# Patient Record
Sex: FEMALE | Race: WHITE | NOT HISPANIC OR LATINO | Employment: OTHER | URBAN - METROPOLITAN AREA
[De-identification: names, ages, dates, MRNs, and addresses within clinical notes are randomized per-mention and may not be internally consistent; named-entity substitution may affect disease eponyms.]

---

## 2017-01-18 ENCOUNTER — OFFICE VISIT (OUTPATIENT)
Dept: OBSTETRICS AND GYNECOLOGY | Facility: CLINIC | Age: 72
End: 2017-01-18

## 2017-01-18 VITALS
BODY MASS INDEX: 38.3 KG/M2 | HEIGHT: 67 IN | WEIGHT: 244 LBS | SYSTOLIC BLOOD PRESSURE: 120 MMHG | DIASTOLIC BLOOD PRESSURE: 78 MMHG

## 2017-01-18 DIAGNOSIS — Z85.3 PERSONAL HISTORY OF BREAST CANCER: ICD-10-CM

## 2017-01-18 DIAGNOSIS — Z87.42 HISTORY OF ABNORMAL CERVICAL PAP SMEAR: ICD-10-CM

## 2017-01-18 DIAGNOSIS — Z01.419 ENCOUNTER FOR GYNECOLOGICAL EXAMINATION WITHOUT ABNORMAL FINDING: Primary | ICD-10-CM

## 2017-01-18 DIAGNOSIS — Z78.0 MENOPAUSE: ICD-10-CM

## 2017-01-18 PROCEDURE — G0101 CA SCREEN;PELVIC/BREAST EXAM: HCPCS | Performed by: OBSTETRICS & GYNECOLOGY

## 2017-01-18 RX ORDER — ATORVASTATIN CALCIUM 10 MG/1
TABLET, FILM COATED ORAL
COMMUNITY
Start: 2016-12-06 | End: 2017-11-14 | Stop reason: HOSPADM

## 2017-01-18 RX ORDER — ALLOPURINOL 100 MG/1
TABLET ORAL
Status: ON HOLD | COMMUNITY
Start: 2016-12-14 | End: 2017-11-14

## 2017-01-18 RX ORDER — FLUTICASONE PROPIONATE 50 MCG
SPRAY, SUSPENSION (ML) NASAL
Status: ON HOLD | COMMUNITY
Start: 2016-12-06 | End: 2020-12-28

## 2017-01-18 RX ORDER — LEVOTHYROXINE SODIUM 112 UG/1
100 TABLET ORAL DAILY
Status: ON HOLD | COMMUNITY
Start: 2016-12-06 | End: 2020-12-29

## 2017-01-18 RX ORDER — FOLIC ACID 1 MG/1
TABLET ORAL
Status: ON HOLD | COMMUNITY
Start: 2016-12-06 | End: 2017-11-14

## 2017-01-18 RX ORDER — ACYCLOVIR 400 MG/1
TABLET ORAL
Status: ON HOLD | COMMUNITY
Start: 2016-12-16 | End: 2020-12-28

## 2017-01-18 NOTE — PROGRESS NOTES
Subjective     Alice Maxwell is a 71 y.o. female for annual gyn exam    History of Present Illness   71-year-old postmenopausal white female presents for gynecologic exam.  She has a history of postmenopausal bleeding and abnormal Pap smears and has been followed yearly for this.  She also has a history of left breast cancer which was treated with lumpectomy followed by radiation therapy.  This was in 2002.  Her follow-up to this point has been negative.  He complains of intermittent lower abdominal cramping but denies any abnormal vaginal discharges or bleeding.    The following portions of the patient's history were reviewed and updated as appropriate: allergies, current medications, past family history, past medical history, past social history, past surgical history and problem list.      Review of Systems   Constitutional: Negative for activity change, appetite change, fatigue and unexpected weight change.   HENT: Negative.    Eyes: Negative.    Respiratory: Negative.    Cardiovascular: Negative.    Gastrointestinal: Positive for abdominal pain. Negative for abdominal distention, anal bleeding, constipation, diarrhea, nausea and vomiting.   Endocrine: Negative for cold intolerance, heat intolerance, polydipsia, polyphagia and polyuria.   Genitourinary: Negative for difficulty urinating, dysuria, flank pain, frequency, hematuria, urgency, vaginal bleeding and vaginal discharge.   Musculoskeletal: Negative.    Skin: Negative.    Allergic/Immunologic: Negative.    Neurological: Negative.    Hematological: Negative.    Psychiatric/Behavioral: Negative.        Objective     Physical Exam   Constitutional: She is oriented to person, place, and time. Vital signs are normal. She appears well-developed and well-nourished. She is cooperative.   HENT:   Head: Normocephalic.   Eyes: Conjunctivae are normal. Pupils are equal, round, and reactive to light.   Neck: Normal range of motion. Neck supple. No tracheal deviation  present. No thyromegaly present.   Cardiovascular: Normal rate, regular rhythm and normal heart sounds.  Exam reveals no gallop and no friction rub.    No murmur heard.  Pulmonary/Chest: Effort normal and breath sounds normal. No respiratory distress. She has no wheezes. Right breast exhibits no inverted nipple, no mass, no nipple discharge, no skin change and no tenderness. Left breast exhibits no inverted nipple, no mass, no nipple discharge, no skin change and no tenderness. Breasts are symmetrical. There is no breast swelling.   Abdominal: Soft. Bowel sounds are normal. She exhibits no distension, no ascites and no mass. There is no hepatosplenomegaly. There is no tenderness. There is no rebound, no guarding and no CVA tenderness. No hernia. Hernia confirmed negative in the right inguinal area and confirmed negative in the left inguinal area.   Genitourinary: Rectal exam shows no fissure, no mass, no tenderness and anal tone normal. Pelvic exam was performed with patient supine. No labial fusion. There is no rash, tenderness, lesion or injury on the right labia. There is no rash, tenderness, lesion or injury on the left labia. Uterus is not deviated, not enlarged, not fixed and not tender. Cervix exhibits no motion tenderness, no discharge and no friability. Right adnexum displays no mass, no tenderness and no fullness. Left adnexum displays no mass, no tenderness and no fullness. No erythema, tenderness or bleeding in the vagina. No foreign body in the vagina. No signs of injury around the vagina. No vaginal discharge found.   Musculoskeletal: Normal range of motion. She exhibits no edema or deformity.   Lymphadenopathy:     She has no cervical adenopathy.        Right: No inguinal adenopathy present.        Left: No inguinal adenopathy present.   Neurological: She is alert and oriented to person, place, and time. She has normal reflexes. No cranial nerve deficit. Coordination normal.   Skin: Skin is warm and  dry. No rash noted. No erythema.   Psychiatric: She has a normal mood and affect. Her behavior is normal.         Alice was seen today for gynecologic exam.    Diagnoses and all orders for this visit:    Encounter for gynecological examination without abnormal finding    History of abnormal cervical Pap smear  -     Pap IG, Rfx HPV ASCU    Personal history of breast cancer    Menopause    The patient was reassured by her negative exam.  I believe her abdominal discomfort is gastrointestinal related.  She has a mammogram scheduled for next week as well as a follow up with her oncologist.  She continues nutritional supplementation for bone health.

## 2017-01-18 NOTE — MR AVS SNAPSHOT
Alice Maxwell   2017 2:10 PM   Office Visit    Dept Phone:  [phone]   Encounter #:  [id number]    Provider:  Robert Rosales MD   Department:  [facility]                Your Full Care Plan              Your Updated Medication List          This list is accurate as of: 17  2:52 PM.  Always use your most recent med list.                acyclovir 400 MG tablet   Commonly known as:  ZOVIRAX       allopurinol 100 MG tablet   Commonly known as:  ZYLOPRIM       atorvastatin 10 MG tablet   Commonly known as:  LIPITOR       fluticasone 50 MCG/ACT nasal spray   Commonly known as:  FLONASE       folic acid 1 MG tablet   Commonly known as:  FOLVITE       levothyroxine 112 MCG tablet   Commonly known as:  SYNTHROID, LEVOTHROID       metFORMIN 500 MG tablet   Commonly known as:  GLUCOPHAGE               We Performed the Following     Pap IG, Rfx HPV ASCU       You Were Diagnosed With        Codes Comments    Encounter for gynecological examination without abnormal finding    -  Primary ICD-10-CM: Z01.419  ICD-9-CM: V72.31     History of abnormal cervical Pap smear     ICD-10-CM: Z87.898  ICD-9-CM: V13.29     Personal history of breast cancer     ICD-10-CM: Z85.3  ICD-9-CM: V10.3     Menopause     ICD-10-CM: Z78.0  ICD-9-CM: 627.2       Instructions     None    Patient Instructions History      Upcoming Appointments     Visit Type Date Time Department    [facility] 2017  2:10 PM [facility]      [facility] Signup     [facility] allows you to send messages to your doctor, view your test results, renew your prescriptions, schedule appointments, and more. To sign up, go to [employer] and click on the Sign Up Now link in the New User? box. Enter your [facility] Activation Code exactly as it appears below along with the last four digits of your Social Security Number and your Date of Birth () to complete the sign-up process. If you  "do not sign up before the expiration date, you must request a new code.    [facility] Activation Code: [email or web address]  Expires: 2/1/2017  2:50 PM    If you have questions, you can email [email or web address] or call [phone] to talk to our [facility] staff. Remember, [facility] is NOT to be used for urgent needs. For medical emergencies, dial 911.               Other Info from Your Visit           Allergies     Penicillins        Reason for Visit     Gynecologic Exam CRAMPING LOW ABD EVERY 2 MONTHS      Vital Signs     Blood Pressure Height Weight Body Mass Index Smoking Status       120/78 67\" (170.2 cm) 244 lb (111 kg) 38.22 kg/m2 Never Smoker       Problems and Diagnoses Noted     Encounter for routine gynecological examination    -  Primary    History of abnormal cervical Pap smear        Personal history of breast cancer        Menopause            "

## 2017-01-19 LAB
CONV .: NORMAL
CYTOLOGIST CVX/VAG CYTO: NORMAL
CYTOLOGY CVX/VAG DOC THIN PREP: NORMAL
DX ICD CODE: NORMAL
HIV 1 & 2 AB SER-IMP: NORMAL
OTHER STN SPEC: NORMAL
PATH REPORT.FINAL DX SPEC: NORMAL
STAT OF ADQ CVX/VAG CYTO-IMP: NORMAL

## 2017-10-31 ENCOUNTER — HOSPITAL ENCOUNTER (INPATIENT)
Facility: HOSPITAL | Age: 72
LOS: 14 days | Discharge: HOME | End: 2017-11-14
Attending: PHYSICAL MEDICINE & REHABILITATION | Admitting: PHYSICAL MEDICINE & REHABILITATION

## 2017-10-31 DIAGNOSIS — R13.12 OROPHARYNGEAL DYSPHAGIA: ICD-10-CM

## 2017-10-31 LAB
GLUCOSE BLDC GLUCOMTR-MCNC: 127 MG/DL (ref 70–130)
GLUCOSE BLDC GLUCOMTR-MCNC: 99 MG/DL (ref 70–130)

## 2017-10-31 PROCEDURE — 82962 GLUCOSE BLOOD TEST: CPT

## 2017-10-31 PROCEDURE — 25010000002 ENOXAPARIN PER 10 MG: Performed by: PHYSICAL MEDICINE & REHABILITATION

## 2017-10-31 RX ORDER — ALLOPURINOL 100 MG/1
200 TABLET ORAL DAILY
Status: DISCONTINUED | OUTPATIENT
Start: 2017-11-01 | End: 2017-11-14 | Stop reason: HOSPADM

## 2017-10-31 RX ORDER — ACETAMINOPHEN 325 MG/1
650 TABLET ORAL EVERY 6 HOURS PRN
Status: DISCONTINUED | OUTPATIENT
Start: 2017-10-31 | End: 2017-11-14 | Stop reason: HOSPADM

## 2017-10-31 RX ORDER — NICOTINE POLACRILEX 4 MG
15 LOZENGE BUCCAL
Status: DISCONTINUED | OUTPATIENT
Start: 2017-10-31 | End: 2017-11-14

## 2017-10-31 RX ORDER — ATORVASTATIN CALCIUM 80 MG/1
80 TABLET, FILM COATED ORAL NIGHTLY
Status: DISCONTINUED | OUTPATIENT
Start: 2017-10-31 | End: 2017-11-14 | Stop reason: HOSPADM

## 2017-10-31 RX ORDER — FOLIC ACID 1 MG/1
1 TABLET ORAL DAILY
Status: DISCONTINUED | OUTPATIENT
Start: 2017-11-01 | End: 2017-11-14 | Stop reason: HOSPADM

## 2017-10-31 RX ORDER — DIPHENOXYLATE HYDROCHLORIDE AND ATROPINE SULFATE 2.5; .025 MG/1; MG/1
1 TABLET ORAL DAILY
Status: DISCONTINUED | OUTPATIENT
Start: 2017-11-01 | End: 2017-11-14 | Stop reason: HOSPADM

## 2017-10-31 RX ORDER — OMEGA-3 FATTY ACIDS/FISH OIL 300-1000MG
1 CAPSULE ORAL 2 TIMES DAILY
Status: DISCONTINUED | OUTPATIENT
Start: 2017-10-31 | End: 2017-11-01

## 2017-10-31 RX ORDER — ASPIRIN 81 MG/1
81 TABLET, CHEWABLE ORAL DAILY
Status: DISCONTINUED | OUTPATIENT
Start: 2017-11-01 | End: 2017-11-14 | Stop reason: HOSPADM

## 2017-10-31 RX ORDER — LEVOTHYROXINE SODIUM 112 UG/1
112 TABLET ORAL
Status: DISCONTINUED | OUTPATIENT
Start: 2017-11-01 | End: 2017-11-14 | Stop reason: HOSPADM

## 2017-10-31 RX ORDER — MELATONIN
2000 DAILY
COMMUNITY

## 2017-10-31 RX ORDER — WARFARIN SODIUM 5 MG/1
5 TABLET ORAL
Status: DISCONTINUED | OUTPATIENT
Start: 2017-10-31 | End: 2017-11-03

## 2017-10-31 RX ORDER — DEXTROSE MONOHYDRATE 25 G/50ML
25 INJECTION, SOLUTION INTRAVENOUS
Status: DISCONTINUED | OUTPATIENT
Start: 2017-10-31 | End: 2017-11-14

## 2017-10-31 RX ORDER — ASPIRIN 81 MG/1
81 TABLET, CHEWABLE ORAL DAILY
COMMUNITY

## 2017-10-31 RX ADMIN — WARFARIN SODIUM 5 MG: 5 TABLET ORAL at 20:13

## 2017-10-31 RX ADMIN — ATORVASTATIN CALCIUM 80 MG: 80 TABLET, FILM COATED ORAL at 20:20

## 2017-10-31 RX ADMIN — ENOXAPARIN SODIUM 100 MG: 100 INJECTION SUBCUTANEOUS at 20:11

## 2017-11-01 PROBLEM — I63.9 STROKE (HCC): Status: ACTIVE | Noted: 2017-11-01

## 2017-11-01 LAB
25(OH)D3 SERPL-MCNC: 45.2 NG/ML (ref 30–100)
CREAT BLD-MCNC: 1.09 MG/DL (ref 0.57–1)
GFR SERPL CREATININE-BSD FRML MDRD: 49 ML/MIN/1.73
GLUCOSE BLDC GLUCOMTR-MCNC: 108 MG/DL (ref 70–130)
GLUCOSE BLDC GLUCOMTR-MCNC: 126 MG/DL (ref 70–130)
GLUCOSE BLDC GLUCOMTR-MCNC: 128 MG/DL (ref 70–130)
GLUCOSE BLDC GLUCOMTR-MCNC: 133 MG/DL (ref 70–130)
INR PPP: 1.09 (ref 0.9–1.1)
PROTHROMBIN TIME: 13.7 SECONDS (ref 11.7–14.2)

## 2017-11-01 PROCEDURE — 97112 NEUROMUSCULAR REEDUCATION: CPT | Performed by: OCCUPATIONAL THERAPIST

## 2017-11-01 PROCEDURE — 82306 VITAMIN D 25 HYDROXY: CPT | Performed by: PHYSICAL MEDICINE & REHABILITATION

## 2017-11-01 PROCEDURE — 97166 OT EVAL MOD COMPLEX 45 MIN: CPT | Performed by: OCCUPATIONAL THERAPIST

## 2017-11-01 PROCEDURE — 85610 PROTHROMBIN TIME: CPT | Performed by: PHYSICAL MEDICINE & REHABILITATION

## 2017-11-01 PROCEDURE — 82962 GLUCOSE BLOOD TEST: CPT

## 2017-11-01 PROCEDURE — 96125 COGNITIVE TEST BY HC PRO: CPT

## 2017-11-01 PROCEDURE — 97110 THERAPEUTIC EXERCISES: CPT

## 2017-11-01 PROCEDURE — 82565 ASSAY OF CREATININE: CPT | Performed by: PHYSICAL MEDICINE & REHABILITATION

## 2017-11-01 PROCEDURE — 25010000002 ENOXAPARIN PER 10 MG: Performed by: PHYSICAL MEDICINE & REHABILITATION

## 2017-11-01 PROCEDURE — 97162 PT EVAL MOD COMPLEX 30 MIN: CPT

## 2017-11-01 PROCEDURE — 92610 EVALUATE SWALLOWING FUNCTION: CPT

## 2017-11-01 RX ORDER — UREA 10 %
3 LOTION (ML) TOPICAL NIGHTLY
Status: DISCONTINUED | OUTPATIENT
Start: 2017-11-01 | End: 2017-11-14 | Stop reason: HOSPADM

## 2017-11-01 RX ADMIN — ALLOPURINOL 200 MG: 100 TABLET ORAL at 10:11

## 2017-11-01 RX ADMIN — FOLIC ACID 1 MG: 1 TABLET ORAL at 10:10

## 2017-11-01 RX ADMIN — ENOXAPARIN SODIUM 100 MG: 100 INJECTION SUBCUTANEOUS at 10:11

## 2017-11-01 RX ADMIN — Medication 1 TABLET: at 10:10

## 2017-11-01 RX ADMIN — Medication 3 MG: at 21:30

## 2017-11-01 RX ADMIN — ASPIRIN 81 MG: 81 TABLET, CHEWABLE ORAL at 10:11

## 2017-11-01 RX ADMIN — WARFARIN SODIUM 5 MG: 5 TABLET ORAL at 17:31

## 2017-11-01 RX ADMIN — ENOXAPARIN SODIUM 100 MG: 100 INJECTION SUBCUTANEOUS at 21:30

## 2017-11-01 RX ADMIN — METFORMIN HYDROCHLORIDE 500 MG: 500 TABLET ORAL at 10:10

## 2017-11-01 RX ADMIN — LEVOTHYROXINE SODIUM 112 MCG: 112 TABLET ORAL at 05:34

## 2017-11-01 RX ADMIN — ATORVASTATIN CALCIUM 80 MG: 80 TABLET, FILM COATED ORAL at 21:30

## 2017-11-01 NOTE — PROGRESS NOTES
"Adult Nutrition  Assessment/PES    Patient Name:  Alice Maxwell  YOB: 1945  MRN: [id number]  Admit Date:  10/31/2017    Assessment Date:  11/1/2017    Comments:    Admission screen complete.   Will monitor nutritional status while on rehab.           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 L side weakness.             Anthropometrics       11/01/17 1430    Anthropometrics    RD Documented Weight on Admission --   none available    Anthropometrics (Special Considerations)    Height Used for Calculations 1.702 m (5' 7\")    Usual Body Weight (UBW)    Usual Body Weight 111 kg (244 lb) on file            Labs/Tests/Procedures/Meds       11/01/17 1430    Labs/Tests/Procedures/Meds    Diagnostic Test/Procedure Review reviewed    Labs/Tests Review Reviewed    Procedure Review CT   Acute infarct involving perisylvian right frontal lobe and small portion of the insular cortex    Medication Review Reviewed, pertinent;Diabetic Oral Agent;Multivitamin;Anticoag    Significant Vitals reviewed            Physical Findings       11/01/17 1432    Physical Findings/Assessment    Additional Documentation --   no skin impairment              Nutrition Prescription Ordered       11/01/17 1434    Nutrition Prescription PO    Current PO Diet Pureed    Fluid Consistency Thin    Common Modifiers Consistent Carbohydrate            Evaluation of Received Nutrient/Fluid Intake       11/01/17 1434    PO Evaluation    Number of Meals 1    % PO Intake 100%            Problem/Interventions:        Problem 1       11/01/17 1437    Nutrition Diagnoses Problem 1    Problem 1 Nutrition " Appropriate for Condition at this Time    Etiology (related to) MNT for Treatment/Condition    Signs/Symptoms (evidenced by) PO Intake                    Intervention Goal       11/01/17 1439    Intervention Goal    General Maintain nutrition    PO Tolerate PO;PO intake (%)    PO Intake % 100 %            Nutrition Intervention       11/01/17 1440    Nutrition Intervention    RD/Tech Action Menu provided;Encourage intake;Care plan reviewd;Follow Tx progress              Education/Evaluation       11/01/17 1440    Education    Education Will Instruct as appropriate    Monitor/Evaluation    Monitor Per protocol        Electronically signed by:  Modesta Ramirez RD  11/01/17 2:40 PM

## 2017-11-01 NOTE — PROGRESS NOTES
Inpatient Rehabilitation Plan of Care Note    Plan of Care  Care Plan Reviewed - No updates at this time.    Signed by: Beatriz Cherry RN

## 2017-11-01 NOTE — PROGRESS NOTES
Inpatient Rehabilitation Plan of Care Note    Plan of Care  Care Plan Reviewed - No updates at this time.    Safety    Performed Intervention(s)  Safety rounds      Body Systems    Performed Intervention(s)  Blood glucose testing    Signed by: Daniel Horne RN

## 2017-11-01 NOTE — PROGRESS NOTES
Completed assessment with patient, daughter (Lanette), and sister. Patient lives with  in one story home with ramp entrance. They live in [place] [address]. They have 3 adult children that live here in [place] and [place]. Sister also lives here in [place]. Patient had mild stroke in 2014 and stated she occasionally had some trouble with words, but other than that, denied any residual problems.Daughter stated the only problem they noticed from past stroke was patient would get emotional easily. Discussed Psuedo Bulbar Affect and medication for this. Patient does not want to be on any more medication.  Patient was independent at home. She did use cane when outside of home and uses shower bench. 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 Does most of the work on the computer and stated she usually works about 15 hours/week.  has own [employer] business and this is his busy time of the year. Patient hopes to return home with  at d/c but  unable to be home with her all the time due to his business. Daughter stated patient can come to her house at d/c if needed. Daughter has one story home with no steps and can work from home so could be with her all the time. Discussed team and family conference.  to be here tomorrow night and will go back home sometime Friday morning. Daughter will make sure he stays Friday morning to talk with me regarding team report and ELOS. Will assist with plans.

## 2017-11-01 NOTE — THERAPY EVALUATION
Inpatient Rehabilitation - Physical Therapy Initial Evaluation  [facility]     Patient Name: Alice Maxwell  : 1945  MRN: [id number]  Today's Date: 2017   Onset of Illness/Injury or Date of Surgery Date: 10/28/17  Date of Referral to PT: 17  Referring Physician: Jeremie      Admit Date: 10/31/2017     Visit Dx:  No diagnosis found.  There is no problem list on file for this patient.    Past Medical History:   Diagnosis Date   • Abnormal Pap smear of cervix    • Arthritis    • Cancer     breast   • Disease of thyroid gland    • Stroke    • Urinary tract infection      Past Surgical History:   Procedure Laterality Date   • BACK SURGERY     • BREAST LUMPECTOMY     • CERVICAL BIOPSY  W/ LOOP ELECTRODE EXCISION     • CHOLECYSTECTOMY     • FOOT SURGERY     • GASTRIC BYPASS     • HERNIA REPAIR     • THYROIDECTOMY            PT ASSESSMENT (last 72 hours)      PT Evaluation       17 1226 17 1123    Rehab Evaluation    Document Type evaluation  -SO evaluation  -MD    Subjective Information agree to therapy;complains of;weakness;fatigue  -SO no complaints;agree to therapy  -MD    Patient Effort, Rehab Treatment adequate  -SO     Symptoms Noted During/After Treatment none  -SO none  -MD    General Information    Patient Profile Review yes  -SO yes  -MD    Onset of Illness/Injury or Date of Surgery Date  10/28/17  -MD    Referring Physician Jeremie  -SO Jeremie  -MD    General Observations Pt sitting up in w/c, family present  -SO Pt sitting in WC w family  -MD    Pertinent History Of Current Problem CVA L JOSE  -SO To ER w L sided weakness  and confusion  -MD    Precautions/Limitations fall precautions  -SO fall precautions  -MD    Prior Level of Function independent:;ADL's  -SO independent:  -MD    Equipment Currently Used at Home cane, straight  -SO cane, straight  -MD    Plans/Goals Discussed With patient and family;agreed upon  -SO patient and family  -MD    Living Environment    Lives With  spouse  -SO spouse  -MD    Living Arrangements house  -SO house  -MD    Home Accessibility  ramps present at home  -MD    Clinical Impression    Date of Referral to PT  11/01/17  -MD    PT Diagnosis  impaired gait and impaired dynamic standing balance  -MD    Prognosis  good  -MD    Functional Level At Time Of Evaluation  CGA-Min A  -MD    Criteria for Skilled Therapeutic Interventions Met  yes;treatment indicated  -MD    Pathology/Pathophysiology Noted (Describe Specifically for Each System)  musculoskeletal  -MD    Impairments Found (describe specific impairments)  gait, locomotion, and balance  -MD    Functional Limitations in Following Categories (Describe Specific Limitations)  self-care;home management  -MD    Rehab Potential  good, to achieve stated therapy goals  -MD    Predicted Duration of Therapy Intervention (days/wks)  2 wks  -MD    Pain Assessment    Pain Assessment No/denies pain  -SO No/denies pain  -MD    Vision Assessment/Intervention    Visual Impairment visual impairment, bilaterally;left neglect  -SO visual impairment, bilaterally;left neglect  -MD    Cognitive Assessment/Intervention    Current Cognitive/Communication Assessment impaired  -SO impaired  -MD    Orientation Status oriented to;person;place;time  -SO oriented to;person;place;time  -MD    Follows Commands/Answers Questions able to follow single-step instructions;needs cueing  -% of the time;needs cueing  -MD    Personal Safety decreased insight to deficits  -SO impulsive  -MD    Personal Safety Interventions gait belt;fall prevention program maintained  -SO fall prevention program maintained;gait belt;muscle strengthening facilitated;nonskid shoes/slippers when out of bed;supervised activity  -MD    ROM (Range of Motion)    General ROM  no range of motion deficits identified  -MD    MMT (Manual Muscle Testing)    General MMT Assessment  lower extremity strength deficits identified  -MD    Lower Extremity    Lower Ext Manual  Muscle Testing  left LE strength is WFL;right LE strength is WFL  -MD    Lower Ext Manual Muscle Testing Detail  R LE: hip flexion: 4-/5, knee flex; 4/5, knee ext: 4-/5, ankle DF/PF: 3/5.  L LE: hip flexion: 4-/5, knee flex: 4/5, knee ext: 4-/5, ankle DF/PF: 3-/5  -MD    Bed Mobility, Assessment/Treatment    Bed Mobility, Roll Left, [place]  verbal cues required;supervision required  -MD    Bed Mobility, Roll Right, [place]  verbal cues required;supervision required  -MD    Bed Mobility, Scoot/Bridge, [place]  verbal cues required;supervision required  -MD    Bed Mob, Supine to Sit, [place]  verbal cues required;supervision required  -MD    Bed Mob, Sit to Supine, [place]  verbal cues required;supervision required  -MD    Bed Mobility, Safety Issues  cognitive deficits limit understanding  -MD    Bed Mobility, Impairments  strength decreased  -MD    Bed Mobility, Comment Sitting up in w/c  -SO     Transfer Assessment/Treatment    Transfers, Bed-Chair [place]  verbal cues required;contact guard assist  -MD    Transfers, Chair-Bed [place]  verbal cues required;contact guard assist  -MD    Transfers, Bed-Chair-Bed, Assist Device  rolling walker  -MD    Transfers, Sit-Stand [place] contact guard assist;verbal cues required  -SO verbal cues required;contact guard assist  -MD    Transfers, Stand-Sit [place] contact guard assist;verbal cues required  -SO verbal cues required;contact guard assist  -MD    Transfers, Sit-Stand-Sit, Assist Device --   HHA  -SO rolling walker  -MD    Toilet Transfer, [place] contact guard assist;verbal cues required  -SO     Toilet Transfer, Assistive Device wheelchair   Grab bar  -SO     Transfer, Safety Issues  step length decreased;weight-shifting ability decreased;balance decreased during turns;sequencing ability decreased  -MD    Transfer, Impairments strength decreased  -SO strength decreased;impaired balance  -MD    Gait  Assessment/Treatment    Gait, [place] Level  verbal cues required;contact guard assist;minimum assist (75% patient effort)  -MD    Gait, Assistive Device  rolling walker  -MD    Gait, Distance (Feet)  80   x2  -MD    Gait, Gait Deviations  bilateral:;janet decreased;forward flexed posture;step length decreased;decreased heel strike;toe-to-floor clearance decreased  -MD    Gait, Safety Issues  step length decreased;weight-shifting ability decreased  -MD    Gait, Impairments  impaired balance;strength decreased  -MD    Stairs Assessment/Treatment    Number of Stairs  4  -MD    Stairs, Handrail Location  both sides  -MD    Stairs, [place] Level  verbal cues required;minimum assist (75% patient effort)  -MD    Stairs, Technique Used  step to step (ascending);step to step (descending)  -MD    Stairs, Safety Issues  sequencing ability decreased  -MD    Stairs, Impairments  strength decreased;impaired balance  -MD    Therapy Exercises    Bilateral Lower Extremities  AROM:;15 reps;sitting;ankle pumps/circles;hip abduction/adduction;LAQ;hip flexion  -MD    BLE Resistance  other (comment)   1.5lb ankle weight  -MD    Positioning and Restraints    Pre-Treatment Position sitting in chair/recliner  -SO sitting in chair/recliner  -MD    Post Treatment Position wheelchair  -SO wheelchair  -MD    In Wheelchair sitting;call light within reach;encouraged to call for assist;with family/caregiver  -SO with other staff  -MD      10/31/17 1268       Living Environment    Lives With spouse  -MS     Living Arrangements house  -MS     Home Accessibility no concerns  -MS     Stair Railings at Home inside, present at both sides  -MS     Type of Financial/Environmental Concern none  -MS     Transportation Available car  -MS       User Key  (r) = Recorded By, (t) = Taken By, (c) = Cosigned By    Initials Name Provider Type    SO Micki Castro, OTR Occupational Therapist    MD Nereida Llamas, PT Physical Therapist    MS Daniel SANCHEZ  Ozzie, RN Registered Nurse          Physical Therapy Education     Title: PT OT SLP Therapies (Active)     Topic: Physical Therapy (Active)     Point: Precautions (Active)    Learning Progress Summary    Learner Readiness Method Response Comment Documented by Status   Patient Acceptance E,D NR  MD 11/01/17 1228 Active                      User Key     Initials Effective Dates Name Provider Type Discipline    MD 12/01/15 -  Nereida Llamas, PT Physical Therapist PT                PT Recommendation and Plan  Anticipated Equipment Needs At Discharge: gait belt, front wheeled walker  Anticipated Discharge Disposition: home with home health  Planned Therapy Interventions: balance training, bed mobility training, gait training, home exercise program, patient/family education, transfer training  PT Frequency: 2 times/day  Plan of Care Review  Plan Of Care Reviewed With: patient  Outcome Summary/Follow up Plan: Pt presents s/p CVA leading to impaired gait and impaired dynamic standing balance.  Due to the above pt will benifit from skilled PT to address the above issues and increase pts safety and independence w functional mobility.          IP PT Goals       11/01/17 1221          Bed Mobility PT LTG    Bed Mobility PT LTG, Date Established 11/01/17  -MD      Bed Mobility PT LTG, Time to Achieve 2 wks  -MD      Bed Mobility PT LTG, Activity Type supine to sit/sit to supine  -MD      Bed Mobility PT LTG, [place] independent  -MD      Transfer Training PT LTG    Transfer Training PT LTG, Date Established 11/01/17  -MD      Transfer Training PT LTG, Time to Achieve 2 wks  -MD      Transfer Training PT LTG, Activity Type sit to stand/stand to sit  -MD      Transfer Training PT LTG, [place] supervision required  -MD      Transfer Training PT LTG, Assist Device walker, rolling  -MD      Transfer Training 2 PT LTG    Transfer Training PT 2 LTG, Date Established 11/01/17  -MD      Transfer Training PT 2 LTG, Time to  Achieve 2 wks  -MD      Transfer Training PT 2 LTG, Activity Type other (see comments)   car transfer  -MD      Transfer Training PT 2 LTG, [place] Level supervision required  -MD      Transfer Training PT 2 LTG, Assist Device walker, rolling  -MD      Gait Training PT LTG    Gait Training Goal PT LTG, Date Established 11/01/17  -MD      Gait Training Goal PT LTG, Time to Achieve 2 wks  -MD      Gait Training Goal PT LTG, [place] Level supervision required  -MD      Gait Training Goal PT LTG, Assist Device walker, rolling  -MD      Gait Training Goal PT LTG, Distance to Achieve 160  -MD      Stair Training PT LTG    Stair Training Goal PT LTG, Date Established 11/01/17  -MD      Stair Training Goal PT LTG, Time to Achieve 2 wks  -MD      Stair Training Goal PT LTG, Number of Steps 4  -MD      Stair Training Goal PT LTG, [place] Level supervision required  -MD      Stair Training Goal PT LTG, Assist Device 2 handrails  -MD      Patient Education PT LTG    Patient Education PT LTG, Date Established 11/01/17  -MD      Patient Education PT LTG, Time to Achieve 2 wks  -MD      Patient Education PT LTG, Education Type HEP  -MD      Patient Education PT LTG, Education Understanding demonstrate adequately  -MD        User Key  (r) = Recorded By, (t) = Taken By, (c) = Cosigned By    Initials Name Provider Type    MD Nereida Llamas, PT Physical Therapist               Time Calculation:         PT Charges       11/01/17 1255 11/01/17 1123       Time Calculation    Start Time 1240  -MD 1100  -MD     Stop Time 1310  -MD 1130  -MD     Time Calculation (min) 30 min  -MD 30 min  -MD     PT Received On 11/01/17  -MD 11/01/17  -MD     PT - Next Appointment 11/02/17  -MD      PT Goal Re-Cert Due Date  11/08/17  -MD       User Key  (r) = Recorded By, (t) = Taken By, (c) = Cosigned By    Initials Name Provider Type    MD Nereida Llamas, PT Physical Therapist          Therapy Charges for Today     Code Description Service Date  Service Provider Modifiers Qty    43292416103 HC PT EVAL MOD COMPLEXITY 2 11/1/2017 Nereida Llamas, PT GP 1    97698985966 HC PT THER PROC EA 15 MIN 11/1/2017 Nereida Llamas, PT GP 3    36751991784 HC PT THER SUPP EA 15 MIN 11/1/2017 Nereida Llamas, PT GP 1                 Nereida Llamas, PT  11/1/2017

## 2017-11-01 NOTE — PROGRESS NOTES
Inpatient Rehabilitation Plan of Care Note    Plan of Care  Care Plan Reviewed - No updates at this time.    Safety    Performed Intervention(s)  Safety rounds      Psychosocial    Performed Intervention(s)  Verbalizes needs and concerns    Signed by: Daniel Horne RN

## 2017-11-01 NOTE — PROGRESS NOTES
Inpatient Rehabilitation Functional Measures Assessment    Functional Measures  YESI Eating:  [facility] Grooming: [facility] Bathing:  [facility] Upper Body Dressing:  [facility] Lower Body Dressing:  [facility] Toileting:  [facility] Bladder Management  Level of Assistance:  [place]  Frequency/Number of Accidents this Shift:  [facility] Bowel Management  Level of Assistance: [place]  Frequency/Number of Accidents this Shift: [facility] Bed/Chair/Wheelchair Transfer:  [facility] Toilet Transfer:  [facility] Tub/Shower Transfer:  [place]    Previously Documented Mode of Locomotion at Discharge: Field  YESI Expected Mode of Locomotion at Discharge: [facility] Walk/Wheelchair:  [facility] Stairs:  [facility] Comprehension:  Auditory comprehension is the usual mode. Comprehension  Score = 6, Modified [place].  Patient comprehends complex/abstract  information in their primary language with only mild difficulty.  YESI Expression:  Vocal expression is the usual mode. Expression Score = 6,  Modified Independent.  Patient expresses complex/abstract information in their  primary language with only mild difficulty with tasks.  YESI Social Interaction:  Social Interaction Score = 6, Modified Independent.  Patient is modified independent for social interaction, requiring: Requires  additional time.  YESI Problem Solving:  Problem Solving Score = 6, Modified [place].  Patient  makes appropriate decisions in order to solve complex problems with mild  difficulty but self-corrects.  YESI Memory:  Memory Score = 6, Modified [place].  Patient is modified  independent for memory, having only mild difficulty and using self-initiated or  environmental cues to remember.    Therapy Mode Minutes  Occupational Therapy: Branch  Physical Therapy: Branch  Speech Language Pathology:  Branch    Signed by: Daniel Horne RN

## 2017-11-01 NOTE — PROGRESS NOTES
Inpatient Rehabilitation Functional Measures Assessment and Plan of Care    Plan of Care  Updated Problems/Interventions  Mobility    [PT] Bed/Chair/Wheelchair(Active)  Current Status(11/01/2017): CGA, RWx  Weekly Goal(11/08/2017): SBA, RWx  Discharge Goal: Supervision, RWx    [PT] Walk(Active)  Current Status(11/01/2017): 80 ft, Min A, RWx  Weekly Goal(11/08/2017): to and from BR, CGA  Discharge Goal: 160 ft, Supervision, RWx    [PT] Stairs(Active)  Current Status(11/01/2017): 4 steps, 2 HR, Min A  Weekly Goal(11/08/2017): PT only  Discharge Goal: 4 steps, 2 HR, CGA    Functional Measures  YESI Eating:  [place]anch  [facility] Grooming: [place]  [facility] Bathing:  [place]  [facility] Upper Body Dressing:  [place]  [facility] Lower Body Dressing:  [place]  [facility] Toileting:  Branch    [facility] Bladder Management  Level of Assistance:  [place]  Frequency/Number of Accidents this Shift:  [facility] Bowel Management  Level of Assistance: [place]  Frequency/Number of Accidents this Shift: [place]    [facility] Bed/Chair/Wheelchair Transfer:  Bed/chair/wheelchair Transfer Score = 4.  Patient performs 75% or more of effort and minimal assistance (little/incidental  help/lifting of one limb/steadying) for transferring to and from the  bed/chair/wheelchair, requiring: Contact guard. Patient requires the following  assistive device(s): Walker.  YESI Toilet Transfer:  [place]  [facility] Tub/Shower Transfer:  Branch    Previously Documented Mode of Locomotion at Discharge: Field  YESI Expected Mode of Locomotion at Discharge: The expected mode of most  frequently used locomotion, at discharge, is expected to be walking.  [facility] Walk/Wheelchair:  WHEELCHAIR OBSERVATION   Activity was not observed.    WALK OBSERVATION   Walk Distance Scale = 2.  Distance walked is 50 -149 feet. Walk Score = 2.  Patient performs 75% or more of effort and requires minimal assistance.  Incidental assistance, contact guard or steadying was provided. Patient walked a  distance of 80 feet. Patient requires the  following assistive device(s): Rolling  walker.  YESI Stairs:  Stairs Score = 2.  Incidental assistance with lifting or lowering,  contact guard or steadying was provided. Patient performs 75% or more of effort  and requires minimal contact assistance. Patient negotiated  4 stairs. Patient  requires the following assistive device(s): Handrail(s).    YESI Comprehension:  [place]  YESI Expression:  [place]  YESI Social Interaction:  [place]  YESI Problem Solving:  [place]  YESI Memory:  [place]    Therapy Mode Minutes  Occupational Therapy: Branch  Physical Therapy: Individual: 60 minutes.  Speech Language Pathology:  [place]anch    Signed by: Nereida Llamas, PT

## 2017-11-01 NOTE — PROGRESS NOTES
Case Management  Inpatient Rehabilitation Plan of Care and Discharge Plan Note    Rehabilitation Diagnosis:  CVA left jennifer  Date of Onset:  10/28/17    Medical Summary:  TO ER with left sided weakness, confusion, drooling; stat  flighted to ER  Past Medical History: [place], allergies, DOMITILA cataracts; HLD, LE edema; WALLY; OA,  chronic pain, RA, back sx, foot sx 2016; multiple UTIs, urgency; hernia repair,  gastric bypass; CVA 2014 with residual dysarthria; DM, obesity, partial  thyroidectomy; factor V [place], breast cancer, lumpectomy; anxiety    Plan of Care  Updated Problems/Interventions      Expected Intensity:  Average of 3 hours of therapy 5 days/week.  Interdisciplinary Team:  Interdisciplinary Team: Medical Supervision and 24 Hour Rehabilitation Nursing.,  Physical Therapy:, Occupational Therapy:, Speech and Language Therapy:, Social  Work, Therapeutic Recreation., Psychology.  Physical Therapy Intensity/Duration: 60 minutes/day, 5 days/week  Occupational Therapy Intensity/Duration: 60 minutes/day, 5 days/week  Speech Language Pathology  Intensity/Duration: 60 minutes/day, 5 days/week  Estimated Length of Stay/Anticipated Discharge Date: ELOS: 10 - 14 days  Anticipated Discharge Destination:  Anticipated discharge destination from inpatient rehabilitation is community  discharge with assistance. Home with spouse      Based on the patient's medical and functional status, their prognosis and  expected level of functional improvement is:  MOD I    Signed by: Jean Claude Harkins RN

## 2017-11-01 NOTE — PROGRESS NOTES
Inpatient Rehabilitation Plan of Care Note    Plan of Care  Care Plan Reviewed - No updates at this time.    Safety    Performed Intervention(s)  Safety rounds  Bed and chair alarms      Psychosocial    Performed Intervention(s)  Verbalizes needs and concerns    Signed by: Daniel Horne RN

## 2017-11-01 NOTE — THERAPY EVALUATION
Inpatient Rehabilitation - Occupational Therapy Initial Evaluation  [facility]     Patient Name: Alice Maxwell  : 1945  MRN: [id number]  Today's Date: 2017  Onset of Illness/Injury or Date of Surgery Date: 10/28/17  Date of Referral to OT: 17  Referring Physician: Jeremie    Admit Date: 10/31/2017     No diagnosis found.  There is no problem list on file for this patient.    Past Medical History:   Diagnosis Date   • Abnormal Pap smear of cervix    • Arthritis    • Cancer     breast   • Disease of thyroid gland    • Stroke    • Urinary tract infection      Past Surgical History:   Procedure Laterality Date   • BACK SURGERY     • BREAST LUMPECTOMY     • CERVICAL BIOPSY  W/ LOOP ELECTRODE EXCISION     • CHOLECYSTECTOMY     • FOOT SURGERY     • GASTRIC BYPASS     • HERNIA REPAIR     • THYROIDECTOMY            OT ASSESSMENT FLOWSHEET (last 72 hours)      OT Evaluation       17 1226 17 1123 10/31/17 1635          Rehab Evaluation    Document Type evaluation  -SO evaluation  -MD       Subjective Information agree to therapy;complains of;weakness;fatigue  -SO no complaints;agree to therapy  -MD       Patient Effort, Rehab Treatment adequate  -SO        Symptoms Noted During/After Treatment none  -SO none  -MD       General Information    Patient Profile Review yes  -SO yes  -MD       Onset of Illness/Injury or Date of Surgery Date  10/28/17  -MD       Referring Physician Jeremie  -SO Jeremie  -MD       General Observations Pt sitting up in w/c, family present  -SO Pt sitting in WC w family  -MD       Pertinent History Of Current Problem CVA L JOSE  -SO To ER w L sided weakness  and confusion  -MD       Precautions/Limitations fall precautions  -SO fall precautions  -MD       Prior Level of Function independent:;ADL's  -SO independent:  -MD       Equipment Currently Used at Home cane, straight  -SO cane, straight  -MD       Plans/Goals Discussed With patient and family;agreed upon  -SO  patient and family  -MD       Living Environment    Lives With spouse  -SO spouse  -MD spouse  -MS      Living Arrangements house  -SO house  -MD house  -MS      Home Accessibility  ramps present at home  -MD no concerns  -MS      Stair Railings at Home   inside, present at both sides  -MS      Type of Financial/Environmental Concern   none  -MS      Transportation Available   car  -MS      Clinical Impression    Date of Referral to OT 11/01/17  -SO        Impairments Found (describe specific impairments) aerobic capacity/endurance;arousal, attention, and cognition;gait, locomotion, and balance;motor function;muscle performance;posture;ROM  -SO        Criteria for Skilled Therapeutic Interventions Met yes  -SO        Rehab Potential good, to achieve stated therapy goals  -SO        Therapy Frequency 5 times/wk  -SO        Predicted Duration of Therapy Intervention (days/wks) 2-3 weeks  -SO        Functional Level Prior    Ambulation   0-->independent  -MS      Transferring   0-->independent  -MS      Toileting   0-->independent  -MS      Bathing   0-->independent  -MS      Dressing   0-->independent  -MS      Eating   0-->independent  -MS      Communication   0-->understands/communicates without difficulty  -MS      Swallowing   0-->swallows foods/liquids without difficulty  -MS      Pain Assessment    Pain Assessment No/denies pain  -SO No/denies pain  -MD       Vision Assessment/Intervention    Visual Impairment visual impairment, bilaterally;left neglect  -SO visual impairment, bilaterally;left neglect  -MD       Cognitive Assessment/Intervention    Current Cognitive/Communication Assessment impaired  -SO impaired  -MD       Orientation Status oriented to;person;place;time  -SO oriented to;person;place;time  -MD       Follows Commands/Answers Questions able to follow single-step instructions;needs cueing  -% of the time;needs cueing  -MD       Personal Safety decreased insight to deficits  -SO impulsive   -MD       Personal Safety Interventions gait belt;fall prevention program maintained  -SO fall prevention program maintained;gait belt;muscle strengthening facilitated;nonskid shoes/slippers when out of bed;supervised activity  -MD       ROM (Range of Motion)    General ROM  no range of motion deficits identified  -MD       MMT (Manual Muscle Testing)    General MMT Assessment  lower extremity strength deficits identified  -MD       Bed Mobility, Assessment/Treatment    Bed Mobility, Roll Left, [place]  verbal cues required;supervision required  -MD       Bed Mobility, Roll Right, [place]  verbal cues required;supervision required  -MD       Bed Mobility, Scoot/Bridge, [place]  verbal cues required;supervision required  -MD       Bed Mob, Supine to Sit, [place]  verbal cues required;supervision required  -MD       Bed Mob, Sit to Supine, [place]  verbal cues required;supervision required  -MD       Bed Mobility, Safety Issues  cognitive deficits limit understanding  -MD       Bed Mobility, Impairments  strength decreased  -MD       Bed Mobility, Comment Sitting up in w/c  -SO        Transfer Assessment/Treatment    Transfers, Bed-Chair [place]  verbal cues required;contact guard assist  -MD       Transfers, Chair-Bed [place]  verbal cues required;contact guard assist  -MD       Transfers, Bed-Chair-Bed, Assist Device  rolling walker  -MD       Transfers, Sit-Stand [place] contact guard assist;verbal cues required  -SO verbal cues required;contact guard assist  -MD       Transfers, Stand-Sit [place] contact guard assist;verbal cues required  -SO verbal cues required;contact guard assist  -MD       Transfers, Sit-Stand-Sit, Assist Device --   HHA  -SO rolling walker  -MD       Toilet Transfer, [place] contact guard assist;verbal cues required  -SO        Toilet Transfer, Assistive Device wheelchair   Grab bar  -SO        Transfer, Safety Issues  step length  decreased;weight-shifting ability decreased;balance decreased during turns;sequencing ability decreased  -MD       Transfer, Impairments strength decreased  -SO strength decreased;impaired balance  -MD       Stairs Assessment/Treatment    Number of Stairs  4  -MD       Stairs, Handrail Location  both sides  -MD       Stairs, [place] Level  verbal cues required;minimum assist (75% patient effort)  -MD       Stairs, Technique Used  step to step (ascending);step to step (descending)  -MD       Stairs, Safety Issues  sequencing ability decreased  -MD       Stairs, Impairments  strength decreased;impaired balance  -MD       Upper Body Bathing Assessment/Training    UB Bathing Assess/Train, Position sitting;sink side  -SO        UB Bathing Assess/Train, [place] Level stand by assist;set up required;verbal cues required  -SO        Lower Body Bathing Assessment/Training    LB Bathing Assess/Train, Position sitting;standing;sink side  -SO        LB Bathing Assess/Train, [place] Level contact guard assist;verbal cues required  -SO        LB Bathing Assess/Train, Impairments impaired balance  -SO        Upper Body Dressing Assessment/Training    UB Dressing Assess/Train, Clothing Type doffing:;donning:;t-shirt  -SO        UB Dressing Assess/Train, Position sitting;sink side  -SO        UB Dressing Assess/Train, [place] minimum assist (75% patient effort)  -SO        Lower Body Dressing Assessment/Training    LB Dressing Assess/Train, Clothing Type doffing:;donning:;pants;shoes;socks  -SO        LB Dressing Assess/Train, Position sitting;standing  -SO        LB Dressing Assess/Train, [place] minimum assist (75% patient effort);verbal cues required;nonverbal cues required (demo/gesture)  -SO        Toileting Assessment/Training    Toileting Assess/Train, Assistive Device grab bars  -SO        Toileting Assess/Train, Position sitting;standing  -SO        Toileting Assess/Train, [place] Level contact  guard assist;verbal cues required;nonverbal cues required (demo/gesture)  -SO        Grooming Assessment/Training    Grooming Assess/Train, Position sitting;sink side  -SO        Grooming Assess/Train, [place] Level set up required;verbal cues required  -SO        Positioning and Restraints    Pre-Treatment Position sitting in chair/recliner  -SO sitting in chair/recliner  -MD       Post Treatment Position wheelchair  -SO        In Wheelchair sitting;call light within reach;encouraged to call for assist;with family/caregiver  -SO        Lower Extremity    Lower Ext Manual Muscle Testing  left LE strength is WFL;right LE strength is WFL  -MD       Lower Ext Manual Muscle Testing Detail  R LE: hip flexion: 4-/5, knee flex; 4/5, knee ext: 4-/5, ankle DF/PF: 3/5.  L LE: hip flexion: 4-/5, knee flex: 4/5, knee ext: 4-/5, ankle DF/PF: 3-/5  -MD         User Key  (r) = Recorded By, (t) = Taken By, (c) = Cosigned By    Initials Name Effective Dates    SO Micki Castro OTR 04/13/15 -     MD Nereida Llamas, PT 12/01/15 -     MS Daniel Horne RN 06/16/16 -            Occupational Therapy Education     Title: PT OT SLP Therapies (Active)     Topic: Occupational Therapy (Active)     Point: ADL training (Done)    Description: Instruct learner(s) on proper safety adaptation and remediation techniques during self care or transfers.   Instruct in proper use of assistive devices.    Learning Progress Summary    Learner Readiness Method Response Comment Documented by Status   Patient Acceptance E VU Discuss OT goals and POC. SO 11/01/17 1232 Done   Family Acceptance E VU Discuss OT goals and POC. SO 11/01/17 1232 Done                      User Key     Initials Effective Dates Name Provider Type Discipline    SO 04/13/15 -  KANDY Boo Occupational Therapist OT                  OT Recommendation and Plan  Therapy Frequency: 5 times/wk             OT Goals       11/01/17 1230          Transfer Training OT STG     Transfer Training OT STG, Date Established 11/01/17 -SO      Transfer Training OT STG, Time to Achieve 1 wk  -SO      Transfer Training OT STG, Activity Type tub;walk-in shower;shower chair  -SO      Transfer Training OT STG, [place] Level contact guard assist  -SO      Transfer Training OT STG, Assist Device walker, rolling;shower chair  -SO      Transfer Training OT LTG    Transfer Training OT LTG, Date Established 11/01/17 -SO      Transfer Training OT LTG, Time to Achieve by discharge  -SO      Transfer Training OT LTG, Activity Type tub;walk-in shower;shower chair  -SO      Transfer Training OT LTG, [place] Level supervision required  -SO      Transfer Training OT LTG, Assist Device walker, rolling;shower chair  -SO      Transfer Training 2 OT STG    Transfer Training 2 OT STG, Date Established 11/01/17 -SO      Transfer Training 2 OT STG, Time to Achieve 1 wk  -SO      Transfer Training 2 OT STG, Activity Type toilet  -SO      Transfer Training 2 OT STG, [place] Level contact guard assist  -SO      Transfer Training 2 OT STG, Assist Device walker, rolling  -SO      Transfer Training 2 OT LTG    Transfer Training 2 OT LTG, Date Established 11/01/17 -SO      Transfer Training 2 OT LTG, Time to Achieve by discharge  -SO      Transfer Training 2 OT LTG, Activity Type toilet  -SO      Transfer Training 2 OT LTG, [place] Level supervision required  -SO      Transfer Training 2 OT LTG, Assist Device walker, rolling  -SO      Caregiver Training OT LTG    Caregiver Training OT LTG, Date Established 11/01/17 -SO      Caregiver Training OT LTG, Time to Achieve by discharge  -SO      Caregiver Training OT LTG, [place] Level able to assist adequately;able to cue patient adequately  -SO      Patient Education OT LTG    Patient Education OT LTG, Date Established 11/01/17 -SO      Patient Education OT LTG, Time to Achieve by discharge  -SO      Patient Education OT LTG, Education Type written  program;HEP;aware of neuro deficits;home safety  -SO      Patient Education OT LTG, Education Understanding independent;demonstrates adequately;verbalizes understanding  -SO      ADL OT STG    ADL OT STG, Date Established 11/01/17  -SO      ADL OT STG, Time to Achieve 1 wk  -SO      ADL OT STG, Activity Type ADL skills  -SO      ADL OT STG, [place] Level contact guard;assistive device  -SO      ADL OT LTG    ADL OT LTG, Date Established 11/01/17  -SO      ADL OT LTG, Time to Achieve by discharge  -SO      ADL OT LTG, Activity Type ADL skills  -SO      ADL OT LTG, [place] Level standby assist;assistive device  -SO        User Key  (r) = Recorded By, (t) = Taken By, (c) = Cosigned By    Initials Name Provider Type    SO KANDY Boo Occupational Therapist              Time Calculation:   OT Start Time: 1030  OT Stop Time: 1100  OT Time Calculation (min): 30 min    Therapy Charges for Today     Code Description Service Date Service Provider Modifiers Qty    06305569714 HC OT EVAL MOD COMPLEXITY 2 11/1/2017 KANDY Boo GO 1               KANDY Boo  11/1/2017

## 2017-11-01 NOTE — PROGRESS NOTES
Inpatient Rehabilitation Functional Measures Assessment    Functional Measures  YESI Eating:  [facility] Grooming: [facility] Bathing:  [facility] Upper Body Dressing:  [facility] Lower Body Dressing:  [facility] Toileting:  [facility] Bladder Management  Level of Assistance:  [place]  Frequency/Number of Accidents this Shift:  [facility] Bowel Management  Level of Assistance: [place]  Frequency/Number of Accidents this Shift: [facility] Bed/Chair/Wheelchair Transfer:  [facility] Toilet Transfer:  [facility] Tub/Shower Transfer:  [place]    Previously Documented Mode of Locomotion at Discharge: Field  YESI Expected Mode of Locomotion at Discharge: [facility] Walk/Wheelchair:  [facility] Stairs:  [facility] Comprehension:  Auditory comprehension is the usual mode. Comprehension  Score = 6, Modified [place].  Patient comprehends complex/abstract  information in their primary language with only mild difficulty.  YESI Expression:  Vocal expression is the usual mode. Expression Score = 6,  Modified Independent.  Patient expresses complex/abstract information in their  primary language with only mild difficulty with tasks.  YESI Social Interaction:  Social Interaction Score = 6, Modified Independent.  Patient is modified independent for social interaction, requiring: Requires  additional time.  YESI Problem Solving:  Problem Solving Score = 6, Modified [place].  Patient  makes appropriate decisions in order to solve complex problems with mild  difficulty but self-corrects.  YESI Memory:  Memory Score = 6, Modified [place].  Patient is modified  independent for memory, having only mild difficulty and using self-initiated or  environmental cues to remember.    Therapy Mode Minutes  Occupational Therapy: [place]  Physical Therapy: [place]  Speech Language Pathology:  [place]    Signed by: Beatriz Cherry RN

## 2017-11-01 NOTE — PROGRESS NOTES
Inpatient Rehabilitation Functional Measures Assessment    Functional Measures  YESI Eating:  [facility] Grooming: [facility] Bathing:  [facility] Upper Body Dressing:  [facility] Lower Body Dressing:  [facility] Toileting:  [facility] Bladder Management  Level of Assistance:  [place]  Frequency/Number of Accidents this Shift:  [facility] Bowel Management  Level of Assistance: [place]  Frequency/Number of Accidents this Shift: [facility] Bed/Chair/Wheelchair Transfer:  [facility] Toilet Transfer:  [facility] Tub/Shower Transfer:  [place]    Previously Documented Mode of Locomotion at Discharge: Field  YESI Expected Mode of Locomotion at Discharge: [facility] Walk/Wheelchair:  [facility] Stairs:  [facility] Comprehension:  Auditory comprehension is the usual mode. Comprehension  Score = 6, Modified [place].  Patient comprehends complex/abstract  information in their primary language with only mild difficulty.  YESI Expression:  Vocal expression is the usual mode. Expression Score = 6,  Modified Independent.  Patient expresses complex/abstract information in their  primary language with only mild difficulty with tasks.  YESI Social Interaction:  Social Interaction Score = 6, Modified Independent.  Patient is modified independent for social interaction, requiring: Requires  additional time.  YESI Problem Solving:  Problem Solving Score = 6, Modified [place].  Patient  makes appropriate decisions in order to solve complex problems with mild  difficulty but self-corrects.  YESI Memory:  Memory Score = 6, Modified [place].  Patient is modified  independent for memory, having only mild difficulty and using self-initiated or  environmental cues to remember.    Therapy Mode Minutes  Occupational Therapy: [place]  Physical Therapy: [place]  Speech Language Pathology:  [place]    Signed by: Daniel Horne RN

## 2017-11-01 NOTE — PROGRESS NOTES
SECTION GG    Self Care Performance:   Oral Hygiene: [place] sets up or cleans up; patient completes activity. [place]  assists only prior to or following the activity.   Toileting Hygiene: [place] provides verbal cues or touching/steadying assistance  as patient completes activity.   Shower/Bathe Self: [place] provides verbal cues or touching/steadying assistance  as patient completes activity.   Upper Body Dressing: [place] does less than half the effort. [place] lifts, holds  or supports trunk or limbs but provides less than half the effort.   Lower Body Dressing: [place] does less than half the effort. [place] lifts, holds  or supports trunk or limbs but provides less than half the effort.   Putting On/Taking Off Footwear: [place] provides verbal cues or  touching/steadying assistance as patient completes activity.    Self Care Discharge Goals:   Putting On/Taking Off Footwear: [place] sets up or cleans up; patient completes  activity. [place] assists only prior to or following the activity.    Mobility Toilet Transfer Performance: [place] provides verbal cues or  touching/steadying assistance as patient completes activity.    Mobility Toilet Transfer Discharge Goal: [place] provides verbal cues or  touching/steadying assistance as patient completes activity.    Signed by: Micki Castro OTR/WAQAS

## 2017-11-01 NOTE — NURSING NOTE
diamond Varma RN sent the 3 home meds the patient had to pharmacy.  She does not have the 2000 unit vitamid D tabs. She only takes thecalcium/vitamin D combo which has been sent down. Note to Dr. munoz in sticky note.

## 2017-11-01 NOTE — THERAPY EVALUATION
Inpatient Rehabilitation - Speech Language Pathology Initial Evaluation  [facility]     Patient Name: Alice Maxwell  : 1945  MRN: [id number]  Today's Date: 2017  Onset of Illness/Injury or Date of Surgery Date: 10/28/17          Speech-Language/Cognitive-Linguistic Evaluation  Subjective: The patient was seen on this date for a Cognitive-Linguistic Evaluation.  Patient was alert and cooperative.    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   Objective: The patient was assessed utilizing standardized assessment Findings were as follows:  CLQT: The Cognitive Linguistic Quick Test (CLQT) was developed for use with English or Faroese speaking adults with acquired neurological dysfunction, ages 18-89. It is an individually administered test designed to gain information about cognitive-linguistic functioning. The test is criterion-referenced and yields severity ratings for 5 cognitive domains, a Total Composite Severity Rating, and a Clock Drawing Severity Rating. The CLQT consists of 10 tasks: personal facts, symbol cancellation, confrontation naming, clock drawing, story retelling, symbol trails, generative naming, design memory, mazes, and design generation. Cognitive Domain Scores are plotted on a rating scale of 1-4, with 1 being Severe and 4 being WFL.   Domain Score Severity Rating   Attention 161 4 (WNL)   Memory 149 3 (Mild)   Executive Functions 16 4 (WNL)   Language 31 4 (WNL)   Visuospatial Skills 62 4 (WNL)         Total Score Severity Rating   Clock Drawing  12 WNL     Clock Drawing Comments:    Severity Rating Severity Rating Range   Composite   3.8 4.0 - 3.5 (WNL)   CLQT Comments:    Assessment: The patient demonstrated functional cognition and impaired speech-language. Pt reports speech/language/cognition to be at baseline from previous CVA in .   Recommendations:   Diagnostic therapy.   Other Recommended Evaluations: Clinical Swallow Evaluation    Speech  Therapy is recommended. Rationale Complete higher level diagnostic therapy    Admit Date: 10/31/2017     Visit Dx:  No diagnosis found.  There is no problem list on file for this patient.    Past Medical History:   Diagnosis Date   • Abnormal Pap smear of cervix    • Arthritis    • Cancer     breast   • Disease of thyroid gland    • Stroke    • Urinary tract infection      Past Surgical History:   Procedure Laterality Date   • BACK SURGERY     • BREAST LUMPECTOMY     • CERVICAL BIOPSY  W/ LOOP ELECTRODE EXCISION     • CHOLECYSTECTOMY     • FOOT SURGERY     • GASTRIC BYPASS     • HERNIA REPAIR     • THYROIDECTOMY            SLP EVALUATION (last 72 hours)      SLP Evaluation       11/01/17 1215                Rehab Evaluation    Document Type evaluation  -OC        Subjective Information no complaints;agree to therapy  -OC        Patient Effort, Rehab Treatment adequate  -OC        Symptoms Noted During/After Treatment none  -OC        General Information    Patient Profile Review yes  -OC        Current Diet Limitations thin liquids;puree  -OC        Precautions/Limitations, Vision WFL with corrective lenses  -OC        Precautions/Limitations, Hearing WFL  -OC        Prior Level of Function- Communication other (comment)   pt reports baseline with speech/lang/cog from previous CVA   -OC        Prior Level of Function- Swallowing no diet consistency restrictions  -OC        Plans/Goals Discussed With patient;family;agreed upon  -OC        Barriers to Rehab none identified  -OC        Cognitive Assessment/Intervention    Current Cognitive/Communication Assessment impaired  -OC        Orientation Status oriented to;person;place;time  -OC        Improve executive function skills    Improve executive function skills: organization/planning activity;exhibit cognitive flexibility;perform self-correction;perform self-evaluation;90%;without cues  -OC        Status: Improve executive function skills New  -OC          User Key   (r) = Recorded By, (t) = Taken By, (c) = Cosigned By    Initials Name Effective Dates    ALISTAIR Mohr MA, CCC-SLP 04/05/17 -            EDUCATION  The patient has been educated in the following areas:   Cognitive Impairment Modified Diet Instruction.    SLP Recommendation and Plan   3-5 times per week  45-60 minutes  Until DC      Anticipated Discharge Disposition: home with assist     Plan of Care Review  Plan Of Care Reviewed With: patient, daughter  Outcome Summary/Follow up Plan: Cognitive-linguistic eval completed, composite score WNL. Mild for executive functions. No overt s/s aspiration with puree and thin lunch tray.  VFSS [facility] 10/29/17 recommending puree and thin.           IP SLP Goals       11/01/17 1215          Safely Consume Diet    Safely Consume Diet- SLP, Date Established 11/01/17  -OC      Safely Consume Diet- SLP, Time to Achieve by discharge  -OC      Safely Consume Diet- SLP, Outcome goal ongoing  -OC        User Key  (r) = Recorded By, (t) = Taken By, (c) = Cosigned By    Initials Name Provider Type    OC Lilian Mohr MA, CCC-SLP Speech and Language Pathologist             SLP Outcome Measures (last 72 hours)      SLP Outcome Measures       11/01/17 1215          SLP Outcome Measures    Outcome Measure Used? Adult NOMS;Modified [place]  -OC      FCM Scores    FCM Chosen Swallowing  -OC      Swallowing FCM Score 4  -OC      Modified [place] Scale    Modified [place] Scale 3 - Moderate disability.  Requiring some help, but able to walk without assistance.  -OC        User Key  (r) = Recorded By, (t) = Taken By, (c) = Cosigned By    Initials Name Effective Dates    ALISTAIR Mohr MA, CCC-SLP 04/05/17 -           Time Calculation:         Time Calculation- SLP       11/01/17 1510 11/01/17 1457 11/01/17 1456    Time Calculation- SLP    SLP Start Time 1100  -OC 0900  -OC 1130  -OC    SLP Stop Time 1200  -OC 1000  -OC 1145  -OC    SLP Time Calculation (min) 60 min  -OC 60 min  -OC 15 min  -OC       User Key  (r) = Recorded By, (t) = Taken By, (c) = Cosigned By    Initials Name Provider Type    OC Lilian Mohr MA,BEN-SLP Speech and Language Pathologist          Therapy Charges for Today     Code Description Service Date Service Provider Modifiers Qty    [id number] HC ST STD COG PERF TEST PER HOUR 11/1/2017 Lilian Mohr MA, CCC-SLP GN 2    [id number]  ST EVAL ORAL PHARYNG SWALLOW 1 11/1/2017 Lilian Mohr MA, CCC-SLP GN 1             ADULT NOMS (last 72 hours)      Adult NOMS       11/01/17 1215                FCM Scores    FCM Chosen Swallowing  -OC        Swallowing FCM Score 4  -OC          User Key  (r) = Recorded By, (t) = Taken By, (c) = Cosigned By    Initials Name Effective Dates    OC Lilian Mohr MA, CCC-SLP 04/05/17 -                Lilian Mohr MA, CCC-SLP  11/1/2017

## 2017-11-01 NOTE — PROGRESS NOTES
Inpatient Rehabilitation Functional Measures Assessment    Functional Measures  YESI Eating:  Eating Score = 5. Patient is supervision/set-up for eating,  requiring: Stand by assistance. No assistive devices were required.  YESI Grooming: Branch  YESI Bathing:  Branch  YESI Upper Body Dressing:  Branch  YESI Lower Body Dressing:  Branch  YESI Toileting:  Patient requires minimal assistance for adjusting clothing  before using a toilet, commode, bedpan, or urinal. Patient requires minimal  assistance for hygiene. Patient requires minimal assistance for adjusting  clothing after using a toilet, commode, bedpan, or urinal. Patient performs 0 -  24% of toileting tasks.  Toileting Score = 1, Total Assistance. Patient requires  the following assistive device(s): Grab bar.    YESI Bladder Management  Level of Assistance:  Bladder Score = 3. Patient performs 50-74% of tasks and  requires moderate assistance for bladder management. [place] provides some assist  to apply OR remove brief.  Frequency/Number of Accidents this Shift:  Bladder accidents this shift:  2 .    YESI Bowel Management  Level of Assistance: Bowel Score = 4. Patient performs 75% or more of tasks and  requires minimal assistance for bowel management. [place] provides touching  (incidental) assist for application of brief.  Frequency/Number of Accidents this Shift: Bowel accidents this shift: 2 .    YESI Bed/Chair/Wheelchair Transfer:  Bed/chair/wheelchair Transfer Score = 4.  Patient performs 75% or more of effort and minimal assistance (little/incidental  help/lifting of one limb/steadying) for transferring to and from the  bed/chair/wheelchair, requiring: Steadying. Steadying. Patient requires the  following assistive device(s): Seating system of wheelchair. Bed rails. Grab  bars.  YESI Toilet Transfer:  Toilet Transfer Score = 4.  Patient performs 75% or more  of effort and minimal assistance (little/incidental help/steadying) for  transferring to and from the  toilet/commode, requiring: Steadying. Contact  guard. Patient requires the following assistive device(s): Grab bars. Safety  frame/over the toilet.  YESI Tub/Shower Transfer:  Branch    Previously Documented Mode of Locomotion at Discharge: Field  YEIS Expected Mode of Locomotion at Discharge: [facility] Walk/Wheelchair:  Branch  [facility] Stairs:  Branch    [facility] Comprehension:  [facility] Expression:  [facility] Social Interaction:  [facility] Problem Solving:  [facility] Memory:  [place]    Therapy Mode Minutes  Occupational Therapy: Branch  Physical Therapy: Branch  Speech Language Pathology:  [place]    Signed by: Jaqueline Izaguirre

## 2017-11-01 NOTE — PLAN OF CARE
Problem: Inpatient Occupational Therapy  Goal: Transfer Training Goal 1 STG- OT    11/01/17 1230   Transfer Training OT STG   Transfer Training OT STG, Date Established 11/01/17   Transfer Training OT STG, Time to Achieve 1 wk   Transfer Training OT STG, Activity Type tub;walk-in shower;shower chair   Transfer Training OT STG, [place] Level contact guard assist   Transfer Training OT STG, Assist Device walker, rolling;shower chair       Goal: Transfer Training Goal 1 LTG- OT    11/01/17 1230   Transfer Training OT LTG   Transfer Training OT LTG, Date Established 11/01/17   Transfer Training OT LTG, Time to Achieve by discharge   Transfer Training OT LTG, Activity Type tub;walk-in shower;shower chair   Transfer Training OT LTG, [place] Level supervision required   Transfer Training OT LTG, Assist Device walker, rolling;shower chair       Goal: Transfer Training Goal 2 STG- OT    11/01/17 1230   Transfer Training 2 OT STG   Transfer Training 2 OT STG, Date Established 11/01/17   Transfer Training 2 OT STG, Time to Achieve 1 wk   Transfer Training 2 OT STG, Activity Type toilet   Transfer Training 2 OT STG, [place] Level contact guard assist   Transfer Training 2 OT STG, Assist Device walker, rolling       Goal: Transfer Training Goal 2 LTG- OT    11/01/17 1230   Transfer Training 2 OT LTG   Transfer Training 2 OT LTG, Date Established 11/01/17   Transfer Training 2 OT LTG, Time to Achieve by discharge   Transfer Training 2 OT LTG, Activity Type toilet   Transfer Training 2 OT LTG, [place] Level supervision required   Transfer Training 2 OT LTG, Assist Device walker, rolling       Goal: Caregiver Training Goal LTG- OT    11/01/17 1230   Caregiver Training OT LTG   Caregiver Training OT LTG, Date Established 11/01/17   Caregiver Training OT LTG, Time to Achieve by discharge   Caregiver Training OT LTG, [place] Level able to assist adequately;able to cue patient adequately       Goal: Patient  Education Goal LTG- OT    11/01/17 1230   Patient Education OT LTG   Patient Education OT LTG, Date Established 11/01/17   Patient Education OT LTG, Time to Achieve by discharge   Patient Education OT LTG, Education Type written program;HEP;aware of neuro deficits;home safety   Patient Education OT LTG, Education Understanding independent;demonstrates adequately;verbalizes understanding       Goal: ADL Goal STG- OT    11/01/17 1230   ADL OT STG   ADL OT STG, Date Established 11/01/17   ADL OT STG, Time to Achieve 1 wk   ADL OT STG, Activity Type ADL skills   ADL OT STG, [place] Level contact guard;assistive device       Goal: ADL Goal LTG- OT    11/01/17 1230   ADL OT LTG   ADL OT LTG, Date Established 11/01/17   ADL OT LTG, Time to Achieve by discharge   ADL OT LTG, Activity Type ADL skills   ADL OT LTG, [place] Level standby assist;assistive device

## 2017-11-01 NOTE — PROGRESS NOTES
Inpatient Rehabilitation Functional Measures Assessment and Plan of Care    Plan of Care  Updated Problems/Interventions  Mobility    [OT] Toilet Transfers(Active)  Current Status(11/01/2017): CGA  Weekly Goal(11/01/2017): SBA  Discharge Goal: SUP    [OT] Tub/Shower Transfers(Active)  Current Status(11/01/2017): TBD  Weekly Goal(11/01/2017): TBD  Discharge Goal: TBD        Self Care    [OT] Bathing(Active)  Current Status(11/01/2017): CGA  Weekly Goal(11/01/2017): SBA  Discharge Goal: SUP    [OT] Dressing (Lower)(Active)  Current Status(11/01/2017): MIN  Weekly Goal(11/01/2017): CGA  Discharge Goal: SBA    [OT] Dressing (Upper)(Active)  Current Status(11/01/2017): MIN  Weekly Goal(11/01/2017): SBA  Discharge Goal: SUP    [OT] Grooming(Active)  Current Status(11/01/2017): SBA  Weekly Goal(11/01/2017): SUP  Discharge Goal: SUP    [OT] Toileting(Active)  Current Status(11/01/2017): CGA  Weekly Goal(11/01/2017): SBA  Discharge Goal: SUP    Functional Measures  YESI Eating:  Branch  YESI Grooming: Grooming Score = 5. Patient is supervision/set-up for grooming,  requiring: Stand by assistance. Verbal cuing, prompting, or instructing. No  assistive devices were required.  YESI Bathing:  Patient took sponge bath. Bathing Score = 4.  Patient requires  minimal assistance for bathing, requiring steadying for balance only. Patient  requires the following assistive device(s): Grab bar/arm rest to maintain  balance.  YESI Upper Body Dressing:  Patient requires no physical assistance for gathering  clothes. Wearing a bra or undershirt was not applicable for this patient.  Patient requires no physical assistance for threading the right arm through the  garment (shirt/sweater). Patient requires no physical assistance for threading  the left arm through the garment (shirt/sweater). Patient requires  minimal/incidental physical assistance for pulling an over-head-garment over  head or pulling front-fastening-garment around back. Patient  requires  minimal/incidental physical assistance for pulling an over-head-garment down the  trunk or adjusting/fastening together a front-fastening-garment. Patient  performs 90 % of upper body dressing tasks. Upper Body Dressing Score = 4,  Minimal Assistance. No assistive devices were required.  YESI Lower Body Dressing:  Patient requires minimal/incidental assistance for  gathering clothes. Patient requires minimal/incidental assistance for threading  the right leg through the undergarment. Patient requires minimal/incidental  assistance for threading the left leg through the undergarment. Patient requires  no physical assistance for donning and/or doffing undergarment over hips and  adjusting fasteners. Patient requires minimal/incidental assistance for  threading the right leg through the pants/skirt. Patient requires  minimal/incidental assistance for threading the left leg through the  pants/skirt. Patient requires no physical assistance for donning and/or doffing  pants/skirt over hips and adjusting fastener. Patient requires no physical  assistance for donning and/or doffing right sock. Patient requires no physical  assistance for donning and/or doffing left sock. Patient requires no physical  assistance for donning and/or doffing right shoe. Patient requires no physical  assistance for donning and/or doffing left shoe. Patient performs 88.64 % of  lower body dressing tasks. Lower Body Dressing Score = 4, Minimal Assistance. No  assistive devices were required.  YESI Toileting:  Toileting Score = 4.  Patient requires minimal assistance for  toileting, such as steadying for balance while cleansing or adjusting clothes.  Patient requires the following assistive device(s): Grab bar.    YESI Bladder Management  Level of Assistance:  Branch  Frequency/Number of Accidents this Shift:  Branch    YESI Bowel Management  Level of Assistance: [place]  Frequency/Number of Accidents this Shift: Branch    YESI  Bed/Chair/Wheelchair Transfer:  [facility] Toilet Transfer:  Toilet Transfer Score = 4.  Patient performs 75% or more  of effort and minimal assistance (little/incidental help/steadying) for  transferring to and from the toilet/commode, requiring: Contact guard. Patient  requires the following assistive device(s): Grab bars.  YESI Tub/Shower Transfer:  Activity was not observed.    Previously Documented Mode of Locomotion at Discharge: Field  YESI Expected Mode of Locomotion at Discharge: [facility] Walk/Wheelchair:  Branch  [facility] Stairs:  Branch    [facility] Comprehension:  Branch  [facility] Expression:  Branch  [facility] Social Interaction:  Branch  [facility] Problem Solving:  Branch  [facility] Memory:  Branch    Therapy Mode Minutes  Occupational Therapy: Branch  Physical Therapy: Branch  Speech Language Pathology:  Branch    Signed by: KANDY Kennedy/WAQAS

## 2017-11-01 NOTE — PROGRESS NOTES
Section B. Hearing, Speech, Vision: Expression of Ideas and Wants: Exhibits some  difficulty with expressing needs and ideas (e.g., some words or finishing  thoughts) or speech is not clear.  Understanding Verbal Content: Understands: Clear comprehension without cues or  repetitions.    Section C. Cognitive Patterns: Brief Interview for Mental Status (BIMS) was  conducted.  Repetition of Three Words: Three words  Temporal Orientation Year: Correct  Temporal Orientation Month: Accurate within 5 days  Temporal Orientation Day of Week: Incorrect or no answer  Recall Socks: Yes, no cue required  Recall Blue: Yes, no cue required  Recall Bed: Yes, no cue required  BIMS SUMMARY SCORE: 14 Cognitively intact Patient was able to complete the Brief  Interview for Mental Status    Signed by: Lilian Mohr, SLP

## 2017-11-01 NOTE — PLAN OF CARE
Problem: Patient Care Overview (Adult)  Goal: Plan of Care Review  Outcome: Ongoing (interventions implemented as appropriate)    11/01/17 0219   Coping/Psychosocial Response Interventions   Plan Of Care Reviewed With patient   Patient Care Overview   Progress improving   Outcome Evaluation   Outcome Summary/Follow up Plan Pt. doing fine tonight, denies any pain. Pleasant and cooperative.  at bedside. L hemiparesis. Ambulatory to the bathroom. Continent of B&B. First day of therapy today.        Goal: Adult Individualization and Mutuality  Outcome: Ongoing (interventions implemented as appropriate)  Goal: Discharge Needs Assessment  Outcome: Ongoing (interventions implemented as appropriate)    Problem: Stroke (Ischemic) (Adult)  Goal: Signs and Symptoms of Listed Potential Problems Will be Absent or Manageable (Stroke)  Outcome: Ongoing (interventions implemented as appropriate)    Problem: Fall Risk (Adult)  Goal: Identify Related Risk Factors and Signs and Symptoms  Outcome: Ongoing (interventions implemented as appropriate)

## 2017-11-01 NOTE — PLAN OF CARE
Problem: Patient Care Overview (Adult)  Goal: Plan of Care Review    11/01/17 1221   Coping/Psychosocial Response Interventions   Plan Of Care Reviewed With patient   Outcome Evaluation   Outcome Summary/Follow up Plan Pt presents s/p CVA leading to impaired gait and impaired dynamic standing balance. Due to the above pt will benifit from skilled PT to address the above issues and increase pts safety and independence w functional mobility.         Problem: Inpatient Physical Therapy  Goal: Bed Mobility Goal LTG- PT    11/01/17 1221   Bed Mobility PT LTG   Bed Mobility PT LTG, Date Established 11/01/17   Bed Mobility PT LTG, Time to Achieve 2 wks   Bed Mobility PT LTG, Activity Type supine to sit/sit to supine   Bed Mobility PT LTG, [place] Level independent       Goal: Transfer Training Goal 1 LTG- PT    11/01/17 1221   Transfer Training PT LTG   Transfer Training PT LTG, Date Established 11/01/17   Transfer Training PT LTG, Time to Achieve 2 wks   Transfer Training PT LTG, Activity Type sit to stand/stand to sit   Transfer Training PT LTG, [place] Level supervision required   Transfer Training PT LTG, Assist Device walker, rolling       Goal: Transfer Training Goal 2 LTG- PT    11/01/17 1221   Transfer Training 2 PT LTG   Transfer Training PT 2 LTG, Date Established 11/01/17   Transfer Training PT 2 LTG, Time to Achieve 2 wks   Transfer Training PT 2 LTG, Activity Type other (see comments)  (car transfer)   Transfer Training PT 2 LTG, [place] Level supervision required   Transfer Training PT 2 LTG, Assist Device walker, rolling       Goal: Gait Training Goal LTG- PT    11/01/17 1221   Gait Training PT LTG   Gait Training Goal PT LTG, Date Established 11/01/17   Gait Training Goal PT LTG, Time to Achieve 2 wks   Gait Training Goal PT LTG, [place] Level supervision required   Gait Training Goal PT LTG, Assist Device walker, rolling   Gait Training Goal PT LTG, Distance to Achieve 160       Goal:  Stair Training Goal LTG- PT    11/01/17 1221   Stair Training PT LTG   Stair Training Goal PT LTG, Date Established 11/01/17   Stair Training Goal PT LTG, Time to Achieve 2 wks   Stair Training Goal PT LTG, Number of Steps 4   Stair Training Goal PT LTG, [place] Level supervision required   Stair Training Goal PT LTG, Assist Device 2 handrails       Goal: Patient Education Goal LTG- PT    11/01/17 1221   Patient Education PT LTG   Patient Education PT LTG, Date Established 11/01/17   Patient Education PT LTG, Time to Achieve 2 wks   Patient Education PT LTG, Education Type HEP   Patient Education PT LTG, Education Understanding demonstrate adequately

## 2017-11-01 NOTE — THERAPY EVALUATION
Inpatient Rehabilitation - Speech Language Pathology   Swallow Initial Evaluation [facility]     Patient Name: Alice Maxwell  : 1945  MRN: [id number]  Today's Date: 2017  Onset of Illness/Injury or Date of Surgery Date: 10/28/17            Admit Date: 10/31/2017     VFSS completed 10/29/17 recommending puree and thin. Pt seen with lunch tray of puree and thin. Anterior loss on L with secretions, pt I'ly wiping L. No overt s/s aspiration with thin and puree this date. No pocketing observed on L. ST initiated dysphagia therapy with hyolaryngeal strengthening exercises. Pt agrees dysphagia to be main focus of therapy at this time for return to regular diet.    Visit Dx:   No diagnosis found.  There is no problem list on file for this patient.    Past Medical History:   Diagnosis Date   • Abnormal Pap smear of cervix    • Arthritis    • Cancer     breast   • Disease of thyroid gland    • Stroke    • Urinary tract infection      Past Surgical History:   Procedure Laterality Date   • BACK SURGERY     • BREAST LUMPECTOMY     • CERVICAL BIOPSY  W/ LOOP ELECTRODE EXCISION     • CHOLECYSTECTOMY     • FOOT SURGERY     • GASTRIC BYPASS     • HERNIA REPAIR     • THYROIDECTOMY            SWALLOW EVALUATION (last 72 hours)      Swallow Evaluation       17 1215                Clinical Impression    Patient's Goals For Discharge return to regular diet  -OC        Family Goals For Discharge patient able to return to regular diet  -OC        SLP Swallowing Diagnosis mild dysphagia;oral dysfunction  -OC        Rehab Potential/Prognosis, Swallowing good, to achieve stated therapy goals  -OC        Criteria for Skilled Therapeutic Interventions Met skilled criteria for dysphagia intervention met  -OC        FCM, Swallowing 4-->Level 4  -OC        Therapy Frequency 3-5 times/wk  -OC        Predicted Duration Therapy Interv (days) until discharge  -OC        Expected Duration Therapy Session (min) 45-60 minutes  -OC         SLP Diet Recommendation II - pureed;thin liquids  -OC        Recommended Feeding/Eating Techniques alternate between small bites and sips of food/liquid;maintain upright posture during/after eating for 30 mins;small sips/bites  -OC        SLP Rec. for Method of Medication Administration meds crushed in pudding/applesauce  -OC        Monitor For Signs Of Aspiration cough;elevated WBC count;gurgly voice;throat clearing  -OC        Anticipated Discharge Disposition home with assist  -OC        Oral Motor Structure and Function    Oral Motor Anatomy and Physiology patient demonstrates anatomy and physiology that is WNL  -OC        Dentition Assessment present and adequate  -OC        Secretion Management WNL/WFL  -OC        Volitional Swallow no difficulties initiating volitional swallow  -OC        Volitional Cough no difficulties initiating volitional cough  -OC        Oral Musculature General Assessment oral labial impairment;lingual impairment   L weakness and slight droop  -OC        Dysphagia Treatment Objectives and Progress    Dysphagia Treatment Objectives Improve oral skills;Improve timing of pharyngeal response;Improve closure at entrance to airway;Improve laryngeal elevation;Improve hyolaryngeal excursion;Improve tongue base & pharyngeal wall squeeze  -OC        Improve oral skills    To Improve Oral Skills, patient will: Increase lip closure;Increase tongue tip elevation;Increase tongue lateralization;Increase tongue A-P movement;Increase back of tongue control;90%;without cues  -OC        Status: Improve Oral Skills New  -OC        Improve laryngeal elevation    To improve laryngeal elevation, patient will: Complete pitch-glide;90%;without cues  -OC        Status: Improve laryngeal elevation New  -OC        Improve tongue base & pharyngeal wall squeeze    To improve tongue base & pharyngeal wall squeeze, patient will: Complete gargle/hold retraction;Complete yawn/hold retractions;Complete tongue base  retraction;Complete effortful swallow;Complete tongue hold swallow;90%;without cues  -OC        Status: Improve tongue base & pharyngeal wall squeeze New  -OC          User Key  (r) = Recorded By, (t) = Taken By, (c) = Cosigned By    Initials Name Effective Dates    ALISTAIR Mohr MA,[facility]-SLP 04/05/17 -         EDUCATION  The patient has been educated in the following areas:   Dysphagia (Swallowing Impairment) Modified Diet Instruction.    SLP Recommendation and Plan  SLP Swallowing Diagnosis: mild dysphagia, oral dysfunction  SLP Diet Recommendation: II - pureed, thin liquids  Recommended Feeding/Eating Techniques: alternate between small bites and sips of food/liquid, maintain upright posture during/after eating for 30 mins, small sips/bites  SLP Rec. for Method of Medication Administration: meds crushed in pudding/applesauce  Monitor For Signs Of Aspiration: cough, elevated WBC count, gurgly voice, throat clearing     Criteria for Skilled Therapeutic Interventions Met: skilled criteria for dysphagia intervention met  Anticipated Discharge Disposition: home with assist  Rehab Potential/Prognosis, Swallowing: good, to achieve stated therapy goals  Therapy Frequency: 3-5 times/wk     Plan of Care Review  Plan Of Care Reviewed With: patient, daughter  Outcome Summary/Follow up Plan: Cognitive-linguistic eval completed, composite score WNL. Mild for executive functions. No overt s/s aspiration with puree and thin lunch tray.  VFSS [facility] 10/29/17 recommending puree and thin.           IP SLP Goals       11/01/17 1215          Safely Consume Diet    Safely Consume Diet- SLP, Date Established 11/01/17  -OC      Safely Consume Diet- SLP, Time to Achieve by discharge  -OC      Safely Consume Diet- SLP, Outcome goal ongoing  -OC        User Key  (r) = Recorded By, (t) = Taken By, (c) = Cosigned By    Initials Name Provider Type    ALISTAIR Mohr MA,[facility]-SLP Speech and Language Pathologist             SLP Outcome Measures  (last 72 hours)      SLP Outcome Measures       11/01/17 1215          SLP Outcome Measures    Outcome Measure Used? Adult NOMS;Modified [place]  -OC      FCM Scores    FCM Chosen Swallowing  -OC      Swallowing FCM Score 4  -OC      Modified [place] Scale    Modified [place] Scale 3 - Moderate disability.  Requiring some help, but able to walk without assistance.  -OC        User Key  (r) = Recorded By, (t) = Taken By, (c) = Cosigned By    Initials Name Effective Dates    OC Lilian Mohr MA,[facility]-SLP 04/05/17 -            Time Calculation:         Time Calculation- SLP       11/01/17 1457 11/01/17 1456       Time Calculation- SLP    SLP Start Time 0900  -OC 1130  -OC     SLP Stop Time 1000  -OC 1145  -OC     SLP Time Calculation (min) 60 min  -OC 15 min  -OC       User Key  (r) = Recorded By, (t) = Taken By, (c) = Cosigned By    Initials Name Provider Type    OC Lilian Mohr MA,CCC-SLP Speech and Language Pathologist          Therapy Charges for Today     Code Description Service Date Service Provider Modifiers Qty    58943586725 HC ST STD COG PERF TEST PER HOUR 11/1/2017 Lilian Mohr MACCC-SLP GN 2    59800177212 HC ST EVAL ORAL PHARYNG SWALLOW 1 11/1/2017 Lilian Mohr MA, CCC-SLP GN 1               Lilian Mohr MA, CCC-SLP  11/1/2017

## 2017-11-01 NOTE — THERAPY TREATMENT NOTE
Inpatient Rehabilitation - Occupational Therapy Treatment Note  [facility]     Patient Name: Alice Maxwell  : 1945  MRN: [id number]  Today's Date: 2017  Onset of Illness/Injury or Date of Surgery Date: 10/28/17  Date of Referral to OT: 17  Referring Physician: Jeremie      Admit Date: 10/31/2017    Visit Dx:   No diagnosis found.  There is no problem list on file for this patient.            Adult Rehabilitation Note       17 1550          Rehab Assessment/Intervention    Discipline occupational therapist  -SO      Document Type therapy note (daily note)  -SO      Subjective Information no complaints;agree to therapy  -SO      Patient Effort, Rehab Treatment adequate  -SO      Symptoms Noted During/After Treatment fatigue  -SO      Precautions/Limitations fall precautions  -SO      Recorded by [SO] Micki Castro, SARAR      Pain Assessment    Pain Assessment No/denies pain  -SO      Recorded by [SO] Micki Castro, OTR      Vision Assessment/Intervention    Visual Impairment visual impairment, left  -SO      Visual Impairment Comment Cues to scan to the left during connect the numbers, able to draw a clock correctly but wrote wrong number  -SO      Recorded by [SO] Micki Castro, SARAR      Cognitive Assessment/Intervention    Current Cognitive/Communication Assessment impaired  -SO      Orientation Status oriented to;person;place;time  -SO      Follows Commands/Answers Questions able to follow single-step instructions;needs cueing  -SO      Recorded by [SO] Micki Castro, SARAR      ROM (Range of Motion)    General ROM upper extremity range of motion deficits identified  -SO      General ROM Detail RUE WFL; LUE shoulder 3/4-7/8  -SO      Recorded by [SO] Micki Castro OTR      MMT (Manual Muscle Testing)    General MMT Assessment upper extremity strength deficits identified  -SO      General MMT Assessment Detail RUE 4-/5; LUE shoulder 3-/5, elbow  3/5, forearm 3-/5  -SO      Recorded by [SO] Micki Castro OTR      Bed Mobility, Assessment/Treatment    Bed Mob, Supine to Sit, [place] verbal cues required;supervision required  -SO      Bed Mob, Sit to Supine, [place] verbal cues required;supervision required  -SO      Recorded by [SO] KANDY Boo      Transfer Assessment/Treatment    Transfers, Bed-Chair [place] contact guard assist  -SO      Transfers, Chair-Bed [place] contact guard assist  -SO      Transfers, Sit-Stand [place] contact guard assist  -SO      Transfers, Stand-Sit [place] contact guard assist  -SO      Recorded by [SO] Micki Castro OTR      Motor Skills/Interventions    Additional Documentation Fine Motor Coordination Training (Group);Gross Motor Coordination Training (Group)  -SO      Recorded by [SO] Micki Castro OTR      Fine Motor Coordination Training    9-Hole Peg Right 23  -SO      9-Hole Peg Left 56  -SO      Box and Blocks Right 46  -SO      Box and Blocks Left 36  -SO      Recorded by [SO] KANDY Boo      Positioning and Restraints    Pre-Treatment Position in bed  -SO      Post Treatment Position bed  -SO      In Bed supine;call light within reach;encouraged to call for assist;exit alarm on;with family/caregiver  -SO      Recorded by [SO] KANDY Boo        User Key  (r) = Recorded By, (t) = Taken By, (c) = Cosigned By    Initials Name Effective Dates    SO KANDY Boo 04/13/15 -                 OT Goals       11/01/17 1554 11/01/17 1230       Transfer Training OT STG    Transfer Training OT STG, Date Established  11/01/17  -SO     Transfer Training OT STG, Time to Achieve  1 wk  -SO     Transfer Training OT STG, Activity Type  tub;walk-in shower;shower chair  -SO     Transfer Training OT STG, [place] Level  contact guard assist  -SO     Transfer Training OT STG, Assist Device  walker, rolling;shower  chair  -SO     Transfer Training OT LTG    Transfer Training OT LTG, Date Established  11/01/17  -SO     Transfer Training OT LTG, Time to Achieve  by discharge  -SO     Transfer Training OT LTG, Activity Type  tub;walk-in shower;shower chair  -SO     Transfer Training OT LTG, [place] Level  supervision required  -SO     Transfer Training OT LTG, Assist Device  walker, rolling;shower chair  -SO     Transfer Training 2 OT STG    Transfer Training 2 OT STG, Date Established  11/01/17  -SO     Transfer Training 2 OT STG, Time to Achieve  1 wk  -SO     Transfer Training 2 OT STG, Activity Type  toilet  -SO     Transfer Training 2 OT STG, [place] Level  contact guard assist  -SO     Transfer Training 2 OT STG, Assist Device  walker, rolling  -SO     Transfer Training 2 OT LTG    Transfer Training 2 OT LTG, Date Established  11/01/17  -SO     Transfer Training 2 OT LTG, Time to Achieve  by discharge  -SO     Transfer Training 2 OT LTG, Activity Type  toilet  -SO     Transfer Training 2 OT LTG, [place] Level  supervision required  -SO     Transfer Training 2 OT LTG, Assist Device  walker, rolling  -SO     Strength OT LTG    Strength Goal OT LTG, Date Established 11/01/17 -SO      Strength Goal OT LTG, Time to Achieve by discharge  -SO      Strength Goal OT LTG, Measure to Achieve Pt to increase LUE strength to 4-/5 to increase independence with ADLs.  -SO      Caregiver Training OT LTG    Caregiver Training OT LTG, Date Established  11/01/17  -SO     Caregiver Training OT LTG, Time to Achieve  by discharge  -SO     Caregiver Training OT LTG, [place] Level  able to assist adequately;able to cue patient adequately  -SO     Patient Education OT LTG    Patient Education OT LTG, Date Established  11/01/17  -SO     Patient Education OT LTG, Time to Achieve  by discharge  -SO     Patient Education OT LTG, Education Type  written program;HEP;aware of neuro deficits;home safety  -SO     Patient Education OT  LTG, Education Understanding  independent;demonstrates adequately;verbalizes understanding  -SO     Isolated Movement OT LTG    Isolated Movement OT LTG, Date Established 11/01/17  -SO      Isolated Movement OT LTG, Time to Achieve by discharge  -SO      Isolated Movement OT LTG, Body Area left scapula;left shoulder;left elbow;left forearm;left wrist;left fingers  -SO      Isolated Movement OT LTG, Level WFL  -SO      Coordination OT LTG    Coordination OT LTG, Date Established 11/01/17  -SO      Coordination OT LTG, Time to Achieve by discharge  -SO      Coordination OT LTG, Activity Type FM written ex program;GM written ex program;FM task;GM task  -SO      Coordination OT LTG, [place] Level independent  -SO      Coordination OT LTG, Additional Goal LUE  -SO      ADL OT STG    ADL OT STG, Date Established  11/01/17  -SO     ADL OT STG, Time to Achieve  1 wk  -SO     ADL OT STG, Activity Type  ADL skills  -SO     ADL OT STG, [place] Level  contact guard;assistive device  -SO     ADL OT LTG    ADL OT LTG, Date Established  11/01/17  -SO     ADL OT LTG, Time to Achieve  by discharge  -SO     ADL OT LTG, Activity Type  ADL skills  -SO     ADL OT LTG, [place] Level  standby assist;assistive device  -SO       User Key  (r) = Recorded By, (t) = Taken By, (c) = Cosigned By    Initials Name Provider Type    SO Micki Castro OTR Occupational Therapist          Occupational Therapy Education     Title: PT OT SLP Therapies (Active)     Topic: Occupational Therapy (Active)     Point: ADL training (Done)    Description: Instruct learner(s) on proper safety adaptation and remediation techniques during self care or transfers.   Instruct in proper use of assistive devices.    Learning Progress Summary    Learner Readiness Method Response Comment Documented by Status   Patient Acceptance E VU Discuss OT goals and POC. SO 11/01/17 1232 Done   Family Acceptance E VU Discuss OT goals and POC. SO 11/01/17 1232  Done                      User Key     Initials Effective Dates Name Provider Type Discipline    SO 04/13/15 -  KANDY Boo Occupational Therapist OT                  OT Recommendation and Plan  Therapy Frequency: 5 times/wk          Time Calculation:         Time Calculation- OT       11/01/17 1555 11/01/17 1232       Time Calculation- OT    OT Start Time 1430  -SO 1030  -SO     OT Stop Time 1500  -SO 1100  -SO     OT Time Calculation (min) 30 min  -SO 30 min  -SO     OT Received On  11/01/17  -SO     OT Goal Re-Cert Due Date  11/08/17  -SO       User Key  (r) = Recorded By, (t) = Taken By, (c) = Cosigned By    Initials Name Provider Type    SO KANDY Boo Occupational Therapist           Therapy Charges for Today     Code Description Service Date Service Provider Modifiers Qty    01243318883 HC OT EVAL MOD COMPLEXITY 2 11/1/2017 KANDY Boo GO 1    43270097756 HC OT NEUROMUSC RE EDUCATION EA 15 MIN 11/1/2017 KANDY Boo GO 2               KANDY Boo  11/1/2017

## 2017-11-01 NOTE — PROGRESS NOTES
LOS: 1 day   Patient Care Team:  Calvin Dhillon Jr., DO as PCP - General (Internal Medicine)    Chief Complaint:   S/p R MCA ischemic infarct  Stroke prophylaxis - ASA 81 mg and Lovenox transition to coumadin  Hypercoaguability disorder - Hereditary thrombophilia  Homozygous MTHFR with hyperhomocystinemia  Homozygous factor V Leiden gene mutation  history of malignant neoplasm of breast   Obesity   Hyperlipidemia -   Hypothyroidism   DM    Subjective     History of Present Illness    Subjective  The patient continues with left-sided weakness and left inattention.  Tolerating regular consistency diet.  No headache.    History taken from: patient    Objective     Vital Signs  Temp:  [97.5 °F (36.4 °C)-98.2 °F (36.8 °C)] 97.5 °F (36.4 °C)  Heart Rate:  [73-80] 77  Resp:  [18-20] 18  BP: (120-144)/(60-77) 126/60    Objective  Mental status-awake alert  Lungs-clear to auscultation  Heart-regular rate and rhythm  Abdomen abdomen-normoactive bowel sounds, soft, nontender  Extremities-no pretibial edema  Neurologic-mild left hemiparesis with left upper extremity 4/5 and left lower extremity 4/5 proximally and distally  She shows some left inattention.  Decreased dexterity with left hand.  She recognizes finger movement in all 4 quadrants    Results Review:     I reviewed the patient's new clinical results.    Lab Results  Lab Value Date/Time   INR 1.09 11/01/2017 0754             Results from last 7 days  Lab Units 11/01/17  0754   CREATININE mg/dL 1.09*     Glucose   Date/Time Value Ref Range Status   11/01/2017 1656 108 70 - 130 mg/dL Final   11/01/2017 1136 133 (H) 70 - 130 mg/dL Final   11/01/2017 0722 128 70 - 130 mg/dL Final   10/31/2017 2156 127 70 - 130 mg/dL Final   10/31/2017 1556 99 70 - 130 mg/dL Final       Labs on October 31-sodium 139, potassium 3.8, chloride 106, glucose 101, calcium 9.0, come back to 25, BUN 16, creatinine 1.1.  WBC6.64, hemoglobin 11.8, hematocrit 36.0, platelets 125.  October  29 triglycerides 89, cholesterol 125.  October 28 AST 35, ALT 29, alkaline phosphatase 99, total protein 7.7, albumin 4.1  Oct 31 - INR 1.0  Medication Review: done  Scheduled Meds:  allopurinol 200 mg Oral Daily   aspirin 81 mg Oral Daily   atorvastatin 80 mg Oral Nightly   enoxaparin 100 mg Subcutaneous Q12H   folic acid 1 mg Oral Daily   levothyroxine 112 mcg Oral Q AM   melatonin 3 mg Oral Nightly   metFORMIN 500 mg Oral Daily With Breakfast   multivitamin 1 tablet Oral Daily   warfarin 5 mg Oral Daily     Continuous Infusions:   PRN Meds:.•  acetaminophen  •  dextrose  •  dextrose  •  glucagon (human recombinant)      Assessment/Plan     Active Problems:    Stroke      Assessment & Plan  S/p R MCA ischemic infarct  Stroke prophylaxis - ASA 81 mg and Lovenox transition to coumadin.  Daily INR  Hypercoaguability disorder  history of malignant neoplasm of breast   Factor 5 Leiden mutation, heterozygous   Obesity   Hyperlipidemia -atorvastatin   Hypothyroidism -on replacement   Cerebrovascular accident (CVA) due to thrombosis of right middle cerebral artery   DVT prophylaxis-SCDs and anticoagulation  Diabetes mellitus-metformin     72 to female status post right MCA ischemic infarct with left inattention, left hemiparesis, dysarthria, decreased alertness, impairments with her mobility and self-care and is now admitted for comprehensive inpatient rehabilitation program with physical therapy 1 hour, occupational therapy 1 hour, and speech therapy 1 hour, 5 days a week.  Rehabilitation nursing for carryover and monitoring of her neurologic status, diabetes, bowel bladder and skin.  Ongoing physician follow-up.  Weekly team conferences.  Goals for her to achieve a level of supervision with her mobility and self-care for return home with her family.  Rehabilitation prognosis fair.  Medical prognosis defer to neurology.  Estimated length of stay indeterminate.  Aric Chao MD  11/01/17  7:06 PM    Time:

## 2017-11-01 NOTE — PLAN OF CARE
Problem: Inpatient Occupational Therapy  Goal: Strength Goal LTG- OT    11/01/17 1554   Strength OT LTG   Strength Goal OT LTG, Date Established 11/01/17   Strength Goal OT LTG, Time to Achieve by discharge   Strength Goal OT LTG, Measure to Achieve Pt to increase LUE strength to 4-/5 to increase independence with ADLs.       Goal: Isolated Movement Goal LTG- OT    11/01/17 1554   Isolated Movement OT LTG   Isolated Movement OT LTG, Date Established 11/01/17   Isolated Movement OT LTG, Time to Achieve by discharge   Isolated Movement OT LTG, Body Area left scapula;left shoulder;left elbow;left forearm;left wrist;left fingers   Isolated Movement OT LTG, Level WFL       Goal: Coordination Goal LTG- OT    11/01/17 1554   Coordination OT LTG   Coordination OT LTG, Date Established 11/01/17   Coordination OT LTG, Time to Achieve by discharge   Coordination OT LTG, Activity Type FM written ex program;GM written ex program;FM task;GM task   Coordination OT LTG, [place] Level independent   Coordination OT LTG, Additional Goal LUE

## 2017-11-01 NOTE — NURSING NOTE
Advised Dr. Chao that four home med vitamins will not be profiled by [id number]. Avani salamanca=d I both spoke with different pharmacists who said non prescription over the counter meds will not be set up. Dr. Chao DC'd and told me to have her resume on discharge.

## 2017-11-01 NOTE — PLAN OF CARE
Problem: Patient Care Overview (Adult)  Goal: Plan of Care Review  Outcome: Ongoing (interventions implemented as appropriate)    11/01/17 1215   Coping/Psychosocial Response Interventions   Plan Of Care Reviewed With patient;daughter   Outcome Evaluation   Outcome Summary/Follow up Plan Cognitive-linguistic eval completed, composite score WNL. Mild for executive functions. No overt s/s aspiration with puree and thin lunch tray. [facility] 10/29/17 recommending puree and thin.          Problem: Inpatient SLP  Goal: Dysphagia- Patient will safely consume diet as per recommendation with no signs/symptoms of aspiration  Outcome: Ongoing (interventions implemented as appropriate)    11/01/17 1215   Safely Consume Diet   Safely Consume Diet- SLP, Date Established 11/01/17   Safely Consume Diet- SLP, Time to Achieve by discharge   Safely Consume Diet- SLP, Outcome goal ongoing

## 2017-11-01 NOTE — PROGRESS NOTES
Inpatient Rehabilitation Functional Measures Assessment    Functional Measures  YESI Eating:  Eating Score = 5. Patient is supervision/set-up for eating,  requiring: No assistive devices were required.  [facility] Grooming: [facility] Bathing:  [facility] Upper Body Dressing:  [facility] Lower Body Dressing:  [facility] Toileting:  [facility] Bladder Management  Level of Assistance:  [place]  Frequency/Number of Accidents this Shift:  [facility] Bowel Management  Level of Assistance: [place]  Frequency/Number of Accidents this Shift: [facility] Bed/Chair/Wheelchair Transfer:  [facility] Toilet Transfer:  [facility] Tub/Shower Transfer:  [place]    Previously Documented Mode of Locomotion at Discharge: Field  YESI Expected Mode of Locomotion at Discharge: [facility] Walk/Wheelchair:  [facility] Stairs:  [facility] Comprehension:  Both ( auditory and visual) modes of comprehension are used  equally. Patient does not comprehend complex/abstract information in their  primary language without assistance from a helper. Comprehension Score = 5,  Supervision. Patient comprehends basic daily needs or ideas greater than 90% of  the time. Patient requires stand by/rare prompting. No assistive devices were  required.  YESI Expression:  Both ( vocal and non-vocal) modes of expression are used  equally. Patient does not express complex/abstract information in their primary  language without a helper. Expression Score = 5, Stand By Prompting. Patient  expresses basic daily needs or ideas without prompting. No assistive devices  were required.  YESI Social Interaction:  Social Interaction Score = 5, Supervision.  Patient may  require encouragement to initiate participation. Patient requires directing only  under stressful or unfamiliar conditions, but less than 10% of the time, for the  following behavior(s):  YESI Problem Solving:  Problem Solving Score = 7, Independent.  Patient makes  appropriate decisions in order to solve  complex problems.  Patient is completely  independent for problem solving.  There are no activity limitations.  YESI Memory:  Memory Score = 7, Independent.  Patient is completely independent  for memory.  There are no activity limitations.    Therapy Mode Minutes  Occupational Therapy: Branch  Physical Therapy: Branch  Speech Language Pathology:  Individual: 60 minutes.    Signed by: Lilian Mohr SLP

## 2017-11-01 NOTE — PROGRESS NOTES
Occupational Therapy: Individual: 60 minutes.    Physical Therapy: Branch    Speech Language Pathology:  Branch    Signed by: KANDY Kennedy/WAQAS

## 2017-11-02 LAB
BASOPHILS # BLD AUTO: 0.02 10*3/MM3 (ref 0–0.2)
BASOPHILS NFR BLD AUTO: 0.3 % (ref 0–1.5)
DEPRECATED RDW RBC AUTO: 46.8 FL (ref 37–54)
EOSINOPHIL # BLD AUTO: 0.13 10*3/MM3 (ref 0–0.7)
EOSINOPHIL NFR BLD AUTO: 2.3 % (ref 0.3–6.2)
ERYTHROCYTE [DISTWIDTH] IN BLOOD BY AUTOMATED COUNT: 14.4 % (ref 11.7–13)
GLUCOSE BLDC GLUCOMTR-MCNC: 104 MG/DL (ref 70–130)
GLUCOSE BLDC GLUCOMTR-MCNC: 108 MG/DL (ref 70–130)
GLUCOSE BLDC GLUCOMTR-MCNC: 118 MG/DL (ref 70–130)
GLUCOSE BLDC GLUCOMTR-MCNC: 98 MG/DL (ref 70–130)
HCT VFR BLD AUTO: 36.4 % (ref 35.6–45.5)
HGB BLD-MCNC: 11.7 G/DL (ref 11.9–15.5)
IMM GRANULOCYTES # BLD: 0 10*3/MM3 (ref 0–0.03)
IMM GRANULOCYTES NFR BLD: 0 % (ref 0–0.5)
INR PPP: 1.07 (ref 0.9–1.1)
LYMPHOCYTES # BLD AUTO: 2 10*3/MM3 (ref 0.9–4.8)
LYMPHOCYTES NFR BLD AUTO: 35 % (ref 19.6–45.3)
MCH RBC QN AUTO: 28.4 PG (ref 26.9–32)
MCHC RBC AUTO-ENTMCNC: 32.1 G/DL (ref 32.4–36.3)
MCV RBC AUTO: 88.3 FL (ref 80.5–98.2)
MONOCYTES # BLD AUTO: 0.56 10*3/MM3 (ref 0.2–1.2)
MONOCYTES NFR BLD AUTO: 9.8 % (ref 5–12)
NEUTROPHILS # BLD AUTO: 3.01 10*3/MM3 (ref 1.9–8.1)
NEUTROPHILS NFR BLD AUTO: 52.6 % (ref 42.7–76)
PLATELET # BLD AUTO: 149 10*3/MM3 (ref 140–500)
PMV BLD AUTO: 12.2 FL (ref 6–12)
PROTHROMBIN TIME: 13.5 SECONDS (ref 11.7–14.2)
RBC # BLD AUTO: 4.12 10*6/MM3 (ref 3.9–5.2)
WBC NRBC COR # BLD: 5.72 10*3/MM3 (ref 4.5–10.7)

## 2017-11-02 PROCEDURE — 82962 GLUCOSE BLOOD TEST: CPT

## 2017-11-02 PROCEDURE — 85025 COMPLETE CBC W/AUTO DIFF WBC: CPT | Performed by: PHYSICAL MEDICINE & REHABILITATION

## 2017-11-02 PROCEDURE — 97110 THERAPEUTIC EXERCISES: CPT | Performed by: PHYSICAL THERAPIST

## 2017-11-02 PROCEDURE — 25010000002 ENOXAPARIN PER 10 MG: Performed by: PHYSICAL MEDICINE & REHABILITATION

## 2017-11-02 PROCEDURE — 97112 NEUROMUSCULAR REEDUCATION: CPT | Performed by: OCCUPATIONAL THERAPIST

## 2017-11-02 PROCEDURE — 85610 PROTHROMBIN TIME: CPT | Performed by: PHYSICAL MEDICINE & REHABILITATION

## 2017-11-02 PROCEDURE — 97535 SELF CARE MNGMENT TRAINING: CPT | Performed by: OCCUPATIONAL THERAPIST

## 2017-11-02 PROCEDURE — 92526 ORAL FUNCTION THERAPY: CPT

## 2017-11-02 RX ORDER — MELATONIN
1000 2 TIMES DAILY
Status: DISCONTINUED | OUTPATIENT
Start: 2017-11-02 | End: 2017-11-14 | Stop reason: HOSPADM

## 2017-11-02 RX ADMIN — METFORMIN HYDROCHLORIDE 500 MG: 500 TABLET ORAL at 08:40

## 2017-11-02 RX ADMIN — Medication 1 TABLET: at 08:44

## 2017-11-02 RX ADMIN — ATORVASTATIN CALCIUM 80 MG: 80 TABLET, FILM COATED ORAL at 20:51

## 2017-11-02 RX ADMIN — ALLOPURINOL 200 MG: 100 TABLET ORAL at 08:40

## 2017-11-02 RX ADMIN — Medication 3 MG: at 23:04

## 2017-11-02 RX ADMIN — ENOXAPARIN SODIUM 100 MG: 100 INJECTION SUBCUTANEOUS at 20:50

## 2017-11-02 RX ADMIN — ENOXAPARIN SODIUM 100 MG: 100 INJECTION SUBCUTANEOUS at 08:40

## 2017-11-02 RX ADMIN — LEVOTHYROXINE SODIUM 112 MCG: 112 TABLET ORAL at 05:38

## 2017-11-02 RX ADMIN — WARFARIN SODIUM 5 MG: 5 TABLET ORAL at 17:05

## 2017-11-02 RX ADMIN — FOLIC ACID 1 MG: 1 TABLET ORAL at 08:40

## 2017-11-02 RX ADMIN — ASPIRIN 81 MG: 81 TABLET, CHEWABLE ORAL at 08:40

## 2017-11-02 RX ADMIN — VITAMIN D, TAB 1000IU (100/BT) 1000 UNITS: 25 TAB at 17:05

## 2017-11-02 NOTE — PROGRESS NOTES
Inpatient Rehabilitation Functional Measures Assessment    Functional Measures  YESI Eating:  Eating Score = 5. Patient is supervision/set-up for eating,  requiring: No assistive devices were required.  YESI Grooming: Branch  [facility] Bathing:  [place]  [facility] Upper Body Dressing:  [place]  [facility] Lower Body Dressing:  [place]  [facility] Toileting:  Toileting Score = 5.  Patient is supervision/set-up for  toileting, requiring: No assistive devices were required.    YESI Bladder Management  Level of Assistance:  Bladder Score = 5.  Patient is supervision/set-up for  bladder management, requiring: No assistive devices were required.  Frequency/Number of Accidents this Shift:  Bladder accidents this shift:  0 .  Patient has not had an accident this shift.    YESI Bowel Management  Level of Assistance: Bowel Score = 5.  Patient is supervision/set-up for bowel  management, requiring: No assistive devices were required.  Frequency/Number of Accidents this Shift: Bowel accidents this shift: 0 .  Patient has not had an accident this shift.    YESI Bed/Chair/Wheelchair Transfer:  Bed/chair/wheelchair Transfer Score = 2.  Patient performs 25-49% of effort and requires maximal assistance (most of the  lifting) for transferring to and from the bed/chair/wheelchair. No assistive  devices were required.  YESI Toilet Transfer:  Toilet Transfer Score = 2.  Patient performs 25-49% of  effort and requires maximal assistance (most of the lifting) for transferring to  and from the toilet/commode. No assistive devices were required.  YESI Tub/Shower Transfer:  [place]    Previously Documented Mode of Locomotion at Discharge: Field  YESI Expected Mode of Locomotion at Discharge: [facility] Walk/Wheelchair:  [facility] Stairs:  [facility] Comprehension:  [facility] Expression:  [facility] Social Interaction:  [facility] Problem Solving:  [facility] Memory:  Branch    Therapy Mode Minutes  Occupational Therapy: Branch  Physical Therapy: Branch  Speech  Language Pathology:  Branch    Signed by: KAYLEN Treadwell

## 2017-11-02 NOTE — THERAPY TREATMENT NOTE
Inpatient Rehabilitation - Occupational Therapy Treatment Note  [facility]     Patient Name: Alice Maxwell  : 1945  MRN: [id number]  Today's Date: 2017  Onset of Illness/Injury or Date of Surgery Date: 10/28/17  Date of Referral to OT: 17  Referring Physician: Jeremie      Admit Date: 10/31/2017    Visit Dx:   No diagnosis found.  Patient Active Problem List   Diagnosis   • Stroke             Adult Rehabilitation Note       17 1116 17 1550       Rehab Assessment/Intervention    Discipline occupational therapist  -SO occupational therapist  -SO     Document Type therapy note (daily note)  -SO therapy note (daily note)  -SO     Subjective Information no complaints;agree to therapy  -SO no complaints;agree to therapy  -SO     Patient Effort, Rehab Treatment good  -SO adequate  -SO     Symptoms Noted During/After Treatment fatigue  -SO fatigue  -SO     Precautions/Limitations fall precautions  -SO fall precautions  -SO     Recorded by [SO] Micki Castro, SARAR [SO] Micik Castro, OTR     Pain Assessment    Pain Assessment No/denies pain  -SO No/denies pain  -SO     Recorded by [SO] Micki Castro, OTR [SO] Micki Castro, OTR     Vision Assessment/Intervention    Visual Impairment visual impairment, left  -SO visual impairment, left  -SO     Visual Impairment Comment Cues to locate washcloth on L side, inattention  -SO Cues to scan to the left during connect the numbers, able to draw a clock correctly but wrote wrong number  -SO     Recorded by [SO] Micki Castro, SARAR [SO] Micki Castro, SARAR     Cognitive Assessment/Intervention    Current Cognitive/Communication Assessment impaired  -SO impaired  -SO     Orientation Status oriented to;person;place;time  -SO oriented to;person;place;time  -SO     Follows Commands/Answers Questions able to follow single-step instructions;needs cueing  -SO able to follow single-step instructions;needs  cueing  -SO     Recorded by [SO] KANDY Boo [SO] Micki Castro OTR     ROM (Range of Motion)    General ROM  upper extremity range of motion deficits identified  -SO     General ROM Detail  RUE WFL; LUE shoulder 3/4-7/8  -SO     Recorded by  [SO] Micki Castro OTR     MMT (Manual Muscle Testing)    General MMT Assessment  upper extremity strength deficits identified  -SO     General MMT Assessment Detail  RUE 4-/5; LUE shoulder 3-/5, elbow 3/5, forearm 3-/5  -SO     Recorded by  [SO] Micki Castro OTR     Bed Mobility, Assessment/Treatment    Bed Mob, Supine to Sit, [place]  verbal cues required;supervision required  -SO     Bed Mob, Sit to Supine, [place]  verbal cues required;supervision required  -SO     Bed Mobility, Comment Sitting up in w/c  -SO      Recorded by [SO] KANDY Boo [SO] KANDY Boo     Transfer Assessment/Treatment    Transfers, Bed-Chair [place]  contact guard assist  -SO     Transfers, Chair-Bed [place]  contact guard assist  -SO     Transfers, Sit-Stand [place] contact guard assist  -SO contact guard assist  -SO     Transfers, Stand-Sit [place] contact guard assist  -SO contact guard assist  -SO     Transfers, Sit-Stand-Sit, Assist Device rolling walker  -SO      Walk-In Shower Transfer, [place] contact guard assist;verbal cues required  -SO      Walk-In Shower Transfer, Assist Device --   Tub bench, grab bars  -SO      Recorded by [SO] KANDY Boo [SO] KANDY Boo     Upper Body Bathing Assessment/Training    UB Bathing Assess/Train Assistive Device hand-held shower head;tub bench;grab bars  -SO      UB Bathing Assess/Train, Position sitting  -SO      UB Bathing Assess/Train, [place] Level stand by assist;set up required;verbal cues required  -SO      Recorded by [SO] KANDY Boo      Lower Body Bathing Assessment/Training    LB  Bathing Assess/Train Assistive Device grab bars;hand-held shower head;tub bench  -SO      LB Bathing Assess/Train, Position sitting;standing  -SO      LB Bathing Assess/Train, [place] Level contact guard assist;verbal cues required  -SO      Recorded by [SO] Micki Castro OTR      Upper Body Dressing Assessment/Training    UB Dressing Assess/Train, Clothing Type doffing:;donning:;t-shirt  -SO      UB Dressing Assess/Train, [place] verbal cues required;minimum assist (75% patient effort)  -SO      Recorded by [SO] Micki Castro OTR      Lower Body Dressing Assessment/Training    LB Dressing Assess/Train, Clothing Type doffing:;donning:;pants;shoes;socks  -SO      LB Dressing Assess/Train, Position sitting;standing  -SO      LB Dressing Assess/Train, [place] minimum assist (75% patient effort);verbal cues required;nonverbal cues required (demo/gesture)  -SO      Recorded by [SO] Micki Castro OTR      Grooming Assessment/Training    Grooming Assess/Train, Position sitting;sink side  -SO      Grooming Assess/Train, [place] Level supervision required;set up required  -SO      Recorded by [SO] Micki Castro OTR      Motor Skills/Interventions    Additional Documentation  Fine Motor Coordination Training (Group);Gross Motor Coordination Training (Group)  -SO     Recorded by  [SO] Micki Castro OTR     Fine Motor Coordination Training    9-Hole Peg Right  23  -SO     9-Hole Peg Left  56  -SO     Box and Blocks Right  46  -SO     Box and Blocks Left  36  -SO     Recorded by  [SO] Micki Castro OTR     Positioning and Restraints    Pre-Treatment Position sitting in chair/recliner  -SO in bed  -SO     Post Treatment Position wheelchair  -SO bed  -SO     In Bed  supine;call light within reach;encouraged to call for assist;exit alarm on;with family/caregiver  -SO     In Wheelchair sitting;call light within reach;encouraged to call for assist  -SO       Recorded by [SO] KANDY Boo [SO] Micki Castro OTR       User Key  (r) = Recorded By, (t) = Taken By, (c) = Cosigned By    Initials Name Effective Dates    SO Micki Castro, OTR 04/13/15 -                 OT Goals       11/01/17 1554 11/01/17 1230       Transfer Training OT STG    Transfer Training OT STG, Date Established  11/01/17  -SO     Transfer Training OT STG, Time to Achieve  1 wk  -SO     Transfer Training OT STG, Activity Type  tub;walk-in shower;shower chair  -SO     Transfer Training OT STG, [place] Level  contact guard assist  -SO     Transfer Training OT STG, Assist Device  walker, rolling;shower chair  -SO     Transfer Training OT LTG    Transfer Training OT LTG, Date Established  11/01/17  -SO     Transfer Training OT LTG, Time to Achieve  by discharge  -SO     Transfer Training OT LTG, Activity Type  tub;walk-in shower;shower chair  -SO     Transfer Training OT LTG, [place] Level  supervision required  -SO     Transfer Training OT LTG, Assist Device  walker, rolling;shower chair  -SO     Transfer Training 2 OT STG    Transfer Training 2 OT STG, Date Established  11/01/17  -SO     Transfer Training 2 OT STG, Time to Achieve  1 wk  -SO     Transfer Training 2 OT STG, Activity Type  toilet  -SO     Transfer Training 2 OT STG, [place] Level  contact guard assist  -SO     Transfer Training 2 OT STG, Assist Device  walker, rolling  -SO     Transfer Training 2 OT LTG    Transfer Training 2 OT LTG, Date Established  11/01/17  -SO     Transfer Training 2 OT LTG, Time to Achieve  by discharge  -SO     Transfer Training 2 OT LTG, Activity Type  toilet  -SO     Transfer Training 2 OT LTG, [place] Level  supervision required  -SO     Transfer Training 2 OT LTG, Assist Device  walker, rolling  -SO     Strength OT LTG    Strength Goal OT LTG, Date Established 11/01/17  -SO      Strength Goal OT LTG, Time to Achieve by discharge  -SO      Strength Goal OT LTG,  Measure to Achieve Pt to increase LUE strength to 4-/5 to increase independence with ADLs.  -SO      Caregiver Training OT LTG    Caregiver Training OT LTG, Date Established  11/01/17  -SO     Caregiver Training OT LTG, Time to Achieve  by discharge  -SO     Caregiver Training OT LTG, [place] Level  able to assist adequately;able to cue patient adequately  -SO     Patient Education OT LTG    Patient Education OT LTG, Date Established  11/01/17  -SO     Patient Education OT LTG, Time to Achieve  by discharge  -SO     Patient Education OT LTG, Education Type  written program;HEP;aware of neuro deficits;home safety  -SO     Patient Education OT LTG, Education Understanding  independent;demonstrates adequately;verbalizes understanding  -SO     Isolated Movement OT LTG    Isolated Movement OT LTG, Date Established 11/01/17  -SO      Isolated Movement OT LTG, Time to Achieve by discharge  -SO      Isolated Movement OT LTG, Body Area left scapula;left shoulder;left elbow;left forearm;left wrist;left fingers  -SO      Isolated Movement OT LTG, Level WFL  -SO      Coordination OT LTG    Coordination OT LTG, Date Established 11/01/17  -SO      Coordination OT LTG, Time to Achieve by discharge  -SO      Coordination OT LTG, Activity Type FM written ex program;GM written ex program;FM task;GM task  -SO      Coordination OT LTG, [place] Level independent  -SO      Coordination OT LTG, Additional Goal LUE  -SO      ADL OT STG    ADL OT STG, Date Established  11/01/17  -SO     ADL OT STG, Time to Achieve  1 wk  -SO     ADL OT STG, Activity Type  ADL skills  -SO     ADL OT STG, [place] Level  contact guard;assistive device  -SO     ADL OT LTG    ADL OT LTG, Date Established  11/01/17  -SO     ADL OT LTG, Time to Achieve  by discharge  -SO     ADL OT LTG, Activity Type  ADL skills  -SO     ADL OT LTG, [place] Level  standby assist;assistive device  -SO       User Key  (r) = Recorded By, (t) = Taken By, (c) =  Cosigned By    Initials Name Provider Type    SO KANDY Boo Occupational Therapist          Occupational Therapy Education     Title: PT OT SLP Therapies (Active)     Topic: Occupational Therapy (Active)     Point: ADL training (Done)    Description: Instruct learner(s) on proper safety adaptation and remediation techniques during self care or transfers.   Instruct in proper use of assistive devices.    Learning Progress Summary    Learner Readiness Method Response Comment Documented by Status   Patient Acceptance E VU Discuss OT goals and POC. SO 11/01/17 1232 Done   Family Acceptance E VU Discuss OT goals and POC. SO 11/01/17 1232 Done                      User Key     Initials Effective Dates Name Provider Type Discipline    SO 04/13/15 -  KANDY Boo Occupational Therapist OT                  OT Recommendation and Plan  Therapy Frequency: 5 times/wk          Time Calculation:         Time Calculation- OT       11/02/17 1120          Time Calculation- OT    OT Start Time 0800  -SO      OT Stop Time 0845  -SO      OT Time Calculation (min) 45 min  -SO      OT Received On 11/02/17  -SO        User Key  (r) = Recorded By, (t) = Taken By, (c) = Cosigned By    Initials Name Provider Type    SO KANDY Boo Occupational Therapist           Therapy Charges for Today     Code Description Service Date Service Provider Modifiers Qty    04120448522 HC OT EVAL MOD COMPLEXITY 2 11/1/2017 KANDY Boo GO 1    23180361288 HC OT NEUROMUSC RE EDUCATION EA 15 MIN 11/1/2017 KANDY Boo GO 2    29731634255 HC OT SELF CARE/MGMT/TRAIN EA 15 MIN 11/2/2017 KANDY Boo GO 3               KANDY Boo  11/2/2017

## 2017-11-02 NOTE — PROGRESS NOTES
Inpatient Rehabilitation Functional Measures Assessment    Functional Measures  YESI Eating:  [facility] Grooming: [facility] Bathing:  [facility] Upper Body Dressing:  [facility] Lower Body Dressing:  [facility] Toileting:  [facility] Bladder Management  Level of Assistance:  [place]  Frequency/Number of Accidents this Shift:  [facility] Bowel Management  Level of Assistance: [place]  Frequency/Number of Accidents this Shift: [facility] Bed/Chair/Wheelchair Transfer:  [facility] Toilet Transfer:  [facility] Tub/Shower Transfer:  [place]    Previously Documented Mode of Locomotion at Discharge: Field  YESI Expected Mode of Locomotion at Discharge: [facility] Walk/Wheelchair:  [facility] Stairs:  [facility] Comprehension:  Auditory comprehension is the usual mode. Comprehension  Score = 7, Independent.  Patient comprehends complex/abstract information in  their primary language.  Patient is completely independent for auditory  comprehension.  There are no activity limitations.  YESI Expression:  Vocal expression is the usual mode. Expression Score = 7,  Independent.  Patient expresses complex/abstract information in their primary  language.  Patient is completely independent for vocal expression.  There are no  activity limitations.  YESI Social Interaction:  Social Interaction Score = 7, Independent. Patient is  completely independent for social interaction.  There are no activity  limitations.  YESI Problem Solving:  Activity was not observed.  YESI Memory:  Memory Score = 7, Independent.  Patient is completely independent  for memory.  There are no activity limitations.    Therapy Mode Minutes  Occupational Therapy: [place]  Physical Therapy: [place]  Speech Language Pathology:  [place]    Signed by: Rossy Falcon RN

## 2017-11-02 NOTE — THERAPY TREATMENT NOTE
Inpatient Rehabilitation - Speech Language Pathology   Swallow Treatment Note [facility]     Patient Name: Alice Maxwell  : 1945  MRN: [id number]  Today's Date: 2017  Onset of Illness/Injury or Date of Surgery Date: 10/28/17            Admit Date: 10/31/2017  Patient seen with test tray of mech soft no mixed consistencies and thin. Pt with cough 2-3 times, vocal quality remained clear. No anterior loss or L pocketing with puree or mech soft. Min cues for L tongue sweep and liquid wash throughout meal. Recommend upgrade in diet to mech soft no mixed with thin.     Visit Dx:    No diagnosis found.  Patient Active Problem List   Diagnosis   • Stroke             Adult Rehabilitation Note       17 1433 17 1200 17 1130    Rehab Assessment/Intervention    Discipline occupational therapist  -SO speech language pathologist  -OC physical therapist  -JK    Document Type therapy note (daily note)  -SO therapy note (daily note)  -OC therapy note (daily note)  -JK    Subjective Information no complaints;agree to therapy  -SO agree to therapy;no complaints  -OC no complaints;agree to therapy  -JK    Patient Effort, Rehab Treatment good  -SO adequate  -OC good  -JK    Symptoms Noted During/After Treatment fatigue  -SO fatigue  -OC fatigue  -JK    Precautions/Limitations fall precautions  -SO  fall precautions  -JK    Precautions/Limitations, Vision   WFL with corrective lenses  -JK    Precautions/Limitations, Hearing   WFL  -JK    Recorded by [SO] Micki Castro, OTR [OC] Lilian Mohr MA,CCC-SLP [JK] Anna Epps, PT    Pain Assessment    Pain Assessment No/denies pain  -SO No/denies pain  -OC No/denies pain  -JK    Recorded by [SO] Micki Castro, OTR [OC] Lilian Mohr MA,CCC-SLP [JK] Anna Epps, PT    Vision Assessment/Intervention    Visual Impairment visual impairment, left  -SO  visual impairment, left  -JK    Visual Impairment Comment Scanning activity, missed letters  in bottom L corner  -SO      Recorded by [SO] Micki Castro, OTR  [JK] Anna Epps, PT    Cognitive Assessment/Intervention    Current Cognitive/Communication Assessment impaired  -SO impaired  -OC impaired  -JK    Orientation Status oriented to;person;place;time  -SO oriented to;person;place;time  -OC oriented to;person;place;time  -JK    Follows Commands/Answers Questions able to follow single-step instructions;needs cueing  -SO  able to follow single-step instructions;100% of the time  -JK    Personal Safety decreased insight to deficits  -SO  decreased insight to deficits  -JK    Personal Safety Interventions   fall prevention program maintained;gait belt  -JK    Recorded by [SO] Micki Castro, OTR [OC] Lilian Mohr MA,CCC-SLP [JK] Anna Epps, PT    Swallow Assessment/Intervention    Additional Documentation  --   [facility] soft test tray. See note  -OC     Recorded by  [OC] Lilian Mohr MA,CCC-SLP     Improve oral skills    Status: Improve Oral Skills  Progressing as expected  -OC     Oral Skills Progress  70%;with inconsistent cues  -OC     Comments: Improve Oral Skills  anterior loss saliva with secretions  -OC     Recorded by  [OC] Lilian Mohr MA,CCC-SLP     Improve laryngeal elevation    Status: Improve laryngeal elevation  Progressing as expected  -OC     Laryngeal Elevation Progress  70%;with inconsistent cues   difficulty differentiating pitch and loudness   -OC     Recorded by  [OC] Lilian Mohr MA,CCC-SLP     Bed Mobility, Assessment/Treatment    Bed Mobility, Comment Sitting up in chair  -SO  up in chair  -JK    Recorded by [SO] Micki Castro, OTR  [JK] Anna Epps, PT    Transfer Assessment/Treatment    Transfers, Sit-Stand [place] contact guard assist  -SO  contact guard assist  -JK    Transfers, Stand-Sit [place] contact guard assist  -SO  contact guard assist  -JK    Transfers, Sit-Stand-Sit, Assist Device straight cane  -SO  straight cane  -JK     "Transfer, Safety Issues   step length decreased;weight-shifting ability decreased;balance decreased during turns;sequencing ability decreased  -JK    Transfer, Impairments   strength decreased;impaired balance  -JK    Transfer, Comment W/c <-> mat CGA  -SO      Recorded by [SO] Micki Castro, OTR  [JK] Anna Epps, PT    Gait Assessment/Treatment    Gait, [place] Level   verbal cues required;contact guard assist;minimum assist (75% patient effort)  -JK    Gait, Assistive Device   rolling walker  -JK    Gait, Distance (Feet)   160  -JK    Gait, Gait Deviations   bilateral:;janet decreased;forward flexed posture;toe-to-floor clearance decreased;step length decreased  -JK    Gait, Safety Issues   step length decreased;weight-shifting ability decreased  -JK    Gait, Impairments   impaired balance;strength decreased  -JK    Gait, Comment   pt became more unsteady with gait by end of tx  -JK    Recorded by   [JK] Anna Epps PT    Therapy Exercises    Bilateral Lower Extremities   other reps   nu step x 6 min on level 7did not use L UE  -JK    Recorded by   [JK] Anna Epps PT    Fine Motor Coordination Training    Detail (Fine Motor Coordination Training) Pt with good- coordination with manipulating golf tees. Some increased difficulty with 1/4\" pegs and needed some cues for correct placement to match pattern.  -SO      Recorded by [SO] Micki Castro OTR      Positioning and Restraints    Pre-Treatment Position sitting in chair/recliner  -SO  sitting in chair/recliner  -JK    Post Treatment Position wheelchair  -SO  wheelchair  -JK    In Wheelchair sitting;with PT;with family/caregiver  -SO  sitting;with SLP  -JK    Recorded by [SO] Micki Castro OTR  [JK] Anna Epps PT      11/02/17 1116 11/01/17 1550       Rehab Assessment/Intervention    Discipline occupational therapist  -SO occupational therapist  -SO     Document Type therapy note (daily note)  -SO therapy note " (daily note)  -SO     Subjective Information no complaints;agree to therapy  -SO no complaints;agree to therapy  -SO     Patient Effort, Rehab Treatment good  -SO adequate  -SO     Symptoms Noted During/After Treatment fatigue  -SO fatigue  -SO     Precautions/Limitations fall precautions  -SO fall precautions  -SO     Recorded by [SO] Micki Castro OTR [SO] Micki Castro, OTR     Pain Assessment    Pain Assessment No/denies pain  -SO No/denies pain  -SO     Recorded by [SO] Micki Castro OTR [SO] Micki Castro OTR     Vision Assessment/Intervention    Visual Impairment visual impairment, left  -SO visual impairment, left  -SO     Visual Impairment Comment Cues to locate washcloth on L side, inattention  -SO Cues to scan to the left during connect the numbers, able to draw a clock correctly but wrote wrong number  -SO     Recorded by [SO] Micki Castro, OTR [SO] Micki Castro OTR     Cognitive Assessment/Intervention    Current Cognitive/Communication Assessment impaired  -SO impaired  -SO     Orientation Status oriented to;person;place;time  -SO oriented to;person;place;time  -SO     Follows Commands/Answers Questions able to follow single-step instructions;needs cueing  -SO able to follow single-step instructions;needs cueing  -SO     Recorded by [SO] Micki Castro OTR [SO] Micki Castro OTR     ROM (Range of Motion)    General ROM  upper extremity range of motion deficits identified  -SO     General ROM Detail  RUE WFL; LUE shoulder 3/4-7/8  -SO     Recorded by  [SO] Micki Castro OTR     MMT (Manual Muscle Testing)    General MMT Assessment  upper extremity strength deficits identified  -SO     General MMT Assessment Detail  RUE 4-/5; LUE shoulder 3-/5, elbow 3/5, forearm 3-/5  -SO     Recorded by  [SO] Micki Castro OTR     Bed Mobility, Assessment/Treatment    Bed Mob, Supine to Sit, [place]  verbal cues  required;supervision required  -SO     Bed Mob, Sit to Supine, [place]  verbal cues required;supervision required  -SO     Bed Mobility, Comment Sitting up in w/c  -SO      Recorded by [SO] Micki Castro OTR [SO] Micki Castro OTR     Transfer Assessment/Treatment    Transfers, Bed-Chair [place]  contact guard assist  -SO     Transfers, Chair-Bed [place]  contact guard assist  -SO     Transfers, Sit-Stand [place] contact guard assist  -SO contact guard assist  -SO     Transfers, Stand-Sit [place] contact guard assist  -SO contact guard assist  -SO     Transfers, Sit-Stand-Sit, Assist Device rolling walker  -SO      Walk-In Shower Transfer, [place] contact guard assist;verbal cues required  -SO      Walk-In Shower Transfer, Assist Device --   Tub bench, grab bars  -SO      Recorded by [SO] Micki Castro OTR [SO] Micki Castro OTR     Upper Body Bathing Assessment/Training    UB Bathing Assess/Train Assistive Device hand-held shower head;tub bench;grab bars  -SO      UB Bathing Assess/Train, Position sitting  -SO      UB Bathing Assess/Train, [place] Level stand by assist;set up required;verbal cues required  -SO      Recorded by [SO] KANDY Boo      Lower Body Bathing Assessment/Training    LB Bathing Assess/Train Assistive Device grab bars;hand-held shower head;tub bench  -SO      LB Bathing Assess/Train, Position sitting;standing  -SO      LB Bathing Assess/Train, [place] Level contact guard assist;verbal cues required  -SO      Recorded by [SO] Micki Castro OTR      Upper Body Dressing Assessment/Training    UB Dressing Assess/Train, Clothing Type doffing:;donning:;t-shirt  -SO      UB Dressing Assess/Train, [place] verbal cues required;minimum assist (75% patient effort)  -SO      Recorded by [SO] Micki Castro OTR      Lower Body Dressing Assessment/Training    LB Dressing Assess/Train,  Clothing Type doffing:;donning:;pants;shoes;socks  -SO      LB Dressing Assess/Train, Position sitting;standing  -SO      LB Dressing Assess/Train, [place] minimum assist (75% patient effort);verbal cues required;nonverbal cues required (demo/gesture)  -SO      Recorded by [SO] KANDY Boo      Grooming Assessment/Training    Grooming Assess/Train, Position sitting;sink side  -SO      Grooming Assess/Train, [place]ep[place] Level supervision required;set up required  -SO      Recorded by [SO] KANDY Boo      Motor Skills/Interventions    Additional Documentation  Fine Motor Coordination Training (Group);Gross Motor Coordination Training (Group)  -SO     Recorded by  [SO] KANDY Boo     Fine Motor Coordination Training    9-Hole Peg Right  23  -SO     9-Hole Peg Left  56  -SO     Box and Blocks Right  46  -SO     Box and Blocks Left  36  -SO     Recorded by  [SO] KANDY Boo     Positioning and Restraints    Pre-Treatment Position sitting in chair/recliner  -SO in bed  -SO     Post Treatment Position wheelchair  -SO bed  -SO     In Bed  supine;call light within reach;encouraged to call for assist;exit alarm on;with family/caregiver  -SO     In Wheelchair sitting;call light within reach;encouraged to call for assist  -SO      Recorded by [SO] KANDY Boo [SO] Micki Castro OTR       User Key  (r) = Recorded By, (t) = Taken By, (c) = Cosigned By    Initials Name Effective Dates    OC Lilian Mohr MA,CCC-SLP 04/05/17 -     SO Micki Castro, OTR 04/13/15 -     TOSHA Epps, PT 12/01/15 -                   IP SLP Goals       11/01/17 1215          Safely Consume Diet    Safely Consume Diet- SLP, Date Established 11/01/17  -OC      Safely Consume Diet- SLP, Time to Achieve by discharge  -OC      Safely Consume Diet- SLP, Outcome goal ongoing  -OC        User Key  (r) = Recorded By, (t) = Taken By, (c) = Cosigned By     Initials Name Provider Type    OC Lilian Mohr MA,BEN-SLP Speech and Language Pathologist          EDUCATION  The patient has been educated in the following areas:   Dysphagia (Swallowing Impairment).    SLP Recommendation and Plan   3-5 TIMES PER WK  45-60 MINUTES  UNTIL DC         Plan of Care Review  Plan Of Care Reviewed With: patient, daughter  Outcome Summary/Follow up Plan: Cognitive-linguistic eval completed, composite score WNL. Mild for executive functions. No overt s/s aspiration with puree and thin lunch tray.  VFSS [facility] 10/29/17 recommending puree and thin.        SLP Outcome Measures (last 72 hours)      SLP Outcome Measures       11/01/17 1215          SLP Outcome Measures    Outcome Measure Used? Adult NOMS;Modified [place]  -OC      FCM Scores    FCM Chosen Swallowing  -OC      Swallowing FCM Score 4  -OC      Modified Wesley Scale    Modified [place] Scale 3 - Moderate disability.  Requiring some help, but able to walk without assistance.  -OC        User Key  (r) = Recorded By, (t) = Taken By, (c) = Cosigned By    Initials Name Effective Dates    OC Lilian Mohr MA, CCC-GARTH 04/05/17 -              Time Calculation:         Time Calculation- SLP       11/02/17 1444 11/02/17 1000       Time Calculation- SLP    SLP Start Time 1130  -OC 0930  -OC     SLP Stop Time 1200  -OC 1000  -OC     SLP Time Calculation (min) 30 min  -OC 30 min  -OC       User Key  (r) = Recorded By, (t) = Taken By, (c) = Cosigned By    Initials Name Provider Type    OC Lilian Mohr MA,BEN-SLP Speech and Language Pathologist          Therapy Charges for Today     Code Description Service Date Service Provider Modifiers Qty    96412272127 HC ST STD COG PERF TEST PER HOUR 11/1/2017 Lilian Mohr MA,BEN-SLP GN 2    83146163292 HC ST EVAL ORAL PHARYNG SWALLOW 1 11/1/2017 Lilian Mohr MA, CCC-SLP GN 1    27938955139 HC ST TREATMENT SWALLOW 4 11/2/2017 Lilian Mohr MA, CCC-SLP GN 1                 Lilian Mohr MA, CCC-SLP  11/2/2017

## 2017-11-02 NOTE — PROGRESS NOTES
Case Management  Inpatient Rehabilitation Plan of Care and Discharge Plan Note    Rehabilitation Diagnosis:  Branch  Date of Onset:  Kevin    Medical Summary:  Branch  Past Medical History: Branch    Plan of Care  Updated Problems/Interventions  Field    Expected Intensity:  Kevin  Interdisciplinary Team:  Kevin  Estimated Length of Stay/Anticipated Discharge Date: [place]  Anticipated Discharge Destination:  Anticipated discharge destination from inpatient rehabilitation is community  discharge with assistance. Patient lives with  in one story home with  ramp entrance on [address].  Worked part time from home.  has [employer] business a mile from their  home. Three children and sister live in [place].  D/C plan is either home with  or to [facility] here in [place]. HH  or outpatient therapies.      Based on the patient's medical and functional status, their prognosis and  expected level of functional improvement is:  Kevin    Signed by: MICHAEL Couch

## 2017-11-02 NOTE — PROGRESS NOTES
Inpatient Rehabilitation Plan of Care Note    Plan of Care  Care Plan Reviewed - No updates at this time.    Sphincter Control    Performed Intervention(s)  Monitor I & O's  Encourage fluid intake  Timed voids/ scheduled toileting      Safety    Performed Intervention(s)  Falls prevention protocol  Safety rounds  Bed and chair alarms      Psychosocial    Performed Intervention(s)  Verbalizes needs and concerns  Therapeutic environmental set-up      Body Systems    Performed Intervention(s)  Blood glucose testing  Monitor labs  Medication    Signed by: Carolina King RN

## 2017-11-02 NOTE — PROGRESS NOTES
Inpatient Rehabilitation Plan of Care Note    Plan of Care  Care Plan Reviewed - No updates at this time.    Sphincter Control    Performed Intervention(s)  Monitor I & O's  Encourage fluid intake  Timed voids/ scheduled toileting      Safety    Performed Intervention(s)  Falls prevention protocol  Safety rounds  Bed and chair alarms      Psychosocial    Performed Intervention(s)  Verbalizes needs and concerns  Therapeutic environmental set-up      Body Systems    Performed Intervention(s)  Blood glucose testing  Monitor labs  Medication    Signed by: Rossy Falcon RN

## 2017-11-02 NOTE — PROGRESS NOTES
Inpatient Rehabilitation Plan of Care Note    Plan of Care  Updated Problems/Interventions  Mobility    [OT] Toilet Transfers(Active)  Current Status(11/02/2017): MIN/CGA  Weekly Goal(11/10/2017): SBA  Discharge Goal: SUP    [OT] Tub/Shower Transfers(Active)  Current Status(11/02/2017): MIN/CGA  Weekly Goal(11/10/2017): SBA  Discharge Goal: SUP        Self Care    [OT] Bathing(Active)  Current Status(11/02/2017): CGA  Weekly Goal(11/10/2017): SBA  Discharge Goal: SUP    [OT] Dressing (Lower)(Active)  Current Status(11/02/2017): MIN  Weekly Goal(11/10/2017): CGA  Discharge Goal: SBA    [OT] Dressing (Upper)(Active)  Current Status(11/02/2017): MIN  Weekly Goal(11/10/2017): SBA  Discharge Goal: SUP    [OT] Grooming(Active)  Current Status(11/02/2017): SBA  Weekly Goal(11/10/2017): SUP  Discharge Goal: SUP    [OT] Toileting(Active)  Current Status(11/02/2017): CGA  Weekly Goal(11/10/2017): SBA  Discharge Goal: SUP    Signed by: Micki Castro OTR/L

## 2017-11-02 NOTE — PROGRESS NOTES
Inpatient Rehabilitation Functional Measures Assessment and Plan of Care    Plan of Care  Updated Problems/Interventions  Swallow Function    [ST] Swallowing(Active)  Current Status(11/02/2017): Puree and Thin  Weekly Goal(11/09/2017): Tolerate diet with no overt s/s aspiration  Discharge Goal: Return to regular textures and thin liquids    Functional Measures  [facility] Eating:  [facility] Grooming: [facility] Bathing:  [facility] Upper Body Dressing:  [facility] Lower Body Dressing:  [facility] Toileting:  [facility] Bladder Management  Level of Assistance:  [place]  Frequency/Number of Accidents this Shift:  [facility] Bowel Management  Level of Assistance: [place]  Frequency/Number of Accidents this Shift: [facility] Bed/Chair/Wheelchair Transfer:  [facility] Toilet Transfer:  [facility] Tub/Shower Transfer:  [place]    Previously Documented Mode of Locomotion at Discharge: Field  YESI Expected Mode of Locomotion at Discharge: [facility] Walk/Wheelchair:  [facility] Stairs:  [facility] Comprehension:  Both ( auditory and visual) modes of comprehension are used  equally. Comprehension Score = 6, Modified [place].  Patient comprehends  complex/abstract information in their primary language, requiring:  [facility] Expression:  Both ( vocal and non-vocal) modes of expression are used  equally. Expression Score = 6,  Modified Independent. Patient expresses  complex/abstract information in their primary language, requiring:  [facility] Social Interaction:  Social Interaction Score = 7, Independent. Patient is  completely independent for social interaction.  There are no activity  limitations.  [facility] Problem Solving:  Patient does not make appropriate decisions in order to  solve complex problems without assistance from a helper. Problem Solving Score =  5, Supervision.  Patient makes appropriate decisions in order to solve routine  problems with directing only under stressful or unfamiliar conditions, but no  more  than 10% of the time, for the following behavior(s):  YESI Memory:  Memory Score = 4, Minimal Prompting. Patient recognizes and  remembers 75-90% of the time. Patient requires minimal/occasional prompting for  memory for the following:    Therapy Mode Minutes  Occupational Therapy: Branch  Physical Therapy: Branch  Speech Language Pathology:  Individual: 60 minutes.    Signed by: Lilian Mohr SLP

## 2017-11-02 NOTE — PLAN OF CARE
Problem: Patient Care Overview (Adult)  Goal: Plan of Care Review  Outcome: Ongoing (interventions implemented as appropriate)    11/01/17 2035   Coping/Psychosocial Response Interventions   Plan Of Care Reviewed With patient   Patient Care Overview   Progress improving   Outcome Evaluation   Outcome Summary/Follow up Plan melatonin ordered for sleep. did not sleep last night.         Problem: Stroke (Ischemic) (Adult)  Goal: Signs and Symptoms of Listed Potential Problems Will be Absent or Manageable (Stroke)  Outcome: Ongoing (interventions implemented as appropriate)    11/01/17 2035   Stroke (Ischemic)   Problems Assessed (Stroke (Ischemic)/TIA) motor/sensory impairment   Problems Present (Stroke (Ischemic)/TIA) motor/sensory impairment         Problem: Fall Risk (Adult)  Goal: Absence of Falls  Outcome: Ongoing (interventions implemented as appropriate)    11/01/17 2035   Fall Risk (Adult)   Absence of Falls making progress toward outcome

## 2017-11-02 NOTE — THERAPY TREATMENT NOTE
Inpatient Rehabilitation - Physical Therapy Treatment Note  [facility]     Patient Name: Alice Maxwell  : 1945  MRN: [id number]  Today's Date: 2017  Onset of Illness/Injury or Date of Surgery Date: 10/28/17  Date of Referral to PT: 17  Referring Physician: Jeremie    Admit Date: 10/31/2017    Visit Dx:  No diagnosis found.  Patient Active Problem List   Diagnosis   • Stroke               Adult Rehabilitation Note       17 1130 17 1116 17 1550    Rehab Assessment/Intervention    Discipline physical therapist  -JK occupational therapist  -SO occupational therapist  -SO    Document Type therapy note (daily note)  -JK therapy note (daily note)  -SO therapy note (daily note)  -SO    Subjective Information no complaints;agree to therapy  -JK no complaints;agree to therapy  -SO no complaints;agree to therapy  -SO    Patient Effort, Rehab Treatment good  -JK good  -SO adequate  -SO    Symptoms Noted During/After Treatment fatigue  -JK fatigue  -SO fatigue  -SO    Precautions/Limitations fall precautions  -JK fall precautions  -SO fall precautions  -SO    Precautions/Limitations, Vision WFL with corrective lenses  -JK      Precautions/Limitations, Hearing WFL  -JK      Recorded by [JK] Anna Epps, PT [SO] Micki Castro, OTR [SO] Micki Castro, OTR    Pain Assessment    Pain Assessment No/denies pain  -JK No/denies pain  -SO No/denies pain  -SO    Recorded by [JK] Anna Epps, PT [SO] Micki Castro, OTR [SO] Micki Castro, OTR    Vision Assessment/Intervention    Visual Impairment visual impairment, left  -JK visual impairment, left  -SO visual impairment, left  -SO    Visual Impairment Comment  Cues to locate washcloth on L side, inattention  -SO Cues to scan to the left during connect the numbers, able to draw a clock correctly but wrote wrong number  -SO    Recorded by [JK] Anna Epps, PT [SO] Micki Castro, OTR [SO] Micki  KANDY Genao    Cognitive Assessment/Intervention    Current Cognitive/Communication Assessment impaired  -JK impaired  -SO impaired  -SO    Orientation Status oriented to;person;place;time  -JK oriented to;person;place;time  -SO oriented to;person;place;time  -SO    Follows Commands/Answers Questions able to follow single-step instructions;100% of the time  -JK able to follow single-step instructions;needs cueing  -SO able to follow single-step instructions;needs cueing  -SO    Personal Safety decreased insight to deficits  -JK      Personal Safety Interventions fall prevention program maintained;gait belt  -JK      Recorded by [JK] Anna Epps, PT [SO] Micki Castro, SARAR [SO] Micki Castro OTR    ROM (Range of Motion)    General ROM   upper extremity range of motion deficits identified  -SO    General ROM Detail   RUE WFL; LUE shoulder 3/4-7/8  -SO    Recorded by   [SO] Micki Castro OTR    MMT (Manual Muscle Testing)    General MMT Assessment   upper extremity strength deficits identified  -SO    General MMT Assessment Detail   RUE 4-/5; LUE shoulder 3-/5, elbow 3/5, forearm 3-/5  -SO    Recorded by   [SO] Micki Castro OTR    Bed Mobility, Assessment/Treatment    Bed Mob, Supine to Sit, [place]   verbal cues required;supervision required  -SO    Bed Mob, Sit to Supine, [place]   verbal cues required;supervision required  -SO    Bed Mobility, Comment up in chair  -JK Sitting up in w/c  -SO     Recorded by [JK] Anna Epps, PT [SO] Micki Castro, SARAR [SO] Micki Castro OTR    Transfer Assessment/Treatment    Transfers, Bed-Chair [place]   contact guard assist  -SO    Transfers, Chair-Bed [place]   contact guard assist  -SO    Transfers, Sit-Stand [place] contact guard assist  -JK contact guard assist  -SO contact guard assist  -SO    Transfers, Stand-Sit [place] contact guard assist  -JK contact guard assist  -SO  contact guard assist  -SO    Transfers, Sit-Stand-Sit, Assist Device straight cane  -JK rolling walker  -SO     Walk-In Shower Transfer, [place]  contact guard assist;verbal cues required  -SO     Walk-In Shower Transfer, Assist Device  --   Tub bench, grab bars  -SO     Transfer, Safety Issues step length decreased;weight-shifting ability decreased;balance decreased during turns;sequencing ability decreased  -JK      Transfer, Impairments strength decreased;impaired balance  -JK      Recorded by [JK] Anna Epps, PT [SO] Micki Castro, OTR [SO] Micki Castro, OTR    Gait Assessment/Treatment    Gait, [place] Level verbal cues required;contact guard assist;minimum assist (75% patient effort)  -JK      Gait, Assistive Device rolling walker  -JK      Gait, Distance (Feet) 160  -JK      Gait, Gait Deviations bilateral:;janet decreased;forward flexed posture;toe-to-floor clearance decreased;step length decreased  -JK      Gait, Safety Issues step length decreased;weight-shifting ability decreased  -JK      Gait, Impairments impaired balance;strength decreased  -JK      Gait, Comment pt became more unsteady with gait by end of tx  -JK      Recorded by [JK] Anna Epps, PT      Upper Body Bathing Assessment/Training    UB Bathing Assess/Train Assistive Device  hand-held shower head;tub bench;grab bars  -SO     UB Bathing Assess/Train, Position  sitting  -SO     UB Bathing Assess/Train, [place] Level  stand by assist;set up required;verbal cues required  -SO     Recorded by  [SO] Micki Castro OTR     Lower Body Bathing Assessment/Training    LB Bathing Assess/Train Assistive Device  grab bars;hand-held shower head;tub bench  -SO     LB Bathing Assess/Train, Position  sitting;standing  -SO     LB Bathing Assess/Train, [place] Level  contact guard assist;verbal cues required  -SO     Recorded by  [SO] Micki Castro OTR     Upper Body Dressing  Assessment/Training    UB Dressing Assess/Train, Clothing Type  doffing:;donning:;t-shirt  -SO     UB Dressing Assess/Train, [place]  verbal cues required;minimum assist (75% patient effort)  -SO     Recorded by  [SO] Micki Castro OTR     Lower Body Dressing Assessment/Training    LB Dressing Assess/Train, Clothing Type  doffing:;donning:;pants;shoes;socks  -SO     LB Dressing Assess/Train, Position  sitting;standing  -SO     LB Dressing Assess/Train, [place]  minimum assist (75% patient effort);verbal cues required;nonverbal cues required (demo/gesture)  -SO     Recorded by  [SO] Micki Castro OTR     Grooming Assessment/Training    Grooming Assess/Train, Position  sitting;sink side  -SO     Grooming Assess/Train, [place] Level  supervision required;set up required  -SO     Recorded by  [SO] Micki Castro OTR     Motor Skills/Interventions    Additional Documentation   Fine Motor Coordination Training (Group);Gross Motor Coordination Training (Group)  -SO    Recorded by   [SO] KANDY Boo    Therapy Exercises    Bilateral Lower Extremities other reps   nu step x 6 min on level 7did not use L UE  -JK      Recorded by [JK] Anna Epps, PT      Fine Motor Coordination Training    9-Hole Peg Right   23  -SO    9-Hole Peg Left   56  -SO    Box and Blocks Right   46  -SO    Box and Blocks Left   36  -SO    Recorded by   [SO] Micki Castro OTR    Positioning and Restraints    Pre-Treatment Position sitting in chair/recliner  -JK sitting in chair/recliner  -SO in bed  -SO    Post Treatment Position wheelchair  -JK wheelchair  -SO bed  -SO    In Bed   supine;call light within reach;encouraged to call for assist;exit alarm on;with family/caregiver  -SO    In Wheelchair sitting;with SLP  -JK sitting;call light within reach;encouraged to call for assist  -SO     Recorded by [TOSHA] Anna Epps, PT [SO] Micki Castro OTR [SO] Micki Castro,  OTR      User Key  (r) = Recorded By, (t) = Taken By, (c) = Cosigned By    Initials Name Effective Dates    SO Micki Castro, OTR 04/13/15 -     JK Anna Epps, PT 12/01/15 -                 IP PT Goals       11/01/17 1221          Bed Mobility PT LTG    Bed Mobility PT LTG, Date Established 11/01/17  -MD      Bed Mobility PT LTG, Time to Achieve 2 wks  -MD      Bed Mobility PT LTG, Activity Type supine to sit/sit to supine  -MD      Bed Mobility PT LTG, [place] Level independent  -MD      Transfer Training PT LTG    Transfer Training PT LTG, Date Established 11/01/17  -MD      Transfer Training PT LTG, Time to Achieve 2 wks  -MD      Transfer Training PT LTG, Activity Type sit to stand/stand to sit  -MD      Transfer Training PT LTG, [place] Level supervision required  -MD      Transfer Training PT LTG, Assist Device walker, rolling  -MD      Transfer Training 2 PT LTG    Transfer Training PT 2 LTG, Date Established 11/01/17  -MD      Transfer Training PT 2 LTG, Time to Achieve 2 wks  -MD      Transfer Training PT 2 LTG, Activity Type other (see comments)   car transfer  -MD      Transfer Training PT 2 LTG, [place] Level supervision required  -MD      Transfer Training PT 2 LTG, Assist Device walker, rolling  -MD      Gait Training PT LTG    Gait Training Goal PT LTG, Date Established 11/01/17  -MD      Gait Training Goal PT LTG, Time to Achieve 2 wks  -MD      Gait Training Goal PT LTG, [place] Level supervision required  -MD      Gait Training Goal PT LTG, Assist Device walker, rolling  -MD      Gait Training Goal PT LTG, Distance to Achieve 160  -MD      Stair Training PT LTG    Stair Training Goal PT LTG, Date Established 11/01/17  -MD      Stair Training Goal PT LTG, Time to Achieve 2 wks  -MD      Stair Training Goal PT LTG, Number of Steps 4  -MD      Stair Training Goal PT LTG, [place] Level supervision required  -MD      Stair Training Goal PT LTG, Assist Device 2  handrails  -MD      Patient Education PT LTG    Patient Education PT LTG, Date Established 11/01/17  -MD      Patient Education PT LTG, Time to Achieve 2 wks  -MD      Patient Education PT LTG, Education Type HEP  -MD      Patient Education PT LTG, Education Understanding demonstrate adequately  -MD        User Key  (r) = Recorded By, (t) = Taken By, (c) = Cosigned By    Initials Name Provider Type    MD Nereida Llamas, PT Physical Therapist          Physical Therapy Education     Title: PT OT SLP Therapies (Active)     Topic: Physical Therapy (Active)     Point: Mobility training (Done)    Learning Progress Summary    Learner Readiness Method Response Comment Documented by Status   Patient Acceptance E EDUARDO  JK 11/02/17 1158 Done               Point: Precautions (Active)    Learning Progress Summary    Learner Readiness Method Response Comment Documented by Status   Patient Acceptance ENOEMY MD 11/01/17 1228 Active                      User Key     Initials Effective Dates Name Provider Type Discipline     12/01/15 -  Anna Epps, PT Physical Therapist PT    MD 12/01/15 -  Nereida Llamas, PT Physical Therapist PT                    PT Recommendation and Plan  Anticipated Equipment Needs At Discharge: gait belt, front wheeled walker  Anticipated Discharge Disposition: home with home health  Planned Therapy Interventions: balance training, bed mobility training, gait training, home exercise program, patient/family education, transfer training  PT Frequency: 2 times/day            Time Calculation:         PT Charges       11/02/17 1412 11/02/17 1159       Time Calculation    Start Time 1330  -JK 1100  -JK     Stop Time 1400  -JK 1130  -JK     Time Calculation (min) 30 min  -JK 30 min  -JK       User Key  (r) = Recorded By, (t) = Taken By, (c) = Cosigned By    Initials Name Provider Type    TOSHA Epps, PT Physical Therapist          Therapy Charges for Today     Code Description Service Date Service Provider  Modifiers Qty    [id number]  PT THER PROC EA 15 MIN 11/2/2017 Anna Epps, PT GP 4               Anna Epps, PT  11/2/2017

## 2017-11-02 NOTE — PROGRESS NOTES
"   LOS: 2 days   Patient Care Team:  Calvin Dhillon Jr., DO as PCP - General (Internal Medicine)    Chief Complaint:   S/p R MCA ischemic infarct  Stroke prophylaxis - ASA 81 mg and Lovenox transition to coumadin  Hypercoaguability disorder - Hereditary thrombophilia  Homozygous MTHFR with hyperhomocystinemia  Homozygous factor V Leiden gene mutation  history of malignant neoplasm of breast   Obesity   Hyperlipidemia -   Hypothyroidism   DM    Subjective     History of Present Illness    Subjective  Her strength is improving on the left side.  She is tolerating therapies.  Swallow improved.      History taken from: patient    Objective     Vital Signs  Temp:  [97.5 °F (36.4 °C)-97.7 °F (36.5 °C)] 97.7 °F (36.5 °C)  Heart Rate:  [68-80] 68  Resp:  [16-20] 16  BP: (126-140)/(60-65) 140/63    Objective:  Vital signs: (most recent): Blood pressure 140/63, pulse 68, temperature 97.7 °F (36.5 °C), temperature source Oral, resp. rate 16, height 67\" (170.2 cm), weight 219 lb 6.4 oz (99.5 kg), SpO2 96 %.            Mental status-awake alert  Lungs-clear to auscultation  Heart-regular rate and rhythm  Abdomen abdomen-normoactive bowel sounds, soft, nontender  Extremities-no pretibial edema  Neurologic-mild left hemiparesis .  Left elbow flexion 5 minus, finger flexion 4+, knee extension 5 minus, ankle dorsiflexion 5 minus.  Left facial droop.  She shows some left inattention.   better dexterity with left hand.  She recognizes finger movement in all 4 quadrants    Results Review:     I reviewed the patient's new clinical results.    Lab Results  Lab Value Date/Time   INR 1.07 11/02/2017 0713   INR 1.09 11/01/2017 0754       Results from last 7 days  Lab Units 11/02/17  0713   WBC 10*3/mm3 5.72   HEMOGLOBIN g/dL 11.7*   HEMATOCRIT % 36.4   PLATELETS 10*3/mm3 149         Results from last 7 days  Lab Units 11/01/17  0754   CREATININE mg/dL 1.09*     Glucose   Date/Time Value Ref Range Status   11/02/2017 1134 104 70 - " 130 mg/dL Final   11/02/2017 0727 118 70 - 130 mg/dL Final   11/01/2017 2110 126 70 - 130 mg/dL Final   11/01/2017 1656 108 70 - 130 mg/dL Final   11/01/2017 1136 133 (H) 70 - 130 mg/dL Final   11/01/2017 0722 128 70 - 130 mg/dL Final   10/31/2017 2156 127 70 - 130 mg/dL Final   10/31/2017 1556 99 70 - 130 mg/dL Final       Labs on October 31-sodium 139, potassium 3.8, chloride 106, glucose 101, calcium 9.0, come back to 25, BUN 16, creatinine 1.1.  WBC6.64, hemoglobin 11.8, hematocrit 36.0, platelets 125.  October 29 triglycerides 89, cholesterol 125.  October 28 AST 35, ALT 29, alkaline phosphatase 99, total protein 7.7, albumin 4.1  Oct 31 - INR 1.0  Medication Review: done  Scheduled Meds:    allopurinol 200 mg Oral Daily   aspirin 81 mg Oral Daily   atorvastatin 80 mg Oral Nightly   cholecalciferol 1,000 Units Oral BID   enoxaparin 100 mg Subcutaneous Q12H   folic acid 1 mg Oral Daily   levothyroxine 112 mcg Oral Q AM   melatonin 3 mg Oral Nightly   metFORMIN 500 mg Oral Daily With Breakfast   multivitamin 1 tablet Oral Daily   warfarin 5 mg Oral Daily     Continuous Infusions:   PRN Meds:.•  acetaminophen  •  dextrose  •  dextrose  •  glucagon (human recombinant)      Assessment/Plan     Active Problems:    Stroke      Assessment & Plan  S/p R MCA ischemic infarct  Left hemiparesis-improving  Dysphagia-November 2-advance from puree to mechanical soft, no mixed consistency, thin liquid diet.  Stroke prophylaxis - ASA 81 mg and Lovenox transition to coumadin.  Daily INR.  November 2-INR still low-Will continue Coumadin 5 mg as recent restarted but may titrate up dose tomorrow depending on lab result.  Hypercoaguability disorder  history of malignant neoplasm of breast   Factor 5 Leiden mutation, heterozygous   Obesity   Hyperlipidemia -atorvastatin   Hypothyroidism -on replacement   Cerebrovascular accident (CVA) due to thrombosis of right middle cerebral artery   DVT prophylaxis-SCDs and  anticoagulation  Diabetes mellitus-metformin     72 to female status post right MCA ischemic infarct with left inattention, left hemiparesis, dysarthria, decreased alertness, impairments with her mobility and self-care and is now admitted for comprehensive inpatient rehabilitation program with physical therapy 1 hour, occupational therapy 1 hour, and speech therapy 1 hour, 5 days a week.  Rehabilitation nursing for carryover and monitoring of her neurologic status, diabetes, bowel bladder and skin.  Ongoing physician follow-up.  Weekly team conferences.  Goals for her to achieve a level of supervision with her mobility and self-care for return home with her family.  Rehabilitation prognosis fair.  Medical prognosis defer to neurology.  Estimated length of stay indeterminate.  Aric Chao MD  11/02/17  1:00 PM    Time:

## 2017-11-02 NOTE — PLAN OF CARE
Problem: Patient Care Overview (Adult)  Goal: Plan of Care Review  Outcome: Ongoing (interventions implemented as appropriate)    11/02/17 0329   Coping/Psychosocial Response Interventions   Plan Of Care Reviewed With patient   Patient Care Overview   Progress improving   Outcome Evaluation   Outcome Summary/Follow up Plan Patient pleasant and cooperative,started Melatonin last night and seemed to sleep better than the previous night She is A&O x4, has slurred speech and has a left leg drift- NIH is a 3. She takes her meds crushed in applesauce or pudding and has no complaints of pain or discomfort.          Problem: Stroke (Ischemic) (Adult)  Goal: Signs and Symptoms of Listed Potential Problems Will be Absent or Manageable (Stroke)  Outcome: Ongoing (interventions implemented as appropriate)    11/02/17 0329   Stroke (Ischemic)   Problems Assessed (Stroke (Ischemic)/TIA) all   Problems Present (Stroke (Ischemic)/TIA) motor/sensory impairment         Problem: Fall Risk (Adult)  Goal: Absence of Falls  Outcome: Ongoing (interventions implemented as appropriate)    11/02/17 0329   Fall Risk (Adult)   Absence of Falls making progress toward outcome

## 2017-11-02 NOTE — THERAPY TREATMENT NOTE
Inpatient Rehabilitation - Occupational Therapy Treatment Note  [facility]     Patient Name: Alice Maxwell  : 1945  MRN: [id number]  Today's Date: 2017  Onset of Illness/Injury or Date of Surgery Date: 10/28/17  Date of Referral to OT: 17  Referring Physician: Jeremie      Admit Date: 10/31/2017    Visit Dx:   No diagnosis found.  Patient Active Problem List   Diagnosis   • Stroke             Adult Rehabilitation Note       17 1433 17 1130 17 1116    Rehab Assessment/Intervention    Discipline occupational therapist  -SO physical therapist  -JK occupational therapist  -SO    Document Type therapy note (daily note)  -SO therapy note (daily note)  -JK therapy note (daily note)  -SO    Subjective Information no complaints;agree to therapy  -SO no complaints;agree to therapy  -JK no complaints;agree to therapy  -SO    Patient Effort, Rehab Treatment good  -SO good  -JK good  -SO    Symptoms Noted During/After Treatment fatigue  -SO fatigue  -JK fatigue  -SO    Precautions/Limitations fall precautions  -SO fall precautions  -JK fall precautions  -SO    Precautions/Limitations, Vision  WFL with corrective lenses  -JK     Precautions/Limitations, Hearing  WFL  -JK     Recorded by [SO] Micki Castro, OTR [JK] Anna Epps, PT [SO] Micki Castro, OTR    Pain Assessment    Pain Assessment No/denies pain  -SO No/denies pain  -JK No/denies pain  -SO    Recorded by [SO] Micki Castro OTR [JK] Anna Epps, PT [SO] Micki Castro, OTR    Vision Assessment/Intervention    Visual Impairment visual impairment, left  -SO visual impairment, left  -JK visual impairment, left  -SO    Visual Impairment Comment Scanning activity, missed letters in bottom L corner  -SO  Cues to locate washcloth on L side, inattention  -SO    Recorded by [SO] Micki Castro, OTR [JK] Anna Epps, PT [SO] Micki Castro, OTR    Cognitive  Assessment/Intervention    Current Cognitive/Communication Assessment impaired  -SO impaired  -JK impaired  -SO    Orientation Status oriented to;person;place;time  -SO oriented to;person;place;time  -JK oriented to;person;place;time  -SO    Follows Commands/Answers Questions able to follow single-step instructions;needs cueing  -SO able to follow single-step instructions;100% of the time  -JK able to follow single-step instructions;needs cueing  -SO    Personal Safety decreased insight to deficits  -SO decreased insight to deficits  -JK     Personal Safety Interventions  fall prevention program maintained;gait belt  -JK     Recorded by [SO] KANDY Boo [JK] Anna Epps, PT [SO] Micki Castro OTR    Bed Mobility, Assessment/Treatment    Bed Mobility, Comment Sitting up in chair  -SO up in chair  -JK Sitting up in w/c  -SO    Recorded by [SO] KANDY Boo [JK] Anna Epps, PT [SO] Micki Castro, SARAR    Transfer Assessment/Treatment    Transfers, Sit-Stand [place] contact guard assist  -SO contact guard assist  -JK contact guard assist  -SO    Transfers, Stand-Sit [place] contact guard assist  -SO contact guard assist  -JK contact guard assist  -SO    Transfers, Sit-Stand-Sit, Assist Device straight cane  -SO straight cane  -JK rolling walker  -SO    Walk-In Shower Transfer, [place]   contact guard assist;verbal cues required  -SO    Walk-In Shower Transfer, Assist Device   --   Tub bench, grab bars  -SO    Transfer, Safety Issues  step length decreased;weight-shifting ability decreased;balance decreased during turns;sequencing ability decreased  -JK     Transfer, Impairments  strength decreased;impaired balance  -JK     Transfer, Comment W/c <-> mat CGA  -SO      Recorded by [SO] KANDY Boo [JK] Anna Epps, PT [SO] Micki Castro, SARAR    Gait Assessment/Treatment    Gait, [place] Level  verbal cues  required;contact guard assist;minimum assist (75% patient effort)  -JK     Gait, Assistive Device  rolling walker  -JK     Gait, Distance (Feet)  160  -JK     Gait, Gait Deviations  bilateral:;janet decreased;forward flexed posture;toe-to-floor clearance decreased;step length decreased  -JK     Gait, Safety Issues  step length decreased;weight-shifting ability decreased  -JK     Gait, Impairments  impaired balance;strength decreased  -JK     Gait, Comment  pt became more unsteady with gait by end of tx  -JK     Recorded by  [JK] Anna Epps, PT     Upper Body Bathing Assessment/Training    UB Bathing Assess/Train Assistive Device   hand-held shower head;tub bench;grab bars  -SO    UB Bathing Assess/Train, Position   sitting  -SO    UB Bathing Assess/Train, [place] Level   stand by assist;set up required;verbal cues required  -SO    Recorded by   [SO] Micki Castro, OTR    Lower Body Bathing Assessment/Training    LB Bathing Assess/Train Assistive Device   grab bars;hand-held shower head;tub bench  -SO    LB Bathing Assess/Train, Position   sitting;standing  -SO    LB Bathing Assess/Train, [place] Level   contact guard assist;verbal cues required  -SO    Recorded by   [SO] Micki Castro OTR    Upper Body Dressing Assessment/Training    UB Dressing Assess/Train, Clothing Type   doffing:;donning:;t-shirt  -SO    UB Dressing Assess/Train, [place]   verbal cues required;minimum assist (75% patient effort)  -SO    Recorded by   [SO] Micki Castro OTR    Lower Body Dressing Assessment/Training    LB Dressing Assess/Train, Clothing Type   doffing:;donning:;pants;shoes;socks  -SO    LB Dressing Assess/Train, Position   sitting;standing  -SO    LB Dressing Assess/Train, [place]   minimum assist (75% patient effort);verbal cues required;nonverbal cues required (demo/gesture)  -SO    Recorded by   [SO] Micki Castro, OTR    Grooming Assessment/Training    Grooming  "Assess/Train, Position   sitting;sink side  -SO    Grooming Assess/Train, [place] Level   supervision required;set up required  -SO    Recorded by   [SO] KANDY Boo    Therapy Exercises    Bilateral Lower Extremities  other reps   nu step x 6 min on level 7did not use L UE  -JK     Recorded by  [JK] Anna Epps, PT     Fine Motor Coordination Training    Detail (Fine Motor Coordination Training) Pt with good- coordination with manipulating golf tees. Some increased difficulty with 1/4\" pegs and needed some cues for correct placement to match pattern.  -SO      Recorded by [SO] KANDY Boo      Positioning and Restraints    Pre-Treatment Position sitting in chair/recliner  -SO sitting in chair/recliner  -JK sitting in chair/recliner  -SO    Post Treatment Position wheelchair  -SO wheelchair  -JK wheelchair  -SO    In Wheelchair sitting;with PT;with family/caregiver  -SO sitting;with SLP  -JK sitting;call light within reach;encouraged to call for assist  -SO    Recorded by [SO] KANDY Boo [JK] Anna Epps, PT [SO] Micki Castro OTR      11/01/17 1550          Rehab Assessment/Intervention    Discipline occupational therapist  -SO      Document Type therapy note (daily note)  -SO      Subjective Information no complaints;agree to therapy  -SO      Patient Effort, Rehab Treatment adequate  -SO      Symptoms Noted During/After Treatment fatigue  -SO      Precautions/Limitations fall precautions  -SO      Recorded by [SO] KANDY Boo      Pain Assessment    Pain Assessment No/denies pain  -SO      Recorded by [SO] KANDY Boo      Vision Assessment/Intervention    Visual Impairment visual impairment, left  -SO      Visual Impairment Comment Cues to scan to the left during connect the numbers, able to draw a clock correctly but wrote wrong number  -SO      Recorded by [SO] KANDY Boo      Cognitive " Assessment/Intervention    Current Cognitive/Communication Assessment impaired  -SO      Orientation Status oriented to;person;place;time  -SO      Follows Commands/Answers Questions able to follow single-step instructions;needs cueing  -SO      Recorded by [SO] KANDY Boo      ROM (Range of Motion)    General ROM upper extremity range of motion deficits identified  -SO      General ROM Detail RUE WFL; LUE shoulder 3/4-7/8  -SO      Recorded by [SO] Micki Castro OTR      MMT (Manual Muscle Testing)    General MMT Assessment upper extremity strength deficits identified  -SO      General MMT Assessment Detail RUE 4-/5; LUE shoulder 3-/5, elbow 3/5, forearm 3-/5  -SO      Recorded by [SO] Micki Castro OTR      Bed Mobility, Assessment/Treatment    Bed Mob, Supine to Sit, [place] verbal cues required;supervision required  -SO      Bed Mob, Sit to Supine, [place] verbal cues required;supervision required  -SO      Recorded by [SO] KANDY Boo      Transfer Assessment/Treatment    Transfers, Bed-Chair [place] contact guard assist  -SO      Transfers, Chair-Bed [place] contact guard assist  -SO      Transfers, Sit-Stand [place] contact guard assist  -SO      Transfers, Stand-Sit [place] contact guard assist  -SO      Recorded by [SO] KANDY Boo      Motor Skills/Interventions    Additional Documentation Fine Motor Coordination Training (Group);Gross Motor Coordination Training (Group)  -SO      Recorded by [SO] Micki Castro OTR      Fine Motor Coordination Training    9-Hole Peg Right 23  -SO      9-Hole Peg Left 56  -SO      Box and Blocks Right 46  -SO      Box and Blocks Left 36  -SO      Recorded by [SO] Micki Castro OTR      Positioning and Restraints    Pre-Treatment Position in bed  -SO      Post Treatment Position bed  -SO      In Bed supine;call light within reach;encouraged to call for  assist;exit alarm on;with family/caregiver  -SO      Recorded by [SO] Micki Castro, OTR        User Key  (r) = Recorded By, (t) = Taken By, (c) = Cosigned By    Initials Name Effective Dates    SO Mickicandelario Castro, OTR 04/13/15 -     JK Anna Epps, PT 12/01/15 -                 OT Goals       11/01/17 1554 11/01/17 1230       Transfer Training OT STG    Transfer Training OT STG, Date Established  11/01/17  -SO     Transfer Training OT STG, Time to Achieve  1 wk  -SO     Transfer Training OT STG, Activity Type  tub;walk-in shower;shower chair  -SO     Transfer Training OT STG, [place] Level  contact guard assist  -SO     Transfer Training OT STG, Assist Device  walker, rolling;shower chair  -SO     Transfer Training OT LTG    Transfer Training OT LTG, Date Established  11/01/17  -SO     Transfer Training OT LTG, Time to Achieve  by discharge  -SO     Transfer Training OT LTG, Activity Type  tub;walk-in shower;shower chair  -SO     Transfer Training OT LTG, [place] Level  supervision required  -SO     Transfer Training OT LTG, Assist Device  walker, rolling;shower chair  -SO     Transfer Training 2 OT STG    Transfer Training 2 OT STG, Date Established  11/01/17  -SO     Transfer Training 2 OT STG, Time to Achieve  1 wk  -SO     Transfer Training 2 OT STG, Activity Type  toilet  -SO     Transfer Training 2 OT STG, [place] Level  contact guard assist  -SO     Transfer Training 2 OT STG, Assist Device  walker, rolling  -SO     Transfer Training 2 OT LTG    Transfer Training 2 OT LTG, Date Established  11/01/17  -SO     Transfer Training 2 OT LTG, Time to Achieve  by discharge  -SO     Transfer Training 2 OT LTG, Activity Type  toilet  -SO     Transfer Training 2 OT LTG, [place] Level  supervision required  -SO     Transfer Training 2 OT LTG, Assist Device  walker, rolling  -SO     Strength OT LTG    Strength Goal OT LTG, Date Established 11/01/17  -SO      Strength Goal OT LTG,  Time to Achieve by discharge  -SO      Strength Goal OT LTG, Measure to Achieve Pt to increase LUE strength to 4-/5 to increase independence with ADLs.  -SO      Caregiver Training OT LTG    Caregiver Training OT LTG, Date Established  11/01/17 -SO     Caregiver Training OT LTG, Time to Achieve  by discharge  -SO     Caregiver Training OT LTG, [place] Level  able to assist adequately;able to cue patient adequately  -SO     Patient Education OT LTG    Patient Education OT LTG, Date Established  11/01/17 -SO     Patient Education OT LTG, Time to Achieve  by discharge  -SO     Patient Education OT LTG, Education Type  written program;HEP;aware of neuro deficits;home safety  -SO     Patient Education OT LTG, Education Understanding  independent;demonstrates adequately;verbalizes understanding  -SO     Isolated Movement OT LTG    Isolated Movement OT LTG, Date Established 11/01/17 -SO      Isolated Movement OT LTG, Time to Achieve by discharge  -SO      Isolated Movement OT LTG, Body Area left scapula;left shoulder;left elbow;left forearm;left wrist;left fingers  -SO      Isolated Movement OT LTG, Level WFL  -SO      Coordination OT LTG    Coordination OT LTG, Date Established 11/01/17 -SO      Coordination OT LTG, Time to Achieve by discharge  -SO      Coordination OT LTG, Activity Type FM written ex program;GM written ex program;FM task;GM task  -SO      Coordination OT LTG, [place] Level independent  -SO      Coordination OT LTG, Additional Goal LUE  -SO      ADL OT STG    ADL OT STG, Date Established  11/01/17 -SO     ADL OT STG, Time to Achieve  1 wk  -SO     ADL OT STG, Activity Type  ADL skills  -SO     ADL OT STG, [place] Level  contact guard;assistive device  -SO     ADL OT LTG    ADL OT LTG, Date Established  11/01/17 -SO     ADL OT LTG, Time to Achieve  by discharge  -SO     ADL OT LTG, Activity Type  ADL skills  -SO     ADL OT LTG, [place] Level  standby assist;assistive device  -SO        User Key  (r) = Recorded By, (t) = Taken By, (c) = Cosigned By    Initials Name Provider Type    SO KANDY Boo Occupational Therapist          Occupational Therapy Education     Title: PT OT SLP Therapies (Active)     Topic: Occupational Therapy (Active)     Point: ADL training (Done)    Description: Instruct learner(s) on proper safety adaptation and remediation techniques during self care or transfers.   Instruct in proper use of assistive devices.    Learning Progress Summary    Learner Readiness Method Response Comment Documented by Status   Patient Acceptance E VU Discuss OT goals and POC. SO 11/01/17 1232 Done   Family Acceptance E VU Discuss OT goals and POC. SO 11/01/17 1232 Done                      User Key     Initials Effective Dates Name Provider Type Discipline    SO 04/13/15 -  KANDY Boo Occupational Therapist OT                  OT Recommendation and Plan  Therapy Frequency: 5 times/wk          Time Calculation:         Time Calculation- OT       11/02/17 1436 11/02/17 1120       Time Calculation- OT    OT Start Time 1300  -SO 0800  -SO     OT Stop Time 1330  -SO 0845  -SO     OT Time Calculation (min) 30 min  -SO 45 min  -SO     OT Received On 11/02/17  -SO 11/02/17  -SO       User Key  (r) = Recorded By, (t) = Taken By, (c) = Cosigned By    Initials Name Provider Type    SO KANDY Boo Occupational Therapist           Therapy Charges for Today     Code Description Service Date Service Provider Modifiers Qty    52749668961 HC OT EVAL MOD COMPLEXITY 2 11/1/2017 KANDY Boo GO 1    51094728939 HC OT NEUROMUSC RE EDUCATION EA 15 MIN 11/1/2017 KANDY Boo GO 2    07102140995 HC OT SELF CARE/MGMT/TRAIN EA 15 MIN 11/2/2017 KANDY Boo GO 3    57210450815 HC OT NEUROMUSC RE EDUCATION EA 15 MIN 11/2/2017 KANDY Boo GO 2               KANDY Boo  11/2/2017

## 2017-11-02 NOTE — PROGRESS NOTES
Occupational Therapy: Individual: 75 minutes.    Physical Therapy: Branch    Speech Language Pathology:  Branch    Signed by: KANDY Kennedy/WAQAS

## 2017-11-02 NOTE — PLAN OF CARE
Problem: Patient Care Overview (Adult)  Goal: Plan of Care Review  Outcome: Ongoing (interventions implemented as appropriate)    11/02/17 1144   Coping/Psychosocial Response Interventions   Plan Of Care Reviewed With patient   Patient Care Overview   Progress improving   Outcome Evaluation   Outcome Summary/Follow up Plan Patient participated in therapies today. Patient denied pain.        Goal: Adult Individualization and Mutuality  Outcome: Ongoing (interventions implemented as appropriate)    Problem: Stroke (Ischemic) (Adult)  Goal: Signs and Symptoms of Listed Potential Problems Will be Absent or Manageable (Stroke)  Outcome: Ongoing (interventions implemented as appropriate)    Problem: Fall Risk (Adult)  Goal: Identify Related Risk Factors and Signs and Symptoms  Outcome: Ongoing (interventions implemented as appropriate)  Goal: Absence of Falls  Outcome: Ongoing (interventions implemented as appropriate)

## 2017-11-02 NOTE — PROGRESS NOTES
Inpatient Rehabilitation Plan of Care Note    Plan of Care  Updated Problems/Interventions  Mobility    [PT] Bed/Chair/Wheelchair(Active)  Current Status(11/02/2017): CGA, RWx  Weekly Goal(11/09/2017): SBA, RWx  Discharge Goal: Supervision, RWx    [PT] Walk(Active)  Current Status(11/02/2017): 80 ft, Min A, RWx  Weekly Goal(11/09/2017): to and from BR, CGA  Discharge Goal: 160 ft, Supervision, RWx    [PT] Stairs(Active)  Current Status(11/02/2017): 4 steps, 2 HR, Min A  Weekly Goal(11/08/2017): PT only  Discharge Goal: 4 steps, 2 HR, CGA    Signed by: Anna Epps, PT

## 2017-11-02 NOTE — PROGRESS NOTES
Inpatient Rehabilitation Functional Measures Assessment    Functional Measures  YESI Eating:  [facility] Grooming: [facility] Bathing:  [facility] Upper Body Dressing:  [facility] Lower Body Dressing:  [facility] Toileting:  [facility] Bladder Management  Level of Assistance:  [place]  Frequency/Number of Accidents this Shift:  [facility] Bowel Management  Level of Assistance: [place]  Frequency/Number of Accidents this Shift: [facility] Bed/Chair/Wheelchair Transfer:  [facility] Toilet Transfer:  [facility] Tub/Shower Transfer:  [place]    Previously Documented Mode of Locomotion at Discharge: Field  YESI Expected Mode of Locomotion at Discharge: [facility] Walk/Wheelchair:  [facility] Stairs:  [facility] Comprehension:  Auditory comprehension is the usual mode. Comprehension  Score = 6, Modified [place].  Patient comprehends complex/abstract  information in their primary language, requiring:  YESI Expression:  Vocal expression is the usual mode. Expression Score = 6,  Modified Independent.  Patient expresses complex/abstract information in their  primary language, requiring:  YESI Social Interaction:  Social Interaction Score = 6, Modified Independent.  Patient is modified independent for social interaction, requiring:  YESI Problem Solving:  Problem Solving Score = 6, Modified [place].  Patient  makes appropriate decisions in order to solve complex problems, but requires  extra time.  YESI Memory:  Memory Score = 6, Modified [place].  Patient is modified  independent for memory, requiring:    Therapy Mode Minutes  Occupational Therapy: Branch  Physical Therapy: Branch  Speech Language Pathology:  [place]anch    Signed by: Carolina King RN

## 2017-11-02 NOTE — PROGRESS NOTES
Inpatient Rehabilitation Functional Measures Assessment    Functional Measures  YESI Eating:  Branch  [facility] Grooming: [place]  [facility] Bathing:  [place]  [facility] Upper Body Dressing:  [place]  [facility] Lower Body Dressing:  [facility] Toileting:  [facility] Bladder Management  Level of Assistance:  [place]  Frequency/Number of Accidents this Shift:  [facility] Bowel Management  Level of Assistance: [place]  Frequency/Number of Accidents this Shift: [facility] Bed/Chair/Wheelchair Transfer:  Activity was not observed.  YESI Toilet Transfer:  [facility] Tub/Shower Transfer:  [place]    Previously Documented Mode of Locomotion at Discharge: [place]  YESI Expected Mode of Locomotion at Discharge: [facility] Walk/Wheelchair:  WHEELCHAIR OBSERVATION   Activity was not observed.    WALK OBSERVATION   Walk Distance Scale = 3.  Distance walked is greater than 150 feet. Walk Score  = 4.  Patient performs 75% or more of effort and requires minimal assistance.  Incidental help/contact guard/steadying was provided. Patient walked a distance  of  160 feet. Patient requires the following assistive device(s): Rolling  walker.  YESI Stairs:  Activity was not observed.    YESI Comprehension:  [facility] Expression:  [facility] Social Interaction:  [facility] Problem Solving:  [facility] Memory:  [place]    Therapy Mode Minutes  Occupational Therapy: Branch  Physical Therapy: Individual: 60 minutes.  Speech Language Pathology:  [place]    Signed by: Anna Epps PT

## 2017-11-03 LAB
GLUCOSE BLDC GLUCOMTR-MCNC: 109 MG/DL (ref 70–130)
GLUCOSE BLDC GLUCOMTR-MCNC: 83 MG/DL (ref 70–130)
INR PPP: 1.13 (ref 0.9–1.1)
PROTHROMBIN TIME: 14.1 SECONDS (ref 11.7–14.2)

## 2017-11-03 PROCEDURE — 92526 ORAL FUNCTION THERAPY: CPT

## 2017-11-03 PROCEDURE — 97110 THERAPEUTIC EXERCISES: CPT

## 2017-11-03 PROCEDURE — 85610 PROTHROMBIN TIME: CPT | Performed by: PHYSICAL MEDICINE & REHABILITATION

## 2017-11-03 PROCEDURE — 97112 NEUROMUSCULAR REEDUCATION: CPT

## 2017-11-03 PROCEDURE — 25010000002 ENOXAPARIN PER 10 MG: Performed by: PHYSICAL MEDICINE & REHABILITATION

## 2017-11-03 PROCEDURE — 97535 SELF CARE MNGMENT TRAINING: CPT

## 2017-11-03 PROCEDURE — 82962 GLUCOSE BLOOD TEST: CPT

## 2017-11-03 RX ORDER — WARFARIN SODIUM 7.5 MG/1
7.5 TABLET ORAL
Status: DISCONTINUED | OUTPATIENT
Start: 2017-11-03 | End: 2017-11-06

## 2017-11-03 RX ADMIN — METFORMIN HYDROCHLORIDE 500 MG: 500 TABLET ORAL at 08:15

## 2017-11-03 RX ADMIN — LEVOTHYROXINE SODIUM 112 MCG: 112 TABLET ORAL at 06:12

## 2017-11-03 RX ADMIN — ALLOPURINOL 200 MG: 100 TABLET ORAL at 08:15

## 2017-11-03 RX ADMIN — ATORVASTATIN CALCIUM 80 MG: 80 TABLET, FILM COATED ORAL at 22:42

## 2017-11-03 RX ADMIN — FOLIC ACID 1 MG: 1 TABLET ORAL at 08:15

## 2017-11-03 RX ADMIN — Medication 1 TABLET: at 08:15

## 2017-11-03 RX ADMIN — ENOXAPARIN SODIUM 100 MG: 100 INJECTION SUBCUTANEOUS at 08:12

## 2017-11-03 RX ADMIN — WARFARIN SODIUM 7.5 MG: 7.5 TABLET ORAL at 17:03

## 2017-11-03 RX ADMIN — VITAMIN D, TAB 1000IU (100/BT) 1000 UNITS: 25 TAB at 17:03

## 2017-11-03 RX ADMIN — VITAMIN D, TAB 1000IU (100/BT) 1000 UNITS: 25 TAB at 08:15

## 2017-11-03 RX ADMIN — ENOXAPARIN SODIUM 100 MG: 100 INJECTION SUBCUTANEOUS at 22:42

## 2017-11-03 RX ADMIN — Medication 3 MG: at 22:42

## 2017-11-03 RX ADMIN — ASPIRIN 81 MG: 81 TABLET, CHEWABLE ORAL at 08:15

## 2017-11-03 NOTE — PROGRESS NOTES
Case Management  Inpatient Rehabilitation Team Conference    Conference Date/Time: 11/3/2017 8:45:23 AM    Team Conference Attendees:  Dr. 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  Worked part time from home.  has [employer] business a mile from their  home. Three children and sister live in [place].  D/C plan is either home with  or to [facility] here in [place]. HH  or outpatient therapies.  Medical Necessity Expected Level Rationale: MOD I  Intensity and Duration: an average of 3 hours/5 days per week  Medical Supervision and 24 Hour Rehab Nursing: x  Physical Therapy: x  PT Intensity/Duration: 60 minutes/day, 5 days/week  Occupational Therapy: x  OT Intensity/Duration: 60 minutes/day, 5 days/week  Speech and Language Therapy: x  SLP Intensity/Duration: 60 minutes/day, 5 days/week  Social Work: x  Therapeutic Recreation: x  Psychology: x  Updated (if changes indicated)    Anticipated Discharge Date/Estimated Length of Stay:   ELOS: 2 weeks    Based on the patient's medical and functional status, their prognosis and  expected level of functional improvement is:  Supervision/SBA      Interdisciplinary Problem/Goals/Status    All Rehab Problems:  Body Systems    [RN] Endocrine(Active)  Current Status(11/03/2017): Blood sugar well controlled, accuchecks down to BID  Weekly Goal(11/10/2017): Blood sugar will be within acceptable limits  Discharge Goal: Independent with diabetes regimen        Mobility    [OT] Toilet Transfers(Active)  Current Status(11/02/2017): MIN/CGA  Weekly Goal(11/10/2017): SBA  Discharge Goal: SUP    [OT] Tub/Shower Transfers(Active)  Current Status(11/02/2017): MIN/CGA  Weekly Goal(11/10/2017): SBA  Discharge Goal: SUP    [PT] Bed/Chair/Wheelchair(Active)  Current Status(11/02/2017): CGA, RWx  Weekly Goal(11/09/2017): SBA, RWx  Discharge Goal: Supervision, RWx    [PT] Walk(Active)  Current Status(11/02/2017): 80 ft, Min A, RWx  Weekly Goal(11/09/2017): to and from BR, CGA  Discharge Goal: 160 ft, Supervision, RWx    [PT] Stairs(Active)  Current Status(11/02/2017): 4 steps, 2 HR, Min A  Weekly Goal(11/08/2017): PT only  Discharge Goal: 4 steps, 2 HR, CGA        Psychosocial    [RN] Coping/Adjustment(Active)  Current Status(10/31/2017): Expresses appropriate coping  Weekly Goal(11/14/2017): Allow opportunity to express concerns regarding current  status  Discharge Goal: Adequate coping regarding life changes with ongoing support        Safety    [RN] Potential for Injury(Active)  Current Status(10/31/2017): History of fall  Weekly Goal(11/14/2017): Cue to use call bell  Discharge Goal: Patient/family will be aware of risk of fall and safety in the  home setting        Self Care    [OT] Bathing(Active)  Current Status(11/02/2017): CGA  Weekly Goal(11/10/2017): SBA  Discharge Goal: SUP    [OT] Dressing (Lower)(Active)  Current Status(11/02/2017): MIN  Weekly Goal(11/10/2017): CGA  Discharge Goal: SBA    [OT] Dressing (Upper)(Active)  Current Status(11/02/2017): MIN  Weekly Goal(11/10/2017): SBA  Discharge Goal: SUP    [OT] Grooming(Active)  Current  Status(11/02/2017): SBA  Weekly Goal(11/10/2017): SUP  Discharge Goal: SUP    [OT] Toileting(Active)  Current Status(11/02/2017): CGA  Weekly Goal(11/10/2017): SBA  Discharge Goal: SUP        Sphincter Control    [RN] Bowel and Bladder Management(Active)  Current Status(10/31/2017): Continent of bowel and bladder  Weekly Goal(11/14/2017): 100% continent  Discharge Goal: 100% continent        Swallow Function    [ST] Swallowing(Active)  Current Status(11/02/2017): Puree and Thin  Weekly Goal(11/09/2017): Tolerate diet with no overt s/s aspiration  Discharge Goal: Return to regular textures and thin liquids        Comments: 11/3: some left neglect; left hand with fine motor issues; test tray  with mech soft yesterday, did well, SLP focusing on swallow; poor sleep; poor  insight into deficits at times;    Signed by: Jean Claude Harkins RN    Physician CoSigned By: Aric Chao 11/03/2017 09:14:34

## 2017-11-03 NOTE — PROGRESS NOTES
Discussed ELOS, current status, d/c and weekly goals with patient, , and daughter. Discussed left neglect and decreased awareness. Also discussed stroke recovery process. Patient has decreased insight into her deficits. Thinks she could walk to bathroom with her cane by herself. Stressed safety and need for staff to assist her with walking to bathroom with rolling walker. Scheduled family conference for next Friday, 11/10 at 10:00 a.m. Will follow and assist with plans.

## 2017-11-03 NOTE — PROGRESS NOTES
Inpatient Rehabilitation Functional Measures Assessment    Functional Measures  YESI Eating:  [facility] Grooming: [facility] Bathing:  [facility] Upper Body Dressing:  [facility] Lower Body Dressing:  [facility] Toileting:  [facility] Bladder Management  Level of Assistance:  [place]  Frequency/Number of Accidents this Shift:  [facility] Bowel Management  Level of Assistance: [place]  Frequency/Number of Accidents this Shift: [facility] Bed/Chair/Wheelchair Transfer:  [facility] Toilet Transfer:  [facility] Tub/Shower Transfer:  [place]    Previously Documented Mode of Locomotion at Discharge: Field  YESI Expected Mode of Locomotion at Discharge: [facility] Walk/Wheelchair:  [facility] Stairs:  [facility] Comprehension:  Auditory comprehension is the usual mode. Comprehension  Score = 6, Modified [place].  Patient comprehends complex/abstract  information in their primary language with only mild difficulty.  YESI Expression:  Vocal expression is the usual mode. Expression Score = 6,  Modified Independent.  Patient expresses complex/abstract information in their  primary language with only mild difficulty with tasks.  YESI Social Interaction:  Social Interaction Score = 6, Modified Independent.  Patient is modified independent for social interaction, occasionally losing  control, but self-corrects.  YESI Problem Solving:  Problem Solving Score = 6, Modified [place].  Patient  makes appropriate decisions in order to solve complex problems with mild  difficulty but self-corrects.  YESI Memory:  Memory Score = 6, Modified [place].  Patient is modified  independent for memory, having only mild difficulty and using self-initiated or  environmental cues to remember.    Therapy Mode Minutes  Occupational Therapy: [place]  Physical Therapy: [place]  Speech Language Pathology:  [place]    Signed by: Daniel Horne RN

## 2017-11-03 NOTE — THERAPY TREATMENT NOTE
Inpatient Rehabilitation - Physical Therapy Treatment Note  [facility]     Patient Name: Alice Maxwell  : 1945  MRN: [id number]  Today's Date: 11/3/2017  Onset of Illness/Injury or Date of Surgery Date: 10/28/17  Date of Referral to PT: 17  Referring Physician: Jeremie    Admit Date: 10/31/2017    Visit Dx:  No diagnosis found.  Patient Active Problem List   Diagnosis   • Stroke               Adult Rehabilitation Note       17 1154 17 1114 17 1433    Rehab Assessment/Intervention    Discipline (P)  occupational therapist  -NA physical therapist  -MD occupational therapist  -SO    Document Type (P)  therapy note (daily note)  -NA therapy note (daily note)  -MD therapy note (daily note)  -SO    Subjective Information (P)  no complaints;agree to therapy  -NA agree to therapy;no complaints  -MD no complaints;agree to therapy  -SO    Patient Effort, Rehab Treatment (P)  good  -NA good  -MD good  -SO    Symptoms Noted During/After Treatment (P)  fatigue  -NA none  -MD fatigue  -SO    Precautions/Limitations (P)  fall precautions  -NA fall precautions  -MD fall precautions  -SO    Equipment Issued to Patient  gait belt;front wheeled walker  -MD     Recorded by [NA] Lynnette River, OT Student [MD] Nereida Llamas, PT [SO] Micki Castro, SARAR    Pain Assessment    Pain Assessment (P)  No/denies pain  -NA No/denies pain  -MD No/denies pain  -SO    Recorded by [NA] Lynnette River OT Student [MD] Nereida Llamas, PT [SO] Micki Castro, OTR    Vision Assessment/Intervention    Visual Impairment (P)  left neglect;inattention to the left;visual impairment, left  -NA left neglect  -MD visual impairment, left  -SO    Visual Impairment Comment   Scanning activity, missed letters in bottom L corner  -SO    Vision Comment (P)  mod cues to attend to left side of body during ADLs  -NA      Recorded by [NA] Lynnette River, OT Student [MD] Nereida Llamas, PT [SO] Micki Castro, OTR     Cognitive Assessment/Intervention    Current Cognitive/Communication Assessment (P)  impaired  -NA impaired  -MD impaired  -SO    Orientation Status (P)  oriented to;person;place;time  -NA oriented to;person;place;time  -MD oriented to;person;place;time  -SO    Follows Commands/Answers Questions (P)  able to follow single-step instructions;needs cueing  -% of the time;needs cueing;needs increased time;needs repetition  -MD able to follow single-step instructions;needs cueing  -SO    Personal Safety (P)  decreased insight to deficits  -NA decreased insight to deficits  -MD decreased insight to deficits  -SO    Personal Safety Interventions (P)  fall prevention program maintained;gait belt;nonskid shoes/slippers when out of bed  -NA fall prevention program maintained;gait belt;muscle strengthening facilitated;nonskid shoes/slippers when out of bed;supervised activity  -MD     Recorded by [NA] Lynnetet River, OT Student [MD] Nereida Llamas, PT [SO] Micki Castro, OTR    Bed Mobility, Assessment/Treatment    Bed Mob, Supine to Sit, [place]  not tested  -MD     Bed Mob, Sit to Supine, [place]  not tested  -MD     Bed Mobility, Comment (P)  up in wc  -NA  Sitting up in chair  -SO    Recorded by [NA] Lynnette Rievr OT Student [MD] Nereida Llamas, PT [SO] Micki Castro, OTR    Transfer Assessment/Treatment    Transfers, Sit-Stand [place] (P)  contact guard assist  -NA verbal cues required;contact guard assist  -MD contact guard assist  -SO    Transfers, Stand-Sit [place] (P)  contact guard assist  -NA verbal cues required;contact guard assist  -MD contact guard assist  -SO    Transfers, Sit-Stand-Sit, Assist Device (P)  straight cane  -NA rolling walker  -MD straight cane  -SO    Transfer, Safety Issues  step length decreased;weight-shifting ability decreased;balance decreased during turns;sequencing ability decreased  -MD     Transfer, Impairments  strength decreased;impaired balance   -MD     Transfer, Comment   W/c <-> mat CGA  -SO    Recorded by [NA] Lynnette River, OT Student [MD] Nereida Llamas, PT [SO] KANDY Boo    Gait Assessment/Treatment    Gait, [place] Level  verbal cues required;minimum assist (75% patient effort)  -MD     Gait, Assistive Device  rolling walker  -MD     Gait, Distance (Feet)  80   x3  -MD     Gait, Gait Deviations  bilateral:;janet decreased;forward flexed posture;step length decreased;decreased heel strike;toe-to-floor clearance decreased  -MD     Gait, Safety Issues  step length decreased;weight-shifting ability decreased  -MD     Gait, Impairments  impaired balance;strength decreased  -MD     Recorded by  [MD] Nereida Llamas, PT     Upper Body Bathing Assessment/Training    UB Bathing Assess/Train, Position (P)  sink side;sitting  -NA      UB Bathing Assess/Train, [place] Level (P)  supervision required;verbal cues required;set up required  -NA      UB Bathing Assess/Train, Impairments (P)  impaired vision;coordination impaired  -NA      Recorded by [NA] Lynnette River OT Student      Lower Body Bathing Assessment/Training    LB Bathing Assess/Train, Position (P)  sink side;sitting;standing  -NA      LB Bathing Assess/Train, [place] Level (P)  contact guard assist;verbal cues required  -NA      LB Bathing Assess/Train, Impairments (P)  impaired vision;impaired balance;strength decreased  -NA      Recorded by [NA] Lynnette River OT Student      Upper Body Dressing Assessment/Training    UB Dressing Assess/Train, Clothing Type (P)  doffing:;donning:;t-shirt  -NA      UB Dressing Assess/Train, Assist Device (P)  jennifer technique  -NA      UB Dressing Assess/Train, Position (P)  sitting  -NA      UB Dressing Assess/Train, [place] (P)  minimum assist (75% patient effort);verbal cues required  -NA      Recorded by [NA] Lynnette River OT Student      Lower Body Dressing Assessment/Training    LB Dressing Assess/Train, Clothing Type  "(P)  doffing:;donning:;pants;shoes;socks  -NA      LB Dressing Assess/Train, Position (P)  sitting;standing  -NA      LB Dressing Assess/Train, [place] (P)  minimum assist (75% patient effort);verbal cues required;nonverbal cues required (demo/gesture)  -NA      LB Dressing Assess/Train, Impairments (P)  strength decreased;impaired balance  -NA      Recorded by [NA] Lynnette River, OT Student      Toileting Assessment/Training    Toileting Assess/Train, Comment (P)  denied need  -NA      Recorded by [NA] Lynnette River, OT Student      Grooming Assessment/Training    Grooming Assess/Train, Position (P)  sink side;sitting  -NA      Grooming Assess/Train, [place]ep[place] Level (P)  set up required;supervision required;verbal cues required  -NA      Recorded by [NA] Lynnette River, OT Student      Motor Skills/Interventions    Additional Documentation  Balance Skills Training (Group)  -MD     Recorded by  [MD] Nereida Llamas, PT     Balance Skills Training    Standing-Level of Assistance  Contact guard  -MD     Static Standing Balance Support  assistive device  -MD     Standing-Balance Activities  Ball toss;Kicking ball   batting balloon back and forth  -MD     Gait Balance-Level of Assistance  Contact guard  -MD     Gait Balance Support  jennifer bar  -MD     Gait Balance Activities  side-stepping;backwards  -MD     Recorded by  [MD] Nereida Llamas, PT     Therapy Exercises    Bilateral Lower Extremities  AROM:;10 reps;sitting  -MD     BLE Other Reps  --   Nustep at level 2 for 3 min  -MD     BLE Resistance  theraband  -MD     Recorded by  [MD] Nereida Llamas, PT     Fine Motor Coordination Training    Detail (Fine Motor Coordination Training)   Pt with good- coordination with manipulating golf tees. Some increased difficulty with 1/4\" pegs and needed some cues for correct placement to match pattern.  -SO    Recorded by   [SO] Micki aCstro OTR    Positioning and Restraints    Pre-Treatment Position (P)  sitting in " chair/recliner  -NA sitting in chair/recliner  -MD sitting in chair/recliner  -SO    Post Treatment Position (P)  wheelchair  -NA wheelchair  -MD wheelchair  -SO    In Wheelchair (P)  sitting;call light within reach;encouraged to call for assist;exit alarm on;with family/caregiver  -NA sitting;with OT  -MD sitting;with PT;with family/caregiver  -SO    Recorded by [NA] Lynnette River, OT Student [MD] Nereida Llamas, PT [SO] Micki Castro, OTR      11/02/17 1200 11/02/17 1130 11/02/17 1116    Rehab Assessment/Intervention    Discipline speech language pathologist  -OC physical therapist  -JK occupational therapist  -SO    Document Type therapy note (daily note)  -OC therapy note (daily note)  -JK therapy note (daily note)  -SO    Subjective Information agree to therapy;no complaints  -OC no complaints;agree to therapy  -JK no complaints;agree to therapy  -SO    Patient Effort, Rehab Treatment adequate  -OC good  -JK good  -SO    Symptoms Noted During/After Treatment fatigue  -OC fatigue  -JK fatigue  -SO    Precautions/Limitations  fall precautions  -JK fall precautions  -SO    Precautions/Limitations, Vision  WFL with corrective lenses  -JK     Precautions/Limitations, Hearing  WFL  -JK     Recorded by [OC] Lilian Mohr MA,CCC-SLP [JK] Anna Epps, PT [SO] Micki Castro, OTR    Pain Assessment    Pain Assessment No/denies pain  -OC No/denies pain  -JK No/denies pain  -SO    Recorded by [OC] Lilian Mohr MA,CCC-SLP [JK] Anna Epps, PT [SO] Micki Castro, OTR    Vision Assessment/Intervention    Visual Impairment  visual impairment, left  -JK visual impairment, left  -SO    Visual Impairment Comment   Cues to locate washcloth on L side, inattention  -SO    Recorded by  [JK] Anna Epps, PT [SO] Micki Castro, OTR    Cognitive Assessment/Intervention    Current Cognitive/Communication Assessment impaired  -OC impaired  -JK impaired  -SO    Orientation Status oriented  to;person;place;time  -OC oriented to;person;place;time  -JK oriented to;person;place;time  -SO    Follows Commands/Answers Questions  able to follow single-step instructions;100% of the time  -JK able to follow single-step instructions;needs cueing  -SO    Personal Safety  decreased insight to deficits  -JK     Personal Safety Interventions  fall prevention program maintained;gait belt  -JK     Recorded by [OC] Lilian Mohr MA,CCC-SLP [JK] Anna Epps, PT [SO] Micki Castro, OTR    Swallow Assessment/Intervention    Additional Documentation --   [facility] soft test tray. See note  -OC      Recorded by [OC] Lilian Mohr MA,CCC-SLP      Improve oral skills    Status: Improve Oral Skills Progressing as expected  -OC      Oral Skills Progress 70%;with inconsistent cues  -OC      Comments: Improve Oral Skills anterior loss saliva with secretions  -OC      Recorded by [OC] Lilian Mohr MA,CCC-SLP      Improve laryngeal elevation    Status: Improve laryngeal elevation Progressing as expected  -OC      Laryngeal Elevation Progress 70%;with inconsistent cues   difficulty differentiating pitch and loudness   -OC      Recorded by [OC] Lilian Mohr MA,CCC-SLP      Bed Mobility, Assessment/Treatment    Bed Mobility, Comment  up in chair  -JK Sitting up in w/c  -SO    Recorded by  [JK] Anna Epps, PT [SO] Micki Castro, OTR    Transfer Assessment/Treatment    Transfers, Sit-Stand [place]  contact guard assist  -JK contact guard assist  -SO    Transfers, Stand-Sit [place]  contact guard assist  -JK contact guard assist  -SO    Transfers, Sit-Stand-Sit, Assist Device  straight cane  -JK rolling walker  -SO    Walk-In Shower Transfer, [place]   contact guard assist;verbal cues required  -SO    Walk-In Shower Transfer, Assist Device   --   Tub bench, grab bars  -SO    Transfer, Safety Issues  step length decreased;weight-shifting ability decreased;balance decreased during turns;sequencing ability  decreased  -JK     Transfer, Impairments  strength decreased;impaired balance  -JK     Recorded by  [JK] Anna Epps, PT [SO] Micki Castro OTR    Gait Assessment/Treatment    Gait, [place] Level  verbal cues required;contact guard assist;minimum assist (75% patient effort)  -JK     Gait, Assistive Device  rolling walker  -JK     Gait, Distance (Feet)  160  -JK     Gait, Gait Deviations  bilateral:;janet decreased;forward flexed posture;toe-to-floor clearance decreased;step length decreased  -JK     Gait, Safety Issues  step length decreased;weight-shifting ability decreased  -JK     Gait, Impairments  impaired balance;strength decreased  -JK     Gait, Comment  pt became more unsteady with gait by end of tx  -JK     Recorded by  [JK] Anna Epps, PT     Upper Body Bathing Assessment/Training    UB Bathing Assess/Train Assistive Device   hand-held shower head;tub bench;grab bars  -SO    UB Bathing Assess/Train, Position   sitting  -SO    UB Bathing Assess/Train, [place] Level   stand by assist;set up required;verbal cues required  -SO    Recorded by   [SO] Micki Castro OTR    Lower Body Bathing Assessment/Training    LB Bathing Assess/Train Assistive Device   grab bars;hand-held shower head;tub bench  -SO    LB Bathing Assess/Train, Position   sitting;standing  -SO    LB Bathing Assess/Train, [place] Level   contact guard assist;verbal cues required  -SO    Recorded by   [SO] Micki Castro OTR    Upper Body Dressing Assessment/Training    UB Dressing Assess/Train, Clothing Type   doffing:;donning:;t-shirt  -SO    UB Dressing Assess/Train, [place]   verbal cues required;minimum assist (75% patient effort)  -SO    Recorded by   [SO] Micki Castro OTR    Lower Body Dressing Assessment/Training    LB Dressing Assess/Train, Clothing Type   doffing:;donning:;pants;shoes;socks  -SO    LB Dressing Assess/Train, Position   sitting;standing  -SO    LB  Dressing Assess/Train, [place]   minimum assist (75% patient effort);verbal cues required;nonverbal cues required (demo/gesture)  -SO    Recorded by   [SO] KANDY Boo    Grooming Assessment/Training    Grooming Assess/Train, Position   sitting;sink side  -SO    Grooming Assess/Train, [place] Level   supervision required;set up required  -SO    Recorded by   [SO] KANDY Boo    Therapy Exercises    Bilateral Lower Extremities  other reps   nu step x 6 min on level 7did not use L UE  -JK     Recorded by  [JK] Anna Epps PT     Positioning and Restraints    Pre-Treatment Position  sitting in chair/recliner  -JK sitting in chair/recliner  -SO    Post Treatment Position  wheelchair  -JK wheelchair  -SO    In Wheelchair  sitting;with SLP  -JK sitting;call light within reach;encouraged to call for assist  -SO    Recorded by  [TIFFANIEK] Anna Epps PT [SO] KANDY Boo      11/01/17 1550          Rehab Assessment/Intervention    Discipline occupational therapist  -SO      Document Type therapy note (daily note)  -SO      Subjective Information no complaints;agree to therapy  -SO      Patient Effort, Rehab Treatment adequate  -SO      Symptoms Noted During/After Treatment fatigue  -SO      Precautions/Limitations fall precautions  -SO      Recorded by [SO] KANDY Boo      Pain Assessment    Pain Assessment No/denies pain  -SO      Recorded by [SO] Micki Castro OTR      Vision Assessment/Intervention    Visual Impairment visual impairment, left  -SO      Visual Impairment Comment Cues to scan to the left during connect the numbers, able to draw a clock correctly but wrote wrong number  -SO      Recorded by [SO] Micki Castro OTR      Cognitive Assessment/Intervention    Current Cognitive/Communication Assessment impaired  -SO      Orientation Status oriented to;person;place;time  -SO      Follows Commands/Answers Questions able to  follow single-step instructions;needs cueing  -SO      Recorded by [SO] Micki Castro OTR      ROM (Range of Motion)    General ROM upper extremity range of motion deficits identified  -SO      General ROM Detail RUE WFL; LUE shoulder 3/4-7/8  -SO      Recorded by [SO] Micki Castro, SARAR      MMT (Manual Muscle Testing)    General MMT Assessment upper extremity strength deficits identified  -SO      General MMT Assessment Detail RUE 4-/5; LUE shoulder 3-/5, elbow 3/5, forearm 3-/5  -SO      Recorded by [SO] Micki Castro OTR      Bed Mobility, Assessment/Treatment    Bed Mob, Supine to Sit, [place] verbal cues required;supervision required  -SO      Bed Mob, Sit to Supine, [place] verbal cues required;supervision required  -SO      Recorded by [SO] Micki Castro OTR      Transfer Assessment/Treatment    Transfers, Bed-Chair [place] contact guard assist  -SO      Transfers, Chair-Bed [place] contact guard assist  -SO      Transfers, Sit-Stand [place] contact guard assist  -SO      Transfers, Stand-Sit [place] contact guard assist  -SO      Recorded by [SO] Micki Castro OTR      Motor Skills/Interventions    Additional Documentation Fine Motor Coordination Training (Group);Gross Motor Coordination Training (Group)  -SO      Recorded by [SO] Micki Castro OTR      Fine Motor Coordination Training    9-Hole Peg Right 23  -SO      9-Hole Peg Left 56  -SO      Box and Blocks Right 46  -SO      Box and Blocks Left 36  -SO      Recorded by [SO] Micki Castro OTR      Positioning and Restraints    Pre-Treatment Position in bed  -SO      Post Treatment Position bed  -SO      In Bed supine;call light within reach;encouraged to call for assist;exit alarm on;with family/caregiver  -SO      Recorded by [SO] Micki Castro OTR        User Key  (r) = Recorded By, (t) = Taken By, (c) = Cosigned By    Initials Name Effective  Dates    OC Lilian Mohr MA,CCC-SLP 04/05/17 -     SO Micki Castro, OTR 04/13/15 -     TOSHA Epps, PT 12/01/15 -     MD Nereida Llamas, PT 12/01/15 -     KAYLEN River, OT Student 08/21/17 -                 IP PT Goals       11/01/17 1221          Bed Mobility PT LTG    Bed Mobility PT LTG, Date Established 11/01/17  -MD      Bed Mobility PT LTG, Time to Achieve 2 wks  -MD      Bed Mobility PT LTG, Activity Type supine to sit/sit to supine  -MD      Bed Mobility PT LTG, [place] Level independent  -MD      Transfer Training PT LTG    Transfer Training PT LTG, Date Established 11/01/17  -MD      Transfer Training PT LTG, Time to Achieve 2 wks  -MD      Transfer Training PT LTG, Activity Type sit to stand/stand to sit  -MD      Transfer Training PT LTG, [place] Level supervision required  -MD      Transfer Training PT LTG, Assist Device walker, rolling  -MD      Transfer Training 2 PT LTG    Transfer Training PT 2 LTG, Date Established 11/01/17  -MD      Transfer Training PT 2 LTG, Time to Achieve 2 wks  -MD      Transfer Training PT 2 LTG, Activity Type other (see comments)   car transfer  -MD      Transfer Training PT 2 LTG, [place] Level supervision required  -MD      Transfer Training PT 2 LTG, Assist Device walker, rolling  -MD      Gait Training PT LTG    Gait Training Goal PT LTG, Date Established 11/01/17  -MD      Gait Training Goal PT LTG, Time to Achieve 2 wks  -MD      Gait Training Goal PT LTG, [place] Level supervision required  -MD      Gait Training Goal PT LTG, Assist Device walker, rolling  -MD      Gait Training Goal PT LTG, Distance to Achieve 160  -MD      Stair Training PT LTG    Stair Training Goal PT LTG, Date Established 11/01/17  -MD      Stair Training Goal PT LTG, Time to Achieve 2 wks  -MD      Stair Training Goal PT LTG, Number of Steps 4  -MD      Stair Training Goal PT LTG, [place] Level supervision required  -MD      Stair Training Goal PT LTG,  Assist Device 2 handrails  -MD      Patient Education PT LTG    Patient Education PT LTG, Date Established 11/01/17  -MD      Patient Education PT LTG, Time to Achieve 2 wks  -MD      Patient Education PT LTG, Education Type HEP  -MD      Patient Education PT LTG, Education Understanding demonstrate adequately  -MD        User Key  (r) = Recorded By, (t) = Taken By, (c) = Cosigned By    Initials Name Provider Type    MD Nereida Llamas, PT Physical Therapist          Physical Therapy Education     Title: PT OT SLP Therapies (Active)     Topic: Physical Therapy (Active)     Point: Mobility training (Done)    Learning Progress Summary    Learner Readiness Method Response Comment Documented by Status   Patient Acceptance E VU  JK 11/02/17 1158 Done               Point: Precautions (Active)    Learning Progress Summary    Learner Readiness Method Response Comment Documented by Status   Patient Acceptance NOEMY YOON MD 11/03/17 1215 Active    Acceptance ENOEMY MD 11/01/17 1228 Active                      User Key     Initials Effective Dates Name Provider Type Discipline    RALPH 12/01/15 -  Anna Epps, PT Physical Therapist PT    MD 12/01/15 -  Nereida Llamas, PT Physical Therapist PT                    PT Recommendation and Plan  Anticipated Equipment Needs At Discharge: gait belt, front wheeled walker  Anticipated Discharge Disposition: home with home health  Planned Therapy Interventions: balance training, bed mobility training, gait training, home exercise program, patient/family education, transfer training  PT Frequency: 2 times/day  Plan of Care Review  Plan Of Care Reviewed With: patient  Outcome Summary/Follow up Plan: Pt presents s/p CVA leading to impaired gait and impaired dynamic standing balance.  Due to the above pt will benifit from skilled PT to address the above issues and increase pts safety and independence w functional mobility.         Time Calculation:         PT Charges       11/03/17 1343 11/03/17 1112        Time Calculation    Start Time 1330  -MD 1100  -MD     Stop Time 1400  -MD 1130  -MD     Time Calculation (min) 30 min  -MD 30 min  -MD     PT Received On 11/03/17  -MD 11/03/17  -MD     PT - Next Appointment 11/04/17  -MD        User Key  (r) = Recorded By, (t) = Taken By, (c) = Cosigned By    Initials Name Provider Type    MD Nereida Llamas, PT Physical Therapist          Therapy Charges for Today     Code Description Service Date Service Provider Modifiers Qty    98705009699 HC PT THER PROC EA 15 MIN 11/3/2017 Nereida Llamas, PT GP 4    31055379160 HC PT THER SUPP EA 15 MIN 11/3/2017 Nereida Llamas, PT GP 2               Nereida Llamas, PT  11/3/2017

## 2017-11-03 NOTE — THERAPY TREATMENT NOTE
Inpatient Rehabilitation - Occupational Therapy Treatment Note  [facility]     Patient Name: Alice Maxwell  : 1945  MRN: [id number]  Today's Date: 11/3/2017  Onset of Illness/Injury or Date of Surgery Date: 10/28/17  Date of Referral to OT: 17  Referring Physician: Jeremie      Admit Date: 10/31/2017    Visit Dx:   No diagnosis found.  Patient Active Problem List   Diagnosis   • Stroke             Adult Rehabilitation Note       17 1509 17 1330 17 1154    Rehab Assessment/Intervention    Discipline (P)  occupational therapist  -NA speech language pathologist  -OC (P)  occupational therapist  -NA    Document Type (P)  therapy note (daily note)  -NA therapy note (daily note)  -OC (P)  therapy note (daily note)  -NA    Subjective Information (P)  agree to therapy;complains of;fatigue  -NA agree to therapy  -OC (P)  no complaints;agree to therapy  -NA    Patient Effort, Rehab Treatment (P)  good  -NA good  -OC (P)  good  -NA    Symptoms Noted During/After Treatment (P)  fatigue  -NA none  -OC (P)  fatigue  -NA    Precautions/Limitations (P)  fall precautions  -NA  (P)  fall precautions  -NA    Recorded by [NA] Lynnette River OT Student [OC] Lilian Mohr MA,CCC-SLP [NA] SARA Bran    Pain Assessment    Pain Assessment (P)  No/denies pain  -NA No/denies pain  -OC (P)  No/denies pain  -NA    Recorded by [NA] Lynnette River OT Student [OC] Lilian Mohr MA,CCC-SLP [NA] SARA Bran    Vision Assessment/Intervention    Visual Impairment (P)  left neglect;inattention to the left;visual impairment, left  -NA  (P)  left neglect;inattention to the left;visual impairment, left  -NA    Vision Comment (P)  min cues to attend to left  -NA  (P)  mod cues to attend to left side of body during ADLs  -NA    Recorded by [NA] Lynnette River OT Student  [NA] SARA Bran    Cognitive Assessment/Intervention    Current Cognitive/Communication  Assessment (P)  impaired  -NA impaired  -OC (P)  impaired  -NA    Orientation Status   (P)  oriented to;person;place;time  -NA    Follows Commands/Answers Questions (P)  able to follow single-step instructions;needs increased time;needs cueing  -NA  (P)  able to follow single-step instructions;needs cueing  -NA    Personal Safety (P)  decreased insight to deficits  -NA  (P)  decreased insight to deficits  -NA    Personal Safety Interventions (P)  fall prevention program maintained;gait belt;nonskid shoes/slippers when out of bed  -NA  (P)  fall prevention program maintained;gait belt;nonskid shoes/slippers when out of bed  -NA    Recorded by [NA] Lynnette River, OT Student [OC] Lilian Mohr MA,CCC-SLP [NA] Lynnette River, OT Student    Swallow Assessment/Intervention    Additional Documentation  --   Trialed whole soft meats with pt this date, fxnl mastication  -OC     Recorded by  [OC] Lilian Mohr MA,BEN-SLP     Improve oral skills    Status: Improve Oral Skills  Progressing as expected  -OC     Oral Skills Progress  70%;with inconsistent cues  -OC     Comments: Improve Oral Skills  No anterior loss with saliva this date.   -OC     Recorded by  [OC] Lilian Mohr MA,CCC-SLP     Improve laryngeal elevation    Status: Improve laryngeal elevation  Progressing as expected  -OC     Laryngeal Elevation Progress  70%;with inconsistent cues  -OC     Recorded by  [OC] Lilian Mohr MA,BEN-SLP     Improve tongue base & pharyngeal wall squeeze    Tongue Base/Pharyngeal Wall Squeeze Progress  70%;with inconsistent cues  -OC     Recorded by  [OC] Lilian Mohr MA,CCC-SLP     Bed Mobility, Assessment/Treatment    Bed Mobility, Assistive Device (P)  bed rails  -NA      Bed Mobility, Scoot/Bridge, [place] (P)  supervision required;verbal cues required  -NA      Bed Mob, Sit to Supine, [place] (P)  supervision required  -NA      Bed Mobility, Impairments (P)  strength decreased  -NA      Bed Mobility, Comment   (P)  up  in wc  -NA    Recorded by [NA] Lynnette River OT Student  [NA] Lynnette River OT Student    Transfer Assessment/Treatment    Transfers, Chair-Bed [place] (P)  contact guard assist  -NA      Transfers, Sit-Stand [place] (P)  contact guard assist  -NA  (P)  contact guard assist  -NA    Transfers, Stand-Sit [place] (P)  contact guard assist  -NA  (P)  contact guard assist  -NA    Transfers, Sit-Stand-Sit, Assist Device (P)  straight cane  -NA  (P)  straight cane  -NA    Transfer, Impairments (P)  strength decreased;impaired balance  -NA      Recorded by [NA] Lynnette River OT Student  [NA] Lynnette River OT Student    Upper Body Bathing Assessment/Training    UB Bathing Assess/Train, Position   (P)  sink side;sitting  -NA    UB Bathing Assess/Train, [place] Level   (P)  supervision required;verbal cues required;set up required  -NA    UB Bathing Assess/Train, Impairments   (P)  impaired vision;coordination impaired  -NA    Recorded by   [NA] Lynnette River OT Student    Lower Body Bathing Assessment/Training    LB Bathing Assess/Train, Position   (P)  sink side;sitting;standing  -NA    LB Bathing Assess/Train, [place] Level   (P)  contact guard assist;verbal cues required  -NA    LB Bathing Assess/Train, Impairments   (P)  impaired vision;impaired balance;strength decreased  -NA    Recorded by   [NA] Lynnette River OT Student    Upper Body Dressing Assessment/Training    UB Dressing Assess/Train, Clothing Type   (P)  doffing:;donning:;t-shirt  -NA    UB Dressing Assess/Train, Assist Device   (P)  jennifer technique  -NA    UB Dressing Assess/Train, Position   (P)  sitting  -NA    UB Dressing Assess/Train, [place]   (P)  minimum assist (75% patient effort);verbal cues required  -NA    Recorded by   [NA] Lynnette River OT Student    Lower Body Dressing Assessment/Training    LB Dressing Assess/Train, Clothing Type   (P)  doffing:;donning:;pants;shoes;socks  -NA    LB  Dressing Assess/Train, Position   (P)  sitting;standing  -NA    LB Dressing Assess/Train, [place]   (P)  minimum assist (75% patient effort);verbal cues required;nonverbal cues required (demo/gesture)  -NA    LB Dressing Assess/Train, Impairments   (P)  strength decreased;impaired balance  -NA    Recorded by   [NA] Lynnette River OT Student    Toileting Assessment/Training    Toileting Assess/Train, Comment   (P)  denied need  -NA    Recorded by   [NA] Lynnette River OT Student    Grooming Assessment/Training    Grooming Assess/Train, Position   (P)  sink side;sitting  -NA    Grooming Assess/Train, [place] Level   (P)  set up required;supervision required;verbal cues required  -NA    Recorded by   [NA] Lynnette River OT Student    Balance Skills Training    Sitting-Level of Assistance (P)  Close supervision  -NA      Sitting-Balance Support (P)  Feet supported  -NA      Sitting-Balance Activities (P)  Lateral lean;Forward lean;Reaching across midline;Reaching for objects  -NA      Sitting # of Minutes (P)  20  -NA      Recorded by [NA] Lynnette River OT Student      Fine Motor Coordination Training    Detail (Fine Motor Coordination Training) (P)  pt completed FMC of LUE stacking 10 pennies with mod difficulty. pt with min difficulty with in hand manipulation of pennies and putting into small slot. pt completed bilateral stringing of small beads with some difficulty and LUE hand fatigue.  -NA      Recorded by [NA] Lynnette River OT Student      Positioning and Restraints    Pre-Treatment Position (P)  sitting in chair/recliner  -NA  (P)  sitting in chair/recliner  -NA    Post Treatment Position (P)  bed  -NA  (P)  wheelchair  -NA    In Bed (P)  supine;call light within reach;encouraged to call for assist;exit alarm on;with family/caregiver  -NA      In Wheelchair   (P)  sitting;call light within reach;encouraged to call for assist;exit alarm on;with family/caregiver  -NA    Recorded by [NA]  Lynnette River, OT Student  [NA] Lynnette River OT Student      11/03/17 1114 11/02/17 1433 11/02/17 1200    Rehab Assessment/Intervention    Discipline physical therapist  -MD occupational therapist  -SO speech language pathologist  -OC    Document Type therapy note (daily note)  -MD therapy note (daily note)  -SO therapy note (daily note)  -OC    Subjective Information agree to therapy;no complaints  -MD no complaints;agree to therapy  -SO agree to therapy;no complaints  -OC    Patient Effort, Rehab Treatment good  -MD good  -SO adequate  -OC    Symptoms Noted During/After Treatment none  -MD fatigue  -SO fatigue  -OC    Precautions/Limitations fall precautions  -MD fall precautions  -SO     Equipment Issued to Patient gait belt;front wheeled walker  -MD      Recorded by [MD] Nereida Llamas, PT [SO] Micki Castro, OTR [OC] Lilian Mohr MA,[facility]-SLP    Pain Assessment    Pain Assessment No/denies pain  -MD No/denies pain  -SO No/denies pain  -OC    Recorded by [MD] Nereida Llamas, PT [SO] Micki Castro, OTR [OC] Lilian Mohr MA,CCC-SLP    Vision Assessment/Intervention    Visual Impairment left neglect  -MD visual impairment, left  -SO     Visual Impairment Comment  Scanning activity, missed letters in bottom L corner  -SO     Recorded by [MD] Nereida Llamas, PT [SO] Micki Castro, OTR     Cognitive Assessment/Intervention    Current Cognitive/Communication Assessment impaired  -MD impaired  -SO impaired  -OC    Orientation Status oriented to;person;place;time  -MD oriented to;person;place;time  -SO oriented to;person;place;time  -OC    Follows Commands/Answers Questions 100% of the time;needs cueing;needs increased time;needs repetition  -MD able to follow single-step instructions;needs cueing  -SO     Personal Safety decreased insight to deficits  -MD decreased insight to deficits  -SO     Personal Safety Interventions fall prevention program maintained;gait belt;muscle strengthening  facilitated;nonskid shoes/slippers when out of bed;supervised activity  -MD      Recorded by [MD] Nereida Llamas, PT [SO] Micki Castro, OTR [OC] Lilian Mohr MA,CCC-SLP    Swallow Assessment/Intervention    Additional Documentation   --   [facility] soft test tray. See note  -OC    Recorded by   [OC] Lilian Mohr MA,CCC-SLP    Improve oral skills    Status: Improve Oral Skills   Progressing as expected  -OC    Oral Skills Progress   70%;with inconsistent cues  -OC    Comments: Improve Oral Skills   anterior loss saliva with secretions  -OC    Recorded by   [OC] Lilian Mohr MA,CCC-SLP    Improve laryngeal elevation    Status: Improve laryngeal elevation   Progressing as expected  -OC    Laryngeal Elevation Progress   70%;with inconsistent cues   difficulty differentiating pitch and loudness   -OC    Recorded by   [OC] Lilian Mohr MA,CCC-SLP    Bed Mobility, Assessment/Treatment    Bed Mob, Supine to Sit, [place] not tested  -MD      Bed Mob, Sit to Supine, [place] not tested  -MD      Bed Mobility, Comment  Sitting up in chair  -SO     Recorded by [MD] Nereida Llamas, PT [SO] Micki Castro, OTR     Transfer Assessment/Treatment    Transfers, Sit-Stand [place] verbal cues required;contact guard assist  -MD contact guard assist  -SO     Transfers, Stand-Sit [place] verbal cues required;contact guard assist  -MD contact guard assist  -SO     Transfers, Sit-Stand-Sit, Assist Device rolling walker  -MD straight cane  -SO     Transfer, Safety Issues step length decreased;weight-shifting ability decreased;balance decreased during turns;sequencing ability decreased  -MD      Transfer, Impairments strength decreased;impaired balance  -MD      Transfer, Comment  W/c <-> mat CGA  -SO     Recorded by [MD] Nereida Llamas, PT [SO] Micki Castro, OTR     Gait Assessment/Treatment    Gait, [place] Level verbal cues required;minimum assist (75% patient effort)  -MD      Gait, Assistive Device rolling  "walker  -MD      Gait, Distance (Feet) 80   x3  -MD      Gait, Gait Deviations bilateral:;janet decreased;forward flexed posture;step length decreased;decreased heel strike;toe-to-floor clearance decreased  -MD      Gait, Safety Issues step length decreased;weight-shifting ability decreased  -MD      Gait, Impairments impaired balance;strength decreased  -MD      Recorded by [MD] Nereida Llamas, PT      Motor Skills/Interventions    Additional Documentation Balance Skills Training (Group)  -MD      Recorded by [MD] Nereida Llamas PT      Balance Skills Training    Standing-Level of Assistance Contact guard  -MD      Static Standing Balance Support assistive device  -MD      Standing-Balance Activities Ball toss;Kicking ball   batting balloon back and forth  -MD      Gait Balance-Level of Assistance Contact guard  -MD      Gait Balance Support jennifer bar  -MD      Gait Balance Activities side-stepping;backwards  -MD      Recorded by [MD] Nereida Llamas, ONEIDA      Therapy Exercises    Bilateral Lower Extremities AROM:;10 reps;sitting  -MD      BLE Other Reps --   Nustep at level 2 for 3 min  -MD      BLE Resistance theraband  -MD      Recorded by [MD] Nereida Llamas, ONEIDA      Fine Motor Coordination Training    Detail (Fine Motor Coordination Training)  Pt with good- coordination with manipulating golf tees. Some increased difficulty with 1/4\" pegs and needed some cues for correct placement to match pattern.  -SO     Recorded by  [SO] KANDY Boo     Positioning and Restraints    Pre-Treatment Position sitting in chair/recliner  -MD sitting in chair/recliner  -SO     Post Treatment Position wheelchair  -MD wheelchair  -SO     In Wheelchair sitting;with OT  -MD sitting;with PT;with family/caregiver  -SO     Recorded by [MD] Nereida Llamas, ONEIDA [SO] KANDY Boo       11/02/17 1130 11/02/17 1116 11/01/17 1550    Rehab Assessment/Intervention    Discipline physical therapist  -JK occupational therapist  -SO " occupational therapist  -SO    Document Type therapy note (daily note)  -JK therapy note (daily note)  -SO therapy note (daily note)  -SO    Subjective Information no complaints;agree to therapy  -JK no complaints;agree to therapy  -SO no complaints;agree to therapy  -SO    Patient Effort, Rehab Treatment good  -JK good  -SO adequate  -SO    Symptoms Noted During/After Treatment fatigue  -JK fatigue  -SO fatigue  -SO    Precautions/Limitations fall precautions  -JK fall precautions  -SO fall precautions  -SO    Precautions/Limitations, Vision WFL with corrective lenses  -JK      Precautions/Limitations, Hearing WFL  -JK      Recorded by [JK] Anna Epps, PT [SO] Micki Castro, OTR [SO] Micki Castro, OTR    Pain Assessment    Pain Assessment No/denies pain  -JK No/denies pain  -SO No/denies pain  -SO    Recorded by [JK] Anna Epps, PT [SO] Micki Castro, OTR [SO] Micki Castro, OTR    Vision Assessment/Intervention    Visual Impairment visual impairment, left  -JK visual impairment, left  -SO visual impairment, left  -SO    Visual Impairment Comment  Cues to locate washcloth on L side, inattention  -SO Cues to scan to the left during connect the numbers, able to draw a clock correctly but wrote wrong number  -SO    Recorded by [JK] Anna Epps, PT [SO] Micki Castro, OTR [SO] Micki Castro, OTR    Cognitive Assessment/Intervention    Current Cognitive/Communication Assessment impaired  -JK impaired  -SO impaired  -SO    Orientation Status oriented to;person;place;time  -JK oriented to;person;place;time  -SO oriented to;person;place;time  -SO    Follows Commands/Answers Questions able to follow single-step instructions;100% of the time  -JK able to follow single-step instructions;needs cueing  -SO able to follow single-step instructions;needs cueing  -SO    Personal Safety decreased insight to deficits  -JK      Personal Safety Interventions fall  prevention program maintained;gait belt  -JK      Recorded by [JK] Anna Epps, PT [SO] Micki Castro, OTR [SO] Micki Castro, OTR    ROM (Range of Motion)    General ROM   upper extremity range of motion deficits identified  -SO    General ROM Detail   RUE WFL; LUE shoulder 3/4-7/8  -SO    Recorded by   [SO] Micki Castro OTR    MMT (Manual Muscle Testing)    General MMT Assessment   upper extremity strength deficits identified  -SO    General MMT Assessment Detail   RUE 4-/5; LUE shoulder 3-/5, elbow 3/5, forearm 3-/5  -SO    Recorded by   [SO] Micki Castro, OTR    Bed Mobility, Assessment/Treatment    Bed Mob, Supine to Sit, [place]   verbal cues required;supervision required  -SO    Bed Mob, Sit to Supine, [place]   verbal cues required;supervision required  -SO    Bed Mobility, Comment up in chair  -JK Sitting up in w/c  -SO     Recorded by [TIFFANIEK] Anna Epps, PT [SO] Micki Castro, OTR [SO] Micki Castro, OTR    Transfer Assessment/Treatment    Transfers, Bed-Chair [place]   contact guard assist  -SO    Transfers, Chair-Bed [place]   contact guard assist  -SO    Transfers, Sit-Stand [place] contact guard assist  -JK contact guard assist  -SO contact guard assist  -SO    Transfers, Stand-Sit [place] contact guard assist  -JK contact guard assist  -SO contact guard assist  -SO    Transfers, Sit-Stand-Sit, Assist Device straight cane  -JK rolling walker  -SO     Walk-In Shower Transfer, [place]  contact guard assist;verbal cues required  -SO     Walk-In Shower Transfer, Assist Device  --   Tub bench, grab bars  -SO     Transfer, Safety Issues step length decreased;weight-shifting ability decreased;balance decreased during turns;sequencing ability decreased  -JK      Transfer, Impairments strength decreased;impaired balance  -JK      Recorded by [JK] Anna Epps, PT [SO] Micki Castro, OTR [SO] Micki  Jing Castro OTR    Gait Assessment/Treatment    Gait, [place] Level verbal cues required;contact guard assist;minimum assist (75% patient effort)  -JK      Gait, Assistive Device rolling walker  -JK      Gait, Distance (Feet) 160  -JK      Gait, Gait Deviations bilateral:;janet decreased;forward flexed posture;toe-to-floor clearance decreased;step length decreased  -JK      Gait, Safety Issues step length decreased;weight-shifting ability decreased  -JK      Gait, Impairments impaired balance;strength decreased  -JK      Gait, Comment pt became more unsteady with gait by end of tx  -JK      Recorded by [JK] Anna Epps, PT      Upper Body Bathing Assessment/Training    UB Bathing Assess/Train Assistive Device  hand-held shower head;tub bench;grab bars  -SO     UB Bathing Assess/Train, Position  sitting  -SO     UB Bathing Assess/Train, [place] Level  stand by assist;set up required;verbal cues required  -SO     Recorded by  [SO] Micki Castro OTR     Lower Body Bathing Assessment/Training    LB Bathing Assess/Train Assistive Device  grab bars;hand-held shower head;tub bench  -SO     LB Bathing Assess/Train, Position  sitting;standing  -SO     LB Bathing Assess/Train, [place] Level  contact guard assist;verbal cues required  -SO     Recorded by  [SO] Micki Castro OTR     Upper Body Dressing Assessment/Training    UB Dressing Assess/Train, Clothing Type  doffing:;donning:;t-shirt  -SO     UB Dressing Assess/Train, [place]  verbal cues required;minimum assist (75% patient effort)  -SO     Recorded by  [SO] Micki Castro OTR     Lower Body Dressing Assessment/Training    LB Dressing Assess/Train, Clothing Type  doffing:;donning:;pants;shoes;socks  -SO     LB Dressing Assess/Train, Position  sitting;standing  -SO     LB Dressing Assess/Train, [place]  minimum assist (75% patient effort);verbal cues required;nonverbal cues required (demo/gesture)  -SO      Recorded by  [SO] Micki Castro OTR     Grooming Assessment/Training    Grooming Assess/Train, Position  sitting;sink side  -SO     Grooming Assess/Train, [place] Level  supervision required;set up required  -SO     Recorded by  [SO] KANDY Boo     Motor Skills/Interventions    Additional Documentation   Fine Motor Coordination Training (Group);Gross Motor Coordination Training (Group)  -SO    Recorded by   [SO] Micki Castro OTR    Therapy Exercises    Bilateral Lower Extremities other reps   nu step x 6 min on level 7did not use L UE  -JK      Recorded by [JK] Anna Epps, PT      Fine Motor Coordination Training    9-Hole Peg Right   23  -SO    9-Hole Peg Left   56  -SO    Box and Blocks Right   46  -SO    Box and Blocks Left   36  -SO    Recorded by   [SO] Micki Castro OTR    Positioning and Restraints    Pre-Treatment Position sitting in chair/recliner  -JK sitting in chair/recliner  -SO in bed  -SO    Post Treatment Position wheelchair  -JK wheelchair  -SO bed  -SO    In Bed   supine;call light within reach;encouraged to call for assist;exit alarm on;with family/caregiver  -SO    In Wheelchair sitting;with SLP  -JK sitting;call light within reach;encouraged to call for assist  -SO     Recorded by [TIFFANIEK] Anna Epps, ONEIDA [SO] Micki Castro, OTR [SO] Micki Castro, SARAR      User Key  (r) = Recorded By, (t) = Taken By, (c) = Cosigned By    Initials Name Effective Dates    OC Lilian Mohr MA,CCC-SLP 04/05/17 -     SO Micki Castro, OTR 04/13/15 -     TOSHA Epps, PT 12/01/15 -     MD Nereida Llamas, PT 12/01/15 -     KAYLEN River, OT Student 08/21/17 -                 OT Goals       11/01/17 1554 11/01/17 1230       Transfer Training OT STG    Transfer Training OT STG, Date Established  11/01/17  -SO     Transfer Training OT STG, Time to Achieve  1 wk  -SO     Transfer Training OT STG, Activity Type  tub;walk-in shower;shower  chair  -SO     Transfer Training OT STG, [place] Level  contact guard assist  -SO     Transfer Training OT STG, Assist Device  walker, rolling;shower chair  -SO     Transfer Training OT LTG    Transfer Training OT LTG, Date Established  11/01/17 -SO     Transfer Training OT LTG, Time to Achieve  by discharge  -SO     Transfer Training OT LTG, Activity Type  tub;walk-in shower;shower chair  -SO     Transfer Training OT LTG, [place] Level  supervision required  -SO     Transfer Training OT LTG, Assist Device  walker, rolling;shower chair  -SO     Transfer Training 2 OT STG    Transfer Training 2 OT STG, Date Established  11/01/17 -SO     Transfer Training 2 OT STG, Time to Achieve  1 wk  -SO     Transfer Training 2 OT STG, Activity Type  toilet  -SO     Transfer Training 2 OT STG, [place] Level  contact guard assist  -SO     Transfer Training 2 OT STG, Assist Device  walker, rolling  -SO     Transfer Training 2 OT LTG    Transfer Training 2 OT LTG, Date Established  11/01/17 -SO     Transfer Training 2 OT LTG, Time to Achieve  by discharge  -SO     Transfer Training 2 OT LTG, Activity Type  toilet  -SO     Transfer Training 2 OT LTG, [place] Level  supervision required  -SO     Transfer Training 2 OT LTG, Assist Device  walker, rolling  -SO     Strength OT LTG    Strength Goal OT LTG, Date Established 11/01/17 -SO      Strength Goal OT LTG, Time to Achieve by discharge  -SO      Strength Goal OT LTG, Measure to Achieve Pt to increase LUE strength to 4-/5 to increase independence with ADLs.  -SO      Caregiver Training OT LTG    Caregiver Training OT LTG, Date Established  11/01/17  -SO     Caregiver Training OT LTG, Time to Achieve  by discharge  -SO     Caregiver Training OT LTG, [place] Level  able to assist adequately;able to cue patient adequately  -SO     Patient Education OT LTG    Patient Education OT LTG, Date Established  11/01/17  -SO     Patient Education OT LTG, Time to Achieve   by discharge  -SO     Patient Education OT LTG, Education Type  written program;HEP;aware of neuro deficits;home safety  -SO     Patient Education OT LTG, Education Understanding  independent;demonstrates adequately;verbalizes understanding  -SO     Isolated Movement OT LTG    Isolated Movement OT LTG, Date Established 11/01/17  -SO      Isolated Movement OT LTG, Time to Achieve by discharge  -SO      Isolated Movement OT LTG, Body Area left scapula;left shoulder;left elbow;left forearm;left wrist;left fingers  -SO      Isolated Movement OT LTG, Level WFL  -SO      Coordination OT LTG    Coordination OT LTG, Date Established 11/01/17 -SO      Coordination OT LTG, Time to Achieve by discharge  -SO      Coordination OT LTG, Activity Type FM written ex program;GM written ex program;FM task;GM task  -SO      Coordination OT LTG, [place] Level independent  -SO      Coordination OT LTG, Additional Goal LUE  -SO      ADL OT STG    ADL OT STG, Date Established  11/01/17 -SO     ADL OT STG, Time to Achieve  1 wk  -SO     ADL OT STG, Activity Type  ADL skills  -SO     ADL OT STG, [place] Level  contact guard;assistive device  -SO     ADL OT LTG    ADL OT LTG, Date Established  11/01/17 -SO     ADL OT LTG, Time to Achieve  by discharge  -SO     ADL OT LTG, Activity Type  ADL skills  -SO     ADL OT LTG, [place] Level  standby assist;assistive device  -SO       User Key  (r) = Recorded By, (t) = Taken By, (c) = Cosigned By    Initials Name Provider Type    SO Micki Castro OTR Occupational Therapist          Occupational Therapy Education     Title: PT OT SLP Therapies (Active)     Topic: Occupational Therapy (Active)     Point: ADL training (Done)    Description: Instruct learner(s) on proper safety adaptation and remediation techniques during self care or transfers.   Instruct in proper use of assistive devices.    Learning Progress Summary    Learner Readiness Method Response Comment Documented by  Status   Patient Acceptance E VU Discuss OT goals and POC. SO 11/01/17 1232 Done   Family Acceptance E VU Discuss OT goals and POC. SO 11/01/17 1232 Done                      User Key     Initials Effective Dates Name Provider Type Discipline    SO 04/13/15 -  Micki Castro OTR Occupational Therapist OT                  OT Recommendation and Plan  Therapy Frequency: 5 times/wk          Time Calculation:         Time Calculation- OT       11/03/17 1430 11/03/17 1220 11/03/17 0900    Time Calculation- OT    OT Start Time (P)  1430  -NA  (P)  0900  -NA    OT Stop Time (P)  1500  -NA  (P)  0930  -NA    OT Time Calculation (min) (P)  30 min  -NA  (P)  30 min  -NA    OT Non-Billable Time (min)  15 min   Team rounds  -SO       User Key  (r) = Recorded By, (t) = Taken By, (c) = Cosigned By    Initials Name Provider Type    SO Micki Castro OTR Occupational Therapist    NA Lynnette River OT Student OT Student           Therapy Charges for Today     Code Description Service Date Service Provider Modifiers Qty    39355896252 HC OT SELF CARE/MGMT/TRAIN EA 15 MIN 11/3/2017 Lynnette River OT Student GO 2    60752183318 HC OT NEUROMUSC RE EDUCATION EA 15 MIN 11/3/2017 Lynnette River OT Student GO 2               Lynnette River OT Student  11/3/2017

## 2017-11-03 NOTE — PLAN OF CARE
Problem: Patient Care Overview (Adult)  Goal: Plan of Care Review  Outcome: Ongoing (interventions implemented as appropriate)    11/03/17 1151   Coping/Psychosocial Response Interventions   Plan Of Care Reviewed With patient   Patient Care Overview   Progress improving   Outcome Evaluation   Outcome Summary/Follow up Plan accucheck reduced to bid         Problem: Stroke (Ischemic) (Adult)  Goal: Signs and Symptoms of Listed Potential Problems Will be Absent or Manageable (Stroke)  Outcome: Ongoing (interventions implemented as appropriate)    11/03/17 1151   Stroke (Ischemic)   Problems Assessed (Stroke (Ischemic)/TIA) motor/sensory impairment   Problems Present (Stroke (Ischemic)/TIA) motor/sensory impairment         Problem: Fall Risk (Adult)  Goal: Absence of Falls  Outcome: Ongoing (interventions implemented as appropriate)    11/03/17 1151   Fall Risk (Adult)   Absence of Falls making progress toward outcome

## 2017-11-03 NOTE — PROGRESS NOTES
Inpatient Rehabilitation Functional Measures Assessment    Functional Measures  YESI Eating:  Branch  [facility] Grooming: Branch  [facility] Bathing:  Branch  [facility] Upper Body Dressing:  Branch  [facility] Lower Body Dressing:  [facility] Toileting:  [facility] Bladder Management  Level of Assistance:  [place]  Frequency/Number of Accidents this Shift:  [facility] Bowel Management  Level of Assistance: [place]  Frequency/Number of Accidents this Shift: Branch    [facility] Bed/Chair/Wheelchair Transfer:  Activity was not observed.  YESI Toilet Transfer:  [facility] Tub/Shower Transfer:  [place]    Previously Documented Mode of Locomotion at Discharge: Field  YESI Expected Mode of Locomotion at Discharge: [facility] Walk/Wheelchair:  WHEELCHAIR OBSERVATION   Activity was not observed.    WALK OBSERVATION   Walk Distance Scale = 2.  Distance walked is 50 -149 feet. Walk Score = 2.  Patient performs 75% or more of effort and requires minimal assistance.  Incidental assistance, contact guard or steadying was provided. Patient walked a  distance of 80 feet. Patient requires the following assistive device(s): Rolling  walker.  YESI Stairs:  Activity was not observed.    YESI Comprehension:  Branch  [facility] Expression:  [facility] Social Interaction:  [facility] Problem Solving:  [facility] Memory:  [place]    Therapy Mode Minutes  Occupational Therapy: Branch  Physical Therapy: Branch  Speech Language Pathology:  Branch    Signed by: Nereida Llamas, PT

## 2017-11-03 NOTE — PROGRESS NOTES
QUALITY INDICATORS  Active Diagnosis: Comorbidities and Co-existing Conditions:   Diabetes Mellitus  (DM) - e.g., diabetic retinopathy, nephropathy, and neuropathy).  Health Conditions: Patient has had two or more falls, or a fall with injury, in  the past year. Patient has not had major surgery during the 100 days prior to  admission.  Skin Conditions: Unhealed Pressure Ulcer(s) at Stage 1 or Higher on Admission:  No.    Signed by: Jean Claude Harkins RN

## 2017-11-03 NOTE — PROGRESS NOTES
Occupational Therapy: Branch    Physical Therapy: Branch    Speech Language Pathology:  Individual: 60 minutes.    Signed by: Gifty Rogers SLP

## 2017-11-03 NOTE — PROGRESS NOTES
Inpatient Rehabilitation Functional Measures Assessment    Functional Measures  YESI Eating:  Eating Score = 5. Patient is supervision/set-up for eating,  requiring: Stand by assistance. No assistive devices were required.  [facility] Grooming: [facility] Bathing:  [facility] Upper Body Dressing:  [facility] Lower Body Dressing:  [facility] Toileting:  Toileting Score = 5.  Patient is supervision/set-up for  toileting, requiring: Stand by assistance. Stand by assistance. No assistive  devices were required.    YESI Bladder Management  Level of Assistance:  Bladder Score = 5.  Patient is supervision/set-up for  bladder management, requiring: Stand by assistance. Patient requires the  following assistive device(s):  Adult brief. Adult brief.  Frequency/Number of Accidents this Shift:  Bladder accidents this shift:  2 .    YESI Bowel Management  Level of Assistance: Bowel Score = 5.  Patient is supervision/set-up for bowel  management, requiring: Stand by assistance. Patient requires the following  assistive device(s):  Adult brief. Adult brief.  Frequency/Number of Accidents this Shift: Bowel accidents this shift: 2 .    YESI Bed/Chair/Wheelchair Transfer:  Bed/chair/wheelchair Transfer Score = 5.  Patient is supervision/set-up for transferring to and from the  bed/chair/wheelchair, requiring: Stand by assistance. Stand by assistance. Stand  by assistance. Patient requires the following assistive device(s): Seating  system of wheelchair. Walker. Grab bars.  YESI Toilet Transfer:  Toilet Transfer Score = 5.  Patient is supervision/set-up  for transferring to and from the toilet/commode, requiring: Stand by assistance.  Stand by assistance. Patient requires the following assistive device(s): Grab  bars. Safety frame/over the toilet. Walker.  [facility] Tub/Shower Transfer:  [place]    Previously Documented Mode of Locomotion at Discharge: Field  YESI Expected Mode of Locomotion at Discharge: [facility] Walk/Wheelchair:  [facility]  Stairs:  Branch    YESI Comprehension:  Branch  YESI Expression:  Branch  YESI Social Interaction:  Branch  YESI Problem Solving:  Branch  YESI Memory:  Branch    Therapy Mode Minutes  Occupational Therapy: Branch  Physical Therapy: Branch  Speech Language Pathology:  Branch    Signed by: Jaqueline Izaguirre,

## 2017-11-03 NOTE — THERAPY TREATMENT NOTE
Inpatient Rehabilitation - Occupational Therapy Treatment Note  [facility]     Patient Name: Alice Maxwell  : 1945  MRN: [id number]  Today's Date: 11/3/2017  Onset of Illness/Injury or Date of Surgery Date: 10/28/17  Date of Referral to OT: 17  Referring Physician: Jeremie      Admit Date: 10/31/2017    Visit Dx:   No diagnosis found.  Patient Active Problem List   Diagnosis   • Stroke             Adult Rehabilitation Note       17 1154 17 1114 17 1433    Rehab Assessment/Intervention    Discipline (P)  occupational therapist  -NA physical therapist  -MD occupational therapist  -SO    Document Type (P)  therapy note (daily note)  -NA therapy note (daily note)  -MD therapy note (daily note)  -SO    Subjective Information (P)  no complaints;agree to therapy  -NA agree to therapy;no complaints  -MD no complaints;agree to therapy  -SO    Patient Effort, Rehab Treatment (P)  good  -NA good  -MD good  -SO    Symptoms Noted During/After Treatment (P)  fatigue  -NA  fatigue  -SO    Precautions/Limitations (P)  fall precautions  -NA fall precautions  -MD fall precautions  -SO    Equipment Issued to Patient  gait belt;front wheeled walker  -MD     Recorded by [NA] Lynnette River, OT Student [MD] Nereida Llamas, PT [SO] Micki Castro, OTR    Pain Assessment    Pain Assessment (P)  No/denies pain  -NA No/denies pain  -MD No/denies pain  -SO    Recorded by [NA] Lynnette River OT Student [MD] Nereida Llamas, PT [SO] Micki Castro, OTR    Vision Assessment/Intervention    Visual Impairment (P)  left neglect;inattention to the left;visual impairment, left  -NA left neglect  -MD visual impairment, left  -SO    Visual Impairment Comment   Scanning activity, missed letters in bottom L corner  -SO    Vision Comment (P)  mod cues to attend to left side of body during ADLs  -NA      Recorded by [NA] Lynnette River, OT Student [MD] Nereida Llamas, PT [SO] Micki Castro, OTR     Cognitive Assessment/Intervention    Current Cognitive/Communication Assessment (P)  impaired  -NA impaired  -MD impaired  -SO    Orientation Status (P)  oriented to;person;place;time  -NA oriented to;person;place;time  -MD oriented to;person;place;time  -SO    Follows Commands/Answers Questions (P)  able to follow single-step instructions;needs cueing  -% of the time;needs cueing;needs increased time;needs repetition  -MD able to follow single-step instructions;needs cueing  -SO    Personal Safety (P)  decreased insight to deficits  -NA decreased insight to deficits  -MD decreased insight to deficits  -SO    Personal Safety Interventions (P)  fall prevention program maintained;gait belt;nonskid shoes/slippers when out of bed  -NA fall prevention program maintained;gait belt;muscle strengthening facilitated;nonskid shoes/slippers when out of bed;supervised activity  -MD     Recorded by [NA] Lynnette River, OT Student [MD] Nereida Llamas, PT [SO] Micki Castro, SARAR    Bed Mobility, Assessment/Treatment    Bed Mob, Supine to Sit, [place]  not tested  -MD     Bed Mob, Sit to Supine, [place]  not tested  -MD     Bed Mobility, Comment (P)  up in wc  -NA  Sitting up in chair  -SO    Recorded by [NA] Lynnette River, OT Student [MD] Nereida Llamas, PT [SO] Micki Castro, SARAR    Transfer Assessment/Treatment    Transfers, Sit-Stand [place] (P)  contact guard assist  -NA  contact guard assist  -SO    Transfers, Stand-Sit [place] (P)  contact guard assist  -NA  contact guard assist  -SO    Transfers, Sit-Stand-Sit, Assist Device (P)  straight cane  -NA  straight cane  -SO    Transfer, Comment   W/c <-> mat CGA  -SO    Recorded by [NA] Lynnette River, OT Student  [SO] Micki Castro, OTJOSSIE    Upper Body Bathing Assessment/Training    UB Bathing Assess/Train, Position (P)  sink side;sitting  -NA      UB Bathing Assess/Train, [place] Level (P)  supervision required;verbal cues  required;set up required  -NA      UB Bathing Assess/Train, Impairments (P)  impaired vision;coordination impaired  -NA      Recorded by [NA] Lynnette River OT Student      Lower Body Bathing Assessment/Training    LB Bathing Assess/Train, Position (P)  sink side;sitting;standing  -NA      LB Bathing Assess/Train, [place] Level (P)  contact guard assist;verbal cues required  -NA      LB Bathing Assess/Train, Impairments (P)  impaired vision;impaired balance;strength decreased  -NA      Recorded by [NA] Lynnette River OT Student      Upper Body Dressing Assessment/Training    UB Dressing Assess/Train, Clothing Type (P)  doffing:;donning:;t-shirt  -NA      UB Dressing Assess/Train, Assist Device (P)  jennifer technique  -NA      UB Dressing Assess/Train, Position (P)  sitting  -NA      UB Dressing Assess/Train, [place] (P)  minimum assist (75% patient effort);verbal cues required  -NA      Recorded by [NA] Lynnette River OT Student      Lower Body Dressing Assessment/Training    LB Dressing Assess/Train, Clothing Type (P)  doffing:;donning:;pants;shoes;socks  -NA      LB Dressing Assess/Train, Position (P)  sitting;standing  -NA      LB Dressing Assess/Train, [place] (P)  minimum assist (75% patient effort);verbal cues required;nonverbal cues required (demo/gesture)  -NA      LB Dressing Assess/Train, Impairments (P)  strength decreased;impaired balance  -NA      Recorded by [NA] Lynnette River OT Student      Toileting Assessment/Training    Toileting Assess/Train, Comment (P)  denied need  -NA      Recorded by [NA] Lynnette River OT Student      Grooming Assessment/Training    Grooming Assess/Train, Position (P)  sink side;sitting  -NA      Grooming Assess/Train, [place] Level (P)  set up required;supervision required;verbal cues required  -NA      Recorded by [NA] Lynnette River OT Student      Fine Motor Coordination Training    Detail (Fine Motor Coordination Training)   Pt with good-  "coordination with manipulating golf tees. Some increased difficulty with 1/4\" pegs and needed some cues for correct placement to match pattern.  -SO    Recorded by   [SO] Micki Castro, KANDY    Positioning and Restraints    Pre-Treatment Position (P)  sitting in chair/recliner  -NA  sitting in chair/recliner  -SO    Post Treatment Position (P)  wheelchair  -NA  wheelchair  -SO    In Wheelchair (P)  sitting;call light within reach;encouraged to call for assist;exit alarm on;with family/caregiver  -NA  sitting;with PT;with family/caregiver  -SO    Recorded by [NA] Lynnette River, OT Student  [SO] Micki Castro, OTR      11/02/17 1200 11/02/17 1130 11/02/17 1116    Rehab Assessment/Intervention    Discipline speech language pathologist  -OC physical therapist  -JK occupational therapist  -SO    Document Type therapy note (daily note)  -OC therapy note (daily note)  -JK therapy note (daily note)  -SO    Subjective Information agree to therapy;no complaints  -OC no complaints;agree to therapy  -JK no complaints;agree to therapy  -SO    Patient Effort, Rehab Treatment adequate  -OC good  -JK good  -SO    Symptoms Noted During/After Treatment fatigue  -OC fatigue  -JK fatigue  -SO    Precautions/Limitations  fall precautions  -JK fall precautions  -SO    Precautions/Limitations, Vision  WFL with corrective lenses  -JK     Precautions/Limitations, Hearing  WFL  -JK     Recorded by [OC] Lilian Mohr MA,CCC-SLP [JK] Anna Epps, PT [SO] Micki Castro, OTR    Pain Assessment    Pain Assessment No/denies pain  -OC No/denies pain  -JK No/denies pain  -SO    Recorded by [OC] Lilian Mohr MA,CCC-SLP [JK] Anna Epps, PT [SO] Micki Castro, OTR    Vision Assessment/Intervention    Visual Impairment  visual impairment, left  -JK visual impairment, left  -SO    Visual Impairment Comment   Cues to locate washcloth on L side, inattention  -SO    Recorded by  [JK] Anna Epps, PT [SO] " Micki Castro, OTR    Cognitive Assessment/Intervention    Current Cognitive/Communication Assessment impaired  -OC impaired  -JK impaired  -SO    Orientation Status oriented to;person;place;time  -OC oriented to;person;place;time  -JK oriented to;person;place;time  -SO    Follows Commands/Answers Questions  able to follow single-step instructions;100% of the time  -JK able to follow single-step instructions;needs cueing  -SO    Personal Safety  decreased insight to deficits  -JK     Personal Safety Interventions  fall prevention program maintained;gait belt  -JK     Recorded by [OC] Lilian Mohr MA,CCC-SLP [JK] Anna Epps, PT [SO] Micki Castro, OTR    Swallow Assessment/Intervention    Additional Documentation --   [facility] soft test tray. See note  -OC      Recorded by [OC] Lilian Mohr MA,CCC-SLP      Improve oral skills    Status: Improve Oral Skills Progressing as expected  -OC      Oral Skills Progress 70%;with inconsistent cues  -OC      Comments: Improve Oral Skills anterior loss saliva with secretions  -OC      Recorded by [OC] Lilian Mohr MA,CCC-SLP      Improve laryngeal elevation    Status: Improve laryngeal elevation Progressing as expected  -OC      Laryngeal Elevation Progress 70%;with inconsistent cues   difficulty differentiating pitch and loudness   -OC      Recorded by [OC] Lilian Mohr MA,CCC-SLP      Bed Mobility, Assessment/Treatment    Bed Mobility, Comment  up in chair  -JK Sitting up in w/c  -SO    Recorded by  [JK] Anna Epps, PT [SO] Micki Castro, OTR    Transfer Assessment/Treatment    Transfers, Sit-Stand [place]  contact guard assist  -JK contact guard assist  -SO    Transfers, Stand-Sit [place]  contact guard assist  -JK contact guard assist  -SO    Transfers, Sit-Stand-Sit, Assist Device  straight cane  -JK rolling walker  -SO    Walk-In Shower Transfer, [place]   contact guard assist;verbal cues required  -SO    Walk-In Shower  Transfer, Assist Device   --   Tub bench, grab bars  -SO    Transfer, Safety Issues  step length decreased;weight-shifting ability decreased;balance decreased during turns;sequencing ability decreased  -JK     Transfer, Impairments  strength decreased;impaired balance  -JK     Recorded by  [JK] Anna Epps PT [SO] Micki Castro OTR    Gait Assessment/Treatment    Gait, [place] Level  verbal cues required;contact guard assist;minimum assist (75% patient effort)  -JK     Gait, Assistive Device  rolling walker  -JK     Gait, Distance (Feet)  160  -JK     Gait, Gait Deviations  bilateral:;janet decreased;forward flexed posture;toe-to-floor clearance decreased;step length decreased  -JK     Gait, Safety Issues  step length decreased;weight-shifting ability decreased  -JK     Gait, Impairments  impaired balance;strength decreased  -JK     Gait, Comment  pt became more unsteady with gait by end of tx  -JK     Recorded by  [JK] Anna Epps PT     Upper Body Bathing Assessment/Training    UB Bathing Assess/Train Assistive Device   hand-held shower head;tub bench;grab bars  -SO    UB Bathing Assess/Train, Position   sitting  -SO    UB Bathing Assess/Train, [place] Level   stand by assist;set up required;verbal cues required  -SO    Recorded by   [SO] Micki Castro OTR    Lower Body Bathing Assessment/Training    LB Bathing Assess/Train Assistive Device   grab bars;hand-held shower head;tub bench  -SO    LB Bathing Assess/Train, Position   sitting;standing  -SO    LB Bathing Assess/Train, [place] Level   contact guard assist;verbal cues required  -SO    Recorded by   [SO] Micki Castro OTR    Upper Body Dressing Assessment/Training    UB Dressing Assess/Train, Clothing Type   doffing:;donning:;t-shirt  -SO    UB Dressing Assess/Train, Blanket   verbal cues required;minimum assist (75% patient effort)  -SO    Recorded by   [SO] Micki Castro OTR    Lower Body  Dressing Assessment/Training    LB Dressing Assess/Train, Clothing Type   doffing:;donning:;pants;shoes;socks  -SO    LB Dressing Assess/Train, Position   sitting;standing  -SO    LB Dressing Assess/Train, [place]   minimum assist (75% patient effort);verbal cues required;nonverbal cues required (demo/gesture)  -SO    Recorded by   [SO] Micki Castro OTR    Grooming Assessment/Training    Grooming Assess/Train, Position   sitting;sink side  -SO    Grooming Assess/Train, [place] Level   supervision required;set up required  -SO    Recorded by   [SO] KANDY Boo    Therapy Exercises    Bilateral Lower Extremities  other reps   nu step x 6 min on level 7did not use L UE  -JK     Recorded by  [TIFFANIEK] Anna Epps, ONEIDA     Positioning and Restraints    Pre-Treatment Position  sitting in chair/recliner  -JK sitting in chair/recliner  -SO    Post Treatment Position  wheelchair  -JK wheelchair  -SO    In Wheelchair  sitting;with SLP  -JK sitting;call light within reach;encouraged to call for assist  -SO    Recorded by  [TIFFANIEK] Anna Epps, PT [SO] Micki Castro OTR      11/01/17 1550          Rehab Assessment/Intervention    Discipline occupational therapist  -SO      Document Type therapy note (daily note)  -SO      Subjective Information no complaints;agree to therapy  -SO      Patient Effort, Rehab Treatment adequate  -SO      Symptoms Noted During/After Treatment fatigue  -SO      Precautions/Limitations fall precautions  -SO      Recorded by [SO] KANDY Boo      Pain Assessment    Pain Assessment No/denies pain  -SO      Recorded by [SO] KANDY Boo      Vision Assessment/Intervention    Visual Impairment visual impairment, left  -SO      Visual Impairment Comment Cues to scan to the left during connect the numbers, able to draw a clock correctly but wrote wrong number  -SO      Recorded by [SO] KANDY Boo      Cognitive  Assessment/Intervention    Current Cognitive/Communication Assessment impaired  -SO      Orientation Status oriented to;person;place;time  -SO      Follows Commands/Answers Questions able to follow single-step instructions;needs cueing  -SO      Recorded by [SO] KANDY Boo      ROM (Range of Motion)    General ROM upper extremity range of motion deficits identified  -SO      General ROM Detail RUE WFL; LUE shoulder 3/4-7/8  -SO      Recorded by [SO] Micki Castro OTR      MMT (Manual Muscle Testing)    General MMT Assessment upper extremity strength deficits identified  -SO      General MMT Assessment Detail RUE 4-/5; LUE shoulder 3-/5, elbow 3/5, forearm 3-/5  -SO      Recorded by [SO] Micki Castro OTR      Bed Mobility, Assessment/Treatment    Bed Mob, Supine to Sit, [place] verbal cues required;supervision required  -SO      Bed Mob, Sit to Supine, [place] verbal cues required;supervision required  -SO      Recorded by [SO] KANDY Boo      Transfer Assessment/Treatment    Transfers, Bed-Chair [place] contact guard assist  -SO      Transfers, Chair-Bed [place] contact guard assist  -SO      Transfers, Sit-Stand [place] contact guard assist  -SO      Transfers, Stand-Sit [place] contact guard assist  -SO      Recorded by [SO] KANDY Boo      Motor Skills/Interventions    Additional Documentation Fine Motor Coordination Training (Group);Gross Motor Coordination Training (Group)  -SO      Recorded by [SO] Micki Castro OTR      Fine Motor Coordination Training    9-Hole Peg Right 23  -SO      9-Hole Peg Left 56  -SO      [place] Right 46  -SO      Box [place] Left 36  -SO      Recorded by [SO] Micki Castro OTR      Positioning and Restraints    Pre-Treatment Position in bed  -SO      Post Treatment Position bed  -SO      In Bed supine;call light within reach;encouraged to call for  assist;exit alarm on;with family/caregiver  -SO      Recorded by [SO] Micki Castro, OTR        User Key  (r) = Recorded By, (t) = Taken By, (c) = Cosigned By    Initials Name Effective Dates    OC Lilian Mohr MA,CCC-SLP 04/05/17 -     SO Micki Castro, OTR 04/13/15 -     TOSHA Epps, PT 12/01/15 -     MD Nereida Llamas, PT 12/01/15 -     KAYLEN River, OT Student 08/21/17 -                 OT Goals       11/01/17 1554 11/01/17 1230       Transfer Training OT STG    Transfer Training OT STG, Date Established  11/01/17  -SO     Transfer Training OT STG, Time to Achieve  1 wk  -SO     Transfer Training OT STG, Activity Type  tub;walk-in shower;shower chair  -SO     Transfer Training OT STG, [place] Level  contact guard assist  -SO     Transfer Training OT STG, Assist Device  walker, rolling;shower chair  -SO     Transfer Training OT LTG    Transfer Training OT LTG, Date Established  11/01/17  -SO     Transfer Training OT LTG, Time to Achieve  by discharge  -SO     Transfer Training OT LTG, Activity Type  tub;walk-in shower;shower chair  -SO     Transfer Training OT LTG, [place] Level  supervision required  -SO     Transfer Training OT LTG, Assist Device  walker, rolling;shower chair  -SO     Transfer Training 2 OT STG    Transfer Training 2 OT STG, Date Established  11/01/17  -SO     Transfer Training 2 OT STG, Time to Achieve  1 wk  -SO     Transfer Training 2 OT STG, Activity Type  toilet  -SO     Transfer Training 2 OT STG, [place] Level  contact guard assist  -SO     Transfer Training 2 OT STG, Assist Device  walker, rolling  -SO     Transfer Training 2 OT LTG    Transfer Training 2 OT LTG, Date Established  11/01/17  -SO     Transfer Training 2 OT LTG, Time to Achieve  by discharge  -SO     Transfer Training 2 OT LTG, Activity Type  toilet  -SO     Transfer Training 2 OT LTG, [place] Level  supervision required  -SO     Transfer Training 2 OT LTG, Assist Device   walker, rolling  -SO     Strength OT LTG    Strength Goal OT LTG, Date Established 11/01/17  -SO      Strength Goal OT LTG, Time to Achieve by discharge  -SO      Strength Goal OT LTG, Measure to Achieve Pt to increase LUE strength to 4-/5 to increase independence with ADLs.  -SO      Caregiver Training OT LTG    Caregiver Training OT LTG, Date Established  11/01/17  -SO     Caregiver Training OT LTG, Time to Achieve  by discharge  -SO     Caregiver Training OT LTG, [place] Level  able to assist adequately;able to cue patient adequately  -SO     Patient Education OT LTG    Patient Education OT LTG, Date Established  11/01/17 -SO     Patient Education OT LTG, Time to Achieve  by discharge  -SO     Patient Education OT LTG, Education Type  written program;HEP;aware of neuro deficits;home safety  -SO     Patient Education OT LTG, Education Understanding  independent;demonstrates adequately;verbalizes understanding  -SO     Isolated Movement OT LTG    Isolated Movement OT LTG, Date Established 11/01/17  -SO      Isolated Movement OT LTG, Time to Achieve by discharge  -SO      Isolated Movement OT LTG, Body Area left scapula;left shoulder;left elbow;left forearm;left wrist;left fingers  -SO      Isolated Movement OT LTG, Level WFL  -SO      Coordination OT LTG    Coordination OT LTG, Date Established 11/01/17  -SO      Coordination OT LTG, Time to Achieve by discharge  -SO      Coordination OT LTG, Activity Type FM written ex program;GM written ex program;FM task;GM task  -SO      Coordination OT LTG, [place] Level independent  -SO      Coordination OT LTG, Additional Goal LUE  -SO      ADL OT STG    ADL OT STG, Date Established  11/01/17  -SO     ADL OT STG, Time to Achieve  1 wk  -SO     ADL OT STG, Activity Type  ADL skills  -SO     ADL OT STG, [place] Level  contact guard;assistive device  -SO     ADL OT LTG    ADL OT LTG, Date Established  11/01/17  -SO     ADL OT LTG, Time to Achieve  by discharge   -SO     ADL OT LTG, Activity Type  ADL skills  -SO     ADL OT LTG, [place] Level  standby assist;assistive device  -SO       User Key  (r) = Recorded By, (t) = Taken By, (c) = Cosigned By    Initials Name Provider Type    SO KANDY Boo Occupational Therapist          Occupational Therapy Education     Title: PT OT SLP Therapies (Active)     Topic: Occupational Therapy (Active)     Point: ADL training (Done)    Description: Instruct learner(s) on proper safety adaptation and remediation techniques during self care or transfers.   Instruct in proper use of assistive devices.    Learning Progress Summary    Learner Readiness Method Response Comment Documented by Status   Patient Acceptance E VU Discuss OT goals and POC. SO 11/01/17 1232 Done   Family Acceptance E VU Discuss OT goals and POC. SO 11/01/17 1232 Done                      User Key     Initials Effective Dates Name Provider Type Discipline    SO 04/13/15 -  KANDY Boo Occupational Therapist OT                  OT Recommendation and Plan  Therapy Frequency: 5 times/wk          Time Calculation:         Time Calculation- OT       11/03/17 0900          Time Calculation- OT    OT Start Time (P)  0900  -NA      OT Stop Time (P)  0930  -NA      OT Time Calculation (min) (P)  30 min  -NA        User Key  (r) = Recorded By, (t) = Taken By, (c) = Cosigned By    Initials Name Provider Type    KAYLEN River OT Student OT Student           Therapy Charges for Today     Code Description Service Date Service Provider Modifiers Qty    [id number] HC OT SELF CARE/MGMT/TRAIN EA 15 MIN 11/3/2017 Lynnette River OT Student GO 2               Lynnette River OT Student  11/3/2017

## 2017-11-03 NOTE — PROGRESS NOTES
Inpatient Rehabilitation Functional Measures Assessment and Plan of Care    Plan of Care  Updated Problems/Interventions  Field    Functional Measures  YESI Eating:  [facility] Grooming: [facility] Bathing:  [facility] Upper Body Dressing:  [facility] Lower Body Dressing:  [facility] Toileting:  [facility] Bladder Management  Level of Assistance:  [place]  Frequency/Number of Accidents this Shift:  [facility] Bowel Management  Level of Assistance: [place]  Frequency/Number of Accidents this Shift: [facility] Bed/Chair/Wheelchair Transfer:  [facility] Toilet Transfer:  [facility] Tub/Shower Transfer:  [place]    Previously Documented Mode of Locomotion at Discharge: Field  YESI Expected Mode of Locomotion at Discharge: [facility] Walk/Wheelchair:  [facility] Stairs:  [facility] Comprehension:  [facility] Expression:  [facility] Social Interaction:  [facility] Problem Solving:  [facility] Memory:  [place]    Therapy Mode Minutes  Occupational Therapy: Individual: 60 minutes.  Physical Therapy: [place]  Speech Language Pathology:  [place]    Signed by: Lynnette River OT Student     - CoSigned By: MIKA Altamirano 11/3/2017 3:55:33 PM

## 2017-11-03 NOTE — PROGRESS NOTES
Inpatient Rehabilitation Functional Measures Assessment    Functional Measures  YESI Eating:  Branch  [facility] Grooming: Branch  [facility] Bathing:  Branch  [facility] Upper Body Dressing:  Branch  [facility] Lower Body Dressing:  Branch  [facility] Toileting:  Patient requires minimal assistance for adjusting clothing  before using a toilet, commode, bedpan, or urinal. Patient requires no physical  assistance for hygiene. Patient requires minimal assistance for adjusting  clothing after using a toilet, commode, bedpan, or urinal. Patient performs  33.33 % of toileting tasks. Toileting Score = 2. Maximal Assistance. Patient  requires the following assistive device(s): Grab bar.    YESI Bladder Management  Level of Assistance:  Bladder Score = 5.  Patient is supervision/set-up for  bladder management, requiring: Stand by assistance. No assistive devices were  required.  Frequency/Number of Accidents this Shift:  Bladder accidents this shift:  0 .  Patient has not had an accident this shift.    YESI Bowel Management  Level of Assistance: Activity was not observed.  Frequency/Number of Accidents this Shift: Branch    [facility] Bed/Chair/Wheelchair Transfer:  Bed/chair/wheelchair Transfer Score = 4.  Patient performs 75% or more of effort and minimal assistance (little/incidental  help/lifting of one limb/steadying) for transferring to and from the  bed/chair/wheelchair, requiring: Contact guard. Patient requires the following  assistive device(s): Bed rails. Seating system of wheelchair.  YESI Toilet Transfer:  [facility] Tub/Shower Transfer:  [place]    Previously Documented Mode of Locomotion at Discharge: Field  YESI Expected Mode of Locomotion at Discharge: [facility] Walk/Wheelchair:  Branch  [facility] Stairs:  [facility] Comprehension:  [facility] Expression:  [facility] Social Interaction:  [facility] Problem Solving:  [facility] Memory:  [place]    Therapy Mode Minutes  Occupational Therapy: Branch  Physical Therapy: Branch  Speech Language Pathology:   Branch    Signed by: KAYLEN Olvera

## 2017-11-03 NOTE — PROGRESS NOTES
[facility] Coordinator  Inpatient Rehabilitation Admission    Ethnic Group: White.  Marital Status:  Marital Status: .    IRF Admission Date:  10/31/2017  Admission Class: Initial Rehab.  Admit From:  [facility]    Pre-Hospital Living: Home. Pre-Hospital Living  With: (2) Family/Relatives.    Payment Sources: Primary: Medicare Fee for Service  Secondary: Not Listed.  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 No    YESI Bladder Accidents:  2 - Accidents.  Bladder Score = 6. Patient has not had an accident, but uses a  device/medication.  YESI Bowel Accident: 2 -Accidents.  Bowel Score = 6. Patient has no accidents, but uses a device/medications.    Presence of Pressure Ulcer:  No observed/documented pressure ulcers.    MEDICAL NEEDS  Height on Admission:  67 inches.  Weight on Admission:  227 pounds.    QUALITY INDICATORS  Prior Functioning:  Self Care: Patient completed the activities by him/herself, with or without an  assistive device, with no assistance from a helper.  Indoor Mobility: Patient completed the activities by him/herself, with or  without an assistive device, with no assistance from a helper.  Stairs: Patient completed the activities by him/herself, with or without an  assistive device, with no assistance from a helper.  Functional Cognition: Patient completed the activities by him/herself, with or  without an assistive device, with no assistance from a helper.  Prior Device Use: Patient does not use manual or motorized  wheelchair or  scooter, mechanical lift, walker, or an orthotic/prosthesis.    Bladder and Bowel: Bladder Continence: Always continent (no documented  incontinence).  Bowel Continence: Not rated (patient had an ostomy or did not have a bowel  movement for the entire 3 days).  Swallowing/Nutritional Status: Modiified food consistency/supervision (patient  requires modified food or liquid consistency and/or needs supervision during  eating for safety).  Special Conditions: Patient did not receive total parenteral nutrition treatment  at the time of admission.    Signed by: Jean Claude Harkins RN

## 2017-11-03 NOTE — PROGRESS NOTES
Occupational Therapy: Branch    Physical Therapy: Individual: 60 minutes.    Speech Language Pathology:  Branch    Signed by: Nereida Llamas PT

## 2017-11-03 NOTE — PLAN OF CARE
Problem: Patient Care Overview (Adult)  Goal: Plan of Care Review  Outcome: Ongoing (interventions implemented as appropriate)    11/03/17 0305   Coping/Psychosocial Response Interventions   Plan Of Care Reviewed With patient   Patient Care Overview   Progress improving   Outcome Evaluation   Outcome Summary/Follow up Plan Patient slept better tonight- she requested Melatonin at 11pm. No complaints of pain and no unsafe behavior.         Problem: Stroke (Ischemic) (Adult)  Goal: Signs and Symptoms of Listed Potential Problems Will be Absent or Manageable (Stroke)  Outcome: Ongoing (interventions implemented as appropriate)    11/03/17 0305   Stroke (Ischemic)   Problems Assessed (Stroke (Ischemic)/TIA) all   Problems Present (Stroke (Ischemic)/TIA) motor/sensory impairment         Problem: Fall Risk (Adult)  Goal: Absence of Falls  Outcome: Ongoing (interventions implemented as appropriate)    11/03/17 0305   Fall Risk (Adult)   Absence of Falls making progress toward outcome

## 2017-11-03 NOTE — PROGRESS NOTES
Inpatient Rehabilitation Functional Measures Assessment    Functional Measures  YESI Eating:  [facility] Grooming: [facility] Bathing:  [facility] Upper Body Dressing:  [facility] Lower Body Dressing:  [facility] Toileting:  [facility] Bladder Management  Level of Assistance:  [place]  Frequency/Number of Accidents this Shift:  [facility] Bowel Management  Level of Assistance: [place]  Frequency/Number of Accidents this Shift: [facility] Bed/Chair/Wheelchair Transfer:  [facility] Toilet Transfer:  [facility] Tub/Shower Transfer:  [place]    Previously Documented Mode of Locomotion at Discharge: Field  YESI Expected Mode of Locomotion at Discharge: [facility] Walk/Wheelchair:  [facility] Stairs:  [facility] Comprehension:  Auditory comprehension is the usual mode. Comprehension  Score = 6, Modified [place].  Patient comprehends complex/abstract  information in their primary language, requiring:  YESI Expression:  Vocal expression is the usual mode. Expression Score = 6,  Modified Independent.  Patient expresses complex/abstract information in their  primary language, requiring:  YESI Social Interaction:  Social Interaction Score = 6, Modified Independent.  Patient is modified independent for social interaction, requiring:  YESI Problem Solving:  Problem Solving Score = 6, Modified [place].  Patient  makes appropriate decisions in order to solve complex problems, but requires  extra time.  YESI Memory:  Memory Score = 6, Modified [place].  Patient is modified  independent for memory, requiring:    Therapy Mode Minutes  Occupational Therapy: Branch  Physical Therapy: Branch  Speech Language Pathology:  Branch    Signed by: Carolina King RN

## 2017-11-03 NOTE — THERAPY TREATMENT NOTE
Inpatient Rehabilitation - Speech Language Pathology   Swallow Treatment Note [facility]     Patient Name: Alice Maxwell  : 1945  MRN: [id number]  Today's Date: 11/3/2017  Onset of Illness/Injury or Date of Surgery Date: 10/28/17            Admit Date: 10/31/2017  Trialed whole soft meats this date. Functional mastication. No pocketed on L, no overt s/s aspiration this date.     Daughter present for session. Discussed pt's work and roles at company. ST to add goals for pt's return to work.     Visit Dx:    No diagnosis found.  Patient Active Problem List   Diagnosis   • Stroke             Adult Rehabilitation Note       17 1330 17 1154 17 1114    Rehab Assessment/Intervention    Discipline speech language pathologist  -OC (P)  occupational therapist  -NA physical therapist  -MD    Document Type therapy note (daily note)  -OC (P)  therapy note (daily note)  -NA therapy note (daily note)  -MD    Subjective Information agree to therapy  -OC (P)  no complaints;agree to therapy  -NA agree to therapy;no complaints  -MD    Patient Effort, Rehab Treatment good  -OC (P)  good  -NA good  -MD    Symptoms Noted During/After Treatment none  -OC (P)  fatigue  -NA none  -MD    Precautions/Limitations  (P)  fall precautions  -NA fall precautions  -MD    Equipment Issued to Patient   gait belt;front wheeled walker  -MD    Recorded by [OC] Lilian Mohr MA,CCC-SLP [NA] Lynnette River, OT Student [MD] Nereida Llamas, PT    Pain Assessment    Pain Assessment No/denies pain  -OC (P)  No/denies pain  -NA No/denies pain  -MD    Recorded by [OC] Lilian Mohr MA,CCC-SLP [NA] Lynnette River, OT Student [MD] Nereida Llamas, PT    Vision Assessment/Intervention    Visual Impairment  (P)  left neglect;inattention to the left;visual impairment, left  -NA left neglect  -MD    Vision Comment  (P)  mod cues to attend to left side of body during ADLs  -NA     Recorded by  [NA] Lynnette River, OT Student [MD] Nereida Llamas, PT     Cognitive Assessment/Intervention    Current Cognitive/Communication Assessment impaired  -OC (P)  impaired  -NA impaired  -MD    Orientation Status  (P)  oriented to;person;place;time  -NA oriented to;person;place;time  -MD    Follows Commands/Answers Questions  (P)  able to follow single-step instructions;needs cueing  -% of the time;needs cueing;needs increased time;needs repetition  -MD    Personal Safety  (P)  decreased insight to deficits  -NA decreased insight to deficits  -MD    Personal Safety Interventions  (P)  fall prevention program maintained;gait belt;nonskid shoes/slippers when out of bed  -NA fall prevention program maintained;gait belt;muscle strengthening facilitated;nonskid shoes/slippers when out of bed;supervised activity  -MD    Recorded by [OC] Lilian Mohr MA,CCC-SLP [NA] Lynnette River OT Student [MD] Nreeida Llamas, PT    Swallow Assessment/Intervention    Additional Documentation --   Trialed whole soft meats with pt this date, fxnl mastication  -OC      Recorded by [OC] Lilian Mohr MA,CCC-SLP      Improve oral skills    Status: Improve Oral Skills Progressing as expected  -OC      Oral Skills Progress 70%;with inconsistent cues  -OC      Comments: Improve Oral Skills No anterior loss with saliva this date.   -OC      Recorded by [OC] Lilian Mohr MA,CCC-SLP      Improve laryngeal elevation    Status: Improve laryngeal elevation Progressing as expected  -OC      Laryngeal Elevation Progress 70%;with inconsistent cues  -OC      Recorded by [OC] Lilian Mohr MA,CCC-SLP      Improve tongue base & pharyngeal wall squeeze    Tongue Base/Pharyngeal Wall Squeeze Progress 70%;with inconsistent cues  -OC      Recorded by [OC] Lilian Mohr MA,CCC-SLP      Bed Mobility, Assessment/Treatment    Bed Mob, Supine to Sit, [place]   not tested  -MD    Bed Mob, Sit to Supine, [place]   not tested  -MD    Bed Mobility, Comment  (P)  up in wc  -NA     Recorded by  [NA] Lynnette River, OT  Student [MD] Nereida Llamas PT    Transfer Assessment/Treatment    Transfers, Sit-Stand [place]  (P)  contact guard assist  -NA verbal cues required;contact guard assist  -MD    Transfers, Stand-Sit [place]  (P)  contact guard assist  -NA verbal cues required;contact guard assist  -MD    Transfers, Sit-Stand-Sit, Assist Device  (P)  straight cane  -NA rolling walker  -MD    Transfer, Safety Issues   step length decreased;weight-shifting ability decreased;balance decreased during turns;sequencing ability decreased  -MD    Transfer, Impairments   strength decreased;impaired balance  -MD    Recorded by  [NA] Lynnette River OT Student [MD] Nereida Llamas PT    Gait Assessment/Treatment    Gait, [place] Level   verbal cues required;minimum assist (75% patient effort)  -MD    Gait, Assistive Device   rolling walker  -MD    Gait, Distance (Feet)   80   x3  -MD    Gait, Gait Deviations   bilateral:;janet decreased;forward flexed posture;step length decreased;decreased heel strike;toe-to-floor clearance decreased  -MD    Gait, Safety Issues   step length decreased;weight-shifting ability decreased  -MD    Gait, Impairments   impaired balance;strength decreased  -MD    Recorded by   [MD] Nereida Llamas PT    Upper Body Bathing Assessment/Training    UB Bathing Assess/Train, Position  (P)  sink side;sitting  -NA     UB Bathing Assess/Train, [place] Level  (P)  supervision required;verbal cues required;set up required  -NA     UB Bathing Assess/Train, Impairments  (P)  impaired vision;coordination impaired  -NA     Recorded by  [NA] Lynnette River OT Student     Lower Body Bathing Assessment/Training    LB Bathing Assess/Train, Position  (P)  sink side;sitting;standing  -NA     LB Bathing Assess/Train, [place] Level  (P)  contact guard assist;verbal cues required  -NA     LB Bathing Assess/Train, Impairments  (P)  impaired vision;impaired balance;strength decreased  -NA     Recorded by  [KAYLEN] Lynnette  Aleta, OT Student     Upper Body Dressing Assessment/Training    UB Dressing Assess/Train, Clothing Type  (P)  doffing:;donning:;t-shirt  -NA     UB Dressing Assess/Train, Assist Device  (P)  jennifer technique  -NA     UB Dressing Assess/Train, Position  (P)  sitting  -NA     UB Dressing Assess/Train, [place]  (P)  minimum assist (75% patient effort);verbal cues required  -NA     Recorded by  [NA] Lynnette River OT Student     Lower Body Dressing Assessment/Training    LB Dressing Assess/Train, Clothing Type  (P)  doffing:;donning:;pants;shoes;socks  -NA     LB Dressing Assess/Train, Position  (P)  sitting;standing  -NA     LB Dressing Assess/Train, [place]  (P)  minimum assist (75% patient effort);verbal cues required;nonverbal cues required (demo/gesture)  -NA     LB Dressing Assess/Train, Impairments  (P)  strength decreased;impaired balance  -NA     Recorded by  [NA] Lynnette River OT Student     Toileting Assessment/Training    Toileting Assess/Train, Comment  (P)  denied need  -NA     Recorded by  [NA] Lynnette River, OT Student     Grooming Assessment/Training    Grooming Assess/Train, Position  (P)  sink side;sitting  -NA     Grooming Assess/Train, Indepen Level  (P)  set up required;supervision required;verbal cues required  -NA     Recorded by  [NA] Lynnette River OT Student     Motor Skills/Interventions    Additional Documentation   Balance Skills Training (Group)  -MD    Recorded by   [MD] Nereida Llamas, PT    Balance Skills Training    Standing-Level of Assistance   Contact guard  -MD    Static Standing Balance Support   assistive device  -MD    Standing-Balance Activities   Ball toss;Kicking ball   batting balloon back and forth  -MD    Gait Balance-Level of Assistance   Contact guard  -MD    Gait Balance Support   jennifer bar  -MD    Gait Balance Activities   side-stepping;backwards  -MD    Recorded by   [MD] Nereida Llamas, ONEIDA    Therapy Exercises    Bilateral Lower Extremities   AROM:;10  reps;sitting  -MD RICKETTS Other Reps   --   Nustep at level 2 for 3 min  -MD    BLE Resistance   theraband  -MD    Recorded by   [MD] Nereida Llamas, PT    Positioning and Restraints    Pre-Treatment Position  (P)  sitting in chair/recliner  -NA sitting in chair/recliner  -MD    Post Treatment Position  (P)  wheelchair  -NA wheelchair  -MD    In Wheelchair  (P)  sitting;call light within reach;encouraged to call for assist;exit alarm on;with family/caregiver  -NA sitting;with OT  -MD    Recorded by  [NA] Lynnette River, OT Student [MD] Nereida Llamas, PT      11/02/17 1433 11/02/17 1200 11/02/17 1130    Rehab Assessment/Intervention    Discipline occupational therapist  -SO speech language pathologist  -OC physical therapist  -JK    Document Type therapy note (daily note)  -SO therapy note (daily note)  -OC therapy note (daily note)  -JK    Subjective Information no complaints;agree to therapy  -SO agree to therapy;no complaints  -OC no complaints;agree to therapy  -JK    Patient Effort, Rehab Treatment good  -SO adequate  -OC good  -JK    Symptoms Noted During/After Treatment fatigue  -SO fatigue  -OC fatigue  -JK    Precautions/Limitations fall precautions  -SO  fall precautions  -JK    Precautions/Limitations, Vision   WFL with corrective lenses  -JK    Precautions/Limitations, Hearing   WFL  -JK    Recorded by [SO] Micki Castro, OTR [OC] Lilian Mohr MA,CCC-SLP [JK] Anna Epps, PT    Pain Assessment    Pain Assessment No/denies pain  -SO No/denies pain  -OC No/denies pain  -JK    Recorded by [SO] Micki Castro, OTR [OC] Lilian Mohr MA,CCC-SLP [JK] Anna Epps, PT    Vision Assessment/Intervention    Visual Impairment visual impairment, left  -SO  visual impairment, left  -JK    Visual Impairment Comment Scanning activity, missed letters in bottom L corner  -SO      Recorded by [SO] Micki Castro, OTR  [JK] Anna Epps, PT    Cognitive Assessment/Intervention    Current  Cognitive/Communication Assessment impaired  -SO impaired  -OC impaired  -JK    Orientation Status oriented to;person;place;time  -SO oriented to;person;place;time  -OC oriented to;person;place;time  -JK    Follows Commands/Answers Questions able to follow single-step instructions;needs cueing  -SO  able to follow single-step instructions;100% of the time  -JK    Personal Safety decreased insight to deficits  -SO  decreased insight to deficits  -JK    Personal Safety Interventions   fall prevention program maintained;gait belt  -JK    Recorded by [SO] Micki Castro, OTR [OC] Lilian Mohr MA,CCC-SLP [JK] Anna Epps, PT    Swallow Assessment/Intervention    Additional Documentation  --   [facility] soft test tray. See note  -OC     Recorded by  [OC] Lilian Mohr MA,CCC-SLP     Improve oral skills    Status: Improve Oral Skills  Progressing as expected  -OC     Oral Skills Progress  70%;with inconsistent cues  -OC     Comments: Improve Oral Skills  anterior loss saliva with secretions  -OC     Recorded by  [OC] Lilian Mohr MA,CCC-SLP     Improve laryngeal elevation    Status: Improve laryngeal elevation  Progressing as expected  -OC     Laryngeal Elevation Progress  70%;with inconsistent cues   difficulty differentiating pitch and loudness   -OC     Recorded by  [OC] Lilian Mohr MA,CCC-SLP     Bed Mobility, Assessment/Treatment    Bed Mobility, Comment Sitting up in chair  -SO  up in chair  -JK    Recorded by [SO] Micki Castro, OTR  [JK] Anna Epps, PT    Transfer Assessment/Treatment    Transfers, Sit-Stand [place] contact guard assist  -SO  contact guard assist  -JK    Transfers, Stand-Sit [place] contact guard assist  -SO  contact guard assist  -JK    Transfers, Sit-Stand-Sit, Assist Device straight cane  -SO  straight cane  -JK    Transfer, Safety Issues   step length decreased;weight-shifting ability decreased;balance decreased during turns;sequencing ability decreased  -JK     "Transfer, Impairments   strength decreased;impaired balance  -JK    Transfer, Comment W/c <-> mat CGA  -SO      Recorded by [SO] Micki Castro OTR  [JK] Anna Epps, PT    Gait Assessment/Treatment    Gait, [place] Level   verbal cues required;contact guard assist;minimum assist (75% patient effort)  -JK    Gait, Assistive Device   rolling walker  -JK    Gait, Distance (Feet)   160  -JK    Gait, Gait Deviations   bilateral:;janet decreased;forward flexed posture;toe-to-floor clearance decreased;step length decreased  -JK    Gait, Safety Issues   step length decreased;weight-shifting ability decreased  -JK    Gait, Impairments   impaired balance;strength decreased  -JK    Gait, Comment   pt became more unsteady with gait by end of tx  -JK    Recorded by   [TIFFANIEK] Anna Epps PT    Therapy Exercises    Bilateral Lower Extremities   other reps   nu step x 6 min on level 7did not use L UE  -JK    Recorded by   [JK] Anna Epps PT    Fine Motor Coordination Training    Detail (Fine Motor Coordination Training) Pt with good- coordination with manipulating golf tees. Some increased difficulty with 1/4\" pegs and needed some cues for correct placement to match pattern.  -SO      Recorded by [SO] Micki Castro OTR      Positioning and Restraints    Pre-Treatment Position sitting in chair/recliner  -SO  sitting in chair/recliner  -JK    Post Treatment Position wheelchair  -SO  wheelchair  -JK    In Wheelchair sitting;with PT;with family/caregiver  -SO  sitting;with SLP  -JK    Recorded by [SO] Micki Castro OTR  [JK] Anna Epps, PT      11/02/17 1116 11/01/17 1550       Rehab Assessment/Intervention    Discipline occupational therapist  -SO occupational therapist  -SO     Document Type therapy note (daily note)  -SO therapy note (daily note)  -SO     Subjective Information no complaints;agree to therapy  -SO no complaints;agree to therapy  -SO     Patient Effort, Rehab Treatment " good  -SO adequate  -SO     Symptoms Noted During/After Treatment fatigue  -SO fatigue  -SO     Precautions/Limitations fall precautions  -SO fall precautions  -SO     Recorded by [SO] Micki Castro OTR [SO] Micki Castro OTR     Pain Assessment    Pain Assessment No/denies pain  -SO No/denies pain  -SO     Recorded by [SO] Micki Castro, SARAR [SO] Micki Castro OTR     Vision Assessment/Intervention    Visual Impairment visual impairment, left  -SO visual impairment, left  -SO     Visual Impairment Comment Cues to locate washcloth on L side, inattention  -SO Cues to scan to the left during connect the numbers, able to draw a clock correctly but wrote wrong number  -SO     Recorded by [SO] Micki Castro OTR [SO] Micki Castro OTR     Cognitive Assessment/Intervention    Current Cognitive/Communication Assessment impaired  -SO impaired  -SO     Orientation Status oriented to;person;place;time  -SO oriented to;person;place;time  -SO     Follows Commands/Answers Questions able to follow single-step instructions;needs cueing  -SO able to follow single-step instructions;needs cueing  -SO     Recorded by [SO] Micki Castro OTR [SO] Micki Castro OTR     ROM (Range of Motion)    General ROM  upper extremity range of motion deficits identified  -SO     General ROM Detail  RUE WFL; LUE shoulder 3/4-7/8  -SO     Recorded by  [SO] Micki Castro OTR     MMT (Manual Muscle Testing)    General MMT Assessment  upper extremity strength deficits identified  -SO     General MMT Assessment Detail  RUE 4-/5; LUE shoulder 3-/5, elbow 3/5, forearm 3-/5  -SO     Recorded by  [SO] Micki Castro OTR     Bed Mobility, Assessment/Treatment    Bed Mob, Supine to Sit, [place]  verbal cues required;supervision required  -SO     Bed Mob, Sit to Supine, [place]  verbal cues required;supervision required  -SO     Bed Mobility, Comment Sitting  up in w/c  -SO      Recorded by [SO] Micki Castro OTR [SO] Micki Castro OTR     Transfer Assessment/Treatment    Transfers, Bed-Chair [place]  contact guard assist  -SO     Transfers, Chair-Bed [place]  contact guard assist  -SO     Transfers, Sit-Stand [place] contact guard assist  -SO contact guard assist  -SO     Transfers, Stand-Sit [place] contact guard assist  -SO contact guard assist  -SO     Transfers, Sit-Stand-Sit, Assist Device rolling walker  -SO      Walk-In Shower Transfer, [place] contact guard assist;verbal cues required  -SO      Walk-In Shower Transfer, Assist Device --   Tub bench, grab bars  -SO      Recorded by [SO] Micki Castro OTR [SO] Micki Castro OTR     Upper Body Bathing Assessment/Training    UB Bathing Assess/Train Assistive Device hand-held shower head;tub bench;grab bars  -SO      UB Bathing Assess/Train, Position sitting  -SO      UB Bathing Assess/Train, [place] Level stand by assist;set up required;verbal cues required  -SO      Recorded by [SO] Micki Castro OTR      Lower Body Bathing Assessment/Training    LB Bathing Assess/Train Assistive Device grab bars;hand-held shower head;tub bench  -SO      LB Bathing Assess/Train, Position sitting;standing  -SO      LB Bathing Assess/Train, [place] Level contact guard assist;verbal cues required  -SO      Recorded by [SO] Micki Castro OTR      Upper Body Dressing Assessment/Training    UB Dressing Assess/Train, Clothing Type doffing:;donning:;t-shirt  -SO      UB Dressing Assess/Train, [place] verbal cues required;minimum assist (75% patient effort)  -SO      Recorded by [SO] Micki Castro OTR      Lower Body Dressing Assessment/Training    LB Dressing Assess/Train, Clothing Type doffing:;donning:;pants;shoes;socks  -SO      LB Dressing Assess/Train, Position sitting;standing  -SO      LB Dressing Assess/Train, [place]  minimum assist (75% patient effort);verbal cues required;nonverbal cues required (demo/gesture)  -SO      Recorded by [SO] Micki Castro OTR      Grooming Assessment/Training    Grooming Assess/Train, Position sitting;sink side  -SO      Grooming Assess/Train, [place]ep[place] Level supervision required;set up required  -SO      Recorded by [SO] Micki Castro OTR      Motor Skills/Interventions    Additional Documentation  Fine Motor Coordination Training (Group);Gross Motor Coordination Training (Group)  -SO     Recorded by  [SO] Micki Castro OTR     Fine Motor Coordination Training    9-Hole Peg Right  23  -SO     9-Hole Peg Left  56  -SO     Box and Blocks Right  46  -SO     Box and Blocks Left  36  -SO     Recorded by  [SO] Micki Castro OTR     Positioning and Restraints    Pre-Treatment Position sitting in chair/recliner  -SO in bed  -SO     Post Treatment Position wheelchair  -SO bed  -SO     In Bed  supine;call light within reach;encouraged to call for assist;exit alarm on;with family/caregiver  -SO     In Wheelchair sitting;call light within reach;encouraged to call for assist  -SO      Recorded by [SO] Micki Castro, OTR [SO] Micki Castro, OTR       User Key  (r) = Recorded By, (t) = Taken By, (c) = Cosigned By    Initials Name Effective Dates    OC Lilian Mohr MA,CCC-SLP 04/05/17 -     SO Micki Castro, OTR 04/13/15 -     TOSHA Epps, PT 12/01/15 -     MD Nereida Llamas, PT 12/01/15 -     KAYLEN River, OT Student 08/21/17 -                    SLP Goals       11/01/17 1215          Safely Consume Diet    Safely Consume Diet- SLP, Date Established 11/01/17  -OC      Safely Consume Diet- SLP, Time to Achieve by discharge  -OC      Safely Consume Diet- SLP, Outcome goal ongoing  -OC        User Key  (r) = Recorded By, (t) = Taken By, (c) = Cosigned By    Initials Name Provider Type    ALISTAIR Mohr MA,[facility]-SLP Speech and Language  Pathologist          EDUCATION  The patient has been educated in the following areas:   Dysphagia (Swallowing Impairment).    SLP Recommendation and Plan    3-5 times per wk  Until DC  45-60 min     Plan of Care Review  Plan Of Care Reviewed With: patient, daughter  Outcome Summary/Follow up Plan: Cognitive-linguistic eval completed, composite score WNL. Mild for executive functions. No overt s/s aspiration with puree and thin lunch tray.  VFSS [facility] 10/29/17 recommending puree and thin.        SLP Outcome Measures (last 72 hours)      SLP Outcome Measures       11/01/17 1215          SLP Outcome Measures    Outcome Measure Used? Adult NOMS;Modified [place]  -OC      FCM Scores    FCM Chosen Swallowing  -OC      Swallowing FCM Score 4  -OC      Modified [place] Scale    Modified [place] Scale 3 - Moderate disability.  Requiring some help, but able to walk without assistance.  -OC        User Key  (r) = Recorded By, (t) = Taken By, (c) = Cosigned By    Initials Name Effective Dates    OC Lilian Mohr MA, CCC-SLP 04/05/17 -              Time Calculation:         Time Calculation- SLP       11/03/17 1508 11/03/17 1200       Time Calculation- SLP    SLP Start Time 1300  -OC 1130  -OC     SLP Stop Time 1330  -OC 1200  -OC     SLP Time Calculation (min) 30 min  -OC 30 min  -OC       User Key  (r) = Recorded By, (t) = Taken By, (c) = Cosigned By    Initials Name Provider Type    ALISTAIR Mohr MA,CCC-SLP Speech and Language Pathologist          Therapy Charges for Today     Code Description Service Date Service Provider Modifiers Qty    77713190102 HC ST TREATMENT SWALLOW 4 11/2/2017 Lilian Mohr MACCC-SLP GN 1    [id number] HC ST TREATMENT SWALLOW 4 11/3/2017 Lilian Mohr MA,BEN-SLP GN 1                 Lilian Mohr MA,BEN-SLP  11/3/2017

## 2017-11-03 NOTE — PROGRESS NOTES
"   LOS: 3 days   Patient Care Team:  Calvin Dhillon Jr., DO as PCP - General (Internal Medicine)    Chief Complaint:   S/p R MCA ischemic infarct  Stroke prophylaxis - ASA 81 mg and Lovenox transition to coumadin  Hypercoaguability disorder - Hereditary thrombophilia  Homozygous MTHFR with hyperhomocystinemia  Homozygous factor V Leiden gene mutation  history of malignant neoplasm of breast   Obesity   Hyperlipidemia -   Hypothyroidism   DM    Subjective     History of Present Illness    Subjective    She continues stronger on the left side.  She gets tearful at times.  Her daughter describes lability since her last stroke more with tearfulness as compared to laughing.  Has fatigue.  Discussed the option of amantadine to help with fatigue after stroke or SSRI to help with the depression.  Also discussed the option of Nuedexta for lability.  The patient does not wish to add any additional medication at this point.  Discussed if the fatigue or tearfulness gets overwhelming, we can review again.      History taken from: patient    Objective     Vital Signs  Temp:  [97.4 °F (36.3 °C)-98 °F (36.7 °C)] 98 °F (36.7 °C)  Heart Rate:  [72-74] 74  Resp:  [18-20] 20  BP: (120-134)/(69-71) 134/69    Objective:  Vital signs: (most recent): Blood pressure 134/69, pulse 74, temperature 98 °F (36.7 °C), temperature source Oral, resp. rate 20, height 67\" (170.2 cm), weight 219 lb 6.4 oz (99.5 kg), SpO2 96 %.            Mental status-awake alert  Lungs-clear to auscultation  Heart-regular rate and rhythm  Abdomen abdomen-normoactive bowel sounds, soft, nontender  Extremities-no pretibial edema  Neurologic-mild left hemiparesis .  Left elbow flexion 5 minus, finger flexion 5-, knee extension 5 minus, ankle dorsiflexion 5 minus.  Left facial droop.  She shows some left inattention.   better dexterity with left hand.        Results Review:     I reviewed the patient's new clinical results.    Lab Results  Lab Value Date/Time "   INR 1.13 (H) 11/03/2017 0520   INR 1.07 11/02/2017 0713   INR 1.09 11/01/2017 0754       Results from last 7 days  Lab Units 11/02/17  0713   WBC 10*3/mm3 5.72   HEMOGLOBIN g/dL 11.7*   HEMATOCRIT % 36.4   PLATELETS 10*3/mm3 149         Results from last 7 days  Lab Units 11/01/17  0754   CREATININE mg/dL 1.09*     Glucose   Date/Time Value Ref Range Status   11/03/2017 0729 109 70 - 130 mg/dL Final   11/02/2017 2053 108 70 - 130 mg/dL Final   11/02/2017 1619 98 70 - 130 mg/dL Final   11/02/2017 1134 104 70 - 130 mg/dL Final   11/02/2017 0727 118 70 - 130 mg/dL Final   11/01/2017 2110 126 70 - 130 mg/dL Final   11/01/2017 1656 108 70 - 130 mg/dL Final   11/01/2017 1136 133 (H) 70 - 130 mg/dL Final       Labs on October 31-sodium 139, potassium 3.8, chloride 106, glucose 101, calcium 9.0, come back to 25, BUN 16, creatinine 1.1.  WBC6.64, hemoglobin 11.8, hematocrit 36.0, platelets 125.  October 29 triglycerides 89, cholesterol 125.  October 28 AST 35, ALT 29, alkaline phosphatase 99, total protein 7.7, albumin 4.1  Oct 31 - INR 1.0  Medication Review: done  Scheduled Meds:    allopurinol 200 mg Oral Daily   aspirin 81 mg Oral Daily   atorvastatin 80 mg Oral Nightly   cholecalciferol 1,000 Units Oral BID   enoxaparin 100 mg Subcutaneous Q12H   folic acid 1 mg Oral Daily   levothyroxine 112 mcg Oral Q AM   melatonin 3 mg Oral Nightly   metFORMIN 500 mg Oral Daily With Breakfast   multivitamin 1 tablet Oral Daily   warfarin 7.5 mg Oral Daily     Continuous Infusions:   PRN Meds:.•  acetaminophen  •  dextrose  •  dextrose  •  glucagon (human recombinant)      Assessment/Plan     Active Problems:    Stroke      Assessment & Plan  S/p R MCA ischemic infarct    Left hemiparesis-improving    Dysphagia-November 2-advance from puree to mechanical soft, no mixed consistency, thin liquid diet.    Stroke prophylaxis - ASA 81 mg and Lovenox transition to coumadin.  Daily INR.  November 2-INR still low-Will continue Coumadin 5  mg as recent restarted but may titrate up dose tomorrow depending on lab result. Nov 3- increased coumadin to 7.5 mg.     Hypercoaguability disorder  history of malignant neoplasm of breast   Factor 5 Leiden mutation, heterozygous   Obesity   Hyperlipidemia -atorvastatin   Hypothyroidism -on replacement   Cerebrovascular accident (CVA) due to thrombosis of right middle cerebral artery   DVT prophylaxis-SCDs and anticoagulation  Diabetes mellitus-metformin  History of lability after past stroke  Poststroke fatigue     72 to female status post right MCA ischemic infarct with left inattention, left hemiparesis, dysarthria, decreased alertness, impairments with her mobility and self-care and is now admitted for comprehensive inpatient rehabilitation program with physical therapy 1 hour, occupational therapy 1 hour, and speech therapy 1 hour, 5 days a week.  Rehabilitation nursing for carryover and monitoring of her neurologic status, diabetes, bowel bladder and skin.  Ongoing physician follow-up.  Weekly team conferences.  Goals for her to achieve a level of supervision with her mobility and self-care for return home with her family.       TEAM CONF- NOV 3- LEFT INATTENTION. DIFFICULTY DON LEG LEG OF PANTS. FINE MOTOR DEFICITS LEFT HAND. MILD MEMORY DEFICITS. DYSPHAGIA - MECH SOFT, THINS. TRIAL WHOLE MEALS. FLAT AFFECT.  BNE PENDING. TRANSFERS CTG. GAIT 80 FEET MIN ASSIST. 4 STAIRS MIN ASSIST. BLADDER CONTINENT.  ELOS- TWO WEEKS  .  Aric Chao MD  11/03/17  8:49 AM    Time:

## 2017-11-03 NOTE — PROGRESS NOTES
Inpatient Rehabilitation Plan of Care Note    Plan of Care  Care Plan Reviewed - No updates at this time.    Safety    Performed Intervention(s)  Safety rounds      Psychosocial    Performed Intervention(s)  Verbalizes needs and concerns      Body Systems    Performed Intervention(s)  Blood glucose testing    Signed by: Daniel Horne RN

## 2017-11-04 LAB
GLUCOSE BLDC GLUCOMTR-MCNC: 102 MG/DL (ref 70–130)
GLUCOSE BLDC GLUCOMTR-MCNC: 99 MG/DL (ref 70–130)
INR PPP: 1.17 (ref 0.9–1.1)
PROTHROMBIN TIME: 14.5 SECONDS (ref 11.7–14.2)

## 2017-11-04 PROCEDURE — 82962 GLUCOSE BLOOD TEST: CPT

## 2017-11-04 PROCEDURE — 97535 SELF CARE MNGMENT TRAINING: CPT | Performed by: OCCUPATIONAL THERAPIST

## 2017-11-04 PROCEDURE — 97112 NEUROMUSCULAR REEDUCATION: CPT | Performed by: OCCUPATIONAL THERAPIST

## 2017-11-04 PROCEDURE — 25010000002 ENOXAPARIN PER 10 MG: Performed by: PHYSICAL MEDICINE & REHABILITATION

## 2017-11-04 PROCEDURE — 97110 THERAPEUTIC EXERCISES: CPT

## 2017-11-04 PROCEDURE — 85610 PROTHROMBIN TIME: CPT | Performed by: PHYSICAL MEDICINE & REHABILITATION

## 2017-11-04 PROCEDURE — 92526 ORAL FUNCTION THERAPY: CPT | Performed by: SPEECH-LANGUAGE PATHOLOGIST

## 2017-11-04 RX ADMIN — ENOXAPARIN SODIUM 100 MG: 100 INJECTION SUBCUTANEOUS at 22:19

## 2017-11-04 RX ADMIN — ALLOPURINOL 200 MG: 100 TABLET ORAL at 08:14

## 2017-11-04 RX ADMIN — ATORVASTATIN CALCIUM 80 MG: 80 TABLET, FILM COATED ORAL at 22:17

## 2017-11-04 RX ADMIN — ENOXAPARIN SODIUM 100 MG: 100 INJECTION SUBCUTANEOUS at 08:14

## 2017-11-04 RX ADMIN — FOLIC ACID 1 MG: 1 TABLET ORAL at 08:14

## 2017-11-04 RX ADMIN — Medication 1 TABLET: at 08:14

## 2017-11-04 RX ADMIN — WARFARIN SODIUM 7.5 MG: 7.5 TABLET ORAL at 16:49

## 2017-11-04 RX ADMIN — VITAMIN D, TAB 1000IU (100/BT) 1000 UNITS: 25 TAB at 16:49

## 2017-11-04 RX ADMIN — LEVOTHYROXINE SODIUM 112 MCG: 112 TABLET ORAL at 05:35

## 2017-11-04 RX ADMIN — Medication 3 MG: at 22:18

## 2017-11-04 RX ADMIN — VITAMIN D, TAB 1000IU (100/BT) 1000 UNITS: 25 TAB at 08:14

## 2017-11-04 RX ADMIN — ASPIRIN 81 MG: 81 TABLET, CHEWABLE ORAL at 08:13

## 2017-11-04 RX ADMIN — METFORMIN HYDROCHLORIDE 500 MG: 500 TABLET ORAL at 08:14

## 2017-11-04 NOTE — THERAPY TREATMENT NOTE
Inpatient Rehabilitation - Occupational Therapy Treatment Note  [facility]     Patient Name: Alice Maxwell  : 1945  MRN: [id number]  Today's Date: 2017  Onset of Illness/Injury or Date of Surgery Date: 10/28/17  Date of Referral to OT: 17  Referring Physician: Jeremie      Admit Date: 10/31/2017    Visit Dx:   No diagnosis found.  Patient Active Problem List   Diagnosis   • Stroke             Adult Rehabilitation Note       17 1210 17 1009 17 1509    Rehab Assessment/Intervention    Discipline occupational therapist  -KP physical therapist  -MD occupational therapist  -KP,NA,KP2    Document Type therapy note (daily note)  -KP therapy note (daily note)  -MD therapy note (daily note)  -KP,NA,KP2    Subjective Information agree to therapy;complains of;fatigue  -KP no complaints;agree to therapy  -MD agree to therapy;complains of;fatigue  -KP,NA,KP2    Patient Effort, Rehab Treatment good  -KP good  -MD good  -KP,NA,KP2    Symptoms Noted During/After Treatment none  -KP none  -MD fatigue  -KP,NA,KP2    Precautions/Limitations fall precautions;other (see comments)   [place] . hearing aides  -KP fall precautions  -MD fall precautions  -KP,NA,KP2    Specific Treatment Considerations hearing aids  -KP      Equipment Issued to Patient  gait belt  -MD     Recorded by [KP] Vero York OTR [MD] Nereida Llamas, PT [KP,NA,KP2] Vero York OTR (r) Lynnette River OT Student (t) KANDY Sultana (c)    Pain Assessment    Pain Assessment No/denies pain  -KP No/denies pain  -MD No/denies pain  -KP,NA,KP2    Recorded by [KP] KANDY Sultana [MD] Nereida Llamas, PT [KP,NA,KP2] KANDY Sultana (r) Lyn[place]te [place] OT Student (t) KANDY Sultana (c)    Vision Assessment/Intervention    Visual Impairment visual impairment, left;inattention to the left;left neglect  -KP left neglect  -MD left neglect;inattention to the left;visual impairment,  left  -KP,NA,KP2    Vision Comment   min cues to attend to left  -KP,NA,KP2    Recorded by [KP] KANDY Sultana [MD] Nereida Llamas, PT [KP,NA,KP2] KANDY Sultana (r) Lyn[place]te [place] OT Student (t) KANDY Sultana (c)    Cognitive Assessment/Intervention    Current Cognitive/Communication Assessment impaired  -KP impaired  -MD impaired  -KP,NA,KP2    Orientation Status oriented to;place;person;time  -KP oriented to;person;place;time  -MD     Follows Commands/Answers Questions 100% of the time;able to follow single-step instructions;needs cueing  -% of the time;needs cueing;needs increased time;needs repetition  -MD able to follow single-step instructions;needs increased time;needs cueing  -KP,NA,KP2    Personal Safety mild impairment  -KP decreased insight to deficits  -MD decreased insight to deficits  -KP,NA,KP2    Personal Safety Interventions fall prevention program maintained;gait belt;muscle strengthening facilitated;nonskid shoes/slippers when out of bed  -KP fall prevention program maintained;gait belt;muscle strengthening facilitated;nonskid shoes/slippers when out of bed;supervised activity  -MD fall prevention program maintained;gait belt;nonskid shoes/slippers when out of bed  -KP,NA,KP2    Recorded by [KP] KANDY Sultana [MD] Nereida Llamas, PT [KP,NA,KP2] KANDY Sultana (r) Lyn[place]te [place] OT Student (t) KANDY Sultana (c)    Bed Mobility, Assessment/Treatment    Bed Mobility, Assistive Device   bed rails  -KP,NA,KP2    Bed Mobility, [place]oot/Bridge, [place]   supervision required;verbal cues required  -KP,NA,KP2    Bed Mob, Supine to Sit, [place]  not tested  -MD     Bed Mob, Sit to Supine, [place]  not tested  -MD supervision required  -KP,NA,KP2    Bed Mobility, Impairments   strength decreased  -KP,NA,KP2    Bed Mobility, Comment up in wc  -KP      Recorded by [KP] Vero York OTR [MD] Nereida Llamas, PT  [KP,NA,KP2] Vero York OTR (r) [place]te [place] OT Student (t) KANDY Sultana (c)    Transfer Assessment/Treatment    Transfers, Chair-Bed [place]   contact guard assist  -KP,NA,KP2    Transfers, Sit-Stand [place] contact guard assist  -KP contact guard assist  -MD contact guard assist  -KP,NA,KP2    Transfers, Stand-Sit [place] contact guard assist  -KP contact guard assist  -MD contact guard assist  -KP,NA,KP2    Transfers, Sit-Stand-Sit, Assist Device rolling walker  -KP rolling walker  -MD straight cane  -KP,NA,KP2    Toilet Transfer, [place] contact guard assist  -KP      Toilet Transfer, Assistive Device wheelchair;other (see comments)   grab bars  -KP      Walk-In Shower Transfer, [place] contact guard assist  -KP      Walk-In Shower Transfer, Assist Device other (see comments);standard shower chair   grab bars. shower bench  -KP      Transfer, Safety Issues  step length decreased;weight-shifting ability decreased;balance decreased during turns;sequencing ability decreased  -MD     Transfer, Impairments  strength decreased;impaired balance  -MD strength decreased;impaired balance  -KP,NA,KP2    Recorded by [KP] Vero York OTR [MD] Nereida Llamas, PT [KP,NA,KP2] Vero York, OTR (r) [place], OT Student (t) KANDY Sultana (c)    Gait Assessment/Treatment    Gait, [place] Level  verbal cues required;contact guard assist  -MD     Gait, Assistive Device  rolling walker  -MD     Gait, Distance (Feet)  80  -MD     Gait, Gait Deviations  bilateral:;janet decreased;forward flexed posture;step length decreased;toe-to-floor clearance decreased;decreased heel strike  -MD     Gait, Safety Issues  step length decreased  -MD     Gait, Impairments  impaired balance;strength decreased  -MD     Recorded by  [MD] Nereida Llamas, PT     Upper Body Bathing Assessment/Training    UB Bathing Assess/Train Assistive Device hand-held shower  head;shower chair with back  -KP      UB Bathing Assess/Train, Position sitting  -KP      UB Bathing Assess/Train, [place] Level set up required;stand by assist  -KP      Recorded by [KP] Vero York OTR      Lower Body Bathing Assessment/Training    LB Bathing Assess/Train Assistive Device grab bars;hand-held shower head;shower chair with back  -KP      LB Bathing Assess/Train, Position sitting;standing  -KP      LB Bathing Assess/Train, [place] Level set up required  -KP      Recorded by [KP] Vero York OTR      Upper Body Dressing Assessment/Training    UB Dressing Assess/Train, Clothing Type doffing:;donning:;t-shirt  -KP      UB Dressing Assess/Train, Position sitting  -KP      UB Dressing Assess/Train, [place] set up required;supervision required  -KP      Recorded by [KP] Vero York OTR      Lower Body Dressing Assessment/Training    LB Dressing Assess/Train, Clothing Type doffing:;donning:;pants;socks  -KP      LB Dressing Assess/Train, Position sitting;standing  -KP      LB Dressing Assess/Train, [place] set up required;contact guard assist  -KP      Recorded by [KP] Vero York OTR      Toileting Assessment/Training    Toileting Assess/Train, Assistive Device grab bars  -      Toileting Assess/Train, Position sitting;standing  -KP      Toileting Assess/Train, [place] Level set up required;contact guard assist  -KP      Recorded by [KP] Vero York OTR      Grooming Assessment/Training    Grooming Assess/Train, Position sitting  -KP      Grooming Assess/Train, [place] Level supervision required;set up required  -KP      Recorded by [KP] Vero York OTR      Balance Skills Training    Sitting-Level of Assistance Close supervision  -KP  Close supervision  -KP,NA,KP2    Sitting-Balance Support Feet supported  -KP  Feet supported  -KP,NA,KP2    Sitting-Balance Activities   Lateral lean;Forward lean;Reaching across  midline;Reaching for objects  -KP,NA,KP2    Sitting # of Minutes   20  -KP,NA,KP2    Standing-Level of Assistance Contact guard  -KP      Static Standing Balance Support Right upper extremity supported  -KP      Recorded by [KP] KANDY Sultana  [KP,NA,KP2] KANDY Sultana (r) Lynnette [place] OT Student (t) KANDY Sultana (c)    Therapy Exercises    Bilateral Lower Extremities  AROM:;20 reps;sitting;ankle pumps/circles;hip abduction/adduction;hip flexion;LAQ  -MD     BLE Other Reps  --   nustep at level 2 for 7 min  -MD     Left Upper Extremity AROM:;20 reps;sitting;elbow flexion/extension;shoulder extension/flexion;shoulder abduction/adduction  -KP      Recorded by [BRIANNA] KANDY Sultana [MD] Nereida Llamas, PT     Fine Motor Coordination Training    Detail (Fine Motor Coordination Training) isnerts pegs into small hole. colored pegs to inc L FMC. w. minVC  -KP  pt completed FMC of LUE stacking 10 pennies with mod difficulty. pt with min difficulty with in hand manipulation of pennies and putting into small slot. pt completed bilateral stringing of small beads with some difficulty and LUE hand fatigue.  -KP,NA,KP2    Recorded by [BRIANNA] KANDY Sultana  [KP,NA,KP2] KANDY Sultana (r) [place]te [place] OT Student (t) KANDY Sultana (c)    Positioning and Restraints    Pre-Treatment Position bathroom  -KP sitting in chair/recliner  -MD sitting in chair/recliner  -KP,NA,KP2    Post Treatment Position wheelchair  -KP  bed  -KP,NA,KP2    In Bed   supine;call light within reach;encouraged to call for assist;exit alarm on;with family/caregiver  -KP,NA,KP2    In Wheelchair sitting;with PT  -KP      Recorded by [KP] KANDY Sultana [MD] Nereida Llamas, PT [KP,NA,KP2] KANDY Sultana (r) [place]te [place] OT Student (t) KANDY Sultana (c)      11/03/17 1330 11/03/17 1154 11/03/17 1114    Rehab Assessment/Intervention     Discipline speech language pathologist  -OC occupational therapist  -KP,NA,KP2 physical therapist  -MD    Document Type therapy note (daily note)  -OC therapy note (daily note)  -KP,NA,KP2 therapy note (daily note)  -MD    Subjective Information agree to therapy  -OC no complaints;agree to therapy  -KP,NA,KP2 agree to therapy;no complaints  -MD    Patient Effort, Rehab Treatment good  -OC good  -KP,NA,KP2 good  -MD    Symptoms Noted During/After Treatment none  -OC fatigue  -KP,NA,KP2 none  -MD    Precautions/Limitations  fall precautions  -KP,NA,KP2 fall precautions  -MD    Equipment Issued to Patient   gait belt;front wheeled walker  -MD    Recorded by [OC] Lilian Mohr MA,CCC-SLP [KP,NA,KP2] Vero York OTR (r) [place], OT Student (t) Vero York OTR (c) [MD] Nereida Llamas, PT    Pain Assessment    Pain Assessment No/denies pain  -OC No/denies pain  -KP,NA,KP2 No/denies pain  -MD    Recorded by [OC] Lilian Mohr MA,CCC-SLP [KP,NA,KP2] Vero York OTR (r) [place] OT Student (t) KANDY Sultana (c) [MD] Nereida Llamas, PT    Vision Assessment/Intervention    Visual Impairment  left neglect;inattention to the left;visual impairment, left  -KP,NA,KP2 left neglect  -MD    Vision Comment  mod cues to attend to left side of body during ADLs  -KP,NA,KP2     Recorded by  [KP,NA,KP2] KANDY Sultana (r) [place] OT Student (t) KANDY Sultana (c) [MD] Nereida Llamas, PT    Cognitive Assessment/Intervention    Current Cognitive/Communication Assessment impaired  -OC impaired  -KP,NA,KP2 impaired  -MD    Orientation Status  oriented to;person;place;time  -KP,NA,KP2 oriented to;person;place;time  -MD    Follows Commands/Answers Questions  able to follow single-step instructions;needs cueing  -KP,NA,KP2 100% of the time;needs cueing;needs increased time;needs repetition  -MD    Personal Safety  decreased insight to deficits  -KP,NA,KP2 decreased  insight to deficits  -MD    Personal Safety Interventions  fall prevention program maintained;gait belt;nonskid shoes/slippers when out of bed  -KP,NA,KP2 fall prevention program maintained;gait belt;muscle strengthening facilitated;nonskid shoes/slippers when out of bed;supervised activity  -MD    Recorded by [OC] Lilian Mohr MA,BEN-SLP [KP,NA,KP2] Vero York, OTR (r) [place], OT Student (t) Vero York OTR (c) [MD] Nereida Llamas, PT    Swallow Assessment/Intervention    Additional Documentation --   Trialed whole soft meats with pt this date, fxnl mastication  -OC      Recorded by [OC] Lilian Mohr MA,BEN-SLP      Improve oral skills    Status: Improve Oral Skills Progressing as expected  -OC      Oral Skills Progress 70%;with inconsistent cues  -OC      Comments: Improve Oral Skills No anterior loss with saliva this date.   -OC      Recorded by [OC] Lilian Mohr MA,BEN-SLP      Improve laryngeal elevation    Status: Improve laryngeal elevation Progressing as expected  -OC      Laryngeal Elevation Progress 70%;with inconsistent cues  -OC      Recorded by [OC] Lilian Mohr MA,BEN-SLP      Improve tongue base & pharyngeal wall squeeze    Tongue Base/Pharyngeal Wall Squeeze Progress 70%;with inconsistent cues  -OC      Recorded by [OC] Lilian Mohr MA,BEN-SLP      Bed Mobility, Assessment/Treatment    Bed Mob, Supine to Sit, [place]   not tested  -MD    Bed Mob, Sit to Supine, [place]   not tested  -MD    Bed Mobility, Comment  up in wc  -KP,NA,KP2     Recorded by  [KP,NA,KP2] Vero York OTR (r) [place], OT Student (t) Vero York OTR (c) [MD] Nereida Llamas, PT    Transfer Assessment/Treatment    Transfers, Sit-Stand [place]  contact guard assist  -KP,NA,KP2 verbal cues required;contact guard assist  -MD    Transfers, Stand-Sit [place]  contact guard assist  -KP,NA,KP2 verbal cues required;contact guard assist  -MD    Transfers,  Sit-Stand-Sit, Assist Device  straight cane  -KP,NA,KP2 rolling walker  -MD    Transfer, Safety Issues   step length decreased;weight-shifting ability decreased;balance decreased during turns;sequencing ability decreased  -MD    Transfer, Impairments   strength decreased;impaired balance  -MD    Recorded by  [KP,NA,KP2] KANDY Sultana (r) [place] OT Student (t) KANDY Sultana (c) [MD] Nereida Llamas PT    Gait Assessment/Treatment    Gait, [place] Level   verbal cues required;minimum assist (75% patient effort)  -MD    Gait, Assistive Device   rolling walker  -MD    Gait, Distance (Feet)   80   x3  -MD    Gait, Gait Deviations   bilateral:;janet decreased;forward flexed posture;step length decreased;decreased heel strike;toe-to-floor clearance decreased  -MD    Gait, Safety Issues   step length decreased;weight-shifting ability decreased  -MD    Gait, Impairments   impaired balance;strength decreased  -MD    Recorded by   [MD] Nereida Llamas PT    Upper Body Bathing Assessment/Training    UB Bathing Assess/Train, Position  sink side;sitting  -KP,NA,KP2     UB Bathing Assess/Train, [place] Level  supervision required;verbal cues required;set up required  -KP,NA,KP2     UB Bathing Assess/Train, Impairments  impaired vision;coordination impaired  -KP,NA,KP2     Recorded by  [KP,NA,KP2] KANDY Sultana (r) [place] OT Student (t) KANDY Sultana (c)     Lower Body Bathing Assessment/Training    LB Bathing Assess/Train, Position  sink side;sitting;standing  -KP,NA,KP2     LB Bathing Assess/Train, [place] Level  contact guard assist;verbal cues required  -KP,NA,KP2     LB Bathing Assess/Train, Impairments  impaired vision;impaired balance;strength decreased  -KP,NA,KP2     Recorded by  [KP,NA,KP2] KANDY Sultaan (r) [place] OT Student (t) KANDY Sultana (c)     Upper Body Dressing Assessment/Training    UB Dressing  Assess/Train, Clothing Type  doffing:;donning:;t-shirt  -KP,NA,KP2     UB Dressing Assess/Train, Assist Device  jennifer technique  -KP,NA,KP2     UB Dressing Assess/Train, Position  sitting  -KP,NA,KP2     UB Dressing Assess/Train, [place]  minimum assist (75% patient effort);verbal cues required  -KP,NA,KP2     Recorded by  [KP,NA,KP2] KANDY Sultana (r) [place] OT Student (t) KANDY Sultana (c)     Lower Body Dressing Assessment/Training    LB Dressing Assess/Train, Clothing Type  doffing:;donning:;pants;shoes;socks  -KP,NA,KP2     LB Dressing Assess/Train, Position  sitting;standing  -KP,NA,KP2     LB Dressing Assess/Train, [place]  minimum assist (75% patient effort);verbal cues required;nonverbal cues required (demo/gesture)  -KP,NA,KP2     LB Dressing Assess/Train, Impairments  strength decreased;impaired balance  -KP,NA,KP2     Recorded by  [KP,NA,KP2] KANDY Sultana (r) [place] OT Student (t) KANDY Sultaan (c)     Toileting Assessment/Training    Toileting Assess/Train, Comment  denied need  -KP,NA,KP2     Recorded by  [KP,NA,KP2] KANDY Sultana (r) SARA Bran (t) KANDY Sultana (c)     Grooming Assessment/Training    Grooming Assess/Train, Position  sink side;sitting  -KP,NA,KP2     Grooming Assess/Train, [place] Level  set up required;supervision required;verbal cues required  -KP,NA,KP2     Recorded by  [KP,NA,KP2] KANDY Sultana (r) [place] OT Student (t) KANDY Sultana (c)     Motor Skills/Interventions    Additional Documentation   Balance Skills Training (Group)  -MD    Recorded by   [MD] Nereida Llamas PT    Balance Skills Training    Standing-Level of Assistance   Contact guard  -MD    Static Standing Balance Support   assistive device  -MD    Standing-Balance Activities   Ball toss;Kicking ball   batting balloon back and forth  -MD    Gait Balance-Level  of Assistance   Contact guard  -MD    Gait Balance Support   jennifer bar  -MD    Gait Balance Activities   side-stepping;backwards  -MD    Recorded by   [MD] Nereida Llamas, PT    Therapy Exercises    Bilateral Lower Extremities   AROM:;10 reps;sitting  -MD RICKETTS Other Reps   --   Nustep at level 2 for 3 min  -MD RICKETTS Resistance   theraband  -MD    Recorded by   [MD] Nereida Llamas, PT    Positioning and Restraints    Pre-Treatment Position  sitting in chair/recliner  -BRIANNA,NA,KP2 sitting in chair/recliner  -MD    Post Treatment Position  wheelchair  -KP,NA,KP2 wheelchair  -MD    In Wheelchair  sitting;call light within reach;encouraged to call for assist;exit alarm on;with family/caregiver  -KP,NA,KP2 sitting;with OT  -MD    Recorded by  [KP,NA,KP2] Vero York, OTR (r) [place] OT Student (t) Vero York, OTR (c) [MD] Nereida Llamas, PT      11/02/17 1433 11/02/17 1200 11/02/17 1130    Rehab Assessment/Intervention    Discipline occupational therapist  -SO speech language pathologist  -OC physical therapist  -JK    Document Type therapy note (daily note)  -SO therapy note (daily note)  -OC therapy note (daily note)  -JK    Subjective Information no complaints;agree to therapy  -SO agree to therapy;no complaints  -OC no complaints;agree to therapy  -JK    Patient Effort, Rehab Treatment good  -SO adequate  -OC good  -JK    Symptoms Noted During/After Treatment fatigue  -SO fatigue  -OC fatigue  -JK    Precautions/Limitations fall precautions  -SO  fall precautions  -JK    Precautions/Limitations, Vision   WFL with corrective lenses  -JK    Precautions/Limitations, Hearing   WFL  -JK    Recorded by [SO] Micki Castro, OTR [OC] Lilian Mohr MA,CCC-SLP [JK] Anna Epps, PT    Pain Assessment    Pain Assessment No/denies pain  -SO No/denies pain  -OC No/denies pain  -JK    Recorded by [SO] Micki Castro, OTR [OC] Lilian Mohr MA,CCC-SLP [JK] Anna Epps, PT    Vision  Assessment/Intervention    Visual Impairment visual impairment, left  -SO  visual impairment, left  -JK    Visual Impairment Comment Scanning activity, missed letters in bottom L corner  -SO      Recorded by [SO] Micki Castro, OTR  [JK] Anna Epps, PT    Cognitive Assessment/Intervention    Current Cognitive/Communication Assessment impaired  -SO impaired  -OC impaired  -JK    Orientation Status oriented to;person;place;time  -SO oriented to;person;place;time  -OC oriented to;person;place;time  -JK    Follows Commands/Answers Questions able to follow single-step instructions;needs cueing  -SO  able to follow single-step instructions;100% of the time  -JK    Personal Safety decreased insight to deficits  -SO  decreased insight to deficits  -JK    Personal Safety Interventions   fall prevention program maintained;gait belt  -JK    Recorded by [SO] Micki Castro, SARAR [OC] Lilian Mohr MA,CCC-SLP [JK] Anna Epps, PT    Swallow Assessment/Intervention    Additional Documentation  --   [place] soft test tray. See note  -OC     Recorded by  [OC] Lilian Mohr MA,CCC-SLP     Improve oral skills    Status: Improve Oral Skills  Progressing as expected  -OC     Oral Skills Progress  70%;with inconsistent cues  -OC     Comments: Improve Oral Skills  anterior loss saliva with secretions  -OC     Recorded by  [OC] Lilian Mohr MA,CCC-SLP     Improve laryngeal elevation    Status: Improve laryngeal elevation  Progressing as expected  -OC     Laryngeal Elevation Progress  70%;with inconsistent cues   difficulty differentiating pitch and loudness   -OC     Recorded by  [OC] Lilian Mohr MA,CCC-SLP     Bed Mobility, Assessment/Treatment    Bed Mobility, Comment Sitting up in chair  -SO  up in chair  -JK    Recorded by [SO] Micki Castro, OTR  [JK] Anna Epps, PT    Transfer Assessment/Treatment    Transfers, Sit-Stand [place] contact guard assist  -SO  contact guard assist  -JK    Transfers,  "Stand-Sit [place] contact guard assist  -SO  contact guard assist  -JK    Transfers, Sit-Stand-Sit, Assist Device straight cane  -SO  straight cane  -JK    Transfer, Safety Issues   step length decreased;weight-shifting ability decreased;balance decreased during turns;sequencing ability decreased  -JK    Transfer, Impairments   strength decreased;impaired balance  -JK    Transfer, Comment W/c <-> mat CGA  -SO      Recorded by [SO] Micki Castro, OTR  [JK] Anna Epps, PT    Gait Assessment/Treatment    Gait, [place] Level   verbal cues required;contact guard assist;minimum assist (75% patient effort)  -JK    Gait, Assistive Device   rolling walker  -JK    Gait, Distance (Feet)   160  -JK    Gait, Gait Deviations   bilateral:;janet decreased;forward flexed posture;toe-to-floor clearance decreased;step length decreased  -JK    Gait, Safety Issues   step length decreased;weight-shifting ability decreased  -JK    Gait, Impairments   impaired balance;strength decreased  -JK    Gait, Comment   pt became more unsteady with gait by end of tx  -JK    Recorded by   [JK] Anna Epps, ONEIDA    Therapy Exercises    Bilateral Lower Extremities   other reps   nu step x 6 min on level 7did not use L UE  -JK    Recorded by   [JK] Anna Epps, PT    Fine Motor Coordination Training    Detail (Fine Motor Coordination Training) Pt with good- coordination with manipulating golf tees. Some increased difficulty with 1/4\" pegs and needed some cues for correct placement to match pattern.  -SO      Recorded by [SO] Micki Castro, OTR      Positioning and Restraints    Pre-Treatment Position sitting in chair/recliner  -SO  sitting in chair/recliner  -JK    Post Treatment Position wheelchair  -SO  wheelchair  -JK    In Wheelchair sitting;with PT;with family/caregiver  -SO  sitting;with SLP  -JK    Recorded by [SO] Micki Castro OTR  [JRALPH] Anna Epps, PT      11/02/17 1116 11/01/17 1550       " Rehab Assessment/Intervention    Discipline occupational therapist  -SO occupational therapist  -SO     Document Type therapy note (daily note)  -SO therapy note (daily note)  -SO     Subjective Information no complaints;agree to therapy  -SO no complaints;agree to therapy  -SO     Patient Effort, Rehab Treatment good  -SO adequate  -SO     Symptoms Noted During/After Treatment fatigue  -SO fatigue  -SO     Precautions/Limitations fall precautions  -SO fall precautions  -SO     Recorded by [SO] Micki Castro OTR [SO] Micki Castro OTR     Pain Assessment    Pain Assessment No/denies pain  -SO No/denies pain  -SO     Recorded by [SO] Micki Castro OTR [SO] Micki Castro OTR     Vision Assessment/Intervention    Visual Impairment visual impairment, left  -SO visual impairment, left  -SO     Visual Impairment Comment Cues to locate washcloth on L side, inattention  -SO Cues to scan to the left during connect the numbers, able to draw a clock correctly but wrote wrong number  -SO     Recorded by [SO] Micki Castro, SARAR [SO] Micki Castro, SARAR     Cognitive Assessment/Intervention    Current Cognitive/Communication Assessment impaired  -SO impaired  -SO     Orientation Status oriented to;person;place;time  -SO oriented to;person;place;time  -SO     Follows Commands/Answers Questions able to follow single-step instructions;needs cueing  -SO able to follow single-step instructions;needs cueing  -SO     Recorded by [SO] Micik Castro, SARAR [SO] Micki Castro OTR     ROM (Range of Motion)    General ROM  upper extremity range of motion deficits identified  -SO     General ROM Detail  RUE WFL; LUE shoulder 3/4-7/8  -SO     Recorded by  [SO] Micki Castro OTR     MMT (Manual Muscle Testing)    General MMT Assessment  upper extremity strength deficits identified  -SO     General MMT Assessment Detail  RUE 4-/5; LUE shoulder 3-/5, elbow 3/5,  forearm 3-/5  -SO     Recorded by  [SO] Micki Castro OTR     Bed Mobility, Assessment/Treatment    Bed Mob, Supine to Sit, [place]  verbal cues required;supervision required  -SO     Bed Mob, Sit to Supine, [place]  verbal cues required;supervision required  -SO     Bed Mobility, Comment Sitting up in w/c  -SO      Recorded by [SO] Micki Castro OTR [SO] Micki Castro OTR     Transfer Assessment/Treatment    Transfers, Bed-Chair [place]  contact guard assist  -SO     Transfers, Chair-Bed [place]  contact guard assist  -SO     Transfers, Sit-Stand [place] contact guard assist  -SO contact guard assist  -SO     Transfers, Stand-Sit [place] contact guard assist  -SO contact guard assist  -SO     Transfers, Sit-Stand-Sit, Assist Device rolling walker  -SO      Walk-In Shower Transfer, [place] contact guard assist;verbal cues required  -SO      Walk-In Shower Transfer, Assist Device --   Tub bench, grab bars  -SO      Recorded by [SO] Micki Castro OTR [SO] Micki Castro OTR     Upper Body Bathing Assessment/Training    UB Bathing Assess/Train Assistive Device hand-held shower head;tub bench;grab bars  -SO      UB Bathing Assess/Train, Position sitting  -SO      UB Bathing Assess/Train, [place] Level stand by assist;set up required;verbal cues required  -SO      Recorded by [SO] Micki Castro OTR      Lower Body Bathing Assessment/Training    LB Bathing Assess/Train Assistive Device grab bars;hand-held shower head;tub bench  -SO      LB Bathing Assess/Train, Position sitting;standing  -SO      LB Bathing Assess/Train, [place] Level contact guard assist;verbal cues required  -SO      Recorded by [SO] Micki Castro OTR      Upper Body Dressing Assessment/Training    UB Dressing Assess/Train, Clothing Type doffing:;donning:;t-shirt  -SO      UB Dressing Assess/Train, [place] verbal cues required;minimum  assist (75% patient effort)  -SO      Recorded by [SO] Micki Castro OTR      Lower Body Dressing Assessment/Training    LB Dressing Assess/Train, Clothing Type doffing:;donning:;pants;shoes;socks  -SO      LB Dressing Assess/Train, Position sitting;standing  -SO      LB Dressing Assess/Train, [place] minimum assist (75% patient effort);verbal cues required;nonverbal cues required (demo/gesture)  -SO      Recorded by [SO] Micki Castro OTR      Grooming Assessment/Training    Grooming Assess/Train, Position sitting;sink side  -SO      Grooming Assess/Train, [place] Level supervision required;set up required  -SO      Recorded by [SO] KANDY Boo      Motor Skills/Interventions    Additional Documentation  Fine Motor Coordination Training (Group);Gross Motor Coordination Training (Group)  -SO     Recorded by  [SO] Micki Castro OTR     Fine Motor Coordination Training    9-Hole Peg Right  23  -SO     9-Hole Peg Left  56  -SO     Box and Blocks Right  46  -SO     Box and Blocks Left  36  -SO     Recorded by  [SO] Micki Castor OTR     Positioning and Restraints    Pre-Treatment Position sitting in chair/recliner  -SO in bed  -SO     Post Treatment Position wheelchair  -SO bed  -SO     In Bed  supine;call light within reach;encouraged to call for assist;exit alarm on;with family/caregiver  -SO     In Wheelchair sitting;call light within reach;encouraged to call for assist  -SO      Recorded by [SO] KANDY Boo [SO] Micki Castro OTR       User Key  (r) = Recorded By, (t) = Taken By, (c) = Cosigned By    Initials Name Effective Dates    OC Lilian Mohr MA,CCC-SLP 04/05/17 -     SO Micki Castro, OTR 04/13/15 -     BRIANNA York, OTR 04/13/15 -     TOSHA Epps, PT 12/01/15 -     MD Nereida Llamas, PT 12/01/15 -     NA Lynnette River, OT Student 08/21/17 -                 OT Goals       11/01/17 1554 11/01/17 1234        Transfer Training OT STG    Transfer Training OT STG, Date Established  11/01/17 -SO     Transfer Training OT STG, Time to Achieve  1 wk  -SO     Transfer Training OT STG, Activity Type  tub;walk-in shower;shower chair  -SO     Transfer Training OT STG, [place] Level  contact guard assist  -SO     Transfer Training OT STG, Assist Device  walker, rolling;shower chair  -SO     Transfer Training OT LTG    Transfer Training OT LTG, Date Established  11/01/17 -SO     Transfer Training OT LTG, Time to Achieve  by discharge  -SO     Transfer Training OT LTG, Activity Type  tub;walk-in shower;shower chair  -SO     Transfer Training OT LTG, [place] Level  supervision required  -SO     Transfer Training OT LTG, Assist Device  walker, rolling;shower chair  -SO     Transfer Training 2 OT STG    Transfer Training 2 OT STG, Date Established  11/01/17 -SO     Transfer Training 2 OT STG, Time to Achieve  1 wk  -SO     Transfer Training 2 OT STG, Activity Type  toilet  -SO     Transfer Training 2 OT STG, [place] Level  contact guard assist  -SO     Transfer Training 2 OT STG, Assist Device  walker, rolling  -SO     Transfer Training 2 OT LTG    Transfer Training 2 OT LTG, Date Established  11/01/17 -SO     Transfer Training 2 OT LTG, Time to Achieve  by discharge  -SO     Transfer Training 2 OT LTG, Activity Type  toilet  -SO     Transfer Training 2 OT LTG, [place] Level  supervision required  -SO     Transfer Training 2 OT LTG, Assist Device  walker, rolling  -SO     Strength OT LTG    Strength Goal OT LTG, Date Established 11/01/17 -SO      Strength Goal OT LTG, Time to Achieve by discharge  -SO      Strength Goal OT LTG, Measure to Achieve Pt to increase LUE strength to 4-/5 to increase independence with ADLs.  -SO      Caregiver Training OT LTG    Caregiver Training OT LTG, Date Established  11/01/17 -SO     Caregiver Training OT LTG, Time to Achieve  by discharge  -SO     Caregiver Training OT  LTG, [place] Level  able to assist adequately;able to cue patient adequately  -SO     Patient Education OT LTG    Patient Education OT LTG, Date Established  11/01/17  -SO     Patient Education OT LTG, Time to Achieve  by discharge  -SO     Patient Education OT LTG, Education Type  written program;HEP;aware of neuro deficits;home safety  -SO     Patient Education OT LTG, Education Understanding  independent;demonstrates adequately;verbalizes understanding  -SO     Isolated Movement OT LTG    Isolated Movement OT LTG, Date Established 11/01/17  -SO      Isolated Movement OT LTG, Time to Achieve by discharge  -SO      Isolated Movement OT LTG, Body Area left scapula;left shoulder;left elbow;left forearm;left wrist;left fingers  -SO      Isolated Movement OT LTG, Level WFL  -SO      Coordination OT LTG    Coordination OT LTG, Date Established 11/01/17  -SO      Coordination OT LTG, Time to Achieve by discharge  -SO      Coordination OT LTG, Activity Type FM written ex program;GM written ex program;FM task;GM task  -SO      Coordination OT LTG, [place] Level independent  -SO      Coordination OT LTG, Additional Goal LUE  -SO      ADL OT STG    ADL OT STG, Date Established  11/01/17 -SO     ADL OT STG, Time to Achieve  1 wk  -SO     ADL OT STG, Activity Type  ADL skills  -SO     ADL OT STG, [place] Level  contact guard;assistive device  -SO     ADL OT LTG    ADL OT LTG, Date Established  11/01/17  -SO     ADL OT LTG, Time to Achieve  by discharge  -SO     ADL OT LTG, Activity Type  ADL skills  -SO     ADL OT LTG, [place] Level  standby assist;assistive device  -SO       User Key  (r) = Recorded By, (t) = Taken By, (c) = Cosigned By    Initials Name Provider Type    SO Micki Castro OTJOSSIE Occupational Therapist          Occupational Therapy Education     Title: PT OT SLP Therapies (Active)     Topic: Occupational Therapy (Active)     Point: ADL training (Done)    Description: Instruct  learner(s) on proper safety adaptation and remediation techniques during self care or transfers.   Instruct in proper use of assistive devices.    Learning Progress Summary    Learner Readiness Method Response Comment Documented by Status   Patient Acceptance E VU Discuss OT goals and POC. SO 11/01/17 1232 Done   Family Acceptance E VU Discuss OT goals and POC. SO 11/01/17 1232 Done                      User Key     Initials Effective Dates Name Provider Type [facility]    SO 04/13/15 -  Micki Castro OTJOSSIE Occupational Therapist OT                  OT Recommendation and Plan  Therapy Frequency: 5 times/wk          Time Calculation:         Time Calculation- OT       11/04/17 1221          Time Calculation- OT    OT Start Time 0900  -KP      OT Stop Time 1000  -KP      OT Time Calculation (min) 60 min  -KP        User Key  (r) = Recorded By, (t) = Taken By, (c) = Cosigned By    Initials Name Provider Type     KANDY Sultana Occupational Therapist           Therapy Charges for Today     Code Description Service Date Service Provider Modifiers Qty    21697364871 HC OT SELF CARE/MGMT/TRAIN EA 15 MIN 11/4/2017 KANDY Sultana GO 3    12674726046 HC OT NEUROMUSC RE EDUCATION EA 15 MIN 11/4/2017 KANDY Sultana GO 1               KANDY Sultana  11/4/2017

## 2017-11-04 NOTE — PROGRESS NOTES
Inpatient Rehabilitation Functional Measures Assessment    Functional Measures  YESI Eating:  Eating Score = 5. Patient is supervision/set-up for eating,  requiring: No assistive devices were required.  YESI Grooming: [facility] Bathing:  [facility] Upper Body Dressing:  [facility] Lower Body Dressing:  [facility] Toileting:  Toileting Score = 5.  Patient is supervision/set-up for  toileting, requiring: No assistive devices were required.    YESI Bladder Management  Level of Assistance:  Bladder Score = 5.  Patient is supervision/set-up for  bladder management, requiring: No assistive devices were required.  Frequency/Number of Accidents this Shift:  Bladder accidents this shift:  0 .  Patient has not had an accident this shift.    YESI Bowel Management  Level of Assistance: Bowel Score = 5.  Patient is supervision/set-up for bowel  management, requiring: Stand by assistance. No assistive devices were required.    Frequency/Number of Accidents this Shift: Bowel accidents this shift: 0 .  Patient has not had an accident, but used a device/medication this shift  requirin .    YESI Bed/Chair/Wheelchair Transfer:  Bed/chair/wheelchair Transfer Score = 4.  Patient performs 75% or more of effort and minimal assistance (little/incidental  help/lifting of one limb/steadying) for transferring to and from the  bed/chair/wheelchair, requiring: No assistive devices were required.  [facility] Toilet Transfer:  [facility] Tub/Shower Transfer:  [place]    Previously Documented Mode of Locomotion at Discharge: Field  YESI Expected Mode of Locomotion at Discharge: [facility] Walk/Wheelchair:  [facility] Stairs:  [facility] Comprehension:  [facility] Expression:  [facility] Social Interaction:  [facility] Problem Solving:  [facility] Memory:  [place]    Therapy Mode Minutes  Occupational Therapy: Branch  Physical Therapy: Branch  Speech Language Pathology:  Branch    Signed by: KAYLEN Treadwell

## 2017-11-04 NOTE — PROGRESS NOTES
Inpatient Rehabilitation Functional Measures Assessment    Functional Measures  YESI Eating:  Eating Score = 6. Patient is modified independent for eating,  requiring:  [facility] Grooming: [facility] Bathing:  [facility] Upper Body Dressing:  [facility] Lower Body Dressing:  [facility] Toileting:  [facility] Bladder Management  Level of Assistance:  [place]  Frequency/Number of Accidents this Shift:  [facility] Bowel Management  Level of Assistance: [place]  Frequency/Number of Accidents this Shift: [facility] Bed/Chair/Wheelchair Transfer:  [facility] Toilet Transfer:  [facility] Tub/Shower Transfer:  [place]    Previously Documented Mode of Locomotion at Discharge: Field  YESI Expected Mode of Locomotion at Discharge: [facility] Walk/Wheelchair:  [facility] Stairs:  [facility] Comprehension:  [facility] Expression:  [facility] Social Interaction:  [facility] Problem Solving:  [facility] Memory:  [place]    Therapy Mode Minutes  Occupational Therapy: Branch  Physical Therapy: Branch  Speech Language Pathology:  Individual: 60 minutes.    Signed by: Sepideh Yepez SLP

## 2017-11-04 NOTE — PROGRESS NOTES
Occupational Therapy: Individual: 60 minutes.    Physical Therapy: Branch    Speech Language Pathology:  [place]    Signed by: KANDY Altamirano/L

## 2017-11-04 NOTE — PLAN OF CARE
Problem: Patient Care Overview (Adult)  Goal: Plan of Care Review  Outcome: Ongoing (interventions implemented as appropriate)    11/04/17 1028   Coping/Psychosocial Response Interventions   Plan Of Care Reviewed With patient   Patient Care Overview   Progress improving   Outcome Evaluation   Outcome Summary/Follow up Plan Patient participated in therapies today. Patient took her medications whole with pudding.        Goal: Adult Individualization and Mutuality  Outcome: Ongoing (interventions implemented as appropriate)    Problem: Stroke (Ischemic) (Adult)  Goal: Signs and Symptoms of Listed Potential Problems Will be Absent or Manageable (Stroke)  Outcome: Ongoing (interventions implemented as appropriate)    Problem: Fall Risk (Adult)  Goal: Identify Related Risk Factors and Signs and Symptoms  Outcome: Ongoing (interventions implemented as appropriate)  Goal: Absence of Falls  Outcome: Ongoing (interventions implemented as appropriate)

## 2017-11-04 NOTE — THERAPY TREATMENT NOTE
Inpatient Rehabilitation - Physical Therapy Treatment Note  [facility]     Patient Name: Alice Maxwell  : 1945  MRN: [id number]  Today's Date: 2017  Onset of Illness/Injury or Date of Surgery Date: 10/28/17  Date of Referral to PT: 17  Referring Physician: Jeremie    Admit Date: 10/31/2017    Visit Dx:  No diagnosis found.  Patient Active Problem List   Diagnosis   • Stroke               Adult Rehabilitation Note       17 1210 17 1009 17 1509    Rehab Assessment/Intervention    Discipline occupational therapist  -KP physical therapist  -MD occupational therapist  -KP,NA,KP2    Document Type therapy note (daily note)  -KP therapy note (daily note)  -MD therapy note (daily note)  -KP,NA,KP2    Subjective Information agree to therapy;complains of;fatigue  -KP no complaints;agree to therapy  -MD agree to therapy;complains of;fatigue  -KP,NA,KP2    Patient Effort, Rehab Treatment good  -KP good  -MD good  -KP,NA,KP2    Symptoms Noted During/After Treatment none  -KP none  -MD fatigue  -KP,NA,KP2    Precautions/Limitations fall precautions;other (see comments)   [place] [facility] hearing aides  -KP fall precautions  -MD fall precautions  -KP,NA,KP2    Specific Treatment Considerations hearing aids  -KP      Equipment Issued to Patient  gait belt  -MD     Recorded by [KP] Vero York OTR [MD] Nereida Llamas, PT [KP,NA,KP2] Vero York OTR (r) Lynnette River OT Student (t) KANDY Sultana (c)    Pain Assessment    Pain Assessment No/denies pain  -KP No/denies pain  -MD No/denies pain  -KP,NA,KP2    Recorded by [KP] KANDY Sultana [MD] Nereida Llamas, PT [KP,NA,KP2] KANDY Sultana (r) Lynnette River OT Student (t) KANDY Sultana (c)    Vision Assessment/Intervention    Visual Impairment visual impairment, left;inattention to the left;left neglect  -KP left neglect  -MD left neglect;inattention to the left;visual impairment, left   -KP,NA,KP2    Vision Comment   min cues to attend to left  -KP,NA,KP2    Recorded by [KP] KANDY Sultana [MD] Nereida Llamas PT [KP,NA,KP2] KANDY Sultana (r) Lynnette River OT Student (t) KANDY Sultana (c)    Cognitive Assessment/Intervention    Current Cognitive/Communication Assessment impaired  -KP impaired  -MD impaired  -KP,NA,KP2    Orientation Status oriented to;place;person;time  -KP oriented to;person;place;time  -MD     Follows Commands/Answers Questions 100% of the time;able to follow single-step instructions;needs cueing  -% of the time;needs cueing;needs increased time;needs repetition  -MD able to follow single-step instructions;needs increased time;needs cueing  -KP,NA,KP2    Personal Safety mild impairment  -KP decreased insight to deficits  -MD decreased insight to deficits  -KP,NA,KP2    Personal Safety Interventions fall prevention program maintained;gait belt;muscle strengthening facilitated;nonskid shoes/slippers when out of bed  -KP fall prevention program maintained;gait belt;muscle strengthening facilitated;nonskid shoes/slippers when out of bed;supervised activity  -MD fall prevention program maintained;gait belt;nonskid shoes/slippers when out of bed  -KP,NA,KP2    Recorded by [KP] KANDY Sultana [MD] Nereida Llamas, PT [KP,NA,KP2] KANDY Sultana (r) Lynnette River OT Student (t) KANDY Sultana (c)    Bed Mobility, Assessment/Treatment    Bed Mobility, Assistive Device   bed rails  -KP,NA,KP2    Bed Mobility, Scoot/Bridge, [place]   supervision required;verbal cues required  -KP,NA,KP2    Bed Mob, Supine to Sit, [place]  not tested  -MD     Bed Mob, Sit to Supine, [place]  not tested  -MD supervision required  -KP,NA,KP2    Bed Mobility, Impairments   strength decreased  -KP,NA,KP2    Bed Mobility, Comment up in wc  -KP      Recorded by [KP] Vero York OTR [MD] Nereida Llamas, PT [KP,NA,KP2]  Vero York OTR (r) Lynnette River OT Student (t) KANDY Sultana (c)    Transfer Assessment/Treatment    Transfers, Chair-Bed [place]   contact guard assist  -KP,NA,KP2    Transfers, Sit-Stand [place] contact guard assist  -KP contact guard assist  -MD contact guard assist  -KP,NA,KP2    Transfers, Stand-Sit [place] contact guard assist  -KP contact guard assist  -MD contact guard assist  -KP,NA,KP2    Transfers, Sit-Stand-Sit, Assist Device rolling walker  -KP rolling walker  -MD straight cane  -KP,NA,KP2    Toilet Transfer, [place] contact guard assist  -KP      Toilet Transfer, Assistive Device wheelchair;other (see comments)   grab bars  -KP      Walk-In Shower Transfer, [place] contact guard assist  -KP      Walk-In Shower Transfer, Assist Device other (see comments);standard shower chair   grab bars. shower bench  -KP      Transfer, Safety Issues  step length decreased;weight-shifting ability decreased;balance decreased during turns;sequencing ability decreased  -MD     Transfer, Impairments  strength decreased;impaired balance  -MD strength decreased;impaired balance  -KP,NA,KP2    Recorded by [KP] Vero York OTR [MD] Nereida Llamas, PT [KP,NA,KP2] Vero York OTR (r) Lynnette River, OT Student (t) KANDY Sultana (c)    Gait Assessment/Treatment    Gait, [place] Level  verbal cues required;contact guard assist  -MD     Gait, Assistive Device  rolling walker  -MD     Gait, Distance (Feet)  80  -MD     Gait, Gait Deviations  bilateral:;janet decreased;forward flexed posture;step length decreased;toe-to-floor clearance decreased;decreased heel strike  -MD     Gait, Safety Issues  step length decreased  -MD     Gait, Impairments  impaired balance;strength decreased  -MD     Recorded by  [MD] Nereida Llamas, PT     Stairs Assessment/Treatment    Number of Stairs  4  -MD     Stairs, Handrail Location  both sides  -MD     Stairs,  [place] Level  verbal cues required;contact guard assist  -MD     Stairs, Technique Used  step over step (ascending);step to step (descending)  -MD     Stairs, Safety Issues  sequencing ability decreased  -MD     Stairs, Impairments  strength decreased;impaired balance  -MD     Recorded by  [MD] Nereida Llamas, PT     Upper Body Bathing Assessment/Training    UB Bathing Assess/Train Assistive Device hand-held shower head;shower chair with back  -KP      UB Bathing Assess/Train, Position sitting  -KP      UB Bathing Assess/Train, [place] Level set up required;stand by assist  -KP      Recorded by [KP] Vero York, OTR      Lower Body Bathing Assessment/Training    LB Bathing Assess/Train Assistive Device grab bars;hand-held shower head;shower chair with back  -KP      LB Bathing Assess/Train, Position sitting;standing  -KP      LB Bathing Assess/Train, [place] Level set up required  -KP      Recorded by [KP] Vero York, OTR      Upper Body Dressing Assessment/Training    UB Dressing Assess/Train, Clothing Type doffing:;donning:;t-shirt  -KP      UB Dressing Assess/Train, Position sitting  -      UB Dressing Assess/Train, [place] set up required;supervision required  -KP      Recorded by [KP] Vero York OTR      Lower Body Dressing Assessment/Training    LB Dressing Assess/Train, Clothing Type doffing:;donning:;pants;socks  -KP      LB Dressing Assess/Train, Position sitting;standing  -KP      LB Dressing Assess/Train, [place] set up required;contact guard assist  -KP      Recorded by [KP] Vero York OTR      Toileting Assessment/Training    Toileting Assess/Train, Assistive Device grab bars  -      Toileting Assess/Train, Position sitting;standing  -KP      Toileting Assess/Train, [place]ep[place] Level set up required;contact guard assist  -KP      Recorded by [KP] Vero York OTR      Grooming Assessment/Training    Grooming Assess/Train,  Position sitting  -KP      Grooming Assess/Train, Indep[place] Level supervision required;set up required  -KP      Recorded by [KP] KANDY Sultana      Balance Skills Training    Sitting-Level of Assistance Close supervision  -KP  Close supervision  -KP,NA,KP2    Sitting-Balance Support Feet supported  -KP  Feet supported  -KP,NA,KP2    Sitting-Balance Activities   Lateral lean;Forward lean;Reaching across midline;Reaching for objects  -KP,NA,KP2    Sitting # of Minutes   20  -KP,NA,KP2    Standing-Level of Assistance Contact guard  -KP      Static Standing Balance Support Right upper extremity supported  -KP      Recorded by [KP] KANDY Sultana  [KP,NA,KP2] KANDY Sultana (r) Lynnette River OT Student (t) KANDY Sultana (c)    Therapy Exercises    Bilateral Lower Extremities  AROM:;20 reps;sitting;ankle pumps/circles;hip abduction/adduction;hip flexion;LAQ  -MD     BLE Other Reps  --   nustep at level 2 for 7 min  -MD     Left Upper Extremity AROM:;20 reps;sitting;elbow flexion/extension;shoulder extension/flexion;shoulder abduction/adduction  -      Recorded by [KP] KANDY Sultana [MD] Nereida Llamas, PT     Fine Motor Coordination Training    Detail (Fine Motor Coordination Training) isnerts pegs into small hole. colored pegs to inc L FMC. w. minVC  -KP  pt completed FMC of LUE stacking 10 pennies with mod difficulty. pt with min difficulty with in hand manipulation of pennies and putting into small slot. pt completed bilateral stringing of small beads with some difficulty and LUE hand fatigue.  -KP,NA,KP2    Recorded by [KP] KANDY Sultana  [KP,NA,KP2] KANDY Sultana (r) Lynnette River OT Student (t) KANDY Sultana (c)    Positioning and Restraints    Pre-Treatment Position bathroom  -KP sitting in chair/recliner  -MD sitting in chair/recliner  -KP,NA,KP2    Post Treatment Position wheelchair  -KP wheelchair  -MD bed   -KP,NA,KP2    In Bed   supine;call light within reach;encouraged to call for assist;exit alarm on;with family/caregiver  -KP,NA,KP2    In Wheelchair sitting;with PT  -KP with SLP;with family/caregiver  -MD     Recorded by [KP] Vero York OTR [MD] Nereida Llamas, PT [KP,NA,KP2] Vero York OTR (r) Lynnette River OT Student (t) KANDY Sultana (c)      11/03/17 1330 11/03/17 1154 11/03/17 1114    Rehab Assessment/Intervention    Discipline speech language pathologist  -OC occupational therapist  -KP,NA,KP2 physical therapist  -MD    Document Type therapy note (daily note)  -OC therapy note (daily note)  -KP,NA,KP2 therapy note (daily note)  -MD    Subjective Information agree to therapy  -OC no complaints;agree to therapy  -KP,NA,KP2 agree to therapy;no complaints  -MD    Patient Effort, Rehab Treatment good  -OC good  -KP,NA,KP2 good  -MD    Symptoms Noted During/After Treatment none  -OC fatigue  -KP,NA,KP2 none  -MD    Precautions/Limitations  fall precautions  -KP,NA,KP2 fall precautions  -MD    Equipment Issued to Patient   gait belt;front wheeled walker  -MD    Recorded by [OC] Lilian Mohr MA,CCC-SLP [KP,NA,KP2] KANDY Sultana (r) Lynnette River OT Student (t) KANDY Sultana (c) [MD] Nereida Llamas, PT    Pain Assessment    Pain Assessment No/denies pain  -OC No/denies pain  -KP,NA,KP2 No/denies pain  -MD    Recorded by [OC] Lilian Mohr MA,CCC-SLP [KP,NA,KP2] KANDY Sultana (r) Lynnette [place] OT Student (t) KANDY Sultana (c) [MD] Nereida Llamas, PT    Vision Assessment/Intervention    Visual Impairment  left neglect;inattention to the left;visual impairment, left  -KP,NA,KP2 left neglect  -MD    Vision Comment  mod cues to attend to left side of body during ADLs  -KP,NA,KP2     Recorded by  [KP,NA,KP2] Vero York, OTR (r) Lynnette River, OT Student (t) Vero York OTR (c) [MD] Nereida Llamas, PT    Cognitive  Assessment/Intervention    Current Cognitive/Communication Assessment impaired  -OC impaired  -KP,NA,KP2 impaired  -MD    Orientation Status  oriented to;person;place;time  -KP,NA,KP2 oriented to;person;place;time  -MD    Follows Commands/Answers Questions  able to follow single-step instructions;needs cueing  -KP,NA,KP2 100% of the time;needs cueing;needs increased time;needs repetition  -MD    Personal Safety  decreased insight to deficits  -KP,NA,KP2 decreased insight to deficits  -MD    Personal Safety Interventions  fall prevention program maintained;gait belt;nonskid shoes/slippers when out of bed  -KP,NA,KP2 fall prevention program maintained;gait belt;muscle strengthening facilitated;nonskid shoes/slippers when out of bed;supervised activity  -MD    Recorded by [OC] Lilian Mohr MA,BEN-SLP [KP,NA,KP2] Vero York, OTR (r) Lynnette River, OT Student (t) Vero York, OTR (c) [MD] Nereida Llamas, PT    Swallow Assessment/Intervention    Additional Documentation --   Trialed whole soft meats with pt this date, fxnl mastication  -OC      Recorded by [OC] Lilian Mohr MA,CCC-SLP      Improve oral skills    Status: Improve Oral Skills Progressing as expected  -OC      Oral Skills Progress 70%;with inconsistent cues  -OC      Comments: Improve Oral Skills No anterior loss with saliva this date.   -OC      Recorded by [OC] Lilian Mohr MA,CCC-SLP      Improve laryngeal elevation    Status: Improve laryngeal elevation Progressing as expected  -OC      Laryngeal Elevation Progress 70%;with inconsistent cues  -OC      Recorded by [OC] Lilian Mohr MA,CCC-SLP      Improve tongue base & pharyngeal wall squeeze    Tongue Base/Pharyngeal Wall Squeeze Progress 70%;with inconsistent cues  -OC      Recorded by [OC] Lilian Mohr MA,CCC-SLP      Bed Mobility, Assessment/Treatment    Bed Mob, Supine to Sit, [place]   not tested  -MD    Bed Mob, Sit to Supine, [place]   not tested  -MD    Bed Mobility,  Comment  up in wc  -KP,NA,KP2     Recorded by  [KP,NA,KP2] Vero York OTR (r) [place]te [place], OT Student (t) KANDY Sultana (c) [MD] Nereida Llamas PT    Transfer Assessment/Treatment    Transfers, Sit-Stand [place]  contact guard assist  -KP,NA,KP2 verbal cues required;contact guard assist  -MD    Transfers, Stand-Sit [place]  contact guard assist  -KP,NA,KP2 verbal cues required;contact guard assist  -MD    Transfers, Sit-Stand-Sit, Assist Device  straight cane  -KP,NA,KP2 rolling walker  -MD    Transfer, Safety Issues   step length decreased;weight-shifting ability decreased;balance decreased during turns;sequencing ability decreased  -MD    Transfer, Impairments   strength decreased;impaired balance  -MD    Recorded by  [KP,NA,KP2] KANDY Sultana (r) Lynnette [place], OT Student (t) KANDY Sultana (c) [MD] Nereida Llamas PT    Gait Assessment/Treatment    Gait, [place] Level   verbal cues required;minimum assist (75% patient effort)  -MD    Gait, Assistive Device   rolling walker  -MD    Gait, Distance (Feet)   80   x3  -MD    Gait, Gait Deviations   bilateral:;janet decreased;forward flexed posture;step length decreased;decreased heel strike;toe-to-floor clearance decreased  -MD    Gait, Safety Issues   step length decreased;weight-shifting ability decreased  -MD    Gait, Impairments   impaired balance;strength decreased  -MD    Recorded by   [MD] Nereida Llamas PT    Upper Body Bathing Assessment/Training    UB Bathing Assess/Train, Position  sink side;sitting  -KP,NA,KP2     UB Bathing Assess/Train, [place] Level  supervision required;verbal cues required;set up required  -KP,NA,KP2     UB Bathing Assess/Train, Impairments  impaired vision;coordination impaired  -KP,NA,KP2     Recorded by  [KP,NA,KP2] KANDY Sultana (r) Lynnette [place], OT Student (t) KANDY Sultana (c)     Lower Body Bathing Assessment/Training    LB Bathing  Assess/Train, Position  sink side;sitting;standing  -KP,NA,KP2     LB Bathing Assess/Train, [place] Level  contact guard assist;verbal cues required  -KP,NA,KP2     LB Bathing Assess/Train, Impairments  impaired vision;impaired balance;strength decreased  -KP,NA,KP2     Recorded by  [KP,NA,KP2] KANDY Sultana (r) Lyn[place]te [place] OT Student (t) KANDY Sultana (c)     Upper Body Dressing Assessment/Training    UB Dressing Assess/Train, Clothing Type  doffing:;donning:;t-shirt  -KP,NA,KP2     UB Dressing Assess/Train, Assist Device  jennifer technique  -KP,NA,KP2     UB Dressing Assess/Train, Position  sitting  -KP,NA,KP2     UB Dressing Assess/Train, [place]  minimum assist (75% patient effort);verbal cues required  -KP,NA,KP2     Recorded by  [KP,NA,KP2] KANDY Sultana (r) Lyn[place]te [place] OT Student (t) KANDY Sultana (c)     Lower Body Dressing Assessment/Training    LB Dressing Assess/Train, Clothing Type  doffing:;donning:;pants;shoes;socks  -KP,NA,KP2     LB Dressing Assess/Train, Position  sitting;standing  -KP,NA,KP2     LB Dressing Assess/Train, [place]  minimum assist (75% patient effort);verbal cues required;nonverbal cues required (demo/gesture)  -KP,NA,KP2     LB Dressing Assess/Train, Impairments  strength decreased;impaired balance  -KP,NA,KP2     Recorded by  [KP,NA,KP2] KANDY Sultana (r) Lynnette [place] OT Student (t) KANDY Sultana (c)     Toileting Assessment/Training    Toileting Assess/Train, Comment  denied need  -KP,NA,KP2     Recorded by  [KP,NA,KP2] KANDY Sultana (r) Lyn[place]te [place] OT Student (t) KANDY Sultana (c)     Grooming Assessment/Training    Grooming Assess/Train, Position  sink side;sitting  -KP,NA,KP2     Grooming Assess/Train, [place] Level  set up required;supervision required;verbal cues required  -KP,NA,KP2     Recorded by  [KP,NA,KP2] Vero York, OTR  (r) Lynnette [place], OT Student (t) Vero York OTR (c)     Motor Skills/Interventions    Additional Documentation   Balance Skills Training (Group)  -MD    Recorded by   [MD] Nereida Llamas, PT    Balance Skills Training    Standing-Level of Assistance   Contact guard  -MD    Static Standing Balance Support   assistive device  -MD    Standing-Balance Activities   Ball toss;Kicking ball   batting balloon back and forth  -MD    Gait Balance-Level of Assistance   Contact guard  -MD    Gait Balance Support   jennifer bar  -MD    Gait Balance Activities   side-stepping;backwards  -MD    Recorded by   [MD] Nereida Llamas PT    Therapy Exercises    Bilateral Lower Extremities   AROM:;10 reps;sitting  -MD RICK[place] Other Reps   --   Nustep at level 2 for 3 min  -MD RICKET[place] Resistance   theraband  -MD    Recorded by   [MD] Nereida Llamas, ONEIDA    Positioning and Restraints    Pre-Treatment Position  sitting in chair/recliner  -KP,NA,KP2 sitting in chair/recliner  -MD    Post Treatment Position  wheelchair  -KP,NA,KP2 wheelchair  -MD    In Wheelchair  sitting;call light within reach;encouraged to call for assist;exit alarm on;with family/caregiver  -KP,NA,KP2 sitting;with OT  -MD    Recorded by  [KP,NA,KP2] Vero York OTR (r) [place], OT Student (t) Vero York OTR (c) [MD] Nereida Llamas, PT      11/02/17 1433 11/02/17 1200 11/02/17 1130    Rehab Assessment/Intervention    Discipline occupational therapist  -SO speech language pathologist  -OC physical therapist  -JK    Document Type therapy note (daily note)  -SO therapy note (daily note)  -OC therapy note (daily note)  -JK    Subjective Information no complaints;agree to therapy  -SO agree to therapy;no complaints  -OC no complaints;agree to therapy  -JK    Patient Effort, Rehab Treatment good  -SO adequate  -OC good  -JK    Symptoms Noted During/After Treatment fatigue  -SO fatigue  -OC fatigue  -JK    Precautions/Limitations fall precautions  -SO  fall  precautions  -JK    Precautions/Limitations, Vision   WFL with corrective lenses  -JK    Precautions/Limitations, Hearing   WFL  -JK    Recorded by [SO] Micki Castro, OTR [OC] Lilian Mohr MA,CCC-SLP [JK] Anna Epps, PT    Pain Assessment    Pain Assessment No/denies pain  -SO No/denies pain  -OC No/denies pain  -JK    Recorded by [SO] Micki Castro, OTR [OC] Lilian Mohr MA,CCC-SLP [JK] Anna Epps, PT    Vision Assessment/Intervention    Visual Impairment visual impairment, left  -SO  visual impairment, left  -JK    Visual Impairment Comment Scanning activity, missed letters in bottom L corner  -SO      Recorded by [SO] Micki Castro, OTR  [JK] Anna Epps, PT    Cognitive Assessment/Intervention    Current Cognitive/Communication Assessment impaired  -SO impaired  -OC impaired  -JK    Orientation Status oriented to;person;place;time  -SO oriented to;person;place;time  -OC oriented to;person;place;time  -JK    Follows Commands/Answers Questions able to follow single-step instructions;needs cueing  -SO  able to follow single-step instructions;100% of the time  -JK    Personal Safety decreased insight to deficits  -SO  decreased insight to deficits  -JK    Personal Safety Interventions   fall prevention program maintained;gait belt  -JK    Recorded by [SO] Micki Castro, OTR [OC] Lilian Mohr MA,CCC-SLP [JK] Anna Epps, PT    Swallow Assessment/Intervention    Additional Documentation  --   [facility] soft test tray. See note  -OC     Recorded by  [OC] Lilian Mohr MA,CCC-SLP     Improve oral skills    Status: Improve Oral Skills  Progressing as expected  -OC     Oral Skills Progress  70%;with inconsistent cues  -OC     Comments: Improve Oral Skills  anterior loss saliva with secretions  -OC     Recorded by  [OC] Lilian Mohr MA,CCC-SLP     Improve laryngeal elevation    Status: Improve laryngeal elevation  Progressing as expected  -OC     Laryngeal Elevation Progress   "70%;with inconsistent cues   difficulty differentiating pitch and loudness   -OC     Recorded by  [OC] Lilian Mohr MA,CCC-SLP     Bed Mobility, Assessment/Treatment    Bed Mobility, Comment Sitting up in chair  -SO  up in chair  -JK    Recorded by [SO] Micki Castro, OTR  [JK] Anna Epps PT    Transfer Assessment/Treatment    Transfers, Sit-Stand [place] contact guard assist  -SO  contact guard assist  -JK    Transfers, Stand-Sit [place] contact guard assist  -SO  contact guard assist  -JK    Transfers, Sit-Stand-Sit, Assist Device straight cane  -SO  straight cane  -JK    Transfer, Safety Issues   step length decreased;weight-shifting ability decreased;balance decreased during turns;sequencing ability decreased  -JK    Transfer, Impairments   strength decreased;impaired balance  -JK    Transfer, Comment W/c <-> mat CGA  -SO      Recorded by [SO] Micki Castro, OTR  [JK] Anna Epps PT    Gait Assessment/Treatment    Gait, [place] Level   verbal cues required;contact guard assist;minimum assist (75% patient effort)  -JK    Gait, Assistive Device   rolling walker  -JK    Gait, Distance (Feet)   160  -JK    Gait, Gait Deviations   bilateral:;janet decreased;forward flexed posture;toe-to-floor clearance decreased;step length decreased  -JK    Gait, Safety Issues   step length decreased;weight-shifting ability decreased  -JK    Gait, Impairments   impaired balance;strength decreased  -JK    Gait, Comment   pt became more unsteady with gait by end of tx  -JK    Recorded by   [JK] Anna Epps PT    Therapy Exercises    Bilateral Lower Extremities   other reps   nu step x 6 min on level 7did not use L UE  -JK    Recorded by   [JK] Anna Epps PT    Fine Motor Coordination Training    Detail (Fine Motor Coordination Training) Pt with good- coordination with manipulating golf tees. Some increased difficulty with 1/4\" pegs and needed some cues for correct placement to match " pattern.  -SO      Recorded by [SO] KANDY Boo      Positioning and Restraints    Pre-Treatment Position sitting in chair/recliner  -SO  sitting in chair/recliner  -JK    Post Treatment Position wheelchair  -SO  wheelchair  -JK    In Wheelchair sitting;with PT;with family/caregiver  -SO  sitting;with SLP  -JK    Recorded by [SO] KANDY Boo  [JK] Anna Epps, PT      11/02/17 1116 11/01/17 1550       Rehab Assessment/Intervention    Discipline occupational therapist  -SO occupational therapist  -SO     Document Type therapy note (daily note)  -SO therapy note (daily note)  -SO     Subjective Information no complaints;agree to therapy  -SO no complaints;agree to therapy  -SO     Patient Effort, Rehab Treatment good  -SO adequate  -SO     Symptoms Noted During/After Treatment fatigue  -SO fatigue  -SO     Precautions/Limitations fall precautions  -SO fall precautions  -SO     Recorded by [SO] Micki Castro OTR [SO] Micki Castro OTR     Pain Assessment    Pain Assessment No/denies pain  -SO No/denies pain  -SO     Recorded by [SO] Micki Castro OTR [SO] Micki Castro, SARAR     Vision Assessment/Intervention    Visual Impairment visual impairment, left  -SO visual impairment, left  -SO     Visual Impairment Comment Cues to locate washcloth on L side, inattention  -SO Cues to scan to the left during connect the numbers, able to draw a clock correctly but wrote wrong number  -SO     Recorded by [SO] Micki Castro, SARAR [SO] Micki Castro OTR     Cognitive Assessment/Intervention    Current Cognitive/Communication Assessment impaired  -SO impaired  -SO     Orientation Status oriented to;person;place;time  -SO oriented to;person;place;time  -SO     Follows Commands/Answers Questions able to follow single-step instructions;needs cueing  -SO able to follow single-step instructions;needs cueing  -SO     Recorded by [SO] Micki  KANDY Genao [SO] KANDY Boo     ROM (Range of Motion)    General ROM  upper extremity range of motion deficits identified  -SO     General ROM Detail  RUE WFL; LUE shoulder 3/4-7/8  -SO     Recorded by  [SO] KANDY Boo     MMT (Manual Muscle Testing)    General MMT Assessment  upper extremity strength deficits identified  -SO     General MMT Assessment Detail  RUE 4-/5; LUE shoulder 3-/5, elbow 3/5, forearm 3-/5  -SO     Recorded by  [SO] KANDY Boo     Bed Mobility, Assessment/Treatment    Bed Mob, Supine to Sit, [place]  verbal cues required;supervision required  -SO     Bed Mob, Sit to Supine, [place]  verbal cues required;supervision required  -SO     Bed Mobility, Comment Sitting up in w/c  -SO      Recorded by [SO] KANDY Boo [SO] KANDY Boo     Transfer Assessment/Treatment    Transfers, Bed-Chair [place]  contact guard assist  -SO     Transfers, Chair-Bed [place]  contact guard assist  -SO     Transfers, Sit-Stand [place] contact guard assist  -SO contact guard assist  -SO     Transfers, Stand-Sit [place] contact guard assist  -SO contact guard assist  -SO     Transfers, Sit-Stand-Sit, Assist Device rolling walker  -SO      Walk-In Shower Transfer, [place] contact guard assist;verbal cues required  -SO      Walk-In Shower Transfer, Assist Device --   Tub bench, grab bars  -SO      Recorded by [SO] KANDY Boo [SO] KANDY Boo     Upper Body Bathing Assessment/Training    UB Bathing Assess/Train Assistive Device hand-held shower head;tub bench;grab bars  -SO      UB Bathing Assess/Train, Position sitting  -SO      UB Bathing Assess/Train, [place] Level stand by assist;set up required;verbal cues required  -SO      Recorded by [SO] KANDY Boo      Lower Body Bathing Assessment/Training    LB Bathing Assess/Train Assistive Device  grab bars;hand-held shower head;tub bench  -SO      LB Bathing Assess/Train, Position sitting;standing  -SO      LB Bathing Assess/Train, [place] Level contact guard assist;verbal cues required  -SO      Recorded by [SO] Micki Castro OTR      Upper Body Dressing Assessment/Training    UB Dressing Assess/Train, Clothing Type doffing:;donning:;t-shirt  -SO      UB Dressing Assess/Train, [place] verbal cues required;minimum assist (75% patient effort)  -SO      Recorded by [SO] Micki Castro OTR      Lower Body Dressing Assessment/Training    LB Dressing Assess/Train, Clothing Type doffing:;donning:;pants;shoes;socks  -SO      LB Dressing Assess/Train, Position sitting;standing  -SO      LB Dressing Assess/Train, [place] minimum assist (75% patient effort);verbal cues required;nonverbal cues required (demo/gesture)  -SO      Recorded by [SO] Micki Castro OTR      Grooming Assessment/Training    Grooming Assess/Train, Position sitting;sink side  -SO      Grooming Assess/Train, [place] Level supervision required;set up required  -SO      Recorded by [SO] Micki Castro OTR      Motor Skills/Interventions    Additional Documentation  Fine Motor Coordination Training (Group);Gross Motor Coordination Training (Group)  -SO     Recorded by  [SO] Micki Castro OTR     Fine Motor Coordination Training    9-Hole Peg Right  23  -SO     9-Hole Peg Left  56  -SO     Box and Blocks Right  46  -SO     Box and Blocks Left  36  -SO     Recorded by  [SO] Micki Castro OTR     Positioning and Restraints    Pre-Treatment Position sitting in chair/recliner  -SO in bed  -SO     Post Treatment Position wheelchair  -SO bed  -SO     In Bed  supine;call light within reach;encouraged to call for assist;exit alarm on;with family/caregiver  -SO     In Wheelchair sitting;call light within reach;encouraged to call for assist  -SO      Recorded by [SO] Micki Castro,  OTR [SO] Micki Ch Gloria, OTR       User Key  (r) = Recorded By, (t) = Taken By, (c) = Cosigned By    Initials Name Effective Dates    OC Lilian Mohr MA,CCC-SLP 04/05/17 -     SO Micki Castro, OTR 04/13/15 -     KP Vero [place]enhall [place], OTR 04/13/15 -     TOSHA Epps, PT 12/01/15 -     MD Nereida Llamas, PT 12/01/15 -     NA Lynnette River, OT Student 08/21/17 -                 IP PT Goals       11/01/17 1221          Bed Mobility PT LTG    Bed Mobility PT LTG, Date Established 11/01/17  -MD      Bed Mobility PT LTG, Time to Achieve 2 wks  -MD      Bed Mobility PT LTG, Activity Type supine to sit/sit to supine  -MD      Bed Mobility PT LTG, [place] Level independent  -MD      Transfer Training PT LTG    Transfer Training PT LTG, Date Established 11/01/17  -MD      Transfer Training PT LTG, Time to Achieve 2 wks  -MD      Transfer Training PT LTG, Activity Type sit to stand/stand to sit  -MD      Transfer Training PT LTG, [place] Level supervision required  -MD      Transfer Training PT LTG, Assist Device walker, rolling  -MD      Transfer Training 2 PT LTG    Transfer Training PT 2 LTG, Date Established 11/01/17  -MD      Transfer Training PT 2 LTG, Time to Achieve 2 wks  -MD      Transfer Training PT 2 LTG, Activity Type other (see comments)   car transfer  -MD      Transfer Training PT 2 LTG, [place] Level supervision required  -MD      Transfer Training PT 2 LTG, Assist Device walker, rolling  -MD      Gait Training PT LTG    Gait Training Goal PT LTG, Date Established 11/01/17  -MD      Gait Training Goal PT LTG, Time to Achieve 2 wks  -MD      Gait Training Goal PT LTG, [place] Level supervision required  -MD      Gait Training Goal PT LTG, Assist Device walker, rolling  -MD      Gait Training Goal PT LTG, Distance to Achieve 160  -MD      Stair Training PT LTG    Stair Training Goal PT LTG, Date Established 11/01/17  -MD      Stair Training Goal PT LTG, Time to Achieve  2 wks  -MD      Stair Training Goal PT LTG, Number of Steps 4  -MD      Stair Training Goal PT LTG, [place] Level supervision required  -MD      Stair Training Goal PT LTG, Assist Device 2 handrails  -MD      Patient Education PT LTG    Patient Education PT LTG, Date Established 11/01/17  -MD      Patient Education PT LTG, Time to Achieve 2 wks  -MD      Patient Education PT LTG, Education Type HEP  -MD      Patient Education PT LTG, Education Understanding demonstrate adequately  -MD        User Key  (r) = Recorded By, (t) = Taken By, (c) = Cosigned By    Initials Name Provider Type    MD Nereida Llamas, PT Physical Therapist          Physical Therapy Education     Title: PT OT SLP Therapies (Active)     Topic: Physical Therapy (Active)     Point: Mobility training (Done)    Learning Progress Summary    Learner Readiness Method Response Comment Documented by Status   Patient Acceptance E VU  JK 11/02/17 1158 Done               Point: Precautions (Active)    Learning Progress Summary    Learner Readiness Method Response Comment Documented by Status   Patient Acceptance ENOEMY MD 11/04/17 1012 Active    Acceptance E,NOEMY SANFORD MD 11/03/17 1215 Active    Acceptance E,D NR  MD 11/01/17 1228 Active                      User Key     Initials Effective Dates Name Provider Type Discipline     12/01/15 -  Anna Epps, PT Physical Therapist PT    MD 12/01/15 -  Nereida Llamas, PT Physical Therapist PT                    PT Recommendation and Plan  Anticipated Equipment Needs At Discharge: gait belt, front wheeled walker  Anticipated Discharge Disposition: home with home health  Planned Therapy Interventions: balance training, bed mobility training, gait training, home exercise program, patient/family education, transfer training  PT Frequency: 2 times/day  Plan of Care Review  Plan Of Care Reviewed With: patient  Outcome Summary/Follow up Plan: Pt presents s/p CVA leading to impaired gait and impaired dynamic standing balance.   Due to the above pt will benifit from skilled PT to address the above issues and increase pts safety and independence w functional mobility.         Time Calculation:         PT Charges       11/04/17 1009          Time Calculation    Start Time 1000  -MD      Stop Time 1100  -MD      Time Calculation (min) 60 min  -MD      PT Received On 11/04/17  -MD      PT - Next Appointment 11/06/17  -MD        User Key  (r) = Recorded By, (t) = Taken By, (c) = Cosigned By    Initials Name Provider Type    MD Nereida Llamas, PT Physical Therapist          Therapy Charges for Today     Code Description Service Date Service Provider Modifiers Qty    62286215304 HC PT THER PROC EA 15 MIN 11/3/2017 Nereida Llamas, PT GP 4    71180722291 HC PT THER SUPP EA 15 MIN 11/3/2017 Nereida Llamas, PT GP 2    40721542447 HC PT THER PROC EA 15 MIN 11/4/2017 Nereida Llamas, PT GP 4    17078949625 HC PT THER SUPP EA 15 MIN 11/4/2017 Nereida Llamas, PT GP 1               Nereida Llamas PT  11/4/2017

## 2017-11-04 NOTE — PLAN OF CARE
Problem: Patient Care Overview (Adult)  Goal: Plan of Care Review  Outcome: Ongoing (interventions implemented as appropriate)    11/04/17 0249   Coping/Psychosocial Response Interventions   Plan Of Care Reviewed With patient   Patient Care Overview   Progress improving   Outcome Evaluation   Outcome Summary/Follow up Plan Patient slept well tonight, without family at bedside. NIH ranges between a 3 and a 5. Meds taken in pudding- crushed and accuchecks have been reduced to x 2 daily.          Problem: Stroke (Ischemic) (Adult)  Goal: Signs and Symptoms of Listed Potential Problems Will be Absent or Manageable (Stroke)  Outcome: Ongoing (interventions implemented as appropriate)    11/04/17 0249   Stroke (Ischemic)   Problems Assessed (Stroke (Ischemic)/TIA) all   Problems Present (Stroke (Ischemic)/TIA) motor/sensory impairment         Problem: Fall Risk (Adult)  Goal: Absence of Falls  Outcome: Ongoing (interventions implemented as appropriate)    11/04/17 0249   Fall Risk (Adult)   Absence of Falls making progress toward outcome

## 2017-11-04 NOTE — PROGRESS NOTES
Inpatient Rehabilitation Functional Measures Assessment    Functional Measures  YESI Eating:  Branch  YESI Grooming: Branch  YESI Bathing:  Branch  [facility] Upper Body Dressing:  Branch  [facility] Lower Body Dressing:  Branch  [facility] Toileting:  Toileting Score = 5.  Patient is supervision/set-up for  toileting, requiring: Stand by assistance. Verbal cuing, prompting, or  instructing. No assistive devices were required.    YESI Bladder Management  Level of Assistance:  Bladder Score = 3. Patient performs 50-74% of tasks and  requires moderate assistance for bladder management requiring assistive  device/method: bathroom .  Frequency/Number of Accidents this Shift:  Bladder accidents this shift:  0 .  Patient has not had an accident this shift.    YESI Bowel Management  Level of Assistance: Activity was not observed.  Frequency/Number of Accidents this Shift: Bowel accidents this shift: 0 .  Patient has not had an accident this shift.    YESI Bed/Chair/Wheelchair Transfer:  Bed/chair/wheelchair Transfer Score = 2.  Patient performs 25-49% of effort and requires maximal assistance (most of the  lifting) for transferring to and from the bed/chair/wheelchair. Patient requires  the following assistive device(s): Elevated head of bed. Elevated bed/surface.  Walker. Grab bars.  YESI Toilet Transfer:  Toilet Transfer Score = 2.  Patient performs 25-49% of  effort and requires maximal assistance (most of the lifting) for transferring to  and from the toilet/commode. No assistive devices were required.  YESI Tub/Shower Transfer:  [place]anch    Previously Documented Mode of Locomotion at Discharge: Field  YESI Expected Mode of Locomotion at Discharge: [place]  [facility] Walk/Wheelchair:  Branch  [facility] Stairs:  Branch    [facility] Comprehension:  Branch  [facility] Expression:  Branch  [facility] Social Interaction:  Branch  [facility] Problem Solving:  Branch  [facility] Memory:  Branch    Therapy Mode Minutes  Occupational Therapy: Branch  Physical Therapy: Branch  Speech Language Pathology:   Branch    Signed by: KAYLEN Umanzor

## 2017-11-04 NOTE — PROGRESS NOTES
Occupational Therapy: Branch    Physical Therapy: Individual: 60 minutes.    Speech Language Pathology:  Branch    Signed by: Neredia Llamas PT

## 2017-11-04 NOTE — PROGRESS NOTES
Inpatient Rehabilitation Functional Measures Assessment    Functional Measures  YESI Eating:  Branch  YESI Grooming: Branch  YESI Bathing:  Branch  YESI Upper Body Dressing:  Branch  YESI Lower Body Dressing:  [facility] Toileting:  [facility] Bladder Management  Level of Assistance:  [place]  Frequency/Number of Accidents this Shift:  [facility] Bowel Management  Level of Assistance: [place]  Frequency/Number of Accidents this Shift: Branch    [facility] Bed/Chair/Wheelchair Transfer:  Activity was not observed.  YESI Toilet Transfer:  [facility] Tub/Shower Transfer:  [place]    Previously Documented Mode of Locomotion at Discharge: Field  YESI Expected Mode of Locomotion at Discharge: [facility] Walk/Wheelchair:  WHEELCHAIR OBSERVATION   Activity was not observed.    WALK OBSERVATION   Walk Distance Scale = 2.  Distance walked is 50 -149 feet. Walk Score = 2.  Patient performs 75% or more of effort and requires minimal assistance.  Incidental assistance, contact guard or steadying was provided. Patient walked a  distance of 80 feet. Patient requires the following assistive device(s): Rolling  walker.  YESI Stairs:  Stairs Score = 2.  Incidental assistance with lifting or lowering,  contact guard or steadying was provided. Patient performs 75% or more of effort  and requires minimal contact assistance. Patient negotiated  4 stairs. Patient  requires the following assistive device(s): Handrail(s).    YESI Comprehension:  Branch  YESI Expression:  [facility] Social Interaction:  [facility] Problem Solving:  [facility] Memory:  [place]uff    Therapy Mode Minutes  Occupational Therapy: Duff  Physical Therapy: Duff  Speech Language Pathology:  [place]uff    Signed by: Nereida Llamas PT

## 2017-11-04 NOTE — PROGRESS NOTES
Inpatient Rehabilitation Functional Measures Assessment    Functional Measures  YESI Eating:  [facility] Grooming: [facility] Bathing:  [facility] Upper Body Dressing:  [facility] Lower Body Dressing:  [facility] Toileting:  [facility] Bladder Management  Level of Assistance:  [place]  Frequency/Number of Accidents this Shift:  [facility] Bowel Management  Level of Assistance: [place]  Frequency/Number of Accidents this Shift: [facility] Bed/Chair/Wheelchair Transfer:  [facility] Toilet Transfer:  [facility] Tub/Shower Transfer:  [place]    Previously Documented Mode of Locomotion at Discharge: Field  YESI Expected Mode of Locomotion at Discharge: [facility] Walk/Wheelchair:  [facility] Stairs:  [facility] Comprehension:  Auditory comprehension is the usual mode. Comprehension  Score = 7, Independent.  Patient comprehends complex/abstract information in  their primary language.  Patient is completely independent for auditory  comprehension.  There are no activity limitations.  YESI Expression:  Vocal expression is the usual mode. Expression Score = 7,  Independent.  Patient expresses complex/abstract information in their primary  language.  Patient is completely independent for vocal expression.  There are no  activity limitations.  YESI Social Interaction:  Social Interaction Score = 7, Independent. Patient is  completely independent for social interaction.  There are no activity  limitations.  YESI Problem Solving:  Activity was not observed.  YESI Memory:  Memory Score = 7, Independent.  Patient is completely independent  for memory.  There are no activity limitations.    Therapy Mode Minutes  Occupational Therapy: [place]  Physical Therapy: [place]  Speech Language Pathology:  [place]    Signed by: Rossy Falcon RN

## 2017-11-04 NOTE — THERAPY TREATMENT NOTE
Inpatient Rehabilitation - Speech Language Pathology Treatment Note  [facility]     Patient Name: Alice Maxwell  : 1945  MRN: [id number]  Today's Date: 2017         Admit Date: 10/31/2017    Visit Dx:      ICD-10-CM ICD-9-CM   1. Oropharyngeal dysphagia R13.12 787.22     Patient Active Problem List   Diagnosis   • Stroke              Adult Rehabilitation Note       17 1500 17 1210 17 1009    Rehab Assessment/Intervention    Discipline speech language pathologist  -JTIFFANIE occupational therapist  -KP physical therapist  -MD    Document Type therapy note (daily note)  -JJ therapy note (daily note)  -KP therapy note (daily note)  -MD    Subjective Information agree to therapy  - agree to therapy;complains of;fatigue  - no complaints;agree to therapy  -MD    Patient Effort, Rehab Treatment adequate  - good  -KP good  -MD    Symptoms Noted During/After Treatment  none  -KP none  -MD    Precautions/Limitations  fall precautions;other (see comments)   [place] . hearing aides  - fall precautions  -MD    Specific Treatment Considerations  hearing aids  -     Equipment Issued to Patient   gait belt  -MD    Recorded by [JALEN] Sepideh Yepez, MS CCC-SLP [] Vero York OTR [MD] Nereida Llamas, PT    Pain Assessment    Pain Assessment  No/denies pain  - No/denies pain  -MD    Recorded by  [] KANDY Sultana [MD] Nereida Llamas PT    Vision Assessment/Intervention    Visual Impairment  visual impairment, left;inattention to the left;left neglect  - left neglect  -MD    Recorded by  [] KANDY Sultana [MD] Nereida Llamas, ONEIDA    Cognitive Assessment/Intervention    Current Cognitive/Communication Assessment  impaired  -KP impaired  -MD    Orientation Status  oriented to;place;person;time  -KP oriented to;person;place;time  -MD    Follows Commands/Answers Questions  100% of the time;able to follow single-step instructions;needs cueing  - 100% of the  time;needs cueing;needs increased time;needs repetition  -MD    Personal Safety  mild impairment  - decreased insight to deficits  -MD    Personal Safety Interventions  fall prevention program maintained;gait belt;muscle strengthening facilitated;nonskid shoes/slippers when out of bed  - fall prevention program maintained;gait belt;muscle strengthening facilitated;nonskid shoes/slippers when out of bed;supervised activity  -MD    Recorded by  [KP] Vero York, OTR [MD] Nereida Llamas, PT    Swallow Assessment/Intervention    Additional Documentation --   [facility] soft test tray  -JJ      Recorded by [JJ] Sepideh Yepez, MS CCC-SLP      Improve oral skills    To Improve Oral Skills, patient will: Increase lip closure;Increase back of tongue control;90%;without cues  -JJ      Oral Skills Progress 50%;with consistent cues  -JJ      Recorded by [JJ] Sepideh Yepez, MS CCC-SLP      Improve laryngeal elevation    To improve laryngeal elevation, patient will: Complete pitch-glide;90%;without cues  -JJ      Laryngeal Elevation Progress 60%;with consistent cues  -JJ      Recorded by [JJ] Sepideh Yepez MS CCC-SLP      Improve tongue base & pharyngeal wall squeeze    To improve tongue base & pharyngeal wall squeeze, patient will: Complete gargle/hold retraction;Complete tongue base retraction;Complete effortful swallow;90%;without cues  -JJ      Tongue Base/Pharyngeal Wall Squeeze Progress 50%;with consistent cues  -JJ      Recorded by [JJ] Sepideh Yepez, MS CCC-SLP      Bed Mobility, Assessment/Treatment    Bed Mob, Supine to Sit, [place]   not tested  -MD    Bed Mob, Sit to Supine, [place]   not tested  -MD    Bed Mobility, Comment  up in [facility]     Recorded by  [] Vero York, OTR [MD] Nereida Llamas, PT    Transfer Assessment/Treatment    Transfers, Sit-Stand [place]  contact guard assist  -KP contact guard assist  -MD    Transfers, Stand-Sit  [place]  contact guard assist  - contact guard assist  -MD    Transfers, Sit-Stand-Sit, Assist Device  rolling walker  - rolling walker  -MD    Toilet Transfer, [place]  contact guard assist  -KP     Toilet Transfer, Assistive Device  wheelchair;other (see comments)   grab bars  -     Walk-In Shower Transfer, [place]  contact guard assist  -KP     Walk-In Shower Transfer, Assist Device  other (see comments);standard shower chair   grab bars. shower bench  -     Transfer, Safety Issues   step length decreased;weight-shifting ability decreased;balance decreased during turns;sequencing ability decreased  -MD    Transfer, Impairments   strength decreased;impaired balance  -MD    Recorded by  [KP] Vero York, OTR [MD] Nereida Llamas PT    Gait Assessment/Treatment    Gait, [place] Level   verbal cues required;contact guard assist  -MD    Gait, Assistive Device   rolling walker  -MD    Gait, Distance (Feet)   80  -MD    Gait, Gait Deviations   bilateral:;janet decreased;forward flexed posture;step length decreased;toe-to-floor clearance decreased;decreased heel strike  -MD    Gait, Safety Issues   step length decreased  -MD    Gait, Impairments   impaired balance;strength decreased  -MD    Recorded by   [MD] Nereida Llamas PT    Stairs Assessment/Treatment    Number of Stairs   4  -MD    Stairs, Handrail Location   both sides  -MD    Stairs, [place] Level   verbal cues required;contact guard assist  -MD    Stairs, Technique Used   step over step (ascending);step to step (descending)  -MD    Stairs, Safety Issues   sequencing ability decreased  -MD    Stairs, Impairments   strength decreased;impaired balance  -MD    Recorded by   [MD] Nereida Llamas PT    Upper Body Bathing Assessment/Training    UB Bathing Assess/Train Assistive Device  hand-held shower head;shower chair with back  -     UB Bathing Assess/Train, Position  sitting  -     UB Bathing Assess/Train, [place] Level  set  up required;stand by assist  -KP     Recorded by  [KP] KANDY Sultana     Lower Body Bathing Assessment/Training    LB Bathing Assess/Train Assistive Device  grab bars;hand-held shower head;shower chair with back  -KP     LB Bathing Assess/Train, Position  sitting;standing  -KP     LB Bathing Assess/Train, [place] Level  set up required  -KP     Recorded by  [KP] Vero York OTR     Upper Body Dressing Assessment/Training    UB Dressing Assess/Train, Clothing Type  doffing:;donning:;t-shirt  -KP     UB Dressing Assess/Train, Position  sitting  -KP     UB Dressing Assess/Train, [place]  set up required;supervision required  -KP     Recorded by  [KP] Vero York OTR     Lower Body Dressing Assessment/Training    LB Dressing Assess/Train, Clothing Type  doffing:;donning:;pants;socks  -KP     LB Dressing Assess/Train, Position  sitting;standing  -KP     LB Dressing Assess/Train, [place]  set up required;contact guard assist  -KP     Recorded by  [KP] KANDY Sultana     Toileting Assessment/Training    Toileting Assess/Train, Assistive Device  grab bars  -KP     Toileting Assess/Train, Position  sitting;standing  -KP     Toileting Assess/Train, [place] Level  set up required;contact guard assist  -KP     Recorded by  [KP] KANDY Sultana     Grooming Assessment/Training    Grooming Assess/Train, Position  sitting  -KP     Grooming Assess/Train, [place] Level  supervision required;set up required  -KP     Recorded by  [KP] KANDY Sultana     Balance Skills Training    Sitting-Level of Assistance  Close supervision  -KP     Sitting-Balance Support  Feet supported  -KP     Standing-Level of Assistance  Contact guard  -KP     Static Standing Balance Support  Right upper extremity supported  -KP     Recorded by  [KP] Vero York OTR     Therapy Exercises    Bilateral Lower Extremities   AROM:;20 reps;sitting;ankle pumps/circles;hip  abduction/adduction;hip flexion;LAQ  -MD    BLE Other Reps   --   nustep at level 2 for 7 min  -MD    Left Upper Extremity  AROM:;20 reps;sitting;elbow flexion/extension;shoulder extension/flexion;shoulder abduction/adduction  -KP     Recorded by  [KP] KANDY Sultana [MD] Nereida Llamas, PT    Fine Motor Coordination Training    Detail (Fine Motor Coordination Training)  isnerts pegs into small hole. colored pegs to inc L FMC. w. minVC  -KP     Recorded by  [KP] KANDY Sultana     Positioning and Restraints    Pre-Treatment Position  bathroom  -KP sitting in chair/recliner  -MD    Post Treatment Position  wheelchair  -KP wheelchair  -MD    In Wheelchair  sitting;with PT  -KP with SLP;with family/caregiver  -MD    Recorded by  [KP] KANDY Sultana [MD] Nereida Llamas, ONEIDA      11/03/17 1509 11/03/17 1330 11/03/17 1154    Rehab Assessment/Intervention    Discipline occupational therapist  -KP,NA,KP2 speech language pathologist  -OC occupational therapist  -KP,NA,KP2    Document Type therapy note (daily note)  -KP,NA,KP2 therapy note (daily note)  -OC therapy note (daily note)  -KP,NA,KP2    Subjective Information agree to therapy;complains of;fatigue  -KP,NA,KP2 agree to therapy  -OC no complaints;agree to therapy  -KP,NA,KP2    Patient Effort, Rehab Treatment good  -KP,NA,KP2 good  -OC good  -KP,NA,KP2    Symptoms Noted During/After Treatment fatigue  -KP,NA,KP2 none  -OC fatigue  -KP,NA,KP2    Precautions/Limitations fall precautions  -KP,NA,KP2  fall precautions  -KP,NA,KP2    Recorded by [KP,NA,KP2] Vero York OTJOSSIE (r) [place] OT Student (t) KANDY Sultana (c) [OC] Lilian Mohr MA,[facility]-SLP [KP,NA,KP2] KANDY Sultana (r) [place] OT Student (t) KANDY Sultana (c)    Pain Assessment    Pain Assessment No/denies pain  -KP,NA,KP2 No/denies pain  -OC No/denies pain  -KP,NA,KP2    Recorded by [KP,NA,KP2] Vero York, OTR  (r) [place] OT Student (t) KANDY Sultana (c) [OC] Lilian Mohr MA,CCC-SLP [KP,NA,KP2] KANDY Sultana (r) [place] OT Student (t) KANDY Sultana (c)    Vision Assessment/Intervention    Visual Impairment left neglect;inattention to the left;visual impairment, left  -KP,NA,KP2  left neglect;inattention to the left;visual impairment, left  -KP,NA,KP2    Vision Comment min cues to attend to left  -KP,NA,KP2  mod cues to attend to left side of body during ADLs  -KP,NA,KP2    Recorded by [KP,NA,KP2] KANDY Sultana (r) [place] OT Student (t) KANDY Sultana (c)  [KP,NA,KP2] KANDY Sultana (r) [place] OT Student (t) KANDY Sultana (c)    Cognitive Assessment/Intervention    Current Cognitive/Communication Assessment impaired  -KP,NA,KP2 impaired  -OC impaired  -KP,NA,KP2    Orientation Status   oriented to;person;place;time  -KP,NA,KP2    Follows Commands/Answers Questions able to follow single-step instructions;needs increased time;needs cueing  -KP,NA,KP2  able to follow single-step instructions;needs cueing  -KP,NA,KP2    Personal Safety decreased insight to deficits  -KP,NA,KP2  decreased insight to deficits  -KP,NA,KP2    Personal Safety Interventions fall prevention program maintained;gait belt;nonskid shoes/slippers when out of bed  -KP,NA,KP2  fall prevention program maintained;gait belt;nonskid shoes/slippers when out of bed  -KP,NA,KP2    Recorded by [KP,NA,KP2] KANDY Sultana (r) [place] OT Student (t) KANDY Sultana (c) [OC] Lilian Mohr MA,CCC-SLP [KP,NA,KP2] KANDY Sultana (r) [place] OT Student (t) Vero York OTR (c)    Swallow Assessment/Intervention    Additional Documentation  --   Trialed whole soft meats with pt this date, fxnl mastication  -OC     Recorded by  [OC] Lilian Mohr MA,CCC-SLP     Improve oral skills     Status: Improve Oral Skills  Progressing as expected  -OC     Oral Skills Progress  70%;with inconsistent cues  -OC     Comments: Improve Oral Skills  No anterior loss with saliva this date.   -OC     Recorded by  [OC] Lilian Mohr MA,CCC-SLP     Improve laryngeal elevation    Status: Improve laryngeal elevation  Progressing as expected  -OC     Laryngeal Elevation Progress  70%;with inconsistent cues  -OC     Recorded by  [OC] Lilian Mohr MA,CCC-SLP     Improve tongue base & pharyngeal wall squeeze    Tongue Base/Pharyngeal Wall Squeeze Progress  70%;with inconsistent cues  -OC     Recorded by  [OC] Lilian Mohr MA,CCC-SLP     Bed Mobility, Assessment/Treatment    Bed Mobility, Assistive Device bed rails  -KP,NA,KP2      Bed Mobility, [place]/Bridge, [place] supervision required;verbal cues required  -KP,NA,KP2      Bed Mob, Sit to Supine, [place] supervision required  -KP,NA,KP2      Bed Mobility, Impairments strength decreased  -KP,NA,KP2      Bed Mobility, Comment   up in wc  -KP,NA,KP2    Recorded by [KP,NA,KP2] Vero York OTR (r) [place] OT Student (t) Vero York OTJOSSIE (c)  [KP,NA,KP2] Vero York OTR (r) [place] OT Student (t) KANDY Sultana (c)    Transfer Assessment/Treatment    Transfers, Chair-Bed [place] contact guard assist  -KP,NA,KP2      Transfers, Sit-Stand [place] contact guard assist  -KP,NA,KP2  contact guard assist  -KP,NA,KP2    Transfers, Stand-Sit [place] contact guard assist  -KP,NA,KP2  contact guard assist  -KP,NA,KP2    Transfers, Sit-Stand-Sit, Assist Device straight cane  -KP,NA,KP2  straight cane  -KP,NA,KP2    Transfer, Impairments strength decreased;impaired balance  -KP,NA,KP2      Recorded by [KP,NA,KP2] KANDY Sultana (r) [place] OT Student (t) KANDY Sultana (c)  [KP,NA,KP2] Vero York OTR (r) [place] OT Student (t) Vero [place]  KANDY York (c)    Upper Body Bathing Assessment/Training    UB Bathing Assess/Train, Position   sink side;sitting  -KP,NA,KP2    UB Bathing Assess/Train, [place] Level   supervision required;verbal cues required;set up required  -KP,NA,KP2    UB Bathing Assess/Train, Impairments   impaired vision;coordination impaired  -KP,NA,KP2    Recorded by   [KP,NA,KP2] Vero York OTR (r) [place] OT Student (t) KANDY Sultana (c)    Lower Body Bathing Assessment/Training    LB Bathing Assess/Train, Position   sink side;sitting;standing  -KP,NA,KP2    LB Bathing Assess/Train, [place] Level   contact guard assist;verbal cues required  -KP,NA,KP2    LB Bathing Assess/Train, Impairments   impaired vision;impaired balance;strength decreased  -KP,NA,KP2    Recorded by   [KP,NA,KP2] KANDY Sultana (r) [place] OT Student (t) KANDY Sultana (c)    Upper Body Dressing Assessment/Training    UB Dressing Assess/Train, Clothing Type   doffing:;donning:;t-shirt  -KP,NA,KP2    UB Dressing Assess/Train, Assist Device   jennifer technique  -KP,NA,KP2    UB Dressing Assess/Train, Position   sitting  -KP,NA,KP2    UB Dressing Assess/Train, [place]   minimum assist (75% patient effort);verbal cues required  -KP,NA,KP2    Recorded by   [KP,NA,KP2] KANDY Sultana (r) [place] OT Student (t) KANDY Sultana (c)    Lower Body Dressing Assessment/Training    LB Dressing Assess/Train, Clothing Type   doffing:;donning:;pants;shoes;socks  -KP,NA,KP2    LB Dressing Assess/Train, Position   sitting;standing  -KP,NA,KP2    LB Dressing Assess/Train, [place]   minimum assist (75% patient effort);verbal cues required;nonverbal cues required (demo/gesture)  -KP,NA,KP2    LB Dressing Assess/Train, Impairments   strength decreased;impaired balance  -KP,NA,KP2    Recorded by   [KP,NA,KP2] Vero York, OTR (r) [place], OT Student  (t) KANDY Sultana (c)    Toileting Assessment/Training    Toileting Assess/Train, Comment   denied need  -KP,NA,KP2    Recorded by   [KP,NA,KP2] KANDY Sultana (r) [place] OT Student (t) KANDY Sultana (c)    Grooming Assessment/Training    Grooming Assess/Train, Position   sink side;sitting  -KP,NA,KP2    Grooming Assess/Train, [place] Level   set up required;supervision required;verbal cues required  -KP,NA,KP2    Recorded by   [KP,NA,KP2] KANDY Sultana (r) [place] OT Student (t) KANDY Sultana (gricelda)    Balance Skills Training    Sitting-Level of Assistance Close supervision  -KP,NA,KP2      Sitting-Balance Support Feet supported  -KP,NA,KP2      Sitting-Balance Activities Lateral lean;Forward lean;Reaching across midline;Reaching for objects  -KP,NA,KP2      Sitting # of Minutes 20  -KP,NA,KP2      Recorded by [KP,NA,KP2] KANDY Sultana (r) [place] OT Student (t) KANDY Sultana (c)      Fine Motor Coordination Training    Detail (Fine Motor Coordination Training) pt completed FMC of LUE stacking 10 pennies with mod difficulty. pt with min difficulty with in hand manipulation of pennies and putting into small slot. pt completed bilateral stringing of small beads with some difficulty and LUE hand fatigue.  -KP,NA,KP2      Recorded by [KP,NA,KP2] KANDY Sultana (r) [place] OT Student (t) KANDY Sultana (c)      Positioning and Restraints    Pre-Treatment Position sitting in chair/recliner  -KP,NA,KP2  sitting in chair/recliner  -KP,NA,KP2    Post Treatment Position bed  -KP,NA,KP2  wheelchair  -KP,NA,KP2    In Bed supine;call light within reach;encouraged to call for assist;exit alarm on;with family/caregiver  -KP,NA,KP2      In Wheelchair   sitting;call light within reach;encouraged to call for assist;exit alarm on;with family/caregiver  -KP,NA,KP2    Recorded by  [KP,NA,KP2] Vero York, OTR (r) [place], OT Student (t) Vero York, OTR (c)  [KP,NA,KP2] Vero York, OTR (r) [place], OT Student (t) Vero York, OTR (c)      11/03/17 1114 11/02/17 1433 11/02/17 1200    Rehab Assessment/Intervention    Discipline physical therapist  -MD occupational therapist  -SO speech language pathologist  -OC    Document Type therapy note (daily note)  -MD therapy note (daily note)  -SO therapy note (daily note)  -OC    Subjective Information agree to therapy;no complaints  -MD no complaints;agree to therapy  -SO agree to therapy;no complaints  -OC    Patient Effort, Rehab Treatment good  -MD good  -SO adequate  -OC    Symptoms Noted During/After Treatment none  -MD fatigue  -SO fatigue  -OC    Precautions/Limitations fall precautions  -MD fall precautions  -SO     Equipment Issued to Patient gait belt;front wheeled walker  -MD      Recorded by [MD] Nereida Llamas, PT [SO] Micki Castro, OTR [OC] Lilian Mohr MA,[facility]-SLP    Pain Assessment    Pain Assessment No/denies pain  -MD No/denies pain  -SO No/denies pain  -OC    Recorded by [MD] Nereida Llamas, PT [SO] Micki Castro, OTR [OC] Lilian Mohr MA,[facility]-SLP    Vision Assessment/Intervention    Visual Impairment left neglect  -MD visual impairment, left  -SO     Visual Impairment Comment  Scanning activity, missed letters in bottom L corner  -SO     Recorded by [MD] Nereida Llamas, PT [SO] Micki Castro, OTR     Cognitive Assessment/Intervention    Current Cognitive/Communication Assessment impaired  -MD impaired  -SO impaired  -OC    Orientation Status oriented to;person;place;time  -MD oriented to;person;place;time  -SO oriented to;person;place;time  -OC    Follows Commands/Answers Questions 100% of the time;needs cueing;needs increased time;needs repetition  -MD able to follow single-step instructions;needs cueing  -SO     Personal Safety decreased insight to deficits   -MD decreased insight to deficits  -SO     Personal Safety Interventions fall prevention program maintained;gait belt;muscle strengthening facilitated;nonskid shoes/slippers when out of bed;supervised activity  -MD      Recorded by [MD] Nereida Llamas, PT [SO] Micki Castro, OTR [OC] Lilian Mohr MA,CCC-SLP    Swallow Assessment/Intervention    Additional Documentation   --   [facility] soft test tray. See note  -OC    Recorded by   [OC] Lilian Mohr MA,CCC-SLP    Improve oral skills    Status: Improve Oral Skills   Progressing as expected  -OC    Oral Skills Progress   70%;with inconsistent cues  -OC    Comments: Improve Oral Skills   anterior loss saliva with secretions  -OC    Recorded by   [OC] Lilian Mohr MA,CCC-SLP    Improve laryngeal elevation    Status: Improve laryngeal elevation   Progressing as expected  -OC    Laryngeal Elevation Progress   70%;with inconsistent cues   difficulty differentiating pitch and loudness   -OC    Recorded by   [OC] Lilian Mohr MA,CCC-SLP    Bed Mobility, Assessment/Treatment    Bed Mob, Supine to Sit, [place] not tested  -MD      Bed Mob, Sit to Supine, [place] not tested  -MD      Bed Mobility, Comment  Sitting up in chair  -SO     Recorded by [MD] Nereida Llamas, PT [SO] Micki Castro, OTR     Transfer Assessment/Treatment    Transfers, Sit-Stand [place] verbal cues required;contact guard assist  -MD contact guard assist  -SO     Transfers, Stand-Sit [place] verbal cues required;contact guard assist  -MD contact guard assist  -SO     Transfers, Sit-Stand-Sit, Assist Device rolling walker  -MD straight cane  -SO     Transfer, Safety Issues step length decreased;weight-shifting ability decreased;balance decreased during turns;sequencing ability decreased  -MD      Transfer, Impairments strength decreased;impaired balance  -MD      Transfer, Comment  W/c <-> mat CGA  -SO     Recorded by [MD] Nereida Llamas, PT [SO] Micki Castro, OTR     Gait  "Assessment/Treatment    Gait, [place] Level verbal cues required;minimum assist (75% patient effort)  -MD      Gait, Assistive Device rolling walker  -MD      Gait, Distance (Feet) 80   x3  -MD      Gait, Gait Deviations bilateral:;janet decreased;forward flexed posture;step length decreased;decreased heel strike;toe-to-floor clearance decreased  -MD      Gait, Safety Issues step length decreased;weight-shifting ability decreased  -MD      Gait, Impairments impaired balance;strength decreased  -MD      Recorded by [MD] Nereida Llamas PT      Motor Skills/Interventions    Additional Documentation Balance Skills Training (Group)  -MD      Recorded by [MD] Nereida Llamas PT      Balance Skills Training    Standing-Level of Assistance Contact guard  -MD      Static Standing Balance Support assistive device  -MD      Standing-Balance Activities Ball toss;Kicking ball   batting balloon back and forth  -MD      Gait Balance-Level of Assistance Contact guard  -MD      Gait Balance Support jennifer bar  -MD      Gait Balance Activities side-stepping;backwards  -MD      Recorded by [MD] Nereida Llamas PT      Therapy Exercises    Bilateral Lower Extremities AROM:;10 reps;sitting  -MD RICKETTS Other Reps --   Nustep at level 2 for 3 min  -MD      BLE Resistance theraband  -MD      Recorded by [MD] Nereida Llamas, PT      Fine Motor Coordination Training    Detail (Fine Motor Coordination Training)  Pt with good- coordination with manipulating golf tees. Some increased difficulty with 1/4\" pegs and needed some cues for correct placement to match pattern.  -SO     Recorded by  [SO] KANDY Boo     Positioning and Restraints    Pre-Treatment Position sitting in chair/recliner  -MD sitting in chair/recliner  -SO     Post Treatment Position wheelchair  -MD wheelchair  -SO     In Wheelchair sitting;with OT  -MD sitting;with PT;with family/caregiver  -SO     Recorded by [MD] Nereida Llamas, PT [SO] KANDY Boo       " 11/02/17 1130 11/02/17 1116 11/01/17 1550    Rehab Assessment/Intervention    Discipline physical therapist  -JK occupational therapist  -SO occupational therapist  -SO    Document Type therapy note (daily note)  -JK therapy note (daily note)  -SO therapy note (daily note)  -SO    Subjective Information no complaints;agree to therapy  -JK no complaints;agree to therapy  -SO no complaints;agree to therapy  -SO    Patient Effort, Rehab Treatment good  -JK good  -SO adequate  -SO    Symptoms Noted During/After Treatment fatigue  -JK fatigue  -SO fatigue  -SO    Precautions/Limitations fall precautions  -JK fall precautions  -SO fall precautions  -SO    Precautions/Limitations, Vision WFL with corrective lenses  -JK      Precautions/Limitations, Hearing WFL  -JK      Recorded by [JK] Anna Epps, PT [SO] Micki Castro, OTR [SO] Micki Castro, OTR    Pain Assessment    Pain Assessment No/denies pain  -JK No/denies pain  -SO No/denies pain  -SO    Recorded by [JK] Anna Epps, PT [SO] Micki Castro, OTR [SO] Micki Castro, OTR    Vision Assessment/Intervention    Visual Impairment visual impairment, left  -JK visual impairment, left  -SO visual impairment, left  -SO    Visual Impairment Comment  Cues to locate washcloth on L side, inattention  -SO Cues to scan to the left during connect the numbers, able to draw a clock correctly but wrote wrong number  -SO    Recorded by [JK] Anna Epps, PT [SO] Micki Castro, OTR [SO] Micki Castro, OTR    Cognitive Assessment/Intervention    Current Cognitive/Communication Assessment impaired  -JK impaired  -SO impaired  -SO    Orientation Status oriented to;person;place;time  -JK oriented to;person;place;time  -SO oriented to;person;place;time  -SO    Follows Commands/Answers Questions able to follow single-step instructions;100% of the time  -JK able to follow single-step instructions;needs cueing  -SO able to follow  single-step instructions;needs cueing  -SO    Personal Safety decreased insight to deficits  -JK      Personal Safety Interventions fall prevention program maintained;gait belt  -JK      Recorded by [JK] Anna Epps, PT [SO] Micki Castro, OTR [SO] Micki Castro, OTR    ROM (Range of Motion)    General ROM   upper extremity range of motion deficits identified  -SO    General ROM Detail   RUE WFL; LUE shoulder 3/4-7/8  -SO    Recorded by   [SO] Micki Castro, SARAR    MMT (Manual Muscle Testing)    General MMT Assessment   upper extremity strength deficits identified  -SO    General MMT Assessment Detail   RUE 4-/5; LUE shoulder 3-/5, elbow 3/5, forearm 3-/5  -SO    Recorded by   [SO] Micki Castro, SARAR    Bed Mobility, Assessment/Treatment    Bed Mob, Supine to Sit, [place]   verbal cues required;supervision required  -SO    Bed Mob, Sit to Supine, [place]   verbal cues required;supervision required  -SO    Bed Mobility, Comment up in chair  -JK Sitting up in w/c  -SO     Recorded by [JK] Anna Epps, PT [SO] Micki Castro, OTR [SO] Micki Castro, OTR    Transfer Assessment/Treatment    Transfers, Bed-Chair [place]   contact guard assist  -SO    Transfers, Chair-Bed [place]   contact guard assist  -SO    Transfers, Sit-Stand [place] contact guard assist  -JK contact guard assist  -SO contact guard assist  -SO    Transfers, Stand-Sit [place] contact guard assist  -JK contact guard assist  -SO contact guard assist  -SO    Transfers, Sit-Stand-Sit, Assist Device straight cane  -JK rolling walker  -SO     Walk-In Shower Transfer, [place]  contact guard assist;verbal cues required  -SO     Walk-In Shower Transfer, Assist Device  --   Tub bench, grab bars  -SO     Transfer, Safety Issues step length decreased;weight-shifting ability decreased;balance decreased during turns;sequencing ability decreased  -JK      Transfer,  Impairments strength decreased;impaired balance  -JK      Recorded by [JK] Anna Epps, PT [SO] Micki Castro, OTR [SO] Micki Castro, OTR    Gait Assessment/Treatment    Gait, [place] Level verbal cues required;contact guard assist;minimum assist (75% patient effort)  -JK      Gait, Assistive Device rolling walker  -JK      Gait, Distance (Feet) 160  -JK      Gait, Gait Deviations bilateral:;janet decreased;forward flexed posture;toe-to-floor clearance decreased;step length decreased  -JK      Gait, Safety Issues step length decreased;weight-shifting ability decreased  -JK      Gait, Impairments impaired balance;strength decreased  -JK      Gait, Comment pt became more unsteady with gait by end of tx  -JK      Recorded by [JK] Anna Epps, PT      Upper Body Bathing Assessment/Training    UB Bathing Assess/Train Assistive Device  hand-held shower head;tub bench;grab bars  -SO     UB Bathing Assess/Train, Position  sitting  -SO     UB Bathing Assess/Train, [place] Level  stand by assist;set up required;verbal cues required  -SO     Recorded by  [SO] Mciki Castro OTR     Lower Body Bathing Assessment/Training    LB Bathing Assess/Train Assistive Device  grab bars;hand-held shower head;tub bench  -SO     LB Bathing Assess/Train, Position  sitting;standing  -SO     LB Bathing Assess/Train, [place] Level  contact guard assist;verbal cues required  -SO     Recorded by  [SO] Micki Castro OTR     Upper Body Dressing Assessment/Training    UB Dressing Assess/Train, Clothing Type  doffing:;donning:;t-shirt  -SO     UB Dressing Assess/Train, [place]  verbal cues required;minimum assist (75% patient effort)  -SO     Recorded by  [SO] Micki Castro OTR     Lower Body Dressing Assessment/Training    LB Dressing Assess/Train, Clothing Type  doffing:;donning:;pants;shoes;socks  -SO     LB Dressing Assess/Train, Position  sitting;standing  -SO     LB  Dressing Assess/Train, [place]  minimum assist (75% patient effort);verbal cues required;nonverbal cues required (demo/gesture)  -SO     Recorded by  [SO] KANDY Boo     Grooming Assessment/Training    Grooming Assess/Train, Position  sitting;sink side  -SO     Grooming Assess/Train, Indepen Level  supervision required;set up required  -SO     Recorded by  [SO] KANDY Boo     Motor Skills/Interventions    Additional Documentation   Fine Motor Coordination Training (Group);Gross Motor Coordination Training (Group)  -SO    Recorded by   [SO] Micki Castro OTR    Therapy Exercises    Bilateral Lower Extremities other reps   nu step x 6 min on level 7did not use L UE  -JK      Recorded by [JK] Anna Epps, PT      Fine Motor Coordination Training    9-Hole Peg Right   23  -SO    9-Hole Peg Left   56  -SO    Box and Blocks Right   46  -SO    Box and Blocks Left   36  -SO    Recorded by   [SO] KANDY Boo    Positioning and Restraints    Pre-Treatment Position sitting in chair/recliner  -JK sitting in chair/recliner  -SO in bed  -SO    Post Treatment Position wheelchair  -JK wheelchair  -SO bed  -SO    In Bed   supine;call light within reach;encouraged to call for assist;exit alarm on;with family/caregiver  -SO    In Wheelchair sitting;with SLP  -JK sitting;call light within reach;encouraged to call for assist  -SO     Recorded by [TIFFANIEK] Anna Epps, PT [SO] Micki Castro, SARAR [SO] Micki Castro OTR      User Key  (r) = Recorded By, (t) = Taken By, (c) = Cosigned By    Initials Name Effective Dates    OC Lilian Mohr MA,[facility]-SLP 04/05/17 -     JTIFFANIE Yepez, MS CCC-SLP 04/13/15 -     SO Micki Castro, OTR 04/13/15 -     BRIANNA York, OTR 04/13/15 -     TOSHA Epps, PT 12/01/15 -     MD Nereida Llamas, PT 12/01/15 -     NA Lynnette River, OT Student 08/21/17 -                SLP Goals        11/01/17 1215          Safely Consume Diet    Safely Consume Diet- SLP, Date Established 11/01/17  -OC      Safely Consume Diet- SLP, Time to Achieve by discharge  -OC      Safely Consume Diet- SLP, Outcome goal ongoing  -OC        User Key  (r) = Recorded By, (t) = Taken By, (c) = Cosigned By    Initials Name Provider Type    OC Lilian Mohr MA,[facility]-SLP Speech and Language Pathologist          EDUCATION  The patient has been educated in the following areas:   Home Exercise Program (HEP) Dysphagia (Swallowing Impairment) Modified Diet Instruction.    SLP Recommendation and Plan   Pt seen on this date for swallow therapy. Pt needed moderate cues to participate with swallow exercises. She needed cues to stay awake and also to complete the exercises with effort. Pt was able to state her safe swallow strategies independently. Pt was then observed with her lunch tray. She was noted to periodically clear her throat and sweep the left side of her mouth without cues. She needed 2 cues during lunch to slow down and to sweep the left side of her mouth. Her nose did run during the meal but she was eating very spicy food. She was noted to cough one time at the end of the meal while drinking tomato juice (with a lot of pepper added). Pt appears to be tolerating her diet upgrade and following her swallow strategies. SLP to continue to follow for diet tolerance and education/strengthening.                                       Time Calculation:         Time Calculation- SLP       11/04/17 1507          Time Calculation- SLP    SLP Start Time 1100  -JJ      SLP Stop Time 1200  -      SLP Time Calculation (min) 60 min  -        User Key  (r) = Recorded By, (t) = Taken By, (c) = Cosigned By    Initials Name Provider Type    JALEN Yepez MS CCC-SLP Speech and Language Pathologist          Therapy Charges for Today     Code Description Service Date Service Provider Modifiers Qty    [id number]  ST TREATMENT  SWALLOW 4 11/4/2017 Sepideh Yepez, MS CCC-SLP GN 1               Sepideh Yepez, MS BEN-SLP  11/4/2017

## 2017-11-04 NOTE — PROGRESS NOTES
Inpatient Rehabilitation Functional Measures Assessment    Functional Measures  YESI Eating:  [facility] Grooming: [facility] Bathing:  [facility] Upper Body Dressing:  [facility] Lower Body Dressing:  [facility] Toileting:  [facility] Bladder Management  Level of Assistance:  [place]  Frequency/Number of Accidents this Shift:  [facility] Bowel Management  Level of Assistance: [place]  Frequency/Number of Accidents this Shift: [facility] Bed/Chair/Wheelchair Transfer:  [facility] Toilet Transfer:  [facility] Tub/Shower Transfer:  [place]    Previously Documented Mode of Locomotion at Discharge: Field  YESI Expected Mode of Locomotion at Discharge: [facility] Walk/Wheelchair:  [facility] Stairs:  [facility] Comprehension:  Auditory comprehension is the usual mode. Comprehension  Score = 6, Modified [place].  Patient comprehends complex/abstract  information in their primary language, requiring:  YESI Expression:  Vocal expression is the usual mode. Expression Score = 6,  Modified Independent.  Patient expresses complex/abstract information in their  primary language, requiring:  YESI Social Interaction:  Social Interaction Score = 6, Modified Independent.  Patient is modified independent for social interaction, requiring:  YESI Problem Solving:  Problem Solving Score = 6, Modified [place].  Patient  makes appropriate decisions in order to solve complex problems, but requires  extra time.  YESI Memory:  Memory Score = 6, Modified [place].  Patient is modified  independent for memory, requiring:    Therapy Mode Minutes  Occupational Therapy: Branch  Physical Therapy: [facility]  Speech Language Pathology:  [facility]    Signed by: Carolina King RN

## 2017-11-05 LAB
GLUCOSE BLDC GLUCOMTR-MCNC: 110 MG/DL (ref 70–130)
GLUCOSE BLDC GLUCOMTR-MCNC: 95 MG/DL (ref 70–130)
INR PPP: 1.41 (ref 0.9–1.1)
PROTHROMBIN TIME: 16.8 SECONDS (ref 11.7–14.2)

## 2017-11-05 PROCEDURE — 82962 GLUCOSE BLOOD TEST: CPT

## 2017-11-05 PROCEDURE — 25010000002 ENOXAPARIN PER 10 MG: Performed by: PHYSICAL MEDICINE & REHABILITATION

## 2017-11-05 PROCEDURE — 85610 PROTHROMBIN TIME: CPT | Performed by: PHYSICAL MEDICINE & REHABILITATION

## 2017-11-05 RX ADMIN — VITAMIN D, TAB 1000IU (100/BT) 1000 UNITS: 25 TAB at 17:15

## 2017-11-05 RX ADMIN — ALLOPURINOL 200 MG: 100 TABLET ORAL at 08:42

## 2017-11-05 RX ADMIN — Medication 1 TABLET: at 08:42

## 2017-11-05 RX ADMIN — LEVOTHYROXINE SODIUM 112 MCG: 112 TABLET ORAL at 05:44

## 2017-11-05 RX ADMIN — ASPIRIN 81 MG: 81 TABLET, CHEWABLE ORAL at 08:42

## 2017-11-05 RX ADMIN — Medication 3 MG: at 23:07

## 2017-11-05 RX ADMIN — FOLIC ACID 1 MG: 1 TABLET ORAL at 08:42

## 2017-11-05 RX ADMIN — ENOXAPARIN SODIUM 100 MG: 100 INJECTION SUBCUTANEOUS at 21:35

## 2017-11-05 RX ADMIN — ENOXAPARIN SODIUM 100 MG: 100 INJECTION SUBCUTANEOUS at 08:41

## 2017-11-05 RX ADMIN — WARFARIN SODIUM 7.5 MG: 7.5 TABLET ORAL at 17:15

## 2017-11-05 RX ADMIN — ATORVASTATIN CALCIUM 80 MG: 80 TABLET, FILM COATED ORAL at 21:35

## 2017-11-05 RX ADMIN — METFORMIN HYDROCHLORIDE 500 MG: 500 TABLET ORAL at 08:42

## 2017-11-05 RX ADMIN — VITAMIN D, TAB 1000IU (100/BT) 1000 UNITS: 25 TAB at 08:41

## 2017-11-05 NOTE — PLAN OF CARE
Problem: Patient Care Overview (Adult)  Goal: Plan of Care Review  Outcome: Ongoing (interventions implemented as appropriate)  Goal: Adult Individualization and Mutuality  Outcome: Ongoing (interventions implemented as appropriate)  Goal: Discharge Needs Assessment  Outcome: Ongoing (interventions implemented as appropriate)    Problem: Stroke (Ischemic) (Adult)  Goal: Signs and Symptoms of Listed Potential Problems Will be Absent or Manageable (Stroke)  Outcome: Ongoing (interventions implemented as appropriate)    Problem: Fall Risk (Adult)  Goal: Identify Related Risk Factors and Signs and Symptoms  Outcome: Outcome(s) achieved Date Met:  11/05/17  Goal: Absence of Falls  Outcome: Ongoing (interventions implemented as appropriate)

## 2017-11-05 NOTE — PROGRESS NOTES
Inpatient Rehabilitation Plan of Care Note    Plan of Care  Care Plan Reviewed - No updates at this time.    Sphincter Control    Performed Intervention(s)  Monitor I & O's  Encourage fluid intake  Timed voids/ scheduled toileting      Safety    Performed Intervention(s)  Falls prevention protocol  Safety rounds  Bed and chair alarms      Psychosocial    Performed Intervention(s)  Verbalizes needs and concerns  Therapeutic environmental set-up    Signed by: Saad Marquez RN

## 2017-11-05 NOTE — PROGRESS NOTES
Inpatient Rehabilitation Functional Measures Assessment    Functional Measures  YESI Eating:  [facility] Grooming: [facility] Bathing:  [facility] Upper Body Dressing:  [facility] Lower Body Dressing:  [facility] Toileting:  [facility] Bladder Management  Level of Assistance:  [place]  Frequency/Number of Accidents this Shift:  [facility] Bowel Management  Level of Assistance: [place]  Frequency/Number of Accidents this Shift: [facility] Bed/Chair/Wheelchair Transfer:  [facility] Toilet Transfer:  [facility] Tub/Shower Transfer:  [place]    Previously Documented Mode of Locomotion at Discharge: Field  YESI Expected Mode of Locomotion at Discharge: [facility] Walk/Wheelchair:  [facility] Stairs:  [facility] Comprehension:  Auditory comprehension is the usual mode. Comprehension  Score = 6, Modified [place].  Patient comprehends complex/abstract  information in their primary language, requiring: Additional time.  YESI Expression:  Vocal expression is the usual mode. Expression Score = 6,  Modified Independent.  Patient expresses complex/abstract information in their  primary language, requiring: Additional time.  YESI Social Interaction:  Social Interaction Score = 7, Independent. Patient is  completely independent for social interaction.  There are no activity  limitations.  YESI Problem Solving:  Problem Solving Score = 6, Modified [place].  Patient  makes appropriate decisions in order to solve complex problems with mild  difficulty but self-corrects.  YESI Memory:  Memory Score = 6, Modified [place].  Patient is modified  independent for memory, having only mild difficulty and using self-initiated or  environmental cues to remember.    Therapy Mode Minutes  Occupational Therapy: Branch  Physical Therapy: [place]  Speech Language Pathology:  [place]    Signed by: Ayleen Pennington RN

## 2017-11-05 NOTE — PROGRESS NOTES
Inpatient Rehabilitation Plan of Care Note    Plan of Care  Care Plan Reviewed - No updates at this time.    Sphincter Control    Performed Intervention(s)  Monitor I & O's  Encourage fluid intake  Timed voids/ scheduled toileting      Safety    Performed Intervention(s)  Falls prevention protocol  Safety rounds  Bed and chair alarms      Psychosocial    Performed Intervention(s)  Verbalizes needs and concerns  Therapeutic environmental set-up      Body Systems    Performed Intervention(s)  Blood glucose testing  Monitor labs  Medication    Signed by: Ayleen Pennington RN

## 2017-11-05 NOTE — PROGRESS NOTES
Inpatient Rehabilitation Functional Measures Assessment    Functional Measures  YESI Eating:  [facility] Grooming: [facility] Bathing:  [facility] Upper Body Dressing:  [facility] Lower Body Dressing:  [facility] Toileting:  [facility] Bladder Management  Level of Assistance:  [place]  Frequency/Number of Accidents this Shift:  [facility] Bowel Management  Level of Assistance: [place]  Frequency/Number of Accidents this Shift: [facility] Bed/Chair/Wheelchair Transfer:  [facility] Toilet Transfer:  [facility] Tub/Shower Transfer:  [place]    Previously Documented Mode of Locomotion at Discharge: [place]  YESI Expected Mode of Locomotion at Discharge: [facility] Walk/Wheelchair:  [facility] Stairs:  [facility] Comprehension:  Auditory comprehension is the usual mode. Comprehension  Score = 6, Modified [place].  Patient comprehends complex/abstract  information in their primary language, requiring: Additional time.  YESI Expression:  Vocal expression is the usual mode. Expression Score = 6,  Modified Independent.  Patient expresses complex/abstract information in their  primary language, requiring: Additional time.  YESI Social Interaction:  Social Interaction Score = 6, Modified Independent.  Patient is modified independent for social interaction, requiring: Requires  additional time.  YESI Problem Solving:  Problem Solving Score = 6, Modified [place].  Patient  makes appropriate decisions in order to solve complex problems, but requires  extra time.  YESI Memory:  Activity was not observed.    Therapy Mode Minutes  Occupational Therapy: Branch  Physical Therapy: [facility]  Speech Language Pathology:  [facility]    Signed by: Saad Marquez RN

## 2017-11-05 NOTE — PROGRESS NOTES
LOS: 5 days   Patient Care Team:  Calvin Dhillon Jr., DO as PCP - General (Internal Medicine)    Chief Complaint: same    Subjective     History of Present Illness    Subjective Pt is awake and alert. No c/o. No further N/V this am.     History taken from: patient    Objective     Vital Signs  Temp:  [97.5 °F (36.4 °C)-98.6 °F (37 °C)] 97.7 °F (36.5 °C)  Heart Rate:  [69-82] 69  Resp:  [16-20] 16  BP: (111-152)/(59-74) 111/59    Objective exam unchanged    Results Review:     I reviewed the patient's new clinical results.    Medication Review:     Assessment/Plan     Active Problems:    Stroke      Assessment & PlanContinue to prepare for dc.     Kwabena Becerra MD  11/05/17  11:35 AM    Time:

## 2017-11-05 NOTE — PLAN OF CARE
Problem: Patient Care Overview (Adult)  Goal: Plan of Care Review  Outcome: Ongoing (interventions implemented as appropriate)    11/05/17 0599   Coping/Psychosocial Response Interventions   Plan Of Care Reviewed With patient   Patient Care Overview   Progress improving   Outcome Evaluation   Outcome Summary/Follow up Plan Pt. nausea and vomit one time. Emesis is liquid and partially digested food. Pt. ate too much tonight per Pt. states. No medicine given. Sleeping good. No new issues noted at this time.         Problem: Stroke (Ischemic) (Adult)  Goal: Signs and Symptoms of Listed Potential Problems Will be Absent or Manageable (Stroke)  Outcome: Ongoing (interventions implemented as appropriate)    Problem: Fall Risk (Adult)  Goal: Absence of Falls  Outcome: Ongoing (interventions implemented as appropriate)

## 2017-11-05 NOTE — PROGRESS NOTES
Inpatient Rehabilitation Functional Measures Assessment    Functional Measures  YESI Eating:  Eating Score = 5. Patient is supervision/set-up for eating,  requiring: No assistive devices were required.  YESI Grooming: Grooming Score = 5. Patient is supervision/set-up for grooming,  requiring: Patient requires the following assistive device(s): Adapted  comb/brush.  YESI Bathing:  Patient took sponge bath. Bathing Score = 4.  Patient requires  minimal assistance for bathing, requiring steadying for balance only. Patient  requires the following assistive device(s): Grab bar/arm rest to maintain  balance. Grab bar/arm rest to maintain balance. Bath mitt.  YESI Upper Body Dressing:  Upper Body Dressing Score = 5. Patient is supervision  for upper body dressing, requiring: Verbal cuing, prompting, or instructing.  Patient requires the following assistive device(s): Zipper pull.  YESI Lower Body Dressing:  Lower Body Dressing Score = 5. Patient is  supervision/set-up for lower body dressing, requiring: No assistive devices were  required.  YESI Toileting:  Toileting Score = 5.  Patient is supervision/set-up for  toileting, requiring: No assistive devices were required.    YESI Bladder Management  Level of Assistance:  Bladder Score = 5.  Patient is supervision/set-up for  bladder management, requiring: No assistive devices were required.  Frequency/Number of Accidents this Shift:  Bladder accidents this shift:  0 .  Patient has not had an accident this shift.    YESI Bowel Management  Level of Assistance: Bowel Score = 4. Patient performs 75% or more of tasks and  requires minimal assistance for bowel management. [place] provides touching  (incidental) assist to help place patient on bedpan.  Frequency/Number of Accidents this Shift: Bowel accidents this shift: 0 .  Patient has not had an accident, but used a device/medication this shift  requiring: brife .    YESI Bed/Chair/Wheelchair Transfer:  Bed/chair/wheelchair Transfer Score  = 3.  Patient performs 50-74% of effort and requires moderate assistance (some  lifting) for transferring to and from the bed/chair/wheelchair. No assistive  devices were required.  YESI Toilet Transfer:  Toilet Transfer Score = 3.  Patient performs 50-74% of  effort and requires moderate assistance (some lifting) for transferring to and  from the toilet/commode. No assistive devices were required.  YESI Tub/Shower Transfer:  [place]    Previously Documented Mode of Locomotion at Discharge: [place]  YESI Expected Mode of Locomotion at Discharge: [place]  YESI Walk/Wheelchair:  [place]  YESI Stairs:  [place]    YESI Comprehension:  [place]  YESI Expression:  [place]  YESI Social Interaction:  [place]  YESI Problem Solving:  [place]  YESI Memory:  [place]    Therapy Mode Minutes  Occupational Therapy: [facility]  Physical Therapy: [facility]  Speech Language Pathology:  [place]    Signed by: KAYLEN Treadwell

## 2017-11-06 LAB
ALBUMIN SERPL-MCNC: 3.5 G/DL (ref 3.5–5.2)
ALBUMIN/GLOB SERPL: 1.2 G/DL
ALP SERPL-CCNC: 72 U/L (ref 39–117)
ALT SERPL W P-5'-P-CCNC: 24 U/L (ref 1–33)
ANION GAP SERPL CALCULATED.3IONS-SCNC: 15.7 MMOL/L
AST SERPL-CCNC: 30 U/L (ref 1–32)
BASOPHILS # BLD AUTO: 0.02 10*3/MM3 (ref 0–0.2)
BASOPHILS NFR BLD AUTO: 0.3 % (ref 0–1.5)
BILIRUB SERPL-MCNC: 0.4 MG/DL (ref 0.1–1.2)
BUN BLD-MCNC: 15 MG/DL (ref 8–23)
BUN/CREAT SERPL: 13.6 (ref 7–25)
CALCIUM SPEC-SCNC: 9.1 MG/DL (ref 8.6–10.5)
CHLORIDE SERPL-SCNC: 102 MMOL/L (ref 98–107)
CO2 SERPL-SCNC: 23.3 MMOL/L (ref 22–29)
CREAT BLD-MCNC: 1.1 MG/DL (ref 0.57–1)
DEPRECATED RDW RBC AUTO: 47.7 FL (ref 37–54)
EOSINOPHIL # BLD AUTO: 0.07 10*3/MM3 (ref 0–0.7)
EOSINOPHIL NFR BLD AUTO: 1 % (ref 0.3–6.2)
ERYTHROCYTE [DISTWIDTH] IN BLOOD BY AUTOMATED COUNT: 14.6 % (ref 11.7–13)
GFR SERPL CREATININE-BSD FRML MDRD: 49 ML/MIN/1.73
GLOBULIN UR ELPH-MCNC: 2.9 GM/DL
GLUCOSE BLD-MCNC: 132 MG/DL (ref 65–99)
GLUCOSE BLDC GLUCOMTR-MCNC: 144 MG/DL (ref 70–130)
GLUCOSE BLDC GLUCOMTR-MCNC: 97 MG/DL (ref 70–130)
HCT VFR BLD AUTO: 36.3 % (ref 35.6–45.5)
HGB BLD-MCNC: 11.4 G/DL (ref 11.9–15.5)
IMM GRANULOCYTES # BLD: 0.02 10*3/MM3 (ref 0–0.03)
IMM GRANULOCYTES NFR BLD: 0.3 % (ref 0–0.5)
INR PPP: 1.41 (ref 0.9–1.1)
LYMPHOCYTES # BLD AUTO: 2.25 10*3/MM3 (ref 0.9–4.8)
LYMPHOCYTES NFR BLD AUTO: 33.5 % (ref 19.6–45.3)
MCH RBC QN AUTO: 28.1 PG (ref 26.9–32)
MCHC RBC AUTO-ENTMCNC: 31.4 G/DL (ref 32.4–36.3)
MCV RBC AUTO: 89.6 FL (ref 80.5–98.2)
MONOCYTES # BLD AUTO: 0.6 10*3/MM3 (ref 0.2–1.2)
MONOCYTES NFR BLD AUTO: 8.9 % (ref 5–12)
NEUTROPHILS # BLD AUTO: 3.76 10*3/MM3 (ref 1.9–8.1)
NEUTROPHILS NFR BLD AUTO: 56 % (ref 42.7–76)
PLATELET # BLD AUTO: 172 10*3/MM3 (ref 140–500)
PMV BLD AUTO: 12.1 FL (ref 6–12)
POTASSIUM BLD-SCNC: 4.4 MMOL/L (ref 3.5–5.2)
PROT SERPL-MCNC: 6.4 G/DL (ref 6–8.5)
PROTHROMBIN TIME: 16.8 SECONDS (ref 11.7–14.2)
RBC # BLD AUTO: 4.05 10*6/MM3 (ref 3.9–5.2)
SODIUM BLD-SCNC: 141 MMOL/L (ref 136–145)
WBC NRBC COR # BLD: 6.72 10*3/MM3 (ref 4.5–10.7)

## 2017-11-06 PROCEDURE — 82962 GLUCOSE BLOOD TEST: CPT

## 2017-11-06 PROCEDURE — 85610 PROTHROMBIN TIME: CPT | Performed by: PHYSICAL MEDICINE & REHABILITATION

## 2017-11-06 PROCEDURE — 97535 SELF CARE MNGMENT TRAINING: CPT | Performed by: OCCUPATIONAL THERAPIST

## 2017-11-06 PROCEDURE — 92526 ORAL FUNCTION THERAPY: CPT

## 2017-11-06 PROCEDURE — 80053 COMPREHEN METABOLIC PANEL: CPT | Performed by: PHYSICAL MEDICINE & REHABILITATION

## 2017-11-06 PROCEDURE — 25010000002 ENOXAPARIN PER 10 MG: Performed by: PHYSICAL MEDICINE & REHABILITATION

## 2017-11-06 PROCEDURE — 85025 COMPLETE CBC W/AUTO DIFF WBC: CPT | Performed by: PHYSICAL MEDICINE & REHABILITATION

## 2017-11-06 PROCEDURE — 97112 NEUROMUSCULAR REEDUCATION: CPT | Performed by: OCCUPATIONAL THERAPIST

## 2017-11-06 PROCEDURE — 97532: CPT

## 2017-11-06 PROCEDURE — 97110 THERAPEUTIC EXERCISES: CPT

## 2017-11-06 RX ORDER — WARFARIN SODIUM 10 MG/1
10 TABLET ORAL
Status: DISCONTINUED | OUTPATIENT
Start: 2017-11-06 | End: 2017-11-07

## 2017-11-06 RX ADMIN — Medication 3 MG: at 23:03

## 2017-11-06 RX ADMIN — FOLIC ACID 1 MG: 1 TABLET ORAL at 08:53

## 2017-11-06 RX ADMIN — WARFARIN SODIUM 10 MG: 10 TABLET ORAL at 17:00

## 2017-11-06 RX ADMIN — ALLOPURINOL 200 MG: 100 TABLET ORAL at 08:53

## 2017-11-06 RX ADMIN — Medication 1 TABLET: at 08:53

## 2017-11-06 RX ADMIN — VITAMIN D, TAB 1000IU (100/BT) 1000 UNITS: 25 TAB at 08:53

## 2017-11-06 RX ADMIN — ASPIRIN 81 MG: 81 TABLET, CHEWABLE ORAL at 08:54

## 2017-11-06 RX ADMIN — LEVOTHYROXINE SODIUM 112 MCG: 112 TABLET ORAL at 05:07

## 2017-11-06 RX ADMIN — ENOXAPARIN SODIUM 100 MG: 100 INJECTION SUBCUTANEOUS at 08:51

## 2017-11-06 RX ADMIN — METFORMIN HYDROCHLORIDE 500 MG: 500 TABLET ORAL at 08:53

## 2017-11-06 RX ADMIN — ENOXAPARIN SODIUM 100 MG: 100 INJECTION SUBCUTANEOUS at 20:31

## 2017-11-06 RX ADMIN — ATORVASTATIN CALCIUM 80 MG: 80 TABLET, FILM COATED ORAL at 20:30

## 2017-11-06 RX ADMIN — VITAMIN D, TAB 1000IU (100/BT) 1000 UNITS: 25 TAB at 17:00

## 2017-11-06 NOTE — PLAN OF CARE
Problem: Patient Care Overview (Adult)  Goal: Plan of Care Review  Outcome: Ongoing (interventions implemented as appropriate)    11/06/17 1213   Coping/Psychosocial Response Interventions   Plan Of Care Reviewed With patient   Patient Care Overview   Progress improving   Outcome Evaluation   Outcome Summary/Follow up Plan DC changed to BID         Problem: Stroke (Ischemic) (Adult)  Goal: Signs and Symptoms of Listed Potential Problems Will be Absent or Manageable (Stroke)  Outcome: Ongoing (interventions implemented as appropriate)    11/06/17 1213   Stroke (Ischemic)   Problems Assessed (Stroke (Ischemic)/TIA) motor/sensory impairment   Problems Present (Stroke (Ischemic)/TIA) motor/sensory impairment         Problem: Fall Risk (Adult)  Goal: Absence of Falls  Outcome: Ongoing (interventions implemented as appropriate)    11/06/17 1213   Fall Risk (Adult)   Absence of Falls making progress toward outcome

## 2017-11-06 NOTE — THERAPY TREATMENT NOTE
Inpatient Rehabilitation - Occupational Therapy Treatment Note  [facility]     Patient Name: Alice Maxwell  : 1945  MRN: [id number]  Today's Date: 2017  Onset of Illness/Injury or Date of Surgery Date: 10/28/17  Date of Referral to OT: 17  Referring Physician: Jeremie      Admit Date: 10/31/2017    Visit Dx:     ICD-10-CM ICD-9-CM   1. Oropharyngeal dysphagia R13.12 787.22     Patient Active Problem List   Diagnosis   • Stroke             Adult Rehabilitation Note       17 1550 17 1330 17 1203    Rehab Assessment/Intervention    Discipline occupational therapist  -KP speech language pathologist  -OC occupational therapist  -KP    Document Type therapy note (daily note)  -KP therapy note (daily note)  -OC therapy note (daily note)  -KP    Subjective Information complains of;agree to therapy;fatigue  -KP no complaints;agree to therapy  -OC agree to therapy;no complaints  -KP    Patient Effort, Rehab Treatment good  -KP good  -OC good  -KP    Symptoms Noted During/After Treatment none  -KP none  -OC none  -KP    Precautions/Limitations fall precautions  -KP  fall precautions  -KP    Recorded by [KP] Vero York OTR [OC] Lilian Mohr MA,CCC-SLP [KP] Vero York OTR    Pain Assessment    Pain Assessment No/denies pain  -KP No/denies pain  -OC No/denies pain  -KP    Recorded by [KP] KANDY Sultana [OC] Lilian Mohr MA,CCC-SLP [KP] KANDY Sultana    Vision Assessment/Intervention    Visual Impairment visual impairment, left;inattention to the left  -KP      Recorded by [KP] Vero York OTR      Cognitive Assessment/Intervention    Current Cognitive/Communication Assessment impaired  -KP impaired  -OC impaired  -KP    Orientation Status oriented to;person;place;time  -KP oriented to;person;place;time  -OC oriented to;person;place  -KP    Follows Commands/Answers Questions 100% of the time;able to follow single-step instructions   -% of the time;able to follow multi-step instructions;needs cueing;able to follow single-step instructions;needs increased time  -% of the time;able to follow single-step instructions  -KP    Personal Safety mild impairment  -KP  mild impairment  -    Personal Safety Interventions fall prevention program maintained;gait belt;muscle strengthening facilitated;nonskid shoes/slippers when out of bed  -  fall prevention program maintained;gait belt;nonskid shoes/slippers when out of bed  -KP    Recorded by [] Vero York, OTR [OC] Lilian Mohr MA,CCC-SLP [] Vero York, OTR    Improve executive function skills    Status: Improve executive function skills  Progressing as expected  -OC     Executive Function Skills Progress  20%;without cues;70%;with consistent cues   organization task of dates/holidays  -OC     Comments: Improve executive function skills  Pt demonstrated difficulty with cell phone this date  -OC     Recorded by  [OC] Lilian Mohr MA,CCC-SLP     Improve oral skills    Status: Improve Oral Skills  Progressing as expected  -OC     Oral Skills Progress  70%;with inconsistent cues  -OC     Recorded by  [OC] Lilian Mohr MA,CCC-SLP     Improve laryngeal elevation    Status: Improve laryngeal elevation  Progressing as expected  -OC     Laryngeal Elevation Progress  60%;with inconsistent cues  -OC     Recorded by  [OC] Lilian Mohr MA,CCC-SLP     Improve tongue base & pharyngeal wall squeeze    Status: Improve tongue base & pharyngeal wall squeeze  Progressing as expected  -OC     Tongue Base/Pharyngeal Wall Squeeze Progress  60%;with inconsistent cues  -OC     Recorded by  [OC] Lilian Mohr MA,CCC-SLP     Bed Mobility, Assessment/Treatment    Bed Mob, Supine to Sit, [place] not tested  -      Bed Mob, Sit to Supine, [place] set up required;supervision required  -      Bed Mobility, Comment   pt up in   -    Recorded by [] Vero York, OTR   [KP] Vero York OTR    Transfer Assessment/Treatment    Transfers, Sit-Stand [place] set up required;supervision required;stand by assist  -  stand by assist;contact guard assist  -    Transfers, Stand-Sit [place] stand by assist  -  set up required;stand by assist  -KP    Transfers, Sit-Stand-Sit, Assist Device bed rails  -KP  other (see comments)   wc and grab bars  -    Walk-In Shower Transfer, [place]   set up required;contact guard assist;supervision required  -    Walk-In Shower Transfer, Assist Device   other (see comments)   emy bench  -KP    Recorded by [] Vero York OTR  [KP] Vero York OTR    Upper Body Bathing Assessment/Training    UB Bathing Assess/Train Assistive Device   hand-held shower head;tub bench  -    UB Bathing Assess/Train, Position   sitting  -    UB Bathing Assess/Train, [place] Level   stand by assist;set up required  -KP    Recorded by   [] Vero York OTR    Lower Body Bathing Assessment/Training    LB Bathing Assess/Train Assistive Device   hand-held shower head;grab bars;tub bench  -KP    LB Bathing Assess/Train, Position   sitting;standing  -KP    LB Bathing Assess/Train, [place] Level   set up required;stand by assist  -KP    Recorded by   [] Vero York OTR    Upper Body Dressing Assessment/Training    UB Dressing Assess/Train, Clothing Type   donning:;doffing:;t-shirt  -    UB Dressing Assess/Train, Position   sitting  -    UB Dressing Assess/Train, [place]   set up required;supervision required  -KP    Recorded by   [] Vero York OTR    Lower Body Dressing Assessment/Training    LB Dressing Assess/Train, Clothing Type   doffing:;donning:;pants;slipper socks  -    LB Dressing Assess/Train, Position   sitting;standing  -KP    LB Dressing Assess/Train, [place]   set up required;contact guard assist;supervision required  -KP    Recorded by   []  KANDY Sultana    Grooming Assessment/Training    Grooming Assess/Train, Position   sitting  -KP    Grooming Assess/Train, [place] Level   set up required;supervision required  -KP    Recorded by   [KP] KANDY Sultana    Balance Skills Training    Sitting-Level of Assistance   Close supervision  -KP    Sitting-Balance Support   Feet supported  -KP    Standing-Level of Assistance Close supervision  -KP  Close supervision;Contact guard  -KP    Static Standing Balance Support Right upper extremity supported  -KP  Right upper extremity supported   intermittent UE support  -KP    Recorded by [KP] KANDY Sultana  [KP] KANDY Sultana    Therapy Exercises    Left Upper Extremity AROM:;15 reps;sitting;elbow flexion/extension;shoulder extension/flexion  -KP      Recorded by [BRIANNA] KANDY Sultana      Fine Motor Coordination Training    Detail (Fine Motor Coordination Training) graps pennies w. L hand using index and thumb and inserting into small slot to inc FMC w. min difficulty. 2pt pinch on sq pegs and inserts them into board x30 to inc FMC no difficulty. Graps small colored pegs to complete peg design w. very little difficulty.  -KP      Recorded by [KP] KANDY Sultana      Positioning and Restraints    Pre-Treatment Position sitting in chair/recliner  -KP  sitting in chair/recliner  -KP    Post Treatment Position bed  -KP  wheelchair  -KP    In Bed supine;call light within reach;exit alarm on;encouraged to call for assist  -KP      In Wheelchair   sitting;call light within reach;encouraged to call for assist  -KP    Recorded by [KP] KANDY Sultana  [KP] KANDY Sultana      11/06/17 1116 11/04/17 1500 11/04/17 1210    Rehab Assessment/Intervention    Discipline physical therapist  -MD speech language pathologist  -JALEN occupational therapist  -BRIANNA    Document Type therapy note (daily note)  -MD therapy note (daily note)  -JALEN therapy  note (daily note)  -KP    Subjective Information agree to therapy;no complaints  -MD agree to therapy  -JJ agree to therapy;complains of;fatigue  -KP    Patient Effort, Rehab Treatment good  -MD adequate  -JJ good  -KP    Symptoms Noted During/After Treatment none  -MD  none  -KP    Precautions/Limitations fall precautions  -MD  fall precautions;other (see comments)   [facility] . hearing aides  -KP    Specific Treatment Considerations   hearing aids  -KP    Equipment Issued to Patient gait belt;front wheeled walker  -MD      Recorded by [MD] Nereida Llamas, PT [JJ] Sepideh Yepez, MS CCC-SLP [KP] Vero York, OTR    Pain Assessment    Pain Assessment No/denies pain  -MD  No/denies pain  -KP    Recorded by [MD] Nereida Llamas, PT  [] Vero York, OTR    Vision Assessment/Intervention    Visual Impairment visual impairment, left;inattention to the left;left neglect  -MD  visual impairment, left;inattention to the left;left neglect  -KP    Recorded by [MD] Nereida Llamas, PT  [KP] Vero York, OTR    Cognitive Assessment/Intervention    Current Cognitive/Communication Assessment impaired  -MD  impaired  -KP    Orientation Status oriented to;person;place  -MD  oriented to;place;person;time  -KP    Follows Commands/Answers Questions 100% of the time;needs cueing;needs repetition;needs increased time  -MD  100% of the time;able to follow single-step instructions;needs cueing  -    Personal Safety decreased insight to deficits  -MD  mild impairment  -KP    Personal Safety Interventions fall prevention program maintained;gait belt;muscle strengthening facilitated;nonskid shoes/slippers when out of bed;supervised activity  -MD  fall prevention program maintained;gait belt;muscle strengthening facilitated;nonskid shoes/slippers when out of bed  -    Recorded by [MD] Nereida Llamas, PT  [KP] Vero York, OTR    Swallow Assessment/Intervention    Additional Documentation  --   [facility] soft test tray   -JJ     Recorded by  [JJ] Sepideh Yepez MS CCC-SLP     Improve oral skills    To Improve Oral Skills, patient will:  Increase lip closure;Increase back of tongue control;90%;without cues  -JJ     Oral Skills Progress  50%;with consistent cues  -JJ     Recorded by  [JJ] Sepideh Yepez, MS CCC-SLP     Improve laryngeal elevation    To improve laryngeal elevation, patient will:  Complete pitch-glide;90%;without cues  -JJ     Laryngeal Elevation Progress  60%;with consistent cues  -JJ     Recorded by  [JJ] Sepideh Yepez MS CCC-SLP     Improve tongue base & pharyngeal wall squeeze    To improve tongue base & pharyngeal wall squeeze, patient will:  Complete gargle/hold retraction;Complete tongue base retraction;Complete effortful swallow;90%;without cues  -JJ     Tongue Base/Pharyngeal Wall Squeeze Progress  50%;with consistent cues  -JJ     Recorded by  [JJ] Sepideh Yepez MS CCC-SLP     Bed Mobility, Assessment/Treatment    Bed Mob, Supine to Sit, [place] not tested  -MD      Bed Mob, Sit to Supine, [place] not tested  -MD      Bed Mobility, Comment   up in   -    Recorded by [MD] Nereida Llamas, PT  [KP] Vero York, OTR    Transfer Assessment/Treatment    Transfers, Sit-Stand [place] contact guard assist  -MD  contact guard assist  -    Transfers, Stand-Sit [place] contact guard assist  -MD  contact guard assist  -KP    Transfers, Sit-Stand-Sit, Assist Device rolling walker  -MD  rolling walker  -    Toilet Transfer, [place]   contact guard assist  -    Toilet Transfer, Assistive Device   wheelchair;other (see comments)   grab bars  -    Walk-In Shower Transfer, [place]   contact guard assist  -KP    Walk-In Shower Transfer, Assist Device   other (see comments);standard shower chair   grab bars. shower bench  -    Recorded by [MD] Nereida Llamas, PT  [KP] Vero York, OTR    Gait Assessment/Treatment    Gait,  [place] Level verbal cues required;contact guard assist  -MD      Gait, Assistive Device rolling walker  -MD      Gait, Distance (Feet) 80  -MD      Gait, Gait Deviations bilateral:;janet decreased;forward flexed posture;step length decreased;toe-to-floor clearance decreased  -MD      Gait, Safety Issues step length decreased  -MD      Gait, Impairments impaired balance;strength decreased  -MD      Gait, Comment 20` on carpet CGA, RWx  -MD      Recorded by [MD] Nereida Llamas, PT      Stairs Assessment/Treatment    Number of Stairs 4  -MD      Stairs, Handrail Location both sides  -MD      Stairs, [place] Level verbal cues required;contact guard assist  -MD      Stairs, Technique Used step over step (ascending);step over step (descending)  -MD      Stairs, Impairments strength decreased;impaired balance  -MD      Recorded by [MD] Nereida Llamas, PT      Upper Body Bathing Assessment/Training    UB Bathing Assess/Train Assistive Device   hand-held shower head;shower chair with back  -    UB Bathing Assess/Train, Position   sitting  -    UB Bathing Assess/Train, [place] Level   set up required;stand by assist  -    Recorded by   [KP] Vero York, SARAR    Lower Body Bathing Assessment/Training    LB Bathing Assess/Train Assistive Device   grab bars;hand-held shower head;shower chair with back  -KP    LB Bathing Assess/Train, Position   sitting;standing  -    LB Bathing Assess/Train, [place] Level   set up required  -    Recorded by   [KP] Vero York, OTR    Upper Body Dressing Assessment/Training    UB Dressing Assess/Train, Clothing Type   doffing:;donning:;t-shirt  -    UB Dressing Assess/Train, Position   sitting  -    UB Dressing Assess/Train, [place]   set up required;supervision required  -    Recorded by   [KP] Vero York, OTR    Lower Body Dressing Assessment/Training    LB Dressing Assess/Train, Clothing Type   doffing:;donning:;pants;socks  -     LB Dressing Assess/Train, Position   sitting;standing  -KP    LB Dressing Assess/Train, [place]   set up required;contact guard assist  -KP    Recorded by   [KP] KANDY Sultana    Toileting Assessment/Training    Toileting Assess/Train, Assistive Device   grab bars  -KP    Toileting Assess/Train, Position   sitting;standing  -KP    Toileting Assess/Train, [place] Level   set up required;contact guard assist  -KP    Recorded by   [KP] KANDY Sultana    Grooming Assessment/Training    Grooming Assess/Train, Position   sitting  -KP    Grooming Assess/Train, [place] Level   supervision required;set up required  -KP    Recorded by   [KP] KANDY Sultana    Balance Skills Training    Sitting-Level of Assistance   Close supervision  -KP    Sitting-Balance Support   Feet supported  -KP    Standing-Level of Assistance   Contact guard  -KP    Static Standing Balance Support   Right upper extremity supported  -KP    Recorded by   [KP] KANDY Sultana    Therapy Exercises    BLE Other Reps --   nustep at level 2 for 6 min  -MD      Left Upper Extremity   AROM:;20 reps;sitting;elbow flexion/extension;shoulder extension/flexion;shoulder abduction/adduction  -KP    Recorded by [MD] Nereida Llamas, PT  [KP] Vero York, SARAR    Fine Motor Coordination Training    Detail (Fine Motor Coordination Training)   isnerts pegs into small hole. colored pegs to inc L FMC. w. minVC  -KP    Recorded by   [KP] KANDY Sultana    Positioning and Restraints    Pre-Treatment Position sitting in chair/recliner  -MD  bathroom  -KP    Post Treatment Position wheelchair  -MD  wheelchair  -KP    In Wheelchair with OT  -MD  sitting;with PT  -KP    Recorded by [MD] Nereida Llamas, PT  [KP] Vero York OTR      11/04/17 1009          Rehab Assessment/Intervention    Discipline physical therapist  -MD      Document Type therapy note (daily note)  -MD      Subjective Information no  complaints;agree to therapy  -MD      Patient Effort, Rehab Treatment good  -MD      Symptoms Noted During/After Treatment none  -MD      Precautions/Limitations fall precautions  -MD      Equipment Issued to Patient gait belt  -MD      Recorded by [MD] Nereida Llamas PT      Pain Assessment    Pain Assessment No/denies pain  -MD      Recorded by [MD] Nereida Llamas PT      Vision Assessment/Intervention    Visual Impairment left neglect  -MD      Recorded by [MD] Nereida Llamas PT      Cognitive Assessment/Intervention    Current Cognitive/Communication Assessment impaired  -MD      Orientation Status oriented to;person;place;time  -MD      Follows Commands/Answers Questions 100% of the time;needs cueing;needs increased time;needs repetition  -MD      Personal Safety decreased insight to deficits  -MD      Personal Safety Interventions fall prevention program maintained;gait belt;muscle strengthening facilitated;nonskid shoes/slippers when out of bed;supervised activity  -MD      Recorded by [MD] Nereida Llamas PT      Bed Mobility, Assessment/Treatment    Bed Mob, Supine to Sit, [place] not tested  -MD      Bed Mob, Sit to Supine, [place] not tested  -MD      Recorded by [MD] Nereida Llamas PT      Transfer Assessment/Treatment    Transfers, Sit-Stand [place] contact guard assist  -MD      Transfers, Stand-Sit [place] contact guard assist  -MD      Transfers, Sit-Stand-Sit, Assist Device rolling walker  -MD      Transfer, Safety Issues step length decreased;weight-shifting ability decreased;balance decreased during turns;sequencing ability decreased  -MD      Transfer, Impairments strength decreased;impaired balance  -MD      Recorded by [MD] Nereida Llamas PT      Gait Assessment/Treatment    Gait, [place] Level verbal cues required;contact guard assist  -MD      Gait, Assistive Device rolling walker  -MD      Gait, Distance (Feet) 80  -MD      Gait, Gait Deviations bilateral:;janet decreased;forward flexed  posture;step length decreased;toe-to-floor clearance decreased;decreased heel strike  -MD      Gait, Safety Issues step length decreased  -MD      Gait, Impairments impaired balance;strength decreased  -MD      Recorded by [MD] Nereida Llamas PT      Stairs Assessment/Treatment    Number of Stairs 4  -MD      Stairs, Handrail Location both sides  -MD      Stairs, [place] Level verbal cues required;contact guard assist  -MD      Stairs, Technique Used step over step (ascending);step to step (descending)  -MD      Stairs, Safety Issues sequencing ability decreased  -MD      Stairs, Impairments strength decreased;impaired balance  -MD      Recorded by [MD] Nereida Llamas PT      Therapy Exercises    Bilateral Lower Extremities AROM:;20 reps;sitting;ankle pumps/circles;hip abduction/adduction;hip flexion;LAQ  -MD      BLE Other Reps --   nustep at level 2 for 7 min  -MD      Recorded by [MD] Nereida Llamas PT      Positioning and Restraints    Pre-Treatment Position sitting in chair/recliner  -MD      Post Treatment Position wheelchair  -MD      In Wheelchair with SLP;with family/caregiver  -MD      Recorded by [MD] Nereida Llamas PT        User Key  (r) = Recorded By, (t) = Taken By, (c) = Cosigned By    Initials Name Effective Dates    OC Lilian Mohr MA,[facility]-SLP 04/05/17 -     JALEN Yepez, MS CCC-SLP 04/13/15 -     BRIANNA York, OTR 04/13/15 -     MD Nereida Llamas, PT 12/01/15 -                 OT Goals       11/01/17 1554 11/01/17 1230       Transfer Training OT STG    Transfer Training OT STG, Date Established  11/01/17  -SO     Transfer Training OT STG, Time to Achieve  1 wk  -SO     Transfer Training OT STG, Activity Type  tub;walk-in shower;shower chair  -SO     Transfer Training OT STG, [place] Level  contact guard assist  -SO     Transfer Training OT STG, Assist Device  walker, rolling;shower chair  -SO     Transfer Training OT LTG    Transfer Training OT LTG, Date Established  11/01/17  -SO      Transfer Training OT LTG, Time to Achieve  by discharge  -SO     Transfer Training OT LTG, Activity Type  tub;walk-in shower;shower chair  -SO     Transfer Training OT LTG, [place] Level  supervision required  -SO     Transfer Training OT LTG, Assist Device  walker, rolling;shower chair  -SO     Transfer Training 2 OT STG    Transfer Training 2 OT STG, Date Established  11/01/17  -SO     Transfer Training 2 OT STG, Time to Achieve  1 wk  -SO     Transfer Training 2 OT STG, Activity Type  toilet  -SO     Transfer Training 2 OT STG, [place] Level  contact guard assist  -SO     Transfer Training 2 OT STG, Assist Device  walker, rolling  -SO     Transfer Training 2 OT LTG    Transfer Training 2 OT LTG, Date Established  11/01/17  -SO     Transfer Training 2 OT LTG, Time to Achieve  by discharge  -SO     Transfer Training 2 OT LTG, Activity Type  toilet  -SO     Transfer Training 2 OT LTG, [place] Level  supervision required  -SO     Transfer Training 2 OT LTG, Assist Device  walker, rolling  -SO     Strength OT LTG    Strength Goal OT LTG, Date Established 11/01/17  -SO      Strength Goal OT LTG, Time to Achieve by discharge  -SO      Strength Goal OT LTG, Measure to Achieve Pt to increase LUE strength to 4-/5 to increase independence with ADLs.  -SO      Caregiver Training OT LTG    Caregiver Training OT LTG, Date Established  11/01/17  -SO     Caregiver Training OT LTG, Time to Achieve  by discharge  -SO     Caregiver Training OT LTG, [place] Level  able to assist adequately;able to cue patient adequately  -SO     Patient Education OT LTG    Patient Education OT LTG, Date Established  11/01/17  -SO     Patient Education OT LTG, Time to Achieve  by discharge  -SO     Patient Education OT LTG, Education Type  written program;HEP;aware of neuro deficits;home safety  -SO     Patient Education OT LTG, Education Understanding  independent;demonstrates adequately;verbalizes understanding  -SO      Isolated Movement OT LTG    Isolated Movement OT LTG, Date Established 11/01/17  -SO      Isolated Movement OT LTG, Time to Achieve by discharge  -SO      Isolated Movement OT LTG, Body Area left scapula;left shoulder;left elbow;left forearm;left wrist;left fingers  -SO      Isolated Movement OT LTG, Level WFL  -SO      Coordination OT LTG    Coordination OT LTG, Date Established 11/01/17  -SO      Coordination OT LTG, Time to Achieve by discharge  -SO      Coordination OT LTG, Activity Type FM written ex program;GM written ex program;FM task;GM task  -SO      Coordination OT LTG, [place] Level independent  -SO      Coordination OT LTG, Additional Goal LUE  -SO      ADL OT STG    ADL OT STG, Date Established  11/01/17  -SO     ADL OT STG, Time to Achieve  1 wk  -SO     ADL OT STG, Activity Type  ADL skills  -SO     ADL OT STG, [place] Level  contact guard;assistive device  -SO     ADL OT LTG    ADL OT LTG, Date Established  11/01/17  -SO     ADL OT LTG, Time to Achieve  by discharge  -SO     ADL OT LTG, Activity Type  ADL skills  -SO     ADL OT LTG, [place] Level  standby assist;assistive device  -SO       User Key  (r) = Recorded By, (t) = Taken By, (c) = Cosigned By    Initials Name Provider Type    SO Micki Castro OTR Occupational Therapist          Occupational Therapy Education     Title: PT OT SLP Therapies (Active)     Topic: Occupational Therapy (Active)     Point: ADL training (Done)    Description: Instruct learner(s) on proper safety adaptation and remediation techniques during self care or transfers.   Instruct in proper use of assistive devices.    Learning Progress Summary    Learner Readiness Method Response Comment Documented by Status   Patient Acceptance E,NOEMY CLARK,NR ed pt on shower tsf technique. ed pt on bathing safety. pt demo shower w. CGA to SBA. mostly SBA. KP 11/06/17 1207 Done    Acceptance E EDUARDO Discuss OT goals and POC. SO 11/01/17 1232 Done   Family Acceptance CAR CLARK  Discuss OT goals and POC.  11/01/17 1232 Done               Point: Precautions (Done)    Description: Instruct learner(s) on prescribed precautions during self-care and functional transfers.    Learning Progress Summary    Learner Readiness Method Response Comment Documented by Status   Patient Acceptance E,NOEMY CLARK,NR ed pt on shower tsf technique. ed pt on bathing safety. pt demo shower w. CGA to SBA. mostly SBA.  11/06/17 1207 Done               Point: Body mechanics (Done)    Description: Instruct learner(s) on proper positioning and spine alignment during self-care, functional mobility activities and/or exercises.    Learning Progress Summary    Learner Readiness Method Response Comment Documented by Status   Patient Acceptance E,NOEMY CLARK,NR ed pt on shower tsf technique. ed pt on bathing safety. pt demo shower w. CGA to SBA. mostly SBA.  11/06/17 1207 Done                      User Key     Initials Effective Dates Name Provider Type Discipline     04/13/15 -  Micki Castro OTR Occupational Therapist OT     04/13/15 -  KANDY Sultana Occupational Therapist OT                  OT Recommendation and Plan  Therapy Frequency: 5 times/wk          Time Calculation:         Time Calculation- OT       11/06/17 1555 11/06/17 1208       Time Calculation- OT    OT Start Time 1400  - 0900  -     OT Stop Time 1430  - 0930  -     OT Time Calculation (min) 30 min  - 30 min  -       User Key  (r) = Recorded By, (t) = Taken By, (c) = Cosigned By    Initials Name Provider Type     KANDY Sultana Occupational Therapist           Therapy Charges for Today     Code Description Service Date Service Provider Modifiers Qty    62705229852 HC OT SELF CARE/MGMT/TRAIN EA 15 MIN 11/6/2017 KANDY Sultana GO 2    [id number] HC OT NEUROMUSC RE EDUCATION EA 15 MIN 11/6/2017 AKNDY Sultana GO 2               KANDY Sultana  11/6/2017

## 2017-11-06 NOTE — PROGRESS NOTES
Inpatient Rehabilitation Functional Measures Assessment    Functional Measures  YESI Eating:  [facility] Grooming: [facility] Bathing:  [facility] Upper Body Dressing:  [facility] Lower Body Dressing:  [facility] Toileting:  [facility] Bladder Management  Level of Assistance:  Petal  Frequency/Number of Accidents this Shift:  [facility] Bowel Management  Level of Assistance: [place]  Frequency/Number of Accidents this Shift: [facility] Bed/Chair/Wheelchair Transfer:  [facility] Toilet Transfer:  [facility] Tub/Shower Transfer:  Petal    Previously Documented Mode of Locomotion at Discharge: Field  YESI Expected Mode of Locomotion at Discharge: [facility] Walk/Wheelchair:  [facility] Stairs:  [facility] Comprehension:  Auditory comprehension is the usual mode. Comprehension  Score = 6, Modified [place].  Patient comprehends complex/abstract  information in their primary language, requiring:  YESI Expression:  Non-vocal expression is the usual mode. Expression Score = 6,  Modified Independent. Patient expresses complex/abstract information in their  primary language, requiring:  YESI Social Interaction:  Social Interaction Score = 6, Modified Independent.  Patient is modified independent for social interaction, requiring:  YESI Problem Solving:  Problem Solving Score = 6, Modified [place].  Patient  makes appropriate decisions in order to solve complex problems, but requires  extra time.  YESI Memory:  Memory Score = 6, Modified [place].  Patient is modified  independent for memory, requiring:    Therapy Mode Minutes  Occupational Therapy: Branch  Physical Therapy: Petal  Speech Language Pathology:  Petal    Signed by: Carolina King RN

## 2017-11-06 NOTE — THERAPY TREATMENT NOTE
Inpatient Rehabilitation - Speech Language Pathology Treatment Note  [facility]     Patient Name: Alice Maxwell  : 1945  MRN: [id number]  Today's Date: 2017         Admit Date: 10/31/2017    Visit Dx:      ICD-10-CM ICD-9-CM   1. Oropharyngeal dysphagia R13.12 787.22     Patient Active Problem List   Diagnosis   • Stroke              Adult Rehabilitation Note       17 1330 17 1203 17 1116    Rehab Assessment/Intervention    Discipline speech language pathologist  -OC occupational therapist  -KP physical therapist  -MD    Document Type therapy note (daily note)  -OC therapy note (daily note)  -KP therapy note (daily note)  -MD    Subjective Information no complaints;agree to therapy  -OC agree to therapy;no complaints  -KP agree to therapy;no complaints  -MD    Patient Effort, Rehab Treatment good  -OC good  -KP good  -MD    Symptoms Noted During/After Treatment none  -OC none  -KP none  -MD    Precautions/Limitations  fall precautions  -KP fall precautions  -MD    Equipment Issued to Patient   gait belt;front wheeled walker  -MD    Recorded by [OC] Lilian Mohr MA,CCC-SLP [KP] Vero York, OTR [MD] Nereida Llamas, PT    Pain Assessment    Pain Assessment No/denies pain  -OC No/denies pain  -KP No/denies pain  -MD    Recorded by [OC] Lilian Mohr MA,CCC-SLP [KP] Vero York, OTR [MD] Nereida Llamas, PT    Vision Assessment/Intervention    Visual Impairment   visual impairment, left;inattention to the left;left neglect  -MD    Recorded by   [MD] Nereida Llamas PT    Cognitive Assessment/Intervention    Current Cognitive/Communication Assessment impaired  -OC impaired  -KP impaired  -MD    Orientation Status oriented to;person;place;time  -OC oriented to;person;place  -KP oriented to;person;place  -MD    Follows Commands/Answers Questions 100% of the time;able to follow multi-step instructions;needs cueing;able to follow single-step instructions;needs increased time  -% of  the time;able to follow single-step instructions  - 100% of the time;needs cueing;needs repetition;needs increased time  -MD    Personal Safety  mild impairment  - decreased insight to deficits  -MD    Personal Safety Interventions  fall prevention program maintained;gait belt;nonskid shoes/slippers when out of bed  - fall prevention program maintained;gait belt;muscle strengthening facilitated;nonskid shoes/slippers when out of bed;supervised activity  -MD    Recorded by [OC] Lilian Mohr MA,CCC-SLP [] Vero York, OTR [MD] Nereida Llamas, PT    Improve executive function skills    Status: Improve executive function skills Progressing as expected  -OC      Executive Function Skills Progress 20%;without cues;70%;with consistent cues   organization task of dates/holidays  -OC      Comments: Improve executive function skills Pt demonstrated difficulty with cell phone this date  -OC      Recorded by [OC] Lilian Mohr MA,BEN-SLP      Improve oral skills    Status: Improve Oral Skills Progressing as expected  -OC      Oral Skills Progress 70%;with inconsistent cues  -OC      Recorded by [OC] Lilian Mohr MA,CCC-SLP      Improve laryngeal elevation    Status: Improve laryngeal elevation Progressing as expected  -OC      Laryngeal Elevation Progress 60%;with inconsistent cues  -OC      Recorded by [OC] Lilian Mohr MA,CCC-SLP      Improve tongue base & pharyngeal wall squeeze    Status: Improve tongue base & pharyngeal wall squeeze Progressing as expected  -OC      Tongue Base/Pharyngeal Wall Squeeze Progress 60%;with inconsistent cues  -OC      Recorded by [OC] Lilian Mohr MA,CCC-SLP      Bed Mobility, Assessment/Treatment    Bed Mob, Supine to Sit, [place]   not tested  -MD    Bed Mob, Sit to Supine, [place]   not tested  -MD    Bed Mobility, Comment  pt up in   -KP     Recorded by  [KP] Vero York, OTR [MD] Nereida Llamas, PT    Transfer Assessment/Treatment    Transfers, Sit-Stand  [place]  stand by assist;contact guard assist  -KP contact guard assist  -MD    Transfers, Stand-Sit [place]  set up required;stand by assist  -KP contact guard assist  -MD    Transfers, Sit-Stand-Sit, Assist Device  other (see comments)   wc and grab bars  -KP rolling walker  -MD    Walk-In Shower Transfer, [place]  set up required;contact guard assist;supervision required  -KP     Walk-In Shower Transfer, Assist Device  other (see comments)   emy bench  -KP     Recorded by  [KP] KANDY Sultana [MD] Nereida Llamas PT    Gait Assessment/Treatment    Gait, [place] Level   verbal cues required;contact guard assist  -MD    Gait, Assistive Device   rolling walker  -MD    Gait, Distance (Feet)   80  -MD    Gait, Gait Deviations   bilateral:;janet decreased;forward flexed posture;step length decreased;toe-to-floor clearance decreased  -MD    Gait, Safety Issues   step length decreased  -MD    Gait, Impairments   impaired balance;strength decreased  -MD    Gait, Comment   20` on carpet CGA, RWx  -MD    Recorded by   [MD] Nereida Llamas PT    Stairs Assessment/Treatment    Number of Stairs   4  -MD    Stairs, Handrail Location   both sides  -MD    Stairs, [place] Level   verbal cues required;contact guard assist  -MD    Stairs, Technique Used   step over step (ascending);step over step (descending)  -MD    Stairs, Impairments   strength decreased;impaired balance  -MD    Recorded by   [MD] Nereida Llamas PT    Upper Body Bathing Assessment/Training    UB Bathing Assess/Train Assistive Device  hand-held shower head;tub bench  -     UB Bathing Assess/Train, Position  sitting  -     UB Bathing Assess/Train, [place] Level  stand by assist;set up required  -     Recorded by  [KP] Vero York, SARAR     Lower Body Bathing Assessment/Training    LB Bathing Assess/Train Assistive Device  hand-held shower head;grab bars;tub bench  -KP     LB Bathing Assess/Train, Position   sitting;standing  -KP     LB Bathing Assess/Train, [place] Level  set up required;stand by assist  -KP     Recorded by  [KP] Vero York OTR     Upper Body Dressing Assessment/Training    UB Dressing Assess/Train, Clothing Type  donning:;doffing:;t-shirt  -KP     UB Dressing Assess/Train, Position  sitting  -KP     UB Dressing Assess/Train, [place]  set up required;supervision required  -KP     Recorded by  [KP] Vero York OTR     Lower Body Dressing Assessment/Training    LB Dressing Assess/Train, Clothing Type  doffing:;donning:;pants;slipper socks  -KP     LB Dressing Assess/Train, Position  sitting;standing  -KP     LB Dressing Assess/Train, [place]  set up required;contact guard assist;supervision required  -KP     Recorded by  [KP] Vero York OTR     Grooming Assessment/Training    Grooming Assess/Train, Position  sitting  -KP     Grooming Assess/Train, [place] Level  set up required;supervision required  -KP     Recorded by  [KP] Vero York OTR     Balance Skills Training    Sitting-Level of Assistance  Close supervision  -KP     Sitting-Balance Support  Feet supported  -KP     Standing-Level of Assistance  Close supervision;Contact guard  -KP     Static Standing Balance Support  Right upper extremity supported   intermittent UE support  -KP     Recorded by  [KP] Vero York OTR     Therapy Exercises    BLE Other Reps   --   nustep at level 2 for 6 min  -MD    Recorded by   [MD] Nereida Llamas, PT    Positioning and Restraints    Pre-Treatment Position  sitting in chair/recliner  -KP sitting in chair/recliner  -MD    Post Treatment Position  wheelchair  -KP wheelchair  -MD    In Wheelchair  sitting;call light within reach;encouraged to call for assist  -KP with OT  -MD    Recorded by  [KP] Vero York OTR [MD] Nereida Llamas, PT      11/04/17 1500 11/04/17 1210 11/04/17 1009    Rehab Assessment/Intervention    Discipline speech language  pathologist  -JTIFFANIE occupational therapist  - physical therapist  -MD    Document Type therapy note (daily note)  -JJ therapy note (daily note)  -KP therapy note (daily note)  -MD    Subjective Information agree to therapy  - agree to therapy;complains of;fatigue  - no complaints;agree to therapy  -MD    Patient Effort, Rehab Treatment adequate  -JJ good  -KP good  -MD    Symptoms Noted During/After Treatment  none  -KP none  -MD    Precautions/Limitations  fall precautions;other (see comments)   [place] . hearing aides  - fall precautions  -MD    Specific Treatment Considerations  hearing aids  -     Equipment Issued to Patient   gait belt  -MD    Recorded by [J] eSpideh Yepez, MS CCC-SLP [] KANDY Sultana [MD] Nereida Llamas, PT    Pain Assessment    Pain Assessment  No/denies pain  - No/denies pain  -MD    Recorded by  [] KANDY Sultana [MD] Nereida Llamas PT    Vision Assessment/Intervention    Visual Impairment  visual impairment, left;inattention to the left;left neglect  - left neglect  -MD    Recorded by  [] KANDY Sultana [MD] Nereida Llamas, ONEIDA    Cognitive Assessment/Intervention    Current Cognitive/Communication Assessment  impaired  -KP impaired  -MD    Orientation Status  oriented to;place;person;time  - oriented to;person;place;time  -MD    Follows Commands/Answers Questions  100% of the time;able to follow single-step instructions;needs cueing  - 100% of the time;needs cueing;needs increased time;needs repetition  -MD    Personal Safety  mild impairment  - decreased insight to deficits  -MD    Personal Safety Interventions  fall prevention program maintained;gait belt;muscle strengthening facilitated;nonskid shoes/slippers when out of bed  - fall prevention program maintained;gait belt;muscle strengthening facilitated;nonskid shoes/slippers when out of bed;supervised activity  -MD    Recorded by  [] KANDY Sultana [MD] Nereida Llamas,  PT    Swallow Assessment/Intervention    Additional Documentation --   [facility] soft test tray  -JJ      Recorded by [JJ] Sepideh Yepez, MS CCC-SLP      Improve oral skills    To Improve Oral Skills, patient will: Increase lip closure;Increase back of tongue control;90%;without cues  -JJ      Oral Skills Progress 50%;with consistent cues  -JJ      Recorded by [JJ] Sepideh Yepez, MS CCC-SLP      Improve laryngeal elevation    To improve laryngeal elevation, patient will: Complete pitch-glide;90%;without cues  -JJ      Laryngeal Elevation Progress 60%;with consistent cues  -JJ      Recorded by [JJ] Sepideh Yepez, MS CCC-SLP      Improve tongue base & pharyngeal wall squeeze    To improve tongue base & pharyngeal wall squeeze, patient will: Complete gargle/hold retraction;Complete tongue base retraction;Complete effortful swallow;90%;without cues  -JJ      Tongue Base/Pharyngeal Wall Squeeze Progress 50%;with consistent cues  -JJ      Recorded by [JJ] Sepideh Yepez, MS CCC-SLP      Bed Mobility, Assessment/Treatment    Bed Mob, Supine to Sit, [place]   not tested  -MD    Bed Mob, Sit to Supine, [place]   not tested  -MD    Bed Mobility, Comment  up in [facility]     Recorded by  [KP] Vero York, OTR [MD] Nereida Llamas, PT    Transfer Assessment/Treatment    Transfers, Sit-Stand [place]  contact guard assist  - contact guard assist  -MD    Transfers, Stand-Sit [place]  contact guard assist  - contact guard assist  -MD    Transfers, Sit-Stand-Sit, Assist Device  rolling walker  - rolling walker  -MD    Toilet Transfer, [place]  contact guard assist  -     Toilet Transfer, Assistive Device  wheelchair;other (see comments)   grab bars  -     Walk-In Shower Transfer, [place]  contact guard assist  -KP     Walk-In Shower Transfer, Assist Device  other (see comments);standard shower chair   grab bars. shower bench  -     Transfer, Safety  Issues   step length decreased;weight-shifting ability decreased;balance decreased during turns;sequencing ability decreased  -MD    Transfer, Impairments   strength decreased;impaired balance  -MD    Recorded by  [KP] Vero York OTR [MD] Nereida Llamas PT    Gait Assessment/Treatment    Gait, [place] Level   verbal cues required;contact guard assist  -MD    Gait, Assistive Device   rolling walker  -MD    Gait, Distance (Feet)   80  -MD    Gait, Gait Deviations   bilateral:;janet decreased;forward flexed posture;step length decreased;toe-to-floor clearance decreased;decreased heel strike  -MD    Gait, Safety Issues   step length decreased  -MD    Gait, Impairments   impaired balance;strength decreased  -MD    Recorded by   [MD] Nereida Llamas PT    Stairs Assessment/Treatment    Number of Stairs   4  -MD    Stairs, Handrail Location   both sides  -MD    Stairs, [place] Level   verbal cues required;contact guard assist  -MD    Stairs, Technique Used   step over step (ascending);step to step (descending)  -MD    Stairs, Safety Issues   sequencing ability decreased  -MD    Stairs, Impairments   strength decreased;impaired balance  -MD    Recorded by   [MD] Nereida Llamas PT    Upper Body Bathing Assessment/Training    UB Bathing Assess/Train Assistive Device  hand-held shower head;shower chair with back  -     UB Bathing Assess/Train, Position  sitting  -     UB Bathing Assess/Train, [place] Level  set up required;stand by assist  -     Recorded by  [KP] Vero York OTR     Lower Body Bathing Assessment/Training    LB Bathing Assess/Train Assistive Device  grab bars;hand-held shower head;shower chair with back  -KP     LB Bathing Assess/Train, Position  sitting;standing  -     LB Bathing Assess/Train, [place] Level  set up required  -     Recorded by  [KP] Vero York OTR     Upper Body Dressing Assessment/Training    UB Dressing Assess/Train, Clothing Type   doffing:;donning:;t-shirt  -KP     UB Dressing Assess/Train, Position  sitting  -KP     UB Dressing Assess/Train, [place]  set up required;supervision required  -KP     Recorded by  [KP] KANDY Sultana     Lower Body Dressing Assessment/Training    LB Dressing Assess/Train, Clothing Type  doffing:;donning:;pants;socks  -KP     LB Dressing Assess/Train, Position  sitting;standing  -KP     LB Dressing Assess/Train, [place]  set up required;contact guard assist  -KP     Recorded by  [KP] Vero York OTR     Toileting Assessment/Training    Toileting Assess/Train, Assistive Device  grab bars  -KP     Toileting Assess/Train, Position  sitting;standing  -KP     Toileting Assess/Train, [place] Level  set up required;contact guard assist  -KP     Recorded by  [KP] Vero York OTR     Grooming Assessment/Training    Grooming Assess/Train, Position  sitting  -KP     Grooming Assess/Train, [place] Level  supervision required;set up required  -KP     Recorded by  [KP] KANDY Sultana     Balance Skills Training    Sitting-Level of Assistance  Close supervision  -KP     Sitting-Balance Support  Feet supported  -KP     Standing-Level of Assistance  Contact guard  -KP     Static Standing Balance Support  Right upper extremity supported  -KP     Recorded by  [KP] KANDY Sultana     Therapy Exercises    Bilateral Lower Extremities   AROM:;20 reps;sitting;ankle pumps/circles;hip abduction/adduction;hip flexion;LAQ  -MD    BLE Other Reps   --   nustep at level 2 for 7 min  -MD    Left Upper Extremity  AROM:;20 reps;sitting;elbow flexion/extension;shoulder extension/flexion;shoulder abduction/adduction  -KP     Recorded by  [KP] KANDY Sultana [MD] Nereida Llamas, PT    Fine Motor Coordination Training    Detail (Fine Motor Coordination Training)  isnerts pegs into small hole. colored pegs to inc L FMC. w. minVC  -KP     Recorded by  [KP] Vero York  OTR     Positioning and Restraints    Pre-Treatment Position  bathroom  -KP sitting in chair/recliner  -MD    Post Treatment Position  wheelchair  -KP wheelchair  -MD    In Wheelchair  sitting;with PT  -KP with SLP;with family/caregiver  -MD    Recorded by  [BRIANNA] KANDY Sultana [MD] Nereida Llamas, PT      11/03/17 0795          Rehab Assessment/Intervention    Discipline occupational therapist  -KP,NA,KP2      Document Type therapy note (daily note)  -KP,NA,KP2      Subjective Information agree to therapy;complains of;fatigue  -KP,NA,KP2      Patient Effort, Rehab Treatment good  -KP,NA,KP2      Symptoms Noted During/After Treatment fatigue  -KP,NA,KP2      Precautions/Limitations fall precautions  -KP,NA,KP2      Recorded by [KP,NA,KP2] Vero York OTR (r) Lynnette River OT Student (t) KANDY Sultana (c)      Pain Assessment    Pain Assessment No/denies pain  -KP,NA,KP2      Recorded by [KP,NA,KP2] KANDY Sultana (r) Lynnette River OT Student (t) KANDY Sultana (c)      Vision Assessment/Intervention    Visual Impairment left neglect;inattention to the left;visual impairment, left  -KP,NA,KP2      Vision Comment min cues to attend to left  -KP,NA,KP2      Recorded by [KP,NA,KP2] KANDY Sultana (r) Lynnette River OT Student (t) KANDY Sultana (c)      Cognitive Assessment/Intervention    Current Cognitive/Communication Assessment impaired  -KP,NA,KP2      Follows Commands/Answers Questions able to follow single-step instructions;needs increased time;needs cueing  -KP,NA,KP2      Personal Safety decreased insight to deficits  -KP,NA,KP2      Personal Safety Interventions fall prevention program maintained;gait belt;nonskid shoes/slippers when out of bed  -KP,NA,KP2      Recorded by [KP,NA,KP2] KANDY Sultana (r) Lynnette River OT Student (t) KANDY Sultana (c)      Bed Mobility, Assessment/Treatment     Bed Mobility, Assistive Device bed rails  -KP,NA,KP2      Bed Mobility, [place]/Bridge, [place] supervision required;verbal cues required  -KP,NA,KP2      Bed Mob, Sit to Supine, [place] supervision required  -KP,NA,KP2      Bed Mobility, Impairments strength decreased  -KP,NA,KP2      Recorded by [KP,NA,KP2] KANDY Sultana (r) Lynnette River OT Student (t) KANDY Sultana (c)      Transfer Assessment/Treatment    Transfers, Chair-Bed [place] contact guard assist  -KP,NA,KP2      Transfers, Sit-Stand [place] contact guard assist  -KP,NA,KP2      Transfers, Stand-Sit [place] contact guard assist  -KP,NA,KP2      Transfers, Sit-Stand-Sit, Assist Device straight cane  -KP,NA,KP2      Transfer, Impairments strength decreased;impaired balance  -KP,NA,KP2      Recorded by [KP,NA,KP2] KANDY Sultana (r) Lynnette River OT Student (t) KANDY Sultana (c)      Balance Skills Training    Sitting-Level of Assistance Close supervision  -KP,NA,KP2      Sitting-Balance Support Feet supported  -KP,NA,KP2      Sitting-Balance Activities Lateral lean;Forward lean;Reaching across midline;Reaching for objects  -KP,NA,KP2      Sitting # of Minutes 20  -KP,NA,KP2      Recorded by [KP,NA,KP2] KANDY Sultana (r) Lynnette [place] OT Student (t) KANDY Sultana (c)      Fine Motor Coordination Training    Detail (Fine Motor Coordination Training) pt completed FMC of LUE stacking 10 pennies with mod difficulty. pt with min difficulty with in hand manipulation of pennies and putting into small slot. pt completed bilateral stringing of small beads with some difficulty and LUE hand fatigue.  -KP,NA,KP2      Recorded by [KP,NA,KP2] KNADY Sultana (r) Lynnette River OT Student (t) KANDY Sultana (c)      Positioning and Restraints    Pre-Treatment Position sitting in chair/recliner  -KP,NA,KP2      Post Treatment Position  bed  -KP,NA,KP2      In Bed supine;call light within reach;encouraged to call for assist;exit alarm on;with family/caregiver  -KP,NA,KP2      Recorded by [KP,NA,KP2] Vero York, OTR (r) Lynnette River, OT Student (t) Vero York, OTR (c)        User Key  (r) = Recorded By, (t) = Taken By, (c) = Cosigned By    Initials Name Effective Dates    OC Lilian Mohr MA,[facility]-SLP 04/05/17 -     JALEN Yepez, MS CCC-SLP 04/13/15 -     KP Vero York, OTR 04/13/15 -     MD Nereida Llamas, PT 12/01/15 -     KAYLEN River, OT Student 08/21/17 -               IP SLP Goals       11/06/17 1330 11/01/17 1215       Safely Consume Diet    Safely Consume Diet- SLP, Date Established  11/01/17  -OC     Safely Consume Diet- SLP, Time to Achieve by discharge  -OC by discharge  -OC     Safely Consume Diet- SLP, Outcome goal ongoing  -OC goal ongoing  -OC     Cognitive Linguistic- Improve Safety and Awareness    Cognitive Linguistic Improve Safety and Awareness- SLP, Date Established 11/06/17  -OC      Cognitive Linguistic Improve Safety and Awareness- SLP, Time to Achieve by discharge  -OC      Cognitive Linguistic Improve Safety and Awareness- SLP, Outcome goal ongoing  -OC        User Key  (r) = Recorded By, (t) = Taken By, (c) = Cosigned By    Initials Name Provider Type    OC Lilian Mohr MA,[facility]-SLP Speech and Language Pathologist          EDUCATION  The patient has been educated in the following areas:   Cognitive Impairment Dysphagia (Swallowing Impairment).    SLP Recommendation and Plan    3-5 times per week  45-60 minutes  Until DC      Anticipated Discharge Disposition: home with assist     Plan of Care Review  Plan Of Care Reviewed With: patient  Outcome Summary/Follow up Plan: Cognitive-linguistic eval completed, composite score WNL. Mild for executive functions. No overt s/s aspiration with puree and thin lunch tray.  VFSS [place] 10/29/17 recommending puree and thin.            Time Calculation:         Time Calculation- SLP       11/06/17 1330 11/06/17 1100       Time Calculation- SLP    SLP Start Time 1300  -OC 1030  -OC     SLP Stop Time 1330  -OC 1100  -OC     SLP Time Calculation (min) 30 min  -OC 30 min  -OC       User Key  (r) = Recorded By, (t) = Taken By, (c) = Cosigned By    Initials Name Provider Type    OC Lilian Mohr MA,CCC-SLP Speech and Language Pathologist          Therapy Charges for Today     Code Description Service Date Service Provider Modifiers Qty    [id number]  ST TREATMENT SWALLOW 2 11/6/2017 Lilian Mohr MA,CCC-SLP GN 1    [id number] [facility] DEV OF COGN SKILLS EACH 15 MIN 11/6/2017 Lilian Mohr MA,CCC-SLP GN 2               Lilian Mohr MA,[facility]  11/6/2017

## 2017-11-06 NOTE — PROGRESS NOTES
Inpatient Rehabilitation Functional Measures Assessment    Functional Measures  YESI Eating:  Branch  YESI Grooming: Branch  YESI Bathing:  Branch  YESI Upper Body Dressing:  Branch  [facility] Lower Body Dressing:  [facility] Toileting:  [facility] Bladder Management  Level of Assistance:  [place]  Frequency/Number of Accidents this Shift:  [facility] Bowel Management  Level of Assistance: [place]  Frequency/Number of Accidents this Shift: Branch    [facility] Bed/Chair/Wheelchair Transfer:  Activity was not observed.  YESI Toilet Transfer:  [facility] Tub/Shower Transfer:  [place]    Previously Documented Mode of Locomotion at Discharge: Field  YESI Expected Mode of Locomotion at Discharge: [facility] Walk/Wheelchair:  WHEELCHAIR OBSERVATION   Activity was not observed.    WALK OBSERVATION   Walk Distance Scale = 2.  Distance walked is 50 -149 feet. Walk Score = 2.  Patient performs 75% or more of effort and requires minimal assistance.  Incidental assistance, contact guard or steadying was provided. Patient walked a  distance of 80 feet. Patient requires the following assistive device(s): Rolling  walker.  YESI Stairs:  Stairs Score = 2.  Incidental assistance with lifting or lowering,  contact guard or steadying was provided. Patient performs 75% or more of effort  and requires minimal contact assistance. Patient negotiated  4 stairs. Patient  requires the following assistive device(s): Handrail(s).    YESI Comprehension:  [place]  YESI Expression:  [facility] Social Interaction:  [facility] Problem Solving:  [facility] Memory:  [place]    Therapy Mode Minutes  Occupational Therapy: [place]  Physical Therapy: Individual: 60 minutes.  Speech Language Pathology:  [place]    Signed by: Nereida Llamas, PT

## 2017-11-06 NOTE — PROGRESS NOTES
"   LOS: 6 days   Patient Care Team:  Calvin Dhillon Jr., DO as PCP - General (Internal Medicine)    Chief Complaint:   S/p R MCA ischemic infarct  Stroke prophylaxis - ASA 81 mg and Lovenox transition to coumadin  Hypercoaguability disorder - Hereditary thrombophilia  Homozygous MTHFR with hyperhomocystinemia  Homozygous factor V Leiden gene mutation  history of malignant neoplasm of breast   Obesity   Hyperlipidemia -   Hypothyroidism   DM    Subjective     History of Present Illness    Subjective       She continues stronger on the left side    History taken from: patient    Objective     Vital Signs  Temp:  [97.8 °F (36.6 °C)-98.6 °F (37 °C)] 98.4 °F (36.9 °C)  Heart Rate:  [63-67] 67  Resp:  [18] 18  BP: (123-135)/(69-76) 123/69    Objective:  Vital signs: (most recent): Blood pressure 123/69, pulse 67, temperature 98.4 °F (36.9 °C), temperature source Oral, resp. rate 18, height 67\" (170.2 cm), weight 219 lb 6.4 oz (99.5 kg), SpO2 100 %.            Mental status-awake alert  Lungs-clear to auscultation  Heart-regular rate and rhythm  Abdomen abdomen-normoactive bowel sounds, soft, nontender  Extremities-no pretibial edema  Neurologic-mild left hemiparesis .  Left elbow flexion 5 minus, finger flexion 5-, knee extension 5 minus, ankle dorsiflexion 5 minus.  Left facial droop.  She shows some left inattention.   better dexterity with left hand.        Results Review:     I reviewed the patient's new clinical results.    Lab Results  Lab Value Date/Time   INR 1.41 (H) 11/06/2017 0627   INR 1.41 (H) 11/05/2017 0555   INR 1.17 (H) 11/04/2017 0647   INR 1.13 (H) 11/03/2017 0520   INR 1.07 11/02/2017 0713   INR 1.09 11/01/2017 0754       Results from last 7 days  Lab Units 11/06/17  0627 11/02/17  0713   WBC 10*3/mm3 6.72 5.72   HEMOGLOBIN g/dL 11.4* 11.7*   HEMATOCRIT % 36.3 36.4   PLATELETS 10*3/mm3 172 149         Results from last 7 days  Lab Units 11/06/17 0627 11/01/17  0754   SODIUM mmol/L 141  --  "   POTASSIUM mmol/L 4.4  --    CHLORIDE mmol/L 102  --    CO2 mmol/L 23.3  --    BUN mg/dL 15  --    CREATININE mg/dL 1.10* 1.09*   CALCIUM mg/dL 9.1  --    BILIRUBIN mg/dL 0.4  --    ALK PHOS U/L 72  --    ALT (SGPT) U/L 24  --    AST (SGOT) U/L 30  --    GLUCOSE mg/dL 132*  --      Glucose   Date/Time Value Ref Range Status   11/06/2017 0720 144 (H) 70 - 130 mg/dL Final   11/05/2017 1635 95 70 - 130 mg/dL Final   11/05/2017 0715 110 70 - 130 mg/dL Final   11/04/2017 1135 102 70 - 130 mg/dL Final   11/04/2017 0737 99 70 - 130 mg/dL Final   11/03/2017 1652 83 70 - 130 mg/dL Final       Labs on October 31-sodium 139, potassium 3.8, chloride 106, glucose 101, calcium 9.0, come back to 25, BUN 16, creatinine 1.1.  WBC6.64, hemoglobin 11.8, hematocrit 36.0, platelets 125.  October 29 triglycerides 89, cholesterol 125.  October 28 AST 35, ALT 29, alkaline phosphatase 99, total protein 7.7, albumin 4.1  Oct 31 - INR 1.0  Medication Review: done  Scheduled Meds:    allopurinol 200 mg Oral Daily   aspirin 81 mg Oral Daily   atorvastatin 80 mg Oral Nightly   cholecalciferol 1,000 Units Oral BID   enoxaparin 100 mg Subcutaneous Q12H   folic acid 1 mg Oral Daily   levothyroxine 112 mcg Oral Q AM   melatonin 3 mg Oral Nightly   metFORMIN 500 mg Oral Daily With Breakfast   multivitamin 1 tablet Oral Daily   warfarin 10 mg Oral Daily     Continuous Infusions:   PRN Meds:.•  acetaminophen  •  dextrose  •  dextrose  •  glucagon (human recombinant)      Assessment/Plan     Active Problems:    Stroke      Assessment & Plan  S/p R MCA ischemic infarct    Left hemiparesis-improving    Dysphagia-November 2-advance from puree to mechanical soft, no mixed consistency, thin liquid diet.    Stroke prophylaxis - ASA 81 mg and Lovenox transition to coumadin.  Daily INR.  November 2-INR still low-Will continue Coumadin 5 mg as recent restarted but may titrate up dose tomorrow depending on lab result. Nov 3- increased coumadin to 7.5 mg.  November 6-INR still low at 1.41.  Has been on Coumadin 5 mg and then increased to 7.5 mg x last 3 days, have changed to 10 mg, continue to follow INR daily    Hypercoaguability disorder  history of malignant neoplasm of breast   Factor 5 Leiden mutation, heterozygous   Obesity   Hyperlipidemia -atorvastatin   Hypothyroidism -on replacement   Cerebrovascular accident (CVA) due to thrombosis of right middle cerebral artery   DVT prophylaxis-SCDs and anticoagulation  Diabetes mellitus-metformin  History of lability after past stroke  Poststroke fatigue     72 to female status post right MCA ischemic infarct with left inattention, left hemiparesis, dysarthria, decreased alertness, impairments with her mobility and self-care and is now admitted for comprehensive inpatient rehabilitation program with physical therapy 1 hour, occupational therapy 1 hour, and speech therapy 1 hour, 5 days a week.  Rehabilitation nursing for carryover and monitoring of her neurologic status, diabetes, bowel bladder and skin.  Ongoing physician follow-up.  Weekly team conferences.  Goals for her to achieve a level of supervision with her mobility and self-care for return home with her family.       TEAM CONF- NOV 3- LEFT INATTENTION. DIFFICULTY DON LEG LEG OF PANTS. FINE MOTOR DEFICITS LEFT HAND. MILD MEMORY DEFICITS. DYSPHAGIA - MECH SOFT, THINS. TRIAL WHOLE MEALS. FLAT AFFECT.  BNE PENDING. TRANSFERS CTG. GAIT 80 FEET MIN ASSIST. 4 STAIRS MIN ASSIST. BLADDER CONTINENT.  ELOS- TWO WEEKS  .  Aric Chao MD  11/06/17  11:02 AM    Time:

## 2017-11-06 NOTE — THERAPY TREATMENT NOTE
Inpatient Rehabilitation - Physical Therapy Treatment Note  [facility]     Patient Name: Alice Maxwell  : 1945  MRN: [id number]  Today's Date: 2017  Onset of Illness/Injury or Date of Surgery Date: 10/28/17  Date of Referral to PT: 17  Referring Physician: Jeremie    Admit Date: 10/31/2017    Visit Dx:    ICD-10-CM ICD-9-CM   1. Oropharyngeal dysphagia R13.12 787.22     Patient Active Problem List   Diagnosis   • Stroke               Adult Rehabilitation Note       17 1203 17 1116 17 1500    Rehab Assessment/Intervention    Discipline occupational therapist  -KP physical therapist  -MD speech language pathologist  -JJ    Document Type therapy note (daily note)  -KP therapy note (daily note)  -MD therapy note (daily note)  -JJ    Subjective Information agree to therapy;no complaints  -KP agree to therapy;no complaints  -MD agree to therapy  -JJ    Patient Effort, Rehab Treatment good  -KP good  -MD adequate  -JJ    Symptoms Noted During/After Treatment none  -KP none  -MD     Precautions/Limitations fall precautions  -KP fall precautions  -MD     Equipment Issued to Patient  gait belt;front wheeled walker  -MD     Recorded by [] Vero York OTR [MD] Nereida Llamas, PT [JJ] Sepideh Yepez MS CCC-SLP    Pain Assessment    Pain Assessment No/denies pain  -KP No/denies pain  -MD     Recorded by [] Vero York OTR [MD] Nereida Llamas PT     Vision Assessment/Intervention    Visual Impairment  visual impairment, left;inattention to the left;left neglect  -MD     Recorded by  [MD] Nereida Llamas PT     Cognitive Assessment/Intervention    Current Cognitive/Communication Assessment impaired  -KP impaired  -MD     Orientation Status oriented to;person;place  -KP oriented to;person;place  -MD     Follows Commands/Answers Questions 100% of the time;able to follow single-step instructions  -% of the time;needs cueing;needs repetition;needs increased time   -MD     Personal Safety mild impairment  -KP decreased insight to deficits  -MD     Personal Safety Interventions fall prevention program maintained;gait belt;nonskid shoes/slippers when out of bed  - fall prevention program maintained;gait belt;muscle strengthening facilitated;nonskid shoes/slippers when out of bed;supervised activity  -MD     Recorded by [KP] Vero York, OTR [MD] Nereida Llamas, PT     Swallow Assessment/Intervention    Additional Documentation   --   [facility] soft test tray  -JJ    Recorded by   [JJ] Sepideh Yepez, MS CCC-SLP    Improve oral skills    To Improve Oral Skills, patient will:   Increase lip closure;Increase back of tongue control;90%;without cues  -JJ    Oral Skills Progress   50%;with consistent cues  -JJ    Recorded by   [JJ] Sepideh Yepez, MS CCC-SLP    Improve laryngeal elevation    To improve laryngeal elevation, patient will:   Complete pitch-glide;90%;without cues  -JJ    Laryngeal Elevation Progress   60%;with consistent cues  -JJ    Recorded by   [JJ] Sepideh Yepez MS CCC-SLP    Improve tongue base & pharyngeal wall squeeze    To improve tongue base & pharyngeal wall squeeze, patient will:   Complete gargle/hold retraction;Complete tongue base retraction;Complete effortful swallow;90%;without cues  -JJ    Tongue Base/Pharyngeal Wall Squeeze Progress   50%;with consistent cues  -JJ    Recorded by   [JJ] Sepideh Yepez MS CCC-SLP    Bed Mobility, Assessment/Treatment    Bed Mob, Supine to Sit, [place]  not tested  -MD     Bed Mob, Sit to Supine, [place]  not tested  -MD     Bed Mobility, Comment pt up in   -KP      Recorded by [] Vero York, OTR [MD] Nereida Llamas, PT     Transfer Assessment/Treatment    Transfers, Sit-Stand [place] stand by assist;contact guard assist  -BRIANNA contact guard assist  -MD     Transfers, Stand-Sit [place] set up required;stand by assist  -BRIANNA contact guard assist  -MD      Transfers, Sit-Stand-Sit, Assist Device other (see comments)   wc and grab bars  -KP rolling walker  -MD     Walk-In Shower Transfer, [place] set up required;contact guard assist;supervision required  -      Walk-In Shower Transfer, Assist Device other (see comments)   emy bench  -KP      Recorded by [KP] KANDY Sultana [MD] Nereida Llamas PT     Gait Assessment/Treatment    Gait, [place] Level  verbal cues required;contact guard assist  -MD     Gait, Assistive Device  rolling walker  -MD     Gait, Distance (Feet)  80  -MD     Gait, Gait Deviations  bilateral:;janet decreased;forward flexed posture;step length decreased;toe-to-floor clearance decreased  -MD     Gait, Safety Issues  step length decreased  -MD     Gait, Impairments  impaired balance;strength decreased  -MD     Gait, Comment  20` on carpet CGA, RWx  -MD     Recorded by  [MD] Nereida Llamas PT     Stairs Assessment/Treatment    Number of Stairs  4  -MD     Stairs, Handrail Location  both sides  -MD     Stairs, [place] Level  verbal cues required;contact guard assist  -MD     Stairs, Technique Used  step over step (ascending);step over step (descending)  -MD     Stairs, Impairments  strength decreased;impaired balance  -MD     Recorded by  [MD] Nereida Llamas PT     Upper Body Bathing Assessment/Training    UB Bathing Assess/Train Assistive Device hand-held shower head;tub bench  -      UB Bathing Assess/Train, Position sitting  -      UB Bathing Assess/Train, [place] Level stand by assist;set up required  -      Recorded by [KP] Vero York OTR      Lower Body Bathing Assessment/Training    LB Bathing Assess/Train Assistive Device hand-held shower head;grab bars;tub bench  -KP      LB Bathing Assess/Train, Position sitting;standing  -      LB Bathing Assess/Train, [place] Level set up required;stand by assist  -      Recorded by [KP] Vero York OTR      Upper Body Dressing  Assessment/Training    UB Dressing Assess/Train, Clothing Type donning:;doffing:;t-shirt  -KP      UB Dressing Assess/Train, Position sitting  -KP      UB Dressing Assess/Train, [place] set up required;supervision required  -KP      Recorded by [KP] Vero York OTR      Lower Body Dressing Assessment/Training    LB Dressing Assess/Train, Clothing Type doffing:;donning:;pants;slipper socks  -KP      LB Dressing Assess/Train, Position sitting;standing  -KP      LB Dressing Assess/Train, [place] set up required;contact guard assist;supervision required  -KP      Recorded by [KP] Vero York OTR      Grooming Assessment/Training    Grooming Assess/Train, Position sitting  -KP      Grooming Assess/Train, [place]ep[place] Level set up required;supervision required  -KP      Recorded by [KP] KANDY Sultana      Balance Skills Training    Sitting-Level of Assistance Close supervision  -KP      Sitting-Balance Support Feet supported  -KP      Standing-Level of Assistance Close supervision;Contact guard  -KP      Static Standing Balance Support Right upper extremity supported   intermittent UE support  -KP      Recorded by [KP] KANDY Sultana      Therapy Exercises    BLE Other Reps  --   nustep at level 2 for 6 min  -MD     Recorded by  [MD] Nereida Llamas, PT     Positioning and Restraints    Pre-Treatment Position sitting in chair/recliner  -KP sitting in chair/recliner  -MD     Post Treatment Position wheelchair  -KP wheelchair  -MD     In Wheelchair sitting;call light within reach;encouraged to call for assist  -KP with OT  -MD     Recorded by [KP] Vero York OTR [MD] Nereida Llamas, PT       11/04/17 1210 11/04/17 1009 11/03/17 1509    Rehab Assessment/Intervention    Discipline occupational therapist  -BRIANNA physical therapist  -MD occupational therapist  -BRIANNA,KAYLEN,KP2    Document Type therapy note (daily note)  -BRIANNA therapy note (daily note)  -MD therapy note (daily note)   -KP,NA,KP2    Subjective Information agree to therapy;complains of;fatigue  -KP no complaints;agree to therapy  -MD agree to therapy;complains of;fatigue  -KP,NA,KP2    Patient Effort, Rehab Treatment good  -KP good  -MD good  -KP,NA,KP2    Symptoms Noted During/After Treatment none  -KP none  -MD fatigue  -KP,NA,KP2    Precautions/Limitations fall precautions;other (see comments)   [place] . hearing aides  -KP fall precautions  -MD fall precautions  -KP,NA,KP2    Specific Treatment Considerations hearing aids  -KP      Equipment Issued to Patient  gait belt  -MD     Recorded by [KP] Vero York OTR [MD] Nereida Llamas, PT [KP,NA,KP2] Vero York OTR (r) Lynnette River OT Student (t) KANDY Sultana (c)    Pain Assessment    Pain Assessment No/denies pain  -KP No/denies pain  -MD No/denies pain  -KP,NA,KP2    Recorded by [KP] KANDY Sultana [MD] Nereida Llamas, PT [KP,NA,KP2] KANDY Sultana (r) Lynnette River OT Student (t) KANDY Sultana (c)    Vision Assessment/Intervention    Visual Impairment visual impairment, left;inattention to the left;left neglect  -KP left neglect  -MD left neglect;inattention to the left;visual impairment, left  -KP,NA,KP2    Vision Comment   min cues to attend to left  -KP,NA,KP2    Recorded by [KP] KANDY Sultana [MD] Nereida Llamas, PT [KP,NA,KP2] KANDY Sultana (r) Lynnette [place] OT Student (t) KANDY Sultana (c)    Cognitive Assessment/Intervention    Current Cognitive/Communication Assessment impaired  -KP impaired  -MD impaired  -KP,NA,KP2    Orientation Status oriented to;place;person;time  -KP oriented to;person;place;time  -MD     Follows Commands/Answers Questions 100% of the time;able to follow single-step instructions;needs cueing  -% of the time;needs cueing;needs increased time;needs repetition  -MD able to follow single-step instructions;needs increased time;needs cueing   -KP,NA,KP2    Personal Safety mild impairment  -KP decreased insight to deficits  -MD decreased insight to deficits  -KP,NA,KP2    Personal Safety Interventions fall prevention program maintained;gait belt;muscle strengthening facilitated;nonskid shoes/slippers when out of bed  -KP fall prevention program maintained;gait belt;muscle strengthening facilitated;nonskid shoes/slippers when out of bed;supervised activity  -MD fall prevention program maintained;gait belt;nonskid shoes/slippers when out of bed  -KP,NA,KP2    Recorded by [KP] Vero York OTR [MD] Nereida Llamas, PT [KP,NA,KP2] KANDY Sultana (r) Lynnette [place] OT Student (t) KANDY Sultana (c)    Bed Mobility, Assessment/Treatment    Bed Mobility, Assistive Device   bed rails  -KP,NA,KP2    Bed Mobility, Scoot/Bridge, [place]   supervision required;verbal cues required  -KP,NA,KP2    Bed Mob, Supine to Sit, [place]  not tested  -MD     Bed Mob, Sit to Supine, [place]  not tested  -MD supervision required  -KP,NA,KP2    Bed Mobility, Impairments   strength decreased  -KP,NA,KP2    Bed Mobility, Comment up in wc  -KP      Recorded by [KP] KANDY Sultana [MD] Nereida Llamas, PT [KP,NA,KP2] KANDY Sultana (r) Lynnette River OT Student (t) KANDY Sultana (c)    Transfer Assessment/Treatment    Transfers, Chair-Bed [place]   contact guard assist  -KP,NA,KP2    Transfers, Sit-Stand [place] contact guard assist  -KP contact guard assist  -MD contact guard assist  -KP,NA,KP2    Transfers, Stand-Sit [place] contact guard assist  -KP contact guard assist  -MD contact guard assist  -KP,NA,KP2    Transfers, Sit-Stand-Sit, Assist Device rolling walker  -KP rolling walker  -MD straight cane  -KP,NA,KP2    Toilet Transfer, [place] contact guard assist  -KP      Toilet Transfer, Assistive Device wheelchair;other (see comments)   grab bars  -KP      Walk-In Shower Transfer,  [place] contact guard assist  -KP      Walk-In Shower Transfer, Assist Device other (see comments);standard shower chair   grab bars. shower bench  -KP      Transfer, Safety Issues  step length decreased;weight-shifting ability decreased;balance decreased during turns;sequencing ability decreased  -MD     Transfer, Impairments  strength decreased;impaired balance  -MD strength decreased;impaired balance  -KP,NA,KP2    Recorded by [KP] Vero York, OTR [MD] Nereida Llamas PT [KP,NA,KP2] Vero York, OTR (r) Lynnette River OT Student (t) Vero York OTR (c)    Gait Assessment/Treatment    Gait, [place] Level  verbal cues required;contact guard assist  -MD     Gait, Assistive Device  rolling walker  -MD     Gait, Distance (Feet)  80  -MD     Gait, Gait Deviations  bilateral:;janet decreased;forward flexed posture;step length decreased;toe-to-floor clearance decreased;decreased heel strike  -MD     Gait, Safety Issues  step length decreased  -MD     Gait, Impairments  impaired balance;strength decreased  -MD     Recorded by  [MD] Nereida Llamas PT     Stairs Assessment/Treatment    Number of Stairs  4  -MD     Stairs, Handrail Location  both sides  -MD     Stairs, [place] Level  verbal cues required;contact guard assist  -MD     Stairs, Technique Used  step over step (ascending);step to step (descending)  -MD     Stairs, Safety Issues  sequencing ability decreased  -MD     Stairs, Impairments  strength decreased;impaired balance  -MD     Recorded by  [MD] Nereida Llamas PT     Upper Body Bathing Assessment/Training    UB Bathing Assess/Train Assistive Device hand-held shower head;shower chair with back  -      UB Bathing Assess/Train, Position sitting  -      UB Bathing Assess/Train, [place] Level set up required;stand by assist  -      Recorded by [KP] Vero York OTR      Lower Body Bathing Assessment/Training    LB Bathing Assess/Train Assistive Device grab  bars;hand-held shower head;shower chair with back  -KP      LB Bathing Assess/Train, Position sitting;standing  -KP      LB Bathing Assess/Train, [place] Level set up required  -KP      Recorded by [KP] Vero oYrk OTR      Upper Body Dressing Assessment/Training    UB Dressing Assess/Train, Clothing Type doffing:;donning:;t-shirt  -KP      UB Dressing Assess/Train, Position sitting  -KP      UB Dressing Assess/Train, [place] set up required;supervision required  -KP      Recorded by [KP] Vero York OTR      Lower Body Dressing Assessment/Training    LB Dressing Assess/Train, Clothing Type doffing:;donning:;pants;socks  -KP      LB Dressing Assess/Train, Position sitting;standing  -KP      LB Dressing Assess/Train, [place] set up required;contact guard assist  -KP      Recorded by [KP] Vero York OTR      Toileting Assessment/Training    Toileting Assess/Train, Assistive Device grab bars  -KP      Toileting Assess/Train, Position sitting;standing  -KP      Toileting Assess/Train, [place]ep[place] Level set up required;contact guard assist  -KP      Recorded by [KP] Vero York OTR      Grooming Assessment/Training    Grooming Assess/Train, Position sitting  -KP      Grooming Assess/Train, [place]ep[place] Level supervision required;set up required  -KP      Recorded by [KP] KANDY Sultana      Balance Skills Training    Sitting-Level of Assistance Close supervision  -KP  Close supervision  -KP,NA,KP2    Sitting-Balance Support Feet supported  -KP  Feet supported  -KP,NA,KP2    Sitting-Balance Activities   Lateral lean;Forward lean;Reaching across midline;Reaching for objects  -KP,NA,KP2    Sitting # of Minutes   20  -KP,NA,KP2    Standing-Level of Assistance Contact guard  -KP      Static Standing Balance Support Right upper extremity supported  -KP      Recorded by [KP] Vero York OTR  [KP,NA,KP2] Vero York OTR (r) Lynnette River OT  Student (t) KANDY Sultana (c)    Therapy Exercises    Bilateral Lower Extremities  AROM:;20 reps;sitting;ankle pumps/circles;hip abduction/adduction;hip flexion;LAQ  -MD     BLE Other Reps  --   nustep at level 2 for 7 min  -MD     Left Upper Extremity AROM:;20 reps;sitting;elbow flexion/extension;shoulder extension/flexion;shoulder abduction/adduction  -KP      Recorded by [BRIANNA] KANDY Sultana [MD] Nereida Llamas, PT     Fine Motor Coordination Training    Detail (Fine Motor Coordination Training) isnerts pegs into small hole. colored pegs to inc L FMC. w. minVC  -KP  pt completed FMC of LUE stacking 10 pennies with mod difficulty. pt with min difficulty with in hand manipulation of pennies and putting into small slot. pt completed bilateral stringing of small beads with some difficulty and LUE hand fatigue.  -KP,NA,KP2    Recorded by [KP] KANDY Sultana  [KP,NA,KP2] KANDY Sultana (r) Lynnette River OT Student (t) KANDY Sultana (c)    Positioning and Restraints    Pre-Treatment Position bathroom  -KP sitting in chair/recliner  -MD sitting in chair/recliner  -KP,NA,KP2    Post Treatment Position wheelchair  -KP wheelchair  -MD bed  -KP,NA,KP2    In Bed   supine;call light within reach;encouraged to call for assist;exit alarm on;with family/caregiver  -KP,NA,KP2    In Wheelchair sitting;with PT  -KP with SLP;with family/caregiver  -MD     Recorded by [BRIANNA] KANDY Sultana [MD] Nereida Llamas, PT [KP,NA,KP2] KANDY Sultana (r) Lynnette River OT Student (t) KANDY Sultana (gricelda)      User Key  (r) = Recorded By, (t) = Taken By, (c) = Cosigned By    Initials Name Effective Dates    JALEN Yepez MS CCC-SLP 04/13/15 -     BRIANNA York OTR 04/13/15 -     MD Nereida Llamas, PT 12/01/15 -     KAYLEN River, OT Student 08/21/17 -                 IP PT Goals       11/01/17 1221          Bed Mobility PT LTG     Bed Mobility PT LTG, Date Established 11/01/17  -MD      Bed Mobility PT LTG, Time to Achieve 2 wks  -MD      Bed Mobility PT LTG, Activity Type supine to sit/sit to supine  -MD      Bed Mobility PT LTG, [place] Level independent  -MD      Transfer Training PT LTG    Transfer Training PT LTG, Date Established 11/01/17  -MD      Transfer Training PT LTG, Time to Achieve 2 wks  -MD      Transfer Training PT LTG, Activity Type sit to stand/stand to sit  -MD      Transfer Training PT LTG, [place] Level supervision required  -MD      Transfer Training PT LTG, Assist Device walker, rolling  -MD      Transfer Training 2 PT LTG    Transfer Training PT 2 LTG, Date Established 11/01/17  -MD      Transfer Training PT 2 LTG, Time to Achieve 2 wks  -MD      Transfer Training PT 2 LTG, Activity Type other (see comments)   car transfer  -MD      Transfer Training PT 2 LTG, [place] Level supervision required  -MD      Transfer Training PT 2 LTG, Assist Device walker, rolling  -MD      Gait Training PT LTG    Gait Training Goal PT LTG, Date Established 11/01/17  -MD      Gait Training Goal PT LTG, Time to Achieve 2 wks  -MD      Gait Training Goal PT LTG, [place] Level supervision required  -MD      Gait Training Goal PT LTG, Assist Device walker, rolling  -MD      Gait Training Goal PT LTG, Distance to Achieve 160  -MD      Stair Training PT LTG    Stair Training Goal PT LTG, Date Established 11/01/17  -MD      Stair Training Goal PT LTG, Time to Achieve 2 wks  -MD      Stair Training Goal PT LTG, Number of Steps 4  -MD      Stair Training Goal PT LTG, [place] Level supervision required  -MD      Stair Training Goal PT LTG, Assist Device 2 handrails  -MD      Patient Education PT LTG    Patient Education PT LTG, Date Established 11/01/17  -MD      Patient Education PT LTG, Time to Achieve 2 wks  -MD      Patient Education PT LTG, Education Type HEP  -MD      Patient Education PT LTG, Education Understanding  demonstrate adequately  -MD        User Key  (r) = Recorded By, (t) = Taken By, (c) = Cosigned By    Initials Name Provider Type    MD Nereida Llamas, PT Physical Therapist          Physical Therapy Education     Title: PT OT SLP Therapies (Active)     Topic: Physical Therapy (Active)     Point: Mobility training (Done)    Learning Progress Summary    Learner Readiness Method Response Comment Documented by Status   Patient Acceptance E VU  JK 11/02/17 1158 Done               Point: Precautions (Active)    Learning Progress Summary    Learner Readiness Method Response Comment Documented by Status   Patient Acceptance E,D NR  MD 11/06/17 1118 Active    Acceptance E,D NR  MD 11/04/17 1012 Active    Acceptance E,D NR  MD 11/03/17 1215 Active    Acceptance E,D NR  MD 11/01/17 1228 Active                      User Key     Initials Effective Dates Name Provider Type Discipline    RALPH 12/01/15 -  Anna Epps, PT Physical Therapist PT    MD 12/01/15 -  Nereida Llamas, PT Physical Therapist PT                    PT Recommendation and Plan  Anticipated Equipment Needs At Discharge: gait belt, front wheeled walker  Anticipated Discharge Disposition: home with home health  Planned Therapy Interventions: balance training, bed mobility training, gait training, home exercise program, patient/family education, transfer training  PT Frequency: 2 times/day  Plan of Care Review  Plan Of Care Reviewed With: patient  Outcome Summary/Follow up Plan: Pt presents s/p CVA leading to impaired gait and impaired dynamic standing balance.  Due to the above pt will benifit from skilled PT to address the above issues and increase pts safety and independence w functional mobility.         Time Calculation:         PT Charges       11/06/17 1410 11/06/17 1216       Time Calculation    Start Time 1330  -MD 1100  -MD     Stop Time 1400  -MD 1130  -MD     Time Calculation (min) 30 min  -MD 30 min  -MD     PT Received On  11/06/17  -MD     PT - Next Appointment   11/07/17  -MD       User Key  (r) = Recorded By, (t) = Taken By, (c) = Cosigned By    Initials Name Provider Type    MD Nereida Llamas, PT Physical Therapist          Therapy Charges for Today     Code Description Service Date Service Provider Modifiers Qty    [id number]  PT THER PROC EA 15 MIN 11/6/2017 Nereida Llamas, PT GP 4               Nereida Llamas, PT  11/6/2017

## 2017-11-06 NOTE — PLAN OF CARE
Problem: Patient Care Overview (Adult)  Goal: Plan of Care Review  Outcome: Ongoing (interventions implemented as appropriate)    11/06/17 1330   Coping/Psychosocial Response Interventions   Plan Of Care Reviewed With patient         Problem: Inpatient SLP  Goal: Dysphagia- Patient will safely consume diet as per recommendation with no signs/symptoms of aspiration  Outcome: Ongoing (interventions implemented as appropriate)    11/06/17 1330   Safely Consume Diet   Safely Consume Diet- SLP, Time to Achieve by discharge   Safely Consume Diet- SLP, Outcome goal ongoing       Goal: Cognitive-linguistic-Patient will improve Cognitive-linguistic skills to improve safety and safety awareness in environment  Outcome: Ongoing (interventions implemented as appropriate)

## 2017-11-06 NOTE — PROGRESS NOTES
Inpatient Rehabilitation Functional Measures Assessment    Functional Measures  YESI Eating:  Patient requires maximal assistance for picking up utensils.  Patient requires maximal assistance for scooping food. Patient requires moderate  assistance for bringing food to mouth. Patient requires no physical assistance  for bringing cup/glass to mouth. Patient requires no physical assistance for  chewing and swallowing. Patient performs 60 % of eating tasks. Eating Score = 3,  Moderate Assistance. No assistive devices were required.  YESI Grooming: Branch  YESI Bathing:  Branch  YESI Upper Body Dressing:  Branch  YESI Lower Body Dressing:  Branch  YESI Toileting:  Toileting Score = 5.  Patient is supervision/set-up for  toileting, requiring: Stand by assistance. Stand by assistance. Patient requires  the following assistive device(s): Grab bar.    YESI Bladder Management  Level of Assistance:  Bladder Score = 5.  Patient is supervision/set-up for  bladder management, requiring: Stand by assistance. Stand by assistance. Patient  requires the following assistive device(s):  Adult brief. Adult brief.  Frequency/Number of Accidents this Shift:  Bladder accidents this shift:  2 .    YESI Bowel Management  Level of Assistance: Bowel Score = 5.  Patient is supervision/set-up for bowel  management, requiring: Stand by assistance. Stand by assistance. Patient  requires the following assistive device(s):  Adult brief. Adult brief.  Frequency/Number of Accidents this Shift: Bowel accidents this shift: 2 .    YESI Bed/Chair/Wheelchair Transfer:  Bed/chair/wheelchair Transfer Score = 5.  Patient is supervision/set-up for transferring to and from the  bed/chair/wheelchair, requiring: Stand by assistance. Stand by assistance.  Patient requires the following assistive device(s): Walker. Bed rails. Grab  bars. Seating system of wheelchair.  YESI Toilet Transfer:  Toilet Transfer Score = 5.  Patient is supervision/set-up  for transferring to and  from the toilet/commode, requiring: Stand by assistance.  Stand by assistance. Wheelchair management (brakes, footrests, armrests). Stand  by assistance. Patient requires the following assistive device(s): Grab bars.  Safety frame/over the toilet. Grab bars.  YESI Tub/Shower Transfer:  Branch    Previously Documented Mode of Locomotion at Discharge: Field  [facility] Expected Mode of Locomotion at Discharge: Branch  [facility] Walk/Wheelchair:  Branch  [facility] Stairs:  Branch    [facility] Comprehension:  Branch  [facility] Expression:  Branch  [facility] Social Interaction:  Branch  [facility] Problem Solving:  Branch  [facility] Memory:  Branch    Therapy Mode Minutes  Occupational Therapy: Branch  Physical Therapy: Branch  Speech Language Pathology:  Branch    Signed by: Jaqueline Izaguirre

## 2017-11-06 NOTE — PROGRESS NOTES
Inpatient Rehabilitation Functional Measures Assessment    Functional Measures  YESI Eating:  [facility] Grooming: [facility] Bathing:  [facility] Upper Body Dressing:  [facility] Lower Body Dressing:  [facility] Toileting:  [facility] Bladder Management  Level of Assistance:  [place]  Frequency/Number of Accidents this Shift:  [facility] Bowel Management  Level of Assistance: [place]  Frequency/Number of Accidents this Shift: [facility] Bed/Chair/Wheelchair Transfer:  [facility] Toilet Transfer:  [facility] Tub/Shower Transfer:  [place]    Previously Documented Mode of Locomotion at Discharge: Field  YESI Expected Mode of Locomotion at Discharge: [facility] Walk/Wheelchair:  [facility] Stairs:  [facility] Comprehension:  Auditory comprehension is the usual mode. Comprehension  Score = 6, Modified [place].  Patient comprehends complex/abstract  information in their primary language with only mild difficulty.  YESI Expression:  Vocal expression is the usual mode. Expression Score = 6,  Modified Independent.  Patient expresses complex/abstract information in their  primary language with only mild difficulty with tasks.  YESI Social Interaction:  Social Interaction Score = 7, Independent. Patient is  completely independent for social interaction.  There are no activity  limitations.  YESI Problem Solving:  Problem Solving Score = 6, Modified [place].  Patient  makes appropriate decisions in order to solve complex problems with mild  difficulty but self-corrects.  YESI Memory:  Memory Score = 6, Modified [place].  Patient is modified  independent for memory, having only mild difficulty and using self-initiated or  environmental cues to remember.    Therapy Mode Minutes  Occupational Therapy: Branch  Physical Therapy: Branch  Speech Language Pathology:  Branch    Signed by: Daniel Horne RN

## 2017-11-06 NOTE — PLAN OF CARE
Problem: Patient Care Overview (Adult)  Goal: Plan of Care Review  Outcome: Ongoing (interventions implemented as appropriate)    11/06/17 0239   Coping/Psychosocial Response Interventions   Plan Of Care Reviewed With patient   Patient Care Overview   Progress improving   Outcome Evaluation   Outcome Summary/Follow up Plan Patient pleasant and cooperative, will complete her 8 NIH scales (below 4) in the AM. she slept well, with no complaints of pain or discomfort. Family will not be present, at all times, on 11/6, but will be available if needed- please call Lanette if patient needs anything.         Problem: Stroke (Ischemic) (Adult)  Goal: Signs and Symptoms of Listed Potential Problems Will be Absent or Manageable (Stroke)  Outcome: Ongoing (interventions implemented as appropriate)    11/06/17 0239   Stroke (Ischemic)   Problems Assessed (Stroke (Ischemic)/TIA) all   Problems Present (Stroke (Ischemic)/TIA) motor/sensory impairment         Problem: Fall Risk (Adult)  Goal: Absence of Falls  Outcome: Ongoing (interventions implemented as appropriate)    11/06/17 0239   Fall Risk (Adult)   Absence of Falls making progress toward outcome

## 2017-11-06 NOTE — PROGRESS NOTES
Occupational Therapy: Branch    Physical Therapy: Branch    Speech Language Pathology:  Individual: 60 minutes.    Signed by: GARTH Almendarez

## 2017-11-06 NOTE — THERAPY TREATMENT NOTE
Inpatient Rehabilitation - Occupational Therapy Treatment Note  [facility]     Patient Name: Alice Maxwell  : 1945  MRN: [id number]  Today's Date: 2017  Onset of Illness/Injury or Date of Surgery Date: 10/28/17  Date of Referral to OT: 17  Referring Physician: Jeremie      Admit Date: 10/31/2017    Visit Dx:     ICD-10-CM ICD-9-CM   1. Oropharyngeal dysphagia R13.12 787.22     Patient Active Problem List   Diagnosis   • Stroke             Adult Rehabilitation Note       17 1203 17 1116 17 1500    Rehab Assessment/Intervention    Discipline occupational therapist  -KP physical therapist  -MD speech language pathologist  -JJ    Document Type therapy note (daily note)  -KP therapy note (daily note)  -MD therapy note (daily note)  -JJ    Subjective Information agree to therapy;no complaints  -KP agree to therapy;no complaints  -MD agree to therapy  -JJ    Patient Effort, Rehab Treatment good  -KP good  -MD adequate  -JJ    Symptoms Noted During/After Treatment none  -KP none  -MD     Precautions/Limitations fall precautions  -KP fall precautions  -MD     Equipment Issued to Patient  gait belt;front wheeled walker  -MD     Recorded by [] Vero York, OTR [MD] Nereida Llamas, PT [JJ] Sepideh Yepez MS CCC-SLP    Pain Assessment    Pain Assessment No/denies pain  -KP No/denies pain  -MD     Recorded by [] Vero York OTR [MD] Nereida Llamas PT     Vision Assessment/Intervention    Visual Impairment  visual impairment, left;inattention to the left;left neglect  -MD     Recorded by  [MD] Nereida Llamas PT     Cognitive Assessment/Intervention    Current Cognitive/Communication Assessment impaired  -KP impaired  -MD     Orientation Status oriented to;person;place  -KP oriented to;person;place  -MD     Follows Commands/Answers Questions 100% of the time;able to follow single-step instructions  -% of the time;needs cueing;needs repetition;needs increased  time  -MD     Personal Safety mild impairment  -KP decreased insight to deficits  -MD     Personal Safety Interventions fall prevention program maintained;gait belt;nonskid shoes/slippers when out of bed  - fall prevention program maintained;gait belt;muscle strengthening facilitated;nonskid shoes/slippers when out of bed;supervised activity  -MD     Recorded by [KP] Vero York, OTR [MD] Nereida Llamas, PT     Swallow Assessment/Intervention    Additional Documentation   --   [facility] soft test tray  -JJ    Recorded by   [JJ] Sepideh Yepez, MS CCC-SLP    Improve oral skills    To Improve Oral Skills, patient will:   Increase lip closure;Increase back of tongue control;90%;without cues  -JJ    Oral Skills Progress   50%;with consistent cues  -JJ    Recorded by   [JJ] Sepideh Yepez, MS CCC-SLP    Improve laryngeal elevation    To improve laryngeal elevation, patient will:   Complete pitch-glide;90%;without cues  -JJ    Laryngeal Elevation Progress   60%;with consistent cues  -JJ    Recorded by   [JJ] Sepideh Yepez MS CCC-SLP    Improve tongue base & pharyngeal wall squeeze    To improve tongue base & pharyngeal wall squeeze, patient will:   Complete gargle/hold retraction;Complete tongue base retraction;Complete effortful swallow;90%;without cues  -JJ    Tongue Base/Pharyngeal Wall Squeeze Progress   50%;with consistent cues  -JJ    Recorded by   [JJ] Sepideh Yepez MS CCC-SLP    Bed Mobility, Assessment/Treatment    Bed Mob, Supine to Sit, [place]  not tested  -MD     Bed Mob, Sit to Supine, [place]  not tested  -MD     Bed Mobility, Comment pt up in   -KP      Recorded by [] Vero York, OTR [MD] Nereida Llamas, PT     Transfer Assessment/Treatment    Transfers, Sit-Stand [place] stand by assist;contact guard assist  -KP contact guard assist  -MD     Transfers, Stand-Sit [place] set up required;stand by assist  - contact guard assist   -MD     Transfers, Sit-Stand-Sit, Assist Device other (see comments)   wc and grab bars  -KP rolling walker  -MD     Walk-In Shower Transfer, [place] set up required;contact guard assist;supervision required  -      Walk-In Shower Transfer, Assist Device other (see comments)   emy bench  -KP      Recorded by [KP] Vero York OTR [MD] Nereida Llamas PT     Gait Assessment/Treatment    Gait, [place] Level  verbal cues required;contact guard assist  -MD     Gait, Assistive Device  rolling walker  -MD     Gait, Distance (Feet)  80  -MD     Gait, Gait Deviations  bilateral:;janet decreased;forward flexed posture;step length decreased;toe-to-floor clearance decreased  -MD     Gait, Safety Issues  step length decreased  -MD     Gait, Impairments  impaired balance;strength decreased  -MD     Recorded by  [MD] Nereida Llamas PT     Stairs Assessment/Treatment    Number of Stairs  4  -MD     Stairs, Handrail Location  both sides  -MD     Stairs, [place] Level  verbal cues required;contact guard assist  -MD     Stairs, Technique Used  step over step (ascending);step over step (descending)  -MD     Stairs, Impairments  strength decreased;impaired balance  -MD     Recorded by  [MD] Nereida Llamas, PT     Upper Body Bathing Assessment/Training    UB Bathing Assess/Train Assistive Device hand-held shower head;tub bench  -      UB Bathing Assess/Train, Position sitting  -      UB Bathing Assess/Train, [place] Level stand by assist;set up required  -      Recorded by [KP] Vero York OTR      Lower Body Bathing Assessment/Training    LB Bathing Assess/Train Assistive Device hand-held shower head;grab bars;tub bench  -      LB Bathing Assess/Train, Position sitting;standing  -      LB Bathing Assess/Train, [place] Level set up required;stand by assist  -      Recorded by [KP] Vero York OTR      Upper Body Dressing Assessment/Training    UB Dressing Assess/Train, Clothing  Type donning:;doffing:;t-shirt  -KP      UB Dressing Assess/Train, Position sitting  -KP      UB Dressing Assess/Train, [place] set up required;supervision required  -KP      Recorded by [KP] KANDY Sultana      Lower Body Dressing Assessment/Training    LB Dressing Assess/Train, Clothing Type doffing:;donning:;pants;slipper socks  -KP      LB Dressing Assess/Train, Position sitting;standing  -KP      LB Dressing Assess/Train, [place] set up required;contact guard assist;supervision required  -KP      Recorded by [KP] KANDY Sultana      Grooming Assessment/Training    Grooming Assess/Train, Position sitting  -KP      Grooming Assess/Train, [place] Level set up required;supervision required  -KP      Recorded by [KP] KANDY Sultana      Balance Skills Training    Sitting-Level of Assistance Close supervision  -KP      Sitting-Balance Support Feet supported  -KP      Standing-Level of Assistance Close supervision;Contact guard  -KP      Static Standing Balance Support Right upper extremity supported   intermittent UE support  -KP      Recorded by [KP] KANDY Sultana      Positioning and Restraints    Pre-Treatment Position sitting in chair/recliner  -KP sitting in chair/recliner  -MD     Post Treatment Position wheelchair  -KP      In Wheelchair sitting;call light within reach;encouraged to call for assist  -KP      Recorded by [KP] KANDY Sultana [MD] Nereida Llamas, PT       11/04/17 1210 11/04/17 1009 11/03/17 1509    Rehab Assessment/Intervention    Discipline occupational therapist  -BRIANNA physical therapist  -MD occupational therapist  -KP,NA,KP2    Document Type therapy note (daily note)  -KP therapy note (daily note)  -MD therapy note (daily note)  -KP,NA,KP2    Subjective Information agree to therapy;complains of;fatigue  -KP no complaints;agree to therapy  -MD agree to therapy;complains of;fatigue  -KP,NA,KP2    Patient Effort, Rehab Treatment  good  -KP good  -MD good  -KP,NA,KP2    Symptoms Noted During/After Treatment none  -KP none  -MD fatigue  -KP,NA,KP2    Precautions/Limitations fall precautions;other (see comments)   [place] . hearing aides  -KP fall precautions  -MD fall precautions  -KP,NA,KP2    Specific Treatment Considerations hearing aids  -KP      Equipment Issued to Patient  gait belt  -MD     Recorded by [KP] KANDY Sultana [MD] Nereida Llamas, PT [KP,NA,KP2] KANDY Sultana (r) Lynnette [place] OT Student (t) KANDY Sultana (c)    Pain Assessment    Pain Assessment No/denies pain  -KP No/denies pain  -MD No/denies pain  -KP,NA,KP2    Recorded by [KP] KANDY Sultana [MD] Nereida Llamas, PT [KP,NA,KP2] KANDY Sultana (r) Lynnette [place] OT Student (t) KANDY Sultana (c)    Vision Assessment/Intervention    Visual Impairment visual impairment, left;inattention to the left;left neglect  -KP left neglect  -MD left neglect;inattention to the left;visual impairment, left  -KP,NA,KP2    Vision Comment   min cues to attend to left  -KP,NA,KP2    Recorded by [KP] KANDY Sultana [MD] Nereida Llamas, PT [KP,NA,KP2] KANDY Sultana (r) Lyn[place]te [place] OT Student (t) KANDY Sultana (c)    Cognitive Assessment/Intervention    Current Cognitive/Communication Assessment impaired  -KP impaired  -MD impaired  -KP,NA,KP2    Orientation Status oriented to;place;person;time  -KP oriented to;person;place;time  -MD     Follows Commands/Answers Questions 100% of the time;able to follow single-step instructions;needs cueing  -% of the time;needs cueing;needs increased time;needs repetition  -MD able to follow single-step instructions;needs increased time;needs cueing  -KP,NA,KP2    Personal Safety mild impairment  -KP decreased insight to deficits  -MD decreased insight to deficits  -KP,NA,KP2    Personal Safety Interventions fall prevention program maintained;gait  belt;muscle strengthening facilitated;nonskid shoes/slippers when out of bed  -KP fall prevention program maintained;gait belt;muscle strengthening facilitated;nonskid shoes/slippers when out of bed;supervised activity  -MD fall prevention program maintained;gait belt;nonskid shoes/slippers when out of bed  -KP,NA,KP2    Recorded by [KP] Vero York OTR [MD] Nereida Llamas, PT [KP,NA,KP2] KANDY Sultana (r) [place] OT Student (t) KANDY Sultana (c)    Bed Mobility, Assessment/Treatment    Bed Mobility, Assistive Device   bed rails  -KP,NA,KP2    Bed Mobility, [place]/Bridge, [place]   supervision required;verbal cues required  -KP,NA,KP2    Bed Mob, Supine to Sit, [place]  not tested  -MD     Bed Mob, Sit to Supine, [place]  not tested  -MD supervision required  -KP,NA,KP2    Bed Mobility, Impairments   strength decreased  -KP,NA,KP2    Bed Mobility, Comment up in wc  -KP      Recorded by [KP] KANDY Sultana [MD] Nereida Llamas, PT [KP,NA,KP2] KANDY Sultana (r) [place]te [place], SARA Student (t) KANDY Sultana (c)    Transfer Assessment/Treatment    Transfers, Chair-Bed [place]   contact guard assist  -KP,NA,KP2    Transfers, Sit-Stand [place] contact guard assist  -KP contact guard assist  -MD contact guard assist  -KP,NA,KP2    Transfers, Stand-Sit [place] contact guard assist  -KP contact guard assist  -MD contact guard assist  -KP,NA,KP2    Transfers, Sit-Stand-Sit, Assist Device rolling walker  -KP rolling walker  -MD straight cane  -KP,NA,KP2    Toilet Transfer, [place] contact guard assist  -KP      Toilet Transfer, Assistive Device wheelchair;other (see comments)   grab bars  -KP      Walk-In Shower Transfer, [place] contact guard assist  -KP      Walk-In Shower Transfer, Assist Device other (see comments);standard shower chair   grab bars. shower bench  -KP      Transfer, Safety Issues  step length  decreased;weight-shifting ability decreased;balance decreased during turns;sequencing ability decreased  -MD     Transfer, Impairments  strength decreased;impaired balance  -MD strength decreased;impaired balance  -KP,NA,KP2    Recorded by [KP] Vero York OTR [MD] Nereida Llamas PT [KP,NA,KP2] Vero York OTR (r) Lynnette [place], OT Student (t) Vero York OTR (c)    Gait Assessment/Treatment    Gait, [place] Level  verbal cues required;contact guard assist  -MD     Gait, Assistive Device  rolling walker  -MD     Gait, Distance (Feet)  80  -MD     Gait, Gait Deviations  bilateral:;janet decreased;forward flexed posture;step length decreased;toe-to-floor clearance decreased;decreased heel strike  -MD     Gait, Safety Issues  step length decreased  -MD     Gait, Impairments  impaired balance;strength decreased  -MD     Recorded by  [MD] Nereida Llamas PT     Stairs Assessment/Treatment    Number of Stairs  4  -MD     Stairs, Handrail Location  both sides  -MD     Stairs, [place] Level  verbal cues required;contact guard assist  -MD     Stairs, Technique Used  step over step (ascending);step to step (descending)  -MD     Stairs, Safety Issues  sequencing ability decreased  -MD     Stairs, Impairments  strength decreased;impaired balance  -MD     Recorded by  [MD] Nereida Llamas PT     Upper Body Bathing Assessment/Training    UB Bathing Assess/Train Assistive Device hand-held shower head;shower chair with back  -KP      UB Bathing Assess/Train, Position sitting  -      UB Bathing Assess/Train, [place] Level set up required;stand by assist  -KP      Recorded by [KP] Vero York OTR      Lower Body Bathing Assessment/Training    LB Bathing Assess/Train Assistive Device grab bars;hand-held shower head;shower chair with back  -KP      LB Bathing Assess/Train, Position sitting;standing  -      LB Bathing Assess/Train, [place] Level set up required  -       Recorded by [KP] Vero York OTR      Upper Body Dressing Assessment/Training    UB Dressing Assess/Train, Clothing Type doffing:;donning:;t-shirt  -KP      UB Dressing Assess/Train, Position sitting  -KP      UB Dressing Assess/Train, [place] set up required;supervision required  -KP      Recorded by [KP] Vero York OTR      Lower Body Dressing Assessment/Training    LB Dressing Assess/Train, Clothing Type doffing:;donning:;pants;socks  -KP      LB Dressing Assess/Train, Position sitting;standing  -KP      LB Dressing Assess/Train, [place] set up required;contact guard assist  -KP      Recorded by [KP] Vero York OTR      Toileting Assessment/Training    Toileting Assess/Train, Assistive Device grab bars  -KP      Toileting Assess/Train, Position sitting;standing  -KP      Toileting Assess/Train, [place] Level set up required;contact guard assist  -KP      Recorded by [KP] Vero York OTR      Grooming Assessment/Training    Grooming Assess/Train, Position sitting  -KP      Grooming Assess/Train, [place] Level supervision required;set up required  -KP      Recorded by [KP] Vero York OTR      Balance Skills Training    Sitting-Level of Assistance Close supervision  -KP  Close supervision  -KP,NA,KP2    Sitting-Balance Support Feet supported  -KP  Feet supported  -KP,NA,KP2    Sitting-Balance Activities   Lateral lean;Forward lean;Reaching across midline;Reaching for objects  -KP,NA,KP2    Sitting # of Minutes   20  -KP,NA,KP2    Standing-Level of Assistance Contact guard  -KP      Static Standing Balance Support Right upper extremity supported  -KP      Recorded by [KP] KANDY Sultana  [KP,NA,KP2] KANDY Sultana (r) Lynnette [place] OT Student (t) KANDY Sultana (c)    Therapy Exercises    Bilateral Lower Extremities  AROM:;20 reps;sitting;ankle pumps/circles;hip abduction/adduction;hip flexion;LAQ  -MD     BLE  Other Reps  --   nustep at level 2 for 7 min  -MD     Left Upper Extremity AROM:;20 reps;sitting;elbow flexion/extension;shoulder extension/flexion;shoulder abduction/adduction  -KP      Recorded by [KP] Vero York OTR [MD] Nereida Llamas, PT     Fine Motor Coordination Training    Detail (Fine Motor Coordination Training) isnerts pegs into small hole. colored pegs to inc L FMC. w. minVC  -KP  pt completed FMC of LUE stacking 10 pennies with mod difficulty. pt with min difficulty with in hand manipulation of pennies and putting into small slot. pt completed bilateral stringing of small beads with some difficulty and LUE hand fatigue.  -KP,NA,KP2    Recorded by [KP] KANDY Sultana  [KP,NA,KP2] KANDY Sultana (r) Lyn[place]te [place] OT Student (t) KANDY Sultana (c)    Positioning and Restraints    Pre-Treatment Position bathroom  -KP sitting in chair/recliner  -MD sitting in chair/recliner  -KP,NA,KP2    Post Treatment Position wheelchair  -KP wheelchair  -MD bed  -KP,NA,KP2    In Bed   supine;call light within reach;encouraged to call for assist;exit alarm on;with family/caregiver  -KP,NA,KP2    In Wheelchair sitting;with PT  -KP with SLP;with family/caregiver  -MD     Recorded by [KP] Vero York OTR [MD] Nereida Llamas, PT [KP,NA,KP2] KANDY Sultana (r) [place]te [place] OT Student (t) KANDY Sultana (c)      11/03/17 1330          Rehab Assessment/Intervention    Discipline speech language pathologist  -OC      Document Type therapy note (daily note)  -OC      Subjective Information agree to therapy  -OC      Patient Effort, Rehab Treatment good  -OC      Symptoms Noted During/After Treatment none  -OC      Recorded by [OC] Lilian Mohr MA,BEN-SLP      Pain Assessment    Pain Assessment No/denies pain  -OC      Recorded by [OC] Lilian Mohr MA,BEN-SLP      Cognitive Assessment/Intervention    Current Cognitive/Communication Assessment impaired   -OC      Recorded by [OC] Lilian Mohr MA,CCC-SLP      Swallow Assessment/Intervention    Additional Documentation --   Trialed whole soft meats with pt this date, fxnl mastication  -OC      Recorded by [OC] Lilian Mohr MA,BEN-SLP      Improve oral skills    Status: Improve Oral Skills Progressing as expected  -OC      Oral Skills Progress 70%;with inconsistent cues  -OC      Comments: Improve Oral Skills No anterior loss with saliva this date.   -OC      Recorded by [OC] Liilan Mohr MA,BEN-SLP      Improve laryngeal elevation    Status: Improve laryngeal elevation Progressing as expected  -OC      Laryngeal Elevation Progress 70%;with inconsistent cues  -OC      Recorded by [OC] Lilian Mohr MA,BEN-SLP      Improve tongue base & pharyngeal wall squeeze    Tongue Base/Pharyngeal Wall Squeeze Progress 70%;with inconsistent cues  -OC      Recorded by [OC] Lilian Mohr MA,BEN-SLP        User Key  (r) = Recorded By, (t) = Taken By, (c) = Cosigned By    Initials Name Effective Dates    OC Lilian Mohr MA,CCC-SLP 04/05/17 -     JALEN Yepez, MS CCC-SLP 04/13/15 -     BRIANNA York, OTR 04/13/15 -     MD Nereida Llamas, PT 12/01/15 -     NA Lynnette [place], OT Student 08/21/17 -                 OT Goals       11/01/17 1554 11/01/17 1230       Transfer Training OT STG    Transfer Training OT STG, Date Established  11/01/17  -SO     Transfer Training OT STG, Time to Achieve  1 wk  -SO     Transfer Training OT STG, Activity Type  tub;walk-in shower;shower chair  -SO     Transfer Training OT STG, [place] Level  contact guard assist  -SO     Transfer Training OT STG, Assist Device  walker, rolling;shower chair  -SO     Transfer Training OT LTG    Transfer Training OT LTG, Date Established  11/01/17  -SO     Transfer Training OT LTG, Time to Achieve  by discharge  -SO     Transfer Training OT LTG, Activity Type  tub;walk-in shower;shower chair  -SO     Transfer Training OT LTG, [place] Level   supervision required  -SO     Transfer Training OT LTG, Assist Device  walker, rolling;shower chair  -SO     Transfer Training 2 OT STG    Transfer Training 2 OT STG, Date Established  11/01/17  -SO     Transfer Training 2 OT STG, Time to Achieve  1 wk  -SO     Transfer Training 2 OT STG, Activity Type  toilet  -SO     Transfer Training 2 OT STG, [place] Level  contact guard assist  -SO     Transfer Training 2 OT STG, Assist Device  walker, rolling  -SO     Transfer Training 2 OT LTG    Transfer Training 2 OT LTG, Date Established  11/01/17  -SO     Transfer Training 2 OT LTG, Time to Achieve  by discharge  -SO     Transfer Training 2 OT LTG, Activity Type  toilet  -SO     Transfer Training 2 OT LTG, [place] Level  supervision required  -SO     Transfer Training 2 OT LTG, Assist Device  walker, rolling  -SO     Strength OT LTG    Strength Goal OT LTG, Date Established 11/01/17  -SO      Strength Goal OT LTG, Time to Achieve by discharge  -SO      Strength Goal OT LTG, Measure to Achieve Pt to increase LUE strength to 4-/5 to increase independence with ADLs.  -SO      Caregiver Training OT LTG    Caregiver Training OT LTG, Date Established  11/01/17  -SO     Caregiver Training OT LTG, Time to Achieve  by discharge  -SO     Caregiver Training OT LTG, [place] Level  able to assist adequately;able to cue patient adequately  -SO     Patient Education OT LTG    Patient Education OT LTG, Date Established  11/01/17  -SO     Patient Education OT LTG, Time to Achieve  by discharge  -SO     Patient Education OT LTG, Education Type  written program;HEP;aware of neuro deficits;home safety  -SO     Patient Education OT LTG, Education Understanding  independent;demonstrates adequately;verbalizes understanding  -SO     Isolated Movement OT LTG    Isolated Movement OT LTG, Date Established 11/01/17  -SO      Isolated Movement OT LTG, Time to Achieve by discharge  -SO      Isolated Movement OT LTG, Body Area left  scapula;left shoulder;left elbow;left forearm;left wrist;left fingers  -SO      Isolated Movement OT LTG, Level WFL  -SO      Coordination OT LTG    Coordination OT LTG, Date Established 11/01/17  -SO      Coordination OT LTG, Time to Achieve by discharge  -SO      Coordination OT LTG, Activity Type FM written ex program;GM written ex program;FM task;GM task  -SO      Coordination OT LTG, [place] Level independent  -SO      Coordination OT LTG, Additional Goal LUE  -SO      ADL OT STG    ADL OT STG, Date Established  11/01/17  -SO     ADL OT STG, Time to Achieve  1 wk  -SO     ADL OT STG, Activity Type  ADL skills  -SO     ADL OT STG, [place] Level  contact guard;assistive device  -SO     ADL OT LTG    ADL OT LTG, Date Established  11/01/17  -SO     ADL OT LTG, Time to Achieve  by discharge  -SO     ADL OT LTG, Activity Type  ADL skills  -SO     ADL OT LTG, [place] Level  standby assist;assistive device  -SO       User Key  (r) = Recorded By, (t) = Taken By, (c) = Cosigned By    Initials Name Provider Type    SO Micki Castro OTR Occupational Therapist          Occupational Therapy Education     Title: PT OT SLP Therapies (Active)     Topic: Occupational Therapy (Active)     Point: ADL training (Done)    Description: Instruct learner(s) on proper safety adaptation and remediation techniques during self care or transfers.   Instruct in proper use of assistive devices.    Learning Progress Summary    Learner Readiness Method Response Comment Documented by Status   Patient Acceptance E,D VU,NR ed pt on shower tsf technique. ed pt on bathing safety. pt demo shower w. CGA to SBA. mostly SBA. KP 11/06/17 1207 Done    Acceptance E VU Discuss OT goals and POC. SO 11/01/17 1232 Done   Family Acceptance E VU Discuss OT goals and POC. SO 11/01/17 1232 Done               Point: Precautions (Done)    Description: Instruct learner(s) on prescribed precautions during self-care and functional transfers.     Learning Progress Summary    Learner Readiness Method Response Comment Documented by Status   Patient Acceptance E,D VU,NR ed pt on shower tsf technique. ed pt on bathing safety. pt demo shower w. CGA to SBA. mostly SBA.  11/06/17 1207 Done               Point: Body mechanics (Done)    Description: Instruct learner(s) on proper positioning and spine alignment during self-care, functional mobility activities and/or exercises.    Learning Progress Summary    Learner Readiness Method Response Comment Documented by Status   Patient Acceptance E,D VU,NR ed pt on shower tsf technique. ed pt on bathing safety. pt demo shower w. CGA to SBA. mostly SBA.  11/06/17 1207 Done                      User Key     Initials Effective Dates Name Provider Type Discipline     04/13/15 -  Micki Castro, OTR Occupational Therapist OT     04/13/15 -  KANDY Sultana Occupational Therapist OT                  OT Recommendation and Plan  Therapy Frequency: 5 times/wk          Time Calculation:         Time Calculation- OT       11/06/17 1208          Time Calculation- OT    OT Start Time 0900  -      OT Stop Time 0930  -      OT Time Calculation (min) 30 min  -        User Key  (r) = Recorded By, (t) = Taken By, (c) = Cosigned By    Initials Name Provider Type     KANDY Sultana Occupational Therapist           Therapy Charges for Today     Code Description Service Date Service Provider Modifiers Qty    [id number] HC OT SELF CARE/MGMT/TRAIN EA 15 MIN 11/6/2017 KANDY Sultana GO 2               KANDY Sultana  11/6/2017

## 2017-11-07 LAB
GLUCOSE BLDC GLUCOMTR-MCNC: 115 MG/DL (ref 70–130)
GLUCOSE BLDC GLUCOMTR-MCNC: 88 MG/DL (ref 70–130)
INR PPP: 1.79 (ref 0.9–1.1)
PROTHROMBIN TIME: 20.2 SECONDS (ref 11.7–14.2)

## 2017-11-07 PROCEDURE — 85610 PROTHROMBIN TIME: CPT | Performed by: PHYSICAL MEDICINE & REHABILITATION

## 2017-11-07 PROCEDURE — 97110 THERAPEUTIC EXERCISES: CPT

## 2017-11-07 PROCEDURE — 82962 GLUCOSE BLOOD TEST: CPT

## 2017-11-07 PROCEDURE — 25010000002 ENOXAPARIN PER 10 MG: Performed by: PHYSICAL MEDICINE & REHABILITATION

## 2017-11-07 PROCEDURE — 92526 ORAL FUNCTION THERAPY: CPT

## 2017-11-07 PROCEDURE — 97112 NEUROMUSCULAR REEDUCATION: CPT | Performed by: OCCUPATIONAL THERAPIST

## 2017-11-07 PROCEDURE — 97532: CPT

## 2017-11-07 PROCEDURE — 97535 SELF CARE MNGMENT TRAINING: CPT | Performed by: OCCUPATIONAL THERAPIST

## 2017-11-07 RX ORDER — WARFARIN SODIUM 7.5 MG/1
7.5 TABLET ORAL
Status: DISCONTINUED | OUTPATIENT
Start: 2017-11-07 | End: 2017-11-08

## 2017-11-07 RX ADMIN — ATORVASTATIN CALCIUM 80 MG: 80 TABLET, FILM COATED ORAL at 21:00

## 2017-11-07 RX ADMIN — ENOXAPARIN SODIUM 100 MG: 100 INJECTION SUBCUTANEOUS at 21:00

## 2017-11-07 RX ADMIN — LEVOTHYROXINE SODIUM 112 MCG: 112 TABLET ORAL at 05:16

## 2017-11-07 RX ADMIN — ENOXAPARIN SODIUM 100 MG: 100 INJECTION SUBCUTANEOUS at 08:10

## 2017-11-07 RX ADMIN — VITAMIN D, TAB 1000IU (100/BT) 1000 UNITS: 25 TAB at 08:10

## 2017-11-07 RX ADMIN — FOLIC ACID 1 MG: 1 TABLET ORAL at 08:10

## 2017-11-07 RX ADMIN — WARFARIN SODIUM 7.5 MG: 7.5 TABLET ORAL at 16:50

## 2017-11-07 RX ADMIN — ALLOPURINOL 200 MG: 100 TABLET ORAL at 08:10

## 2017-11-07 RX ADMIN — VITAMIN D, TAB 1000IU (100/BT) 1000 UNITS: 25 TAB at 16:49

## 2017-11-07 RX ADMIN — Medication 1 TABLET: at 08:10

## 2017-11-07 RX ADMIN — METFORMIN HYDROCHLORIDE 500 MG: 500 TABLET ORAL at 08:10

## 2017-11-07 RX ADMIN — ASPIRIN 81 MG: 81 TABLET, CHEWABLE ORAL at 08:10

## 2017-11-07 RX ADMIN — Medication 3 MG: at 22:59

## 2017-11-07 NOTE — PROGRESS NOTES
Inpatient Rehabilitation Functional Measures Assessment    Functional Measures  YESI Eating:  [facility] Grooming: [facility] Bathing:  [facility] Upper Body Dressing:  [facility] Lower Body Dressing:  [facility] Toileting:  [facility] Bladder Management  Level of Assistance:  [place]  Frequency/Number of Accidents this Shift:  [facility] Bowel Management  Level of Assistance: [place]  Frequency/Number of Accidents this Shift: [facility] Bed/Chair/Wheelchair Transfer:  [facility] Toilet Transfer:  [facility] Tub/Shower Transfer:  [place]    Previously Documented Mode of Locomotion at Discharge: Field  YESI Expected Mode of Locomotion at Discharge: [facility] Walk/Wheelchair:  [facility] Stairs:  [facility] Comprehension:  Auditory comprehension is the usual mode. Comprehension  Score = 6, Modified [place].  Patient comprehends complex/abstract  information in their primary language with only mild difficulty.  YESI Expression:  Vocal expression is the usual mode. Expression Score = 6,  Modified Independent.  Patient expresses complex/abstract information in their  primary language with only mild difficulty with tasks.  YESI Social Interaction:  Social Interaction Score = 7, Independent. Patient is  completely independent for social interaction.  There are no activity  limitations.  YESI Problem Solving:  Problem Solving Score = 6, Modified [place].  Patient  makes appropriate decisions in order to solve complex problems with mild  difficulty but self-corrects.  YESI Memory:  Memory Score = 6, Modified [place].  Patient is modified  independent for memory, having only mild difficulty and using self-initiated or  environmental cues to remember.    Therapy Mode Minutes  Occupational Therapy: Branch  Physical Therapy: Branch  Speech Language Pathology:  [facility]    Signed by: Daniel Horne RN

## 2017-11-07 NOTE — PLAN OF CARE
Problem: Patient Care Overview (Adult)  Goal: Plan of Care Review  Outcome: Ongoing (interventions implemented as appropriate)    11/07/17 0147   Coping/Psychosocial Response Interventions   Plan Of Care Reviewed With patient   Patient Care Overview   Progress improving   Outcome Evaluation   Outcome Summary/Follow up Plan Patient calm and cooperative. Requested to take her Melatonin at 2300. Patient ambulated CGA x 1 with rwx. No c/o pain, no unsafe behaviors.          Problem: Stroke (Ischemic) (Adult)  Goal: Signs and Symptoms of Listed Potential Problems Will be Absent or Manageable (Stroke)  Outcome: Ongoing (interventions implemented as appropriate)    11/07/17 0147   Stroke (Ischemic)   Problems Assessed (Stroke (Ischemic)/TIA) all   Problems Present (Stroke (Ischemic)/TIA) motor/sensory impairment         Problem: Fall Risk (Adult)  Goal: Absence of Falls  Outcome: Ongoing (interventions implemented as appropriate)    11/07/17 0147   Fall Risk (Adult)   Absence of Falls making progress toward outcome

## 2017-11-07 NOTE — THERAPY EVALUATION
Inpatient Rehabilitation - Speech Language Pathology Treatment Note  [facility]     Patient Name: Alice Maxwell  : 1945  MRN: [id number]  Today's Date: 2017         Admit Date: 10/31/2017    Visit Dx:      ICD-10-CM ICD-9-CM   1. Oropharyngeal dysphagia R13.12 787.22     Patient Active Problem List   Diagnosis   • Stroke              Adult Rehabilitation Note       17 1100 17 1030 17 0819    Rehab Assessment/Intervention    Discipline speech language pathologist  -KB speech language pathologist  -KB physical therapist  -MD    Document Type therapy note (daily note)  -KB therapy note (daily note)  -KB therapy note (daily note)  -MD    Subjective Information no complaints;agree to therapy  -KB no complaints;agree to therapy  -KB agree to therapy;no complaints  -MD    Patient Effort, Rehab Treatment good  -KB good  -KB good  -MD    Symptoms Noted During/After Treatment none  -KB none  -KB none  -MD    Precautions/Limitations fall precautions;swallowing precautions  -KB fall precautions;swallowing precautions  -KB fall precautions  -MD    Equipment Issued to Patient   gait belt;front wheeled walker  -MD    Recorded by [KB] Katherine L Bruton [KB] Katherine L Bruton [MD] Nereida Llamas, PT    Pain Assessment    Pain Assessment No/denies pain  -KB No/denies pain  -KB No/denies pain  -MD    Recorded by [KB] Katherine L Bruton [KB] Katherine L Bruton [MD] Nereida Llamas, PT    Vision Assessment/Intervention    Visual Impairment visual impairment, left;inattention to the left  -KB visual impairment, left;inattention to the left  -KB visual impairment, left;inattention to the left  -MD    Recorded by [KB] Katherine L Bruton [KB] Katherine L Bruton [MD] Nereida Llamas, PT    Cognitive Assessment/Intervention    Current Cognitive/Communication Assessment   impaired  -MD    Orientation Status   oriented to;person  -MD    Follows Commands/Answers Questions   100% of the time;needs cueing;needs repetition  -MD     Personal Safety   decreased insight to deficits  -MD    Personal Safety Interventions   fall prevention program maintained;gait belt;muscle strengthening facilitated;nonskid shoes/slippers when out of bed;supervised activity  -MD    Recorded by   [MD] Nereida Llamas, PT    Improve functional problem solving    Improve functional problem solving through:  complete high level reasoning task  -KB     Status: Improve functional problem solving  Progressing as expected  -KB     Functional Problem Solving Progress  50%;without cues;100%;with consistent cues  -KB     Comments: Improve functional problem solving  Pt required MOD verbal cues throughout logic puzzle for attention and reasoning.   -KB     Recorded by  [KB] Katherine L Bruton     Improve oral skills    Status: Improve Oral Skills  Progressing as expected  -KB     Oral Skills Progress  80%;with inconsistent cues  -KB     Recorded by  [KB] Katherine L Bruton     Improve closure at entrance to airway    Status: Improve closure at entrance to airway Progressing as expected  -KB      Closure at Entrance to Airway Progress continue to adress  -KB      Comments: Improve closure at entrance to airway Pt observed with mid-day meal. 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   -KB      Recorded by [KB] Katherine L Bruton      Improve laryngeal elevation    Status: Improve laryngeal elevation  Progressing as expected  -KB     Laryngeal Elevation Progress  60%;with inconsistent cues;continue to adress  -KB     Comments: Improve laryngeal elevation  Pt completing exercises in therapy session as prescribed.   -KB     Recorded by  [KB] Katherine L Bruton     Improve tongue base & pharyngeal wall squeeze    Status: Improve tongue base & pharyngeal wall squeeze  Progressing as expected  -KB     Tongue Base/Pharyngeal Wall Squeeze Progress  60%;with inconsistent cues;continue to adress  -KB     Comments: Improve tongue  base & pharyngeal wall squeeze  Pt completing exercises in therapy sessions as prescribed.   -ANIYA     Recorded by  [ANIYA] Katherine L Bruton     Bed Mobility, Assessment/Treatment    Bed Mob, Supine to Sit, [place]   not tested  -MD    Bed Mob, Sit to Supine, [place]   not tested  -MD    Recorded by   [MD] Nereida Llamas PT    Transfer Assessment/Treatment    Transfers, Sit-Stand [place]   verbal cues required;stand by assist  -MD    Transfers, Stand-Sit [place]   verbal cues required;stand by assist  -MD    Transfers, Sit-Stand-Sit, Assist Device   rolling walker  -MD    Transfer, Safety Issues   step length decreased  -MD    Transfer, Impairments   strength decreased;impaired balance  -MD    Recorded by   [MD] Nereida Llamas PT    Gait Assessment/Treatment    Gait, [place] Level   verbal cues required;contact guard assist  -MD    Gait, Assistive Device   rolling walker  -MD    Gait, Distance (Feet)   80   x3  -MD    Gait, Gait Deviations   bilateral:;janet decreased;forward flexed posture;step length decreased;toe-to-floor clearance decreased;weight-shifting ability decreased  -MD    Gait, Safety Issues   step length decreased  -MD    Gait, Impairments   impaired balance;strength decreased  -MD    Recorded by   [MD] Nereida Llamas PT    Stairs Assessment/Treatment    Number of Stairs   4  -MD    Stairs, Handrail Location   both sides  -MD    Stairs, [place] Level   supervision required  -MD    Stairs, Technique Used   step over step (ascending);step to step (descending)  -MD    Stairs, Impairments   strength decreased;impaired balance  -MD    Recorded by   [MD] Nereida Llamas PT    Therapy Exercises    Bilateral Lower Extremities   --   nustep 7 min level 2  -MD    Recorded by   [MD] Nereida Llamas PT    Positioning and Restraints    Pre-Treatment Position   sitting in chair/recliner  -MD    Post Treatment Position   wheelchair  -MD    In Wheelchair   patient within staff view   in DR  -MD    Recorded by    [MD] Nereida Marinan, PT      11/06/17 1550 11/06/17 1330 11/06/17 1203    Rehab Assessment/Intervention    Discipline occupational therapist  -KP speech language pathologist  -OC occupational therapist  -KP    Document Type therapy note (daily note)  -KP therapy note (daily note)  -OC therapy note (daily note)  -KP    Subjective Information complains of;agree to therapy;fatigue  -KP no complaints;agree to therapy  -OC agree to therapy;no complaints  -KP    Patient Effort, Rehab Treatment good  -KP good  -OC good  -KP    Symptoms Noted During/After Treatment none  -KP none  -OC none  -KP    Precautions/Limitations fall precautions  -KP  fall precautions  -KP    Recorded by [KP] Vero York OTR [OC] Lilian Mohr MA,CCC-SLP [KP] Vero York OTR    Pain Assessment    Pain Assessment No/denies pain  -KP No/denies pain  -OC No/denies pain  -KP    Recorded by [KP] Vero York OTR [OC] Lilian Mohr MA,CCC-SLP [KP] Vero York, SARAR    Vision Assessment/Intervention    Visual Impairment visual impairment, left;inattention to the left  -KP      Recorded by [KP] Vero York, SARAR      Cognitive Assessment/Intervention    Current Cognitive/Communication Assessment impaired  -KP impaired  -OC impaired  -KP    Orientation Status oriented to;person;place;time  -KP oriented to;person;place;time  -OC oriented to;person;place  -KP    Follows Commands/Answers Questions 100% of the time;able to follow single-step instructions  -% of the time;able to follow multi-step instructions;needs cueing;able to follow single-step instructions;needs increased time  -% of the time;able to follow single-step instructions  -KP    Personal Safety mild impairment  -KP  mild impairment  -KP    Personal Safety Interventions fall prevention program maintained;gait belt;muscle strengthening facilitated;nonskid shoes/slippers when out of bed  -KP  fall prevention program maintained;gait belt;nonskid  shoes/slippers when out of bed  -    Recorded by [KP] Vero York, OTR [OC] Lilian Mohr MA,CCC-SLP [KP] Vero York, OTR    Improve executive function skills    Status: Improve executive function skills  Progressing as expected  -OC     Executive Function Skills Progress  20%;without cues;70%;with consistent cues   organization task of dates/holidays  -OC     Comments: Improve executive function skills  Pt demonstrated difficulty with cell phone this date  -OC     Recorded by  [OC] Lilian Mohr MA,CCC-SLP     Improve oral skills    Status: Improve Oral Skills  Progressing as expected  -OC     Oral Skills Progress  70%;with inconsistent cues  -OC     Recorded by  [OC] Lilian Mohr MA,BEN-SLP     Improve laryngeal elevation    Status: Improve laryngeal elevation  Progressing as expected  -OC     Laryngeal Elevation Progress  60%;with inconsistent cues  -OC     Recorded by  [OC] Lilian Mohr MA,BEN-SLP     Improve tongue base & pharyngeal wall squeeze    Status: Improve tongue base & pharyngeal wall squeeze  Progressing as expected  -OC     Tongue Base/Pharyngeal Wall Squeeze Progress  60%;with inconsistent cues  -OC     Recorded by  [OC] Lilian Mohr MA,CCC-SLP     Bed Mobility, Assessment/Treatment    Bed Mob, Supine to Sit, [place] not tested  -      Bed Mob, Sit to Supine, [place] set up required;supervision required  -      Bed Mobility, Comment   pt up in   -KP    Recorded by [] Vero York, OTR  [KP] Vero York, OTR    Transfer Assessment/Treatment    Transfers, Sit-Stand [place] set up required;supervision required;stand by assist  -  stand by assist;contact guard assist  -    Transfers, Stand-Sit [place] stand by assist  -  set up required;stand by assist  -    Transfers, Sit-Stand-Sit, Assist Device bed rails  -  other (see comments)    and grab bars  -    Walk-In Shower Transfer, [place]   set up required;contact guard  assist;supervision required  -KP    Walk-In Shower Transfer, Assist Device   other (see comments)   emy bench  -KP    Recorded by [] Vero York OTR  [] Vero York OTR    Upper Body Bathing Assessment/Training    UB Bathing Assess/Train Assistive Device   hand-held shower head;tub bench  -KP    UB Bathing Assess/Train, Position   sitting  -KP    UB Bathing Assess/Train, [place] Level   stand by assist;set up required  -KP    Recorded by   [KP] Vero York OTR    Lower Body Bathing Assessment/Training    LB Bathing Assess/Train Assistive Device   hand-held shower head;grab bars;tub bench  -KP    LB Bathing Assess/Train, Position   sitting;standing  -KP    LB Bathing Assess/Train, [place] Level   set up required;stand by assist  -KP    Recorded by   [KP] Vero York OTR    Upper Body Dressing Assessment/Training    UB Dressing Assess/Train, Clothing Type   donning:;doffing:;t-shirt  -    UB Dressing Assess/Train, Position   sitting  -KP    UB Dressing Assess/Train, [place]   set up required;supervision required  -KP    Recorded by   [] Vero York OTR    Lower Body Dressing Assessment/Training    LB Dressing Assess/Train, Clothing Type   doffing:;donning:;pants;slipper socks  -    LB Dressing Assess/Train, Position   sitting;standing  -KP    LB Dressing Assess/Train, [place]   set up required;contact guard assist;supervision required  -KP    Recorded by   [KP] Vero York OTR    Grooming Assessment/Training    Grooming Assess/Train, Position   sitting  -    Grooming Assess/Train, [place] Level   set up required;supervision required  -KP    Recorded by   [] Vero York OTR    Balance Skills Training    Sitting-Level of Assistance   Close supervision  -    Sitting-Balance Support   Feet supported  -KP    Standing-Level of Assistance Close supervision  -KP  Close supervision;Contact guard  -    Static  Standing Balance Support Right upper extremity supported  -KP  Right upper extremity supported   intermittent UE support  -KP    Recorded by [KP] Vero York OTR  [KP] Vero York, SARAR    Therapy Exercises    Left Upper Extremity AROM:;15 reps;sitting;elbow flexion/extension;shoulder extension/flexion  -KP      Recorded by [KP] Vero York, OTR      Fine Motor Coordination Training    Detail (Fine Motor Coordination Training) graps pennies w. L hand using index and thumb and inserting into small slot to inc FMC w. min difficulty. 2pt pinch on sq pegs and inserts them into board x30 to inc C no difficulty. Graps small colored pegs to complete peg design w. very little difficulty.  -KP      Recorded by [KP] KANDY Sultana      Positioning and Restraints    Pre-Treatment Position sitting in chair/recliner  -KP  sitting in chair/recliner  -KP    Post Treatment Position bed  -KP  wheelchair  -KP    In Bed supine;call light within reach;exit alarm on;encouraged to call for assist  -KP      In Wheelchair   sitting;call light within reach;encouraged to call for assist  -KP    Recorded by [KP] Vero York OTR  [KP] Vero York OTR      11/06/17 1116 11/04/17 1500 11/04/17 1210    Rehab Assessment/Intervention    Discipline physical therapist  -MD speech language pathologist  -JJ occupational therapist  -    Document Type therapy note (daily note)  -MD therapy note (daily note)  -JTIFFANIE therapy note (daily note)  -    Subjective Information agree to therapy;no complaints  -MD agree to therapy  -JJ agree to therapy;complains of;fatigue  -KP    Patient Effort, Rehab Treatment good  -MD adequate  -JJ good  -KP    Symptoms Noted During/After Treatment none  -MD  none  -KP    Precautions/Limitations fall precautions  -MD  fall precautions;other (see comments)   [place] . hearing aides  -    Specific Treatment Considerations   hearing aids  -    Equipment Issued to  Patient gait belt;front wheeled walker  -MD      Recorded by [MD] Nereida Llamas PT [JJ] Sepideh Yepez, MS CCC-SLP [KP] Vero York, OTR    Pain Assessment    Pain Assessment No/denies pain  -MD  No/denies pain  -KP    Recorded by [MD] Nereida Llamas PT  [KP] Vero York, OTR    Vision Assessment/Intervention    Visual Impairment visual impairment, left;inattention to the left;left neglect  -MD  visual impairment, left;inattention to the left;left neglect  -KP    Recorded by [MD] Nereida Llamas, PT  [KP] Vero York, OTR    Cognitive Assessment/Intervention    Current Cognitive/Communication Assessment impaired  -MD  impaired  -KP    Orientation Status oriented to;person;place  -MD  oriented to;place;person;time  -KP    Follows Commands/Answers Questions 100% of the time;needs cueing;needs repetition;needs increased time  -MD  100% of the time;able to follow single-step instructions;needs cueing  -KP    Personal Safety decreased insight to deficits  -MD  mild impairment  -KP    Personal Safety Interventions fall prevention program maintained;gait belt;muscle strengthening facilitated;nonskid shoes/slippers when out of bed;supervised activity  -MD  fall prevention program maintained;gait belt;muscle strengthening facilitated;nonskid shoes/slippers when out of bed  -KP    Recorded by [MD] Nereida Llamas, PT  [KP] Vero York, OTR    Swallow Assessment/Intervention    Additional Documentation  --   [facility] soft test tray  -JJ     Recorded by  [JJ] Sepideh Yepez, MS CCC-SLP     Improve oral skills    To Improve Oral Skills, patient will:  Increase lip closure;Increase back of tongue control;90%;without cues  -JJ     Oral Skills Progress  50%;with consistent cues  -JJ     Recorded by  [JJ] Sepideh Yepez, MS CCC-SLP     Improve laryngeal elevation    To improve laryngeal elevation, patient will:  Complete pitch-glide;90%;without cues  -JJ     Laryngeal Elevation  Progress  60%;with consistent cues  -JJ     Recorded by  [JJ] Sepideh Yepez, MS CCC-SLP     Improve tongue base & pharyngeal wall squeeze    To improve tongue base & pharyngeal wall squeeze, patient will:  Complete gargle/hold retraction;Complete tongue base retraction;Complete effortful swallow;90%;without cues  -JJ     Tongue Base/Pharyngeal Wall Squeeze Progress  50%;with consistent cues  -JJ     Recorded by  [JJ] Sepideh Yepez, MS CCC-SLP     Bed Mobility, Assessment/Treatment    Bed Mob, Supine to Sit, [place] not tested  -MD      Bed Mob, Sit to Supine, [place] not tested  -MD      Bed Mobility, Comment   up in   -    Recorded by [MD] Nereida Llamas, PT  [KP] Vero York, OTR    Transfer Assessment/Treatment    Transfers, Sit-Stand [place] contact guard assist  -MD  contact guard assist  -KP    Transfers, Stand-Sit [place] contact guard assist  -MD  contact guard assist  -KP    Transfers, Sit-Stand-Sit, Assist Device rolling walker  -MD  rolling walker  -    Toilet Transfer, [place]   contact guard assist  -    Toilet Transfer, Assistive Device   wheelchair;other (see comments)   grab bars  -    Walk-In Shower Transfer, [place]   contact guard assist  -KP    Walk-In Shower Transfer, Assist Device   other (see comments);standard shower chair   grab bars. shower bench  -    Recorded by [MD] Nereida Llamas, PT  [KP] Vero York, OTR    Gait Assessment/Treatment    Gait, [place] Level verbal cues required;contact guard assist  -MD      Gait, Assistive Device rolling walker  -MD      Gait, Distance (Feet) 80  -MD      Gait, Gait Deviations bilateral:;janet decreased;forward flexed posture;step length decreased;toe-to-floor clearance decreased  -MD      Gait, Safety Issues step length decreased  -MD      Gait, Impairments impaired balance;strength decreased  -MD      Gait, Comment 20` on carpet CGA, RWx  -MD      Recorded by [MD] Nereida  Farhad, PT      Stairs Assessment/Treatment    Number of Stairs 4  -MD      [place], Handrail Location both sides  -MD      Stairs, [place] Level verbal cues required;contact guard assist  -MD [place], Technique Used step over step (ascending);step over step (descending)  -MD      Stairs, Impairments strength decreased;impaired balance  -MD      Recorded by [MD] Nereida Llamas, PT      Upper Body Bathing Assessment/Training    UB Bathing Assess/Train Assistive Device   hand-held shower head;shower chair with back  -KP    UB Bathing Assess/Train, Position   sitting  -KP    UB Bathing Assess/Train, [place] Level   set up required;stand by assist  -KP    Recorded by   [KP] Vero York, OTR    Lower Body Bathing Assessment/Training    LB Bathing Assess/Train Assistive Device   grab bars;hand-held shower head;shower chair with back  -KP    LB Bathing Assess/Train, Position   sitting;standing  -KP    LB Bathing Assess/Train, [place] Level   set up required  -KP    Recorded by   [KP] Vero York OTR    Upper Body Dressing Assessment/Training    UB Dressing Assess/Train, Clothing Type   doffing:;donning:;t-shirt  -KP    UB Dressing Assess/Train, Position   sitting  -KP    UB Dressing Assess/Train, [place]   set up required;supervision required  -KP    Recorded by   [KP] Vero York OTR    Lower Body Dressing Assessment/Training    LB Dressing Assess/Train, Clothing Type   doffing:;donning:;pants;socks  -KP    LB Dressing Assess/Train, Position   sitting;standing  -KP    LB Dressing Assess/Train, [place]   set up required;contact guard assist  -KP    Recorded by   [KP] Vero York OTR    Toileting Assessment/Training    Toileting Assess/Train, Assistive Device   grab bars  -KP    Toileting Assess/Train, Position   sitting;standing  -KP    Toileting Assess/Train, [place] Level   set up required;contact guard assist  -KP    Recorded by   [KP] Vero Mendenhall  KANDY York    Grooming Assessment/Training    Grooming Assess/Train, Position   sitting  -KP    Grooming Assess/Train, [place] Level   supervision required;set up required  -KP    Recorded by   [KP] KANDY Sultana    Balance Skills Training    Sitting-Level of Assistance   Close supervision  -KP    Sitting-Balance Support   Feet supported  -KP    Standing-Level of Assistance   Contact guard  -KP    Static Standing Balance Support   Right upper extremity supported  -KP    Recorded by   [KP] Vero York, SARAR    Therapy Exercises    BLE Other Reps --   nustep at level 2 for 6 min  -MD      Left Upper Extremity   AROM:;20 reps;sitting;elbow flexion/extension;shoulder extension/flexion;shoulder abduction/adduction  -KP    Recorded by [MD] Nereida Llamas, PT  [KP] Vero York, SARAR    Fine Motor Coordination Training    Detail (Fine Motor Coordination Training)   isnerts pegs into small hole. colored pegs to inc L FMC. w. minVC  -KP    Recorded by   [KP] KANDY Sultana    Positioning and Restraints    Pre-Treatment Position sitting in chair/recliner  -MD  bathroom  -KP    Post Treatment Position wheelchair  -MD  wheelchair  -KP    In Wheelchair with OT  -MD  sitting;with PT  -KP    Recorded by [MD] Nereida Llamas, PT  [KP] Vero York, SARAR      User Key  (r) = Recorded By, (t) = Taken By, (c) = Cosigned By    Initials Name Effective Dates    OC Lilian Mohr MA,CCC-SLP 04/05/17 -     JALEN Yepez MS CCC-SLP 04/13/15 -     BRIANNA York, OTR 04/13/15 -     MD Nereida Llamas, PT 12/01/15 -     KB Katherine L Bruton 09/29/17 -               IP SLP Goals       11/06/17 1330 11/01/17 1215       Safely Consume Diet    Safely Consume Diet- SLP, Date Established  11/01/17  -OC     Safely Consume Diet- SLP, Time to Achieve by discharge  -OC by discharge  -OC     Safely Consume Diet- SLP, Outcome goal ongoing  -OC goal ongoing  -OC     Cognitive Linguistic- Improve  Safety and Awareness    Cognitive Linguistic Improve Safety and Awareness- SLP, Date Established 11/06/17  -OC      Cognitive Linguistic Improve Safety and Awareness- SLP, Time to Achieve by discharge  -OC      Cognitive Linguistic Improve Safety and Awareness- SLP, Outcome goal ongoing  -OC        User Key  (r) = Recorded By, (t) = Taken By, (c) = Cosigned By    Initials Name Provider Type    OC Lilian Mohr MA,CCC-SLP Speech and Language Pathologist          EDUCATION  The patient has been educated in the following areas:   Cognitive Impairment Dysphagia (Swallowing Impairment) Modified Diet Instruction.    SLP Recommendation and Plan      Continue speech therapy 3-5 times per week until discharge.                                     Time Calculation:         Time Calculation- SLP       11/07/17 1130 11/07/17 1100       Time Calculation- SLP    SLP Start Time 1130  -KB 1030  -KB     SLP Stop Time 1200  -KB 1100  -KB     SLP Time Calculation (min) 30 min  -KB 30 min  -KB       User Key  (r) = Recorded By, (t) = Taken By, (c) = Cosigned By    Initials Name Provider Type    KB Katherine L Bruton Speech Therapist          Therapy Charges for Today     Code Description Service Date Service Provider Modifiers Qty    [id number] [facility] DEV OF COGN SKILLS EACH 15 MIN 11/7/2017 Katherine L Bruton GN 1    [id number]  ST TREATMENT SWALLOW 3 11/7/2017 Katherine L Bruton GN 1               Katherine L Bruton  11/7/2017

## 2017-11-07 NOTE — PROGRESS NOTES
Inpatient Rehabilitation Functional Measures Assessment    Functional Measures  YESI Eating:  [facility] Grooming: [facility] Bathing:  [facility] Upper Body Dressing:  [facility] Lower Body Dressing:  [facility] Toileting:  [facility] Bladder Management  Level of Assistance:  [place]  Frequency/Number of Accidents this Shift:  [facility] Bowel Management  Level of Assistance: [place]  Frequency/Number of Accidents this Shift: [facility] Bed/Chair/Wheelchair Transfer:  [facility] Toilet Transfer:  [facility] Tub/Shower Transfer:  [place]    Previously Documented Mode of Locomotion at Discharge: Field  YESI Expected Mode of Locomotion at Discharge: [facility] Walk/Wheelchair:  [facility] Stairs:  [facility] Comprehension:  Auditory comprehension is the usual mode. Comprehension  Score = 6, Modified [place].  Patient comprehends complex/abstract  information in their primary language with only mild difficulty.  YESI Expression:  Vocal expression is the usual mode. Expression Score = 6,  Modified Independent.  Patient expresses complex/abstract information in their  primary language with only mild difficulty with tasks.  YESI Social Interaction:  Social Interaction Score = 7, Independent. Patient is  completely independent for social interaction.  There are no activity  limitations.  YESI Problem Solving:  Problem Solving Score = 6, Modified [place].  Patient  makes appropriate decisions in order to solve complex problems, but requires  extra time.  YESI Memory:  Memory Score = 6, Modified [place].  Patient is modified  independent for memory, requiring:    Therapy Mode Minutes  Occupational Therapy: [place]  Physical Therapy: [place]  Speech Language Pathology:  [place]    Signed by: Ayleen Kline RN

## 2017-11-07 NOTE — PLAN OF CARE
Problem: Patient Care Overview (Adult)  Goal: Plan of Care Review  Outcome: Ongoing (interventions implemented as appropriate)    11/07/17 1037   Coping/Psychosocial Response Interventions   Plan Of Care Reviewed With patient   Patient Care Overview   Progress improving   Outcome Evaluation   Outcome Summary/Follow up Plan accuchecks BID         Problem: Stroke (Ischemic) (Adult)  Goal: Signs and Symptoms of Listed Potential Problems Will be Absent or Manageable (Stroke)  Outcome: Ongoing (interventions implemented as appropriate)    11/07/17 1037   Stroke (Ischemic)   Problems Assessed (Stroke (Ischemic)/TIA) motor/sensory impairment   Problems Present (Stroke (Ischemic)/TIA) motor/sensory impairment

## 2017-11-07 NOTE — PROGRESS NOTES
Inpatient Rehabilitation Functional Measures Assessment    Functional Measures  YESI Eating:  [place]  YESI Grooming: [place]  YESI Bathing:  [place]anch  YESI Upper Body Dressing:  [place]  [facility] Lower Body Dressing:  [facility] Toileting:  [facility] Bladder Management  Level of Assistance:  [place]  Frequency/Number of Accidents this Shift:  [facility] Bowel Management  Level of Assistance: [place]  Frequency/Number of Accidents this Shift: [place]    [facility] Bed/Chair/Wheelchair Transfer:  Activity was not observed.  YESI Toilet Transfer:  [facility] Tub/Shower Transfer:  [place]    Previously Documented Mode of Locomotion at Discharge: Field  YESI Expected Mode of Locomotion at Discharge: [facility] Walk/Wheelchair:  WHEELCHAIR OBSERVATION   Activity was not observed.    WALK OBSERVATION   Walk Distance Scale = 2.  Distance walked is 50 -149 feet. Walk Score = 2.  Patient performs 75% or more of effort and requires minimal assistance.  Incidental assistance, contact guard or steadying was provided. Patient walked a  distance of 80 feet. Patient requires the following assistive device(s): Rolling  walker.  YESI Stairs:  Stairs Score = 2.  Incidental assistance with lifting or lowering,  contact guard or steadying was provided. Patient performs 75% or more of effort  and requires minimal contact assistance. Patient negotiated  4 stairs. Patient  requires the following assistive device(s): Handrail(s).    YESI Comprehension:  [place]  YESI Expression:  [facility] Social Interaction:  [facility] Problem Solving:  [facility] Memory:  [place]    Therapy Mode Minutes  Occupational Therapy: [place]  Physical Therapy: Individual: 60 minutes.  Speech Language Pathology:  [place]    Signed by: Nereida Llamas, PT

## 2017-11-07 NOTE — PROGRESS NOTES
Occupational Therapy: [place]    Physical Therapy: [place]    Speech Language Pathology:  Individual: 60 minutes.    Signed by: Katherine Bruton, SLP

## 2017-11-07 NOTE — CONSULTS
Adult Nutrition  Assessment/PES    Patient Name:  Alice Maxwell  YOB: 1945  MRN: [id number]  Admit Date:  10/31/2017    Assessment Date:  11/7/2017    Comments:   Vit K/Coumadin education complete.   We also discussed diabetic/heart healthy diet at her request.           Reason for Assessment       11/07/17 1452    Reason for Assessment    Reason For Assessment/Visit education;patient request;physician consult        Problem/Interventions:          Problem 2       11/07/17 1452    Nutrition Diagnoses Problem 2    Problem 2 Knowledge Deficit    Etiology (related to) Medication Interaction    Medication(s) Anticoagulant                  Intervention Goal       11/07/17 1452    Intervention Goal    General Provide information regarding MNT for treatment/condition                Education/Evaluation       11/07/17 1452    Education    Education Provided education regarding;Education topics;Advised regarding habits/behavior    Provided education regarding Diet rationale    Education Topics Vitamin K;Cardiac diabetic   pt also wanted reinforcement of HH/CC diet; she tries to adhere to 45-60 gm CHO/meal and 20 gm CHO/snack. Reports losing 70 lb since following diet.     Advised Regarding Habits/Behavior Food choices;Appropriate portions;Eating pattern;Meal planning;Other (comment)    Monitor/Evaluation    Monitor Per protocol    Education Follow-up Reinforce PRN        Electronically signed by:  Modesta Ramirez RD  11/07/17 2:54 PM

## 2017-11-07 NOTE — PROGRESS NOTES
Inpatient Rehabilitation Plan of Care Note    Plan of Care  Care Plan Reviewed - No updates at this time.    Sphincter Control    Performed Intervention(s)  Monitor I & O's  Encourage fluid intake  Timed voids/ scheduled toileting      Safety    Performed Intervention(s)  Falls prevention protocol  Safety rounds  Bed and chair alarms      Psychosocial    Performed Intervention(s)  Verbalizes needs and concerns  Therapeutic environmental set-up      Body Systems    Performed Intervention(s)  Blood glucose testing  Monitor labs  Medication    Signed by: Ayleen Kilne RN

## 2017-11-07 NOTE — PROGRESS NOTES
"   LOS: 7 days   Patient Care Team:  Calvin Dhillon Jr., DO as PCP - General (Internal Medicine)    Chief Complaint:   S/p R MCA ischemic infarct  Stroke prophylaxis - ASA 81 mg and Lovenox transition to coumadin  Hypercoaguability disorder - Hereditary thrombophilia  Homozygous MTHFR with hyperhomocystinemia  Homozygous factor V Leiden gene mutation  history of malignant neoplasm of breast   Obesity   Hyperlipidemia -   Hypothyroidism   DM    Subjective     History of Present Illness    Subjective       She continues stronger on the left side.  She is tolerating therapies.  Her attention to the left side is better.    History taken from: patient    Objective     Vital Signs  Temp:  [97.7 °F (36.5 °C)-97.9 °F (36.6 °C)] 97.9 °F (36.6 °C)  Heart Rate:  [59-82] 82  Resp:  [16-20] 20  BP: (130-147)/(61-77) 131/61    Objective:  Vital signs: (most recent): Blood pressure 131/61, pulse 82, temperature 97.9 °F (36.6 °C), temperature source Oral, resp. rate 20, height 67\" (170.2 cm), weight 219 lb 6.4 oz (99.5 kg), SpO2 99 %.            Mental status-awake alert  Lungs-clear to auscultation  Heart-regular rate and rhythm  Abdomen abdomen-normoactive bowel sounds, soft, nontender  Extremities-no pretibial edema  Neurologic-mild left hemiparesis .  Left elbow flexion 5 minus, finger flexion 5-, knee extension 5 minus, ankle dorsiflexion 5 minus.  Left facial droop.      better dexterity with left hand.        Results Review:     I reviewed the patient's new clinical results.    Lab Results  Lab Value Date/Time   INR 1.79 (H) 11/07/2017 0506   INR 1.41 (H) 11/06/2017 0627   INR 1.41 (H) 11/05/2017 0555   INR 1.17 (H) 11/04/2017 0647   INR 1.13 (H) 11/03/2017 0520   INR 1.07 11/02/2017 0713   INR 1.09 11/01/2017 0754       Results from last 7 days  Lab Units 11/06/17  0627 11/02/17  0713   WBC 10*3/mm3 6.72 5.72   HEMOGLOBIN g/dL 11.4* 11.7*   HEMATOCRIT % 36.3 36.4   PLATELETS 10*3/mm3 172 149         Results from " last 7 days  Lab Units 11/06/17  0627 11/01/17  0754   SODIUM mmol/L 141  --    POTASSIUM mmol/L 4.4  --    CHLORIDE mmol/L 102  --    CO2 mmol/L 23.3  --    BUN mg/dL 15  --    CREATININE mg/dL 1.10* 1.09*   CALCIUM mg/dL 9.1  --    BILIRUBIN mg/dL 0.4  --    ALK PHOS U/L 72  --    ALT (SGPT) U/L 24  --    AST (SGOT) U/L 30  --    GLUCOSE mg/dL 132*  --      Glucose   Date/Time Value Ref Range Status   11/07/2017 1636 88 70 - 130 mg/dL Final   11/07/2017 0728 115 70 - 130 mg/dL Final   11/06/2017 1642 97 70 - 130 mg/dL Final   11/06/2017 0720 144 (H) 70 - 130 mg/dL Final   11/05/2017 1635 95 70 - 130 mg/dL Final   11/05/2017 0715 110 70 - 130 mg/dL Final       Labs on October 31-sodium 139, potassium 3.8, chloride 106, glucose 101, calcium 9.0, come back to 25, BUN 16, creatinine 1.1.  WBC6.64, hemoglobin 11.8, hematocrit 36.0, platelets 125.  October 29 triglycerides 89, cholesterol 125.  October 28 AST 35, ALT 29, alkaline phosphatase 99, total protein 7.7, albumin 4.1  Oct 31 - INR 1.0  Medication Review: done  Scheduled Meds:    allopurinol 200 mg Oral Daily   aspirin 81 mg Oral Daily   atorvastatin 80 mg Oral Nightly   cholecalciferol 1,000 Units Oral BID   enoxaparin 100 mg Subcutaneous Q12H   folic acid 1 mg Oral Daily   levothyroxine 112 mcg Oral Q AM   melatonin 3 mg Oral Nightly   metFORMIN 500 mg Oral Daily With Breakfast   multivitamin 1 tablet Oral Daily   warfarin 7.5 mg Oral Daily     Continuous Infusions:   PRN Meds:.•  acetaminophen  •  dextrose  •  dextrose  •  glucagon (human recombinant)      Assessment/Plan     Active Problems:    Stroke      Assessment & Plan  S/p R MCA ischemic infarct    Left hemiparesis-improving  Left inattention-improved    Dysphagia-November 2-advance from puree to mechanical soft, no mixed consistency, thin liquid diet.    Stroke prophylaxis - ASA 81 mg and Lovenox transition to coumadin.  Daily INR.  November 2-INR still low-Will continue Coumadin 5 mg as recent  restarted but may titrate up dose tomorrow depending on lab result. Nov 3- increased coumadin to 7.5 mg. November 6-INR still low at 1.41.  Has been on Coumadin 5 mg and then increased to 7.5 mg x last 3 days, have changed to 10 mg, continue to follow INR daily.  November 7-INR 1.8-change Coumadin to 7.5 mg daily and continue to follow INR daily.    Hypercoaguability disorder  history of malignant neoplasm of breast   Factor 5 Leiden mutation, heterozygous   Obesity   Hyperlipidemia -atorvastatin   Hypothyroidism -on replacement   Cerebrovascular accident (CVA) due to thrombosis of right middle cerebral artery   DVT prophylaxis-SCDs and anticoagulation  Diabetes mellitus-metformin  History of lability after past stroke  Poststroke fatigue     72 to female status post right MCA ischemic infarct with left inattention, left hemiparesis, dysarthria, decreased alertness, impairments with her mobility and self-care and is now admitted for comprehensive inpatient rehabilitation program with physical therapy 1 hour, occupational therapy 1 hour, and speech therapy 1 hour, 5 days a week.  Rehabilitation nursing for carryover and monitoring of her neurologic status, diabetes, bowel bladder and skin.  Ongoing physician follow-up.  Weekly team conferences.  Goals for her to achieve a level of supervision with her mobility and self-care for return home with her family.       TEAM CONF- NOV 3- LEFT INATTENTION. DIFFICULTY DON LEG LEG OF PANTS. FINE MOTOR DEFICITS LEFT HAND. MILD MEMORY DEFICITS. DYSPHAGIA - MECH SOFT, THINS. TRIAL WHOLE MEALS. FLAT AFFECT.  BNE PENDING. TRANSFERS CTG. GAIT 80 FEET MIN ASSIST. 4 STAIRS MIN ASSIST. BLADDER CONTINENT.  ELOS- TWO WEEKS  .  Aric Chao MD  11/07/17  5:56 PM    Time:

## 2017-11-07 NOTE — THERAPY TREATMENT NOTE
Inpatient Rehabilitation - Physical Therapy Treatment Note  [facility]     Patient Name: Alice Maxwell  : 1945  MRN: [id number]  Today's Date: 2017  Onset of Illness/Injury or Date of Surgery Date: 10/28/17  Date of Referral to PT: 17  Referring Physician: Jeremie    Admit Date: 10/31/2017    Visit Dx:    ICD-10-CM ICD-9-CM   1. Oropharyngeal dysphagia R13.12 787.22     Patient Active Problem List   Diagnosis   • Stroke               Adult Rehabilitation Note       17 1201 17 1100 17 1030    Rehab Assessment/Intervention    Discipline occupational therapist  -KP speech language pathologist  -KB speech language pathologist  -KB    Document Type therapy note (daily note)  -KP therapy note (daily note)  -KB therapy note (daily note)  -KB    Subjective Information agree to therapy;no complaints  -KP no complaints;agree to therapy  -KB no complaints;agree to therapy  -KB    Patient Effort, Rehab Treatment good  -KP good  -KB good  -KB    Symptoms Noted During/After Treatment none  -KP none  -KB none  -KB    Precautions/Limitations fall precautions;swallowing precautions  -KP fall precautions;swallowing precautions  -KB fall precautions;swallowing precautions  -KB    Recorded by [KP] Vero York, OTR [KB] Katherine L Bruton [KB] Katherine L Bruton    Pain Assessment    Pain Assessment No/denies pain  -KP No/denies pain  -KB No/denies pain  -KB    Recorded by [KP] Vero York, OTR [KB] Katherine L Bruton [KB] Katherine L Bruton    Vision Assessment/Intervention    Visual Impairment visual impairment, left  -KP visual impairment, left;inattention to the left  -KB visual impairment, left;inattention to the left  -KB    Recorded by [KP] Vero York, OTR [KB] Katherine L Bruton [KB] Katherine L Bruton    Cognitive Assessment/Intervention    Current Cognitive/Communication Assessment impaired  -KP      Orientation Status oriented to;person;place;time  -       Follows Commands/Answers Questions 100% of the time;able to follow single-step instructions  -      Personal Safety mild impairment  -      Personal Safety Interventions fall prevention program maintained;gait belt;nonskid shoes/slippers when out of bed  -      Recorded by [KP] Vero York, OTR      Improve functional problem solving    Improve functional problem solving through:   complete high level reasoning task  -KB    Status: Improve functional problem solving   Progressing as expected  -KB    Functional Problem Solving Progress   50%;without cues;100%;with consistent cues  -KB    Comments: Improve functional problem solving   Pt required MOD verbal cues throughout logic puzzle for attention and reasoning.   -KB    Recorded by   [KB] Katherine L Bruton    Improve oral skills    Status: Improve Oral Skills   Progressing as expected  -KB    Oral Skills Progress   80%;with inconsistent cues  -KB    Recorded by   [KB] Katherine L Bruton    Improve closure at entrance to airway    Status: Improve closure at entrance to airway  Progressing as expected  -KB     Closure at Entrance to Airway Progress  continue to adress  -KB     Comments: Improve closure at entrance to airway  Pt observed with mid-day meal. 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   -KB     Recorded by  [KB] Katherine L Bruton     Improve laryngeal elevation    Status: Improve laryngeal elevation   Progressing as expected  -KB    Laryngeal Elevation Progress   60%;with inconsistent cues;continue to adress  -KB    Comments: Improve laryngeal elevation   Pt completing exercises in therapy session as prescribed.   -KB    Recorded by   [KB] Katherine L Bruton    Improve tongue base & pharyngeal wall squeeze    Status: Improve tongue base & pharyngeal wall squeeze   Progressing as expected  -KB    Tongue Base/Pharyngeal Wall Squeeze Progress   60%;with inconsistent  cues;continue to adress  -KB    Comments: Improve tongue base & pharyngeal wall squeeze   Pt completing exercises in therapy sessions as prescribed.   -KB    Recorded by   [KB] Katherine L Bruton    Bed Mobility, Assessment/Treatment    Bed Mobility, Comment up in   -      Recorded by [KP] Vero York, OTR      Transfer Assessment/Treatment    Transfers, Sit-Stand [place] stand by assist  -      Transfers, Stand-Sit [place] stand by assist  -KP      Transfers, Sit-Stand-Sit, Assist Device other (see comments)    grab bar  -      Transfer, Comment doesn't wantn to shower today. her hair is was fixed last night by [facility]  -      Recorded by [KP] Vero York, OTR      Upper Body Bathing Assessment/Training    UB Bathing Assess/Train, Position sitting;sink side  -      UB Bathing Assess/Train, [place] Level stand by assist;set up required  -      Recorded by [KP] Vero York, OTR      Lower Body Bathing Assessment/Training    LB Bathing Assess/Train, Position sitting;standing  -      LB Bathing Assess/Train, [place] Level set up required;stand by assist  -      Recorded by [KP] Vero York, OTR      Upper Body Dressing Assessment/Training    UB Dressing Assess/Train, Clothing Type donning:;doffing:;t-shirt  -KP      UB Dressing Assess/Train, Position sitting  -      UB Dressing Assess/Train, [place] supervision required;set up required  -      Recorded by [KP] Vero York, OTR      Lower Body Dressing Assessment/Training    LB Dressing Assess/Train, Clothing Type doffing:;donning:;pants;socks;shoes  -      LB Dressing Assess/Train, Position sitting;standing  -KP      LB Dressing Assess/Train, [place] set up required;supervision required  -      Recorded by [KP] Vero York, OTR      Grooming Assessment/Training    Grooming Assess/Train, Position sitting;sink side  -KP      Grooming Assess/Train, [place]  Level supervision required;set up required  -KP      Recorded by [KP] KANDY Sultana      Balance Skills Training    Sitting-Level of Assistance Close supervision;Distant supervision  -KP      Sitting-Balance Support Feet supported  -KP      Standing-Level of Assistance Close supervision  -KP      Static Standing Balance Support Left upper extremity supported  -KP      Recorded by [KP] KANDY Sultana      Positioning and Restraints    Pre-Treatment Position sitting in chair/recliner  -KP      Post Treatment Position wheelchair  -KP      In Wheelchair sitting   in dining room waiting for RT  -KP      Recorded by [KP] Vero York OTR        11/07/17 0819 11/06/17 1550 11/06/17 1330    Rehab Assessment/Intervention    Discipline physical therapist  -MD occupational therapist  -KP speech language pathologist  -OC    Document Type therapy note (daily note)  -MD therapy note (daily note)  -KP therapy note (daily note)  -OC    Subjective Information agree to therapy;no complaints  -MD complains of;agree to therapy;fatigue  -KP no complaints;agree to therapy  -OC    Patient Effort, Rehab Treatment good  -MD good  -KP good  -OC    Symptoms Noted During/After Treatment none  -MD none  -KP none  -OC    Precautions/Limitations fall precautions  -MD fall precautions  -KP     Equipment Issued to Patient gait belt;front wheeled walker  -MD      Recorded by [MD] Nereida Llamas, PT [KP] Vero York, OTR [OC] Lilian Mohr MA,[facility]-SLP    Pain Assessment    Pain Assessment No/denies pain  -MD No/denies pain  -KP No/denies pain  -OC    Recorded by [MD] Nereida Llamas PT [KP] Vero York, OTR [OC] Lilian Mohr MA,CCC-SLP    Vision Assessment/Intervention    Visual Impairment visual impairment, left;inattention to the left  -MD visual impairment, left;inattention to the left  -KP     Recorded by [MD] Nereida Llamas, PT [KP] Vero York OTR     Cognitive Assessment/Intervention    Current  Cognitive/Communication Assessment impaired  -MD impaired  -KP impaired  -OC    Orientation Status oriented to;person  -MD oriented to;person;place;time  -KP oriented to;person;place;time  -OC    Follows Commands/Answers Questions 100% of the time;needs cueing;needs repetition  -% of the time;able to follow single-step instructions  -% of the time;able to follow multi-step instructions;needs cueing;able to follow single-step instructions;needs increased time  -OC    Personal Safety decreased insight to deficits  -MD mild impairment  -KP     Personal Safety Interventions fall prevention program maintained;gait belt;muscle strengthening facilitated;nonskid shoes/slippers when out of bed;supervised activity  -MD fall prevention program maintained;gait belt;muscle strengthening facilitated;nonskid shoes/slippers when out of bed  -KP     Recorded by [MD] Nereida Llamas, PT [KP] Vero York, OTR [OC] Lilian Mohr MA,CCC-SLP    Improve executive function skills    Status: Improve executive function skills   Progressing as expected  -OC    Executive Function Skills Progress   20%;without cues;70%;with consistent cues   organization task of dates/holidays  -OC    Comments: Improve executive function skills   Pt demonstrated difficulty with cell phone this date  -OC    Recorded by   [OC] Lilian Mohr MA,CCC-SLP    Improve oral skills    Status: Improve Oral Skills   Progressing as expected  -OC    Oral Skills Progress   70%;with inconsistent cues  -OC    Recorded by   [OC] Lilian Mohr MA,CCC-SLP    Improve laryngeal elevation    Status: Improve laryngeal elevation   Progressing as expected  -OC    Laryngeal Elevation Progress   60%;with inconsistent cues  -OC    Recorded by   [OC] Lilian Mohr MA,CCC-SLP    Improve tongue base & pharyngeal wall squeeze    Status: Improve tongue base & pharyngeal wall squeeze   Progressing as expected  -OC    Tongue Base/Pharyngeal Wall Squeeze Progress   60%;with  inconsistent cues  -OC    Recorded by   [OC] Lilian Mohr MA,CCC-SLP    Bed Mobility, Assessment/Treatment    Bed Mob, Supine to Sit, [place] not tested  -MD not tested  -KP     Bed Mob, Sit to Supine, [place] not tested  -MD set up required;supervision required  -KP     Recorded by [MD] Nereida Llamas, PT [KP] Vero York, OTR     Transfer Assessment/Treatment    Transfers, Sit-Stand [place] verbal cues required;stand by assist  -MD set up required;supervision required;stand by assist  -KP     Transfers, Stand-Sit [place] verbal cues required;stand by assist  -MD stand by assist  -KP     Transfers, Sit-Stand-Sit, Assist Device rolling walker  -MD bed rails  -KP     Transfer, Safety Issues step length decreased  -MD      Transfer, Impairments strength decreased;impaired balance  -MD      Recorded by [MD] Nereida Llamas, PT [KP] Vero York, OTR     Gait Assessment/Treatment    Gait, [place] Level verbal cues required;contact guard assist  -MD      Gait, Assistive Device rolling walker  -MD      Gait, Distance (Feet) 80   x3  -MD      Gait, Gait Deviations bilateral:;janet decreased;forward flexed posture;step length decreased;toe-to-floor clearance decreased;weight-shifting ability decreased  -MD      Gait, Safety Issues step length decreased  -MD      Gait, Impairments impaired balance;strength decreased  -MD      Recorded by [MD] Nereida Llamas PT      Stairs Assessment/Treatment    Number of Stairs 4  -MD      Stairs, Handrail Location both sides  -MD      Stairs, [place] Level supervision required  -MD      Stairs, Technique Used step over step (ascending);step to step (descending)  -MD      Stairs, Impairments strength decreased;impaired balance  -MD      Recorded by [MD] Nereida Llamas PT      Balance Skills Training    Standing-Level of Assistance  Close supervision  -     Static Standing Balance Support  Right upper extremity supported  -KP     Recorded by  [KP] Vero  Abdirashid York, SARAR     Therapy Exercises    Bilateral Lower Extremities --   nustep 7 min level 2  -MD      Left Upper Extremity  AROM:;15 reps;sitting;elbow flexion/extension;shoulder extension/flexion  -KP     Recorded by [MD] Nereida Llamas, PT [KP] Vero York, OTR     Fine Motor Coordination Training    Detail (Fine Motor Coordination Training)  graps pennies w. L hand using index and thumb and inserting into small slot to inc FMC w. min difficulty. 2pt pinch on sq pegs and inserts them into board x30 to inc FMC no difficulty. Graps small colored pegs to complete peg design w. very little difficulty.  -KP     Recorded by  [KP] Vero York OTR     Positioning and Restraints    Pre-Treatment Position sitting in chair/recliner  -MD sitting in chair/recliner  -KP     Post Treatment Position wheelchair  -MD bed  -KP     In Bed  supine;call light within reach;exit alarm on;encouraged to call for assist  -KP     In Wheelchair patient within staff view   in DR  -MD      Recorded by [MD] Nereida Llamas, PT [KP] Vero York, OTR       11/06/17 1203 11/06/17 1116 11/04/17 1500    Rehab Assessment/Intervention    Discipline occupational therapist  -KP physical therapist  -MD speech language pathologist  -JJ    Document Type therapy note (daily note)  -KP therapy note (daily note)  -MD therapy note (daily note)  -JJ    Subjective Information agree to therapy;no complaints  -KP agree to therapy;no complaints  -MD agree to therapy  -JJ    Patient Effort, Rehab Treatment good  -KP good  -MD adequate  -JJ    Symptoms Noted During/After Treatment none  -KP none  -MD     Precautions/Limitations fall precautions  -KP fall precautions  -MD     Equipment Issued to Patient  gait belt;front wheeled walker  -MD     Recorded by [KP] Vero York, OTR [MD] Nereida Llamas PT [JJ] Sepideh Yepez MS CCC-SLP    Pain Assessment    Pain Assessment No/denies pain  -KP No/denies pain  -MD     Recorded by  [KP] Vero York, OTR [MD] Nereida Llamas PT     Vision Assessment/Intervention    Visual Impairment  visual impairment, left;inattention to the left;left neglect  -MD     Recorded by  [MD] Nereida Llamas PT     Cognitive Assessment/Intervention    Current Cognitive/Communication Assessment impaired  -KP impaired  -MD     Orientation Status oriented to;person;place  -KP oriented to;person;place  -MD     Follows Commands/Answers Questions 100% of the time;able to follow single-step instructions  -% of the time;needs cueing;needs repetition;needs increased time  -MD     Personal Safety mild impairment  -KP decreased insight to deficits  -MD     Personal Safety Interventions fall prevention program maintained;gait belt;nonskid shoes/slippers when out of bed  -KP fall prevention program maintained;gait belt;muscle strengthening facilitated;nonskid shoes/slippers when out of bed;supervised activity  -MD     Recorded by [KP] Vero York, OTR [MD] Nereida Llamas PT     Swallow Assessment/Intervention    Additional Documentation   --   [facility] soft test tray  -JJ    Recorded by   [JJ] Sepideh Yepez, MS CCC-SLP    Improve oral skills    To Improve Oral Skills, patient will:   Increase lip closure;Increase back of tongue control;90%;without cues  -JJ    Oral Skills Progress   50%;with consistent cues  -JJ    Recorded by   [JJ] Sepideh Yepez, MS CCC-SLP    Improve laryngeal elevation    To improve laryngeal elevation, patient will:   Complete pitch-glide;90%;without cues  -JJ    Laryngeal Elevation Progress   60%;with consistent cues  -JJ    Recorded by   [JJ] Sepideh Yepez, MS CCC-SLP    Improve tongue base & pharyngeal wall squeeze    To improve tongue base & pharyngeal wall squeeze, patient will:   Complete gargle/hold retraction;Complete tongue base retraction;Complete effortful swallow;90%;without cues  -JJ    Tongue Base/Pharyngeal Wall Squeeze Progress   50%;with consistent  cues  -JJ    Recorded by   [JJ] Sepideh Yepez MS CCC-SLP    Bed Mobility, Assessment/Treatment    Bed Mob, Supine to Sit, [place]  not tested  -MD     Bed Mob, Sit to Supine, [place]  not tested  -MD     Bed Mobility, Comment pt up in wc  -KP      Recorded by [KP] Vero York, OTR [MD] Nereida Llamas, PT     Transfer Assessment/Treatment    Transfers, Sit-Stand [place] stand by assist;contact guard assist  -KP contact guard assist  -MD     Transfers, Stand-Sit [place] set up required;stand by assist  -KP contact guard assist  -MD     Transfers, Sit-Stand-Sit, Assist Device other (see comments)   wc and grab bars  -KP rolling walker  -MD     Walk-In Shower Transfer, [place] set up required;contact guard assist;supervision required  -      Walk-In Shower Transfer, Assist Device other (see comments)   emy bench  -KP      Recorded by [KP] Vero York, OTR [MD] Nereida Llamas, PT     Gait Assessment/Treatment    Gait, [place] Level  verbal cues required;contact guard assist  -MD     Gait, Assistive Device  rolling walker  -MD     Gait, Distance (Feet)  80  -MD     Gait, Gait Deviations  bilateral:;janet decreased;forward flexed posture;step length decreased;toe-to-floor clearance decreased  -MD     Gait, Safety Issues  step length decreased  -MD     Gait, Impairments  impaired balance;strength decreased  -MD     Gait, Comment  20` on carpet CGA, RWx  -MD     Recorded by  [MD] Nereida Llamas, PT     Stairs Assessment/Treatment    Number of Stairs  4  -MD     Stairs, Handrail Location  both sides  -MD     Stairs, [place] Level  verbal cues required;contact guard assist  -MD     Stairs, Technique Used  step over step (ascending);step over step (descending)  -MD     Stairs, Impairments  strength decreased;impaired balance  -MD     Recorded by  [MD] Nereida Llamas, PT     Upper Body Bathing Assessment/Training    UB Bathing Assess/Train Assistive Device hand-held shower  head;tub bench  -KP      UB Bathing Assess/Train, Position sitting  -KP      UB Bathing Assess/Train, [place] Level stand by assist;set up required  -KP      Recorded by [KP] Vero York OTR      Lower Body Bathing Assessment/Training    LB Bathing Assess/Train Assistive Device hand-held shower head;grab bars;tub bench  -KP      LB Bathing Assess/Train, Position sitting;standing  -KP      LB Bathing Assess/Train, [place] Level set up required;stand by assist  -KP      Recorded by [KP] Vero York OTR      Upper Body Dressing Assessment/Training    UB Dressing Assess/Train, Clothing Type donning:;doffing:;t-shirt  -KP      UB Dressing Assess/Train, Position sitting  -KP      UB Dressing Assess/Train, [place] set up required;supervision required  -KP      Recorded by [KP] Vero York OTR      Lower Body Dressing Assessment/Training    LB Dressing Assess/Train, Clothing Type doffing:;donning:;pants;slipper socks  -KP      LB Dressing Assess/Train, Position sitting;standing  -KP      LB Dressing Assess/Train, [place] set up required;contact guard assist;supervision required  -KP      Recorded by [KP] KANDY Sultana      Grooming Assessment/Training    Grooming Assess/Train, Position sitting  -KP      Grooming Assess/Train, [place] set up required;supervision required  -KP      Recorded by [KP] Vero York OTR      Balance Skills Training    Sitting-Level of Assistance Close supervision  -KP      Sitting-Balance Support Feet supported  -KP      Standing-Level of Assistance Close supervision;Contact guard  -      Static Standing Balance Support Right upper extremity supported   intermittent UE support  -KP      Recorded by [KP] Vero York OTR      Therapy Exercises    BLE Other Reps  --   nustep at level 2 for 6 min  -MD     Recorded by  [MD] Nereida Llamas PT     Positioning and Restraints    Pre-Treatment Position sitting in  chair/recliner  -KP sitting in chair/recliner  -MD     Post Treatment Position wheelchair  -KP wheelchair  -MD     In Wheelchair sitting;call light within reach;encouraged to call for assist  -KP with OT  -MD     Recorded by [KP] Vero York, OTR [MD] Nereida Llamas, PT       User Key  (r) = Recorded By, (t) = Taken By, (c) = Cosigned By    Initials Name Effective Dates    OC Lilian Mohr MA,[facility]-SLP 04/05/17 -     JJ Sepideh Yepez, MS CCC-SLP 04/13/15 -     KP Vero York, OTR 04/13/15 -     MD Nereida Llamas, PT 12/01/15 -     KB Katherine L Bruton 09/29/17 -                 IP PT Goals       11/01/17 1221          Bed Mobility PT LTG    Bed Mobility PT LTG, Date Established 11/01/17  -MD      Bed Mobility PT LTG, Time to Achieve 2 wks  -MD      Bed Mobility PT LTG, Activity Type supine to sit/sit to supine  -MD      Bed Mobility PT LTG, [place] Level independent  -MD      Transfer Training PT LTG    Transfer Training PT LTG, Date Established 11/01/17  -MD      Transfer Training PT LTG, Time to Achieve 2 wks  -MD      Transfer Training PT LTG, Activity Type sit to stand/stand to sit  -MD      Transfer Training PT LTG, [place] Level supervision required  -MD      Transfer Training PT LTG, Assist Device walker, rolling  -MD      Transfer Training 2 PT LTG    Transfer Training PT 2 LTG, Date Established 11/01/17  -MD      Transfer Training PT 2 LTG, Time to Achieve 2 wks  -MD      Transfer Training PT 2 LTG, Activity Type other (see comments)   car transfer  -MD      Transfer Training PT 2 LTG, [place] Level supervision required  -MD      Transfer Training PT 2 LTG, Assist Device walker, rolling  -MD      Gait Training PT LTG    Gait Training Goal PT LTG, Date Established 11/01/17  -MD      Gait Training Goal PT LTG, Time to Achieve 2 wks  -MD      Gait Training Goal PT LTG, [place] Level supervision required  -MD      Gait Training Goal PT LTG, Assist Device walker, rolling   -MD      Gait Training Goal PT LTG, Distance to Achieve 160  -MD      Stair Training PT LTG    Stair Training Goal PT LTG, Date Established 11/01/17  -MD      Stair Training Goal PT LTG, Time to Achieve 2 wks  -MD      Stair Training Goal PT LTG, Number of Steps 4  -MD      Stair Training Goal PT LTG, [place] Level supervision required  -MD      Stair Training Goal PT LTG, Assist Device 2 handrails  -MD      Patient Education PT LTG    Patient Education PT LTG, Date Established 11/01/17  -MD      Patient Education PT LTG, Time to Achieve 2 wks  -MD      Patient Education PT LTG, Education Type HEP  -MD      Patient Education PT LTG, Education Understanding demonstrate adequately  -MD        User Key  (r) = Recorded By, (t) = Taken By, (c) = Cosigned By    Initials Name Provider Type    MD Nereida Llamas, PT Physical Therapist          Physical Therapy Education     Title: PT OT SLP Therapies (Active)     Topic: Physical Therapy (Active)     Point: Mobility training (Done)    Learning Progress Summary    Learner Readiness Method Response Comment Documented by Status   Patient Acceptance E VU  K 11/02/17 1158 Done               Point: Precautions (Active)    Learning Progress Summary    Learner Readiness Method Response Comment Documented by Status   Patient Acceptance NOEMY YOON MD 11/07/17 0821 Active    Acceptance ENOEMY MD 11/06/17 1118 Active    Acceptance ED JUVENAL SLAUGHTER 11/04/17 1012 Active    Acceptance ENOEMY MD 11/03/17 1215 Active    Acceptance ENOEMY MD 11/01/17 1228 Active                      User Key     Initials Effective Dates Name Provider Type Nidhi GIVENS 12/01/15 -  Anna pEps, PT Physical Therapist PT    MD 12/01/15 -  Nereida Llamas, PT Physical Therapist PT                    PT Recommendation and Plan  Anticipated Equipment Needs At Discharge: gait belt, front wheeled walker  Anticipated Discharge Disposition: home with home health  Planned Therapy Interventions: balance training, bed mobility  training, gait training, home exercise program, patient/family education, transfer training  PT Frequency: 2 times/day  Plan of Care Review  Plan Of Care Reviewed With: patient  Outcome Summary/Follow up Plan: Pt presents s/p CVA leading to impaired gait and impaired dynamic standing balance.  Due to the above pt will benifit from skilled PT to address the above issues and increase pts safety and independence w functional mobility.         Time Calculation:         PT Charges       11/07/17 1212 11/07/17 0819       Time Calculation    Start Time 1100  -MD 0800  -MD     Stop Time 1130  -MD 0830  -MD     Time Calculation (min) 30 min  -MD 30 min  -MD     PT Received On  11/07/17  -MD     PT - Next Appointment  11/08/17  -MD       User Key  (r) = Recorded By, (t) = Taken By, (c) = Cosigned By    Initials Name Provider Type    MD Nereida Llamas, PT Physical Therapist          Therapy Charges for Today     Code Description Service Date Service Provider Modifiers Qty    [id number] HC PT THER PROC EA 15 MIN 11/6/2017 Nereida Llamas PT GP 4    [id number] HC PT THER PROC EA 15 MIN 11/7/2017 Nereida Llamas PT GP 4               Nereida Llamas, PT  11/7/2017

## 2017-11-07 NOTE — THERAPY TREATMENT NOTE
Inpatient Rehabilitation - Occupational Therapy Treatment Note  [facility]     Patient Name: Alice Maxwell  : 1945  MRN: [id number]  Today's Date: 2017  Onset of Illness/Injury or Date of Surgery Date: 10/28/17  Date of Referral to OT: 17  Referring Physician: Jeremie      Admit Date: 10/31/2017    Visit Dx:     ICD-10-CM ICD-9-CM   1. Oropharyngeal dysphagia R13.12 787.22     Patient Active Problem List   Diagnosis   • Stroke             Adult Rehabilitation Note       17 1201 17 1100 17 1030    Rehab Assessment/Intervention    Discipline occupational therapist  -KP speech language pathologist  -KB speech language pathologist  -KB    Document Type therapy note (daily note)  -KP therapy note (daily note)  -KB therapy note (daily note)  -KB    Subjective Information agree to therapy;no complaints  -KP no complaints;agree to therapy  -KB no complaints;agree to therapy  -KB    Patient Effort, Rehab Treatment good  -KP good  -KB good  -KB    Symptoms Noted During/After Treatment none  -KP none  -KB none  -KB    Precautions/Limitations fall precautions;swallowing precautions  -KP fall precautions;swallowing precautions  -KB fall precautions;swallowing precautions  -KB    Recorded by [KP] Vero York, OTR [KB] Katherine L Bruton [KB] Katherine L Bruton    Pain Assessment    Pain Assessment No/denies pain  -KP No/denies pain  -KB No/denies pain  -KB    Recorded by [KP] Vero York, OTR [KB] Katherine L Bruton [KB] Katherine L Bruton    Vision Assessment/Intervention    Visual Impairment visual impairment, left  -KP visual impairment, left;inattention to the left  -KB visual impairment, left;inattention to the left  -KB    Recorded by [KP] Vero York, OTR [KB] Katherine L Bruton [KB] Katherine L Bruton    Cognitive Assessment/Intervention    Current Cognitive/Communication Assessment impaired  -KP      Orientation Status oriented to;person;place;time   -      Follows Commands/Answers Questions 100% of the time;able to follow single-step instructions  -KP      Personal Safety mild impairment  -      Personal Safety Interventions fall prevention program maintained;gait belt;nonskid shoes/slippers when out of bed  -KP      Recorded by [KP] Vero York, OTR      Improve functional problem solving    Improve functional problem solving through:   complete high level reasoning task  -KB    Status: Improve functional problem solving   Progressing as expected  -KB    Functional Problem Solving Progress   50%;without cues;100%;with consistent cues  -KB    Comments: Improve functional problem solving   Pt required MOD verbal cues throughout logic puzzle for attention and reasoning.   -KB    Recorded by   [KB] Katherine L Bruton    Improve oral skills    Status: Improve Oral Skills   Progressing as expected  -KB    Oral Skills Progress   80%;with inconsistent cues  -KB    Recorded by   [KB] Katherine L Bruton    Improve closure at entrance to airway    Status: Improve closure at entrance to airway  Progressing as expected  -KB     Closure at Entrance to Airway Progress  continue to adress  -KB     Comments: Improve closure at entrance to airway  Pt observed with mid-day meal. 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   -KB     Recorded by  [KB] Katherine L Bruton     Improve laryngeal elevation    Status: Improve laryngeal elevation   Progressing as expected  -KB    Laryngeal Elevation Progress   60%;with inconsistent cues;continue to adress  -KB    Comments: Improve laryngeal elevation   Pt completing exercises in therapy session as prescribed.   -KB    Recorded by   [KB] Katherine L Bruton    Improve tongue base & pharyngeal wall squeeze    Status: Improve tongue base & pharyngeal wall squeeze   Progressing as expected  -KB    Tongue Base/Pharyngeal Wall Squeeze Progress   60%;with inconsistent  cues;continue to adress  -KB    Comments: Improve tongue base & pharyngeal wall squeeze   Pt completing exercises in therapy sessions as prescribed.   -KB    Recorded by   [KB] Katherine L Bruton    Bed Mobility, Assessment/Treatment    Bed Mobility, Comment up in   -      Recorded by [KP] Vero York, OTR      Transfer Assessment/Treatment    Transfers, Sit-Stand [place] stand by assist  -      Transfers, Stand-Sit [place] stand by assist  -KP      Transfers, Sit-Stand-Sit, Assist Device other (see comments)    grab bar  -      Transfer, Comment doesn't wantn to shower today. her hair is was fixed last night by [facility]  -      Recorded by [KP] Vero York, OTR      Upper Body Bathing Assessment/Training    UB Bathing Assess/Train, Position sitting;sink side  -      UB Bathing Assess/Train, [place] Level stand by assist;set up required  -      Recorded by [KP] Vero York, OTR      Lower Body Bathing Assessment/Training    LB Bathing Assess/Train, Position sitting;standing  -      LB Bathing Assess/Train, [place] Level set up required;stand by assist  -      Recorded by [KP] Vero York, OTR      Upper Body Dressing Assessment/Training    UB Dressing Assess/Train, Clothing Type donning:;doffing:;t-shirt  -KP      UB Dressing Assess/Train, Position sitting  -      UB Dressing Assess/Train, [place] supervision required;set up required  -      Recorded by [KP] Vero York, OTR      Lower Body Dressing Assessment/Training    LB Dressing Assess/Train, Clothing Type doffing:;donning:;pants;socks;shoes  -      LB Dressing Assess/Train, Position sitting;standing  -KP      LB Dressing Assess/Train, [place] set up required;supervision required  -      Recorded by [KP] Vero York, OTR      Grooming Assessment/Training    Grooming Assess/Train, Position sitting;sink side  -KP      Grooming Assess/Train, Indepen  Level supervision required;set up required  -KP      Recorded by [KP] KANDY Sultana      Balance Skills Training    Sitting-Level of Assistance Close supervision;Distant supervision  -KP      Sitting-Balance Support Feet supported  -KP      Standing-Level of Assistance Close supervision  -KP      Static Standing Balance Support Left upper extremity supported  -KP      Recorded by [KP] KANDY Sultana      Positioning and Restraints    Pre-Treatment Position sitting in chair/recliner  -KP      Post Treatment Position wheelchair  -KP      In Wheelchair sitting   in dining room waiting for RT  -KP      Recorded by [KP] Vero York OTR        11/07/17 0819 11/06/17 1550 11/06/17 1330    Rehab Assessment/Intervention    Discipline physical therapist  -MD occupational therapist  -KP speech language pathologist  -OC    Document Type therapy note (daily note)  -MD therapy note (daily note)  -KP therapy note (daily note)  -OC    Subjective Information agree to therapy;no complaints  -MD complains of;agree to therapy;fatigue  -KP no complaints;agree to therapy  -OC    Patient Effort, Rehab Treatment good  -MD good  -KP good  -OC    Symptoms Noted During/After Treatment none  -MD none  -KP none  -OC    Precautions/Limitations fall precautions  -MD fall precautions  -KP     Equipment Issued to Patient gait belt;front wheeled walker  -MD      Recorded by [MD] Nereida Llamas, PT [KP] Vero York, OTR [OC] Lilian Mohr MA,[facility]-SLP    Pain Assessment    Pain Assessment No/denies pain  -MD No/denies pain  -KP No/denies pain  -OC    Recorded by [MD] Nereida Llamas PT [KP] Vero York, OTR [OC] Lilian Mohr MA,CCC-SLP    Vision Assessment/Intervention    Visual Impairment visual impairment, left;inattention to the left  -MD visual impairment, left;inattention to the left  -KP     Recorded by [MD] Nereida Llamas, PT [KP] Vero York OTR     Cognitive Assessment/Intervention    Current  Cognitive/Communication Assessment impaired  -MD impaired  -KP impaired  -OC    Orientation Status oriented to;person  -MD oriented to;person;place;time  -KP oriented to;person;place;time  -OC    Follows Commands/Answers Questions 100% of the time;needs cueing;needs repetition  -% of the time;able to follow single-step instructions  -% of the time;able to follow multi-step instructions;needs cueing;able to follow single-step instructions;needs increased time  -OC    Personal Safety decreased insight to deficits  -MD mild impairment  -KP     Personal Safety Interventions fall prevention program maintained;gait belt;muscle strengthening facilitated;nonskid shoes/slippers when out of bed;supervised activity  -MD fall prevention program maintained;gait belt;muscle strengthening facilitated;nonskid shoes/slippers when out of bed  -KP     Recorded by [MD] Nereida Llamas, PT [KP] eVro York, OTR [OC] Lilian Mohr MA,CCC-SLP    Improve executive function skills    Status: Improve executive function skills   Progressing as expected  -OC    Executive Function Skills Progress   20%;without cues;70%;with consistent cues   organization task of dates/holidays  -OC    Comments: Improve executive function skills   Pt demonstrated difficulty with cell phone this date  -OC    Recorded by   [OC] Lilian Mohr MA,CCC-SLP    Improve oral skills    Status: Improve Oral Skills   Progressing as expected  -OC    Oral Skills Progress   70%;with inconsistent cues  -OC    Recorded by   [OC] Lilian Mohr MA,CCC-SLP    Improve laryngeal elevation    Status: Improve laryngeal elevation   Progressing as expected  -OC    Laryngeal Elevation Progress   60%;with inconsistent cues  -OC    Recorded by   [OC] Lilian Mohr MA,CCC-SLP    Improve tongue base & pharyngeal wall squeeze    Status: Improve tongue base & pharyngeal wall squeeze   Progressing as expected  -OC    Tongue Base/Pharyngeal Wall Squeeze Progress   60%;with  inconsistent cues  -OC    Recorded by   [OC] Lilian Mohr MA,CCC-SLP    Bed Mobility, Assessment/Treatment    Bed Mob, Supine to Sit, [place] not tested  -MD not tested  -KP     Bed Mob, Sit to Supine, [place] not tested  -MD set up required;supervision required  -KP     Recorded by [MD] Nereida Llamas, PT [KP] Vero York, OTR     Transfer Assessment/Treatment    Transfers, Sit-Stand [place] verbal cues required;stand by assist  -MD set up required;supervision required;stand by assist  -KP     Transfers, Stand-Sit [place] verbal cues required;stand by assist  -MD stand by assist  -KP     Transfers, Sit-Stand-Sit, Assist Device rolling walker  -MD bed rails  -KP     Transfer, Safety Issues step length decreased  -MD      Transfer, Impairments strength decreased;impaired balance  -MD      Recorded by [MD] Nereida Llamas, PT [KP] Vero York, OTR     Gait Assessment/Treatment    Gait, [place] Level verbal cues required;contact guard assist  -MD      Gait, Assistive Device rolling walker  -MD      Gait, Distance (Feet) 80   x3  -MD      Gait, Gait Deviations bilateral:;janet decreased;forward flexed posture;step length decreased;toe-to-floor clearance decreased;weight-shifting ability decreased  -MD      Gait, Safety Issues step length decreased  -MD      Gait, Impairments impaired balance;strength decreased  -MD      Recorded by [MD] Nereida Llamas PT      Stairs Assessment/Treatment    Number of Stairs 4  -MD      Stairs, Handrail Location both sides  -MD      Stairs, [place] Level supervision required  -MD      Stairs, Technique Used step over step (ascending);step to step (descending)  -MD      Stairs, Impairments strength decreased;impaired balance  -MD      Recorded by [MD] Nereida Llamas PT      Balance Skills Training    Standing-Level of Assistance  Close supervision  -     Static Standing Balance Support  Right upper extremity supported  -KP     Recorded by  [KP] Vero  Abdirashid York, SARAR     Therapy Exercises    Bilateral Lower Extremities --   nustep 7 min level 2  -MD      Left Upper Extremity  AROM:;15 reps;sitting;elbow flexion/extension;shoulder extension/flexion  -KP     Recorded by [MD] Nereida Llamas, PT [KP] Vero York, OTR     Fine Motor Coordination Training    Detail (Fine Motor Coordination Training)  graps pennies w. L hand using index and thumb and inserting into small slot to inc FMC w. min difficulty. 2pt pinch on sq pegs and inserts them into board x30 to inc FMC no difficulty. Graps small colored pegs to complete peg design w. very little difficulty.  -KP     Recorded by  [KP] Vero York OTR     Positioning and Restraints    Pre-Treatment Position sitting in chair/recliner  -MD sitting in chair/recliner  -KP     Post Treatment Position wheelchair  -MD bed  -KP     In Bed  supine;call light within reach;exit alarm on;encouraged to call for assist  -KP     In Wheelchair patient within staff view   in DR  -MD      Recorded by [MD] Nereida Llamas, PT [KP] Vreo York, OTR       11/06/17 1203 11/06/17 1116 11/04/17 1500    Rehab Assessment/Intervention    Discipline occupational therapist  -KP physical therapist  -MD speech language pathologist  -JJ    Document Type therapy note (daily note)  -KP therapy note (daily note)  -MD therapy note (daily note)  -JJ    Subjective Information agree to therapy;no complaints  -KP agree to therapy;no complaints  -MD agree to therapy  -JJ    Patient Effort, Rehab Treatment good  -KP good  -MD adequate  -JJ    Symptoms Noted During/After Treatment none  -KP none  -MD     Precautions/Limitations fall precautions  -KP fall precautions  -MD     Equipment Issued to Patient  gait belt;front wheeled walker  -MD     Recorded by [KP] Vero York, OTR [MD] Nereida Llamas PT [JJ] Sepideh Yepez MS CCC-SLP    Pain Assessment    Pain Assessment No/denies pain  -KP No/denies pain  -MD     Recorded by  [KP] Vero York, OTR [MD] Nereida Llamas PT     Vision Assessment/Intervention    Visual Impairment  visual impairment, left;inattention to the left;left neglect  -MD     Recorded by  [MD] Nereida Llamas PT     Cognitive Assessment/Intervention    Current Cognitive/Communication Assessment impaired  -KP impaired  -MD     Orientation Status oriented to;person;place  -KP oriented to;person;place  -MD     Follows Commands/Answers Questions 100% of the time;able to follow single-step instructions  -% of the time;needs cueing;needs repetition;needs increased time  -MD     Personal Safety mild impairment  -KP decreased insight to deficits  -MD     Personal Safety Interventions fall prevention program maintained;gait belt;nonskid shoes/slippers when out of bed  -KP fall prevention program maintained;gait belt;muscle strengthening facilitated;nonskid shoes/slippers when out of bed;supervised activity  -MD     Recorded by [KP] Vero York, OTR [MD] Nereida Llamas PT     Swallow Assessment/Intervention    Additional Documentation   --   [facility] soft test tray  -JJ    Recorded by   [JJ] Sepideh Yepez, MS CCC-SLP    Improve oral skills    To Improve Oral Skills, patient will:   Increase lip closure;Increase back of tongue control;90%;without cues  -JJ    Oral Skills Progress   50%;with consistent cues  -JJ    Recorded by   [JJ] Sepideh Yepez, MS CCC-SLP    Improve laryngeal elevation    To improve laryngeal elevation, patient will:   Complete pitch-glide;90%;without cues  -JJ    Laryngeal Elevation Progress   60%;with consistent cues  -JJ    Recorded by   [JJ] Sepideh Yepez, MS CCC-SLP    Improve tongue base & pharyngeal wall squeeze    To improve tongue base & pharyngeal wall squeeze, patient will:   Complete gargle/hold retraction;Complete tongue base retraction;Complete effortful swallow;90%;without cues  -JJ    Tongue Base/Pharyngeal Wall Squeeze Progress   50%;with consistent  cues  -JJ    Recorded by   [JJ] Sepideh Yepez MS CCC-SLP    Bed Mobility, Assessment/Treatment    Bed Mob, Supine to Sit, [place]  not tested  -MD     Bed Mob, Sit to Supine, [place]  not tested  -MD     Bed Mobility, Comment pt up in wc  -KP      Recorded by [KP] Vero York, OTR [MD] Nereida Llamas, PT     Transfer Assessment/Treatment    Transfers, Sit-Stand [place] stand by assist;contact guard assist  -KP contact guard assist  -MD     Transfers, Stand-Sit [place] set up required;stand by assist  -KP contact guard assist  -MD     Transfers, Sit-Stand-Sit, Assist Device other (see comments)   wc and grab bars  -KP rolling walker  -MD     Walk-In Shower Transfer, [place] set up required;contact guard assist;supervision required  -      Walk-In Shower Transfer, Assist Device other (see comments)   emy bench  -KP      Recorded by [KP] Vero York, OTR [MD] Nereida Llamas, PT     Gait Assessment/Treatment    Gait, [place] Level  verbal cues required;contact guard assist  -MD     Gait, Assistive Device  rolling walker  -MD     Gait, Distance (Feet)  80  -MD     Gait, Gait Deviations  bilateral:;janet decreased;forward flexed posture;step length decreased;toe-to-floor clearance decreased  -MD     Gait, Safety Issues  step length decreased  -MD     Gait, Impairments  impaired balance;strength decreased  -MD     Gait, Comment  20` on carpet CGA, RWx  -MD     Recorded by  [MD] Nereida Llamas, PT     Stairs Assessment/Treatment    Number of Stairs  4  -MD     Stairs, Handrail Location  both sides  -MD     Stairs, [place] Level  verbal cues required;contact guard assist  -MD     Stairs, Technique Used  step over step (ascending);step over step (descending)  -MD     Stairs, Impairments  strength decreased;impaired balance  -MD     Recorded by  [MD] Nereida Llamas, PT     Upper Body Bathing Assessment/Training    UB Bathing Assess/Train Assistive Device hand-held shower  head;tub bench  -KP      UB Bathing Assess/Train, Position sitting  -KP      UB Bathing Assess/Train, [place] Level stand by assist;set up required  -KP      Recorded by [KP] Vero York OTR      Lower Body Bathing Assessment/Training    LB Bathing Assess/Train Assistive Device hand-held shower head;grab bars;tub bench  -KP      LB Bathing Assess/Train, Position sitting;standing  -KP      LB Bathing Assess/Train, [place] Level set up required;stand by assist  -KP      Recorded by [KP] Vero York OTR      Upper Body Dressing Assessment/Training    UB Dressing Assess/Train, Clothing Type donning:;doffing:;t-shirt  -KP      UB Dressing Assess/Train, Position sitting  -KP      UB Dressing Assess/Train, [place] set up required;supervision required  -KP      Recorded by [KP] Vero York OTR      Lower Body Dressing Assessment/Training    LB Dressing Assess/Train, Clothing Type doffing:;donning:;pants;slipper socks  -KP      LB Dressing Assess/Train, Position sitting;standing  -KP      LB Dressing Assess/Train, [place] set up required;contact guard assist;supervision required  -KP      Recorded by [KP] KANDY Sultana      Grooming Assessment/Training    Grooming Assess/Train, Position sitting  -KP      Grooming Assess/Train, [place]ep[place] Level set up required;supervision required  -KP      Recorded by [KP] Vero York OTR      Balance Skills Training    Sitting-Level of Assistance Close supervision  -KP      Sitting-Balance Support Feet supported  -KP      Standing-Level of Assistance Close supervision;Contact guard  -      Static Standing Balance Support Right upper extremity supported   intermittent UE support  -KP      Recorded by [KP] Vero York OTR      Therapy Exercises    BLE Other Reps  --   nustep at level 2 for 6 min  -MD     Recorded by  [MD] Nereida Llamas PT     Positioning and Restraints    Pre-Treatment Position sitting in  chair/recliner  -KP sitting in chair/recliner  -MD     Post Treatment Position wheelchair  -KP wheelchair  -MD     In Wheelchair sitting;call light within reach;encouraged to call for assist  -KP with OT  -MD     Recorded by [KP] Vero York OTR [MD] Nereida Llamas, PT       11/04/17 1210          Rehab Assessment/Intervention    Discipline occupational therapist  -      Document Type therapy note (daily note)  -      Subjective Information agree to therapy;complains of;fatigue  -KP      Patient Effort, Rehab Treatment good  -KP      Symptoms Noted During/After Treatment none  -KP      Precautions/Limitations fall precautions;other (see comments)   [place] hearing aides  -KP      Specific Treatment Considerations hearing aids  -KP      Recorded by [KP] KANDY Sultana      Pain Assessment    Pain Assessment No/denies pain  -KP      Recorded by [KP] KANDY Sultana      Vision Assessment/Intervention    Visual Impairment visual impairment, left;inattention to the left;left neglect  -KP      Recorded by [KP] KANDY Sultana      Cognitive Assessment/Intervention    Current Cognitive/Communication Assessment impaired  -      Orientation Status oriented to;place;person;time  -KP      Follows Commands/Answers Questions 100% of the time;able to follow single-step instructions;needs cueing  -      Personal Safety mild impairment  -      Personal Safety Interventions fall prevention program maintained;gait belt;muscle strengthening facilitated;nonskid shoes/slippers when out of bed  -KP      Recorded by [KP] KANDY Sultana      Bed Mobility, Assessment/Treatment    Bed Mobility, Comment up in wc  -KP      Recorded by [KP] KANDY Sultana      Transfer Assessment/Treatment    Transfers, Sit-Stand [place] contact guard assist  -KP      Transfers, Stand-Sit [place] contact guard assist  -KP      Transfers, Sit-Stand-Sit, Assist Device rolling  walker  -KP      Toilet Transfer, [place] contact guard assist  -KP      Toilet Transfer, Assistive Device wheelchair;other (see comments)   grab bars  -KP      Walk-In Shower Transfer, [place] contact guard assist  -KP      Walk-In Shower Transfer, Assist Device other (see comments);standard shower chair   grab bars. shower bench  -KP      Recorded by [KP] KANDY Sultana      Upper Body Bathing Assessment/Training    UB Bathing Assess/Train Assistive Device hand-held shower head;shower chair with back  -KP      UB Bathing Assess/Train, Position sitting  -KP      UB Bathing Assess/Train, [place] Level set up required;stand by assist  -KP      Recorded by [KP] KANDY Sultana      Lower Body Bathing Assessment/Training    LB Bathing Assess/Train Assistive Device grab bars;hand-held shower head;shower chair with back  -KP      LB Bathing Assess/Train, Position sitting;standing  -KP      LB Bathing Assess/Train, [place] Level set up required  -KP      Recorded by [KP] KANDY Sultana      Upper Body Dressing Assessment/Training    UB Dressing Assess/Train, Clothing Type doffing:;donning:;t-shirt  -KP      UB Dressing Assess/Train, Position sitting  -KP      UB Dressing Assess/Train, [place] set up required;supervision required  -KP      Recorded by [KP] KANDY Sultana      Lower Body Dressing Assessment/Training    LB Dressing Assess/Train, Clothing Type doffing:;donning:;pants;socks  -KP      LB Dressing Assess/Train, Position sitting;standing  -KP      LB Dressing Assess/Train, [place] set up required;contact guard assist  -KP      Recorded by [KP] KANDY Sultana      Toileting Assessment/Training    Toileting Assess/Train, Assistive Device grab bars  -KP      Toileting Assess/Train, Position sitting;standing  -KP      Toileting Assess/Train, [place] Level set up required;contact guard assist  -KP      Recorded by [KP] Vero  KANDY Tinsley      Grooming Assessment/Training    Grooming Assess/Train, Position sitting  -KP      Grooming Assess/Train, [place] Level supervision required;set up required  -KP      Recorded by [KP] KANDY Sultana      Balance Skills Training    Sitting-Level of Assistance Close supervision  -KP      Sitting-Balance Support Feet supported  -KP      Standing-Level of Assistance Contact guard  -KP      Static Standing Balance Support Right upper extremity supported  -KP      Recorded by [KP] Vero York OTR      Therapy Exercises    Left Upper Extremity AROM:;20 reps;sitting;elbow flexion/extension;shoulder extension/flexion;shoulder abduction/adduction  -KP      Recorded by [KP] Vero York OTR      Fine Motor Coordination Training    Detail (Fine Motor Coordination Training) isnerts pegs into small hole. colored pegs to inc L FMC. w. minVC  -KP      Recorded by [BRIANNA] KANDY Sultana      Positioning and Restraints    Pre-Treatment Position bathroom  -KP      Post Treatment Position wheelchair  -KP      In Wheelchair sitting;with PT  -KP      Recorded by [BRIANNA] KANDY Sultana        User Key  (r) = Recorded By, (t) = Taken By, (c) = Cosigned By    Initials Name Effective Dates    OC Lilian Mohr MA,CCC-SLP 04/05/17 -     JJ Sepideh Yepez, MS CCC-SLP 04/13/15 -     BRIANNA York OTR 04/13/15 -     MD Nereida Llamas, PT 12/01/15 -     KB Katherine L Bruton 09/29/17 -                 OT Goals       11/01/17 1554 11/01/17 1230       Transfer Training OT STG    Transfer Training OT STG, Date Established  11/01/17  -SO     Transfer Training OT STG, Time to Achieve  1 wk  -SO     Transfer Training OT STG, Activity Type  tub;walk-in shower;shower chair  -SO     Transfer Training OT STG, [place] Level  contact guard assist  -SO     Transfer Training OT STG, Assist Device  walker, rolling;shower chair  -SO     Transfer Training OT LTG     Transfer Training OT LTG, Date Established  11/01/17  -SO     Transfer Training OT LTG, Time to Achieve  by discharge  -SO     Transfer Training OT LTG, Activity Type  tub;walk-in shower;shower chair  -SO     Transfer Training OT LTG, [place] Level  supervision required  -SO     Transfer Training OT LTG, Assist Device  walker, rolling;shower chair  -SO     Transfer Training 2 OT STG    Transfer Training 2 OT STG, Date Established  11/01/17  -SO     Transfer Training 2 OT STG, Time to Achieve  1 wk  -SO     Transfer Training 2 OT STG, Activity Type  toilet  -SO     Transfer Training 2 OT STG, [place] Level  contact guard assist  -SO     Transfer Training 2 OT STG, Assist Device  walker, rolling  -SO     Transfer Training 2 OT LTG    Transfer Training 2 OT LTG, Date Established  11/01/17  -SO     Transfer Training 2 OT LTG, Time to Achieve  by discharge  -SO     Transfer Training 2 OT LTG, Activity Type  toilet  -SO     Transfer Training 2 OT LTG, [place] Level  supervision required  -SO     Transfer Training 2 OT LTG, Assist Device  walker, rolling  -SO     Strength OT LTG    Strength Goal OT LTG, Date Established 11/01/17 -SO      Strength Goal OT LTG, Time to Achieve by discharge  -SO      Strength Goal OT LTG, Measure to Achieve Pt to increase LUE strength to 4-/5 to increase independence with ADLs.  -SO      Caregiver Training OT LTG    Caregiver Training OT LTG, Date Established  11/01/17  -SO     Caregiver Training OT LTG, Time to Achieve  by discharge  -SO     Caregiver Training OT LTG, [place] Level  able to assist adequately;able to cue patient adequately  -SO     Patient Education OT LTG    Patient Education OT LTG, Date Established  11/01/17  -SO     Patient Education OT LTG, Time to Achieve  by discharge  -SO     Patient Education OT LTG, Education Type  written program;HEP;aware of neuro deficits;home safety  -SO     Patient Education OT LTG, Education Understanding   independent;demonstrates adequately;verbalizes understanding  -SO     Isolated Movement OT LTG    Isolated Movement OT LTG, Date Established 11/01/17  -SO      Isolated Movement OT LTG, Time to Achieve by discharge  -SO      Isolated Movement OT LTG, Body Area left scapula;left shoulder;left elbow;left forearm;left wrist;left fingers  -SO      Isolated Movement OT LTG, Level WFL  -SO      Coordination OT LTG    Coordination OT LTG, Date Established 11/01/17  -SO      Coordination OT LTG, Time to Achieve by discharge  -SO      Coordination OT LTG, Activity Type FM written ex program;GM written ex program;FM task;GM task  -SO      Coordination OT LTG, [place] Level independent  -SO      Coordination OT LTG, Additional Goal LUE  -SO      ADL OT STG    ADL OT STG, Date Established  11/01/17  -SO     ADL OT STG, Time to Achieve  1 wk  -SO     ADL OT STG, Activity Type  ADL skills  -SO     ADL OT STG, [place] Level  contact guard;assistive device  -SO     ADL OT LTG    ADL OT LTG, Date Established  11/01/17  -SO     ADL OT LTG, Time to Achieve  by discharge  -SO     ADL OT LTG, Activity Type  ADL skills  -SO     ADL OT LTG, [place] Level  standby assist;assistive device  -SO       User Key  (r) = Recorded By, (t) = Taken By, (c) = Cosigned By    Initials Name Provider Type    SO Micki Castro OTR Occupational Therapist          Occupational Therapy Education     Title: PT OT SLP Therapies (Active)     Topic: Occupational Therapy (Active)     Point: ADL training (Done)    Description: Instruct learner(s) on proper safety adaptation and remediation techniques during self care or transfers.   Instruct in proper use of assistive devices.    Learning Progress Summary    Learner Readiness Method Response Comment Documented by Status   Patient Acceptance NOEMY YOON,NR ed pt on shower tsf technique. ed pt on bathing safety. pt demo shower w. CGA to SBA. mostly SBA. KP 11/06/17 1207 Done    Acceptance CAR CLARK  Discuss OT goals and POC.  11/01/17 1232 Done   Family Acceptance E VU Discuss OT goals and POC.  11/01/17 1232 Done               Point: Precautions (Done)    Description: Instruct learner(s) on prescribed precautions during self-care and functional transfers.    Learning Progress Summary    Learner Readiness Method Response Comment Documented by Status   Patient Acceptance E,NOEMY VU,NR ed pt on shower tsf technique. ed pt on bathing safety. pt demo shower w. CGA to SBA. mostly SBA.  11/06/17 1207 Done               Point: Body mechanics (Done)    Description: Instruct learner(s) on proper positioning and spine alignment during self-care, functional mobility activities and/or exercises.    Learning Progress Summary    Learner Readiness Method Response Comment Documented by Status   Patient Acceptance E,NOEMY CLARK,NR ed pt on shower tsf technique. ed pt on bathing safety. pt demo shower w. CGA to SBA. mostly SBA.  11/06/17 1207 Done                      User Key     Initials Effective Dates Name Provider Type Discipline     04/13/15 -  Micki Castro, OTR Occupational Therapist OT     04/13/15 -  KANDY Sultana Occupational Therapist OT                  OT Recommendation and Plan  Therapy Frequency: 5 times/wk          Time Calculation:         Time Calculation- OT       11/07/17 1205          Time Calculation- OT    OT Start Time 0900  -      OT Stop Time 0930  -      OT Time Calculation (min) 30 min  -        User Key  (r) = Recorded By, (t) = Taken By, (c) = Cosigned By    Initials Name Provider Type     Vero York OTJOSSIE Occupational Therapist           Therapy Charges for Today     Code Description Service Date Service Provider Modifiers Qty    84975028883 HC OT SELF CARE/MGMT/TRAIN EA 15 MIN 11/6/2017 KANDY Sultana GO 2    24288130022 HC OT NEUROMUSC RE EDUCATION EA 15 MIN 11/6/2017 KANDY Sultana GO 2    61304751412 HC OT SELF CARE/MGMT/TRAIN EA  15 MIN 11/7/2017 Vero York, OTR GO 2               Vero York, OTR  11/7/2017

## 2017-11-07 NOTE — THERAPY TREATMENT NOTE
Inpatient Rehabilitation - Occupational Therapy Treatment Note  [facility]     Patient Name: Alice Maxwell  : 1945  MRN: [id number]  Today's Date: 2017  Onset of Illness/Injury or Date of Surgery Date: 10/28/17  Date of Referral to OT: 17  Referring Physician: Jeremie      Admit Date: 10/31/2017    Visit Dx:     ICD-10-CM ICD-9-CM   1. Oropharyngeal dysphagia R13.12 787.22     Patient Active Problem List   Diagnosis   • Stroke             Adult Rehabilitation Note       17 1532 17 1201 17 1100    Rehab Assessment/Intervention    Discipline occupational therapist  -KP occupational therapist  -KP speech language pathologist  -KB    Document Type therapy note (daily note)  - therapy note (daily note)  - therapy note (daily note)  -KB    Subjective Information agree to therapy;no complaints  -KP agree to therapy;no complaints  -KP no complaints;agree to therapy  -KB    Patient Effort, Rehab Treatment good  -KP good  -KP good  -KB    Symptoms Noted During/After Treatment none  -KP none  -KP none  -KB    Precautions/Limitations fall precautions;swallowing precautions  - fall precautions;swallowing precautions  - fall precautions;swallowing precautions  -KB    Recorded by [KP] Vero York, OTR [KP] Vero York, OTR [KB] Katherine L Bruton    Pain Assessment    Pain Assessment No/denies pain  -KP No/denies pain  -KP No/denies pain  -KB    Recorded by [KP] Vero York, OTR [KP] Vero York, OTR [KB] Katherine L Bruton    Vision Assessment/Intervention    Visual Impairment visual impairment, left  -KP visual impairment, left  -KP visual impairment, left;inattention to the left  -KB    Recorded by [KP] Vero York OTR [KP] Vero York, OTR [KB] Katherine L Bruton    Cognitive Assessment/Intervention    Current Cognitive/Communication Assessment impaired  -KP impaired  -KP     Orientation Status oriented  to;person;place;time  - oriented to;person;place;time  -     Follows Commands/Answers Questions 100% of the time;able to follow single-step instructions;needs cueing  - 100% of the time;able to follow single-step instructions  -     Personal Safety mild impairment  -KP mild impairment  -     Personal Safety Interventions fall prevention program maintained;gait belt;muscle strengthening facilitated;nonskid shoes/slippers when out of bed  - fall prevention program maintained;gait belt;nonskid shoes/slippers when out of bed  -KP     Recorded by [KP] Vero York, OTR [KP] Vero York, OTR     Improve closure at entrance to airway    Status: Improve closure at entrance to airway   Progressing as expected  -KB    Closure at Entrance to Airway Progress   continue to adress  -KB    Comments: Improve closure at entrance to airway   Pt observed with mid-day meal. 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   -KB    Recorded by   [KB] Katherine L Bruton    Bed Mobility, Assessment/Treatment    Bed Mob, Supine to Sit, [place] supervision required;set up required  -      Bed Mob, Sit to Supine, [place] not tested  -      Bed Mobility, Comment  up in [facility]     Recorded by [] Vero York, OTR [KP] Vero York, OTR     Transfer Assessment/Treatment    Transfers, Bed-Chair [place] stand by assist  -      Transfers, Chair-Bed [place] not tested  -      Transfers, Bed-Chair-Bed, Assist Device other (see comments)     -      Transfers, Sit-Stand [place] stand by assist  - stand by assist  -     Transfers, Stand-Sit [place] stand by assist  [facility] stand by assist  -     Transfers, Sit-Stand-Sit, Assist Device other (see comments)   [facility] other (see comments)    grab bar  -     Transfer, Comment  doesn't wantn to shower today. her hair is was fixed last night by [facility]      Recorded by [KP] Vero York OTR [KP] Vero York OTR     Upper Body Bathing Assessment/Training    UB Bathing Assess/Train, Position  sitting;sink side  -KP     UB Bathing Assess/Train, [place] Level  stand by assist;set up required  -KP     Recorded by  [KP] Vero York OTR     Lower Body Bathing Assessment/Training    LB Bathing Assess/Train, Position  sitting;standing  -KP     LB Bathing Assess/Train, [place] Level  set up required;stand by assist  -KP     Recorded by  [KP] Vero York OTR     Upper Body Dressing Assessment/Training    UB Dressing Assess/Train, Clothing Type  donning:;doffing:;t-shirt  -KP     UB Dressing Assess/Train, Position  sitting  -KP     UB Dressing Assess/Train, [place]  supervision required;set up required  -KP     Recorded by  [KP] Vero York OTR     Lower Body Dressing Assessment/Training    LB Dressing Assess/Train, Clothing Type  doffing:;donning:;pants;socks;shoes  -KP     LB Dressing Assess/Train, Position  sitting;standing  -KP     LB Dressing Assess/Train, [place]  set up required;supervision required  -KP     Recorded by  [KP] Vero York OTR     Grooming Assessment/Training    Grooming Assess/Train, Position  sitting;sink side  -KP     Grooming Assess/Train, [place] Level  supervision required;set up required  -KP     Recorded by  [KP] Vero York OTR     Balance Skills Training    Sitting-Level of Assistance Close supervision;Distant supervision  -KP Close supervision;Distant supervision  -KP     Sitting-Balance Support Feet supported  -KP Feet supported  -KP     Standing-Level of Assistance Close supervision  - Close supervision  -KP     Static Standing Balance Support Right upper extremity supported  -KP Left upper extremity supported  -KP     Recorded by [KP] Vero York OTR [KP] Vero York, OTR     Therapy Exercises    BUE Resistance theraputty    pinches yellow putty w. each digit and thumb  -KP      Recorded by [KP] Vero York, SARAR      Fine Motor Coordination Training    Detail (Fine Motor Coordination Training) links paper clip together clipping it on other paper clip w. L hand.  in hand manipulation w. coins in L hand pushing coin up w. L thumb and inserting coin into small slot to inc FMC and in hand manipulation.   -KP      Recorded by [KP] Vero York OTR      Positioning and Restraints    Pre-Treatment Position in bed  -KP sitting in chair/recliner  -KP     Post Treatment Position wheelchair  -KP wheelchair  -KP     In Wheelchair sitting;call light within reach;encouraged to call for assist;with family/caregiver  -KP sitting   in dining room waiting for RT  -KP     Recorded by [KP] Vero York OTR [KP] Vero York OTR       11/07/17 1030 11/07/17 0819 11/06/17 1550    Rehab Assessment/Intervention    Discipline speech language pathologist  -KB physical therapist  -MD occupational therapist  -KP    Document Type therapy note (daily note)  -KB therapy note (daily note)  -MD therapy note (daily note)  -KP    Subjective Information no complaints;agree to therapy  -KB agree to therapy;no complaints  -MD complains of;agree to therapy;fatigue  -KP    Patient Effort, Rehab Treatment good  -KB good  -MD good  -KP    Symptoms Noted During/After Treatment none  -KB none  -MD none  -KP    Precautions/Limitations fall precautions;swallowing precautions  -KB fall precautions  -MD fall precautions  -KP    Equipment Issued to Patient  gait belt;front wheeled walker  -MD     Recorded by [KB] Katherine L Bruton [MD] Nereida Llamas, PT [KP] Vero York, OTR    Pain Assessment    Pain Assessment No/denies pain  -KB No/denies pain  -MD No/denies pain  -KP    Recorded by [KB] Katherine L Bruton [MD] Nereida Llamas, PT [KP] Vero York, OTR    Vision Assessment/Intervention    Visual Impairment visual impairment,  left;inattention to the left  -KB visual impairment, left;inattention to the left  -MD visual impairment, left;inattention to the left  -KP    Recorded by [KB] Katherine L Bruton [MD] Nereida Llamas, PT [KP] Vero York, OTR    Cognitive Assessment/Intervention    Current Cognitive/Communication Assessment  impaired  -MD impaired  -KP    Orientation Status  oriented to;person  -MD oriented to;person;place;time  -KP    Follows Commands/Answers Questions  100% of the time;needs cueing;needs repetition  -% of the time;able to follow single-step instructions  -KP    Personal Safety  decreased insight to deficits  -MD mild impairment  -KP    Personal Safety Interventions  fall prevention program maintained;gait belt;muscle strengthening facilitated;nonskid shoes/slippers when out of bed;supervised activity  -MD fall prevention program maintained;gait belt;muscle strengthening facilitated;nonskid shoes/slippers when out of bed  -KP    Recorded by  [MD] Nereida Llamas, PT [KP] Vero York, OTR    Improve functional problem solving    Improve functional problem solving through: complete high level reasoning task  -KB      Status: Improve functional problem solving Progressing as expected  -KB      Functional Problem Solving Progress 50%;without cues;100%;with consistent cues  -KB      Comments: Improve functional problem solving Pt required MOD verbal cues throughout logic puzzle for attention and reasoning.   -KB      Recorded by [KB] Katherine L Bruton      Improve oral skills    Status: Improve Oral Skills Progressing as expected  -KB      Oral Skills Progress 80%;with inconsistent cues  -KB      Recorded by [KB] Katherine L Bruton      Improve laryngeal elevation    Status: Improve laryngeal elevation Progressing as expected  -KB      Laryngeal Elevation Progress 60%;with inconsistent cues;continue to adress  -KB      Comments: Improve laryngeal elevation Pt completing exercises in therapy session as  prescribed.   -KB      Recorded by [KB] Katherine L Bruton      Improve tongue base & pharyngeal wall squeeze    Status: Improve tongue base & pharyngeal wall squeeze Progressing as expected  -KB      Tongue Base/Pharyngeal Wall Squeeze Progress 60%;with inconsistent cues;continue to adress  -KB      Comments: Improve tongue base & pharyngeal wall squeeze Pt completing exercises in therapy sessions as prescribed.   -KB      Recorded by [KB] Katherine L Bruton      Bed Mobility, Assessment/Treatment    Bed Mob, Supine to Sit, [place]  not tested  -MD not tested  -KP    Bed Mob, Sit to Supine, [place]  not tested  -MD set up required;supervision required  -KP    Recorded by  [MD] Nereida Llamas, PT [KP] Vero York, OTR    Transfer Assessment/Treatment    Transfers, Sit-Stand [place]  verbal cues required;stand by assist  -MD set up required;supervision required;stand by assist  -KP    Transfers, Stand-Sit [place]  verbal cues required;stand by assist  -MD stand by assist  -    Transfers, Sit-Stand-Sit, Assist Device  rolling walker  -MD bed rails  -    Transfer, Safety Issues  step length decreased  -MD     Transfer, Impairments  strength decreased;impaired balance  -MD     Recorded by  [MD] Nereida Llamas, PT [KP] Vero York, OTR    Gait Assessment/Treatment    Gait, [place] Level  verbal cues required;contact guard assist  -MD     Gait, Assistive Device  rolling walker  -MD     Gait, Distance (Feet)  80   x3  -MD     Gait, Gait Deviations  bilateral:;janet decreased;forward flexed posture;step length decreased;toe-to-floor clearance decreased;weight-shifting ability decreased  -MD     Gait, Safety Issues  step length decreased  -MD     Gait, Impairments  impaired balance;strength decreased  -MD     Recorded by  [MD] Nereida Llamas, PT     Stairs Assessment/Treatment    Number of Stairs  4  -MD     Stairs, Handrail Location  both sides  -MD     Stairs, [place] Level   supervision required  -MD     Stairs, Technique Used  step over step (ascending);step to step (descending)  -MD     Stairs, Impairments  strength decreased;impaired balance  -MD     Recorded by  [MD] Nereida Llamas PT     Balance Skills Training    Standing-Level of Assistance   Close supervision  -KP    Static Standing Balance Support   Right upper extremity supported  -KP    Recorded by   [KP] Vero York OTR    Therapy Exercises    Bilateral Lower Extremities  --   nustep 7 min level 2  -MD     Left Upper Extremity   AROM:;15 reps;sitting;elbow flexion/extension;shoulder extension/flexion  -KP    Recorded by  [MD] Nereida Llamas PT [KP] Vero York, SARAR    Fine Motor Coordination Training    Detail (Fine Motor Coordination Training)   graps pennies w. L hand using index and thumb and inserting into small slot to inc FMC w. min difficulty. 2pt pinch on sq pegs and inserts them into board x30 to inc FMC no difficulty. Graps small colored pegs to complete peg design w. very little difficulty.  -KP    Recorded by   [KP] KANDY Sultana    Positioning and Restraints    Pre-Treatment Position  sitting in chair/recliner  -MD sitting in chair/recliner  -KP    Post Treatment Position  wheelchair  -MD bed  -KP    In Bed   supine;call light within reach;exit alarm on;encouraged to call for assist  -KP    In Wheelchair  patient within staff view   in DR MONTIEL     Recorded by  [MD] Nereida Llamas PT [KP] Vero York, SARAR      11/06/17 1330 11/06/17 1203 11/06/17 1116    Rehab Assessment/Intervention    Discipline speech language pathologist  -OC occupational therapist  -KP physical therapist  -MD    Document Type therapy note (daily note)  -OC therapy note (daily note)  -KP therapy note (daily note)  -MD    Subjective Information no complaints;agree to therapy  -OC agree to therapy;no complaints  -KP agree to therapy;no complaints  -MD    Patient Effort, Rehab Treatment good  -OC good  -KP good   -MD    Symptoms Noted During/After Treatment none  -OC none  -KP none  -MD    Precautions/Limitations  fall precautions  -KP fall precautions  -MD    Equipment Issued to Patient   gait belt;front wheeled walker  -MD    Recorded by [OC] Lilian Mohr MA,CCC-SLP [KP] Vero York OTR [MD] Nereida Llamas, PT    Pain Assessment    Pain Assessment No/denies pain  -OC No/denies pain  -KP No/denies pain  -MD    Recorded by [OC] Lilian Mohr MA,CCC-SLP [KP] Vero York, OTR [MD] Nereida Llamas, PT    Vision Assessment/Intervention    Visual Impairment   visual impairment, left;inattention to the left;left neglect  -MD    Recorded by   [MD] Nereida Llamas PT    Cognitive Assessment/Intervention    Current Cognitive/Communication Assessment impaired  -OC impaired  -KP impaired  -MD    Orientation Status oriented to;person;place;time  -OC oriented to;person;place  -KP oriented to;person;place  -MD    Follows Commands/Answers Questions 100% of the time;able to follow multi-step instructions;needs cueing;able to follow single-step instructions;needs increased time  -% of the time;able to follow single-step instructions  -% of the time;needs cueing;needs repetition;needs increased time  -MD    Personal Safety  mild impairment  -KP decreased insight to deficits  -MD    Personal Safety Interventions  fall prevention program maintained;gait belt;nonskid shoes/slippers when out of bed  -KP fall prevention program maintained;gait belt;muscle strengthening facilitated;nonskid shoes/slippers when out of bed;supervised activity  -MD    Recorded by [OC] Lilian Mohr MA,CCC-SLP [KP] Vero York, OTR [MD] Nereida Llamas, PT    Improve executive function skills    Status: Improve executive function skills Progressing as expected  -OC      Executive Function Skills Progress 20%;without cues;70%;with consistent cues   organization task of dates/holidays  -OC      Comments: Improve executive function skills Pt demonstrated  difficulty with cell phone this date  -OC      Recorded by [OC] Lilian Mohr MA,CCC-SLP      Improve oral skills    Status: Improve Oral Skills Progressing as expected  -OC      Oral Skills Progress 70%;with inconsistent cues  -OC      Recorded by [OC] Lilian Mohr MA,CCC-SLP      Improve laryngeal elevation    Status: Improve laryngeal elevation Progressing as expected  -OC      Laryngeal Elevation Progress 60%;with inconsistent cues  -OC      Recorded by [OC] Lilian Mohr MA,CCC-SLP      Improve tongue base & pharyngeal wall squeeze    Status: Improve tongue base & pharyngeal wall squeeze Progressing as expected  -OC      Tongue Base/Pharyngeal Wall Squeeze Progress 60%;with inconsistent cues  -OC      Recorded by [OC] Lilian Mohr MA,CCC-SLP      Bed Mobility, Assessment/Treatment    Bed Mob, Supine to Sit, [place]   not tested  -MD    Bed Mob, Sit to Supine, [place]   not tested  -MD    Bed Mobility, Comment  pt up in   -KP     Recorded by  [KP] Vero York, OTR [MD] Nereida Llamas, PT    Transfer Assessment/Treatment    Transfers, Sit-Stand [place]  stand by assist;contact guard assist  -KP contact guard assist  -MD    Transfers, Stand-Sit [place]  set up required;stand by assist  -KP contact guard assist  -MD    Transfers, Sit-Stand-Sit, Assist Device  other (see comments)   wc and grab bars  - rolling walker  -MD    Walk-In Shower Transfer, [place]  set up required;contact guard assist;supervision required  -     Walk-In Shower Transfer, Assist Device  other (see comments)   emy bench  -KP     Recorded by  [KP] Vero York, OTR [MD] Nereida Llamas, PT    Gait Assessment/Treatment    Gait, [place] Level   verbal cues required;contact guard assist  -MD    Gait, Assistive Device   rolling walker  -MD    Gait, Distance (Feet)   80  -MD    Gait, Gait Deviations   bilateral:;janet decreased;forward flexed posture;step length decreased;toe-to-floor clearance decreased   -MD    Gait, Safety Issues   step length decreased  -MD    Gait, Impairments   impaired balance;strength decreased  -MD    Gait, Comment   20` on carpet CGA, RWx  -MD    Recorded by   [MD] Nereida Llamas, PT    Stairs Assessment/Treatment    Number of Stairs   4  -MD    Stairs, Handrail Location   both sides  -MD    Stairs, [place] Level   verbal cues required;contact guard assist  -MD    Stairs, Technique Used   step over step (ascending);step over step (descending)  -MD    Stairs, Impairments   strength decreased;impaired balance  -MD    Recorded by   [MD] Nereida Llamas, PT    Upper Body Bathing Assessment/Training    UB Bathing Assess/Train Assistive Device  hand-held shower head;tub bench  -     UB Bathing Assess/Train, Position  sitting  -     UB Bathing Assess/Train, [place] Level  stand by assist;set up required  -     Recorded by  [KP] Vero York, OTR     Lower Body Bathing Assessment/Training    LB Bathing Assess/Train Assistive Device  hand-held shower head;grab bars;tub bench  -     LB Bathing Assess/Train, Position  sitting;standing  -     LB Bathing Assess/Train, [place] Level  set up required;stand by assist  -     Recorded by  [KP] Vero York, SARAR     Upper Body Dressing Assessment/Training    UB Dressing Assess/Train, Clothing Type  donning:;doffing:;t-shirt  -     UB Dressing Assess/Train, Position  sitting  -     UB Dressing Assess/Train, [place]  set up required;supervision required  -     Recorded by  [KP] Vero York, OTR     Lower Body Dressing Assessment/Training    LB Dressing Assess/Train, Clothing Type  doffing:;donning:;pants;slipper socks  -     LB Dressing Assess/Train, Position  sitting;standing  -     LB Dressing Assess/Train, [place]  set up required;contact guard assist;supervision required  -     Recorded by  [KP] Vero York, OTR     Grooming Assessment/Training    Grooming Assess/Train, Position   sitting  -KP     Grooming Assess/Train, [place] Level  set up required;supervision required  -KP     Recorded by  [KP] Vero York, OTR     Balance Skills Training    Sitting-Level of Assistance  Close supervision  -KP     Sitting-Balance Support  Feet supported  -KP     Standing-Level of Assistance  Close supervision;Contact guard  -KP     Static Standing Balance Support  Right upper extremity supported   intermittent UE support  -KP     Recorded by  [KP] Vero York, OTR     Therapy Exercises    BLE Other Reps   --   nustep at level 2 for 6 min  -MD    Recorded by   [MD] Nereida Llamas, PT    Positioning and Restraints    Pre-Treatment Position  sitting in chair/recliner  -KP sitting in chair/recliner  -MD    Post Treatment Position  wheelchair  -KP wheelchair  -MD    In Wheelchair  sitting;call light within reach;encouraged to call for assist  -KP with OT  -MD    Recorded by  [KP] Vero York, OTR [MD] Nereida Llamas, PT      User Key  (r) = Recorded By, (t) = Taken By, (c) = Cosigned By    Initials Name Effective Dates    OC Lilian Mohr MA,CCC-SLP 04/05/17 -     BRIANNA York, OTR 04/13/15 -     MD Nereida Llamas, PT 12/01/15 -     KB Katherine L Bruton 09/29/17 -                 OT Goals       11/01/17 1554 11/01/17 1230       Transfer Training OT STG    Transfer Training OT STG, Date Established  11/01/17  -SO     Transfer Training OT STG, Time to Achieve  1 wk  -SO     Transfer Training OT STG, Activity Type  tub;walk-in shower;shower chair  -SO     Transfer Training OT STG, [place] Level  contact guard assist  -SO     Transfer Training OT STG, Assist Device  walker, rolling;shower chair  -SO     Transfer Training OT LTG    Transfer Training OT LTG, Date Established  11/01/17  -SO     Transfer Training OT LTG, Time to Achieve  by discharge  -SO     Transfer Training OT LTG, Activity Type  tub;walk-in shower;shower chair  -SO     Transfer Training OT LTG, [place] Level   supervision required  -SO     Transfer Training OT LTG, Assist Device  walker, rolling;shower chair  -SO     Transfer Training 2 OT STG    Transfer Training 2 OT STG, Date Established  11/01/17  -SO     Transfer Training 2 OT STG, Time to Achieve  1 wk  -SO     Transfer Training 2 OT STG, Activity Type  toilet  -SO     Transfer Training 2 OT STG, [place] Level  contact guard assist  -SO     Transfer Training 2 OT STG, Assist Device  walker, rolling  -SO     Transfer Training 2 OT LTG    Transfer Training 2 OT LTG, Date Established  11/01/17  -SO     Transfer Training 2 OT LTG, Time to Achieve  by discharge  -SO     Transfer Training 2 OT LTG, Activity Type  toilet  -SO     Transfer Training 2 OT LTG, [place] Level  supervision required  -SO     Transfer Training 2 OT LTG, Assist Device  walker, rolling  -SO     Strength OT LTG    Strength Goal OT LTG, Date Established 11/01/17  -SO      Strength Goal OT LTG, Time to Achieve by discharge  -SO      Strength Goal OT LTG, Measure to Achieve Pt to increase LUE strength to 4-/5 to increase independence with ADLs.  -SO      Caregiver Training OT LTG    Caregiver Training OT LTG, Date Established  11/01/17  -SO     Caregiver Training OT LTG, Time to Achieve  by discharge  -SO     Caregiver Training OT LTG, [place] Level  able to assist adequately;able to cue patient adequately  -SO     Patient Education OT LTG    Patient Education OT LTG, Date Established  11/01/17  -SO     Patient Education OT LTG, Time to Achieve  by discharge  -SO     Patient Education OT LTG, Education Type  written program;HEP;aware of neuro deficits;home safety  -SO     Patient Education OT LTG, Education Understanding  independent;demonstrates adequately;verbalizes understanding  -SO     Isolated Movement OT LTG    Isolated Movement OT LTG, Date Established 11/01/17  -SO      Isolated Movement OT LTG, Time to Achieve by discharge  -SO      Isolated Movement OT LTG, Body Area left  scapula;left shoulder;left elbow;left forearm;left wrist;left fingers  -SO      Isolated Movement OT LTG, Level WFL  -SO      Coordination OT LTG    Coordination OT LTG, Date Established 11/01/17  -SO      Coordination OT LTG, Time to Achieve by discharge  -SO      Coordination OT LTG, Activity Type FM written ex program;GM written ex program;FM task;GM task  -SO      Coordination OT LTG, [place] Level independent  -SO      Coordination OT LTG, Additional Goal LUE  -SO      ADL OT STG    ADL OT STG, Date Established  11/01/17  -SO     ADL OT STG, Time to Achieve  1 wk  -SO     ADL OT STG, Activity Type  ADL skills  -SO     ADL OT STG, [place] Level  contact guard;assistive device  -SO     ADL OT LTG    ADL OT LTG, Date Established  11/01/17  -SO     ADL OT LTG, Time to Achieve  by discharge  -SO     ADL OT LTG, Activity Type  ADL skills  -SO     ADL OT LTG, [place] Level  standby assist;assistive device  -SO       User Key  (r) = Recorded By, (t) = Taken By, (c) = Cosigned By    Initials Name Provider Type    SO Micki Castro OTR Occupational Therapist          Occupational Therapy Education     Title: PT OT SLP Therapies (Active)     Topic: Occupational Therapy (Active)     Point: ADL training (Done)    Description: Instruct learner(s) on proper safety adaptation and remediation techniques during self care or transfers.   Instruct in proper use of assistive devices.    Learning Progress Summary    Learner Readiness Method Response Comment Documented by Status   Patient Acceptance E,D VU,NR ed pt on shower tsf technique. ed pt on bathing safety. pt demo shower w. CGA to SBA. mostly SBA. KP 11/06/17 1207 Done    Acceptance E VU Discuss OT goals and POC. SO 11/01/17 1232 Done   Family Acceptance E VU Discuss OT goals and POC. SO 11/01/17 1232 Done               Point: Precautions (Done)    Description: Instruct learner(s) on prescribed precautions during self-care and functional transfers.     Learning Progress Summary    Learner Readiness Method Response Comment Documented by Status   Patient Acceptance E,D VU,NR ed pt on shower tsf technique. ed pt on bathing safety. pt demo shower w. CGA to SBA. mostly SBA.  11/06/17 1207 Done               Point: Body mechanics (Done)    Description: Instruct learner(s) on proper positioning and spine alignment during self-care, functional mobility activities and/or exercises.    Learning Progress Summary    Learner Readiness Method Response Comment Documented by Status   Patient Acceptance E,D VU,NR ed pt on shower tsf technique. ed pt on bathing safety. pt demo shower w. CGA to SBA. mostly SBA.  11/06/17 1207 Done                      User Key     Initials Effective Dates Name Provider Type Discipline     04/13/15 -  Micki Castro, OTJOSSIE Occupational Therapist OT     04/13/15 -  KANDY Sultana Occupational Therapist OT                  OT Recommendation and Plan  Therapy Frequency: 5 times/wk          Time Calculation:         Time Calculation- OT       11/07/17 1535 11/07/17 1205       Time Calculation- OT    OT Start Time 1400  -KP 0900  -     OT Stop Time 1430  -KP 0930  -     OT Time Calculation (min) 30 min  -KP 30 min  -       User Key  (r) = Recorded By, (t) = Taken By, (c) = Cosigned By    Initials Name Provider Type     KANDY Sultana Occupational Therapist           Therapy Charges for Today     Code Description Service Date Service Provider Modifiers Qty    91372740837 HC OT SELF CARE/MGMT/TRAIN EA 15 MIN 11/6/2017 KANDY Sultana GO 2    56034340910 HC OT NEUROMUSC RE EDUCATION EA 15 MIN 11/6/2017 KANDY Sultana GO 2    71364660814 HC OT SELF CARE/MGMT/TRAIN EA 15 MIN 11/7/2017 KANDY Sultana GO 2    47371721897 HC OT NEUROMUSC RE EDUCATION EA 15 MIN 11/7/2017 KANDY Sultana GO 2               KANDY Sultana  11/7/2017

## 2017-11-08 LAB
GLUCOSE BLDC GLUCOMTR-MCNC: 104 MG/DL (ref 70–130)
GLUCOSE BLDC GLUCOMTR-MCNC: 112 MG/DL (ref 70–130)
INR PPP: 1.91 (ref 0.9–1.1)
PROTHROMBIN TIME: 21.2 SECONDS (ref 11.7–14.2)

## 2017-11-08 PROCEDURE — 97532: CPT

## 2017-11-08 PROCEDURE — 97112 NEUROMUSCULAR REEDUCATION: CPT | Performed by: OCCUPATIONAL THERAPIST

## 2017-11-08 PROCEDURE — 97110 THERAPEUTIC EXERCISES: CPT

## 2017-11-08 PROCEDURE — 92526 ORAL FUNCTION THERAPY: CPT

## 2017-11-08 PROCEDURE — 82962 GLUCOSE BLOOD TEST: CPT

## 2017-11-08 PROCEDURE — 85610 PROTHROMBIN TIME: CPT | Performed by: PHYSICAL MEDICINE & REHABILITATION

## 2017-11-08 PROCEDURE — 25010000002 ENOXAPARIN PER 10 MG: Performed by: PHYSICAL MEDICINE & REHABILITATION

## 2017-11-08 PROCEDURE — 97535 SELF CARE MNGMENT TRAINING: CPT | Performed by: OCCUPATIONAL THERAPIST

## 2017-11-08 RX ORDER — VALACYCLOVIR HYDROCHLORIDE 500 MG/1
2000 TABLET, FILM COATED ORAL EVERY 12 HOURS SCHEDULED
Status: COMPLETED | OUTPATIENT
Start: 2017-11-08 | End: 2017-11-08

## 2017-11-08 RX ORDER — BISACODYL 10 MG
10 SUPPOSITORY, RECTAL RECTAL DAILY PRN
Status: DISCONTINUED | OUTPATIENT
Start: 2017-11-08 | End: 2017-11-14

## 2017-11-08 RX ORDER — WARFARIN SODIUM 10 MG/1
10 TABLET ORAL
Status: DISCONTINUED | OUTPATIENT
Start: 2017-11-08 | End: 2017-11-09

## 2017-11-08 RX ORDER — SENNA AND DOCUSATE SODIUM 50; 8.6 MG/1; MG/1
2 TABLET, FILM COATED ORAL 2 TIMES DAILY
Status: DISCONTINUED | OUTPATIENT
Start: 2017-11-08 | End: 2017-11-14 | Stop reason: HOSPADM

## 2017-11-08 RX ADMIN — ATORVASTATIN CALCIUM 80 MG: 80 TABLET, FILM COATED ORAL at 21:02

## 2017-11-08 RX ADMIN — ALLOPURINOL 200 MG: 100 TABLET ORAL at 07:50

## 2017-11-08 RX ADMIN — ASPIRIN 81 MG: 81 TABLET, CHEWABLE ORAL at 07:50

## 2017-11-08 RX ADMIN — VITAMIN D, TAB 1000IU (100/BT) 1000 UNITS: 25 TAB at 07:51

## 2017-11-08 RX ADMIN — ENOXAPARIN SODIUM 100 MG: 100 INJECTION SUBCUTANEOUS at 21:02

## 2017-11-08 RX ADMIN — MAGNESIUM HYDROXIDE 10 ML: 2400 SUSPENSION ORAL at 12:22

## 2017-11-08 RX ADMIN — DOCUSATE SODIUM -SENNOSIDES 2 TABLET: 50; 8.6 TABLET, COATED ORAL at 16:59

## 2017-11-08 RX ADMIN — WARFARIN SODIUM 10 MG: 10 TABLET ORAL at 17:01

## 2017-11-08 RX ADMIN — VITAMIN D, TAB 1000IU (100/BT) 1000 UNITS: 25 TAB at 16:59

## 2017-11-08 RX ADMIN — Medication 1 TABLET: at 07:50

## 2017-11-08 RX ADMIN — FOLIC ACID 1 MG: 1 TABLET ORAL at 07:50

## 2017-11-08 RX ADMIN — ENOXAPARIN SODIUM 100 MG: 100 INJECTION SUBCUTANEOUS at 07:49

## 2017-11-08 RX ADMIN — Medication 3 MG: at 21:02

## 2017-11-08 RX ADMIN — LEVOTHYROXINE SODIUM 112 MCG: 112 TABLET ORAL at 05:17

## 2017-11-08 RX ADMIN — VALACYCLOVIR 2000 MG: 500 TABLET, FILM COATED ORAL at 15:06

## 2017-11-08 RX ADMIN — VALACYCLOVIR 2000 MG: 500 TABLET, FILM COATED ORAL at 21:03

## 2017-11-08 RX ADMIN — METFORMIN HYDROCHLORIDE 500 MG: 500 TABLET ORAL at 07:50

## 2017-11-08 NOTE — PROGRESS NOTES
Inpatient Rehabilitation Plan of Care Note    Plan of Care  Care Plan Reviewed - No updates at this time.    Safety    Performed Intervention(s)  Safety rounds      Body Systems    Performed Intervention(s)  Blood glucose testing  Monitor labs    Signed by: Daniel Horne RN

## 2017-11-08 NOTE — PROGRESS NOTES
Inpatient Rehabilitation Functional Measures Assessment    Functional Measures  YESI Eating:  Branch  YESI Grooming: Branch  YESI Bathing:  Branch  [facility] Upper Body Dressing:  [facility] Lower Body Dressing:  [facility] Toileting:  [facility] Bladder Management  Level of Assistance:  Gerald  Frequency/Number of Accidents this Shift:  [facility] Bowel Management  Level of Assistance: Gerald  Frequency/Number of Accidents this Shift: Branch    [facility] Bed/Chair/Wheelchair Transfer:  Bed/chair/wheelchair Transfer Score = 5.  Patient is supervision/set-up for transferring to and from the  bed/chair/wheelchair, requiring: Stand by assistance. Patient requires the  following assistive device(s): Walker.  YESI Toilet Transfer:  [facility] Tub/Shower Transfer:  Gerald    Previously Documented Mode of Locomotion at Discharge: Field  YESI Expected Mode of Locomotion at Discharge: [facility] Walk/Wheelchair:  WHEELCHAIR OBSERVATION   Activity was not observed.    WALK OBSERVATION   Walk Distance Scale = 2.  Distance walked is 50 -149 feet. Walk Score = 2.  Patient performs 75% or more of effort and requires minimal assistance.  Incidental assistance, contact guard or steadying was provided. Patient walked a  distance of 80 feet. Patient requires the following assistive device(s): Rolling  walker.  YESI Stairs:  Activity was not observed.    YESI Comprehension:  Branch  YESI Expression:  Branch  [facility] Social Interaction:  [facility] Problem Solving:  [facility] Memory:  Gerald    Therapy Mode Minutes  Occupational Therapy: Gerald  Physical Therapy: Individual: 60 minutes.  Speech Language Pathology:  Gerald    Signed by: Nereida Llamas PT

## 2017-11-08 NOTE — PROGRESS NOTES
Inpatient Rehabilitation Functional Measures Assessment    Functional Measures  YESI Eating:  [facility] Grooming: [facility] Bathing:  [facility] Upper Body Dressing:  [facility] Lower Body Dressing:  [facility] Toileting:  [facility] Bladder Management  Level of Assistance:  [place]  Frequency/Number of Accidents this Shift:  [facility] Bowel Management  Level of Assistance: [place]  Frequency/Number of Accidents this Shift: [facility] Bed/Chair/Wheelchair Transfer:  [facility] Toilet Transfer:  [facility] Tub/Shower Transfer:  [place]    Previously Documented Mode of Locomotion at Discharge: Field  YESI Expected Mode of Locomotion at Discharge: [facility] Walk/Wheelchair:  [facility] Stairs:  [facility] Comprehension:  Auditory comprehension is the usual mode. Comprehension  Score = 7, Independent.  Patient comprehends complex/abstract information in  their primary language.  Patient is completely independent for auditory  comprehension.  There are no activity limitations.  YESI Expression:  Vocal expression is the usual mode. Expression Score = 6,  Modified Independent.  Patient expresses complex/abstract information in their  primary language with only mild difficulty with tasks.  YESI Social Interaction:  Social Interaction Score = 6, Modified Independent.  Patient is modified independent for social interaction, requiring: Requires  additional time.  YESI Problem Solving:  Problem Solving Score = 7, Independent.  Patient makes  appropriate decisions in order to solve complex problems.  Patient is completely  independent for problem solving.  There are no activity limitations.  YESI Memory:  Memory Score = 6, Modified [place].  Patient is modified  independent for memory, having only mild difficulty and using self-initiated or  environmental cues to remember.    Therapy Mode Minutes  Occupational Therapy: Branch  Physical Therapy: [place]  Speech Language Pathology:  Branch    Signed by: Daniel  Stim, RN

## 2017-11-08 NOTE — PLAN OF CARE
Problem: Patient Care Overview (Adult)  Goal: Plan of Care Review    11/08/17 1501   Coping/Psychosocial Response Interventions   Plan Of Care Reviewed With patient   Patient Care Overview   Progress improving   Outcome Evaluation   Outcome Summary/Follow up Plan Pt progressing towards goals w transfers and ambulation. Gait deviations continue to compromise pts dynamic standing balance w ambulation.         Problem: Inpatient Physical Therapy  Goal: Bed Mobility Goal LTG- PT  Outcome: Ongoing (interventions implemented as appropriate)    11/01/17 1221 11/08/17 1501   Bed Mobility PT LTG   Bed Mobility PT LTG, Date Established 11/01/17 --    Bed Mobility PT LTG, Time to Achieve 2 wks --    Bed Mobility PT LTG, Activity Type supine to sit/sit to supine --    Bed Mobility PT LTG, [place] Level independent --    Bed Mobility PT LTG, Date Goal Reviewed --  11/08/17   Bed Mobility PT LTG, Outcome --  goal ongoing       Goal: Transfer Training Goal 1 LTG- PT  Outcome: Outcome(s) achieved Date Met:  11/08/17 11/08/17 1501   Transfer Training PT LTG   Transfer Training PT LTG, Date Goal Reviewed 11/08/17   Transfer Training PT LTG, Outcome goal met       Goal: Transfer Training Goal 2 LTG- PT  Outcome: Ongoing (interventions implemented as appropriate)    11/01/17 1221 11/08/17 1501   Transfer Training 2 PT LTG   Transfer Training PT 2 LTG, Date Established 11/01/17 --    Transfer Training PT 2 LTG, Time to Achieve 2 wks --    Transfer Training PT 2 LTG, Activity Type other (see comments)  (car transfer) --    Transfer Training PT 2 LTG, [place] Level supervision required --    Transfer Training PT 2 LTG, Assist Device walker, rolling --    Transfer Training PT 2 LTG, Date Goal Reviewed --  11/08/17   Transfer Training PT 2 LTG, Outcome --  goal ongoing       Goal: Gait Training Goal LTG- PT  Outcome: Ongoing (interventions implemented as appropriate)    11/01/17 1221 11/08/17 1501   Gait Training PT LTG   Gait  Training Goal PT LTG, Date Established 11/01/17 --    Gait Training Goal PT LTG, Time to Achieve 2 wks --    Gait Training Goal PT LTG, [place] Level supervision required --    Gait Training Goal PT LTG, Assist Device walker, rolling --    Gait Training Goal PT LTG, Distance to Achieve 160 --    Gait Training Goal PT LTG, Date Goal Reviewed --  11/08/17   Gait Training Goal PT LTG, Outcome --  goal ongoing       Goal: Stair Training Goal LTG- PT  Outcome: Ongoing (interventions implemented as appropriate)    11/01/17 1221 11/08/17 1501   Stair Training PT LTG   Stair Training Goal PT LTG, Date Established 11/01/17 --    Stair Training Goal PT LTG, Time to Achieve 2 wks --    Stair Training Goal PT LTG, Number of Steps 4 --    Stair Training Goal PT LTG, [place] Level supervision required --    Stair Training Goal PT LTG, Assist Device 2 handrails --    Stair Training Goal PT LTG, Date Goal Reviewed --  11/08/17   Stair Training Goal PT LTG, Outcome --  goal ongoing       Goal: Patient Education Goal LTG- PT  Outcome: Ongoing (interventions implemented as appropriate)    11/01/17 1221 11/08/17 1501   Patient Education PT LTG   Patient Education PT LTG, Date Established 11/01/17 --    Patient Education PT LTG, Time to Achieve 2 wks --    Patient Education PT LTG, Education Type HEP --    Patient Education PT LTG, Education Understanding demonstrate adequately --    Patient Education PT LTG, Date Goal Reviewed --  11/08/17   Patient Education PT LTG Outcome --  goal ongoing

## 2017-11-08 NOTE — THERAPY TREATMENT NOTE
Inpatient Rehabilitation - Occupational Therapy Treatment Note  [facility]     Patient Name: Alice Maxwell  : 1945  MRN: [id number]  Today's Date: 2017  Onset of Illness/Injury or Date of Surgery Date: 10/28/17  Date of Referral to OT: 17  Referring Physician: Jeremie      Admit Date: 10/31/2017    Visit Dx:     ICD-10-CM ICD-9-CM   1. Oropharyngeal dysphagia R13.12 787.22     Patient Active Problem List   Diagnosis   • Stroke             Adult Rehabilitation Note       17 1539 17 1330 17 1152    Rehab Assessment/Intervention    Discipline occupational therapist  - speech language pathologist  -KB occupational therapist  -    Document Type therapy note (daily note)  -KP therapy note (daily note)  -KB therapy note (daily note)  -    Subjective Information agree to therapy;no complaints  -KP no complaints;agree to therapy  -KB agree to therapy;no complaints  -KP    Patient Effort, Rehab Treatment good  -KP good  -KB good  -KP    Symptoms Noted During/After Treatment none  -KP none  -KB none  -KP    Precautions/Limitations fall precautions;swallowing precautions  -KP fall precautions;swallowing precautions  -KB fall precautions;swallowing precautions  -KP    Recorded by [] Vero York OTR [KB] Katherine L Bruton [] KANDY Sultana    Pain Assessment    Pain Assessment No/denies pain  -KP No/denies pain  -KB No/denies pain  -KP    Pain Score 0  -KP  0  -KP    Recorded by [] Vero York OTR [KB] Katherine L Bruton [] KANDY Sultana    Vision Assessment/Intervention    Visual Impairment visual impairment, left  -KP WFL with corrective lenses  -KB visual impairment, left  -KP    Recorded by [] KANDY Sultana [KB] Katherine L Bruton [] KANDY Sultana    Cognitive Assessment/Intervention    Current Cognitive/Communication Assessment impaired  -KP  impaired  -KP    Orientation Status  person;place;time;oriented to  -  oriented to;person;place;time  -    Follows Commands/Answers Questions 100% of the time;able to follow single-step instructions;needs cueing  -  100% of the time;able to follow single-step instructions;needs cueing  -    Personal Safety mild impairment  -KP  mild impairment  -KP    Personal Safety Interventions fall prevention program maintained;gait belt;nonskid shoes/slippers when out of bed  -  fall prevention program maintained;gait belt;nonskid shoes/slippers when out of bed  -KP    Recorded by [KP] Vero York, OTR  [KP] Vero York, OTR    Improve functional problem solving    Improve functional problem solving through:  complete high level reasoning task  -KB     Status: Improve functional problem solving  Progressing as expected  -KB     Functional Problem Solving Progress  80%;without cues;100%;with inconsistent cues  -KB     Comments: Improve functional problem solving  Completed two high-level logic puzzles with MIN cues only.  -KB     Recorded by  [KB] Katherine L Bruton     Improve closure at entrance to airway    To improve closure at entrance to airway, patient will:  --   pushing/pulling exercises  -KB     Status: Improve closure at entrance to airway  Progressing as expected  -KB     Closure at Entrance to Airway Progress  continue to adress  -KB     Comments: Improve closure at entrance to airway  Initial cues for how to complete exercises.  -KB     Recorded by  [KB] Katherine L Bruton     Improve tongue base & pharyngeal wall squeeze    To improve tongue base & pharyngeal wall squeeze, patient will:  --   Practiced k/g word list forcefully  -KB     Status: Improve tongue base & pharyngeal wall squeeze  Progressing as expected  -KB     Tongue Base/Pharyngeal Wall Squeeze Progress  continue to adress  -KB     Comments: Improve tongue base & pharyngeal wall squeeze  No cues needed, pt repeated word list x5.   -KB     Recorded by  [KB]  Katherine L Bruton     Bed Mobility, Assessment/Treatment    Bed Mobility, Comment up in chair  -KP  pt up in wc  -KP    Recorded by [] Vero York OTR  [KP] Vero York OTR    Transfer Assessment/Treatment    Transfers, Sit-Stand [place]   supervision required  -KP    Transfers, Stand-Sit [place]   supervision required  -KP    Transfers, Sit-Stand-Sit, Assist Device   other (see comments)   wc  -KP    Walk-In Shower Transfer, [place]   set up required;supervision required  -KP    Walk-In Shower Transfer, Assist Device   other (see comments)   tub bench  -KP    Recorded by   [KP] Vero York OTR    Upper Body Bathing Assessment/Training    UB Bathing Assess/Train Assistive Device   hand-held shower head;tub bench  -KP    UB Bathing Assess/Train, Position   sitting  -KP    UB Bathing Assess/Train, [place] Level   set up required;stand by assist  -    Recorded by   [] Vero York OTR    Lower Body Bathing Assessment/Training    LB Bathing Assess/Train Assistive Device   grab bars;hand-held shower head;tub bench  -KP    LB Bathing Assess/Train, Position   sitting;standing  -KP    LB Bathing Assess/Train, [place] Level   set up required;stand by assist  -KP    Recorded by   [] Vero York OTR    Upper Body Dressing Assessment/Training    UB Dressing Assess/Train, Clothing Type   doffing:;donning:;t-shirt  -KP    UB Dressing Assess/Train, Position   sitting  -KP    UB Dressing Assess/Train, [place]   set up required;supervision required  -KP    Recorded by   [] Vero York OTR    Lower Body Dressing Assessment/Training    LB Dressing Assess/Train, Clothing Type   doffing:;donning:;socks;shoes;pants  -KP    LB Dressing Assess/Train, Position   sitting;standing  -KP    LB Dressing Assess/Train, [place]   set up required;supervision required  -KP    LB Dressing Assess/Train, Comment   holds onto grab bar  intermittently  -KP    Recorded by   [KP] KANDY Sultana    Grooming Assessment/Training    Grooming Assess/Train, Position   sitting  -KP    Grooming Assess/Train, Indep[place] Level   set up required;supervision required  -KP    Recorded by   [KP] KANDY Sultana    Balance Skills Training    Sitting-Level of Assistance Distant supervision  -KP  Close supervision;Distant supervision  -KP    Sitting-Balance Support Feet supported  -KP  Feet supported  -KP    Standing-Level of Assistance   Close supervision  -KP    Recorded by [KP] Vero York OTR  [KP] Vero York OTR    Fine Motor Coordination Training    Detail (Fine Motor Coordination Training) pulls pegs from yellow resistive putty using one hand, using L hand to inc FMC and hand/digit strength. rotates wooden block up dowel [place] w. pegs to inc UE coordination w. min difficulty. strings foam beads on lace w. R hand holding string and L hand pushing bead on and vice versus to inc FMC and BLC  -KP      Recorded by [KP] KANDY Sultana      Positioning and Restraints    Pre-Treatment Position sitting in chair/recliner  -KP  sitting in chair/recliner  -KP    Post Treatment Position wheelchair  -KP  wheelchair  -KP    In Wheelchair sitting;with PT  -KP  sitting;call light within reach;encouraged to call for assist  -KP    Recorded by [BRIANNA] KANDY Sultana  [KP] KANDY Sultana      11/08/17 1114 11/08/17 1030 11/07/17 1532    Rehab Assessment/Intervention    Discipline physical therapist  -MD speech language pathologist  -KB occupational therapist  -KP    Document Type therapy note (daily note)  -MD therapy note (daily note)  -ANIYA therapy note (daily note)  -KP    Subjective Information agree to therapy;no complaints  -MD no complaints;agree to therapy  -ANIYA agree to therapy;no complaints  -KP    Patient Effort, Rehab Treatment good  -MD good  -KB good  -KP    Symptoms Noted During/After Treatment  none  -MD none  -KB none  -KP    Precautions/Limitations fall precautions  -MD fall precautions;swallowing precautions  -KB fall precautions;swallowing precautions  -KP    Precautions/Limitations, Vision  WFL with corrective lenses  -KB     Equipment Issued to Patient gait belt;front wheeled walker  -MD      Recorded by [MD] Nereida Llamas, PT [KB] Katherine L Bruton [] Vero York, OTR    Pain Assessment    Pain Assessment No/denies pain  -MD 0-10  -KB No/denies pain  -KP    Post Pain Score  7  -KB     Pain Location  Toe (Comment which one)  -KB     Pain Orientation  Left;Right  -KB     Pain Intervention(s)  Emotional support   Pt did not want medications.  -KB     Recorded by [MD] Nereida Llamas, PT [KB] Katherine L Bruton [] Vero York, OTR    Vision Assessment/Intervention    Visual Impairment visual impairment, left  -MD WFL with corrective lenses  -KB visual impairment, left  -KP    Recorded by [MD] Nereida Llamas, PT [KB] Katherine L Bruton [] Vero York, OTR    Cognitive Assessment/Intervention    Current Cognitive/Communication Assessment impaired  -MD  impaired  -KP    Orientation Status oriented to;person;place;time  -MD  oriented to;person;place;time  -KP    Follows Commands/Answers Questions 100% of the time;needs cueing;needs repetition  -MD  100% of the time;able to follow single-step instructions;needs cueing  -KP    Personal Safety impulsive;decreased insight to deficits  -MD  mild impairment  -KP    Personal Safety Interventions fall prevention program maintained;gait belt;muscle strengthening facilitated;nonskid shoes/slippers when out of bed;supervised activity  -MD  fall prevention program maintained;gait belt;muscle strengthening facilitated;nonskid shoes/slippers when out of bed  -    Recorded by [MD] Nereida Llamas, PT  [KP] Vero York, OTR    Improve functional problem solving    Improve functional problem solving through:  complete high level reasoning task   -KB     Status: Improve functional problem solving  Progressing as expected  -KB     Functional Problem Solving Progress  80%;without cues;100%;with inconsistent cues  -KB     Comments: Improve functional problem solving  Pt completed high-level logic puzzle with MIN verbal cues for attention to detail.   -KB     Recorded by  [KB] Katherine L Bruton     Improve oral skills    To Improve Oral Skills, patient will:  --   pucker/smile, pucker left/right, suck in/puff out cheeks  -KB     Status: Improve Oral Skills  Progressing as expected  -KB     Oral Skills Progress  continue to adress  -KB     Comments: Improve Oral Skills  Completed exercises without cues.  -KB     Recorded by  [KB] Katherine L Bruton     Improve closure at entrance to airway    To improve closure at entrance to airway, patient will:  --   hard swallows  -KB     Status: Improve closure at entrance to airway  Progressing as expected  -KB     Closure at Entrance to Airway Progress  continue to adress  -KB     Comments: Improve closure at entrance to airway  Completed daily exercises to address airway closure without cues.  -KB     Recorded by  [KB] Katherine L Bruton     Improve laryngeal elevation    To improve laryngeal elevation, patient will:  --   pitch glide, high pitch vowel, humming in falsetto  -KB     Status: Improve laryngeal elevation  Progressing as expected  -KB     Laryngeal Elevation Progress  continue to adress  -KB     Comments: Improve laryngeal elevation  Completed exercises to addres laryngeal elevation without cues.   -KB     Recorded by  [KB] Katherine L Bruton     Improve tongue base & pharyngeal wall squeeze    To improve tongue base & pharyngeal wall squeeze, patient will:  Complete effortful swallow;Complete gargle/hold retraction  -KB     Status: Improve tongue base & pharyngeal wall squeeze  Progressing as expected  -KB     Tongue Base/Pharyngeal Wall Squeeze Progress  continue to adress  -KB     Comments: Improve tongue  base & pharyngeal wall squeeze  Completed exercises without cues.  -KB     Recorded by  [KB] Katherine L Bruton     Bed Mobility, Assessment/Treatment    Bed Mob, Supine to Sit, [place] supervision required  -MD  supervision required;set up required  -    Bed Mob, Sit to Supine, [place] supervision required  -MD  not tested  -KP    Recorded by [MD] Nereida Llamas, PT  [KP] Vero York, SARAR    Transfer Assessment/Treatment    Transfers, Bed-Chair [place]   stand by assist  -KP    Transfers, Chair-Bed [place]   not tested  -KP    Transfers, Bed-Chair-Bed, Assist Device   other (see comments)   wc  -KP    Transfers, Sit-Stand [place] stand by assist  -MD  stand by assist  -KP    Transfers, Stand-Sit [place] stand by assist  -MD  stand by assist  -KP    Transfers, Sit-Stand-Sit, Assist Device rolling walker  -MD  other (see comments)   wc  -KP    Transfer, Safety Issues step length decreased  -MD      Transfer, Impairments strength decreased;impaired balance  -MD      Recorded by [MD] Nereida Llamas, PT  [KP] Vero York, SARAR    Gait Assessment/Treatment    Gait, [place] Level verbal cues required;contact guard assist  -MD      Gait, Assistive Device rolling walker  -MD      Gait, Distance (Feet) 80   x3  -MD      Gait, Gait Deviations bilateral:;janet decreased;forward flexed posture;step length decreased  -MD      Gait, Safety Issues sequencing ability decreased  -MD      Gait, Impairments impaired balance;strength decreased  -MD      Recorded by [MD] Nereida Llamas, PT      Balance Skills Training    Sitting-Level of Assistance   Close supervision;Distant supervision  -    Sitting-Balance Support   Feet supported  -    Standing-Level of Assistance   Close supervision  -    Static Standing Balance Support   Right upper extremity supported  -    Recorded by   [KP] Vero York, SARAR    Therapy Exercises    Right Lower Extremity AROM:;10 reps;sidelying;hip  abduction/adduction   x3 sets  -MD      Left Lower Extremity AROM:;10 reps;sidelying;clam shell   x3 sets  -MD      Bilateral Lower Extremities AROM:;20 reps;ankle pumps/circles;hip abduction/adduction;hip flexion;LAQ  -MD      BLE Other Reps --   Nustep at level 2 for 4 min  -MD      BUE Resistance   theraputty   pinches yellow putty w. each digit and thumb  -KP    Trunk Exercises 10 reps;supine;bridging   x3 sets  -MD      Recorded by [MD] Nereida Llamas, PT  [KP] Vero York, OTR    Fine Motor Coordination Training    Detail (Fine Motor Coordination Training)   links paper clip together clipping it on other paper clip w. L hand.  in hand manipulation w. coins in L hand pushing coin up w. L thumb and inserting coin into small slot to inc FMC and in hand manipulation.   -KP    Recorded by   [KP] Vero York, OTR    Positioning and Restraints    Pre-Treatment Position sitting in chair/recliner  -MD  in bed  -KP    Post Treatment Position wheelchair  -MD  wheelchair  -KP    In Wheelchair sitting;call light within reach  -MD  sitting;call light within reach;encouraged to call for assist;with family/caregiver  -KP    Recorded by [MD] Nereida Llamas, PT  [KP] Vero York, OTR      11/07/17 1201 11/07/17 1100 11/07/17 1030    Rehab Assessment/Intervention    Discipline occupational therapist  -KP speech language pathologist  -KB speech language pathologist  -KB    Document Type therapy note (daily note)  -KP therapy note (daily note)  -KB therapy note (daily note)  -KB    Subjective Information agree to therapy;no complaints  -KP no complaints;agree to therapy  -KB no complaints;agree to therapy  -KB    Patient Effort, Rehab Treatment good  -KP good  -KB good  -KB    Symptoms Noted During/After Treatment none  -KP none  -KB none  -KB    Precautions/Limitations fall precautions;swallowing precautions  -KP fall precautions;swallowing precautions  -KB fall precautions;swallowing precautions  -KB     Recorded by [KP] Vero York, OTR [KB] Katherine L Bruton [KB] Katherine L Bruton    Pain Assessment    Pain Assessment No/denies pain  -KP No/denies pain  -KB No/denies pain  -KB    Recorded by [KP] Vero York, OTR [KB] Katherine L Bruton [KB] Katherine L Bruton    Vision Assessment/Intervention    Visual Impairment visual impairment, left  -KP visual impairment, left;inattention to the left  -KB visual impairment, left;inattention to the left  -KB    Recorded by [KP] Vero York, OTR [KB] Katherine L Bruton [KB] Katherine L Bruton    Cognitive Assessment/Intervention    Current Cognitive/Communication Assessment impaired  -KP      Orientation Status oriented to;person;place;time  -KP      Follows Commands/Answers Questions 100% of the time;able to follow single-step instructions  -      Personal Safety mild impairment  -KP      Personal Safety Interventions fall prevention program maintained;gait belt;nonskid shoes/slippers when out of bed  -KP      Recorded by [KP] Vero York, OTR      Improve functional problem solving    Improve functional problem solving through:   complete high level reasoning task  -KB    Status: Improve functional problem solving   Progressing as expected  -KB    Functional Problem Solving Progress   50%;without cues;100%;with consistent cues  -KB    Comments: Improve functional problem solving   Pt required MOD verbal cues throughout logic puzzle for attention and reasoning.   -KB    Recorded by   [KB] Katherine L Bruton    Improve oral skills    Status: Improve Oral Skills   Progressing as expected  -KB    Oral Skills Progress   80%;with inconsistent cues  -KB    Recorded by   [KB] Katherine L Bruton    Improve closure at entrance to airway    Status: Improve closure at entrance to airway  Progressing as expected  -KB     Closure at Entrance to Airway Progress  continue to adress  -KB     Comments: Improve closure at entrance to airway  Pt  observed with mid-day meal. 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   -KB     Recorded by  [KB] Katherine L Bruton     Improve laryngeal elevation    Status: Improve laryngeal elevation   Progressing as expected  -KB    Laryngeal Elevation Progress   60%;with inconsistent cues;continue to adress  -KB    Comments: Improve laryngeal elevation   Pt completing exercises in therapy session as prescribed.   -KB    Recorded by   [KB] Katherine L Bruton    Improve tongue base & pharyngeal wall squeeze    Status: Improve tongue base & pharyngeal wall squeeze   Progressing as expected  -KB    Tongue Base/Pharyngeal Wall Squeeze Progress   60%;with inconsistent cues;continue to adress  -KB    Comments: Improve tongue base & pharyngeal wall squeeze   Pt completing exercises in therapy sessions as prescribed.   -KB    Recorded by   [KB] Katherine L Bruton    Bed Mobility, Assessment/Treatment    Bed Mobility, Comment up in   -      Recorded by [KP] KANDY Sultana      Transfer Assessment/Treatment    Transfers, Sit-Stand [place] stand by assist  -      Transfers, Stand-Sit [place] stand by assist  -      Transfers, Sit-Stand-Sit, Assist Device other (see comments)    grab bar  -      Transfer, Comment doesn't wantn to shower today. her hair is was fixed last night by [facility]  -      Recorded by [KP] Vero York OTR      Upper Body Bathing Assessment/Training    UB Bathing Assess/Train, Position sitting;sink side  -      UB Bathing Assess/Train, [place] Level stand by assist;set up required  -      Recorded by [KP] KANDY Sultana      Lower Body Bathing Assessment/Training    LB Bathing Assess/Train, Position sitting;standing  -      LB Bathing Assess/Train, [place] Level set up required;stand by assist  -      Recorded by [KP] Vero York, SARAR      Upper Body Dressing  Assessment/Training    UB Dressing Assess/Train, Clothing Type donning:;doffing:;t-shirt  -KP      UB Dressing Assess/Train, Position sitting  -KP      UB Dressing Assess/Train, [place] supervision required;set up required  -KP      Recorded by [KP] Vero York OTR      Lower Body Dressing Assessment/Training    LB Dressing Assess/Train, Clothing Type doffing:;donning:;pants;socks;shoes  -KP      LB Dressing Assess/Train, Position sitting;standing  -KP      LB Dressing Assess/Train, [place] set up required;supervision required  -KP      Recorded by [KP] Vero York OTR      Grooming Assessment/Training    Grooming Assess/Train, Position sitting;sink side  -KP      Grooming Assess/Train, [place] Level supervision required;set up required  -KP      Recorded by [KP] Vero York OTR      Balance Skills Training    Sitting-Level of Assistance Close supervision;Distant supervision  -KP      Sitting-Balance Support Feet supported  -KP      Standing-Level of Assistance Close supervision  -KP      Static Standing Balance Support Left upper extremity supported  -KP      Recorded by [KP] KANDY Sultana      Positioning and Restraints    Pre-Treatment Position sitting in chair/recliner  -KP      Post Treatment Position wheelchair  -KP      In Wheelchair sitting   in dining room waiting for RT  -KP      Recorded by [KP] KANDY Sultana        11/07/17 0819 11/06/17 1550 11/06/17 1330    Rehab Assessment/Intervention    Discipline physical therapist  -MD occupational therapist  -KP speech language pathologist  -OC    Document Type therapy note (daily note)  -MD therapy note (daily note)  -KP therapy note (daily note)  -OC    Subjective Information agree to therapy;no complaints  -MD complains of;agree to therapy;fatigue  -KP no complaints;agree to therapy  -OC    Patient Effort, Rehab Treatment good  -MD good  -KP good  -OC    Symptoms Noted During/After Treatment  none  -MD none  -KP none  -OC    Precautions/Limitations fall precautions  -MD fall precautions  -KP     Equipment Issued to Patient gait belt;front wheeled walker  -MD      Recorded by [MD] Nereida Llamas PT [KP] Vero York, OTR [OC] Lilian Mohr MA,CCC-SLP    Pain Assessment    Pain Assessment No/denies pain  -MD No/denies pain  -KP No/denies pain  -OC    Recorded by [MD] Nereida Llamas PT [KP] Vero York, OTR [OC] Lilian Mohr MA,CCC-SLP    Vision Assessment/Intervention    Visual Impairment visual impairment, left;inattention to the left  -MD visual impairment, left;inattention to the left  -KP     Recorded by [MD] Nereida Llamas PT [KP] Vero York, SARAR     Cognitive Assessment/Intervention    Current Cognitive/Communication Assessment impaired  -MD impaired  -KP impaired  -OC    Orientation Status oriented to;person  -MD oriented to;person;place;time  -KP oriented to;person;place;time  -OC    Follows Commands/Answers Questions 100% of the time;needs cueing;needs repetition  -% of the time;able to follow single-step instructions  -% of the time;able to follow multi-step instructions;needs cueing;able to follow single-step instructions;needs increased time  -OC    Personal Safety decreased insight to deficits  -MD mild impairment  -KP     Personal Safety Interventions fall prevention program maintained;gait belt;muscle strengthening facilitated;nonskid shoes/slippers when out of bed;supervised activity  -MD fall prevention program maintained;gait belt;muscle strengthening facilitated;nonskid shoes/slippers when out of bed  -KP     Recorded by [MD] Nereida Llamas, PT [KP] Vero York, OTR [OC] Lilian Mohr MA,CCC-SLP    Improve executive function skills    Status: Improve executive function skills   Progressing as expected  -OC    Executive Function Skills Progress   20%;without cues;70%;with consistent cues   organization task of dates/holidays  -OC    Comments: Improve  executive function skills   Pt demonstrated difficulty with cell phone this date  -OC    Recorded by   [OC] Lilian Mohr MA,CCC-SLP    Improve oral skills    Status: Improve Oral Skills   Progressing as expected  -OC    Oral Skills Progress   70%;with inconsistent cues  -OC    Recorded by   [OC] Lilian Mohr MA,CCC-SLP    Improve laryngeal elevation    Status: Improve laryngeal elevation   Progressing as expected  -OC    Laryngeal Elevation Progress   60%;with inconsistent cues  -OC    Recorded by   [OC] Lilian Mohr MA,CCC-SLP    Improve tongue base & pharyngeal wall squeeze    Status: Improve tongue base & pharyngeal wall squeeze   Progressing as expected  -OC    Tongue Base/Pharyngeal Wall Squeeze Progress   60%;with inconsistent cues  -OC    Recorded by   [OC] Lilian Mohr MA,CCC-SLP    Bed Mobility, Assessment/Treatment    Bed Mob, Supine to Sit, [place] not tested  -MD not tested  -     Bed Mob, Sit to Supine, [place] not tested  -MD set up required;supervision required  -     Recorded by [MD] eNreida Llamas, PT [KP] Vero York, OTR     Transfer Assessment/Treatment    Transfers, Sit-Stand [place] verbal cues required;stand by assist  -MD set up required;supervision required;stand by assist  -KP     Transfers, Stand-Sit [place] verbal cues required;stand by assist  -MD stand by assist  -     Transfers, Sit-Stand-Sit, Assist Device rolling walker  -MD bed rails  -     Transfer, Safety Issues step length decreased  -MD      Transfer, Impairments strength decreased;impaired balance  -MD      Recorded by [MD] Nereida Llamas, PT [KP] Vero York, OTR     Gait Assessment/Treatment    Gait, [place] Level verbal cues required;contact guard assist  -MD      Gait, Assistive Device rolling walker  -MD      Gait, Distance (Feet) 80   x3  -MD      Gait, Gait Deviations bilateral:;janet decreased;forward flexed posture;step length decreased;toe-to-floor clearance  decreased;weight-shifting ability decreased  -MD      Gait, Safety Issues step length decreased  -MD      Gait, Impairments impaired balance;strength decreased  -MD      Recorded by [MD] Nereida Llamas PT      Stairs Assessment/Treatment    Number of Stairs 4  -MD      Stairs, Handrail Location both sides  -MD      Stairs, [place] Level supervision required  -MD      Stairs, Technique Used step over step (ascending);step to step (descending)  -MD      Stairs, Impairments strength decreased;impaired balance  -MD      Recorded by [MD] Nereida Llamas PT      Balance Skills Training    Standing-Level of Assistance  Close supervision  -KP     Static Standing Balance Support  Right upper extremity supported  -KP     Recorded by  [KP] Vero York, SARAR     Therapy Exercises    Bilateral Lower Extremities --   nustep 7 min level 2  -MD      Left Upper Extremity  AROM:;15 reps;sitting;elbow flexion/extension;shoulder extension/flexion  -KP     Recorded by [MD] Nereida Llamas PT [KP] Vero York, SARAR     Fine Motor Coordination Training    Detail (Fine Motor Coordination Training)  graps pennies w. L hand using index and thumb and inserting into small slot to inc FMC w. min difficulty. 2pt pinch on sq pegs and inserts them into board x30 to inc FMC no difficulty. Graps small colored pegs to complete peg design w. very little difficulty.  -KP     Recorded by  [KP] Vero York, KANDY     Positioning and Restraints    Pre-Treatment Position sitting in chair/recliner  -MD sitting in chair/recliner  -KP     Post Treatment Position wheelchair  -MD bed  -KP     In Bed  supine;call light within reach;exit alarm on;encouraged to call for assist  -KP     In Wheelchair patient within staff view   in DR MONTIEL      Recorded by [MD] Nereida Llamas, PT [KP] Vero York, OTR       11/06/17 1203 11/06/17 1116       Rehab Assessment/Intervention    Discipline occupational therapist  -BRIANNA physical therapist  -MD      Document Type therapy note (daily note)  - therapy note (daily note)  -MD     Subjective Information agree to therapy;no complaints  -KP agree to therapy;no complaints  -MD     Patient Effort, Rehab Treatment good  -KP good  -MD     Symptoms Noted During/After Treatment none  -KP none  -MD     Precautions/Limitations fall precautions  -KP fall precautions  -MD     Equipment Issued to Patient  gait belt;front wheeled walker  -MD     Recorded by [] Vero York OTR [MD] Nereida Llamas PT     Pain Assessment    Pain Assessment No/denies pain  -KP No/denies pain  -MD     Recorded by [KP] Vero York OTR [MD] Nereida Llamas PT     Vision Assessment/Intervention    Visual Impairment  visual impairment, left;inattention to the left;left neglect  -MD     Recorded by  [MD] Nereida Llamas PT     Cognitive Assessment/Intervention    Current Cognitive/Communication Assessment impaired  -KP impaired  -MD     Orientation Status oriented to;person;place  - oriented to;person;place  -MD     Follows Commands/Answers Questions 100% of the time;able to follow single-step instructions  - 100% of the time;needs cueing;needs repetition;needs increased time  -MD     Personal Safety mild impairment  - decreased insight to deficits  -MD     Personal Safety Interventions fall prevention program maintained;gait belt;nonskid shoes/slippers when out of bed  - fall prevention program maintained;gait belt;muscle strengthening facilitated;nonskid shoes/slippers when out of bed;supervised activity  -MD     Recorded by [] Vero York OTR [MD] Nereida Llamas PT     Bed Mobility, Assessment/Treatment    Bed Mob, Supine to Sit, Bee  not tested  -MD     Bed Mob, Sit to Supine, Bee  not tested  -MD     Bed Mobility, Comment pt up in   -KP      Recorded by [] Vero York OTR [MD] Nereida Llamas PT     Transfer Assessment/Treatment    Transfers, Sit-Stand [place] stand by assist;contact guard  assist  -KP contact guard assist  -MD     Transfers, Stand-Sit [place] set up required;stand by assist  -KP contact guard assist  -MD     Transfers, Sit-Stand-Sit, Assist Device other (see comments)   wc and grab bars  -KP rolling walker  -MD     Walk-In Shower Transfer, [place] set up required;contact guard assist;supervision required  -KP      Walk-In Shower Transfer, Assist Device other (see comments)   emy bench  -KP      Recorded by [KP] Vero York OTR [MD] Nereida Llamas PT     Gait Assessment/Treatment    Gait, [place] Level  verbal cues required;contact guard assist  -MD     Gait, Assistive Device  rolling walker  -MD     Gait, Distance (Feet)  80  -MD     Gait, Gait Deviations  bilateral:;janet decreased;forward flexed posture;step length decreased;toe-to-floor clearance decreased  -MD     Gait, Safety Issues  step length decreased  -MD     Gait, Impairments  impaired balance;strength decreased  -MD     Gait, Comment  20` on carpet CGA, RWx  -MD     Recorded by  [MD] Nereida Llamas PT     Stairs Assessment/Treatment    Number of Stairs  4  -MD     Stairs, Handrail Location  both sides  -MD     Stairs, [place] Level  verbal cues required;contact guard assist  -MD     Stairs, Technique Used  step over step (ascending);step over step (descending)  -MD     Stairs, Impairments  strength decreased;impaired balance  -MD     Recorded by  [MD] Nereida Llamas PT     Upper Body Bathing Assessment/Training    UB Bathing Assess/Train Assistive Device hand-held shower head;tub bench  -      UB Bathing Assess/Train, Position sitting  -      UB Bathing Assess/Train, [place] Level stand by assist;set up required  -      Recorded by [KP] Vero York, OTR      Lower Body Bathing Assessment/Training    LB Bathing Assess/Train Assistive Device hand-held shower head;grab bars;tub bench  -KP      LB Bathing Assess/Train, Position sitting;standing  -      LB Bathing Assess/Train,  [place] Level set up required;stand by assist  -KP      Recorded by [KP] Vero York OTR      Upper Body Dressing Assessment/Training    UB Dressing Assess/Train, Clothing Type donning:;doffing:;t-shirt  -KP      UB Dressing Assess/Train, Position sitting  -KP      UB Dressing Assess/Train, [place] set up required;supervision required  -KP      Recorded by [KP] Vero York OTR      Lower Body Dressing Assessment/Training    LB Dressing Assess/Train, Clothing Type doffing:;donning:;pants;slipper socks  -KP      LB Dressing Assess/Train, Position sitting;standing  -KP      LB Dressing Assess/Train, [place] set up required;contact guard assist;supervision required  -KP      Recorded by [KP] Vero York OTR      Grooming Assessment/Training    Grooming Assess/Train, Position sitting  -KP      Grooming Assess/Train, [place] Level set up required;supervision required  -KP      Recorded by [KP] Vero York OTR      Balance Skills Training    Sitting-Level of Assistance Close supervision  -KP      Sitting-Balance Support Feet supported  -KP      Standing-Level of Assistance Close supervision;Contact guard  -KP      Static Standing Balance Support Right upper extremity supported   intermittent UE support  -KP      Recorded by [KP] Vero York OTR      Therapy Exercises    BLE Other Reps  --   nustep at level 2 for 6 min  -MD     Recorded by  [MD] Nereida Llamas PT     Positioning and Restraints    Pre-Treatment Position sitting in chair/recliner  -KP sitting in chair/recliner  -MD     Post Treatment Position wheelchair  -KP wheelchair  -MD     In Wheelchair sitting;call light within reach;encouraged to call for assist  -KP with OT  -MD     Recorded by [KP] Vero York OTR [MD] Nereida Llamas PT       User Key  (r) = Recorded By, (t) = Taken By, (c) = Cosigned By    Initials Name Effective Dates    OC Lilian Mohr MA,CCC-SLP 04/05/17 -     BRIANNA Pham  Abdirashid York, OTR 04/13/15 -     MD Nereida Llamas, PT 12/01/15 -     KB Katherine L Bruton 09/29/17 -                 OT Goals       11/01/17 1554 11/01/17 1230       Transfer Training OT STG    Transfer Training OT STG, Date Established  11/01/17  -SO     Transfer Training OT STG, Time to Achieve  1 wk  -SO     Transfer Training OT STG, Activity Type  tub;walk-in shower;shower chair  -SO     Transfer Training OT STG, [place] Level  contact guard assist  -SO     Transfer Training OT STG, Assist Device  walker, rolling;shower chair  -SO     Transfer Training OT LTG    Transfer Training OT LTG, Date Established  11/01/17  -SO     Transfer Training OT LTG, Time to Achieve  by discharge  -SO     Transfer Training OT LTG, Activity Type  tub;walk-in shower;shower chair  -SO     Transfer Training OT LTG, [place] Level  supervision required  -SO     Transfer Training OT LTG, Assist Device  walker, rolling;shower chair  -SO     Transfer Training 2 OT STG    Transfer Training 2 OT STG, Date Established  11/01/17  -SO     Transfer Training 2 OT STG, Time to Achieve  1 wk  -SO     Transfer Training 2 OT STG, Activity Type  toilet  -SO     Transfer Training 2 OT STG, [place] Level  contact guard assist  -SO     Transfer Training 2 OT STG, Assist Device  walker, rolling  -SO     Transfer Training 2 OT LTG    Transfer Training 2 OT LTG, Date Established  11/01/17  -SO     Transfer Training 2 OT LTG, Time to Achieve  by discharge  -SO     Transfer Training 2 OT LTG, Activity Type  toilet  -SO     Transfer Training 2 OT LTG, [place] Level  supervision required  -SO     Transfer Training 2 OT LTG, Assist Device  walker, rolling  -SO     Strength OT LTG    Strength Goal OT LTG, Date Established 11/01/17  -SO      Strength Goal OT LTG, Time to Achieve by discharge  -SO      Strength Goal OT LTG, Measure to Achieve Pt to increase LUE strength to 4-/5 to increase independence with ADLs.  -SO      Caregiver Training OT  LTG    Caregiver Training OT LTG, Date Established  11/01/17 -SO     Caregiver Training OT LTG, Time to Achieve  by discharge  -SO     Caregiver Training OT LTG, [place] Level  able to assist adequately;able to cue patient adequately  -SO     Patient Education OT LTG    Patient Education OT LTG, Date Established  11/01/17 -SO     Patient Education OT LTG, Time to Achieve  by discharge  -SO     Patient Education OT LTG, Education Type  written program;HEP;aware of neuro deficits;home safety  -SO     Patient Education OT LTG, Education Understanding  independent;demonstrates adequately;verbalizes understanding  -SO     Isolated Movement OT LTG    Isolated Movement OT LTG, Date Established 11/01/17 -SO      Isolated Movement OT LTG, Time to Achieve by discharge  -SO      Isolated Movement OT LTG, Body Area left scapula;left shoulder;left elbow;left forearm;left wrist;left fingers  -SO      Isolated Movement OT LTG, Level WFL  -SO      Coordination OT LTG    Coordination OT LTG, Date Established 11/01/17 -SO      Coordination OT LTG, Time to Achieve by discharge  -SO      Coordination OT LTG, Activity Type FM written ex program;GM written ex program;FM task;GM task  -SO      Coordination OT LTG, [place] Level independent  -SO      Coordination OT LTG, Additional Goal LUE  -SO      ADL OT STG    ADL OT STG, Date Established  11/01/17 -SO     ADL OT STG, Time to Achieve  1 wk  -SO     ADL OT STG, Activity Type  ADL skills  -SO     ADL OT STG, [place] Level  contact guard;assistive device  -SO     ADL OT LTG    ADL OT LTG, Date Established  11/01/17  -SO     ADL OT LTG, Time to Achieve  by discharge  -SO     ADL OT LTG, Activity Type  ADL skills  -SO     ADL OT LTG, [place] Level  standby assist;assistive device  -SO       User Key  (r) = Recorded By, (t) = Taken By, (c) = Cosigned By    Initials Name Provider Type    SO Micki Castro, OTR Occupational Therapist          Occupational  Therapy Education     Title: PT OT SLP Therapies (Active)     Topic: Occupational Therapy (Active)     Point: ADL training (Done)    Description: Instruct learner(s) on proper safety adaptation and remediation techniques during self care or transfers.   Instruct in proper use of assistive devices.    Learning Progress Summary    Learner Readiness Method Response Comment Documented by Status   Patient Acceptance E,D EDUARDO,DU ed pt on shower tsf and safety w. tsf. pt demo w. SBA. tub bench and grab bars  11/08/17 1157 Done    Acceptance E,D VU,NR ed pt on shower tsf technique. ed pt on bathing safety. pt demo shower w. CGA to SBA. mostly SBA.  11/06/17 1207 Done    Acceptance E VU Discuss OT goals and POC. SO 11/01/17 1232 Done   Family Acceptance E VU Discuss OT goals and POC. SO 11/01/17 1232 Done               Point: Precautions (Done)    Description: Instruct learner(s) on prescribed precautions during self-care and functional transfers.    Learning Progress Summary    Learner Readiness Method Response Comment Documented by Status   Patient Acceptance E,D VU,NR ed pt on shower tsf technique. ed pt on bathing safety. pt demo shower w. CGA to SBA. mostly SBA.  11/06/17 1207 Done               Point: Body mechanics (Done)    Description: Instruct learner(s) on proper positioning and spine alignment during self-care, functional mobility activities and/or exercises.    Learning Progress Summary    Learner Readiness Method Response Comment Documented by Status   Patient Acceptance E,D EDUARDO,DU ed pt on shower tsf and safety w. tsf. pt demo w. SBA. tub bench and grab bars  11/08/17 1157 Done    Acceptance E,D VU,NR ed pt on shower tsf technique. ed pt on bathing safety. pt demo shower w. CGA to SBA. mostly SBA.  11/06/17 1207 Done                      User Key     Initials Effective Dates Name Provider Type Discipline     04/13/15 -  Micki Castro, OTR Occupational Therapist OT     04/13/15 -  Vero  KANDY Tinsley Occupational Therapist OT                  OT Recommendation and Plan  Therapy Frequency: 5 times/wk          Time Calculation:         Time Calculation- OT       11/08/17 1542 11/08/17 1157       Time Calculation- OT    OT Start Time 1400  -KP 0830  -KP     OT Stop Time 1430  -KP 0900  -     OT Time Calculation (min) 30 min  -KP 30 min  -KP       User Key  (r) = Recorded By, (t) = Taken By, (c) = Cosigned By    Initials Name Provider Type     KANDY Sultana Occupational Therapist           Therapy Charges for Today     Code Description Service Date Service Provider Modifiers Qty    33349705840 HC OT SELF CARE/MGMT/TRAIN EA 15 MIN 11/7/2017 KANDY Sultana GO 2    51881967986 HC OT NEUROMUSC RE EDUCATION EA 15 MIN 11/7/2017 KANDY Sultana GO 2    [id number] HC OT SELF CARE/MGMT/TRAIN EA 15 MIN 11/8/2017 KANDY Sultana GO 2    [id number] HC OT NEUROMUSC RE EDUCATION EA 15 MIN 11/8/2017 KANDY Sultana GO 2               KANDY Sultana  11/8/2017

## 2017-11-08 NOTE — THERAPY TREATMENT NOTE
Inpatient Rehabilitation - Physical Therapy Treatment Note  [facility]     Patient Name: Alice Maxwell  : 1945  MRN: [id number]  Today's Date: 2017  Onset of Illness/Injury or Date of Surgery Date: 10/28/17  Date of Referral to PT: 17  Referring Physician: Jeremie    Admit Date: 10/31/2017    Visit Dx:    ICD-10-CM ICD-9-CM   1. Oropharyngeal dysphagia R13.12 787.22     Patient Active Problem List   Diagnosis   • Stroke               Adult Rehabilitation Note       17 1330 17 1152 17 1114    Rehab Assessment/Intervention    Discipline speech language pathologist  -KB occupational therapist  -KP physical therapist  -MD    Document Type therapy note (daily note)  -KB therapy note (daily note)  -KP therapy note (daily note)  -MD    Subjective Information no complaints;agree to therapy  -KB agree to therapy;no complaints  -KP agree to therapy;no complaints  -MD    Patient Effort, Rehab Treatment good  -KB good  -KP good  -MD    Symptoms Noted During/After Treatment none  -KB none  -KP none  -MD    Precautions/Limitations fall precautions;swallowing precautions  -KB fall precautions;swallowing precautions  -KP fall precautions  -MD    Equipment Issued to Patient   gait belt;front wheeled walker  -MD    Recorded by [KB] Katherine L Bruton [] Vero York OTR [MD] Nereida Llamas, PT    Pain Assessment    Pain Assessment No/denies pain  -KB No/denies pain  -KP No/denies pain  -MD    Pain Score  0  -KP     Recorded by [KB] Katherine L Bruton [] KANDY Sultana [MD] Nereida Llamas, PT    Vision Assessment/Intervention    Visual Impairment WFL with corrective lenses  -KB visual impairment, left  -KP visual impairment, left  -MD    Recorded by [KB] Katherine L Bruton [] Vero York OTR [MD] Nereida Llamas, PT    Cognitive Assessment/Intervention    Current Cognitive/Communication Assessment  impaired  -KP impaired  -MD    Orientation Status  oriented  to;person;place;time  -KP oriented to;person;place;time  -MD    Follows Commands/Answers Questions  100% of the time;able to follow single-step instructions;needs cueing  -% of the time;needs cueing;needs repetition  -MD    Personal Safety  mild impairment  -KP impulsive;decreased insight to deficits  -MD    Personal Safety Interventions  fall prevention program maintained;gait belt;nonskid shoes/slippers when out of bed  -KP fall prevention program maintained;gait belt;muscle strengthening facilitated;nonskid shoes/slippers when out of bed;supervised activity  -MD    Recorded by  [KP] Vero York, OTR [MD] Nereida Llamas, PT    Improve functional problem solving    Improve functional problem solving through: complete high level reasoning task  -KB      Status: Improve functional problem solving Progressing as expected  -KB      Functional Problem Solving Progress 80%;without cues;100%;with inconsistent cues  -KB      Comments: Improve functional problem solving Completed two high-level logic puzzles with MIN cues only.  -KB      Recorded by [KB] Katherine L Bruton      Improve closure at entrance to airway    To improve closure at entrance to airway, patient will: --   pushing/pulling exercises  -KB      Status: Improve closure at entrance to airway Progressing as expected  -KB      Closure at Entrance to Airway Progress continue to adress  -KB      Comments: Improve closure at entrance to airway Initial cues for how to complete exercises.  -KB      Recorded by [KB] Katherine L Bruton      Improve tongue base & pharyngeal wall squeeze    To improve tongue base & pharyngeal wall squeeze, patient will: --   Practiced k/g word list forcefully  -KB      Status: Improve tongue base & pharyngeal wall squeeze Progressing as expected  -KB      Tongue Base/Pharyngeal Wall Squeeze Progress continue to adress  -KB      Comments: Improve tongue base & pharyngeal wall squeeze No cues needed, pt repeated word list x5.    -KB      Recorded by [KB] Katherine L Bruton      Bed Mobility, Assessment/Treatment    Bed Mob, Supine to Sit, [place]   supervision required  -MD    Bed Mob, Sit to Supine, [place]   supervision required  -MD    Bed Mobility, Comment  pt up in [facility]     Recorded by  [KP] Vero York OTR [MD] Nereida Llamas, ONEIDA    Transfer Assessment/Treatment    Transfers, Sit-Stand [place]  supervision required  - stand by assist  -MD    Transfers, Stand-Sit [place]  supervision required  -KP stand by assist  -MD    Transfers, Sit-Stand-Sit, Assist Device  other (see comments)     -KP rolling walker  -MD    Walk-In Shower Transfer, [place]  set up required;supervision required  -     Walk-In Shower Transfer, Assist Device  other (see comments)   tub bench  -     Transfer, Safety Issues   step length decreased  -MD    Transfer, Impairments   strength decreased;impaired balance  -MD    Recorded by  [KP] Vero York OTR [MD] Nereida Llamas, ONEIDA    Gait Assessment/Treatment    Gait, [place] Level   verbal cues required;contact guard assist  -MD    Gait, Assistive Device   rolling walker  -MD    Gait, Distance (Feet)   80   x3  -MD    Gait, Gait Deviations   bilateral:;janet decreased;forward flexed posture;step length decreased  -MD    Gait, Safety Issues   sequencing ability decreased  -MD    Gait, Impairments   impaired balance;strength decreased  -MD    Recorded by   [MD] Nereida Llamas, PT    Upper Body Bathing Assessment/Training    UB Bathing Assess/Train Assistive Device  hand-held shower head;tub bench  -     UB Bathing Assess/Train, Position  sitting  -     UB Bathing Assess/Train, [place] Level  set up required;stand by assist  -     Recorded by  [KP] Vero York OTR     Lower Body Bathing Assessment/Training    LB Bathing Assess/Train Assistive Device  grab bars;hand-held shower head;tub bench  -KP     LB Bathing Assess/Train, Position   sitting;standing  -     LB Bathing Assess/Train, [place] Level  set up required;stand by assist  -KP     Recorded by  [KP] Vero York OTR     Upper Body Dressing Assessment/Training    UB Dressing Assess/Train, Clothing Type  doffing:;donning:;t-shirt  -KP     UB Dressing Assess/Train, Position  sitting  -     UB Dressing Assess/Train, [place]  set up required;supervision required  -KP     Recorded by  [KP] Vero York OTR     Lower Body Dressing Assessment/Training    LB Dressing Assess/Train, Clothing Type  doffing:;donning:;socks;shoes;pants  -KP     LB Dressing Assess/Train, Position  sitting;standing  -KP     LB Dressing Assess/Train, [place]  set up required;supervision required  -     LB Dressing Assess/Train, Comment  holds onto grab bar intermittently  -KP     Recorded by  [KP] Vero York OTR     Grooming Assessment/Training    Grooming Assess/Train, Position  sitting  -     Grooming Assess/Train, [place] Level  set up required;supervision required  -KP     Recorded by  [KP] KANDY Sultana     Balance Skills Training    Sitting-Level of Assistance  Close supervision;Distant supervision  -     Sitting-Balance Support  Feet supported  -     Standing-Level of Assistance  Close supervision  -     Recorded by  [KP] KANDY Sultana     Therapy Exercises    Right Lower Extremity   AROM:;10 reps;sidelying;hip abduction/adduction   x3 sets  -MD    Left Lower Extremity   AROM:;10 reps;sidelying;clam shell   x3 sets  -MD    Bilateral Lower Extremities   AROM:;20 reps;ankle pumps/circles;hip abduction/adduction;hip flexion;LAQ  -MD    BLE Other Reps   --   Nustep at level 2 for 4 min  -MD    Trunk Exercises   10 reps;supine;bridging   x3 sets  -MD    Recorded by   [MD] Nereida Llamas PT    Positioning and Restraints    Pre-Treatment Position  sitting in chair/recliner  -BRIANNA sitting in chair/recliner  -MD    Post Treatment Position   wheelchair  -KP wheelchair  -MD    In Wheelchair  sitting;call light within reach;encouraged to call for assist  -KP sitting;call light within reach  -MD    Recorded by  [KP] KANDY Sultana [MD] Nereida Marinan, PT      11/08/17 1030 11/07/17 1532 11/07/17 1201    Rehab Assessment/Intervention    Discipline speech language pathologist  -KB occupational therapist  -KP occupational therapist  -KP    Document Type therapy note (daily note)  -KB therapy note (daily note)  -KP therapy note (daily note)  -KP    Subjective Information no complaints;agree to therapy  -KB agree to therapy;no complaints  -KP agree to therapy;no complaints  -KP    Patient Effort, Rehab Treatment good  -KB good  -KP good  -KP    Symptoms Noted During/After Treatment none  -KB none  -KP none  -KP    Precautions/Limitations fall precautions;swallowing precautions  -KB fall precautions;swallowing precautions  -KP fall precautions;swallowing precautions  -KP    Precautions/Limitations, Vision WFL with corrective lenses  -KB      Recorded by [KB] Katherine L Bruton [] Vero York OTR [] Vero York OTR    Pain Assessment    Pain Assessment 0-10  -KB No/denies pain  -KP No/denies pain  -KP    Post Pain Score 7  -KB      Pain Location Toe (Comment which one)  -KB      Pain Orientation Left;Right  -KB      Pain Intervention(s) Emotional support   Pt did not want medications.  -KB      Recorded by [KB] Katherine L Bruton [] Vero York OTR [KP] KANDY Sultana    Vision Assessment/Intervention    Visual Impairment WFL with corrective lenses  -KB visual impairment, left  -KP visual impairment, left  -KP    Recorded by [KB] Katherine L Bruton [] KANDY Sultana [KP] Vero York OTR    Cognitive Assessment/Intervention    Current Cognitive/Communication Assessment  impaired  -KP impaired  -KP    Orientation Status  oriented to;person;place;time  -KP oriented  to;person;place;time  -    Follows Commands/Answers Questions  100% of the time;able to follow single-step instructions;needs cueing  - 100% of the time;able to follow single-step instructions  -    Personal Safety  mild impairment  - mild impairment  -    Personal Safety Interventions  fall prevention program maintained;gait belt;muscle strengthening facilitated;nonskid shoes/slippers when out of bed  - fall prevention program maintained;gait belt;nonskid shoes/slippers when out of bed  -KP    Recorded by  [KP] Vero York, OTR [KP] Vero York, OTR    Improve functional problem solving    Improve functional problem solving through: complete high level reasoning task  -KB      Status: Improve functional problem solving Progressing as expected  -KB      Functional Problem Solving Progress 80%;without cues;100%;with inconsistent cues  -KB      Comments: Improve functional problem solving Pt completed high-level logic puzzle with MIN verbal cues for attention to detail.   -KB      Recorded by [KB] Katherine L Bruton      Improve oral skills    To Improve Oral Skills, patient will: --   pucker/smile, pucker left/right, suck in/puff out cheeks  -KB      Status: Improve Oral Skills Progressing as expected  -KB      Oral Skills Progress continue to adress  -KB      Comments: Improve Oral Skills Completed exercises without cues.  -KB      Recorded by [KB] Katherine L Bruton      Improve closure at entrance to airway    To improve closure at entrance to airway, patient will: --   hard swallows  -KB      Status: Improve closure at entrance to airway Progressing as expected  -KB      Closure at Entrance to Airway Progress continue to adress  -KB      Comments: Improve closure at entrance to airway Completed daily exercises to address airway closure without cues.  -KB      Recorded by [KB] Katherine L Bruton      Improve laryngeal elevation    To improve laryngeal elevation, patient will: --    pitch glide, high pitch vowel, humming in falsetto  -KB      Status: Improve laryngeal elevation Progressing as expected  -KB      Laryngeal Elevation Progress continue to adress  -KB      Comments: Improve laryngeal elevation Completed exercises to addres laryngeal elevation without cues.   -KB      Recorded by [KB] Katherine L Bruton      Improve tongue base & pharyngeal wall squeeze    To improve tongue base & pharyngeal wall squeeze, patient will: Complete effortful swallow;Complete gargle/hold retraction  -KB      Status: Improve tongue base & pharyngeal wall squeeze Progressing as expected  -KB      Tongue Base/Pharyngeal Wall Squeeze Progress continue to adress  -KB      Comments: Improve tongue base & pharyngeal wall squeeze Completed exercises without cues.  -KB      Recorded by [KB] Katherine L Bruton      Bed Mobility, Assessment/Treatment    Bed Mob, Supine to Sit, [place]  supervision required;set up required  -     Bed Mob, Sit to Supine, [place]  not tested  -     Bed Mobility, Comment   up in [facility]    Recorded by  [KP] Vero York, OTR [KP] Vero York, OTR    Transfer Assessment/Treatment    Transfers, Bed-Chair [place]  stand by assist  -     Transfers, Chair-Bed [place]  not tested  -     Transfers, Bed-Chair-Bed, Assist Device  other (see comments)     -     Transfers, Sit-Stand [place]  stand by assist  - stand by assist  -    Transfers, Stand-Sit [place]  stand by assist  - stand by assist  -    Transfers, Sit-Stand-Sit, Assist Device  other (see comments)     - other (see comments)    grab bar  -    Transfer, Comment   doesn't wantn to shower today. her hair is was fixed last night by [facility]    Recorded by  [KP] Vero York, OTR [KP] Vero York, OTR    Upper Body Bathing Assessment/Training    UB Bathing Assess/Train, Position   sitting;sink side  -    UB Bathing Assess/Train,  [place] Level   stand by assist;set up required  -KP    Recorded by   [KP] KANDY Sultana    Lower Body Bathing Assessment/Training    LB Bathing Assess/Train, Position   sitting;standing  -KP    LB Bathing Assess/Train, [place] Level   set up required;stand by assist  -KP    Recorded by   [KP] Vero York OTR    Upper Body Dressing Assessment/Training    UB Dressing Assess/Train, Clothing Type   donning:;doffing:;t-shirt  -KP    UB Dressing Assess/Train, Position   sitting  -KP    UB Dressing Assess/Train, [place]   supervision required;set up required  -KP    Recorded by   [KP] Vero York OTR    Lower Body Dressing Assessment/Training    LB Dressing Assess/Train, Clothing Type   doffing:;donning:;pants;socks;shoes  -KP    LB Dressing Assess/Train, Position   sitting;standing  -KP    LB Dressing Assess/Train, [place]   set up required;supervision required  -KP    Recorded by   [KP] KANDY Sultana    Grooming Assessment/Training    Grooming Assess/Train, Position   sitting;sink side  -KP    Grooming Assess/Train, [place] Level   supervision required;set up required  -KP    Recorded by   [KP] KANDY Sultana    Balance Skills Training    Sitting-Level of Assistance  Close supervision;Distant supervision  -KP Close supervision;Distant supervision  -KP    Sitting-Balance Support  Feet supported  -KP Feet supported  -KP    Standing-Level of Assistance  Close supervision  - Close supervision  -KP    Static Standing Balance Support  Right upper extremity supported  -KP Left upper extremity supported  -KP    Recorded by  [KP] Vero York OTR [KP] Vero York OTR    Therapy Exercises    BUE Resistance  theraputty   pinches yellow putty w. each digit and thumb  -KP     Recorded by  [KP] Vero York OTR     Fine Motor Coordination Training    Detail (Fine Motor Coordination Training)  links paper clip together  clipping it on other paper clip w. L hand.  in hand manipulation w. coins in L hand pushing coin up w. L thumb and inserting coin into small slot to inc FMC and in hand manipulation.   -KP     Recorded by  [KP] Vero York, OTR     Positioning and Restraints    Pre-Treatment Position  in bed  -KP sitting in chair/recliner  -KP    Post Treatment Position  wheelchair  -KP wheelchair  -KP    In Wheelchair  sitting;call light within reach;encouraged to call for assist;with family/caregiver  -KP sitting   in dining room waiting for RT  -KP    Recorded by  [KP] Vero York, OTR [KP] Vero York, OTR      11/07/17 1100 11/07/17 1030 11/07/17 0819    Rehab Assessment/Intervention    Discipline speech language pathologist  -ANIYA speech language pathologist  -ANIYA physical therapist  -MD    Document Type therapy note (daily note)  -KB therapy note (daily note)  -KB therapy note (daily note)  -MD    Subjective Information no complaints;agree to therapy  -KB no complaints;agree to therapy  -KB agree to therapy;no complaints  -MD    Patient Effort, Rehab Treatment good  -KB good  -KB good  -MD    Symptoms Noted During/After Treatment none  -KB none  -KB none  -MD    Precautions/Limitations fall precautions;swallowing precautions  -KB fall precautions;swallowing precautions  -KB fall precautions  -MD    Equipment Issued to Patient   gait belt;front wheeled walker  -MD    Recorded by [KB] Katherine L Bruton [KB] Katherine L Bruton [MD] Nereida Llamas, PT    Pain Assessment    Pain Assessment No/denies pain  -KB No/denies pain  -KB No/denies pain  -MD    Recorded by [KB] Katherine L Bruton [KB] Katherine L Bruton [MD] Nereida Llamas, PT    Vision Assessment/Intervention    Visual Impairment visual impairment, left;inattention to the left  -KB visual impairment, left;inattention to the left  -KB visual impairment, left;inattention to the left  -MD    Recorded by [KB] Katherine L Bruton [KB] Katherine L Bruton [MD]  Nereida Llamas, PT    Cognitive Assessment/Intervention    Current Cognitive/Communication Assessment   impaired  -MD    Orientation Status   oriented to;person  -MD    Follows Commands/Answers Questions   100% of the time;needs cueing;needs repetition  -MD    Personal Safety   decreased insight to deficits  -MD    Personal Safety Interventions   fall prevention program maintained;gait belt;muscle strengthening facilitated;nonskid shoes/slippers when out of bed;supervised activity  -MD    Recorded by   [MD] Nereida Llamas, PT    Improve functional problem solving    Improve functional problem solving through:  complete high level reasoning task  -KB     Status: Improve functional problem solving  Progressing as expected  -KB     Functional Problem Solving Progress  50%;without cues;100%;with consistent cues  -KB     Comments: Improve functional problem solving  Pt required MOD verbal cues throughout logic puzzle for attention and reasoning.   -KB     Recorded by  [KB] Katherine L Bruton     Improve oral skills    Status: Improve Oral Skills  Progressing as expected  -KB     Oral Skills Progress  80%;with inconsistent cues  -KB     Recorded by  [KB] Katherine L Bruton     Improve closure at entrance to airway    Status: Improve closure at entrance to airway Progressing as expected  -KB      Closure at Entrance to Airway Progress continue to adress  -KB      Comments: Improve closure at entrance to airway Pt observed with mid-day meal. 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   -KB      Recorded by [KB] Katherine L Bruton      Improve laryngeal elevation    Status: Improve laryngeal elevation  Progressing as expected  -KB     Laryngeal Elevation Progress  60%;with inconsistent cues;continue to adress  -KB     Comments: Improve laryngeal elevation  Pt completing exercises in therapy session as prescribed.   -KB     Recorded by  [KB] Katherine L Bruton     Improve  tongue base & pharyngeal wall squeeze    Status: Improve tongue base & pharyngeal wall squeeze  Progressing as expected  -ANIYA     Tongue Base/Pharyngeal Wall Squeeze Progress  60%;with inconsistent cues;continue to adress  -ANIYA     Comments: Improve tongue base & pharyngeal wall squeeze  Pt completing exercises in therapy sessions as prescribed.   -ANIYA     Recorded by  [KB] Katherine L Bruton     Bed Mobility, Assessment/Treatment    Bed Mob, Supine to Sit, [place]   not tested  -MD    Bed Mob, Sit to Supine, [place]   not tested  -MD    Recorded by   [MD] Nereida Llamas PT    Transfer Assessment/Treatment    Transfers, Sit-Stand [place]   verbal cues required;stand by assist  -MD    Transfers, Stand-Sit [place]   verbal cues required;stand by assist  -MD    Transfers, Sit-Stand-Sit, Assist Device   rolling walker  -MD    Transfer, Safety Issues   step length decreased  -MD    Transfer, Impairments   strength decreased;impaired balance  -MD    Recorded by   [MD] Nereida Llamas PT    Gait Assessment/Treatment    Gait, [place] Level   verbal cues required;contact guard assist  -MD    Gait, Assistive Device   rolling walker  -MD    Gait, Distance (Feet)   80   x3  -MD    Gait, Gait Deviations   bilateral:;janet decreased;forward flexed posture;step length decreased;toe-to-floor clearance decreased;weight-shifting ability decreased  -MD    Gait, Safety Issues   step length decreased  -MD    Gait, Impairments   impaired balance;strength decreased  -MD    Recorded by   [MD] Nereida Llamas PT    Stairs Assessment/Treatment    Number of Stairs   4  -MD    Stairs, Handrail Location   both sides  -MD    Stairs, [place] Level   supervision required  -MD    Stairs, Technique Used   step over step (ascending);step to step (descending)  -MD    Stairs, Impairments   strength decreased;impaired balance  -MD    Recorded by   [MD] Nereida Llamas, ONEIDA    Therapy Exercises    Bilateral Lower Extremities   --   nustep 7 min  level 2  -MD    Recorded by   [MD] Nereida Llamas PT    Positioning and Restraints    Pre-Treatment Position   sitting in chair/recliner  -MD    Post Treatment Position   wheelchair  -MD    In Wheelchair   patient within staff view   in DR  -MD    Recorded by   [MD] Nereida Llamas PT      11/06/17 1550 11/06/17 1330 11/06/17 1203    Rehab Assessment/Intervention    Discipline occupational therapist  -KP speech language pathologist  -OC occupational therapist  -KP    Document Type therapy note (daily note)  -KP therapy note (daily note)  -OC therapy note (daily note)  -KP    Subjective Information complains of;agree to therapy;fatigue  -KP no complaints;agree to therapy  -OC agree to therapy;no complaints  -KP    Patient Effort, Rehab Treatment good  -KP good  -OC good  -KP    Symptoms Noted During/After Treatment none  -KP none  -OC none  -KP    Precautions/Limitations fall precautions  -KP  fall precautions  -KP    Recorded by [KP] Vero York OTR [OC] Lilian Mohr MA,CCC-SLP [KP] Vero York OTR    Pain Assessment    Pain Assessment No/denies pain  -KP No/denies pain  -OC No/denies pain  -KP    Recorded by [KP] Vero oYrk OTR [OC] Lilian Mohr MA,CCC-SLP [] Vero York OTR    Vision Assessment/Intervention    Visual Impairment visual impairment, left;inattention to the left  -KP      Recorded by [KP] Vero York OTR      Cognitive Assessment/Intervention    Current Cognitive/Communication Assessment impaired  -KP impaired  -OC impaired  -KP    Orientation Status oriented to;person;place;time  -KP oriented to;person;place;time  -OC oriented to;person;place  -KP    Follows Commands/Answers Questions 100% of the time;able to follow single-step instructions  -% of the time;able to follow multi-step instructions;needs cueing;able to follow single-step instructions;needs increased time  -% of the time;able to follow single-step instructions  -KP    Personal  Safety mild impairment  -KP  mild impairment  -    Personal Safety Interventions fall prevention program maintained;gait belt;muscle strengthening facilitated;nonskid shoes/slippers when out of bed  -  fall prevention program maintained;gait belt;nonskid shoes/slippers when out of bed  -KP    Recorded by [KP] Vero York, OTR [OC] Lilian Mohr MA,CCC-SLP [KP] Vero York, OTR    Improve executive function skills    Status: Improve executive function skills  Progressing as expected  -OC     Executive Function Skills Progress  20%;without cues;70%;with consistent cues   organization task of dates/holidays  -OC     Comments: Improve executive function skills  Pt demonstrated difficulty with cell phone this date  -OC     Recorded by  [OC] Lilian Mohr MA,CCC-SLP     Improve oral skills    Status: Improve Oral Skills  Progressing as expected  -OC     Oral Skills Progress  70%;with inconsistent cues  -OC     Recorded by  [OC] Lilian Mohr MA,CCC-SLP     Improve laryngeal elevation    Status: Improve laryngeal elevation  Progressing as expected  -OC     Laryngeal Elevation Progress  60%;with inconsistent cues  -OC     Recorded by  [OC] Lilian Morh MA,CCC-SLP     Improve tongue base & pharyngeal wall squeeze    Status: Improve tongue base & pharyngeal wall squeeze  Progressing as expected  -OC     Tongue Base/Pharyngeal Wall Squeeze Progress  60%;with inconsistent cues  -OC     Recorded by  [OC] Lilian Mohr MA,CCC-SLP     Bed Mobility, Assessment/Treatment    Bed Mob, Supine to Sit, [place] not tested  -      Bed Mob, Sit to Supine, [place] set up required;supervision required  -      Bed Mobility, Comment   pt up in   -KP    Recorded by [KP] Vero York, SARAR  [KP] Vero York, OTR    Transfer Assessment/Treatment    Transfers, Sit-Stand [place] set up required;supervision required;stand by assist  -  stand by assist;contact guard assist  -    Transfers,  Stand-Sit [place] stand by assist  -  set up required;stand by assist  -    Transfers, Sit-Stand-Sit, Assist Device bed rails  -KP  other (see comments)   wc and grab bars  -    Walk-In Shower Transfer, [place]   set up required;contact guard assist;supervision required  -KP    Walk-In Shower Transfer, Assist Device   other (see comments)   emy bench  -KP    Recorded by [KP] KANDY Sultana  [KP] Vero York OTR    Upper Body Bathing Assessment/Training    UB Bathing Assess/Train Assistive Device   hand-held shower head;tub bench  -KP    UB Bathing Assess/Train, Position   sitting  -KP    UB Bathing Assess/Train, [place] Level   stand by assist;set up required  -KP    Recorded by   [KP] Vero York OTR    Lower Body Bathing Assessment/Training    LB Bathing Assess/Train Assistive Device   hand-held shower head;grab bars;tub bench  -KP    LB Bathing Assess/Train, Position   sitting;standing  -KP    LB Bathing Assess/Train, [place] Level   set up required;stand by assist  -KP    Recorded by   [KP] Vero York OTR    Upper Body Dressing Assessment/Training    UB Dressing Assess/Train, Clothing Type   donning:;doffing:;t-shirt  -KP    UB Dressing Assess/Train, Position   sitting  -    UB Dressing Assess/Train, [place]   set up required;supervision required  -KP    Recorded by   [KP] Vero York OTR    Lower Body Dressing Assessment/Training    LB Dressing Assess/Train, Clothing Type   doffing:;donning:;pants;slipper socks  -    LB Dressing Assess/Train, Position   sitting;standing  -KP    LB Dressing Assess/Train, [place]   set up required;contact guard assist;supervision required  -KP    Recorded by   [KP] Vero York OTR    Grooming Assessment/Training    Grooming Assess/Train, Position   sitting  -    Grooming Assess/Train, [place]ep[place] Level   set up required;supervision required  -KP    Recorded by   [] Vero  KANDY Tinsley    Balance Skills Training    Sitting-Level of Assistance   Close supervision  -KP    Sitting-Balance Support   Feet supported  -KP    Standing-Level of Assistance Close supervision  -KP  Close supervision;Contact guard  -KP    Static Standing Balance Support Right upper extremity supported  -KP  Right upper extremity supported   intermittent UE support  -KP    Recorded by [KP] KANDY Sultana  [KP] Vero York OTR    Therapy Exercises    Left Upper Extremity AROM:;15 reps;sitting;elbow flexion/extension;shoulder extension/flexion  -KP      Recorded by [KP] KANDY Sultana      Fine Motor Coordination Training    Detail (Fine Motor Coordination Training) graps pennies w. L hand using index and thumb and inserting into small slot to inc FMC w. min difficulty. 2pt pinch on sq pegs and inserts them into board x30 to inc FMC no difficulty. Graps small colored pegs to complete peg design w. very little difficulty.  -KP      Recorded by [BRIANNA] KANDY Sultana      Positioning and Restraints    Pre-Treatment Position sitting in chair/recliner  -KP  sitting in chair/recliner  -KP    Post Treatment Position bed  -KP  wheelchair  -KP    In Bed supine;call light within reach;exit alarm on;encouraged to call for assist  -KP      In Wheelchair   sitting;call light within reach;encouraged to call for assist  -KP    Recorded by [BRIANNA] KANDY Sultana  [KP] KANDY Sultana      11/06/17 1116          Rehab Assessment/Intervention    Discipline physical therapist  -MD      Document Type therapy note (daily note)  -MD      Subjective Information agree to therapy;no complaints  -MD      Patient Effort, Rehab Treatment good  -MD      Symptoms Noted During/After Treatment none  -MD      Precautions/Limitations fall precautions  -MD      Equipment Issued to Patient gait belt;front wheeled walker  -MD      Recorded by [MD] Nereida Llamas, PT      Pain Assessment     Pain Assessment No/denies pain  -MD      Recorded by [MD] Nereida Llamas PT      Vision Assessment/Intervention    Visual Impairment visual impairment, left;inattention to the left;left neglect  -MD      Recorded by [MD] Nereida Llamas PT      Cognitive Assessment/Intervention    Current Cognitive/Communication Assessment impaired  -MD      Orientation Status oriented to;person;place  -MD      Follows Commands/Answers Questions 100% of the time;needs cueing;needs repetition;needs increased time  -MD      Personal Safety decreased insight to deficits  -MD      Personal Safety Interventions fall prevention program maintained;gait belt;muscle strengthening facilitated;nonskid shoes/slippers when out of bed;supervised activity  -MD      Recorded by [MD] Nereida Llamas PT      Bed Mobility, Assessment/Treatment    Bed Mob, Supine to Sit, [place] not tested  -MD      Bed Mob, Sit to Supine, [place] not tested  -MD      Recorded by [MD] Nereida Llamas PT      Transfer Assessment/Treatment    Transfers, Sit-Stand [place] contact guard assist  -MD      Transfers, Stand-Sit [place] contact guard assist  -MD      Transfers, Sit-Stand-Sit, Assist Device rolling walker  -MD      Recorded by [MD] Nereida Llamas PT      Gait Assessment/Treatment    Gait, [place] Level verbal cues required;contact guard assist  -MD      Gait, Assistive Device rolling walker  -MD      Gait, Distance (Feet) 80  -MD      Gait, Gait Deviations bilateral:;janet decreased;forward flexed posture;step length decreased;toe-to-floor clearance decreased  -MD      Gait, Safety Issues step length decreased  -MD      Gait, Impairments impaired balance;strength decreased  -MD      Gait, Comment 20` on carpet CGA, RWx  -MD      Recorded by [MD] Nereida Llamas PT      Stairs Assessment/Treatment    Number of Stairs 4  -MD      Stairs, Handrail Location both sides  -MD      Stairs, [place] Level verbal cues required;contact guard assist  -MD      Stairs,  Technique Used step over step (ascending);step over step (descending)  -MD      Stairs, Impairments strength decreased;impaired balance  -MD      Recorded by [MD] Nereida Llamas, PT      Therapy Exercises    BLE Other Reps --   nustep at level 2 for 6 min  -MD      Recorded by [MD] Nereida Llamas, PT      Positioning and Restraints    Pre-Treatment Position sitting in chair/recliner  -MD      Post Treatment Position wheelchair  -MD      In Wheelchair with OT  -MD      Recorded by [MD] Nereida Llamas, PT        User Key  (r) = Recorded By, (t) = Taken By, (c) = Cosigned By    Initials Name Effective Dates    OC Lilian Mohr MA,CCC-SLP 04/05/17 -     KP Vero York, OTR 04/13/15 -     MD Nereida Llamas, PT 12/01/15 -     KB Katherine L Bruton 09/29/17 -                 IP PT Goals       11/08/17 1501 11/01/17 1221       Bed Mobility PT LTG    Bed Mobility PT LTG, Date Established  11/01/17  -MD     Bed Mobility PT LTG, Time to Achieve  2 wks  -MD     Bed Mobility PT LTG, Activity Type  supine to sit/sit to supine  -MD     Bed Mobility PT LTG, [place] Level  independent  -MD     Bed Mobility PT LTG, Date Goal Reviewed 11/08/17  -MD      Bed Mobility PT LTG, Outcome goal ongoing  -MD      Transfer Training PT LTG    Transfer Training PT LTG, Date Established  11/01/17  -MD     Transfer Training PT LTG, Time to Achieve  2 wks  -MD     Transfer Training PT LTG, Activity Type  sit to stand/stand to sit  -MD     Transfer Training PT LTG, [place] Level  supervision required  -MD     Transfer Training PT LTG, Assist Device  walker, rolling  -MD     Transfer Training PT  LTG, Date Goal Reviewed 11/08/17  -MD      Transfer Training PT LTG, Outcome goal met  -MD      Transfer Training 2 PT LTG    Transfer Training PT 2 LTG, Date Established  11/01/17  -MD     Transfer Training PT 2 LTG, Time to Achieve  2 wks  -MD     Transfer Training PT 2 LTG, Activity Type  other (see comments)   car transfer  -MD     Transfer Training PT 2  LTG, [place] Level  supervision required  -MD     Transfer Training PT 2 LTG, Assist Device  walker, rolling  -MD     Transfer Training PT 2 LTG, Date Goal Reviewed 11/08/17  -MD      Transfer Training PT 2 LTG, Outcome goal ongoing  -MD      Gait Training PT LTG    Gait Training Goal PT LTG, Date Established  11/01/17  -MD     Gait Training Goal PT LTG, Time to Achieve  2 wks  -MD     Gait Training Goal PT LTG, [place] Level  supervision required  -MD     Gait Training Goal PT LTG, Assist Device  walker, rolling  -MD     Gait Training Goal PT LTG, Distance to Achieve  160  -MD     Gait Training Goal PT LTG, Date Goal Reviewed 11/08/17  -MD      Gait Training Goal PT LTG, Outcome goal ongoing  -MD      Stair Training PT LTG    Stair Training Goal PT LTG, Date Established  11/01/17  -MD     Stair Training Goal PT LTG, Time to Achieve  2 wks  -MD     Stair Training Goal PT LTG, Number of Steps  4  -MD     Stair Training Goal PT LTG, [place] Level  supervision required  -MD     Stair Training Goal PT LTG, Assist Device  2 handrails  -MD     Stair Training Goal PT LTG, Date Goal Reviewed 11/08/17  -MD      Stair Training Goal PT LTG, Outcome goal ongoing  -MD      Patient Education PT LTG    Patient Education PT LTG, Date Established  11/01/17  -MD     Patient Education PT LTG, Time to Achieve  2 wks  -MD     Patient Education PT LTG, Education Type  HEP  -MD     Patient Education PT LTG, Education Understanding  demonstrate adequately  -MD     Patient Education PT LTG, Date Goal Reviewed 11/08/17  -MD      Patient Education PT LTG Outcome goal ongoing  -MD        User Key  (r) = Recorded By, (t) = Taken By, (c) = Cosigned By    Initials Name Provider Type    MD Nereida Llamas, PT Physical Therapist          Physical Therapy Education     Title: PT OT SLP Therapies (Active)     Topic: Physical Therapy (Active)     Point: Mobility training (Done)    Learning Progress Summary    Learner Readiness Method Response  Comment Documented by Status   Patient Acceptance CAR CLARK  JRALPH 11/02/17 1158 Done               Point: Precautions (Active)    Learning Progress Summary    Learner Readiness Method Response Comment Documented by Status   Patient Acceptance ENOEMY MD 11/08/17 1217 Active    Acceptance ENOEMY MD 11/07/17 0821 Active    Acceptance ENOEMY SANFORD MD 11/06/17 1118 Active    Acceptance ENOEMY SANFORD MD 11/04/17 1012 Active    Acceptance ENOEMY MD 11/03/17 1215 Active    Acceptance ED JUVENAL SLAUGHTER 11/01/17 1228 Active                      User Key     Initials Effective Dates Name Provider Type Discipline     12/01/15 -  Anna Epps, PT Physical Therapist PT    MD 12/01/15 -  Nreeida Llamas PT Physical Therapist PT                    PT Recommendation and Plan  Anticipated Equipment Needs At Discharge: gait belt, front wheeled walker  Anticipated Discharge Disposition: home with home health  Planned Therapy Interventions: balance training, bed mobility training, gait training, home exercise program, patient/family education, transfer training  PT Frequency: 2 times/day  Plan of Care Review  Plan Of Care Reviewed With: patient  Progress: improving  Outcome Summary/Follow up Plan: Pt progressing towards goals w transfers and ambulation.  Gait deviations continue to compromise pts dynamic standing balance w ambulation.         Time Calculation:         PT Charges       11/08/17 1445 11/08/17 1114       Time Calculation    Start Time 1430  -MD 1100  -MD     Stop Time 1500  -MD 1130  -MD     Time Calculation (min) 30 min  -MD 30 min  -MD     PT Received On 11/08/17  -MD 11/08/17  -MD     PT - Next Appointment 11/09/17  -MD 11/09/17  -MD     PT Goal Re-Cert Due Date  11/15/17  -MD       User Key  (r) = Recorded By, (t) = Taken By, (c) = Cosigned By    Initials Name Provider Type    MD Nereida Llamas, PT Physical Therapist          Therapy Charges for Today     Code Description Service Date Service Provider Modifiers Qty    [id number]  PT THER PROC  EA 15 MIN 11/7/2017 Nereida Llamas, PT GP 4    [id number]  PT THER PROC EA 15 MIN 11/8/2017 Nereida Llamas, PT GP 4               Nereida Llamas, PT  11/8/2017

## 2017-11-08 NOTE — PLAN OF CARE
Problem: Patient Care Overview (Adult)  Goal: Plan of Care Review  Outcome: Ongoing (interventions implemented as appropriate)    11/08/17 1119   Coping/Psychosocial Response Interventions   Plan Of Care Reviewed With patient   Patient Care Overview   Progress improving   Outcome Evaluation   Outcome Summary/Follow up Plan good family support.          Problem: Stroke (Ischemic) (Adult)  Goal: Signs and Symptoms of Listed Potential Problems Will be Absent or Manageable (Stroke)  Outcome: Ongoing (interventions implemented as appropriate)    11/08/17 1119   Stroke (Ischemic)   Problems Assessed (Stroke (Ischemic)/TIA) motor/sensory impairment   Problems Present (Stroke (Ischemic)/TIA) motor/sensory impairment         Problem: Fall Risk (Adult)  Goal: Absence of Falls  Outcome: Ongoing (interventions implemented as appropriate)    11/08/17 1119   Fall Risk (Adult)   Absence of Falls making progress toward outcome

## 2017-11-08 NOTE — PROGRESS NOTES
Inpatient Rehabilitation Functional Measures Assessment    Functional Measures  YESI Eating:  [facility] Grooming: [facility] Bathing:  [facility] Upper Body Dressing:  [facility] Lower Body Dressing:  [facility] Toileting:  [facility] Bladder Management  Level of Assistance:  [place]  Frequency/Number of Accidents this Shift:  [facility] Bowel Management  Level of Assistance: [place]  Frequency/Number of Accidents this Shift: [facility] Bed/Chair/Wheelchair Transfer:  [facility] Toilet Transfer:  [facility] Tub/Shower Transfer:  [place]    Previously Documented Mode of Locomotion at Discharge: Field  YESI Expected Mode of Locomotion at Discharge: [facility] Walk/Wheelchair:  [facility] Stairs:  [facility] Comprehension:  Auditory comprehension is the usual mode. Comprehension  Score = 6, Modified [place].  Patient comprehends complex/abstract  information in their primary language, requiring:  YESI Expression:  Vocal expression is the usual mode. Expression Score = 6,  Modified Independent.  Patient expresses complex/abstract information in their  primary language, requiring:  YESI Social Interaction:  Social Interaction Score = 7, Independent. Patient is  completely independent for social interaction.  There are no activity  limitations.  YESI Problem Solving:  Problem Solving Score = 6, Modified [place].  Patient  makes appropriate decisions in order to solve complex problems, but requires  extra time.  YESI Memory:  Memory Score = 6, Modified [place].  Patient is modified  independent for memory, requiring:    Therapy Mode Minutes  Occupational Therapy: [place]  Physical Therapy: [place]  Speech Language Pathology:  [place]    Signed by: Ayleen Kline RN

## 2017-11-08 NOTE — PROGRESS NOTES
Inpatient Rehabilitation Plan of Care Note    Plan of Care  Care Plan Reviewed - No updates at this time.    Sphincter Control    Performed Intervention(s)  Monitor I & O's  Encourage fluid intake  Timed voids/ scheduled toileting      Safety    Performed Intervention(s)  Falls prevention protocol  Safety rounds  Bed and chair alarms      Psychosocial    Performed Intervention(s)  Verbalizes needs and concerns  Therapeutic environmental set-up      Body Systems    Performed Intervention(s)  Blood glucose testing  Monitor labs  Medication    Signed by: Ayleen Kline RN

## 2017-11-08 NOTE — THERAPY TREATMENT NOTE
Inpatient Rehabilitation - Speech Language Pathology Treatment Note  [facility]     Patient Name: Alice Maxwell  : 1945  MRN: [id number]  Today's Date: 2017         Admit Date: 10/31/2017    Visit Dx:      ICD-10-CM ICD-9-CM   1. Oropharyngeal dysphagia R13.12 787.22     Patient Active Problem List   Diagnosis   • Stroke              Adult Rehabilitation Note       17 1330 17 1152 17 1114    Rehab Assessment/Intervention    Discipline speech language pathologist  -KB occupational therapist  -KP physical therapist  -MD    Document Type therapy note (daily note)  -KB therapy note (daily note)  -KP therapy note (daily note)  -MD    Subjective Information no complaints;agree to therapy  -KB agree to therapy;no complaints  -KP agree to therapy;no complaints  -MD    Patient Effort, Rehab Treatment good  -KB good  -KP good  -MD    Symptoms Noted During/After Treatment none  -KB none  -KP none  -MD    Precautions/Limitations fall precautions;swallowing precautions  -KB fall precautions;swallowing precautions  -KP fall precautions  -MD    Equipment Issued to Patient   gait belt;front wheeled walker  -MD    Recorded by [KB] Katherine L Bruton [] Vero York OTR [MD] Nereida Llamas, PT    Pain Assessment    Pain Assessment No/denies pain  -KB No/denies pain  -KP No/denies pain  -MD    Pain Score  0  -KP     Recorded by [KB] Katherine L Bruton [] Vero York OTR [MD] Nereida Llamas, ONEIDA    Vision Assessment/Intervention    Visual Impairment WFL with corrective lenses  -KB visual impairment, left  -KP visual impairment, left  -MD    Recorded by [KB] Katherine L Bruton [] Vero York OTR [MD] Nereida Llamas, PT    Cognitive Assessment/Intervention    Current Cognitive/Communication Assessment  impaired  -KP impaired  -MD    Orientation Status  oriented to;person;place;time  -KP oriented to;person;place;time  -MD    Follows Commands/Answers Questions  100% of the time;able  to follow single-step instructions;needs cueing  -% of the time;needs cueing;needs repetition  -MD    Personal Safety  mild impairment  -KP impulsive;decreased insight to deficits  -MD    Personal Safety Interventions  fall prevention program maintained;gait belt;nonskid shoes/slippers when out of bed  -KP fall prevention program maintained;gait belt;muscle strengthening facilitated;nonskid shoes/slippers when out of bed;supervised activity  -MD    Recorded by  [KP] Vero York, OTR [MD] Nereida Llamas, PT    Improve functional problem solving    Improve functional problem solving through: complete high level reasoning task  -KB      Status: Improve functional problem solving Progressing as expected  -KB      Functional Problem Solving Progress 80%;without cues;100%;with inconsistent cues  -KB      Comments: Improve functional problem solving Completed two high-level logic puzzles with MIN cues only.  -KB      Recorded by [KB] Katherine L Bruton      Improve closure at entrance to airway    To improve closure at entrance to airway, patient will: --   pushing/pulling exercises  -KB      Status: Improve closure at entrance to airway Progressing as expected  -KB      Closure at Entrance to Airway Progress continue to adress  -KB      Comments: Improve closure at entrance to airway Initial cues for how to complete exercises.  -KB      Recorded by [KB] Katherine L Bruton      Improve tongue base & pharyngeal wall squeeze    To improve tongue base & pharyngeal wall squeeze, patient will: --   Practiced k/g word list forcefully  -KB      Status: Improve tongue base & pharyngeal wall squeeze Progressing as expected  -KB      Tongue Base/Pharyngeal Wall Squeeze Progress continue to adress  -KB      Comments: Improve tongue base & pharyngeal wall squeeze No cues needed, pt repeated word list x5.   -KB      Recorded by [KB] Katherine L Bruton      Bed Mobility, Assessment/Treatment    Bed Mob, Supine to Sit,  [place]   supervision required  -MD    Bed Mob, Sit to Supine, [place]   supervision required  -MD    Bed Mobility, Comment  pt up in   -     Recorded by  [KP] Vero York OTR [MD] Nereida Llamas PT    Transfer Assessment/Treatment    Transfers, Sit-Stand [place]  supervision required  -KP stand by assist  -MD    Transfers, Stand-Sit [place]  supervision required  -KP stand by assist  -MD    Transfers, Sit-Stand-Sit, Assist Device  other (see comments)   wc  - rolling walker  -MD    Walk-In Shower Transfer, [place]  set up required;supervision required  -     Walk-In Shower Transfer, Assist Device  other (see comments)   tub bench  -KP     Transfer, Safety Issues   step length decreased  -MD    Transfer, Impairments   strength decreased;impaired balance  -MD    Recorded by  [KP] Vero York OTR [MD] Nereida Llamas PT    Gait Assessment/Treatment    Gait, [place] Level   verbal cues required;contact guard assist  -MD    Gait, Assistive Device   rolling walker  -MD    Gait, Distance (Feet)   80   x3  -MD    Gait, Gait Deviations   bilateral:;janet decreased;forward flexed posture;step length decreased  -MD    Gait, Safety Issues   sequencing ability decreased  -MD    Gait, Impairments   impaired balance;strength decreased  -MD    Recorded by   [MD] Nereida Llamas PT    Upper Body Bathing Assessment/Training    UB Bathing Assess/Train Assistive Device  hand-held shower head;tub bench  -     UB Bathing Assess/Train, Position  sitting  -     UB Bathing Assess/Train, [place] Level  set up required;stand by assist  -     Recorded by  [KP] Vero York OTR     Lower Body Bathing Assessment/Training    LB Bathing Assess/Train Assistive Device  grab bars;hand-held shower head;tub bench  -     LB Bathing Assess/Train, Position  sitting;standing  -     LB Bathing Assess/Train, [place] Level  set up required;stand by assist  -     Recorded by  [KP]  Vero York OTR     Upper Body Dressing Assessment/Training    UB Dressing Assess/Train, Clothing Type  doffing:;donning:;t-shirt  -KP     UB Dressing Assess/Train, Position  sitting  -KP     UB Dressing Assess/Train, [place]  set up required;supervision required  -KP     Recorded by  [KP] Vero York OTR     Lower Body Dressing Assessment/Training    LB Dressing Assess/Train, Clothing Type  doffing:;donning:;socks;shoes;pants  -KP     LB Dressing Assess/Train, Position  sitting;standing  -KP     LB Dressing Assess/Train, [place]  set up required;supervision required  -KP     LB Dressing Assess/Train, Comment  holds onto grab bar intermittently  -KP     Recorded by  [KP] KANDY Sultana     Grooming Assessment/Training    Grooming Assess/Train, Position  sitting  -KP     Grooming Assess/Train, [place] Level  set up required;supervision required  -KP     Recorded by  [KP] Vero York OTR     Balance Skills Training    Sitting-Level of Assistance  Close supervision;Distant supervision  -KP     Sitting-Balance Support  Feet supported  -KP     Standing-Level of Assistance  Close supervision  -KP     Recorded by  [KP] Vero York OTR     Therapy Exercises    Right Lower Extremity   AROM:;10 reps;sidelying;hip abduction/adduction   x3 sets  -MD    Left Lower Extremity   AROM:;10 reps;sidelying;clam shell   x3 sets  -MD    Trunk Exercises   10 reps;supine;bridging   x3 sets  -MD    Recorded by   [MD] Nereida Llamas PT    Positioning and Restraints    Pre-Treatment Position  sitting in chair/recliner  -KP sitting in chair/recliner  -MD    Post Treatment Position  wheelchair  -KP     In Wheelchair  sitting;call light within reach;encouraged to call for assist  -KP     Recorded by  [KP] Vero York OTR [MD] Nereida Llamas PT      11/08/17 1030 11/07/17 1532 11/07/17 1201    Rehab Assessment/Intervention    Discipline speech language pathologist  -ANIYA  occupational therapist  -KP occupational therapist  -    Document Type therapy note (daily note)  -KB therapy note (daily note)  -KP therapy note (daily note)  -KP    Subjective Information no complaints;agree to therapy  -KB agree to therapy;no complaints  -KP agree to therapy;no complaints  -KP    Patient Effort, Rehab Treatment good  -KB good  -KP good  -KP    Symptoms Noted During/After Treatment none  -KB none  -KP none  -KP    Precautions/Limitations fall precautions;swallowing precautions  -KB fall precautions;swallowing precautions  -KP fall precautions;swallowing precautions  -KP    Precautions/Limitations, Vision WFL with corrective lenses  -KB      Recorded by [KB] Katherine L Bruton [] Vero York, OTR [] Vero York, SARAR    Pain Assessment    Pain Assessment 0-10  -KB No/denies pain  -KP No/denies pain  -KP    Post Pain Score 7  -KB      Pain Location Toe (Comment which one)  -KB      Pain Orientation Left;Right  -KB      Pain Intervention(s) Emotional support   Pt did not want medications.  -KB      Recorded by [KB] Katherine L Bruton [] Vero York, OTR [] Vero York, OTR    Vision Assessment/Intervention    Visual Impairment WFL with corrective lenses  -KB visual impairment, left  -KP visual impairment, left  -KP    Recorded by [KB] Katherine L Bruton [] Vero York, OTR [] Vero York, OTR    Cognitive Assessment/Intervention    Current Cognitive/Communication Assessment  impaired  -KP impaired  -KP    Orientation Status  oriented to;person;place;time  -KP oriented to;person;place;time  -KP    Follows Commands/Answers Questions  100% of the time;able to follow single-step instructions;needs cueing  - 100% of the time;able to follow single-step instructions  -    Personal Safety  mild impairment  -KP mild impairment  -KP    Personal Safety Interventions  fall prevention program maintained;gait belt;muscle strengthening  facilitated;nonskid shoes/slippers when out of bed  - fall prevention program maintained;gait belt;nonskid shoes/slippers when out of bed  -    Recorded by  [KP] Vero York OTR [KP] Vero York, OTR    Improve functional problem solving    Improve functional problem solving through: complete high level reasoning task  -KB      Status: Improve functional problem solving Progressing as expected  -KB      Functional Problem Solving Progress 80%;without cues;100%;with inconsistent cues  -KB      Comments: Improve functional problem solving Pt completed high-level logic puzzle with MIN verbal cues for attention to detail.   -KB      Recorded by [KB] Katherine L Bruton      Improve oral skills    To Improve Oral Skills, patient will: --   pucker/smile, pucker left/right, suck in/puff out cheeks  -KB      Status: Improve Oral Skills Progressing as expected  -KB      Oral Skills Progress continue to adress  -KB      Comments: Improve Oral Skills Completed exercises without cues.  -KB      Recorded by [KB] Katherine L Bruton      Improve closure at entrance to airway    To improve closure at entrance to airway, patient will: --   hard swallows  -KB      Status: Improve closure at entrance to airway Progressing as expected  -KB      Closure at Entrance to Airway Progress continue to adress  -KB      Comments: Improve closure at entrance to airway Completed daily exercises to address airway closure without cues.  -KB      Recorded by [KB] Katherine L Bruton      Improve laryngeal elevation    To improve laryngeal elevation, patient will: --   pitch glide, high pitch vowel, humming in falsetto  -KB      Status: Improve laryngeal elevation Progressing as expected  -KB      Laryngeal Elevation Progress continue to adress  -KB      Comments: Improve laryngeal elevation Completed exercises to addres laryngeal elevation without cues.   -KB      Recorded by [KB] Katherine L Bruton      Improve tongue base &  pharyngeal wall squeeze    To improve tongue base & pharyngeal wall squeeze, patient will: Complete effortful swallow;Complete gargle/hold retraction  -KB      Status: Improve tongue base & pharyngeal wall squeeze Progressing as expected  -KB      Tongue Base/Pharyngeal Wall Squeeze Progress continue to adress  -KB      Comments: Improve tongue base & pharyngeal wall squeeze Completed exercises without cues.  -KB      Recorded by [KB] Katherine L Bruton      Bed Mobility, Assessment/Treatment    Bed Mob, Supine to Sit, [place]  supervision required;set up required  -     Bed Mob, Sit to Supine, [place]  not tested  -     Bed Mobility, Comment   up in [facility]    Recorded by  [KP] Vero York OTR [KP] Vero York OTR    Transfer Assessment/Treatment    Transfers, Bed-Chair [place]  stand by assist  -     Transfers, Chair-Bed [place]  not tested  -     Transfers, Bed-Chair-Bed, Assist Device  other (see comments)     -     Transfers, Sit-Stand [place]  stand by assist  - stand by assist  -    Transfers, Stand-Sit [place]  stand by assist  - stand by assist  -    Transfers, Sit-Stand-Sit, Assist Device  other (see comments)     - other (see comments)    grab bar  -    Transfer, Comment   doesn't wantn to shower today. her hair is was fixed last night by [facility]  -    Recorded by  [KP] Vero York OTR [KP] Vero York, SARAR    Upper Body Bathing Assessment/Training    UB Bathing Assess/Train, Position   sitting;sink side  -    UB Bathing Assess/Train, [place] Level   stand by assist;set up required  -    Recorded by   [KP] Vero York OTR    Lower Body Bathing Assessment/Training    LB Bathing Assess/Train, Position   sitting;standing  -    LB Bathing Assess/Train, [place] Level   set up required;stand by assist  -    Recorded by   [KP] Vero York OTR    Upper Body Dressing  Assessment/Training    UB Dressing Assess/Train, Clothing Type   donning:;doffing:;t-shirt  -KP    UB Dressing Assess/Train, Position   sitting  -KP    UB Dressing Assess/Train, [place]   supervision required;set up required  -KP    Recorded by   [KP] Vero York OTR    Lower Body Dressing Assessment/Training    LB Dressing Assess/Train, Clothing Type   doffing:;donning:;pants;socks;shoes  -KP    LB Dressing Assess/Train, Position   sitting;standing  -KP    LB Dressing Assess/Train, [place]   set up required;supervision required  -KP    Recorded by   [KP] KANDY Sultana    Grooming Assessment/Training    Grooming Assess/Train, Position   sitting;sink side  -KP    Grooming Assess/Train, [place] Level   supervision required;set up required  -KP    Recorded by   [KP] KANDY Sultana    Balance Skills Training    Sitting-Level of Assistance  Close supervision;Distant supervision  -KP Close supervision;Distant supervision  -KP    Sitting-Balance Support  Feet supported  -KP Feet supported  -KP    Standing-Level of Assistance  Close supervision  -KP Close supervision  -KP    Static Standing Balance Support  Right upper extremity supported  -KP Left upper extremity supported  -KP    Recorded by  [KP] KANDY Sultana [KP] Vero York OTR    Therapy Exercises    BUE Resistance  theraputty   pinches yellow putty w. each digit and thumb  -KP     Recorded by  [KP] KANDY Sultana     Fine Motor Coordination Training    Detail (Fine Motor Coordination Training)  links paper clip together clipping it on other paper clip w. L hand.  in hand manipulation w. coins in L hand pushing coin up w. L thumb and inserting coin into small slot to inc C and in hand manipulation.   -KP     Recorded by  [KP] KANDY Sultana     Positioning and Restraints    Pre-Treatment Position  in bed  -KP sitting in chair/recliner  -KP    Post Treatment Position   wheelchair  -KP wheelchair  -KP    In Wheelchair  sitting;call light within reach;encouraged to call for assist;with family/caregiver  -KP sitting   in dining room waiting for RT  -KP    Recorded by  [KP] Vero York, OTR [KP] Vero York, OTR      11/07/17 1100 11/07/17 1030 11/07/17 0819    Rehab Assessment/Intervention    Discipline speech language pathologist  -KB speech language pathologist  -KB physical therapist  -MD    Document Type therapy note (daily note)  -KB therapy note (daily note)  -KB therapy note (daily note)  -MD    Subjective Information no complaints;agree to therapy  -KB no complaints;agree to therapy  -KB agree to therapy;no complaints  -MD    Patient Effort, Rehab Treatment good  -KB good  -KB good  -MD    Symptoms Noted During/After Treatment none  -KB none  -KB none  -MD    Precautions/Limitations fall precautions;swallowing precautions  -KB fall precautions;swallowing precautions  -KB fall precautions  -MD    Equipment Issued to Patient   gait belt;front wheeled walker  -MD    Recorded by [KB] Katherine L Bruton [KB] Katherine L Bruton [MD] Nereida Llamas, PT    Pain Assessment    Pain Assessment No/denies pain  -KB No/denies pain  -KB No/denies pain  -MD    Recorded by [KB] Katherine L Bruton [KB] Katherine L Bruton [MD] Nereida Llamas, PT    Vision Assessment/Intervention    Visual Impairment visual impairment, left;inattention to the left  -KB visual impairment, left;inattention to the left  -KB visual impairment, left;inattention to the left  -MD    Recorded by [KB] Katherine L Bruton [KB] Katherine L Bruton [MD] Nereida Llamas, PT    Cognitive Assessment/Intervention    Current Cognitive/Communication Assessment   impaired  -MD    Orientation Status   oriented to;person  -MD    Follows Commands/Answers Questions   100% of the time;needs cueing;needs repetition  -MD    Personal Safety   decreased insight to deficits  -MD    Personal Safety Interventions   fall prevention program  maintained;gait belt;muscle strengthening facilitated;nonskid shoes/slippers when out of bed;supervised activity  -MD    Recorded by   [MD] Nereida Llamas, PT    Improve functional problem solving    Improve functional problem solving through:  complete high level reasoning task  -KB     Status: Improve functional problem solving  Progressing as expected  -KB     Functional Problem Solving Progress  50%;without cues;100%;with consistent cues  -KB     Comments: Improve functional problem solving  Pt required MOD verbal cues throughout logic puzzle for attention and reasoning.   -KB     Recorded by  [KB] Katherine L Bruton     Improve oral skills    Status: Improve Oral Skills  Progressing as expected  -KB     Oral Skills Progress  80%;with inconsistent cues  -KB     Recorded by  [KB] Katherine L Bruton     Improve closure at entrance to airway    Status: Improve closure at entrance to airway Progressing as expected  -KB      Closure at Entrance to Airway Progress continue to adress  -KB      Comments: Improve closure at entrance to airway Pt observed with mid-day meal. 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   -KB      Recorded by [KB] Katherine L Bruton      Improve laryngeal elevation    Status: Improve laryngeal elevation  Progressing as expected  -KB     Laryngeal Elevation Progress  60%;with inconsistent cues;continue to adress  -KB     Comments: Improve laryngeal elevation  Pt completing exercises in therapy session as prescribed.   -KB     Recorded by  [KB] Katherine L Bruton     Improve tongue base & pharyngeal wall squeeze    Status: Improve tongue base & pharyngeal wall squeeze  Progressing as expected  -KB     Tongue Base/Pharyngeal Wall Squeeze Progress  60%;with inconsistent cues;continue to adress  -KB     Comments: Improve tongue base & pharyngeal wall squeeze  Pt completing exercises in therapy sessions as prescribed.   -KB     Recorded by   [KB] Ayleen Bassettton     Bed Mobility, Assessment/Treatment    Bed Mob, Supine to Sit, [place]   not tested  -MD    Bed Mob, Sit to Supine, [place]   not tested  -MD    Recorded by   [MD] Nereida Llamas PT    Transfer Assessment/Treatment    Transfers, Sit-Stand [place]   verbal cues required;stand by assist  -MD    Transfers, Stand-Sit [place]   verbal cues required;stand by assist  -MD    Transfers, Sit-Stand-Sit, Assist Device   rolling walker  -MD    Transfer, Safety Issues   step length decreased  -MD    Transfer, Impairments   strength decreased;impaired balance  -MD    Recorded by   [MD] Nereida Llamas PT    Gait Assessment/Treatment    Gait, [place] Level   verbal cues required;contact guard assist  -MD    Gait, Assistive Device   rolling walker  -MD    Gait, Distance (Feet)   80   x3  -MD    Gait, Gait Deviations   bilateral:;janet decreased;forward flexed posture;step length decreased;toe-to-floor clearance decreased;weight-shifting ability decreased  -MD    Gait, Safety Issues   step length decreased  -MD    Gait, Impairments   impaired balance;strength decreased  -MD    Recorded by   [MD] Nereida Llamas PT    Stairs Assessment/Treatment    Number of Stairs   4  -MD    Stairs, Handrail Location   both sides  -MD    Stairs, [place] Level   supervision required  -MD    Stairs, Technique Used   step over step (ascending);step to step (descending)  -MD    Stairs, Impairments   strength decreased;impaired balance  -MD    Recorded by   [MD] Nereida Llamas PT    Therapy Exercises    Bilateral Lower Extremities   --   nustep 7 min level 2  -MD    Recorded by   [MD] Nereida Llamas PT    Positioning and Restraints    Pre-Treatment Position   sitting in chair/recliner  -MD    Post Treatment Position   wheelchair  -MD    In Wheelchair   patient within staff view   in DR  -MD    Recorded by   [MD] Nereida Llamas PT      11/06/17 1550 11/06/17 1330 11/06/17 1203    Rehab Assessment/Intervention    Discipline  occupational therapist  -KP speech language pathologist  -OC occupational therapist  -KP    Document Type therapy note (daily note)  -KP therapy note (daily note)  -OC therapy note (daily note)  -KP    Subjective Information complains of;agree to therapy;fatigue  -KP no complaints;agree to therapy  -OC agree to therapy;no complaints  -KP    Patient Effort, Rehab Treatment good  -KP good  -OC good  -KP    Symptoms Noted During/After Treatment none  -KP none  -OC none  -KP    Precautions/Limitations fall precautions  -KP  fall precautions  -KP    Recorded by [KP] KANDY Sultana [OC] Lilian Mohr MA,CCC-SLP [KP] Vero York OTR    Pain Assessment    Pain Assessment No/denies pain  -KP No/denies pain  -OC No/denies pain  -KP    Recorded by [KP] KANDY Sultana [OC] Lilian Mohr MA,CCC-SLP [KP] Vero York OTR    Vision Assessment/Intervention    Visual Impairment visual impairment, left;inattention to the left  -KP      Recorded by [KP] KANDY Sultana      Cognitive Assessment/Intervention    Current Cognitive/Communication Assessment impaired  -KP impaired  -OC impaired  -KP    Orientation Status oriented to;person;place;time  -KP oriented to;person;place;time  -OC oriented to;person;place  -KP    Follows Commands/Answers Questions 100% of the time;able to follow single-step instructions  -% of the time;able to follow multi-step instructions;needs cueing;able to follow single-step instructions;needs increased time  -% of the time;able to follow single-step instructions  -KP    Personal Safety mild impairment  -KP  mild impairment  -KP    Personal Safety Interventions fall prevention program maintained;gait belt;muscle strengthening facilitated;nonskid shoes/slippers when out of bed  -KP  fall prevention program maintained;gait belt;nonskid shoes/slippers when out of bed  -KP    Recorded by [KP] KANDY Sultana [OC] Lilian Mohr MA,CCC-SLP  [KP] Vero York, OTR    Improve executive function skills    Status: Improve executive function skills  Progressing as expected  -OC     Executive Function Skills Progress  20%;without cues;70%;with consistent cues   organization task of dates/holidays  -OC     Comments: Improve executive function skills  Pt demonstrated difficulty with cell phone this date  -OC     Recorded by  [OC] Lilian Mohr MA,CCC-SLP     Improve oral skills    Status: Improve Oral Skills  Progressing as expected  -OC     Oral Skills Progress  70%;with inconsistent cues  -OC     Recorded by  [OC] Lilian Mohr MA,CCC-SLP     Improve laryngeal elevation    Status: Improve laryngeal elevation  Progressing as expected  -OC     Laryngeal Elevation Progress  60%;with inconsistent cues  -OC     Recorded by  [OC] Lilian Mohr MA,BEN-SLP     Improve tongue base & pharyngeal wall squeeze    Status: Improve tongue base & pharyngeal wall squeeze  Progressing as expected  -OC     Tongue Base/Pharyngeal Wall Squeeze Progress  60%;with inconsistent cues  -OC     Recorded by  [OC] Lilian Mohr MA,CCC-SLP     Bed Mobility, Assessment/Treatment    Bed Mob, Supine to Sit, [place] not tested  -      Bed Mob, Sit to Supine, [place] set up required;supervision required  -      Bed Mobility, Comment   pt up in   -KP    Recorded by [KP] Vero York, OTR  [KP] Vero York, OTR    Transfer Assessment/Treatment    Transfers, Sit-Stand [place] set up required;supervision required;stand by assist  -  stand by assist;contact guard assist  -    Transfers, Stand-Sit [place] stand by assist  -  set up required;stand by assist  -    Transfers, Sit-Stand-Sit, Assist Device bed rails  -  other (see comments)   wc and grab bars  -    Walk-In Shower Transfer, [place]   set up required;contact guard assist;supervision required  -    Walk-In Shower Transfer, Assist Device   other (see comments)   emy bench   -KP    Recorded by [KP] Vero York OTR  [KP] Vero York OTR    Upper Body Bathing Assessment/Training    UB Bathing Assess/Train Assistive Device   hand-held shower head;tub bench  -KP    UB Bathing Assess/Train, Position   sitting  -KP    UB Bathing Assess/Train, [facility] Level   stand by assist;set up required  -KP    Recorded by   [KP] Vero York OTR    Lower Body Bathing Assessment/Training    LB Bathing Assess/Train Assistive Device   hand-held shower head;grab bars;tub bench  -KP    LB Bathing Assess/Train, Position   sitting;standing  -KP    LB Bathing Assess/Train, [facility] Level   set up required;stand by assist  -KP    Recorded by   [KP] Vero York OTR    Upper Body Dressing Assessment/Training    UB Dressing Assess/Train, Clothing Type   donning:;doffing:;t-shirt  -KP    UB Dressing Assess/Train, Position   sitting  -KP    UB Dressing Assess/Train, [facility]   set up required;supervision required  -KP    Recorded by   [KP] Vero York OTR    Lower Body Dressing Assessment/Training    LB Dressing Assess/Train, Clothing Type   doffing:;donning:;pants;slipper socks  -KP    LB Dressing Assess/Train, Position   sitting;standing  -KP    LB Dressing Assess/Train, [facility]   set up required;contact guard assist;supervision required  -KP    Recorded by   [KP] Vero York OTR    Grooming Assessment/Training    Grooming Assess/Train, Position   sitting  -KP    Grooming Assess/Train, [facility] Level   set up required;supervision required  -KP    Recorded by   [KP] Vero York OTR    Balance Skills Training    Sitting-Level of Assistance   Close supervision  -    Sitting-Balance Support   Feet supported  -KP    Standing-Level of Assistance Close supervision  -KP  Close supervision;Contact guard  -    Static Standing Balance Support Right upper extremity supported  -KP  Right upper extremity supported   intermittent UE  support  -KP    Recorded by [KP] Vero York OTR  [KP] Vero York, SARAR    Therapy Exercises    Left Upper Extremity AROM:;15 reps;sitting;elbow flexion/extension;shoulder extension/flexion  -KP      Recorded by [KP] Vero York OTR      Fine Motor Coordination Training    Detail (Fine Motor Coordination Training) graps pennies w. L hand using index and thumb and inserting into small slot to inc [facility] w. min difficulty. 2pt pinch on sq pegs and inserts them into board x30 to inc [facility] no difficulty. Graps small colored pegs to complete peg design w. very little difficulty.  -KP      Recorded by [KP] Vero York OTR      Positioning and Restraints    Pre-Treatment Position sitting in chair/recliner  -KP  sitting in chair/recliner  -KP    Post Treatment Position bed  -KP  wheelchair  -KP    In Bed supine;call light within reach;exit alarm on;encouraged to call for assist  -KP      In Wheelchair   sitting;call light within reach;encouraged to call for assist  -KP    Recorded by [KP] Vero York OTR  [KP] Vero York OTR      11/06/17 1116          Rehab Assessment/Intervention    Discipline physical therapist  -MD      Document Type therapy note (daily note)  -MD      Subjective Information agree to therapy;no complaints  -MD      Patient Effort, Rehab Treatment good  -MD      Symptoms Noted During/After Treatment none  -MD      Precautions/Limitations fall precautions  -MD      Equipment Issued to Patient gait belt;front wheeled walker  -MD      Recorded by [MD] Nereida Llamas PT      Pain Assessment    Pain Assessment No/denies pain  -MD      Recorded by [MD] Nereida Llamas PT      Vision Assessment/Intervention    Visual Impairment visual impairment, left;inattention to the left;left neglect  -MD      Recorded by [MD] Nereida Llamas PT      Cognitive Assessment/Intervention    Current Cognitive/Communication Assessment impaired  -MD      Orientation Status oriented  to;person;place  -MD      Follows Commands/Answers Questions 100% of the time;needs cueing;needs repetition;needs increased time  -MD      Personal Safety decreased insight to deficits  -MD      Personal Safety Interventions fall prevention program maintained;gait belt;muscle strengthening facilitated;nonskid shoes/slippers when out of bed;supervised activity  -MD      Recorded by [MD] Nereida Llamas PT      Bed Mobility, Assessment/Treatment    Bed Mob, Supine to Sit, [place] not tested  -MD      Bed Mob, Sit to Supine, [place] not tested  -MD      Recorded by [MD] Nereida Llamas PT      Transfer Assessment/Treatment    Transfers, Sit-Stand [place] contact guard assist  -MD      Transfers, Stand-Sit [place] contact guard assist  -MD      Transfers, Sit-Stand-Sit, Assist Device rolling walker  -MD      Recorded by [MD] Nereida Llamas PT      Gait Assessment/Treatment    Gait, [place] Level verbal cues required;contact guard assist  -MD      Gait, Assistive Device rolling walker  -MD      Gait, Distance (Feet) 80  -MD      Gait, Gait Deviations bilateral:;janet decreased;forward flexed posture;step length decreased;toe-to-floor clearance decreased  -MD      Gait, Safety Issues step length decreased  -MD      Gait, Impairments impaired balance;strength decreased  -MD      Gait, Comment 20` on carpet CGA, RWx  -MD      Recorded by [MD] Nereida Llamas PT      Stairs Assessment/Treatment    Number of Stairs 4  -MD      Stairs, Handrail Location both sides  -MD      Stairs, [place] Level verbal cues required;contact guard assist  -MD      Stairs, Technique Used step over step (ascending);step over step (descending)  -MD      Stairs, Impairments strength decreased;impaired balance  -MD      Recorded by [MD] Nereida Llamas PT      Therapy Exercises    BLE Other Reps --   nustep at level 2 for 6 min  -MD      Recorded by [MD] Nereida Llamas PT      Positioning and Restraints    Pre-Treatment Position sitting in  chair/recliner  -MD      Post Treatment Position wheelchair  -MD      In Wheelchair with OT  -MD      Recorded by [MD] Nereida Llamas, PT        User Key  (r) = Recorded By, (t) = Taken By, (c) = Cosigned By    Initials Name Effective Dates    OC Lilian Mohr MA,CCC-SLP 04/05/17 -     KP Veroanne Mendenhall Chela, OTR 04/13/15 -     MD Nereida Llamas, PT 12/01/15 -     KB Katherine L Bruton 09/29/17 -                SLP Goals       11/06/17 1330 11/01/17 1215       Safely Consume Diet    Safely Consume Diet- SLP, Date Established  11/01/17  -OC     Safely Consume Diet- SLP, Time to Achieve by discharge  -OC by discharge  -OC     Safely Consume Diet- SLP, Outcome goal ongoing  -OC goal ongoing  -OC     Cognitive Linguistic- Improve Safety and Awareness    Cognitive Linguistic Improve Safety and Awareness- SLP, Date Established 11/06/17  -OC      Cognitive Linguistic Improve Safety and Awareness- SLP, Time to Achieve by discharge  -OC      Cognitive Linguistic Improve Safety and Awareness- SLP, Outcome goal ongoing  -OC        User Key  (r) = Recorded By, (t) = Taken By, (c) = Cosigned By    Initials Name Provider Type    ALISTAIR Mohr MA,[facility]-SLP Speech and Language Pathologist          EDUCATION  The patient has been educated in the following areas:   Dysphagia (Swallowing Impairment).    SLP Recommendation and Plan      Continue speech therapy 3-5 times per week until discharge.                                     Time Calculation:         Time Calculation- SLP       11/08/17 1330 11/08/17 1030       Time Calculation- SLP    SLP Start Time 1330  -KB 1030  -KB     SLP Stop Time 1400  -KB 1100  -KB     SLP Time Calculation (min) 30 min  -KB 30 min  -KB       User Key  (r) = Recorded By, (t) = Taken By, (c) = Cosigned By    Initials Name Provider Type    KB Katherine L Bruton Speech Therapist          Therapy Charges for Today     Code Description Service Date Service Provider Modifiers Qty    86859599935 [facility] DEV OF COGN  SKILLS EACH 15 MIN 11/7/2017 Katherine L Bruton GN 1    [id number] [facility] ST TREATMENT SWALLOW 3 11/7/2017 Katherine L Bruton GN 1    [id number]  ST DEV OF COGN SKILLS EACH 15 MIN 11/8/2017 Katherine L Bruton GN 2    [id number]  ST TREATMENT SWALLOW 2 11/8/2017 Katherine L Bruton GN 1               Katherine L Bruton  11/8/2017

## 2017-11-08 NOTE — THERAPY TREATMENT NOTE
Inpatient Rehabilitation - Occupational Therapy Treatment Note  [facility]     Patient Name: Alice Maxwell  : 1945  MRN: [id number]  Today's Date: 2017  Onset of Illness/Injury or Date of Surgery Date: 10/28/17  Date of Referral to OT: 17  Referring Physician: Jeremie      Admit Date: 10/31/2017    Visit Dx:     ICD-10-CM ICD-9-CM   1. Oropharyngeal dysphagia R13.12 787.22     Patient Active Problem List   Diagnosis   • Stroke             Adult Rehabilitation Note       17 1152 17 1114 17 1030    Rehab Assessment/Intervention    Discipline occupational therapist  -KP physical therapist  -MD speech language pathologist  -KB    Document Type therapy note (daily note)  -KP therapy note (daily note)  -MD therapy note (daily note)  -KB    Subjective Information agree to therapy;no complaints  -KP agree to therapy;no complaints  -MD no complaints;agree to therapy  -KB    Patient Effort, Rehab Treatment good  -KP good  -MD good  -KB    Symptoms Noted During/After Treatment none  -KP none  -MD none  -KB    Precautions/Limitations fall precautions;swallowing precautions  -KP fall precautions  -MD fall precautions;swallowing precautions  -KB    Precautions/Limitations, Vision   WFL with corrective lenses  -KB    Equipment Issued to Patient  gait belt;front wheeled walker  -MD     Recorded by [KP] Vero York, OTR [MD] Nereida Llamas, PT [KB] Katherine L Bruton    Pain Assessment    Pain Assessment No/denies pain  -KP No/denies pain  -MD 0-10  -KB    Pain Score 0  -KP      Post Pain Score   7  -KB    Pain Location   Toe (Comment which one)  -KB    Pain Orientation   Left;Right  -KB    Pain Intervention(s)   Emotional support   Pt did not want medications.  -KB    Recorded by [] Vero York OTR [MD] Nereida Llamas, PT [KB] Katherine L Bruton    Vision Assessment/Intervention    Visual Impairment visual impairment, left  -KP visual impairment, left  -MD WFL with corrective  lenses  -KB    Recorded by [KP] Vero York, OTR [MD] Nereida Llamas, PT [KB] Katherine L Bruton    Cognitive Assessment/Intervention    Current Cognitive/Communication Assessment impaired  -KP impaired  -MD     Orientation Status oriented to;person;place;time  -KP oriented to;person;place;time  -MD     Follows Commands/Answers Questions 100% of the time;able to follow single-step instructions;needs cueing  -% of the time;needs cueing;needs repetition  -MD     Personal Safety mild impairment  -KP impulsive;decreased insight to deficits  -MD     Personal Safety Interventions fall prevention program maintained;gait belt;nonskid shoes/slippers when out of bed  -KP fall prevention program maintained;gait belt;muscle strengthening facilitated;nonskid shoes/slippers when out of bed;supervised activity  -MD     Recorded by [KP] Vero York, OTR [MD] Nereida Llamas, PT     Improve functional problem solving    Improve functional problem solving through:   complete high level reasoning task  -KB    Status: Improve functional problem solving   Progressing as expected  -KB    Functional Problem Solving Progress   80%;without cues;100%;with inconsistent cues  -KB    Comments: Improve functional problem solving   Pt completed high-level logic puzzle with MIN verbal cues for attention to detail.   -KB    Recorded by   [KB] Katherine L Bruton    Improve oral skills    To Improve Oral Skills, patient will:   --   pucker/smile, pucker left/right, suck in/puff out cheeks  -KB    Status: Improve Oral Skills   Progressing as expected  -KB    Oral Skills Progress   continue to adress  -KB    Comments: Improve Oral Skills   Completed exercises without cues.  -KB    Recorded by   [KB] Katherine L Bruton    Improve closure at entrance to airway    To improve closure at entrance to airway, patient will:   --   hard swallows  -KB    Status: Improve closure at entrance to airway   Progressing as expected  -KB    Closure at  Entrance to Airway Progress   continue to adress  -KB    Comments: Improve closure at entrance to airway   Completed daily exercises to address airway closure without cues.  -KB    Recorded by   [KB] Katherine L Bruton    Improve laryngeal elevation    To improve laryngeal elevation, patient will:   --   pitch glide, high pitch vowel, humming in falsetto  -KB    Status: Improve laryngeal elevation   Progressing as expected  -KB    Laryngeal Elevation Progress   continue to adress  -KB    Comments: Improve laryngeal elevation   Completed exercises to addres laryngeal elevation without cues.   -KB    Recorded by   [KB] Katherine L Bruton    Improve tongue base & pharyngeal wall squeeze    To improve tongue base & pharyngeal wall squeeze, patient will:   Complete effortful swallow;Complete gargle/hold retraction  -KB    Status: Improve tongue base & pharyngeal wall squeeze   Progressing as expected  -KB    Tongue Base/Pharyngeal Wall Squeeze Progress   continue to adress  -KB    Comments: Improve tongue base & pharyngeal wall squeeze   Completed exercises without cues.  -KB    Recorded by   [KB] Katherine L Bruton    Bed Mobility, Assessment/Treatment    Bed Mobility, Comment pt up in   -KP      Recorded by [KP] Vero York, OTR      Transfer Assessment/Treatment    Transfers, Sit-Stand [place] supervision required  - stand by assist  -MD     Transfers, Stand-Sit [place] supervision required  - stand by assist  -MD     Transfers, Sit-Stand-Sit, Assist Device other (see comments)     - rolling walker  -MD     Walk-In Shower Transfer, [place] set up required;supervision required  -      Walk-In Shower Transfer, Assist Device other (see comments)   tub bench  -KP      Transfer, Safety Issues  step length decreased  -MD     Transfer, Impairments  strength decreased;impaired balance  -MD     Recorded by [KP] Vero York, OTR [MD] Nereida Llamas, PT     Gait Assessment/Treatment     Gait, [place] Level  verbal cues required;contact guard assist  -MD     Gait, Assistive Device  rolling walker  -MD     Gait, Distance (Feet)  80  -MD     Gait, Gait Deviations  bilateral:;janet decreased;forward flexed posture;step length decreased  -MD     Gait, Safety Issues  sequencing ability decreased  -MD     Gait, Impairments  impaired balance;strength decreased  -MD     Recorded by  [MD] Nereida Llamas, PT     Upper Body Bathing Assessment/Training    UB Bathing Assess/Train Assistive Device hand-held shower head;tub bench  -      UB Bathing Assess/Train, Position sitting  -      UB Bathing Assess/Train, [place] Level set up required;stand by assist  -      Recorded by [] Vero York, OTR      Lower Body Bathing Assessment/Training    LB Bathing Assess/Train Assistive Device grab bars;hand-held shower head;tub bench  -      LB Bathing Assess/Train, Position sitting;standing  -      LB Bathing Assess/Train, [place] Level set up required;stand by assist  -      Recorded by [] Vero York, SARAR      Upper Body Dressing Assessment/Training    UB Dressing Assess/Train, Clothing Type doffing:;donning:;t-shirt  -      UB Dressing Assess/Train, Position sitting  -      UB Dressing Assess/Train, [place] set up required;supervision required  -      Recorded by [] Vero York OTR      Lower Body Dressing Assessment/Training    LB Dressing Assess/Train, Clothing Type doffing:;donning:;socks;shoes;pants  -      LB Dressing Assess/Train, Position sitting;standing  -      LB Dressing Assess/Train, [place] set up required;supervision required  -      LB Dressing Assess/Train, Comment holds onto grab bar intermittently  -      Recorded by [] Vero York, OTR      Grooming Assessment/Training    Grooming Assess/Train, Position sitting  -      Grooming Assess/Train, [place] Level set up required;supervision required  -       Recorded by [KP] KANDY Sultana      Balance Skills Training    Sitting-Level of Assistance Close supervision;Distant supervision  -      Sitting-Balance Support Feet supported  -KP      Standing-Level of Assistance Close supervision  -KP      Recorded by [KP] KANDY Sultana      Positioning and Restraints    Pre-Treatment Position sitting in chair/recliner  -KP sitting in chair/recliner  -MD     Post Treatment Position wheelchair  -KP      In Wheelchair sitting;call light within reach;encouraged to call for assist  -KP      Recorded by [KP] KANDY Sultana [MD] Nereida Llamas, PT       11/07/17 1532 11/07/17 1201 11/07/17 1100    Rehab Assessment/Intervention    Discipline occupational therapist  -KP occupational therapist  -KP speech language pathologist  -KB    Document Type therapy note (daily note)  - therapy note (daily note)  - therapy note (daily note)  -KB    Subjective Information agree to therapy;no complaints  -KP agree to therapy;no complaints  -KP no complaints;agree to therapy  -KB    Patient Effort, Rehab Treatment good  -KP good  -KP good  -KB    Symptoms Noted During/After Treatment none  -KP none  -KP none  -KB    Precautions/Limitations fall precautions;swallowing precautions  -KP fall precautions;swallowing precautions  - fall precautions;swallowing precautions  -KB    Recorded by [KP] KANDY Sultana [KP] KANDY Sultana [KB] Katherine L Bruton    Pain Assessment    Pain Assessment No/denies pain  -KP No/denies pain  -KP No/denies pain  -KB    Recorded by [KP] KANDY Sultana [KP] KANDY Sultana [KB] Katherine L Bruton    Vision Assessment/Intervention    Visual Impairment visual impairment, left  -KP visual impairment, left  -KP visual impairment, left;inattention to the left  -KB    Recorded by [KP] KANDY Sultana [KP] KANDY Sultana [KB] Katherine L Bruton    Cognitive  Assessment/Intervention    Current Cognitive/Communication Assessment impaired  -KP impaired  -KP     Orientation Status oriented to;person;place;time  -KP oriented to;person;place;time  -KP     Follows Commands/Answers Questions 100% of the time;able to follow single-step instructions;needs cueing  -% of the time;able to follow single-step instructions  -KP     Personal Safety mild impairment  -KP mild impairment  -KP     Personal Safety Interventions fall prevention program maintained;gait belt;muscle strengthening facilitated;nonskid shoes/slippers when out of bed  -KP fall prevention program maintained;gait belt;nonskid shoes/slippers when out of bed  -KP     Recorded by [KP] Vero York, OTR [KP] Vero York, OTR     Improve closure at entrance to airway    Status: Improve closure at entrance to airway   Progressing as expected  -KB    Closure at Entrance to Airway Progress   continue to adress  -KB    Comments: Improve closure at entrance to airway   Pt observed with mid-day meal. 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   -KB    Recorded by   [KB] Katherine L Bruton    Bed Mobility, Assessment/Treatment    Bed Mob, Supine to Sit, [place] supervision required;set up required  -      Bed Mob, Sit to Supine, [place] not tested  -      Bed Mobility, Comment  up in [facility]     Recorded by [KP] Vero York, OTR [KP] Vero York, OTR     Transfer Assessment/Treatment    Transfers, Bed-Chair [place] stand by assist  -KP      Transfers, Chair-Bed [place] not tested  -      Transfers, Bed-Chair-Bed, Assist Device other (see comments)     -      Transfers, Sit-Stand [place] stand by assist  -KP stand by assist  -KP     Transfers, Stand-Sit [place] stand by assist  -KP stand by assist  -KP     Transfers, Sit-Stand-Sit, Assist Device other (see comments)     - other (see  comments)   wc grab bar  -KP     Transfer, Comment  doesn't wantn to shower today. her hair is was fixed last night by dg  -KP     Recorded by [KP] Vero York OTR [KP] Vero York OTR     Upper Body Bathing Assessment/Training    UB Bathing Assess/Train, Position  sitting;sink side  -KP     UB Bathing Assess/Train, [place] Level  stand by assist;set up required  -KP     Recorded by  [KP] Vero York OTR     Lower Body Bathing Assessment/Training    LB Bathing Assess/Train, Position  sitting;standing  -KP     LB Bathing Assess/Train, [place] Level  set up required;stand by assist  -KP     Recorded by  [KP] Vero York OTR     Upper Body Dressing Assessment/Training    UB Dressing Assess/Train, Clothing Type  donning:;doffing:;t-shirt  -KP     UB Dressing Assess/Train, Position  sitting  -KP     UB Dressing Assess/Train, [place]  supervision required;set up required  -KP     Recorded by  [KP] Vero York OTR     Lower Body Dressing Assessment/Training    LB Dressing Assess/Train, Clothing Type  doffing:;donning:;pants;socks;shoes  -KP     LB Dressing Assess/Train, Position  sitting;standing  -KP     LB Dressing Assess/Train, [place]  set up required;supervision required  -KP     Recorded by  [KP] Vero York OTR     Grooming Assessment/Training    Grooming Assess/Train, Position  sitting;sink side  -KP     Grooming Assess/Train, [place] Level  supervision required;set up required  -KP     Recorded by  [KP] Vero York OTR     Balance Skills Training    Sitting-Level of Assistance Close supervision;Distant supervision  - Close supervision;Distant supervision  -KP     Sitting-Balance Support Feet supported  - Feet supported  -     Standing-Level of Assistance Close supervision  - Close supervision  -     Static Standing Balance Support Right upper extremity supported  - Left upper extremity supported  -      Recorded by [KP] Vero York OTR [KP] Vero York, OTR     Therapy Exercises    BUE Resistance theraputty   pinches yellow putty w. each digit and thumb  -KP      Recorded by [KP] Vero York OTR      Fine Motor Coordination Training    Detail (Fine Motor Coordination Training) links paper clip together clipping it on other paper clip w. L hand.  in hand manipulation w. coins in L hand pushing coin up w. L thumb and inserting coin into small slot to inc FMC and in hand manipulation.   -KP      Recorded by [KP] Vero York OTR      Positioning and Restraints    Pre-Treatment Position in bed  -KP sitting in chair/recliner  -KP     Post Treatment Position wheelchair  -KP wheelchair  -KP     In Wheelchair sitting;call light within reach;encouraged to call for assist;with family/caregiver  -KP sitting   in dining room waiting for RT  -KP     Recorded by [KP] Vero York OTR [KP] Vero York OTR       11/07/17 1030 11/07/17 0819 11/06/17 1550    Rehab Assessment/Intervention    Discipline speech language pathologist  -KB physical therapist  -MD occupational therapist  -KP    Document Type therapy note (daily note)  -KB therapy note (daily note)  -MD therapy note (daily note)  -KP    Subjective Information no complaints;agree to therapy  -KB agree to therapy;no complaints  -MD complains of;agree to therapy;fatigue  -KP    Patient Effort, Rehab Treatment good  -KB good  -MD good  -KP    Symptoms Noted During/After Treatment none  -KB none  -MD none  -KP    Precautions/Limitations fall precautions;swallowing precautions  -KB fall precautions  -MD fall precautions  -KP    Equipment Issued to Patient  gait belt;front wheeled walker  -MD     Recorded by [KB] Katherine L Bruton [MD] Nereida Llamas, PT [KP] Vero York, OTR    Pain Assessment    Pain Assessment No/denies pain  -KB No/denies pain  -MD No/denies pain  -KP    Recorded by [KB] Katherine L Bruton  [MD] Nereida Llamas, PT [KP] Vero York, OTR    Vision Assessment/Intervention    Visual Impairment visual impairment, left;inattention to the left  -KB visual impairment, left;inattention to the left  -MD visual impairment, left;inattention to the left  -KP    Recorded by [KB] Katherine L Bruton [MD] Nereida Llamas, PT [KP] Vero York, OTR    Cognitive Assessment/Intervention    Current Cognitive/Communication Assessment  impaired  -MD impaired  -KP    Orientation Status  oriented to;person  -MD oriented to;person;place;time  -KP    Follows Commands/Answers Questions  100% of the time;needs cueing;needs repetition  -% of the time;able to follow single-step instructions  -KP    Personal Safety  decreased insight to deficits  -MD mild impairment  -KP    Personal Safety Interventions  fall prevention program maintained;gait belt;muscle strengthening facilitated;nonskid shoes/slippers when out of bed;supervised activity  -MD fall prevention program maintained;gait belt;muscle strengthening facilitated;nonskid shoes/slippers when out of bed  -KP    Recorded by  [MD] Nereida Llamas, PT [KP] Vero York, OTR    Improve functional problem solving    Improve functional problem solving through: complete high level reasoning task  -KB      Status: Improve functional problem solving Progressing as expected  -KB      Functional Problem Solving Progress 50%;without cues;100%;with consistent cues  -KB      Comments: Improve functional problem solving Pt required MOD verbal cues throughout logic puzzle for attention and reasoning.   -KB      Recorded by [KB] Katherine L Bruton      Improve oral skills    Status: Improve Oral Skills Progressing as expected  -KB      Oral Skills Progress 80%;with inconsistent cues  -KB      Recorded by [KB] Katherine L Bruton      Improve laryngeal elevation    Status: Improve laryngeal elevation Progressing as expected  -KB      Laryngeal Elevation Progress 60%;with  inconsistent cues;continue to adress  -KB      Comments: Improve laryngeal elevation Pt completing exercises in therapy session as prescribed.   -KB      Recorded by [KB] Katherine L Bruton      Improve tongue base & pharyngeal wall squeeze    Status: Improve tongue base & pharyngeal wall squeeze Progressing as expected  -KB      Tongue Base/Pharyngeal Wall Squeeze Progress 60%;with inconsistent cues;continue to adress  -KB      Comments: Improve tongue base & pharyngeal wall squeeze Pt completing exercises in therapy sessions as prescribed.   -KB      Recorded by [KB] Katherine L Bruton      Bed Mobility, Assessment/Treatment    Bed Mob, Supine to Sit, [place]  not tested  -MD not tested  -    Bed Mob, Sit to Supine, [place]  not tested  -MD set up required;supervision required  -    Recorded by  [MD] Nereida Llamas, PT [KP] Vero York, OTR    Transfer Assessment/Treatment    Transfers, Sit-Stand [place]  verbal cues required;stand by assist  -MD set up required;supervision required;stand by assist  -    Transfers, Stand-Sit [place]  verbal cues required;stand by assist  -MD stand by assist  -    Transfers, Sit-Stand-Sit, Assist Device  rolling walker  -MD bed rails  -    Transfer, Safety Issues  step length decreased  -MD     Transfer, Impairments  strength decreased;impaired balance  -MD     Recorded by  [MD] Nereida Llamas, PT [KP] Vero York, OTR    Gait Assessment/Treatment    Gait, [place] Level  verbal cues required;contact guard assist  -MD     Gait, Assistive Device  rolling walker  -MD     Gait, Distance (Feet)  80   x3  -MD     Gait, Gait Deviations  bilateral:;janet decreased;forward flexed posture;step length decreased;toe-to-floor clearance decreased;weight-shifting ability decreased  -MD     Gait, Safety Issues  step length decreased  -MD     Gait, Impairments  impaired balance;strength decreased  -MD     Recorded by  [MD] Nereida Llamas, PT     Stairs  Assessment/Treatment    Number of Stairs  4  -MD     Stairs, Handrail Location  both sides  -MD     Stairs, [place] Level  supervision required  -MD     Stairs, Technique Used  step over step (ascending);step to step (descending)  -MD     Stairs, Impairments  strength decreased;impaired balance  -MD     Recorded by  [MD] Nereida Llamas PT     Balance Skills Training    Standing-Level of Assistance   Close supervision  -KP    Static Standing Balance Support   Right upper extremity supported  -KP    Recorded by   [KP] Vero York OTR    Therapy Exercises    Bilateral Lower Extremities  --   nustep 7 min level 2  -MD     Left Upper Extremity   AROM:;15 reps;sitting;elbow flexion/extension;shoulder extension/flexion  -KP    Recorded by  [MD] Nereida Llamas PT [KP] Vero York, SARAR    Fine Motor Coordination Training    Detail (Fine Motor Coordination Training)   graps pennies w. L hand using index and thumb and inserting into small slot to inc FMC w. min difficulty. 2pt pinch on sq pegs and inserts them into board x30 to inc FMC no difficulty. Graps small colored pegs to complete peg design w. very little difficulty.  -KP    Recorded by   [KP] KANDY Sultana    Positioning and Restraints    Pre-Treatment Position  sitting in chair/recliner  -MD sitting in chair/recliner  -KP    Post Treatment Position  wheelchair  -MD bed  -KP    In Bed   supine;call light within reach;exit alarm on;encouraged to call for assist  -KP    In Wheelchair  patient within staff view   in DR MONTIEL     Recorded by  [MD] Nereida Llamas PT [KP] KANDY Sultana      11/06/17 1330 11/06/17 1203 11/06/17 1116    Rehab Assessment/Intervention    Discipline speech language pathologist  -OC occupational therapist  -KP physical therapist  -MD    Document Type therapy note (daily note)  -OC therapy note (daily note)  -KP therapy note (daily note)  -MD    Subjective Information no complaints;agree to therapy  -OC agree  to therapy;no complaints  -KP agree to therapy;no complaints  -MD    Patient Effort, Rehab Treatment good  -OC good  -KP good  -MD    Symptoms Noted During/After Treatment none  -OC none  -KP none  -MD    Precautions/Limitations  fall precautions  -KP fall precautions  -MD    Equipment Issued to Patient   gait belt;front wheeled walker  -MD    Recorded by [OC] Lilian Mohr MA,CCC-SLP [KP] Vero York, OTR [MD] Nereida Llamas, PT    Pain Assessment    Pain Assessment No/denies pain  -OC No/denies pain  -KP No/denies pain  -MD    Recorded by [OC] Lilian Mohr MA,CCC-SLP [KP] Vero York, OTR [MD] Nereida Llamas, PT    Vision Assessment/Intervention    Visual Impairment   visual impairment, left;inattention to the left;left neglect  -MD    Recorded by   [MD] Nereida Llamas PT    Cognitive Assessment/Intervention    Current Cognitive/Communication Assessment impaired  -OC impaired  -KP impaired  -MD    Orientation Status oriented to;person;place;time  -OC oriented to;person;place  -KP oriented to;person;place  -MD    Follows Commands/Answers Questions 100% of the time;able to follow multi-step instructions;needs cueing;able to follow single-step instructions;needs increased time  -% of the time;able to follow single-step instructions  -% of the time;needs cueing;needs repetition;needs increased time  -MD    Personal Safety  mild impairment  -KP decreased insight to deficits  -MD    Personal Safety Interventions  fall prevention program maintained;gait belt;nonskid shoes/slippers when out of bed  -KP fall prevention program maintained;gait belt;muscle strengthening facilitated;nonskid shoes/slippers when out of bed;supervised activity  -MD    Recorded by [OC] Lilian Mohr MA,CCC-SLP [KP] Vero York, OTR [MD] Nereida Llamas, PT    Improve executive function skills    Status: Improve executive function skills Progressing as expected  -OC      Executive Function Skills Progress 20%;without  cues;70%;with consistent cues   organization task of dates/holidays  -OC      Comments: Improve executive function skills Pt demonstrated difficulty with cell phone this date  -OC      Recorded by [OC] Lilian Mohr MA,CCC-SLP      Improve oral skills    Status: Improve Oral Skills Progressing as expected  -OC      Oral Skills Progress 70%;with inconsistent cues  -OC      Recorded by [OC] Lilian Mohr MA,CCC-SLP      Improve laryngeal elevation    Status: Improve laryngeal elevation Progressing as expected  -OC      Laryngeal Elevation Progress 60%;with inconsistent cues  -OC      Recorded by [OC] Lilian Mohr MA,CCC-SLP      Improve tongue base & pharyngeal wall squeeze    Status: Improve tongue base & pharyngeal wall squeeze Progressing as expected  -OC      Tongue Base/Pharyngeal Wall Squeeze Progress 60%;with inconsistent cues  -OC      Recorded by [OC] Lilian Mohr MA,CCC-SLP      Bed Mobility, Assessment/Treatment    Bed Mob, Supine to Sit, [place]   not tested  -MD    Bed Mob, Sit to Supine, [place]   not tested  -MD    Bed Mobility, Comment  pt up in   -KP     Recorded by  [KP] Vero York, OTR [MD] Nereida Llamas, PT    Transfer Assessment/Treatment    Transfers, Sit-Stand [place]  stand by assist;contact guard assist  -KP contact guard assist  -MD    Transfers, Stand-Sit [place]  set up required;stand by assist  - contact guard assist  -MD    Transfers, Sit-Stand-Sit, Assist Device  other (see comments)   wc and grab bars  -KP rolling walker  -MD    Walk-In Shower Transfer, [place]  set up required;contact guard assist;supervision required  -     Walk-In Shower Transfer, Assist Device  other (see comments)   emy bench  -KP     Recorded by  [] Vero York, OTR [MD] Nereida Llamas, PT    Gait Assessment/Treatment    Gait, [place] Level   verbal cues required;contact guard assist  -MD    Gait, Assistive Device   rolling walker  -MD    Gait, Distance (Feet)   80   -MD    Gait, Gait Deviations   bilateral:;janet decreased;forward flexed posture;step length decreased;toe-to-floor clearance decreased  -MD    Gait, Safety Issues   step length decreased  -MD    Gait, Impairments   impaired balance;strength decreased  -MD    Gait, Comment   20` on carpet CGA, RWx  -MD    Recorded by   [MD] Nereida Llamas PT    Stairs Assessment/Treatment    Number of Stairs   4  -MD    Stairs, Handrail Location   both sides  -MD    Stairs, [place] Level   verbal cues required;contact guard assist  -MD    Stairs, Technique Used   step over step (ascending);step over step (descending)  -MD    Stairs, Impairments   strength decreased;impaired balance  -MD    Recorded by   [MD] Nereida Llamas PT    Upper Body Bathing Assessment/Training    UB Bathing Assess/Train Assistive Device  hand-held shower head;tub bench  -     UB Bathing Assess/Train, Position  sitting  -     UB Bathing Assess/Train, [place] Level  stand by assist;set up required  -     Recorded by  [KP] Vero York OTR     Lower Body Bathing Assessment/Training    LB Bathing Assess/Train Assistive Device  hand-held shower head;grab bars;tub bench  -     LB Bathing Assess/Train, Position  sitting;standing  -     LB Bathing Assess/Train, [place] Level  set up required;stand by assist  -     Recorded by  [KP] Vero York OTR     Upper Body Dressing Assessment/Training    UB Dressing Assess/Train, Clothing Type  donning:;doffing:;t-shirt  -     UB Dressing Assess/Train, Position  sitting  -     UB Dressing Assess/Train, Edd[place]  set up required;supervision required  -     Recorded by  [KP] Vero York OTR     Lower Body Dressing Assessment/Training    LB Dressing Assess/Train, Clothing Type  doffing:;donning:;pants;slipper socks  -     LB Dressing Assess/Train, Position  sitting;standing  -     LB Dressing Assess/Train, Edd[place]  set up required;contact guard  assist;supervision required  -KP     Recorded by  [KP] Vero York, OTR     Grooming Assessment/Training    Grooming Assess/Train, Position  sitting  -KP     Grooming Assess/Train, Indepen Level  set up required;supervision required  -KP     Recorded by  [KP] Vero York, OTR     Balance Skills Training    Sitting-Level of Assistance  Close supervision  -KP     Sitting-Balance Support  Feet supported  -KP     Standing-Level of Assistance  Close supervision;Contact guard  -KP     Static Standing Balance Support  Right upper extremity supported   intermittent UE support  -KP     Recorded by  [KP] Vero York, OTR     Therapy Exercises    BLE Other Reps   --   nustep at level 2 for 6 min  -MD    Recorded by   [MD] Nereida Llamas, PT    Positioning and Restraints    Pre-Treatment Position  sitting in chair/recliner  -KP sitting in chair/recliner  -MD    Post Treatment Position  wheelchair  -KP wheelchair  -MD    In Wheelchair  sitting;call light within reach;encouraged to call for assist  -KP with OT  -MD    Recorded by  [KP] Vero York, OTR [MD] Nereida Llamas, PT      User Key  (r) = Recorded By, (t) = Taken By, (c) = Cosigned By    Initials Name Effective Dates    OC Lilian Mohr MA,CCC-SLP 04/05/17 -     BRIANNA York, OTR 04/13/15 -     MD Nereida Llamas, PT 12/01/15 -     KB Katherine L Bruton 09/29/17 -                 OT Goals       11/01/17 1554 11/01/17 1230       Transfer Training OT STG    Transfer Training OT STG, Date Established  11/01/17  -SO     Transfer Training OT STG, Time to Achieve  1 wk  -SO     Transfer Training OT STG, Activity Type  tub;walk-in shower;shower chair  -SO     Transfer Training OT STG, [place] Level  contact guard assist  -SO     Transfer Training OT STG, Assist Device  walker, rolling;shower chair  -SO     Transfer Training OT LTG    Transfer Training OT LTG, Date Established  11/01/17  -SO     Transfer Training OT LTG, Time to Achieve  by  discharge  -SO     Transfer Training OT LTG, Activity Type  tub;walk-in shower;shower chair  -SO     Transfer Training OT LTG, [place] Level  supervision required  -SO     Transfer Training OT LTG, Assist Device  walker, rolling;shower chair  -SO     Transfer Training 2 OT STG    Transfer Training 2 OT STG, Date Established  11/01/17  -SO     Transfer Training 2 OT STG, Time to Achieve  1 wk  -SO     Transfer Training 2 OT STG, Activity Type  toilet  -SO     Transfer Training 2 OT STG, [place] Level  contact guard assist  -SO     Transfer Training 2 OT STG, Assist Device  walker, rolling  -SO     Transfer Training 2 OT LTG    Transfer Training 2 OT LTG, Date Established  11/01/17  -SO     Transfer Training 2 OT LTG, Time to Achieve  by discharge  -SO     Transfer Training 2 OT LTG, Activity Type  toilet  -SO     Transfer Training 2 OT LTG, [place] Level  supervision required  -SO     Transfer Training 2 OT LTG, Assist Device  walker, rolling  -SO     Strength OT LTG    Strength Goal OT LTG, Date Established 11/01/17  -SO      Strength Goal OT LTG, Time to Achieve by discharge  -SO      Strength Goal OT LTG, Measure to Achieve Pt to increase LUE strength to 4-/5 to increase independence with ADLs.  -SO      Caregiver Training OT LTG    Caregiver Training OT LTG, Date Established  11/01/17  -SO     Caregiver Training OT LTG, Time to Achieve  by discharge  -SO     Caregiver Training OT LTG, [place] Level  able to assist adequately;able to cue patient adequately  -SO     Patient Education OT LTG    Patient Education OT LTG, Date Established  11/01/17  -SO     Patient Education OT LTG, Time to Achieve  by discharge  -SO     Patient Education OT LTG, Education Type  written program;HEP;aware of neuro deficits;home safety  -SO     Patient Education OT LTG, Education Understanding  independent;demonstrates adequately;verbalizes understanding  -SO     Isolated Movement OT LTG    Isolated Movement OT LTG,  Date Established 11/01/17  -SO      Isolated Movement OT LTG, Time to Achieve by discharge  -SO      Isolated Movement OT LTG, Body Area left scapula;left shoulder;left elbow;left forearm;left wrist;left fingers  -SO      Isolated Movement OT LTG, Level WFL  -SO      Coordination OT LTG    Coordination OT LTG, Date Established 11/01/17  -SO      Coordination OT LTG, Time to Achieve by discharge  -SO      Coordination OT LTG, Activity Type FM written ex program;GM written ex program;FM task;GM task  -SO      Coordination OT LTG, [place] Level independent  -SO      Coordination OT LTG, Additional Goal LUE  -SO      ADL OT STG    ADL OT STG, Date Established  11/01/17  -SO     ADL OT STG, Time to Achieve  1 wk  -SO     ADL OT STG, Activity Type  ADL skills  -SO     ADL OT STG, [place] Level  contact guard;assistive device  -SO     ADL OT LTG    ADL OT LTG, Date Established  11/01/17  -SO     ADL OT LTG, Time to Achieve  by discharge  -SO     ADL OT LTG, Activity Type  ADL skills  -SO     ADL OT LTG, [place] Level  standby assist;assistive device  -SO       User Key  (r) = Recorded By, (t) = Taken By, (c) = Cosigned By    Initials Name Provider Type    SO Micki Castro OTR Occupational Therapist          Occupational Therapy Education     Title: PT OT SLP Therapies (Active)     Topic: Occupational Therapy (Active)     Point: ADL training (Done)    Description: Instruct learner(s) on proper safety adaptation and remediation techniques during self care or transfers.   Instruct in proper use of assistive devices.    Learning Progress Summary    Learner Readiness Method Response Comment Documented by Status   Patient Acceptance NOEMY YOON DU ed pt on shower tsf and safety w. tsf. pt demo w. SBA. tub bench and grab bars  11/08/17 1157 Done    Acceptance NOEMY YOON NR ed pt on shower tsf technique. ed pt on bathing safety. pt demo shower w. CGA to SBA. mostly SBA.  11/06/17 1207 Done    Acceptance CAR CLARK  Discuss OT goals and POC.  11/01/17 1232 Done   Family Acceptance E VU Discuss OT goals and POC.  11/01/17 1232 Done               Point: Precautions (Done)    Description: Instruct learner(s) on prescribed precautions during self-care and functional transfers.    Learning Progress Summary    Learner Readiness Method Response Comment Documented by Status   Patient Acceptance NOEMY YOON,NR ed pt on shower tsf technique. ed pt on bathing safety. pt demo shower w. CGA to SBA. mostly SBA.  11/06/17 1207 Done               Point: Body mechanics (Done)    Description: Instruct learner(s) on proper positioning and spine alignment during self-care, functional mobility activities and/or exercises.    Learning Progress Summary    Learner Readiness Method Response Comment Documented by Status   Patient Acceptance NOEMY YOON,DU ed pt on shower tsf and safety w. tsf. pt demo w. SBA. tub bench and grab bars  11/08/17 1157 Done    Acceptance NOEMY YOON,NR ed pt on shower tsf technique. ed pt on bathing safety. pt demo shower w. CGA to SBA. mostly SBA.  11/06/17 1207 Done                      User Key     Initials Effective Dates Name Provider Type Discipline     04/13/15 -  Micki Castro OTJOSSIE Occupational Therapist OT     04/13/15 -  KANDY Sultana Occupational Therapist OT                  OT Recommendation and Plan  Therapy Frequency: 5 times/wk          Time Calculation:         Time Calculation- OT       11/08/17 1157          Time Calculation- OT    OT Start Time 0830  -      OT Stop Time 0900  -      OT Time Calculation (min) 30 min  -        User Key  (r) = Recorded By, (t) = Taken By, (c) = Cosigned By    Initials Name Provider Type     Vero York OTJOSSIE Occupational Therapist           Therapy Charges for Today     Code Description Service Date Service Provider Modifiers Qty    36378611493 HC OT SELF CARE/MGMT/TRAIN EA 15 MIN 11/7/2017 KANDY Sultana GO 2    [id number]  HC OT NEUROMUSC RE EDUCATION EA 15 MIN 11/7/2017 Vero York, OTR GO 2    90582887333 HC OT SELF CARE/MGMT/TRAIN EA 15 MIN 11/8/2017 KANDY Sultana GO 2               Vero York, KANDY  11/8/2017

## 2017-11-08 NOTE — PROGRESS NOTES
Inpatient Rehabilitation Plan of Care Note    Plan of Care  Updated Problems/Interventions  Medical Problem(s)    BNE (Active)  Att'n. - WNL  Exec. Fx. - Mildly Imp., slowed processing speed  Rsng/Jgmnt - WNL  Arith - Mildly Imp.  Visuospatial Skills - Mildly Imp.  Visual Mem. - Mildly Imp.  Verbal Mem. - Min Imp.  Emot. - Pt reports improved mood, but mild anx    Signed by: aCmelia Sharp.d

## 2017-11-08 NOTE — PLAN OF CARE
Problem: Patient Care Overview (Adult)  Goal: Plan of Care Review  Outcome: Ongoing (interventions implemented as appropriate)    11/08/17 0131   Coping/Psychosocial Response Interventions   Plan Of Care Reviewed With patient   Patient Care Overview   Progress improving   Outcome Evaluation   Outcome Summary/Follow up Plan Patient calm and cooperative tonight.  at BS. Patient likes to take her Melatonin at 2300. Patient had no c/o pain and no unsafe behaviors.          Problem: Stroke (Ischemic) (Adult)  Goal: Signs and Symptoms of Listed Potential Problems Will be Absent or Manageable (Stroke)  Outcome: Ongoing (interventions implemented as appropriate)    11/08/17 0131   Stroke (Ischemic)   Problems Assessed (Stroke (Ischemic)/TIA) all   Problems Present (Stroke (Ischemic)/TIA) motor/sensory impairment         Problem: Fall Risk (Adult)  Goal: Absence of Falls  Outcome: Ongoing (interventions implemented as appropriate)    11/08/17 0131   Fall Risk (Adult)   Absence of Falls making progress toward outcome

## 2017-11-08 NOTE — PROGRESS NOTES
"   LOS: 8 days   Patient Care Team:  Calvin Dhillon Jr., DO as PCP - General (Internal Medicine)    Chief Complaint:   S/p R MCA ischemic infarct  Stroke prophylaxis - ASA 81 mg and Lovenox transition to coumadin  Hypercoaguability disorder - Hereditary thrombophilia  Homozygous MTHFR with hyperhomocystinemia  Homozygous factor V Leiden gene mutation  history of malignant neoplasm of breast   Obesity   Hyperlipidemia -   Hypothyroidism   DM    Subjective     History of Present Illness    Subjective  She continues stronger on the left side.  She has recurrent viral lesion on her upper lip that started yesterday.      History taken from: patient    Objective     Vital Signs  Temp:  [97.6 °F (36.4 °C)-97.9 °F (36.6 °C)] 97.9 °F (36.6 °C)  Heart Rate:  [78-82] 79  Resp:  [18-20] 18  BP: (120-135)/(60-68) 135/60    Objective:  Vital signs: (most recent): Blood pressure 135/60, pulse 79, temperature 97.9 °F (36.6 °C), temperature source Oral, resp. rate 18, height 67\" (170.2 cm), weight 219 lb 6.4 oz (99.5 kg), SpO2 98 %.            Mental status-awake alert  Lungs-clear to auscultation  Heart-regular rate and rhythm  Abdomen abdomen-normoactive bowel sounds, soft, nontender  Extremities-no pretibial edema  Neurologic-mild left hemiparesis .  Left elbow flexion 5, finger flexion 5, knee extension 5, ankle dorsiflexion 5 .  Left facial droop.      better dexterity with left hand.  Skin-blood filled vesicle left upper lip        Results Review:     I reviewed the patient's new clinical results.    Lab Results  Lab Value Date/Time   INR 1.91 (H) 11/08/2017 0722   INR 1.79 (H) 11/07/2017 0506   INR 1.41 (H) 11/06/2017 0627   INR 1.41 (H) 11/05/2017 0555   INR 1.17 (H) 11/04/2017 0647   INR 1.13 (H) 11/03/2017 0520   INR 1.07 11/02/2017 0713   INR 1.09 11/01/2017 0754       Results from last 7 days  Lab Units 11/06/17  0627 11/02/17  0713   WBC 10*3/mm3 6.72 5.72   HEMOGLOBIN g/dL 11.4* 11.7*   HEMATOCRIT % 36.3 36.4 "   PLATELETS 10*3/mm3 172 149         Results from last 7 days  Lab Units 11/06/17  0627   SODIUM mmol/L 141   POTASSIUM mmol/L 4.4   CHLORIDE mmol/L 102   CO2 mmol/L 23.3   BUN mg/dL 15   CREATININE mg/dL 1.10*   CALCIUM mg/dL 9.1   BILIRUBIN mg/dL 0.4   ALK PHOS U/L 72   ALT (SGPT) U/L 24   AST (SGOT) U/L 30   GLUCOSE mg/dL 132*     Glucose   Date/Time Value Ref Range Status   11/08/2017 0716 104 70 - 130 mg/dL Final   11/07/2017 1636 88 70 - 130 mg/dL Final   11/07/2017 0728 115 70 - 130 mg/dL Final   11/06/2017 1642 97 70 - 130 mg/dL Final   11/06/2017 0720 144 (H) 70 - 130 mg/dL Final   11/05/2017 1635 95 70 - 130 mg/dL Final       Labs on October 31-sodium 139, potassium 3.8, chloride 106, glucose 101, calcium 9.0, come back to 25, BUN 16, creatinine 1.1.  WBC6.64, hemoglobin 11.8, hematocrit 36.0, platelets 125.  October 29 triglycerides 89, cholesterol 125.  October 28 AST 35, ALT 29, alkaline phosphatase 99, total protein 7.7, albumin 4.1  Oct 31 - INR 1.0  Medication Review: done  Scheduled Meds:    allopurinol 200 mg Oral Daily   aspirin 81 mg Oral Daily   atorvastatin 80 mg Oral Nightly   cholecalciferol 1,000 Units Oral BID   enoxaparin 100 mg Subcutaneous Q12H   folic acid 1 mg Oral Daily   levothyroxine 112 mcg Oral Q AM   melatonin 3 mg Oral Nightly   metFORMIN 500 mg Oral Daily With Breakfast   multivitamin 1 tablet Oral Daily   sennosides-docusate sodium 2 tablet Oral BID   warfarin 10 mg Oral Daily     Continuous Infusions:   PRN Meds:.•  acetaminophen  •  bisacodyl  •  dextrose  •  dextrose  •  glucagon (human recombinant)  •  magnesium hydroxide      Assessment/Plan     Active Problems:    Stroke      Assessment & Plan  S/p R MCA ischemic infarct    Left hemiparesis-improving  Left inattention-improved    Dysphagia-November 2-advance from puree to mechanical soft, no mixed consistency, thin liquid diet.    Stroke prophylaxis - ASA 81 mg and Lovenox transition to coumadin.  Daily INR.  November  2-INR still low-Will continue Coumadin 5 mg as recent restarted but may titrate up dose tomorrow depending on lab result. Nov 3- increased coumadin to 7.5 mg. November 6-INR still low at 1.41.  Has been on Coumadin 5 mg and then increased to 7.5 mg x last 3 days, have changed to 10 mg, continue to follow INR daily.  November 7-INR 1.8-change Coumadin to 7.5 mg daily and continue to follow INR daily. Nov 8 - INR 1.9, changed coumadin to 10 mg, continues on Lovenox.    Hypercoaguability disorder  history of malignant neoplasm of breast   Factor 5 Leiden mutation, heterozygous   Obesity   Hyperlipidemia -atorvastatin   Hypothyroidism -on replacement   Cerebrovascular accident (CVA) due to thrombosis of right middle cerebral artery   DVT prophylaxis-SCDs and anticoagulation  Diabetes mellitus-metformin  History of lability after past stroke  Poststroke fatigue  Viral oral lesion - has taken acyclovir in past . Will treat with Valtrex 2 gm po q 12 hours x 2 doses.      72 to female status post right MCA ischemic infarct with left inattention, left hemiparesis, dysarthria, decreased alertness, impairments with her mobility and self-care and is now admitted for comprehensive inpatient rehabilitation program with physical therapy 1 hour, occupational therapy 1 hour, and speech therapy 1 hour, 5 days a week.  Rehabilitation nursing for carryover and monitoring of her neurologic status, diabetes, bowel bladder and skin.  Ongoing physician follow-up.  Weekly team conferences.  Goals for her to achieve a level of supervision with her mobility and self-care for return home with her family.       TEAM CONF- NOV 3- LEFT INATTENTION. DIFFICULTY DON LEG LEG OF PANTS. FINE MOTOR DEFICITS LEFT HAND. MILD MEMORY DEFICITS. DYSPHAGIA - MECH SOFT, THINS. TRIAL WHOLE MEALS. FLAT AFFECT.  BNE PENDING. TRANSFERS CTG. GAIT 80 FEET MIN ASSIST. 4 STAIRS MIN ASSIST. BLADDER CONTINENT.  ELOS- TWO WEEKS  .  Aric Chao,  MD  11/08/17  12:39 PM    Time:

## 2017-11-09 LAB
BASOPHILS # BLD AUTO: 0.01 10*3/MM3 (ref 0–0.2)
BASOPHILS NFR BLD AUTO: 0.1 % (ref 0–1.5)
DEPRECATED RDW RBC AUTO: 48.9 FL (ref 37–54)
EOSINOPHIL # BLD AUTO: 0.07 10*3/MM3 (ref 0–0.7)
EOSINOPHIL NFR BLD AUTO: 0.9 % (ref 0.3–6.2)
ERYTHROCYTE [DISTWIDTH] IN BLOOD BY AUTOMATED COUNT: 14.7 % (ref 11.7–13)
HCT VFR BLD AUTO: 37.6 % (ref 35.6–45.5)
HGB BLD-MCNC: 11.7 G/DL (ref 11.9–15.5)
IMM GRANULOCYTES # BLD: 0 10*3/MM3 (ref 0–0.03)
IMM GRANULOCYTES NFR BLD: 0 % (ref 0–0.5)
INR PPP: 2.14 (ref 0.9–1.1)
LYMPHOCYTES # BLD AUTO: 2.44 10*3/MM3 (ref 0.9–4.8)
LYMPHOCYTES NFR BLD AUTO: 32.8 % (ref 19.6–45.3)
MCH RBC QN AUTO: 28.4 PG (ref 26.9–32)
MCHC RBC AUTO-ENTMCNC: 31.1 G/DL (ref 32.4–36.3)
MCV RBC AUTO: 91.3 FL (ref 80.5–98.2)
MONOCYTES # BLD AUTO: 0.59 10*3/MM3 (ref 0.2–1.2)
MONOCYTES NFR BLD AUTO: 7.9 % (ref 5–12)
NEUTROPHILS # BLD AUTO: 4.34 10*3/MM3 (ref 1.9–8.1)
NEUTROPHILS NFR BLD AUTO: 58.3 % (ref 42.7–76)
PLATELET # BLD AUTO: 160 10*3/MM3 (ref 140–500)
PMV BLD AUTO: 12.1 FL (ref 6–12)
PROTHROMBIN TIME: 23.2 SECONDS (ref 11.7–14.2)
RBC # BLD AUTO: 4.12 10*6/MM3 (ref 3.9–5.2)
WBC NRBC COR # BLD: 7.45 10*3/MM3 (ref 4.5–10.7)

## 2017-11-09 PROCEDURE — 97535 SELF CARE MNGMENT TRAINING: CPT | Performed by: OCCUPATIONAL THERAPIST

## 2017-11-09 PROCEDURE — 97532: CPT

## 2017-11-09 PROCEDURE — 85610 PROTHROMBIN TIME: CPT | Performed by: PHYSICAL MEDICINE & REHABILITATION

## 2017-11-09 PROCEDURE — 85025 COMPLETE CBC W/AUTO DIFF WBC: CPT | Performed by: PHYSICAL MEDICINE & REHABILITATION

## 2017-11-09 PROCEDURE — 92526 ORAL FUNCTION THERAPY: CPT

## 2017-11-09 PROCEDURE — 25010000002 ENOXAPARIN PER 10 MG: Performed by: PHYSICAL MEDICINE & REHABILITATION

## 2017-11-09 PROCEDURE — 97112 NEUROMUSCULAR REEDUCATION: CPT | Performed by: OCCUPATIONAL THERAPIST

## 2017-11-09 PROCEDURE — 97110 THERAPEUTIC EXERCISES: CPT

## 2017-11-09 RX ADMIN — WARFARIN SODIUM 9 MG: 6 TABLET ORAL at 16:49

## 2017-11-09 RX ADMIN — ALLOPURINOL 200 MG: 100 TABLET ORAL at 07:50

## 2017-11-09 RX ADMIN — ENOXAPARIN SODIUM 100 MG: 100 INJECTION SUBCUTANEOUS at 07:50

## 2017-11-09 RX ADMIN — VITAMIN D, TAB 1000IU (100/BT) 1000 UNITS: 25 TAB at 07:49

## 2017-11-09 RX ADMIN — LEVOTHYROXINE SODIUM 112 MCG: 112 TABLET ORAL at 05:26

## 2017-11-09 RX ADMIN — ATORVASTATIN CALCIUM 80 MG: 80 TABLET, FILM COATED ORAL at 20:28

## 2017-11-09 RX ADMIN — DOCUSATE SODIUM -SENNOSIDES 2 TABLET: 50; 8.6 TABLET, COATED ORAL at 16:48

## 2017-11-09 RX ADMIN — ASPIRIN 81 MG: 81 TABLET, CHEWABLE ORAL at 07:49

## 2017-11-09 RX ADMIN — VITAMIN D, TAB 1000IU (100/BT) 1000 UNITS: 25 TAB at 16:50

## 2017-11-09 RX ADMIN — Medication 3 MG: at 23:10

## 2017-11-09 RX ADMIN — FOLIC ACID 1 MG: 1 TABLET ORAL at 07:49

## 2017-11-09 RX ADMIN — METFORMIN HYDROCHLORIDE 500 MG: 500 TABLET ORAL at 07:50

## 2017-11-09 RX ADMIN — Medication 1 TABLET: at 07:49

## 2017-11-09 RX ADMIN — DOCUSATE SODIUM -SENNOSIDES 2 TABLET: 50; 8.6 TABLET, COATED ORAL at 11:37

## 2017-11-09 RX ADMIN — ENOXAPARIN SODIUM 100 MG: 100 INJECTION SUBCUTANEOUS at 20:28

## 2017-11-09 NOTE — PROGRESS NOTES
Adult Nutrition  Assessment/PES    Patient Name:  Alice Maxwell  YOB: 1945  MRN: [id number]  Admit Date:  10/31/2017    Assessment Date:  11/9/2017    Comments:    Appetite good. Spoke w/ pt last week about coumadin diet.   Following.           Reason for Assessment       11/09/17 1159    Reason for Assessment    Reason For Assessment/Visit follow up protocol              Nutrition/Diet History       11/09/17 1159    Nutrition/Diet History    Other Mild anxiety noted. Spoke with pt last week about coumadin/vit K foods.              Labs/Tests/Procedures/Meds       11/09/17 1200    Labs/Tests/Procedures/Meds    Labs/Tests Review Reviewed    Medication Review Reviewed, pertinent    Significant Vitals reviewed                Nutrition Prescription Ordered       11/09/17 1200    Nutrition Prescription PO    Current PO Diet Soft Texture    Texture Whole foods    Fluid Consistency Thin    Common Modifiers Consistent Carbohydrate            Evaluation of Received Nutrient/Fluid Intake       11/09/17 1200    PO Evaluation    Number of Days PO Intake Evaluated 2 days    % PO Intake %          Problem/Interventions:          Intervention Goal       11/09/17 1200    Intervention Goal    General Maintain nutrition    PO Maintain intake;PO intake (%)    PO Intake % 75 %            Nutrition Intervention       11/09/17 1200    Nutrition Intervention    RD/Tech Action Menu provided;Encourage intake;Care plan reviewd;Follow Tx progress              Education/Evaluation       11/09/17 1201    Education    Education Previous education by NAT/ELI    Monitor/Evaluation    Monitor Per protocol        Electronically signed by:  Modesta Ramirez RD  11/09/17 12:01 PM

## 2017-11-09 NOTE — THERAPY TREATMENT NOTE
Inpatient Rehabilitation - Physical Therapy Treatment Note  [facility]     Patient Name: Alice Maxwell  : 1945  MRN: [id number]  Today's Date: 2017  Onset of Illness/Injury or Date of Surgery Date: 10/28/17  Date of Referral to PT: 17  Referring Physician: Jeremie    Admit Date: 10/31/2017    Visit Dx:    ICD-10-CM ICD-9-CM   1. Oropharyngeal dysphagia R13.12 787.22     Patient Active Problem List   Diagnosis   • Stroke               Adult Rehabilitation Note       17 1445 17 1330 17 1210    Rehab Assessment/Intervention    Discipline occupational therapist  - speech language pathologist  -KB occupational therapist  -    Document Type therapy note (daily note)  -KP therapy note (daily note)  -KB therapy note (daily note)  -    Subjective Information agree to therapy;no complaints  -KP no complaints;agree to therapy  -KB agree to therapy;no complaints  -KP    Patient Effort, Rehab Treatment excellent  -KP good  -KB good  -KP    Symptoms Noted During/After Treatment none  -KP none  -KB none  -KP    Precautions/Limitations fall precautions;swallowing precautions  -KP fall precautions;swallowing precautions  - fall precautions  -KP    Recorded by [] Vero York OTR [KB] Katherine L Bruton [] Vero York OTR    Pain Assessment    Pain Assessment No/denies pain  -KP No/denies pain  -KB No/denies pain  -KP    Pain Score   0  -KP    Recorded by [KP] KANDY Sultana [KB] Katherine L Bruton [KP] KANDY Sultana    Vision Assessment/Intervention    Visual Impairment visual impairment, left  -KP visual impairment, left;WFL with corrective lenses  -KB visual impairment, left  -KP    Recorded by [] KANDY Sultana [KB] Katherine L Bruton [] KANDY Sultana    Cognitive Assessment/Intervention    Current Cognitive/Communication Assessment impaired  -KP  impaired  -KP    Orientation Status oriented  to;person;place;time  -KP  oriented to;person;place;time  -KP    Follows Commands/Answers Questions 100% of the time;able to follow single-step instructions  -KP  100% of the time;able to follow single-step instructions  -KP    Personal Safety mild impairment  -KP  mild impairment  -KP    Personal Safety Interventions fall prevention program maintained;gait belt;nonskid shoes/slippers when out of bed  -KP  fall prevention program maintained;gait belt;nonskid shoes/slippers when out of bed  -KP    Recorded by [KP] Vero York, OTR  [KP] Vero York, OTR    Improve oral skills    To Improve Oral Skills, patient will:  Increase tongue tip elevation;Increase tongue lateralization;Increase lip closure;Increase back of tongue control;Increase tongue A-P movement  -KB     Status: Improve Oral Skills  Progressing as expected  -KB     Oral Skills Progress  continue to adress  -KB     Comments: Improve Oral Skills  Unable to alternate puckered lips left to right despite MAX cues and instruciton; cues for labial closure.,  -KB     Recorded by  [KB] Katherine L Bruton     Improve closure at entrance to airway    To improve closure at entrance to airway, patient will:  --   hard swallows  -KB     Status: Improve closure at entrance to airway  Progressing as expected  -KB     Closure at Entrance to Airway Progress  continue to adress  -KB     Comments: Improve closure at entrance to airway  Completed exercises with verbal cues for squeezing muscles.  -KB     Recorded by  [KB] Katherine L Bruton     Improve laryngeal elevation    To improve laryngeal elevation, patient will:  Complete pitch-glide  -KB     Status: Improve laryngeal elevation  Progressing as expected  -KB     Laryngeal Elevation Progress  continue to adress  -KB     Comments: Improve laryngeal elevation  Completed exercises independently.   -KB     Recorded by  [KB] Katherine L Bruton     Improve tongue base & pharyngeal wall squeeze    To  improve tongue base & pharyngeal wall squeeze, patient will:  Complete tongue base retraction;Complete yawn/hold retractions  -KB     Status: Improve tongue base & pharyngeal wall squeeze  Progressing as expected  -KB     Tongue Base/Pharyngeal Wall Squeeze Progress  continue to adress  -KB     Comments: Improve tongue base & pharyngeal wall squeeze  Completed exercises with verbal instructions.  -KB     Recorded by  [KB] Katherine L Bruton     Bed Mobility, Assessment/Treatment    Bed Mobility, Comment up in   -KP  up in   -KP    Recorded by [KP] Vero York OTR  [KP] Vero York OTR    Transfer Assessment/Treatment    Transfers, Sit-Stand [place]   set up required;supervision required;stand by assist  -KP    Transfers, Stand-Sit [place]   set up required;supervision required;stand by assist  -KP    Transfers, Sit-Stand-Sit, Assist Device   other (see comments)   wc grab bars  -KP    Walk-In Shower Transfer, [place]   set up required;supervision required  -KP    Walk-In Shower Transfer, Assist Device   other (see comments)   tub bench, grab bars  -KP    Recorded by   [KP] KANDY Sultana    Functional Mobility    Functional Mobility- Comment   a few steps to tub bench SBA  -KP    Recorded by   [KP] Vero York OTR    Upper Body Bathing Assessment/Training    UB Bathing Assess/Train Assistive Device   hand-held shower head;grab bars;tub bench  -KP    UB Bathing Assess/Train, Position   sitting  -KP    UB Bathing Assess/Train, [place] Level   set up required;stand by assist  -KP    Recorded by   [KP] Vero York OTR    Lower Body Bathing Assessment/Training    LB Bathing Assess/Train Assistive Device   hand-held shower head;grab bars;tub bench  -KP    LB Bathing Assess/Train, Position   sitting;standing  -KP    LB Bathing Assess/Train, [place] Level   set up required;stand by assist  -KP    Recorded by   [KP] Vero York,  OTR    Upper Body Dressing Assessment/Training    UB Dressing Assess/Train, Clothing Type   doffing:;donning:;t-shirt  -KP    UB Dressing Assess/Train, Position   sitting  -KP    UB Dressing Assess/Train, [place]   set up required;supervision required  -KP    Recorded by   [KP] Vero York OTR    Lower Body Dressing Assessment/Training    LB Dressing Assess/Train, Clothing Type   doffing:;donning:;pants;shoes;slipper socks;socks  -KP    LB Dressing Assess/Train, Position   sitting;standing  -KP    LB Dressing Assess/Train, [place]   set up required;supervision required  -KP    Recorded by   [KP] Vero York OTR    Grooming Assessment/Training    Grooming Assess/Train, Position   sitting  -KP    Grooming Assess/Train, [place]ep[place] Level   conditional independence;set up required  -KP    Recorded by   [KP] Vero York OTR    Balance Skills Training    Sitting-Level of Assistance Distant supervision  -KP  Distant supervision  -KP    Sitting-Balance Support Feet supported  -KP  Feet supported  -KP    Sitting-Balance Activities   Reaching for objects  -KP    Standing-Level of Assistance   Close supervision  -KP    Recorded by [KP] Vero York OTR  [KP] Vero York, SARAR    Fine Motor Coordination Training    Detail (Fine Motor Coordination Training) inserts silver pegs into slot, washer on top and other round open peg over top x12 (purdue peg board) to inc FMC w. L hand.  Pulls coins from resistive putty x10 w, L hand to inc FMC. and finger/hand strength. Grasps a lot of coins in her palm, in hand manipulation pushing coins to finger tips then inserting into slot to inc FMC. w. little to no difficulty  -KP      Recorded by [KP] Vero York OTR      Positioning and Restraints    Pre-Treatment Position sitting in chair/recliner  -KP  sitting in chair/recliner  -KP    Post Treatment Position wheelchair  -KP  wheelchair  -KP    In Wheelchair sitting;with PT   -KP  sitting;with other staff   RT  -KP    Recorded by [KP] Vero York, OTR  [KP] KANDY Sultana      11/09/17 1030 11/09/17 1027 11/08/17 1539    Rehab Assessment/Intervention    Discipline speech language pathologist  -KB physical therapist  -MD occupational therapist  -KP    Document Type therapy note (daily note)  -KB therapy note (daily note)  -MD therapy note (daily note)  -KP    Subjective Information no complaints;agree to therapy  -KB no complaints;agree to therapy  -MD agree to therapy;no complaints  -KP    Patient Effort, Rehab Treatment good  -KB good  -MD good  -KP    Symptoms Noted During/After Treatment none  -KB none  -MD none  -KP    Precautions/Limitations fall precautions  -KB fall precautions  -MD fall precautions;swallowing precautions  -KP    Equipment Issued to Patient  gait belt  -MD     Recorded by [KB] Katherine L Bruton [MD] Nereida Llamas, PT [KP] Vero York, OTR    Pain Assessment    Pain Assessment No/denies pain  -KB No/denies pain  -MD No/denies pain  -KP    Pain Score   0  -KP    Recorded by [KB] Katherine L Bruton [MD] Nereida Llamas, PT [KP] Vero York, OTR    Vision Assessment/Intervention    Visual Impairment visual impairment, left;WFL with corrective lenses  -  visual impairment, left  -KP    Recorded by [KB] Katherine L Bruton  [] Vero York, OTR    Cognitive Assessment/Intervention    Current Cognitive/Communication Assessment  impaired  -MD impaired  -KP    Orientation Status  person;place;time;oriented to  -MD person;place;time;oriented to  -KP    Follows Commands/Answers Questions  100% of the time;needs cueing;needs increased time;needs repetition  -% of the time;able to follow single-step instructions;needs cueing  -    Personal Safety  decreased insight to deficits  -MD mild impairment  -KP    Personal Safety Interventions  fall prevention program maintained;gait belt;muscle strengthening facilitated;nonskid  shoes/slippers when out of bed;supervised activity  -MD fall prevention program maintained;gait belt;nonskid shoes/slippers when out of bed  -KP    Recorded by  [MD] Nereida Llamas, PT [KP] Vero York, OTR    Improve attention    Improve attention by: complete divided attention task  -KB      Status: Improve attention Progressing as expected  -KB      Attention Progress 70%;without cues;100%;with inconsistent cues  -KB      Comments: Improve attention Pt completed complex written directions task with MIN-MOD cues for reasoning and attention.  -KB      Recorded by [KB] Katherine L Bruton      Improve oral skills    To Improve Oral Skills, patient will: Increase tongue tip elevation;Increase tongue lateralization;Increase lip closure;Increase back of tongue control;Increase tongue A-P movement  -KB      Status: Improve Oral Skills Progressing as expected  -KB      Oral Skills Progress continue to adress  -KB      Comments: Improve Oral Skills Pt with difficulty moving pucker left to right and holding air in mouth to puff up cheeks.  -KB      Recorded by [KB] Katherine L Bruton      Improve closure at entrance to airway    To improve closure at entrance to airway, patient will: --   hard swallows  -KB      Status: Improve closure at entrance to airway Progressing as expected  -KB      Closure at Entrance to Airway Progress continue to adress  -KB      Comments: Improve closure at entrance to airway Completes exercises independently.   -KB      Recorded by [KB] Katherine L Bruton      Improve laryngeal elevation    To improve laryngeal elevation, patient will: Complete pitch-glide  -KB      Status: Improve laryngeal elevation Progressing as expected  -KB      Laryngeal Elevation Progress continue to adress  -KB      Comments: Improve laryngeal elevation Pt completes exercises independently.  -KB      Recorded by [KB] Katherine L Bruton      Improve tongue base & pharyngeal wall squeeze    To improve tongue base &  pharyngeal wall squeeze, patient will: Complete effortful swallow;Complete gargle/hold retraction  -KB      Status: Improve tongue base & pharyngeal wall squeeze Progressing as expected  -KB      Tongue Base/Pharyngeal Wall Squeeze Progress continue to adress  -KB      Comments: Improve tongue base & pharyngeal wall squeeze Pt completed exercises independently.   -KB      Recorded by [KB] Katherine L Bruton      Bed Mobility, Assessment/Treatment    Bed Mobility, Roll Left, [place]  supervision required  -MD     Bed Mobility, Roll Right, [place]  supervision required  -MD     Bed Mobility, Scoot/Bridge, [place]  supervision required  -MD     Bed Mob, Supine to Sit, [place]  supervision required  -MD     Bed Mob, Sit to Supine, [place]  supervision required  -MD     Bed Mobility, Comment   up in chair  -KP    Recorded by  [MD] Nereida Llamas, PT [KP] Vero York, OTR    Transfer Assessment/Treatment    Transfers, Sit-Stand [place]  supervision required  -MD     Transfers, Stand-Sit [place]  supervision required  -MD     Transfers, Sit-Stand-Sit, Assist Device  rolling walker  -MD     Transfer, Safety Issues  step length decreased  -MD     Transfer, Impairments  strength decreased;impaired balance  -MD     Transfer, Comment  car transfer: SBA, RWx  -MD     Recorded by  [MD] Nereida Llamas, PT     Gait Assessment/Treatment    Gait, [place] Level  verbal cues required;stand by assist;contact guard assist  -MD     Gait, Assistive Device  rolling walker  -MD     Gait, Distance (Feet)  80   x3  -MD     Gait, Gait Deviations  bilateral:;janet decreased;forward flexed posture;step length decreased  -MD     Gait, Safety Issues  step length decreased  -MD     Gait, Impairments  impaired balance;strength decreased  -MD     Recorded by  [MD] Nereida Llamas PT     Stairs Assessment/Treatment    Number of Stairs  4  -MD     Stairs, Handrail Location  both sides  -MD     Stairs, [place]  Level  supervision required  -MD     Stairs, Technique Used  step over step (ascending);step to step (descending)  -MD     Stairs, Impairments  strength decreased;impaired balance  -MD     Recorded by  [MD] Nereida Llamas, PT     Balance Skills Training    Sitting-Level of Assistance   Distant supervision  -KP    Sitting-Balance Support   Feet supported  -KP    Standing-Level of Assistance  Contact guard  -MD     Static Standing Balance Support  No upper extremity supported  -MD     Standing-Balance Activities  Ball toss  -MD     Gait Balance-Level of Assistance  Contact guard  -MD     Gait Balance Support  parallel bars  -MD     Gait Balance Activities  backwards;side-stepping  -MD     Recorded by  [MD] Nereida Llamas, PT [KP] Vero York, OTR    Fine Motor Coordination Training    Detail (Fine Motor Coordination Training)   pulls pegs from yellow resistive putty using one hand, using L hand to inc FMC and hand/digit strength. rotates wooden block up dowel [place] w. pegs to inc UE coordination w. min difficulty. strings foam beads on lace w. R hand holding string and L hand pushing bead on and vice versus to inc FMC and BLC  -KP    Recorded by   [KP] Vero York, SARAR    Positioning and Restraints    Pre-Treatment Position  sitting in chair/recliner  -MD sitting in chair/recliner  -KP    Post Treatment Position  bed  -MD wheelchair  -KP    In Bed  supine;call light within reach;exit alarm on  -MD     In Wheelchair   sitting;with PT  -KP    Recorded by  [MD] Nereida Llamas, PT [KP] Vero York, OTR      11/08/17 1330 11/08/17 1152 11/08/17 1114    Rehab Assessment/Intervention    Discipline speech language pathologist  -ANIYA occupational therapist  -KP physical therapist  -MD    Document Type therapy note (daily note)  -ANIYA therapy note (daily note)  -KP therapy note (daily note)  -MD    Subjective Information no complaints;agree to therapy  -ANIYA agree to therapy;no complaints  -KP agree to therapy;no  complaints  -MD    Patient Effort, Rehab Treatment good  -KB good  -KP good  -MD    Symptoms Noted During/After Treatment none  -KB none  -KP none  -MD    Precautions/Limitations fall precautions;swallowing precautions  -KB fall precautions;swallowing precautions  -KP fall precautions  -MD    Equipment Issued to Patient   gait belt;front wheeled walker  -MD    Recorded by [KB] Katherine L Bruton [KP] Vero York OTR [MD] Nereida Llamas, PT    Pain Assessment    Pain Assessment No/denies pain  -KB No/denies pain  -KP No/denies pain  -MD    Pain Score  0  -KP     Recorded by [KB] Katherine L Bruton [KP] Vero York OTR [MD] Nereida Llamas, PT    Vision Assessment/Intervention    Visual Impairment WFL with corrective lenses  -KB visual impairment, left  -KP visual impairment, left  -MD    Recorded by [KB] Katherine L Bruton [KP] KANDY Sultana [MD] Nereida Llamas, PT    Cognitive Assessment/Intervention    Current Cognitive/Communication Assessment  impaired  -KP impaired  -MD    Orientation Status  oriented to;person;place;time  - oriented to;person;place;time  -MD    Follows Commands/Answers Questions  100% of the time;able to follow single-step instructions;needs cueing  - 100% of the time;needs cueing;needs repetition  -MD    Personal Safety  mild impairment  -KP impulsive;decreased insight to deficits  -MD    Personal Safety Interventions  fall prevention program maintained;gait belt;nonskid shoes/slippers when out of bed  - fall prevention program maintained;gait belt;muscle strengthening facilitated;nonskid shoes/slippers when out of bed;supervised activity  -MD    Recorded by  [KP] Vero York, OTR [MD] Nereida Llamas, PT    Improve functional problem solving    Improve functional problem solving through: complete high level reasoning task  -      Status: Improve functional problem solving Progressing as expected  -KB      Functional Problem Solving Progress 80%;without  cues;100%;with inconsistent cues  -KB      Comments: Improve functional problem solving Completed two high-level logic puzzles with MIN cues only.  -KB      Recorded by [KB] Katherine L Bruton      Improve closure at entrance to airway    To improve closure at entrance to airway, patient will: --   pushing/pulling exercises  -KB      Status: Improve closure at entrance to airway Progressing as expected  -KB      Closure at Entrance to Airway Progress continue to adress  -KB      Comments: Improve closure at entrance to airway Initial cues for how to complete exercises.  -KB      Recorded by [KB] Katherine L Bruton      Improve tongue base & pharyngeal wall squeeze    To improve tongue base & pharyngeal wall squeeze, patient will: --   Practiced k/g word list forcefully  -KB      Status: Improve tongue base & pharyngeal wall squeeze Progressing as expected  -KB      Tongue Base/Pharyngeal Wall Squeeze Progress continue to adress  -KB      Comments: Improve tongue base & pharyngeal wall squeeze No cues needed, pt repeated word list x5.   -KB      Recorded by [KB] Katherine L Bruton      Bed Mobility, Assessment/Treatment    Bed Mob, Supine to Sit, [place]   supervision required  -MD    Bed Mob, Sit to Supine, [place]   supervision required  -MD    Bed Mobility, Comment  pt up in   -KP     Recorded by  [KP] Vero York, OTR [MD] Nereida Llamas, PT    Transfer Assessment/Treatment    Transfers, Sit-Stand [place]  supervision required  - stand by assist  -MD    Transfers, Stand-Sit [place]  supervision required  - stand by assist  -MD    Transfers, Sit-Stand-Sit, Assist Device  other (see comments)     -KP rolling walker  -MD    Walk-In Shower Transfer, [place]  set up required;supervision required  -     Walk-In Shower Transfer, Assist Device  other (see comments)   tub bench  -KP     Transfer, Safety Issues   step length decreased  -MD    Transfer, Impairments   strength  decreased;impaired balance  -MD    Recorded by  [KP] Vero York, OTR [MD] Nereida Llamas, PT    Gait Assessment/Treatment    Gait, [place] Level   verbal cues required;contact guard assist  -MD    Gait, Assistive Device   rolling walker  -MD    Gait, Distance (Feet)   80   x3  -MD    Gait, Gait Deviations   bilateral:;janet decreased;forward flexed posture;step length decreased  -MD    Gait, Safety Issues   sequencing ability decreased  -MD    Gait, Impairments   impaired balance;strength decreased  -MD    Recorded by   [MD] Nereida Llamas, PT    Upper Body Bathing Assessment/Training    UB Bathing Assess/Train Assistive Device  hand-held shower head;tub bench  -     UB Bathing Assess/Train, Position  sitting  -     UB Bathing Assess/Train, [place] Level  set up required;stand by assist  -     Recorded by  [KP] Vero York OTR     Lower Body Bathing Assessment/Training    LB Bathing Assess/Train Assistive Device  grab bars;hand-held shower head;tub bench  -     LB Bathing Assess/Train, Position  sitting;standing  -     LB Bathing Assess/Train, [place] Level  set up required;stand by assist  -     Recorded by  [KP] Vero York, OTR     Upper Body Dressing Assessment/Training    UB Dressing Assess/Train, Clothing Type  doffing:;donning:;t-shirt  -     UB Dressing Assess/Train, Position  sitting  -     UB Dressing Assess/Train, [place]  set up required;supervision required  -     Recorded by  [KP] Vero York, OTR     Lower Body Dressing Assessment/Training    LB Dressing Assess/Train, Clothing Type  doffing:;donning:;socks;shoes;pants  -     LB Dressing Assess/Train, Position  sitting;standing  -     LB Dressing Assess/Train, [place]  set up required;supervision required  -     LB Dressing Assess/Train, Comment  holds onto grab bar intermittently  -     Recorded by  [KP] Vero York, OTR     Grooming Assessment/Training     Grooming Assess/Train, Position  sitting  -     Grooming Assess/Train, [place] Level  set up required;supervision required  -     Recorded by  [KP] Vero York, OTR     Balance Skills Training    Sitting-Level of Assistance  Close supervision;Distant supervision  -     Sitting-Balance Support  Feet supported  -     Standing-Level of Assistance  Close supervision  -     Recorded by  [KP] Vero York, OTR     Therapy Exercises    Right Lower Extremity   AROM:;10 reps;sidelying;hip abduction/adduction   x3 sets  -MD    Left Lower Extremity   AROM:;10 reps;sidelying;clam shell   x3 sets  -MD    Bilateral Lower Extremities   AROM:;20 reps;ankle pumps/circles;hip abduction/adduction;hip flexion;LAQ  -MD    BLE Other Reps   --   Nustep at level 2 for 4 min  -MD    Trunk Exercises   10 reps;supine;bridging   x3 sets  -MD    Recorded by   [MD] Nereida Llamas, PT    Positioning and Restraints    Pre-Treatment Position  sitting in chair/recliner  -KP sitting in chair/recliner  -MD    Post Treatment Position  wheelchair  - wheelchair  -MD    In Wheelchair  sitting;call light within reach;encouraged to call for assist  -KP sitting;call light within reach  -MD    Recorded by  [KP] Vero York, OTR [MD] Nereida Llamas, PT      11/08/17 1030 11/07/17 1532 11/07/17 1201    Rehab Assessment/Intervention    Discipline speech language pathologist  -KB occupational therapist  -KP occupational therapist  -KP    Document Type therapy note (daily note)  - therapy note (daily note)  - therapy note (daily note)  -    Subjective Information no complaints;agree to therapy  - agree to therapy;no complaints  - agree to therapy;no complaints  -    Patient Effort, Rehab Treatment good  -KB good  -KP good  -KP    Symptoms Noted During/After Treatment none  -KB none  -KP none  -KP    Precautions/Limitations fall precautions;swallowing precautions  - fall precautions;swallowing precautions  - fall  precautions;swallowing precautions  -KP    Precautions/Limitations, Vision WFL with corrective lenses  -KB      Recorded by [KB] Katherine L Bruton [KP] Vero York OTR [KP] Vero York, SARAR    Pain Assessment    Pain Assessment 0-10  -KB No/denies pain  -KP No/denies pain  -KP    Post Pain Score 7  -KB      Pain Location Toe (Comment which one)  -KB      Pain Orientation Left;Right  -KB      Pain Intervention(s) Emotional support   Pt did not want medications.  -KB      Recorded by [KB] Katherine L Bruton [KP] Vero York OTR [KP] Vero York OTR    Vision Assessment/Intervention    Visual Impairment WFL with corrective lenses  -KB visual impairment, left  -KP visual impairment, left  -KP    Recorded by [KB] Katherine L Bruton [KP] Vero York OTR [KP] Vero York OTR    Cognitive Assessment/Intervention    Current Cognitive/Communication Assessment  impaired  -KP impaired  -KP    Orientation Status  oriented to;person;place;time  -KP oriented to;person;place;time  -    Follows Commands/Answers Questions  100% of the time;able to follow single-step instructions;needs cueing  - 100% of the time;able to follow single-step instructions  -KP    Personal Safety  mild impairment  -KP mild impairment  -KP    Personal Safety Interventions  fall prevention program maintained;gait belt;muscle strengthening facilitated;nonskid shoes/slippers when out of bed  - fall prevention program maintained;gait belt;nonskid shoes/slippers when out of bed  -KP    Recorded by  [KP] Vero York, SARAR [KP] Vero York OTR    Improve functional problem solving    Improve functional problem solving through: complete high level reasoning task  -KB      Status: Improve functional problem solving Progressing as expected  -KB      Functional Problem Solving Progress 80%;without cues;100%;with inconsistent cues  -KB      Comments: Improve functional problem  solving Pt completed high-level logic puzzle with MIN verbal cues for attention to detail.   -KB      Recorded by [KB] Katherine L Bruton      Improve oral skills    To Improve Oral Skills, patient will: --   pucker/smile, pucker left/right, suck in/puff out cheeks  -KB      Status: Improve Oral Skills Progressing as expected  -KB      Oral Skills Progress continue to adress  -KB      Comments: Improve Oral Skills Completed exercises without cues.  -KB      Recorded by [KB] Katherine L Bruton      Improve closure at entrance to airway    To improve closure at entrance to airway, patient will: --   hard swallows  -KB      Status: Improve closure at entrance to airway Progressing as expected  -KB      Closure at Entrance to Airway Progress continue to adress  -KB      Comments: Improve closure at entrance to airway Completed daily exercises to address airway closure without cues.  -KB      Recorded by [KB] Katherine L Bruton      Improve laryngeal elevation    To improve laryngeal elevation, patient will: --   pitch glide, high pitch vowel, humming in falsetto  -KB      Status: Improve laryngeal elevation Progressing as expected  -KB      Laryngeal Elevation Progress continue to adress  -KB      Comments: Improve laryngeal elevation Completed exercises to addres laryngeal elevation without cues.   -KB      Recorded by [KB] Katherine L Bruton      Improve tongue base & pharyngeal wall squeeze    To improve tongue base & pharyngeal wall squeeze, patient will: Complete effortful swallow;Complete gargle/hold retraction  -KB      Status: Improve tongue base & pharyngeal wall squeeze Progressing as expected  -KB      Tongue Base/Pharyngeal Wall Squeeze Progress continue to adress  -KB      Comments: Improve tongue base & pharyngeal wall squeeze Completed exercises without cues.  -KB      Recorded by [KB] Katherine L Bruton      Bed Mobility, Assessment/Treatment    Bed Mob, Supine to Sit, [place]  supervision  required;set up required  -     Bed Mob, Sit to Supine, [place]  not tested  -     Bed Mobility, Comment   up in [facility]    Recorded by  [] Vero York OTR [KP] Vero York OTR    Transfer Assessment/Treatment    Transfers, Bed-Chair [place]  stand by assist  -     Transfers, Chair-Bed [place]  not tested  -     Transfers, Bed-Chair-Bed, Assist Device  other (see comments)   wc  -     Transfers, Sit-Stand [place]  stand by assist  -KP stand by assist  -KP    Transfers, Stand-Sit [place]  stand by assist  -KP stand by assist  -KP    Transfers, Sit-Stand-Sit, Assist Device  other (see comments)   wc  -KP other (see comments)   wc grab bar  -    Transfer, Comment   doesn't wantn to shower today. her hair is was fixed last night by [facility]    Recorded by  [] Vero York OTR [] Vero York OTR    Upper Body Bathing Assessment/Training    UB Bathing Assess/Train, Position   sitting;sink side  -    UB Bathing Assess/Train, [place] Level   stand by assist;set up required  -    Recorded by   [KP] Vero York OTR    Lower Body Bathing Assessment/Training    LB Bathing Assess/Train, Position   sitting;standing  -    LB Bathing Assess/Train, [place] Level   set up required;stand by assist  -    Recorded by   [] Vero York OTR    Upper Body Dressing Assessment/Training    UB Dressing Assess/Train, Clothing Type   donning:;doffing:;t-shirt  -    UB Dressing Assess/Train, Position   sitting  -    UB Dressing Assess/Train, [place]   supervision required;set up required  -    Recorded by   [] Vero York OTR    Lower Body Dressing Assessment/Training    LB Dressing Assess/Train, Clothing Type   doffing:;donning:;pants;socks;shoes  -    LB Dressing Assess/Train, Position   sitting;standing  -    LB Dressing Assess/Train, [place]   set up required;supervision required  -     Recorded by   [KP] KANDY Sultana    Grooming Assessment/Training    Grooming Assess/Train, Position   sitting;sink side  -KP    Grooming Assess/Train, [place] Level   supervision required;set up required  -KP    Recorded by   [KP] KANDY Sultana    Balance Skills Training    Sitting-Level of Assistance  Close supervision;Distant supervision  -KP Close supervision;Distant supervision  -KP    Sitting-Balance Support  Feet supported  -KP Feet supported  -KP    Standing-Level of Assistance  Close supervision  -KP Close supervision  -KP    Static Standing Balance Support  Right upper extremity supported  -KP Left upper extremity supported  -KP    Recorded by  [KP] KANDY Sultana [KP] Vero York OTR    Therapy Exercises    BUE Resistance  theraputty   pinches yellow putty w. each digit and thumb  -KP     Recorded by  [KP] KANDY Sultana     Fine Motor Coordination Training    Detail (Fine Motor Coordination Training)  links paper clip together clipping it on other paper clip w. L hand.  in hand manipulation w. coins in L hand pushing coin up w. L thumb and inserting coin into small slot to inc FMC and in hand manipulation.   -KP     Recorded by  [KP] KANDY Sultana     Positioning and Restraints    Pre-Treatment Position  in bed  -KP sitting in chair/recliner  -KP    Post Treatment Position  wheelchair  -KP wheelchair  -KP    In Wheelchair  sitting;call light within reach;encouraged to call for assist;with family/caregiver  -KP sitting   in dining room waiting for RT  -KP    Recorded by  [KP] KANDY Sultana [KP] KANDY Sultana      11/07/17 1100 11/07/17 1030 11/07/17 0819    Rehab Assessment/Intervention    Discipline speech language pathologist  -ANIYA speech language pathologist  -ANIYA physical therapist  -MD    Document Type therapy note (daily note)  -ANIYA therapy note (daily note)  -ANIYA therapy note (daily note)  -MD     Subjective Information no complaints;agree to therapy  -KB no complaints;agree to therapy  -KB agree to therapy;no complaints  -MD    Patient Effort, Rehab Treatment good  -KB good  -KB good  -MD    Symptoms Noted During/After Treatment none  -KB none  -KB none  -MD    Precautions/Limitations fall precautions;swallowing precautions  -KB fall precautions;swallowing precautions  -KB fall precautions  -MD    Equipment Issued to Patient   gait belt;front wheeled walker  -MD    Recorded by [KB] Katherine L Bruton [KB] Katherine L Bruton [MD] Nereida Llamas, PT    Pain Assessment    Pain Assessment No/denies pain  -KB No/denies pain  -KB No/denies pain  -MD    Recorded by [KB] Katherine L Bruton [KB] Katherine L Bruton [MD] Nereida Llamas, PT    Vision Assessment/Intervention    Visual Impairment visual impairment, left;inattention to the left  -KB visual impairment, left;inattention to the left  -KB visual impairment, left;inattention to the left  -MD    Recorded by [KB] Katherine L Bruton [KB] Katherine L Bruton [MD] Nereida Llamas, PT    Cognitive Assessment/Intervention    Current Cognitive/Communication Assessment   impaired  -MD    Orientation Status   oriented to;person  -MD    Follows Commands/Answers Questions   100% of the time;needs cueing;needs repetition  -MD    Personal Safety   decreased insight to deficits  -MD    Personal Safety Interventions   fall prevention program maintained;gait belt;muscle strengthening facilitated;nonskid shoes/slippers when out of bed;supervised activity  -MD    Recorded by   [MD] Nereida Llamas, PT    Improve functional problem solving    Improve functional problem solving through:  complete high level reasoning task  -KB     Status: Improve functional problem solving  Progressing as expected  -KB     Functional Problem Solving Progress  50%;without cues;100%;with consistent cues  -KB     Comments: Improve functional problem solving  Pt required MOD verbal cues throughout logic puzzle for attention  and reasoning.   -KB     Recorded by  [KB] Katherine L Bruton     Improve oral skills    Status: Improve Oral Skills  Progressing as expected  -KB     Oral Skills Progress  80%;with inconsistent cues  -KB     Recorded by  [KB] Katherine L Bruton     Improve closure at entrance to airway    Status: Improve closure at entrance to airway Progressing as expected  -KB      Closure at Entrance to Airway Progress continue to adress  -KB      Comments: Improve closure at entrance to airway Pt observed with mid-day meal. 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   -KB      Recorded by [KB] Katherine L Bruton      Improve laryngeal elevation    Status: Improve laryngeal elevation  Progressing as expected  -KB     Laryngeal Elevation Progress  60%;with inconsistent cues;continue to adress  -KB     Comments: Improve laryngeal elevation  Pt completing exercises in therapy session as prescribed.   -KB     Recorded by  [KB] Katherine L Bruton     Improve tongue base & pharyngeal wall squeeze    Status: Improve tongue base & pharyngeal wall squeeze  Progressing as expected  -KB     Tongue Base/Pharyngeal Wall Squeeze Progress  60%;with inconsistent cues;continue to adress  -KB     Comments: Improve tongue base & pharyngeal wall squeeze  Pt completing exercises in therapy sessions as prescribed.   -KB     Recorded by  [KB] Katherine L Bruton     Bed Mobility, Assessment/Treatment    Bed Mob, Supine to Sit, [place]   not tested  -MD    Bed Mob, Sit to Supine, [place]   not tested  -MD    Recorded by   [MD] Nereida Llamas, PT    Transfer Assessment/Treatment    Transfers, Sit-Stand [place]   verbal cues required;stand by assist  -MD    Transfers, Stand-Sit [place]   verbal cues required;stand by assist  -MD    Transfers, Sit-Stand-Sit, Assist Device   rolling walker  -MD    Transfer, Safety Issues   step length decreased  -MD    Transfer, Impairments    strength decreased;impaired balance  -MD    Recorded by   [MD] Nereida Llamas PT    Gait Assessment/Treatment    Gait, [place] Level   verbal cues required;contact guard assist  -MD    Gait, Assistive Device   rolling walker  -MD    Gait, Distance (Feet)   80   x3  -MD    Gait, Gait Deviations   bilateral:;janet decreased;forward flexed posture;step length decreased;toe-to-floor clearance decreased;weight-shifting ability decreased  -MD    Gait, Safety Issues   step length decreased  -MD    Gait, Impairments   impaired balance;strength decreased  -MD    Recorded by   [MD] Nereida Llamas PT    Stairs Assessment/Treatment    Number of Stairs   4  -MD    Stairs, Handrail Location   both sides  -MD    Stairs, [place] Level   supervision required  -MD    Stairs, Technique Used   step over step (ascending);step to step (descending)  -MD    Stairs, Impairments   strength decreased;impaired balance  -MD    Recorded by   [MD] Nereida Llamas PT    Therapy Exercises    Bilateral Lower Extremities   --   nustep 7 min level 2  -MD    Recorded by   [MD] Nereida Llamas PT    Positioning and Restraints    Pre-Treatment Position   sitting in chair/recliner  -MD    Post Treatment Position   wheelchair  -MD    In Wheelchair   patient within staff view   in DR  -MD    Recorded by   [MD] Nereida Llamas PT      11/06/17 1550          Rehab Assessment/Intervention    Discipline occupational therapist  -      Document Type therapy note (daily note)  -      Subjective Information complains of;agree to therapy;fatigue  -KP      Patient Effort, Rehab Treatment good  -KP      Symptoms Noted During/After Treatment none  -KP      Precautions/Limitations fall precautions  -KP      Recorded by [KP] Vero oYrk OTR      Pain Assessment    Pain Assessment No/denies pain  -KP      Recorded by [KP] KANDY Sultana      Vision Assessment/Intervention    Visual Impairment visual impairment, left;inattention to the left  -KP       Recorded by [KP] KANDY Sultana      Cognitive Assessment/Intervention    Current Cognitive/Communication Assessment impaired  -KP      Orientation Status oriented to;person;place;time  -KP      Follows Commands/Answers Questions 100% of the time;able to follow single-step instructions  -      Personal Safety mild impairment  -KP      Personal Safety Interventions fall prevention program maintained;gait belt;muscle strengthening facilitated;nonskid shoes/slippers when out of bed  -KP      Recorded by [KP] Vero York OTR      Bed Mobility, Assessment/Treatment    Bed Mob, Supine to Sit, [place] not tested  -KP      Bed Mob, Sit to Supine, [place] set up required;supervision required  -KP      Recorded by [KP] Vero York OTR      Transfer Assessment/Treatment    Transfers, Sit-Stand [place] set up required;supervision required;stand by assist  -KP      Transfers, Stand-Sit [place] stand by assist  -KP      Transfers, Sit-Stand-Sit, Assist Device bed rails  -KP      Recorded by [KP] KANDY Sultana      Balance Skills Training    Standing-Level of Assistance Close supervision  -KP      Static Standing Balance Support Right upper extremity supported  -KP      Recorded by [KP] Vero York OTR      Therapy Exercises    Left Upper Extremity AROM:;15 reps;sitting;elbow flexion/extension;shoulder extension/flexion  -KP      Recorded by [KP] Vero York OTR      Fine Motor Coordination Training    Detail (Fine Motor Coordination Training) graps pennies w. L hand using index and thumb and inserting into small slot to inc FMC w. min difficulty. 2pt pinch on sq pegs and inserts them into board x30 to inc FMC no difficulty. Graps small colored pegs to complete peg design w. very little difficulty.  -KP      Recorded by [KP] Vero York OTR      Positioning and Restraints    Pre-Treatment Position sitting in chair/recliner  -KP       Post Treatment Position bed  -KP      In Bed supine;call light within reach;exit alarm on;encouraged to call for assist  -KP      Recorded by [KP] Vero York, OTR        User Key  (r) = Recorded By, (t) = Taken By, (c) = Cosigned By    Initials Name Effective Dates    KP Vero York, OTR 04/13/15 -     MD Nereida Llamas, PT 12/01/15 -     KB Katherine L Bruton 09/29/17 -                 IP PT Goals       11/08/17 1501 11/01/17 1221       Bed Mobility PT LTG    Bed Mobility PT LTG, Date Established  11/01/17  -MD     Bed Mobility PT LTG, Time to Achieve  2 wks  -MD     Bed Mobility PT LTG, Activity Type  supine to sit/sit to supine  -MD     Bed Mobility PT LTG, [place] Level  independent  -MD     Bed Mobility PT LTG, Date Goal Reviewed 11/08/17  -MD      Bed Mobility PT LTG, Outcome goal ongoing  -MD      Transfer Training PT LTG    Transfer Training PT LTG, Date Established  11/01/17  -MD     Transfer Training PT LTG, Time to Achieve  2 wks  -MD     Transfer Training PT LTG, Activity Type  sit to stand/stand to sit  -MD     Transfer Training PT LTG, [place] Level  supervision required  -MD     Transfer Training PT LTG, Assist Device  walker, rolling  -MD     Transfer Training PT  LTG, Date Goal Reviewed 11/08/17  -MD      Transfer Training PT LTG, Outcome goal met  -MD      Transfer Training 2 PT LTG    Transfer Training PT 2 LTG, Date Established  11/01/17  -MD     Transfer Training PT 2 LTG, Time to Achieve  2 wks  -MD     Transfer Training PT 2 LTG, Activity Type  other (see comments)   car transfer  -MD     Transfer Training PT 2 LTG, [place] Level  supervision required  -MD     Transfer Training PT 2 LTG, Assist Device  walker, rolling  -MD     Transfer Training PT 2 LTG, Date Goal Reviewed 11/08/17  -MD      Transfer Training PT 2 LTG, Outcome goal ongoing  -MD      Gait Training PT LTG    Gait Training Goal PT LTG, Date Established  11/01/17  -MD     Gait Training Goal PT  LTG, Time to Achieve  2 wks  -MD     Gait Training Goal PT LTG, [place] Level  supervision required  -MD     Gait Training Goal PT LTG, Assist Device  walker, rolling  -MD     Gait Training Goal PT LTG, Distance to Achieve  160  -MD     Gait Training Goal PT LTG, Date Goal Reviewed 11/08/17  -MD      Gait Training Goal PT LTG, Outcome goal ongoing  -MD      Stair Training PT LTG    Stair Training Goal PT LTG, Date Established  11/01/17  -MD     Stair Training Goal PT LTG, Time to Achieve  2 wks  -MD     Stair Training Goal PT LTG, Number of Steps  4  -MD     Stair Training Goal PT LTG, [place] Level  supervision required  -MD     Stair Training Goal PT LTG, Assist Device  2 handrails  -MD     Stair Training Goal PT LTG, Date Goal Reviewed 11/08/17  -MD      Stair Training Goal PT LTG, Outcome goal ongoing  -MD      Patient Education PT LTG    Patient Education PT LTG, Date Established  11/01/17  -MD     Patient Education PT LTG, Time to Achieve  2 wks  -MD     Patient Education PT LTG, Education Type  HEP  -MD     Patient Education PT LTG, Education Understanding  demonstrate adequately  -MD     Patient Education PT LTG, Date Goal Reviewed 11/08/17  -MD      Patient Education PT LTG Outcome goal ongoing  -MD        User Key  (r) = Recorded By, (t) = Taken By, (c) = Cosigned By    Initials Name Provider Type    MD Nereida Llamas, PT Physical Therapist          Physical Therapy Education     Title: PT OT SLP Therapies (Active)     Topic: Physical Therapy (Active)     Point: Mobility training (Done)    Learning Progress Summary    Learner Readiness Method Response Comment Documented by Status   Patient Acceptance CAR GIVENS 11/02/17 1158 Done               Point: Precautions (Done)    Learning Progress Summary    Learner Readiness Method Response Comment Documented by Status   Patient Acceptance NOEMY YOON MD 11/09/17 1031 Done    Acceptance NOEMY YOON MD 11/08/17 1217 Active    Acceptance NOEMY YOON MD 11/07/17 0821 Active     Acceptance E,D JUVENAL SLAUGHTER 11/06/17 1118 Active    Acceptance E,D JUVENAL SLAUGHTER 11/04/17 1012 Active    Acceptance E,D JUVENAL SLAUGHTER 11/03/17 1215 Active    Acceptance E,D JUVENAL SLAUGHTER 11/01/17 1228 Active                      User Key     Initials Effective Dates Name Provider Type Discipline    JK 12/01/15 -  Anna Epps, PT Physical Therapist PT    MD 12/01/15 -  Nereida Llamas PT Physical Therapist PT                    PT Recommendation and Plan  Anticipated Equipment Needs At Discharge: gait belt, front wheeled walker  Anticipated Discharge Disposition: home with home health  Planned Therapy Interventions: balance training, bed mobility training, gait training, home exercise program, patient/family education, transfer training  PT Frequency: 2 times/day  Plan of Care Review  Plan Of Care Reviewed With: patient  Progress: improving  Outcome Summary/Follow up Plan: Pt progressing towards goals w transfers and ambulation.  Gait deviations continue to compromise pts dynamic standing balance w ambulation.         Time Calculation:         PT Charges       11/09/17 1515 11/09/17 1143       Time Calculation    Start Time 1430  -MD 0830  -MD     Stop Time 1500  -MD 0900  -MD     Time Calculation (min) 30 min  -MD 30 min  -MD     PT Received On 11/09/17  -MD 11/09/17  -MD     PT - Next Appointment  11/10/17  -MD       User Key  (r) = Recorded By, (t) = Taken By, (c) = Cosigned By    Initials Name Provider Type    MD Nereida Llamas, PT Physical Therapist          Therapy Charges for Today     Code Description Service Date Service Provider Modifiers Qty    53104460820 HC PT THER PROC EA 15 MIN 11/8/2017 Nereida Llamas, PT GP 4    74214335846 HC PT THER PROC EA 15 MIN 11/9/2017 Nereida Llamas PT GP 4               Nereida Llamas PT  11/9/2017

## 2017-11-09 NOTE — PLAN OF CARE
Problem: Patient Care Overview (Adult)  Goal: Plan of Care Review  Outcome: Ongoing (interventions implemented as appropriate)    11/09/17 0952   Coping/Psychosocial Response Interventions   Plan Of Care Reviewed With patient   Patient Care Overview   Progress improving   Outcome Evaluation   Outcome Summary/Follow up Plan Patient denied any pain. Patient participated in therapies.         Problem: Stroke (Ischemic) (Adult)  Goal: Signs and Symptoms of Listed Potential Problems Will be Absent or Manageable (Stroke)  Outcome: Ongoing (interventions implemented as appropriate)    Problem: Fall Risk (Adult)  Goal: Absence of Falls  Outcome: Ongoing (interventions implemented as appropriate)

## 2017-11-09 NOTE — THERAPY TREATMENT NOTE
Inpatient Rehabilitation - Speech Language Pathology Treatment Note  [facility]     Patient Name: Alice Maxwell  : 1945  MRN: [id number]  Today's Date: 2017         Admit Date: 10/31/2017    Visit Dx:      ICD-10-CM ICD-9-CM   1. Oropharyngeal dysphagia R13.12 787.22     Patient Active Problem List   Diagnosis   • Stroke              Adult Rehabilitation Note       17 1445 17 1330 17 1210    Rehab Assessment/Intervention    Discipline occupational therapist  - speech language pathologist  -KB occupational therapist  -    Document Type therapy note (daily note)  -KP therapy note (daily note)  -KB therapy note (daily note)  -    Subjective Information agree to therapy;no complaints  -KP no complaints;agree to therapy  -KB agree to therapy;no complaints  -KP    Patient Effort, Rehab Treatment excellent  -KP good  -KB good  -KP    Symptoms Noted During/After Treatment none  -KP none  -KB none  -KP    Precautions/Limitations fall precautions;swallowing precautions  -KP fall precautions;swallowing precautions  -KB fall precautions  -KP    Recorded by [] Vero York OTR [KB] Katherine L Bruton [KP] Vero York, OTR    Pain Assessment    Pain Assessment No/denies pain  -KP No/denies pain  -KB No/denies pain  -KP    Pain Score   0  -KP    Recorded by [KP] Vero York OTR [KB] Katherine L Bruton [KP] Vero York OTR    Vision Assessment/Intervention    Visual Impairment visual impairment, left  -KP visual impairment, left;WFL with corrective lenses  -KB visual impairment, left  -KP    Recorded by [] KANDY Sultana [KB] Katherine L Bruton [KP] Vero York OTR    Cognitive Assessment/Intervention    Current Cognitive/Communication Assessment impaired  -KP  impaired  -KP    Orientation Status oriented to;person;place;time  -KP  oriented to;person;place;time  -KP    Follows Commands/Answers Questions 100% of the  time;able to follow single-step instructions  -  100% of the time;able to follow single-step instructions  -    Personal Safety mild impairment  -  mild impairment  -    Personal Safety Interventions fall prevention program maintained;gait belt;nonskid shoes/slippers when out of bed  -  fall prevention program maintained;gait belt;nonskid shoes/slippers when out of bed  -KP    Recorded by [KP] Vero York, OTR  [KP] Vero York, OTR    Improve attention    Improve attention by:  complete divided attention task  -KB     Status: Improve attention  Progressing as expected  -KB     Attention Progress  70%;without cues;100%;with inconsistent cues  -KB     Comments: Improve attention  Pt completed attention/organization task with errors due to inattention.  -KB     Recorded by  [KB] Katherine L Bruton     Improve oral skills    To Improve Oral Skills, patient will:  Increase tongue tip elevation;Increase tongue lateralization;Increase lip closure;Increase back of tongue control;Increase tongue A-P movement  -KB     Status: Improve Oral Skills  Progressing as expected  -KB     Oral Skills Progress  continue to adress  -KB     Comments: Improve Oral Skills  Unable to alternate puckered lips left to right despite MAX cues and instruciton; cues for labial closure.,  -KB     Recorded by  [KB] Katherine L Bruton     Improve closure at entrance to airway    To improve closure at entrance to airway, patient will:  --   hard swallows  -KB     Status: Improve closure at entrance to airway  Progressing as expected  -KB     Closure at Entrance to Airway Progress  continue to adress  -KB     Comments: Improve closure at entrance to airway  Completed exercises with verbal cues for squeezing muscles.  -KB     Recorded by  [KB] Katherine L Bruton     Improve laryngeal elevation    To improve laryngeal elevation, patient will:  Complete pitch-glide  -KB     Status: Improve laryngeal elevation  Progressing as  expected  -KB     Laryngeal Elevation Progress  continue to adress  -KB     Comments: Improve laryngeal elevation  Completed exercises independently.   -KB     Recorded by  [KB] Katherine L Bruton     Improve tongue base & pharyngeal wall squeeze    To improve tongue base & pharyngeal wall squeeze, patient will:  Complete tongue base retraction;Complete yawn/hold retractions  -KB     Status: Improve tongue base & pharyngeal wall squeeze  Progressing as expected  -KB     Tongue Base/Pharyngeal Wall Squeeze Progress  continue to adress  -KB     Comments: Improve tongue base & pharyngeal wall squeeze  Completed exercises with verbal instructions.  -KB     Recorded by  [KB] Katherine L Bruton     Bed Mobility, Assessment/Treatment    Bed Mobility, Comment up in   -KP  up in wc  -KP    Recorded by [KP] KANDY Sultana  [KP] Vero York OTR    Transfer Assessment/Treatment    Transfers, Sit-Stand [place]   set up required;supervision required;stand by assist  -KP    Transfers, Stand-Sit [place]   set up required;supervision required;stand by assist  -KP    Transfers, Sit-Stand-Sit, Assist Device   other (see comments)   wc grab bars  -KP    Walk-In Shower Transfer, [place]   set up required;supervision required  -KP    Walk-In Shower Transfer, Assist Device   other (see comments)   tub bench, grab bars  -KP    Recorded by   [KP] KANDY Sultana    Functional Mobility    Functional Mobility- Comment   a few steps to tub bench SBA  -KP    Recorded by   [KP] Vero York OTR    Upper Body Bathing Assessment/Training    UB Bathing Assess/Train Assistive Device   hand-held shower head;grab bars;tub bench  -    UB Bathing Assess/Train, Position   sitting  -KP    UB Bathing Assess/Train, [place] Level   set up required;stand by assist  -KP    Recorded by   [KP] Vero York OTR    Lower Body Bathing Assessment/Training    LB Bathing Assess/Train  Assistive Device   hand-held shower head;grab bars;tub bench  -KP    LB Bathing Assess/Train, Position   sitting;standing  -KP    LB Bathing Assess/Train, [place] Level   set up required;stand by assist  -KP    Recorded by   [KP] Vero York OTR    Upper Body Dressing Assessment/Training    UB Dressing Assess/Train, Clothing Type   doffing:;donning:;t-shirt  -KP    UB Dressing Assess/Train, Position   sitting  -KP    UB Dressing Assess/Train, [place]   set up required;supervision required  -KP    Recorded by   [KP] Vero York OTR    Lower Body Dressing Assessment/Training    LB Dressing Assess/Train, Clothing Type   doffing:;donning:;pants;shoes;slipper socks;socks  -KP    LB Dressing Assess/Train, Position   sitting;standing  -KP    LB Dressing Assess/Train, [place]   set up required;supervision required  -KP    Recorded by   [KP] Vero York OTR    Grooming Assessment/Training    Grooming Assess/Train, Position   sitting  -KP    Grooming Assess/Train, Indepen Level   conditional independence;set up required  -KP    Recorded by   [KP] KANDY Sultana    Balance Skills Training    Sitting-Level of Assistance Distant supervision  -KP  Distant supervision  -KP    Sitting-Balance Support Feet supported  -KP  Feet supported  -KP    Sitting-Balance Activities   Reaching for objects  -KP    Standing-Level of Assistance   Close supervision  -KP    Recorded by [KP] KANDY Sultana  [KP] Vero York OTR    Fine Motor Coordination Training    Detail (Fine Motor Coordination Training) inserts silver pegs into slot, washer on top and other round open peg over top x12 (purdue peg board) to inc FMC w. L hand.  Pulls coins from resistive putty x10 w, L hand to inc FMC. and finger/hand strength. Grasps a lot of coins in her palm, in hand manipulation pushing coins to finger tips then inserting into slot to inc FMC. w. little to no difficulty  -KP       Recorded by [KP] Vero York OTR      Positioning and Restraints    Pre-Treatment Position sitting in chair/recliner  -KP  sitting in chair/recliner  -KP    Post Treatment Position wheelchair  -KP  wheelchair  -KP    In Wheelchair sitting;with PT  -KP  sitting;with other staff   RT  -KP    Recorded by [KP] Vero York OTR  [KP] Vero York OTR      11/09/17 1030 11/09/17 1027 11/08/17 1539    Rehab Assessment/Intervention    Discipline speech language pathologist  -KB physical therapist  -MD occupational therapist  -KP    Document Type therapy note (daily note)  -ANIYA therapy note (daily note)  -MD therapy note (daily note)  -KP    Subjective Information no complaints;agree to therapy  -KB no complaints;agree to therapy  -MD agree to therapy;no complaints  -KP    Patient Effort, Rehab Treatment good  -KB good  -MD good  -KP    Symptoms Noted During/After Treatment none  -KB none  -MD none  -KP    Precautions/Limitations fall precautions  -KB fall precautions  -MD fall precautions;swallowing precautions  -KP    Equipment Issued to Patient  gait belt  -MD     Recorded by [KB] Katherine L Bruton [MD] Nereida Llamas, PT [KP] Vero York, OTR    Pain Assessment    Pain Assessment No/denies pain  -KB No/denies pain  -MD No/denies pain  -KP    Pain Score   0  -KP    Recorded by [KB] Katherine L Bruton [MD] Nereida Llamas, PT [KP] Vero York, OTR    Vision Assessment/Intervention    Visual Impairment visual impairment, left;WFL with corrective lenses  -KB  visual impairment, left  -KP    Recorded by [KB] Katherine L Bruton  [KP] Vero York, OTR    Cognitive Assessment/Intervention    Current Cognitive/Communication Assessment  impaired  -MD impaired  -KP    Orientation Status  person;place;time;oriented to  -MD person;place;time;oriented to  -KP    Follows Commands/Answers Questions  100% of the time;needs cueing;needs increased time;needs repetition  -% of the  time;able to follow single-step instructions;needs cueing  -    Personal Safety  decreased insight to deficits  -MD mild impairment  -    Personal Safety Interventions  fall prevention program maintained;gait belt;muscle strengthening facilitated;nonskid shoes/slippers when out of bed;supervised activity  -MD fall prevention program maintained;gait belt;nonskid shoes/slippers when out of bed  -KP    Recorded by  [MD] Nereida Llamas, PT [KP] Vero York, OTR    Improve attention    Improve attention by: complete divided attention task  -KB      Status: Improve attention Progressing as expected  -KB      Attention Progress 70%;without cues;100%;with inconsistent cues  -KB      Comments: Improve attention Pt completed complex written directions task with MIN-MOD cues for reasoning and attention.  -KB      Recorded by [KB] Katherine L Bruton      Improve oral skills    To Improve Oral Skills, patient will: Increase tongue tip elevation;Increase tongue lateralization;Increase lip closure;Increase back of tongue control;Increase tongue A-P movement  -KB      Status: Improve Oral Skills Progressing as expected  -KB      Oral Skills Progress continue to adress  -KB      Comments: Improve Oral Skills Pt with difficulty moving pucker left to right and holding air in mouth to puff up cheeks.  -KB      Recorded by [KB] Katherine L Bruton      Improve closure at entrance to airway    To improve closure at entrance to airway, patient will: --   hard swallows  -KB      Status: Improve closure at entrance to airway Progressing as expected  -KB      Closure at Entrance to Airway Progress continue to adress  -KB      Comments: Improve closure at entrance to airway Completes exercises independently.   -KB      Recorded by [KB] Katherine L Bruton      Improve laryngeal elevation    To improve laryngeal elevation, patient will: Complete pitch-glide  -KB      Status: Improve laryngeal elevation Progressing as expected  -KB       Laryngeal Elevation Progress continue to adress  -KB      Comments: Improve laryngeal elevation Pt completes exercises independently.  -KB      Recorded by [KB] Katherine L Bruton      Improve tongue base & pharyngeal wall squeeze    To improve tongue base & pharyngeal wall squeeze, patient will: Complete effortful swallow;Complete gargle/hold retraction  -KB      Status: Improve tongue base & pharyngeal wall squeeze Progressing as expected  -KB      Tongue Base/Pharyngeal Wall Squeeze Progress continue to adress  -KB      Comments: Improve tongue base & pharyngeal wall squeeze Pt completed exercises independently.   -KB      Recorded by [KB] Katherine L Bruton      Bed Mobility, Assessment/Treatment    Bed Mobility, Roll Left, [place]  supervision required  -MD     Bed Mobility, Roll Right, [place]  supervision required  -MD     Bed Mobility, Scoot/Bridge, [place]  supervision required  -MD     Bed Mob, Supine to Sit, [place]  supervision required  -MD     Bed Mob, Sit to Supine, [place]  supervision required  -MD     Bed Mobility, Comment   up in chair  -KP    Recorded by  [MD] Nereida Llamas, PT [KP] Vero York, OTR    Transfer Assessment/Treatment    Transfers, Sit-Stand [place]  supervision required  -MD     Transfers, Stand-Sit [place]  supervision required  -MD     Transfers, Sit-Stand-Sit, Assist Device  rolling walker  -MD     Transfer, Safety Issues  step length decreased  -MD     Transfer, Impairments  strength decreased;impaired balance  -MD     Transfer, Comment  car transfer: [id number]  -MD     Recorded by  [MD] Nereida Llamas, PT     Gait Assessment/Treatment    Gait, [place] Level  verbal cues required;stand by assist;contact guard assist  -MD     Gait, Assistive Device  rolling walker  -MD     Gait, Distance (Feet)  80   x3  -MD     Gait, Gait Deviations  bilateral:;janet decreased;forward flexed posture;step length decreased  -MD     Gait, Safety Issues   step length decreased  -MD     Gait, Impairments  impaired balance;strength decreased  -MD     Recorded by  [MD] Nereida Llamas, PT     Stairs Assessment/Treatment    Number of Stairs  4  -MD     Stairs, Handrail Location  both sides  -MD     Stairs, [place] Level  supervision required  -MD     Stairs, Technique Used  step over step (ascending);step to step (descending)  -MD     Stairs, Impairments  strength decreased;impaired balance  -MD     Recorded by  [MD] Nereida Llamas PT     Balance Skills Training    Sitting-Level of Assistance   Distant supervision  -KP    Sitting-Balance Support   Feet supported  -KP    Standing-Level of Assistance  Contact guard  -MD     Static Standing Balance Support  No upper extremity supported  -MD     Standing-Balance Activities  Ball toss  -MD     Gait Balance-Level of Assistance  Contact guard  -MD     Gait Balance Support  parallel bars  -MD     Gait Balance Activities  backwards;side-stepping  -MD     Recorded by  [MD] Nereida Llamas, PT [KP] Vero York, OTR    Fine Motor Coordination Training    Detail (Fine Motor Coordination Training)   pulls pegs from yellow resistive putty using one hand, using L hand to inc FMC and hand/digit strength. rotates wooden block up dowel [place] w. pegs to inc UE coordination w. min difficulty. strings foam beads on lace w. R hand holding string and L hand pushing bead on and vice versus to inc FMC and BLC  -KP    Recorded by   [KP] Vero York, SARAR    Positioning and Restraints    Pre-Treatment Position  sitting in chair/recliner  -MD sitting in chair/recliner  -KP    Post Treatment Position  bed  -MD wheelchair  -KP    In Bed  supine;call light within reach;exit alarm on  -MD     In Wheelchair   sitting;with PT  -KP    Recorded by  [MD] Nereida Llamas, PT [KP] Vero York, OTR      11/08/17 1330 11/08/17 1152 11/08/17 1114    Rehab Assessment/Intervention    Discipline speech language pathologist  -KB occupational therapist  -KP  physical therapist  -MD    Document Type therapy note (daily note)  -KB therapy note (daily note)  -KP therapy note (daily note)  -MD    Subjective Information no complaints;agree to therapy  -KB agree to therapy;no complaints  -KP agree to therapy;no complaints  -MD    Patient Effort, Rehab Treatment good  -KB good  -KP good  -MD    Symptoms Noted During/After Treatment none  -KB none  -KP none  -MD    Precautions/Limitations fall precautions;swallowing precautions  -KB fall precautions;swallowing precautions  -KP fall precautions  -MD    Equipment Issued to Patient   gait belt;front wheeled walker  -MD    Recorded by [KB] Katherine L Bruton [] KANDY Sultana [MD] Nereida Llamas, PT    Pain Assessment    Pain Assessment No/denies pain  -KB No/denies pain  -KP No/denies pain  -MD    Pain Score  0  -KP     Recorded by [KB] Katherine L Bruton [] KANDY Sultana [MD] Nereida Llamas, PT    Vision Assessment/Intervention    Visual Impairment WFL with corrective lenses  -KB visual impairment, left  -KP visual impairment, left  -MD    Recorded by [KB] Katherine L Bruton [] KANDY Sultana [MD] Nereida Llamas, PT    Cognitive Assessment/Intervention    Current Cognitive/Communication Assessment  impaired  -KP impaired  -MD    Orientation Status  oriented to;person;place;time  - oriented to;person;place;time  -MD    Follows Commands/Answers Questions  100% of the time;able to follow single-step instructions;needs cueing  - 100% of the time;needs cueing;needs repetition  -MD    Personal Safety  mild impairment  -KP impulsive;decreased insight to deficits  -MD    Personal Safety Interventions  fall prevention program maintained;gait belt;nonskid shoes/slippers when out of bed  - fall prevention program maintained;gait belt;muscle strengthening facilitated;nonskid shoes/slippers when out of bed;supervised activity  -MD    Recorded by  [] KANDY Sultana [MD] Nereida Llamas, PT    Improve  functional problem solving    Improve functional problem solving through: complete high level reasoning task  -KB      Status: Improve functional problem solving Progressing as expected  -KB      Functional Problem Solving Progress 80%;without cues;100%;with inconsistent cues  -KB      Comments: Improve functional problem solving Completed two high-level logic puzzles with MIN cues only.  -KB      Recorded by [KB] Katherine L Bruton      Improve closure at entrance to airway    To improve closure at entrance to airway, patient will: --   pushing/pulling exercises  -KB      Status: Improve closure at entrance to airway Progressing as expected  -KB      Closure at Entrance to Airway Progress continue to adress  -KB      Comments: Improve closure at entrance to airway Initial cues for how to complete exercises.  -KB      Recorded by [KB] Katherine L Bruton      Improve tongue base & pharyngeal wall squeeze    To improve tongue base & pharyngeal wall squeeze, patient will: --   Practiced k/g word list forcefully  -KB      Status: Improve tongue base & pharyngeal wall squeeze Progressing as expected  -KB      Tongue Base/Pharyngeal Wall Squeeze Progress continue to adress  -KB      Comments: Improve tongue base & pharyngeal wall squeeze No cues needed, pt repeated word list x5.   -KB      Recorded by [KB] Katherine L Bruton      Bed Mobility, Assessment/Treatment    Bed Mob, Supine to Sit, [place]   supervision required  -MD    Bed Mob, Sit to Supine, [place]   supervision required  -MD    Bed Mobility, Comment  pt up in [facility]     Recorded by  [KP] Vero York, OTR [MD] Nereida Llamas, PT    Transfer Assessment/Treatment    Transfers, Sit-Stand [place]  supervision required  - stand by assist  -MD    Transfers, Stand-Sit [place]  supervision required  - stand by assist  -MD    Transfers, Sit-Stand-Sit, Assist Device  other (see comments)   [facility] rolling walker  -MD    Walk-In Shower  Transfer, [place]  set up required;supervision required  -     Walk-In Shower Transfer, Assist Device  other (see comments)   tub bench  -     Transfer, Safety Issues   step length decreased  -MD    Transfer, Impairments   strength decreased;impaired balance  -MD    Recorded by  [KP] Vero York OTR [MD] Nereida Llamas PT    Gait Assessment/Treatment    Gait, [place] Level   verbal cues required;contact guard assist  -MD    Gait, Assistive Device   rolling walker  -MD    Gait, Distance (Feet)   80   x3  -MD    Gait, Gait Deviations   bilateral:;janet decreased;forward flexed posture;step length decreased  -MD    Gait, Safety Issues   sequencing ability decreased  -MD    Gait, Impairments   impaired balance;strength decreased  -MD    Recorded by   [MD] Nereida Llamas, ONEIDA    Upper Body Bathing Assessment/Training    UB Bathing Assess/Train Assistive Device  hand-held shower head;tub bench  -     UB Bathing Assess/Train, Position  sitting  -     UB Bathing Assess/Train, [place] Level  set up required;stand by assist  -     Recorded by  [KP] KANDY Sultana     Lower Body Bathing Assessment/Training    LB Bathing Assess/Train Assistive Device  grab bars;hand-held shower head;tub bench  -     LB Bathing Assess/Train, Position  sitting;standing  -     LB Bathing Assess/Train, [place] Level  set up required;stand by assist  -     Recorded by  [KP] KANDY Sultana     Upper Body Dressing Assessment/Training    UB Dressing Assess/Train, Clothing Type  doffing:;donning:;t-shirt  -     UB Dressing Assess/Train, Position  sitting  -     UB Dressing Assess/Train, [place]  set up required;supervision required  -     Recorded by  [KP] Vero York OTR     Lower Body Dressing Assessment/Training    LB Dressing Assess/Train, Clothing Type  doffing:;donning:;socks;shoes;pants  -     LB Dressing Assess/Train, Position  sitting;standing  -     LB  Dressing Assess/Train, [place]  set up required;supervision required  -     LB Dressing Assess/Train, Comment  holds onto grab bar intermittently  -KP     Recorded by  [KP] KANDY Sultana     Grooming Assessment/Training    Grooming Assess/Train, Position  sitting  -KP     Grooming Assess/Train, [place] Level  set up required;supervision required  -KP     Recorded by  [KP] Vero Yokr OTR     Balance Skills Training    Sitting-Level of Assistance  Close supervision;Distant supervision  -KP     Sitting-Balance Support  Feet supported  -KP     Standing-Level of Assistance  Close supervision  -KP     Recorded by  [KP] Vero York OTR     Therapy Exercises    Right Lower Extremity   AROM:;10 reps;sidelying;hip abduction/adduction   x3 sets  -MD    Left Lower Extremity   AROM:;10 reps;sidelying;clam shell   x3 sets  -MD    Bilateral Lower Extremities   AROM:;20 reps;ankle pumps/circles;hip abduction/adduction;hip flexion;LAQ  -MD    BLE Other Reps   --   Nustep at level 2 for 4 min  -MD    Trunk Exercises   10 reps;supine;bridging   x3 sets  -MD    Recorded by   [MD] Nereida Llamas, PT    Positioning and Restraints    Pre-Treatment Position  sitting in chair/recliner  -KP sitting in chair/recliner  -MD    Post Treatment Position  wheelchair  -KP wheelchair  -MD    In Wheelchair  sitting;call light within reach;encouraged to call for assist  -KP sitting;call light within reach  -MD    Recorded by  [KP] Vero York OTR [MD] Nereida Llamas, PT      11/08/17 1030 11/07/17 1532 11/07/17 1201    Rehab Assessment/Intervention    Discipline speech language pathologist  -KB occupational therapist  -KP occupational therapist  -KP    Document Type therapy note (daily note)  -KB therapy note (daily note)  -KP therapy note (daily note)  -KP    Subjective Information no complaints;agree to therapy  -KB agree to therapy;no complaints  -KP agree to therapy;no complaints  -KP    Patient Effort,  Rehab Treatment good  -KB good  -KP good  -KP    Symptoms Noted During/After Treatment none  -KB none  -KP none  -KP    Precautions/Limitations fall precautions;swallowing precautions  -KB fall precautions;swallowing precautions  -KP fall precautions;swallowing precautions  -KP    Precautions/Limitations, Vision WFL with corrective lenses  -KB      Recorded by [KB] Katherine L Bruton [KP] Vero York, OTR [KP] Vero York, OTR    Pain Assessment    Pain Assessment 0-10  -KB No/denies pain  -KP No/denies pain  -KP    Post Pain Score 7  -KB      Pain Location Toe (Comment which one)  -KB      Pain Orientation Left;Right  -KB      Pain Intervention(s) Emotional support   Pt did not want medications.  -KB      Recorded by [KB] Katherine L Bruton [KP] Vero York, SARAR [KP] Vero York, OTR    Vision Assessment/Intervention    Visual Impairment WFL with corrective lenses  -KB visual impairment, left  -KP visual impairment, left  -KP    Recorded by [KB] Katherine L Bruton [] Vero York, OTR [KP] Vero York, OTR    Cognitive Assessment/Intervention    Current Cognitive/Communication Assessment  impaired  -KP impaired  -KP    Orientation Status  oriented to;person;place;time  -KP oriented to;person;place;time  -KP    Follows Commands/Answers Questions  100% of the time;able to follow single-step instructions;needs cueing  -% of the time;able to follow single-step instructions  -KP    Personal Safety  mild impairment  -KP mild impairment  -KP    Personal Safety Interventions  fall prevention program maintained;gait belt;muscle strengthening facilitated;nonskid shoes/slippers when out of bed  -KP fall prevention program maintained;gait belt;nonskid shoes/slippers when out of bed  -KP    Recorded by  [KP] Vero York, OTR [KP] Vero York, OTR    Improve functional problem solving    Improve functional problem solving through: complete  high level reasoning task  -KB      Status: Improve functional problem solving Progressing as expected  -KB      Functional Problem Solving Progress 80%;without cues;100%;with inconsistent cues  -KB      Comments: Improve functional problem solving Pt completed high-level logic puzzle with MIN verbal cues for attention to detail.   -KB      Recorded by [KB] Katherine L Bruton      Improve oral skills    To Improve Oral Skills, patient will: --   pucker/smile, pucker left/right, suck in/puff out cheeks  -KB      Status: Improve Oral Skills Progressing as expected  -KB      Oral Skills Progress continue to adress  -KB      Comments: Improve Oral Skills Completed exercises without cues.  -KB      Recorded by [KB] Katherine L Bruton      Improve closure at entrance to airway    To improve closure at entrance to airway, patient will: --   hard swallows  -KB      Status: Improve closure at entrance to airway Progressing as expected  -KB      Closure at Entrance to Airway Progress continue to adress  -KB      Comments: Improve closure at entrance to airway Completed daily exercises to address airway closure without cues.  -KB      Recorded by [KB] Katherine L Bruton      Improve laryngeal elevation    To improve laryngeal elevation, patient will: --   pitch glide, high pitch vowel, humming in falsetto  -KB      Status: Improve laryngeal elevation Progressing as expected  -KB      Laryngeal Elevation Progress continue to adress  -KB      Comments: Improve laryngeal elevation Completed exercises to addres laryngeal elevation without cues.   -KB      Recorded by [KB] Katherine L Bruton      Improve tongue base & pharyngeal wall squeeze    To improve tongue base & pharyngeal wall squeeze, patient will: Complete effortful swallow;Complete gargle/hold retraction  -KB      Status: Improve tongue base & pharyngeal wall squeeze Progressing as expected  -KB      Tongue Base/Pharyngeal Wall Squeeze Progress continue to adress  -KB       Comments: Improve tongue base & pharyngeal wall squeeze Completed exercises without cues.  -KB      Recorded by [KB] Katherine L Bruton      Bed Mobility, Assessment/Treatment    Bed Mob, Supine to Sit, [place]  supervision required;set up required  -     Bed Mob, Sit to Supine, [place]  not tested  -     Bed Mobility, Comment   up in   -    Recorded by  [KP] Vero York OTR [KP] Vero York OTR    Transfer Assessment/Treatment    Transfers, Bed-Chair [place]  stand by assist  -KP     Transfers, Chair-Bed [place]  not tested  -KP     Transfers, Bed-Chair-Bed, Assist Device  other (see comments)   wc  -KP     Transfers, Sit-Stand [place]  stand by assist  -KP stand by assist  -KP    Transfers, Stand-Sit [place]  stand by assist  -KP stand by assist  -KP    Transfers, Sit-Stand-Sit, Assist Device  other (see comments)   wc  -KP other (see comments)   wc grab bar  -    Transfer, Comment   doesn't wantn to shower today. her hair is was fixed last night by [facility]  -    Recorded by  [KP] Vero York OTR [KP] Vero York OTR    Upper Body Bathing Assessment/Training    UB Bathing Assess/Train, Position   sitting;sink side  -    UB Bathing Assess/Train, [place] Level   stand by assist;set up required  -    Recorded by   [KP] Vero York OTR    Lower Body Bathing Assessment/Training    LB Bathing Assess/Train, Position   sitting;standing  -KP    LB Bathing Assess/Train, [place] Level   set up required;stand by assist  -    Recorded by   [KP] Vero York OTR    Upper Body Dressing Assessment/Training    UB Dressing Assess/Train, Clothing Type   donning:;doffing:;t-shirt  -KP    UB Dressing Assess/Train, Position   sitting  -    UB Dressing Assess/Train, [place]   supervision required;set up required  -    Recorded by   [KP] Vero York OTR    Lower Body Dressing Assessment/Training     LB Dressing Assess/Train, Clothing Type   doffing:;donning:;pants;socks;shoes  -KP    LB Dressing Assess/Train, Position   sitting;standing  -KP    LB Dressing Assess/Train, [place]   set up required;supervision required  -KP    Recorded by   [KP] Vero York OTR    Grooming Assessment/Training    Grooming Assess/Train, Position   sitting;sink side  -KP    Grooming Assess/Train, [place] Level   supervision required;set up required  -KP    Recorded by   [KP] Vero York OTR    Balance Skills Training    Sitting-Level of Assistance  Close supervision;Distant supervision  -KP Close supervision;Distant supervision  -KP    Sitting-Balance Support  Feet supported  -KP Feet supported  -KP    Standing-Level of Assistance  Close supervision  -KP Close supervision  -KP    Static Standing Balance Support  Right upper extremity supported  -KP Left upper extremity supported  -KP    Recorded by  [KP] KANDY Sultana [KP] Vero York OTR    Therapy Exercises    BUE Resistance  theraputty   pinches yellow putty w. each digit and thumb  -KP     Recorded by  [KP] KANDY Sultana     Fine Motor Coordination Training    Detail (Fine Motor Coordination Training)  links paper clip together clipping it on other paper clip w. L hand.  in hand manipulation w. coins in L hand pushing coin up w. L thumb and inserting coin into small slot to inc FMC and in hand manipulation.   -KP     Recorded by  [KP] KANDY Sultana     Positioning and Restraints    Pre-Treatment Position  in bed  -KP sitting in chair/recliner  -KP    Post Treatment Position  wheelchair  -KP wheelchair  -KP    In Wheelchair  sitting;call light within reach;encouraged to call for assist;with family/caregiver  -KP sitting   in dining room waiting for RT  -KP    Recorded by  [KP] Vero York OTR [KP] KANDY Sultana      11/07/17 1100 11/07/17 1030 11/07/17 0819    Rehab  Assessment/Intervention    Discipline speech language pathologist  -KB speech language pathologist  -KB physical therapist  -MD    Document Type therapy note (daily note)  -KB therapy note (daily note)  -KB therapy note (daily note)  -MD    Subjective Information no complaints;agree to therapy  -KB no complaints;agree to therapy  -KB agree to therapy;no complaints  -MD    Patient Effort, Rehab Treatment good  -KB good  -KB good  -MD    Symptoms Noted During/After Treatment none  -KB none  -KB none  -MD    Precautions/Limitations fall precautions;swallowing precautions  -KB fall precautions;swallowing precautions  -KB fall precautions  -MD    Equipment Issued to Patient   gait belt;front wheeled walker  -MD    Recorded by [KB] Katherine L Bruton [KB] Katherine L Bruton [MD] Nereida Llamas, PT    Pain Assessment    Pain Assessment No/denies pain  -KB No/denies pain  -KB No/denies pain  -MD    Recorded by [KB] Katherine L Bruton [KB] Katherine L Bruton [MD] Nereida Llamas, PT    Vision Assessment/Intervention    Visual Impairment visual impairment, left;inattention to the left  -KB visual impairment, left;inattention to the left  -KB visual impairment, left;inattention to the left  -MD    Recorded by [KB] Katherine L Bruton [KB] Katherine L Bruton [MD] Nereida Llamas, PT    Cognitive Assessment/Intervention    Current Cognitive/Communication Assessment   impaired  -MD    Orientation Status   oriented to;person  -MD    Follows Commands/Answers Questions   100% of the time;needs cueing;needs repetition  -MD    Personal Safety   decreased insight to deficits  -MD    Personal Safety Interventions   fall prevention program maintained;gait belt;muscle strengthening facilitated;nonskid shoes/slippers when out of bed;supervised activity  -MD    Recorded by   [MD] Nereida Llamas, PT    Improve functional problem solving    Improve functional problem solving through:  complete high level reasoning task  -KB     Status: Improve functional problem  solving  Progressing as expected  -KB     Functional Problem Solving Progress  50%;without cues;100%;with consistent cues  -KB     Comments: Improve functional problem solving  Pt required MOD verbal cues throughout logic puzzle for attention and reasoning.   -KB     Recorded by  [KB] Katherine L Bruton     Improve oral skills    Status: Improve Oral Skills  Progressing as expected  -KB     Oral Skills Progress  80%;with inconsistent cues  -KB     Recorded by  [KB] Katherine L Bruton     Improve closure at entrance to airway    Status: Improve closure at entrance to airway Progressing as expected  -KB      Closure at Entrance to Airway Progress continue to adress  -KB      Comments: Improve closure at entrance to airway Pt observed with mid-day meal. 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   -KB      Recorded by [KB] Katherine L Bruton      Improve laryngeal elevation    Status: Improve laryngeal elevation  Progressing as expected  -KB     Laryngeal Elevation Progress  60%;with inconsistent cues;continue to adress  -KB     Comments: Improve laryngeal elevation  Pt completing exercises in therapy session as prescribed.   -KB     Recorded by  [KB] Katherine L Bruton     Improve tongue base & pharyngeal wall squeeze    Status: Improve tongue base & pharyngeal wall squeeze  Progressing as expected  -KB     Tongue Base/Pharyngeal Wall Squeeze Progress  60%;with inconsistent cues;continue to adress  -KB     Comments: Improve tongue base & pharyngeal wall squeeze  Pt completing exercises in therapy sessions as prescribed.   -KB     Recorded by  [KB] Katherine L Bruton     Bed Mobility, Assessment/Treatment    Bed Mob, Supine to Sit, [place]   not tested  -MD    Bed Mob, Sit to Supine, [place]   not tested  -MD    Recorded by   [MD] Nereida Llamas, PT    Transfer Assessment/Treatment    Transfers, Sit-Stand [place]   verbal cues required;stand by  assist  -MD    Transfers, Stand-Sit [place]   verbal cues required;stand by assist  -MD    Transfers, Sit-Stand-Sit, Assist Device   rolling walker  -MD    Transfer, Safety Issues   step length decreased  -MD    Transfer, Impairments   strength decreased;impaired balance  -MD    Recorded by   [MD] Nereida Llamas PT    Gait Assessment/Treatment    Gait, [place] Level   verbal cues required;contact guard assist  -MD    Gait, Assistive Device   rolling walker  -MD    Gait, Distance (Feet)   80   x3  -MD    Gait, Gait Deviations   bilateral:;janet decreased;forward flexed posture;step length decreased;toe-to-floor clearance decreased;weight-shifting ability decreased  -MD    Gait, Safety Issues   step length decreased  -MD    Gait, Impairments   impaired balance;strength decreased  -MD    Recorded by   [MD] Nereida Llamas PT    Stairs Assessment/Treatment    Number of Stairs   4  -MD    Stairs, Handrail Location   both sides  -MD    Stairs, [place] Level   supervision required  -MD    Stairs, Technique Used   step over step (ascending);step to step (descending)  -MD    Stairs, Impairments   strength decreased;impaired balance  -MD    Recorded by   [MD] Nereida Llamas PT    Therapy Exercises    Bilateral Lower Extremities   --   nustep 7 min level 2  -MD    Recorded by   [MD] Nereida Llamas PT    Positioning and Restraints    Pre-Treatment Position   sitting in chair/recliner  -MD    Post Treatment Position   wheelchair  -MD    In Wheelchair   patient within staff view   in DR  -MD    Recorded by   [MD] Nereida Llamas PT      User Key  (r) = Recorded By, (t) = Taken By, (c) = Cosigned By    Initials Name Effective Dates    BRIANNA York, OTR 04/13/15 -     MD Nereida Llamas, PT 12/01/15 -     KB Katherine L Bruton 09/29/17 -               IP SLP Goals       11/06/17 1330 11/01/17 1215       Safely Consume Diet    Safely Consume Diet- SLP, Date Established  11/01/17  -OC     Safely Consume Diet- SLP, Time to Achieve by  discharge  -OC by discharge  -OC     Safely Consume Diet- SLP, Outcome goal ongoing  -OC goal ongoing  -OC     Cognitive Linguistic- Improve Safety and Awareness    Cognitive Linguistic Improve Safety and Awareness- SLP, Date Established 11/06/17  -OC      Cognitive Linguistic Improve Safety and Awareness- SLP, Time to Achieve by discharge  -OC      Cognitive Linguistic Improve Safety and Awareness- SLP, Outcome goal ongoing  -OC        User Key  (r) = Recorded By, (t) = Taken By, (c) = Cosigned By    Initials Name Provider Type    OC Lilian Mohr MA,CCC-SLP Speech and Language Pathologist          EDUCATION  The patient has been educated in the following areas:   Cognitive Impairment Dysphagia (Swallowing Impairment).    SLP Recommendation and Plan      Continue speech therapy 3-5 times per week until discharge.                                    Time Calculation:         Time Calculation- SLP       11/09/17 1330 11/09/17 1030       Time Calculation- SLP    SLP Start Time 1330  -KB 1030  -KB     SLP Stop Time 1400  -KB 1100  -KB     SLP Time Calculation (min) 30 min  -KB 30 min  -KB       User Key  (r) = Recorded By, (t) = Taken By, (c) = Cosigned By    Initials Name Provider Type    KB Katherine L Bruton Speech and Language Pathologist          Therapy Charges for Today     Code Description Service Date Service Provider Modifiers Qty    81560912918 HC ST DEV OF COGN SKILLS EACH 15 MIN 11/8/2017 Katherine L Bruton GN 2    89288780761 HC ST TREATMENT SWALLOW 2 11/8/2017 Katherine L Bruton GN 1    49959267366 HC ST TREATMENT SWALLOW 2 11/9/2017 Katherine L Bruton GN 1    73632363816 HC ST DEV OF COGN SKILLS EACH 15 MIN 11/9/2017 Katherine L Bruton GN 2               Katherine L Bruton  11/9/2017

## 2017-11-09 NOTE — PROGRESS NOTES
Inpatient Rehabilitation Functional Measures Assessment and Plan of Care    Plan of Care  Updated Problems/Interventions  Mobility    [OT] Toilet Transfers(Active)  Current Status(11/09/2017): SBA  Weekly Goal(11/16/2017): SBA  Discharge Goal: SUP    [OT] Tub/Shower Transfers(Active)  Current Status(11/09/2017): SBA  Weekly Goal(11/16/2017): SBA  Discharge Goal: SBA        Self Care    [OT] Bathing(Active)  Current Status(11/09/2017): SBA  Weekly Goal(11/16/2017): SBA  Discharge Goal: SUP to SBA    [OT] Dressing (Lower)(Active)  Current Status(11/09/2017): SBA  Weekly Goal(11/16/2017): SBA  Discharge Goal: SBA    [OT] Grooming(Active)  Current Status(11/09/2017): Mod I  Weekly Goal(11/16/2017): Mod I  Discharge Goal: Mod I    [OT] Toileting(Active)  Current Status(11/09/2017): SBA  Weekly Goal(11/16/2017): SBA  Discharge Goal: SUP to SBA    Functional Measures  YESI Eating:  [place]  YESI Grooming: Grooming Score = 6.  Patient is modified independent for grooming,  requiring: Safety considerations. seated in wc  YESI Bathing:  Patient bathed in shower. Bathing Score = 5.  Patient is  supervision/set-up for bathing, requiring: Standing by. Patient requires the  following assistive device(s): Grab bar/arm rest to maintain balance. Hand held  shower.  YESI Upper Body Dressing:  Upper Body Dressing Score = 5. Patient is supervision  for upper body dressing, requiring: Stand by assistance. No assistive devices  were required.  YESI Lower Body Dressing:  Lower Body Dressing Score = 5. Patient is  supervision/set-up for lower body dressing, requiring: Standing by. No assistive  devices were required.  YESI Toileting:  Toileting Score = 5.  Patient is supervision/set-up for  toileting, requiring: Stand by assistance. Patient requires the following  assistive device(s): Grab bar.    YESI Bladder Management  Level of Assistance:  [place]  Frequency/Number of Accidents this Shift:  Branch    YESI Bowel Management  Level of Assistance:  [place]  Frequency/Number of Accidents this Shift: [facility] Bed/Chair/Wheelchair Transfer:  [facility] Toilet Transfer:  Toilet Transfer Score = 5.  Patient is supervision/set-up  for transferring to and from the toilet/commode, requiring: Stand by assistance.  Patient requires the following assistive device(s): Grab bars.  YESI Tub/Shower Transfer:  Shower Transfer Score = 5.  Patient is  supervision/set-up for transferring to and from the shower, requiring: Stand by  assistance. Patient requires the following assistive device(s): Grab bars. Tub  bench.    Previously Documented Mode of Locomotion at Discharge: Field  YESI Expected Mode of Locomotion at Discharge: [facility] Walk/Wheelchair:  [facility] Stairs:  [facility] Comprehension:  [facility] Expression:  [facility] Social Interaction:  [facility] Problem Solving:  [facility] Memory:  [place]    Therapy Mode Minutes  Occupational Therapy: Individual: 60 minutes.  Physical Therapy: [place]  Speech Language Pathology:  [place]    Signed by: KANDY Altamirano/L

## 2017-11-09 NOTE — PROGRESS NOTES
Inpatient Rehabilitation Functional Measures Assessment    Functional Measures  YESI Eating:  Eating Score = 5. Patient is supervision/set-up for eating,  requiring: No assistive devices were required.  YESI Grooming: [place]  [facility] Bathing:  [place]  [facility] Upper Body Dressing:  [place]  [facility] Lower Body Dressing:  [place]  [facility] Toileting:  Toileting Score = 5.  Patient is supervision/set-up for  toileting, requiring: No assistive devices were required.    YESI Bladder Management  Level of Assistance:  Bladder Score = 5.  Patient is supervision/set-up for  bladder management, requiring: No assistive devices were required.  Frequency/Number of Accidents this Shift:  Bladder accidents this shift:  0 .  Patient has not had an accident this shift.    YESI Bowel Management  Level of Assistance: Bowel Score = 5.  Patient is supervision/set-up for bowel  management, requiring: No assistive devices were required.  Frequency/Number of Accidents this Shift: Bowel accidents this shift: 0 .  Patient has not had an accident this shift.    YESI Bed/Chair/Wheelchair Transfer:  Bed/chair/wheelchair Transfer Score = 2.  Patient performs 25-49% of effort and requires maximal assistance (most of the  lifting) for transferring to and from the bed/chair/wheelchair. No assistive  devices were required.  YESI Toilet Transfer:  Toilet Transfer Score = 5.  Patient is supervision/set-up  for transferring to and from the toilet/commode, requiring: Stand by assistance.  Stand by assistance. No assistive devices were required.  YESI Tub/Shower Transfer:  [place]    Previously Documented Mode of Locomotion at Discharge: Field  YESI Expected Mode of Locomotion at Discharge: [facility] Walk/Wheelchair:  [place]  [facility] Stairs:  [facility] Comprehension:  [facility] Expression:  [facility] Social Interaction:  [facility] Problem Solving:  [facility] Memory:  [place]    Therapy Mode Minutes  Occupational Therapy: Branch  Physical Therapy: Branch  Speech Language  Pathology:  [place]    Signed by: KAYLEN Treadwell

## 2017-11-09 NOTE — THERAPY TREATMENT NOTE
Inpatient Rehabilitation - Occupational Therapy Treatment Note  [facility]     Patient Name: Alice Maxwell  : 1945  MRN: [id number]  Today's Date: 2017  Onset of Illness/Injury or Date of Surgery Date: 10/28/17  Date of Referral to OT: 17  Referring Physician: Jeremie      Admit Date: 10/31/2017    Visit Dx:     ICD-10-CM ICD-9-CM   1. Oropharyngeal dysphagia R13.12 787.22     Patient Active Problem List   Diagnosis   • Stroke             Adult Rehabilitation Note       17 1445 17 1330 17 1210    Rehab Assessment/Intervention    Discipline occupational therapist  - speech language pathologist  -KB occupational therapist  -    Document Type therapy note (daily note)  -KP therapy note (daily note)  -KB therapy note (daily note)  -    Subjective Information agree to therapy;no complaints  -KP no complaints;agree to therapy  -KB agree to therapy;no complaints  -KP    Patient Effort, Rehab Treatment excellent  -KP good  -KB good  -KP    Symptoms Noted During/After Treatment none  -KP none  -KB none  -KP    Precautions/Limitations fall precautions;swallowing precautions  -KP fall precautions;swallowing precautions  - fall precautions  -KP    Recorded by [] Vero York OTR [KB] Katherine L Bruton [] Vero York OTR    Pain Assessment    Pain Assessment No/denies pain  -KP No/denies pain  -KB No/denies pain  -KP    Pain Score   0  -KP    Recorded by [KP] Vero York OTR [KB] Katherine L Bruton [KP] KANDY Sultana    Vision Assessment/Intervention    Visual Impairment visual impairment, left  -KP visual impairment, left;WFL with corrective lenses  -KB visual impairment, left  -KP    Recorded by [] KANDY Sultana [KB] Katherine L Bruton [] KANDY Sultana    Cognitive Assessment/Intervention    Current Cognitive/Communication Assessment impaired  -KP  impaired  -KP    Orientation Status oriented  to;person;place;time  -KP  oriented to;person;place;time  -KP    Follows Commands/Answers Questions 100% of the time;able to follow single-step instructions  -KP  100% of the time;able to follow single-step instructions  -KP    Personal Safety mild impairment  -KP  mild impairment  -KP    Personal Safety Interventions fall prevention program maintained;gait belt;nonskid shoes/slippers when out of bed  -KP  fall prevention program maintained;gait belt;nonskid shoes/slippers when out of bed  -KP    Recorded by [KP] Vero York, OTR  [KP] Vero York, OTR    Improve oral skills    To Improve Oral Skills, patient will:  Increase tongue tip elevation;Increase tongue lateralization;Increase lip closure;Increase back of tongue control;Increase tongue A-P movement  -KB     Status: Improve Oral Skills  Progressing as expected  -KB     Oral Skills Progress  continue to adress  -KB     Comments: Improve Oral Skills  Unable to alternate puckered lips left to right despite MAX cues and instruciton; cues for labial closure.,  -KB     Recorded by  [KB] Katherine L Bruton     Improve closure at entrance to airway    To improve closure at entrance to airway, patient will:  --   hard swallows  -KB     Status: Improve closure at entrance to airway  Progressing as expected  -KB     Closure at Entrance to Airway Progress  continue to adress  -KB     Comments: Improve closure at entrance to airway  Completed exercises with verbal cues for squeezing muscles.  -KB     Recorded by  [KB] Katherine L Bruton     Improve laryngeal elevation    To improve laryngeal elevation, patient will:  Complete pitch-glide  -KB     Status: Improve laryngeal elevation  Progressing as expected  -KB     Laryngeal Elevation Progress  continue to adress  -KB     Comments: Improve laryngeal elevation  Completed exercises independently.   -KB     Recorded by  [KB] Katherine L Bruton     Improve tongue base & pharyngeal wall squeeze    To  improve tongue base & pharyngeal wall squeeze, patient will:  Complete tongue base retraction;Complete yawn/hold retractions  -KB     Status: Improve tongue base & pharyngeal wall squeeze  Progressing as expected  -KB     Tongue Base/Pharyngeal Wall Squeeze Progress  continue to adress  -KB     Comments: Improve tongue base & pharyngeal wall squeeze  Completed exercises with verbal instructions.  -KB     Recorded by  [KB] Katherine L Bruton     Bed Mobility, Assessment/Treatment    Bed Mobility, Comment up in   -KP  up in   -KP    Recorded by [KP] Vero York OTR  [KP] Vero York OTR    Transfer Assessment/Treatment    Transfers, Sit-Stand [place]   set up required;supervision required;stand by assist  -KP    Transfers, Stand-Sit [place]   set up required;supervision required;stand by assist  -KP    Transfers, Sit-Stand-Sit, Assist Device   other (see comments)   wc grab bars  -KP    Walk-In Shower Transfer, [place]   set up required;supervision required  -KP    Walk-In Shower Transfer, Assist Device   other (see comments)   tub bench, grab bars  -KP    Recorded by   [KP] KANDY Sultana    Functional Mobility    Functional Mobility- Comment   a few steps to tub bench SBA  -KP    Recorded by   [KP] Vero York OTR    Upper Body Bathing Assessment/Training    UB Bathing Assess/Train Assistive Device   hand-held shower head;grab bars;tub bench  -KP    UB Bathing Assess/Train, Position   sitting  -KP    UB Bathing Assess/Train, [place] Level   set up required;stand by assist  -KP    Recorded by   [KP] Vero York OTR    Lower Body Bathing Assessment/Training    LB Bathing Assess/Train Assistive Device   hand-held shower head;grab bars;tub bench  -KP    LB Bathing Assess/Train, Position   sitting;standing  -KP    LB Bathing Assess/Train, [place] Level   set up required;stand by assist  -KP    Recorded by   [KP] Vero York,  OTR    Upper Body Dressing Assessment/Training    UB Dressing Assess/Train, Clothing Type   doffing:;donning:;t-shirt  -KP    UB Dressing Assess/Train, Position   sitting  -KP    UB Dressing Assess/Train, [place]   set up required;supervision required  -KP    Recorded by   [KP] Vero York OTR    Lower Body Dressing Assessment/Training    LB Dressing Assess/Train, Clothing Type   doffing:;donning:;pants;shoes;slipper socks;socks  -KP    LB Dressing Assess/Train, Position   sitting;standing  -KP    LB Dressing Assess/Train, [place]   set up required;supervision required  -KP    Recorded by   [KP] Vero York OTR    Grooming Assessment/Training    Grooming Assess/Train, Position   sitting  -KP    Grooming Assess/Train, Indepen Level   conditional independence;set up required  -KP    Recorded by   [KP] Vero York OTR    Balance Skills Training    Sitting-Level of Assistance Distant supervision  -KP  Distant supervision  -KP    Sitting-Balance Support Feet supported  -KP  Feet supported  -KP    Sitting-Balance Activities   Reaching for objects  -KP    Standing-Level of Assistance   Close supervision  -KP    Recorded by [KP] Vero York OTR  [KP] Vero York, SARAR    Fine Motor Coordination Training    Detail (Fine Motor Coordination Training) inserts silver pegs into slot, washer on top and other round open peg over top x12 (purdue peg board) to inc FMC w. L hand.  Pulls coins from resistive putty x10 w, L hand to inc FMC. and finger/hand strength. Grasps a lot of coins in her palm, in hand manipulation pushing coins to finger tips then inserting into slot to inc FMC. w. little to no difficulty  -KP      Recorded by [KP] Vero York OTR      Positioning and Restraints    Pre-Treatment Position sitting in chair/recliner  -KP  sitting in chair/recliner  -KP    Post Treatment Position wheelchair  -KP  wheelchair  -KP    In Wheelchair sitting;with PT   -KP  sitting;with other staff   RT  -KP    Recorded by [KP] Vero York, OTR  [KP] KANDY Sultana      11/09/17 1030 11/09/17 1027 11/08/17 1539    Rehab Assessment/Intervention    Discipline speech language pathologist  -KB physical therapist  -MD occupational therapist  -KP    Document Type therapy note (daily note)  -KB therapy note (daily note)  -MD therapy note (daily note)  -KP    Subjective Information no complaints;agree to therapy  -KB no complaints;agree to therapy  -MD agree to therapy;no complaints  -KP    Patient Effort, Rehab Treatment good  -KB good  -MD good  -KP    Symptoms Noted During/After Treatment none  -KB none  -MD none  -KP    Precautions/Limitations fall precautions  -KB fall precautions  -MD fall precautions;swallowing precautions  -KP    Equipment Issued to Patient  gait belt  -MD     Recorded by [KB] Katherine L Bruton [MD] Nereida Llamas, PT [KP] Vero York, OTR    Pain Assessment    Pain Assessment No/denies pain  -KB No/denies pain  -MD No/denies pain  -KP    Pain Score   0  -KP    Recorded by [KB] Katherine L Bruton [MD] Nereida Llamas, PT [KP] Vero York, OTR    Vision Assessment/Intervention    Visual Impairment visual impairment, left;WFL with corrective lenses  -  visual impairment, left  -KP    Recorded by [KB] Katherine L Bruton  [] Vero York, OTR    Cognitive Assessment/Intervention    Current Cognitive/Communication Assessment  impaired  -MD impaired  -KP    Orientation Status  person;place;time;oriented to  -MD person;place;time;oriented to  -KP    Follows Commands/Answers Questions  100% of the time;needs cueing;needs increased time;needs repetition  -% of the time;able to follow single-step instructions;needs cueing  -    Personal Safety  decreased insight to deficits  -MD mild impairment  -KP    Personal Safety Interventions  fall prevention program maintained;gait belt;muscle strengthening facilitated;nonskid  shoes/slippers when out of bed;supervised activity  -MD fall prevention program maintained;gait belt;nonskid shoes/slippers when out of bed  -KP    Recorded by  [MD] Nereida Llamas, PT [KP] Vero York, OTR    Improve attention    Improve attention by: complete divided attention task  -KB      Status: Improve attention Progressing as expected  -KB      Attention Progress 70%;without cues;100%;with inconsistent cues  -KB      Comments: Improve attention Pt completed complex written directions task with MIN-MOD cues for reasoning and attention.  -KB      Recorded by [KB] Katherine L Bruton      Improve oral skills    To Improve Oral Skills, patient will: Increase tongue tip elevation;Increase tongue lateralization;Increase lip closure;Increase back of tongue control;Increase tongue A-P movement  -KB      Status: Improve Oral Skills Progressing as expected  -KB      Oral Skills Progress continue to adress  -KB      Comments: Improve Oral Skills Pt with difficulty moving pucker left to right and holding air in mouth to puff up cheeks.  -KB      Recorded by [KB] Katherine L Bruton      Improve closure at entrance to airway    To improve closure at entrance to airway, patient will: --   hard swallows  -KB      Status: Improve closure at entrance to airway Progressing as expected  -KB      Closure at Entrance to Airway Progress continue to adress  -KB      Comments: Improve closure at entrance to airway Completes exercises independently.   -KB      Recorded by [KB] Katherine L Bruton      Improve laryngeal elevation    To improve laryngeal elevation, patient will: Complete pitch-glide  -KB      Status: Improve laryngeal elevation Progressing as expected  -KB      Laryngeal Elevation Progress continue to adress  -KB      Comments: Improve laryngeal elevation Pt completes exercises independently.  -KB      Recorded by [KB] Katherine L Bruton      Improve tongue base & pharyngeal wall squeeze    To improve tongue base &  pharyngeal wall squeeze, patient will: Complete effortful swallow;Complete gargle/hold retraction  -KB      Status: Improve tongue base & pharyngeal wall squeeze Progressing as expected  -KB      Tongue Base/Pharyngeal Wall Squeeze Progress continue to adress  -KB      Comments: Improve tongue base & pharyngeal wall squeeze Pt completed exercises independently.   -KB      Recorded by [KB] Katherine L Bruton      Bed Mobility, Assessment/Treatment    Bed Mobility, Comment   up in chair  -KP    Recorded by   [KP] KANDY Sultana    Transfer Assessment/Treatment    Transfers, Sit-Stand [place]  supervision required  -MD     Transfers, Stand-Sit [place]  supervision required  -MD     Transfers, Sit-Stand-Sit, Assist Device  rolling walker  -MD     Transfer, Safety Issues  step length decreased  -MD     Transfer, Impairments  strength decreased;impaired balance  -MD     Recorded by  [MD] Nereida Llamas PT     Gait Assessment/Treatment    Gait, [place] Level  verbal cues required;stand by assist;contact guard assist  -MD     Gait, Assistive Device  rolling walker  -MD     Gait, Distance (Feet)  80   x3  -MD     Gait, Gait Deviations  bilateral:;janet decreased;forward flexed posture;step length decreased  -MD     Gait, Safety Issues  step length decreased  -MD     Gait, Impairments  impaired balance;strength decreased  -MD     Recorded by  [MD] Nereida Llamas PT     Stairs Assessment/Treatment    Number of Stairs  4  -MD     Stairs, Handrail Location  both sides  -MD     Stairs, [place] Level  supervision required  -MD     Stairs, Technique Used  step over step (ascending);step to step (descending)  -MD     Stairs, Impairments  strength decreased;impaired balance  -MD     Recorded by  [MD] Nereida Llamas PT     Balance Skills Training    Sitting-Level of Assistance   Distant supervision  -    Sitting-Balance Support   Feet supported  -KP    Recorded by   [KP] Vero York, SARAR    Fine Motor  Coordination Training    Detail (Fine Motor Coordination Training)   pulls pegs from yellow resistive putty using one hand, using L hand to inc FMC and hand/digit strength. rotates wooden block up dowel [place] w. pegs to inc UE coordination w. min difficulty. strings foam beads on lace w. R hand holding string and L hand pushing bead on and vice versus to inc FMC and BLC  -KP    Recorded by   [KP] Vero York, KANDY    Positioning and Restraints    Pre-Treatment Position  sitting in chair/recliner  -MD sitting in chair/recliner  -KP    Post Treatment Position   wheelchair  -KP    In Wheelchair   sitting;with PT  -KP    Recorded by  [MD] Nereida Llamas, PT [KP] Vero York OTR      11/08/17 1330 11/08/17 1152 11/08/17 1114    Rehab Assessment/Intervention    Discipline speech language pathologist  -KB occupational therapist  -KP physical therapist  -MD    Document Type therapy note (daily note)  -KB therapy note (daily note)  -KP therapy note (daily note)  -MD    Subjective Information no complaints;agree to therapy  -KB agree to therapy;no complaints  -KP agree to therapy;no complaints  -MD    Patient Effort, Rehab Treatment good  -KB good  -KP good  -MD    Symptoms Noted During/After Treatment none  -KB none  -KP none  -MD    Precautions/Limitations fall precautions;swallowing precautions  -KB fall precautions;swallowing precautions  -KP fall precautions  -MD    Equipment Issued to Patient   gait belt;front wheeled walker  -MD    Recorded by [KB] Katherine L Bruton [KP] Vero York, OTR [MD] Nereida Llamas, PT    Pain Assessment    Pain Assessment No/denies pain  -KB No/denies pain  -KP No/denies pain  -MD    Pain Score  0  -KP     Recorded by [ANIYA] Katherine L Bruton [KP] Vero York, OTR [MD] Nereida Llamas, PT    Vision Assessment/Intervention    Visual Impairment WFL with corrective lenses  -KB visual impairment, left  -KP visual impairment, left  -MD    Recorded by [KB] Katherine L Bruton  [KP] Vero York, OTR [MD] Nereida Llamas, PT    Cognitive Assessment/Intervention    Current Cognitive/Communication Assessment  impaired  -KP impaired  -MD    Orientation Status  oriented to;person;place;time  -KP oriented to;person;place;time  -MD    Follows Commands/Answers Questions  100% of the time;able to follow single-step instructions;needs cueing  -% of the time;needs cueing;needs repetition  -MD    Personal Safety  mild impairment  -KP impulsive;decreased insight to deficits  -MD    Personal Safety Interventions  fall prevention program maintained;gait belt;nonskid shoes/slippers when out of bed  -KP fall prevention program maintained;gait belt;muscle strengthening facilitated;nonskid shoes/slippers when out of bed;supervised activity  -MD    Recorded by  [KP] Vero York, OTR [MD] Nereida Llamas, PT    Improve functional problem solving    Improve functional problem solving through: complete high level reasoning task  -KB      Status: Improve functional problem solving Progressing as expected  -KB      Functional Problem Solving Progress 80%;without cues;100%;with inconsistent cues  -KB      Comments: Improve functional problem solving Completed two high-level logic puzzles with MIN cues only.  -KB      Recorded by [KB] Katherine L Bruton      Improve closure at entrance to airway    To improve closure at entrance to airway, patient will: --   pushing/pulling exercises  -KB      Status: Improve closure at entrance to airway Progressing as expected  -KB      Closure at Entrance to Airway Progress continue to adress  -KB      Comments: Improve closure at entrance to airway Initial cues for how to complete exercises.  -KB      Recorded by [KB] Katherine L Bruton      Improve tongue base & pharyngeal wall squeeze    To improve tongue base & pharyngeal wall squeeze, patient will: --   Practiced k/g word list forcefully  -KB      Status: Improve tongue base & pharyngeal wall squeeze  Progressing as expected  -KB      Tongue Base/Pharyngeal Wall Squeeze Progress continue to adress  -KB      Comments: Improve tongue base & pharyngeal wall squeeze No cues needed, pt repeated word list x5.   -KB      Recorded by [KB] Katherine L Bruton      Bed Mobility, Assessment/Treatment    Bed Mob, Supine to Sit, [place]   supervision required  -MD    Bed Mob, Sit to Supine, [place]   supervision required  -MD    Bed Mobility, Comment  pt up in [facility]     Recorded by  [BRIANNA] Vero York, OTR [MD] Nereida Llamas, ONEIDA    Transfer Assessment/Treatment    Transfers, Sit-Stand [place]  supervision required  - stand by assist  -MD    Transfers, Stand-Sit [place]  supervision required  - stand by assist  -MD    Transfers, Sit-Stand-Sit, Assist Device  other (see comments)   wc  - rolling walker  -MD    Walk-In Shower Transfer, [place]  set up required;supervision required  -     Walk-In Shower Transfer, Assist Device  other (see comments)   tub bench  -     Transfer, Safety Issues   step length decreased  -MD    Transfer, Impairments   strength decreased;impaired balance  -MD    Recorded by  [KP] Vero York, OTR [MD] Nereida Llamas, PT    Gait Assessment/Treatment    Gait, [place] Level   verbal cues required;contact guard assist  -MD    Gait, Assistive Device   rolling walker  -MD    Gait, Distance (Feet)   80   x3  -MD    Gait, Gait Deviations   bilateral:;janet decreased;forward flexed posture;step length decreased  -MD    Gait, Safety Issues   sequencing ability decreased  -MD    Gait, Impairments   impaired balance;strength decreased  -MD    Recorded by   [MD] Nereida Llamas PT    Upper Body Bathing Assessment/Training    UB Bathing Assess/Train Assistive Device  hand-held shower head;tub bench  -     UB Bathing Assess/Train, Position  sitting  -     UB Bathing Assess/Train, [place] Level  set up required;stand by assist  -KP     Recorded by  [BRIANNA] Vero  KANDY Tinsley     Lower Body Bathing Assessment/Training    LB Bathing Assess/Train Assistive Device  grab bars;hand-held shower head;tub bench  -     LB Bathing Assess/Train, Position  sitting;standing  -     LB Bathing Assess/Train, [place] Level  set up required;stand by assist  -KP     Recorded by  [] KANDY Sultana     Upper Body Dressing Assessment/Training    UB Dressing Assess/Train, Clothing Type  doffing:;donning:;t-shirt  -     UB Dressing Assess/Train, Position  sitting  -     UB Dressing Assess/Train, [place]  set up required;supervision required  -KP     Recorded by  [KP] Vero York OTR     Lower Body Dressing Assessment/Training    LB Dressing Assess/Train, Clothing Type  doffing:;donning:;socks;shoes;pants  -KP     LB Dressing Assess/Train, Position  sitting;standing  -KP     LB Dressing Assess/Train, [place]  set up required;supervision required  -     LB Dressing Assess/Train, Comment  holds onto grab bar intermittently  -KP     Recorded by  [] KANDY Sultana     Grooming Assessment/Training    Grooming Assess/Train, Position  sitting  -     Grooming Assess/Train, [place] Level  set up required;supervision required  -KP     Recorded by  [KP] KANDY Sultana     Balance Skills Training    Sitting-Level of Assistance  Close supervision;Distant supervision  -     Sitting-Balance Support  Feet supported  -     Standing-Level of Assistance  Close supervision  -KP     Recorded by  [] KANDY Sultana     Therapy Exercises    Right Lower Extremity   AROM:;10 reps;sidelying;hip abduction/adduction   x3 sets  -MD    Left Lower Extremity   AROM:;10 reps;sidelying;clam shell   x3 sets  -MD    Bilateral Lower Extremities   AROM:;20 reps;ankle pumps/circles;hip abduction/adduction;hip flexion;LAQ  -MD    BLE Other Reps   --   Nustep at level 2 for 4 min  -MD    Trunk Exercises   10 reps;supine;bridging   x3 sets   -MD    Recorded by   [MD] Nereida Llamas, PT    Positioning and Restraints    Pre-Treatment Position  sitting in chair/recliner  -KP sitting in chair/recliner  -MD    Post Treatment Position  wheelchair  -KP wheelchair  -MD    In Wheelchair  sitting;call light within reach;encouraged to call for assist  -KP sitting;call light within reach  -MD    Recorded by  [KP] Vero York OTR [MD] Nereida Llamas, PT      11/08/17 1030 11/07/17 1532 11/07/17 1201    Rehab Assessment/Intervention    Discipline speech language pathologist  -KB occupational therapist  -KP occupational therapist  -KP    Document Type therapy note (daily note)  -KB therapy note (daily note)  -KP therapy note (daily note)  -KP    Subjective Information no complaints;agree to therapy  -KB agree to therapy;no complaints  -KP agree to therapy;no complaints  -KP    Patient Effort, Rehab Treatment good  -KB good  -KP good  -KP    Symptoms Noted During/After Treatment none  -KB none  -KP none  -KP    Precautions/Limitations fall precautions;swallowing precautions  -KB fall precautions;swallowing precautions  -KP fall precautions;swallowing precautions  -KP    Precautions/Limitations, Vision WFL with corrective lenses  -KB      Recorded by [KB] Katherine L Bruton [] Vero York, OTR [KP] Vero York, SARAR    Pain Assessment    Pain Assessment 0-10  -KB No/denies pain  -KP No/denies pain  -KP    Post Pain Score 7  -KB      Pain Location Toe (Comment which one)  -KB      Pain Orientation Left;Right  -KB      Pain Intervention(s) Emotional support   Pt did not want medications.  -KB      Recorded by [KB] Katherine L Bruton [] Vero York, OTR [KP] Vero York, OTR    Vision Assessment/Intervention    Visual Impairment WFL with corrective lenses  -KB visual impairment, left  -KP visual impairment, left  -KP    Recorded by [KB] Katherine L Bruton [] Vero York, OTR [] Vero York, OTR     Cognitive Assessment/Intervention    Current Cognitive/Communication Assessment  impaired  -KP impaired  -KP    Orientation Status  oriented to;person;place;time  -KP oriented to;person;place;time  -KP    Follows Commands/Answers Questions  100% of the time;able to follow single-step instructions;needs cueing  -% of the time;able to follow single-step instructions  -KP    Personal Safety  mild impairment  -KP mild impairment  -KP    Personal Safety Interventions  fall prevention program maintained;gait belt;muscle strengthening facilitated;nonskid shoes/slippers when out of bed  -KP fall prevention program maintained;gait belt;nonskid shoes/slippers when out of bed  -KP    Recorded by  [KP] Vero York, OTR [KP] Vero York, OTR    Improve functional problem solving    Improve functional problem solving through: complete high level reasoning task  -KB      Status: Improve functional problem solving Progressing as expected  -KB      Functional Problem Solving Progress 80%;without cues;100%;with inconsistent cues  -KB      Comments: Improve functional problem solving Pt completed high-level logic puzzle with MIN verbal cues for attention to detail.   -KB      Recorded by [KB] Katherine L Bruton      Improve oral skills    To Improve Oral Skills, patient will: --   pucker/smile, pucker left/right, suck in/puff out cheeks  -KB      Status: Improve Oral Skills Progressing as expected  -KB      Oral Skills Progress continue to adress  -KB      Comments: Improve Oral Skills Completed exercises without cues.  -KB      Recorded by [KB] Katherine L Bruton      Improve closure at entrance to airway    To improve closure at entrance to airway, patient will: --   hard swallows  -KB      Status: Improve closure at entrance to airway Progressing as expected  -KB      Closure at Entrance to Airway Progress continue to adress  -KB      Comments: Improve closure at entrance to airway Completed daily  exercises to address airway closure without cues.  -KB      Recorded by [KB] Katherine L Bruton      Improve laryngeal elevation    To improve laryngeal elevation, patient will: --   pitch glide, high pitch vowel, humming in falsetto  -KB      Status: Improve laryngeal elevation Progressing as expected  -KB      Laryngeal Elevation Progress continue to adress  -KB      Comments: Improve laryngeal elevation Completed exercises to addres laryngeal elevation without cues.   -KB      Recorded by [KB] Katherine L Bruton      Improve tongue base & pharyngeal wall squeeze    To improve tongue base & pharyngeal wall squeeze, patient will: Complete effortful swallow;Complete gargle/hold retraction  -KB      Status: Improve tongue base & pharyngeal wall squeeze Progressing as expected  -KB      Tongue Base/Pharyngeal Wall Squeeze Progress continue to adress  -KB      Comments: Improve tongue base & pharyngeal wall squeeze Completed exercises without cues.  -KB      Recorded by [KB] Katherine L Bruton      Bed Mobility, Assessment/Treatment    Bed Mob, Supine to Sit, [place]  supervision required;set up required  -     Bed Mob, Sit to Supine, [place]  not tested  -     Bed Mobility, Comment   up in [facility]    Recorded by  [KP] Vero York, OTR [KP] Vero York, OTR    Transfer Assessment/Treatment    Transfers, Bed-Chair [place]  stand by assist  -     Transfers, Chair-Bed [place]  not tested  -     Transfers, Bed-Chair-Bed, Assist Device  other (see comments)     -     Transfers, Sit-Stand [place]  stand by assist  - stand by assist  -    Transfers, Stand-Sit [place]  stand by assist  - stand by assist  -    Transfers, Sit-Stand-Sit, Assist Device  other (see comments)     - other (see comments)    grab bar  -    Transfer, Comment   doesn't wantn to shower today. her hair is was fixed last night by [facility]    Recorded by  [KP] Vero Mendenhall  Chela, SARAR [KP] Vero York OTR    Upper Body Bathing Assessment/Training    UB Bathing Assess/Train, Position   sitting;sink side  -KP    UB Bathing Assess/Train, [place] Level   stand by assist;set up required  -KP    Recorded by   [KP] Vero York, OTR    Lower Body Bathing Assessment/Training    LB Bathing Assess/Train, Position   sitting;standing  -KP    LB Bathing Assess/Train, [place] Level   set up required;stand by assist  -KP    Recorded by   [KP] Vero York OTR    Upper Body Dressing Assessment/Training    UB Dressing Assess/Train, Clothing Type   donning:;doffing:;t-shirt  -KP    UB Dressing Assess/Train, Position   sitting  -KP    UB Dressing Assess/Train, [place]   supervision required;set up required  -KP    Recorded by   [KP] Vero York OTR    Lower Body Dressing Assessment/Training    LB Dressing Assess/Train, Clothing Type   doffing:;donning:;pants;socks;shoes  -KP    LB Dressing Assess/Train, Position   sitting;standing  -KP    LB Dressing Assess/Train, [place]   set up required;supervision required  -KP    Recorded by   [KP] Vero York OTR    Grooming Assessment/Training    Grooming Assess/Train, Position   sitting;sink side  -KP    Grooming Assess/Train, [place] Level   supervision required;set up required  -KP    Recorded by   [KP] Vero York OTR    Balance Skills Training    Sitting-Level of Assistance  Close supervision;Distant supervision  -KP Close supervision;Distant supervision  -KP    Sitting-Balance Support  Feet supported  -KP Feet supported  -KP    Standing-Level of Assistance  Close supervision  -KP Close supervision  -KP    Static Standing Balance Support  Right upper extremity supported  -KP Left upper extremity supported  -KP    Recorded by  [KP] Vero York OTR [KP] Vero York, OTR    Therapy Exercises    BUE Resistance  theraputty   pinches yellow putty w. each digit  and thumb  -KP     Recorded by  [KP] Vero York, SARAR     Fine Motor Coordination Training    Detail (Fine Motor Coordination Training)  links paper clip together clipping it on other paper clip w. L hand.  in hand manipulation w. coins in L hand pushing coin up w. L thumb and inserting coin into small slot to inc FMC and in hand manipulation.   -KP     Recorded by  [KP] Vero York OTR     Positioning and Restraints    Pre-Treatment Position  in bed  -KP sitting in chair/recliner  -KP    Post Treatment Position  wheelchair  -KP wheelchair  -KP    In Wheelchair  sitting;call light within reach;encouraged to call for assist;with family/caregiver  -KP sitting   in dining room waiting for RT  -KP    Recorded by  [KP] Vero York OTR [KP] Vero York OTR      11/07/17 1100 11/07/17 1030 11/07/17 0819    Rehab Assessment/Intervention    Discipline speech language pathologist  -KB speech language pathologist  -KB physical therapist  -MD    Document Type therapy note (daily note)  -KB therapy note (daily note)  -KB therapy note (daily note)  -MD    Subjective Information no complaints;agree to therapy  -KB no complaints;agree to therapy  -KB agree to therapy;no complaints  -MD    Patient Effort, Rehab Treatment good  -KB good  -KB good  -MD    Symptoms Noted During/After Treatment none  -KB none  -KB none  -MD    Precautions/Limitations fall precautions;swallowing precautions  -KB fall precautions;swallowing precautions  -KB fall precautions  -MD    Equipment Issued to Patient   gait belt;front wheeled walker  -MD    Recorded by [KB] Katherine L Bruton [KB] Katherine L Bruton [MD] Nereida Llamas, PT    Pain Assessment    Pain Assessment No/denies pain  -KB No/denies pain  -KB No/denies pain  -MD    Recorded by [KB] Katherine L Bruton [KB] Katherine L Bruton [MD] Nereida Llamas, PT    Vision Assessment/Intervention    Visual Impairment visual impairment, left;inattention to the left  -ANIYA  visual impairment, left;inattention to the left  -KB visual impairment, left;inattention to the left  -MD    Recorded by [KB] Katherine L Bruton [KB] Katherine L Bruton [MD] Nereida Llamas, PT    Cognitive Assessment/Intervention    Current Cognitive/Communication Assessment   impaired  -MD    Orientation Status   oriented to;person  -MD    Follows Commands/Answers Questions   100% of the time;needs cueing;needs repetition  -MD    Personal Safety   decreased insight to deficits  -MD    Personal Safety Interventions   fall prevention program maintained;gait belt;muscle strengthening facilitated;nonskid shoes/slippers when out of bed;supervised activity  -MD    Recorded by   [MD] Nereida Llamas, PT    Improve functional problem solving    Improve functional problem solving through:  complete high level reasoning task  -KB     Status: Improve functional problem solving  Progressing as expected  -KB     Functional Problem Solving Progress  50%;without cues;100%;with consistent cues  -KB     Comments: Improve functional problem solving  Pt required MOD verbal cues throughout logic puzzle for attention and reasoning.   -KB     Recorded by  [KB] Katherine L Bruton     Improve oral skills    Status: Improve Oral Skills  Progressing as expected  -KB     Oral Skills Progress  80%;with inconsistent cues  -KB     Recorded by  [KB] Katherine L Bruton     Improve closure at entrance to airway    Status: Improve closure at entrance to airway Progressing as expected  -KB      Closure at Entrance to Airway Progress continue to adress  -KB      Comments: Improve closure at entrance to airway Pt observed with mid-day meal. 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   -KB      Recorded by [KB] Katherine L Bruton      Improve laryngeal elevation    Status: Improve laryngeal elevation  Progressing as expected  -KB     Laryngeal Elevation Progress  60%;with inconsistent cues;continue to  adress  -KB     Comments: Improve laryngeal elevation  Pt completing exercises in therapy session as prescribed.   -KB     Recorded by  [KB] Katherine L Bruton     Improve tongue base & pharyngeal wall squeeze    Status: Improve tongue base & pharyngeal wall squeeze  Progressing as expected  -ANIYA     Tongue Base/Pharyngeal Wall Squeeze Progress  60%;with inconsistent cues;continue to adress  -KB     Comments: Improve tongue base & pharyngeal wall squeeze  Pt completing exercises in therapy sessions as prescribed.   -KB     Recorded by  [KB] Katherine L Bruton     Bed Mobility, Assessment/Treatment    Bed Mob, Supine to Sit, [place]   not tested  -MD    Bed Mob, Sit to Supine, [place]   not tested  -MD    Recorded by   [MD] Nereida Llamas PT    Transfer Assessment/Treatment    Transfers, Sit-Stand [place]   verbal cues required;stand by assist  -MD    Transfers, Stand-Sit [place]   verbal cues required;stand by assist  -MD    Transfers, Sit-Stand-Sit, Assist Device   rolling walker  -MD    Transfer, Safety Issues   step length decreased  -MD    Transfer, Impairments   strength decreased;impaired balance  -MD    Recorded by   [MD] Nereida Llamas PT    Gait Assessment/Treatment    Gait, [place] Level   verbal cues required;contact guard assist  -MD    Gait, Assistive Device   rolling walker  -MD    Gait, Distance (Feet)   80   x3  -MD    Gait, Gait Deviations   bilateral:;janet decreased;forward flexed posture;step length decreased;toe-to-floor clearance decreased;weight-shifting ability decreased  -MD    Gait, Safety Issues   step length decreased  -MD    Gait, Impairments   impaired balance;strength decreased  -MD    Recorded by   [MD] Nereida Llamas PT    Stairs Assessment/Treatment    Number of Stairs   4  -MD    Stairs, Handrail Location   both sides  -MD    Stairs, [place] Level   supervision required  -MD    Stairs, Technique Used   step over step (ascending);step to step (descending)  -MD     Stairs, Impairments   strength decreased;impaired balance  -MD    Recorded by   [MD] Nereida Llamas PT    Therapy Exercises    Bilateral Lower Extremities   --   nustep 7 min level 2  -MD    Recorded by   [MD] Nereida Llamas PT    Positioning and Restraints    Pre-Treatment Position   sitting in chair/recliner  -MD    Post Treatment Position   wheelchair  -MD    In Wheelchair   patient within staff view   in DR  -MD    Recorded by   [MD] Nereida Llamas, PT      11/06/17 1550          Rehab Assessment/Intervention    Discipline occupational therapist  -      Document Type therapy note (daily note)  -      Subjective Information complains of;agree to therapy;fatigue  -      Patient Effort, Rehab Treatment good  -      Symptoms Noted During/After Treatment none  -KP      Precautions/Limitations fall precautions  -KP      Recorded by [BRIANNA] Vero York OTR      Pain Assessment    Pain Assessment No/denies pain  -KP      Recorded by [KP] Vero York OTR      Vision Assessment/Intervention    Visual Impairment visual impairment, left;inattention to the left  -KP      Recorded by [BRIANNA] Vero York OTR      Cognitive Assessment/Intervention    Current Cognitive/Communication Assessment impaired  -KP      Orientation Status oriented to;person;place;time  -KP      Follows Commands/Answers Questions 100% of the time;able to follow single-step instructions  -      Personal Safety mild impairment  -      Personal Safety Interventions fall prevention program maintained;gait belt;muscle strengthening facilitated;nonskid shoes/slippers when out of bed  -KP      Recorded by [KP] Vero York OTR      Bed Mobility, Assessment/Treatment    Bed Mob, Supine to Sit, [place] not tested  -      Bed Mob, Sit to Supine, [place] set up required;supervision required  -KP      Recorded by [KP] Vero York OTR      Transfer Assessment/Treatment    Transfers, Sit-Stand [place] set  up required;supervision required;stand by assist  -KP      Transfers, Stand-Sit [place] stand by assist  -KP      Transfers, Sit-Stand-Sit, Assist Device bed rails  -KP      Recorded by [BRIANNA] KANDY Sultana      Balance Skills Training    Standing-Level of Assistance Close supervision  -KP      Static Standing Balance Support Right upper extremity supported  -KP      Recorded by [BRIANNA] KANDY Sultana      Therapy Exercises    Left Upper Extremity AROM:;15 reps;sitting;elbow flexion/extension;shoulder extension/flexion  -KP      Recorded by [KP] Vero York OTR      Fine Motor Coordination Training    Detail (Fine Motor Coordination Training) graps pennies w. L hand using index and thumb and inserting into small slot to inc FMC w. min difficulty. 2pt pinch on sq pegs and inserts them into board x30 to inc FMC no difficulty. Graps small colored pegs to complete peg design w. very little difficulty.  -KP      Recorded by [BRIANNA] KANDY Sultana      Positioning and Restraints    Pre-Treatment Position sitting in chair/recliner  -KP      Post Treatment Position bed  -KP      In Bed supine;call light within reach;exit alarm on;encouraged to call for assist  -KP      Recorded by [KP] KANDY Sultana        User Key  (r) = Recorded By, (t) = Taken By, (c) = Cosigned By    Initials Name Effective Dates    BRIANNA York, OTR 04/13/15 -     MD Nereida Llamas, PT 12/01/15 -     KB Katherine L Bruton 09/29/17 -                 OT Goals       11/01/17 1554 11/01/17 1230       Transfer Training OT STG    Transfer Training OT STG, Date Established  11/01/17  -SO     Transfer Training OT STG, Time to Achieve  1 wk  -SO     Transfer Training OT STG, Activity Type  tub;walk-in shower;shower chair  -SO     Transfer Training OT STG, [place] Level  contact guard assist  -SO     Transfer Training OT STG, Assist Device  walker, rolling;shower chair  -SO     Transfer  Training OT LTG    Transfer Training OT LTG, Date Established  11/01/17  -SO     Transfer Training OT LTG, Time to Achieve  by discharge  -SO     Transfer Training OT LTG, Activity Type  tub;walk-in shower;shower chair  -SO     Transfer Training OT LTG, [place] Level  supervision required  -SO     Transfer Training OT LTG, Assist Device  walker, rolling;shower chair  -SO     Transfer Training 2 OT STG    Transfer Training 2 OT STG, Date Established  11/01/17 -SO     Transfer Training 2 OT STG, Time to Achieve  1 wk  -SO     Transfer Training 2 OT STG, Activity Type  toilet  -SO     Transfer Training 2 OT STG, [place] Level  contact guard assist  -SO     Transfer Training 2 OT STG, Assist Device  walker, rolling  -SO     Transfer Training 2 OT LTG    Transfer Training 2 OT LTG, Date Established  11/01/17 -SO     Transfer Training 2 OT LTG, Time to Achieve  by discharge  -SO     Transfer Training 2 OT LTG, Activity Type  toilet  -SO     Transfer Training 2 OT LTG, [place] Level  supervision required  -SO     Transfer Training 2 OT LTG, Assist Device  walker, rolling  -SO     Strength OT LTG    Strength Goal OT LTG, Date Established 11/01/17 -SO      Strength Goal OT LTG, Time to Achieve by discharge  -SO      Strength Goal OT LTG, Measure to Achieve Pt to increase LUE strength to 4-/5 to increase independence with ADLs.  -SO      Caregiver Training OT LTG    Caregiver Training OT LTG, Date Established  11/01/17  -SO     Caregiver Training OT LTG, Time to Achieve  by discharge  -SO     Caregiver Training OT LTG, [place] Level  able to assist adequately;able to cue patient adequately  -SO     Patient Education OT LTG    Patient Education OT LTG, Date Established  11/01/17  -SO     Patient Education OT LTG, Time to Achieve  by discharge  -SO     Patient Education OT LTG, Education Type  written program;HEP;aware of neuro deficits;home safety  -SO     Patient Education OT LTG, Education  Understanding  independent;demonstrates adequately;verbalizes understanding  -SO     Isolated Movement OT LTG    Isolated Movement OT LTG, Date Established 11/01/17  -SO      Isolated Movement OT LTG, Time to Achieve by discharge  -SO      Isolated Movement OT LTG, Body Area left scapula;left shoulder;left elbow;left forearm;left wrist;left fingers  -SO      Isolated Movement OT LTG, Level WFL  -SO      Coordination OT LTG    Coordination OT LTG, Date Established 11/01/17  -SO      Coordination OT LTG, Time to Achieve by discharge  -SO      Coordination OT LTG, Activity Type FM written ex program;GM written ex program;FM task;GM task  -SO      Coordination OT LTG, [place] Level independent  -SO      Coordination OT LTG, Additional Goal LUE  -SO      ADL OT STG    ADL OT STG, Date Established  11/01/17  -SO     ADL OT STG, Time to Achieve  1 wk  -SO     ADL OT STG, Activity Type  ADL skills  -SO     ADL OT STG, [place] Level  contact guard;assistive device  -SO     ADL OT LTG    ADL OT LTG, Date Established  11/01/17  -SO     ADL OT LTG, Time to Achieve  by discharge  -SO     ADL OT LTG, Activity Type  ADL skills  -SO     ADL OT LTG, [place] Level  standby assist;assistive device  -SO       User Key  (r) = Recorded By, (t) = Taken By, (c) = Cosigned By    Initials Name Provider Type    SO Micki Castro OTR Occupational Therapist          Occupational Therapy Education     Title: PT OT SLP Therapies (Active)     Topic: Occupational Therapy (Active)     Point: ADL training (Done)    Description: Instruct learner(s) on proper safety adaptation and remediation techniques during self care or transfers.   Instruct in proper use of assistive devices.    Learning Progress Summary    Learner Readiness Method Response Comment Documented by Status   Patient Acceptance NOEMY YOON DU ed pt on shower tsf and safety w. tsf. pt demo w. SBA. tub bench and grab bars KP 11/08/17 1157 Done    Acceptance NOEMY YOON NR ed  pt on shower tsf technique. ed pt on bathing safety. pt demo shower w. CGA to SBA. mostly SBA.  11/06/17 1207 Done    Acceptance E VU Discuss OT goals and POC. SO 11/01/17 1232 Done   Family Acceptance E VU Discuss OT goals and POC. SO 11/01/17 1232 Done               Point: Precautions (Done)    Description: Instruct learner(s) on prescribed precautions during self-care and functional transfers.    Learning Progress Summary    Learner Readiness Method Response Comment Documented by Status   Patient Acceptance E,NOEMY VU,NR ed pt on shower tsf technique. ed pt on bathing safety. pt demo shower w. CGA to SBA. mostly SBA.  11/06/17 1207 Done               Point: Body mechanics (Done)    Description: Instruct learner(s) on proper positioning and spine alignment during self-care, functional mobility activities and/or exercises.    Learning Progress Summary    Learner Readiness Method Response Comment Documented by Status   Patient Acceptance CAR,NOEMY CLARK,DU ed pt on shower tsf and safety w. tsf. pt demo w. SBA. tub bench and grab bars  11/08/17 1157 Done    Acceptance NOEMY YOON VU,NR ed pt on shower tsf technique. ed pt on bathing safety. pt demo shower w. CGA to SBA. mostly SBA.  11/06/17 1207 Done                      User Key     Initials Effective Dates Name Provider Type Discipline     04/13/15 -  Micki Castro, OTR Occupational Therapist OT     04/13/15 -  Vero York, OTR Occupational Therapist OT                  OT Recommendation and Plan  Therapy Frequency: 5 times/wk          Time Calculation:         Time Calculation- OT       11/09/17 1448 11/09/17 1213       Time Calculation- OT    OT Start Time 1400  - 0900  -     OT Stop Time 1430  - 0930  -     OT Time Calculation (min) 30 min  - 30 min  -       User Key  (r) = Recorded By, (t) = Taken By, (c) = Cosigned By    Initials Name Provider Type     Vero York, OTR Occupational Therapist           Therapy Charges for  Today     Code Description Service Date Service Provider Modifiers Qty    06245466973 HC OT SELF CARE/MGMT/TRAIN EA 15 MIN 11/8/2017 Vero York, OTR GO 2    37561004248 HC OT NEUROMUSC RE EDUCATION EA 15 MIN 11/8/2017 Vero York, OTR GO 2    22192815988 HC OT SELF CARE/MGMT/TRAIN EA 15 MIN 11/9/2017 Vero York, OTR GO 2    89588354587 HC OT NEUROMUSC RE EDUCATION EA 15 MIN 11/9/2017 Vero York OTR GO 2               Vero York, OTR  11/9/2017

## 2017-11-09 NOTE — THERAPY TREATMENT NOTE
Inpatient Rehabilitation - Occupational Therapy Treatment Note  [facility]     Patient Name: Alice Maxwell  : 1945  MRN: [id number]  Today's Date: 2017  Onset of Illness/Injury or Date of Surgery Date: 10/28/17  Date of Referral to OT: 17  Referring Physician: Jeremie      Admit Date: 10/31/2017    Visit Dx:     ICD-10-CM ICD-9-CM   1. Oropharyngeal dysphagia R13.12 787.22     Patient Active Problem List   Diagnosis   • Stroke             Adult Rehabilitation Note       17 1210 17 1030 17 1027    Rehab Assessment/Intervention    Discipline occupational therapist  -KP speech language pathologist  -KB physical therapist  -MD    Document Type therapy note (daily note)  -KP therapy note (daily note)  -KB therapy note (daily note)  -MD    Subjective Information agree to therapy;no complaints  -KP no complaints;agree to therapy  -KB no complaints;agree to therapy  -MD    Patient Effort, Rehab Treatment good  -KP good  -KB good  -MD    Symptoms Noted During/After Treatment none  -KP none  -KB none  -MD    Precautions/Limitations fall precautions  -KP fall precautions  -KB fall precautions  -MD    Equipment Issued to Patient   gait belt  -MD    Recorded by [] Vero York, OTR [KB] Katherine L Bruton [MD] Nereida Llamas, PT    Pain Assessment    Pain Assessment No/denies pain  -KP No/denies pain  -KB No/denies pain  -MD    Pain Score 0  -KP      Recorded by [] Vero York, OTR [KB] Katherine L Bruton [MD] Nereida Llamas, PT    Vision Assessment/Intervention    Visual Impairment visual impairment, left  - visual impairment, left;WFL with corrective lenses  -     Recorded by [] Vero York, OTR [KB] Katherine L Bruton     Cognitive Assessment/Intervention    Current Cognitive/Communication Assessment impaired  -KP  impaired  -MD    Orientation Status oriented to;person;place;time  -  person;place;time;oriented to  -MD    Follows Commands/Answers Questions  100% of the time;able to follow single-step instructions  -  100% of the time;needs cueing;needs increased time;needs repetition  -MD    Personal Safety mild impairment  -  decreased insight to deficits  -MD    Personal Safety Interventions fall prevention program maintained;gait belt;nonskid shoes/slippers when out of bed  -  fall prevention program maintained;gait belt;muscle strengthening facilitated;nonskid shoes/slippers when out of bed;supervised activity  -MD    Recorded by [KP] Vero York, OTR  [MD] Nereida Llamas, PT    Improve attention    Improve attention by:  complete divided attention task  -KB     Status: Improve attention  Progressing as expected  -KB     Attention Progress  70%;without cues;100%;with inconsistent cues  -KB     Comments: Improve attention  Pt completed complex written directions task with MIN-MOD cues for reasoning and attention.  -KB     Recorded by  [KB] Katherine L Bruton     Improve oral skills    To Improve Oral Skills, patient will:  Increase tongue tip elevation;Increase tongue lateralization;Increase lip closure;Increase back of tongue control;Increase tongue A-P movement  -KB     Status: Improve Oral Skills  Progressing as expected  -KB     Oral Skills Progress  continue to adress  -KB     Comments: Improve Oral Skills  Pt with difficulty moving pucker left to right and holding air in mouth to puff up cheeks.  -KB     Recorded by  [KB] Katherine L Bruton     Improve closure at entrance to airway    To improve closure at entrance to airway, patient will:  --   hard swallows  -KB     Status: Improve closure at entrance to airway  Progressing as expected  -KB     Closure at Entrance to Airway Progress  continue to adress  -KB     Comments: Improve closure at entrance to airway  Completes exercises independently.   -KB     Recorded by  [KB] Katherine L Bruton     Improve laryngeal elevation    To improve laryngeal elevation, patient will:  Complete pitch-glide  -KB      Status: Improve laryngeal elevation  Progressing as expected  -KB     Laryngeal Elevation Progress  continue to adress  -KB     Comments: Improve laryngeal elevation  Pt completes exercises independently.  -KB     Recorded by  [KB] Katherine L Bruton     Improve tongue base & pharyngeal wall squeeze    To improve tongue base & pharyngeal wall squeeze, patient will:  Complete effortful swallow;Complete gargle/hold retraction  -KB     Status: Improve tongue base & pharyngeal wall squeeze  Progressing as expected  -KB     Tongue Base/Pharyngeal Wall Squeeze Progress  continue to adress  -KB     Comments: Improve tongue base & pharyngeal wall squeeze  Pt completed exercises independently.   -KB     Recorded by  [KB] Katherine L Bruton     Bed Mobility, Assessment/Treatment    Bed Mobility, Comment up in   -KP      Recorded by [KP] Vero York OTR      Transfer Assessment/Treatment    Transfers, Sit-Stand [place] set up required;supervision required;stand by assist  -KP  supervision required  -MD    Transfers, Stand-Sit [place] set up required;supervision required;stand by assist  -KP  supervision required  -MD    Transfers, Sit-Stand-Sit, Assist Device other (see comments)    grab bars  -  rolling walker  -MD    Walk-In Shower Transfer, [place] set up required;supervision required  -      Walk-In Shower Transfer, Assist Device other (see comments)   tub bench, grab bars  -KP      Transfer, Safety Issues   step length decreased  -MD    Transfer, Impairments   strength decreased;impaired balance  -MD    Recorded by [KP] Vero York, OTR  [MD] Nereida Llamas, PT    Gait Assessment/Treatment    Gait, [place] Level   verbal cues required;stand by assist;contact guard assist  -MD    Gait, Assistive Device   rolling walker  -MD    Gait, Distance (Feet)   80   x3  -MD    Gait, Gait Deviations   bilateral:;janet decreased;forward flexed posture;step length decreased  -MD    Gait,  Safety Issues   step length decreased  -MD    Gait, Impairments   impaired balance;strength decreased  -MD    Recorded by   [MD] Nereida Llamas, PT    Stairs Assessment/Treatment    Number of Stairs   4  -MD    Stairs, Handrail Location   both sides  -MD    Stairs, [place] Level   supervision required  -MD [place], Technique Used   step over step (ascending);step to step (descending)  -MD    Stairs, Impairments   strength decreased;impaired balance  -MD    Recorded by   [MD] Nereida Llamas PT    Functional Mobility    Functional Mobility- Comment a few steps to tub bench SBA  -KP      Recorded by [KP] Vero York, OTR      Upper Body Bathing Assessment/Training    UB Bathing Assess/Train Assistive Device hand-held shower head;grab bars;tub bench  -KP      UB Bathing Assess/Train, Position sitting  -KP      UB Bathing Assess/Train, [place] Level set up required;stand by assist  -KP      Recorded by [KP] Vero York, OTR      Lower Body Bathing Assessment/Training    LB Bathing Assess/Train Assistive Device hand-held shower head;grab bars;tub bench  -KP      LB Bathing Assess/Train, Position sitting;standing  -KP      LB Bathing Assess/Train, [place] Level set up required;stand by assist  -KP      Recorded by [KP] Vero York, ASRAR      Upper Body Dressing Assessment/Training    UB Dressing Assess/Train, Clothing Type doffing:;donning:;t-shirt  -KP      UB Dressing Assess/Train, Position sitting  -KP      UB Dressing Assess/Train, [place] set up required;supervision required  -KP      Recorded by [KP] Vero York, SARAR      Lower Body Dressing Assessment/Training    LB Dressing Assess/Train, Clothing Type doffing:;donning:;pants;shoes;slipper socks;socks  -KP      LB Dressing Assess/Train, Position sitting;standing  -KP      LB Dressing Assess/Train, [place] set up required;supervision required  -KP      Recorded by [KP] Vero York OTR      Grooming  Assessment/Training    Grooming Assess/Train, Position sitting  -KP      Grooming Assess/Train, [place]ep[place] Level conditional independence;set up required  -KP      Recorded by [KP] KANDY Sultana      Balance Skills Training    Sitting-Level of Assistance Distant supervision  -      Sitting-Balance Support Feet supported  -KP      Sitting-Balance Activities Reaching for objects  -KP      Standing-Level of Assistance Close supervision  -KP      Recorded by [KP] KANDY Sultana      Positioning and Restraints    Pre-Treatment Position sitting in chair/recliner  -KP  sitting in chair/recliner  -MD    Post Treatment Position wheelchair  -KP      In Wheelchair sitting;with other staff   RT  -KP      Recorded by [KP] KANDY Sultana  [MD] Nereida Llamas, PT      11/08/17 1539 11/08/17 1330 11/08/17 1152    Rehab Assessment/Intervention    Discipline occupational therapist  - speech language pathologist  -KB occupational therapist  -    Document Type therapy note (daily note)  -KP therapy note (daily note)  -KB therapy note (daily note)  -    Subjective Information agree to therapy;no complaints  -KP no complaints;agree to therapy  -KB agree to therapy;no complaints  -KP    Patient Effort, Rehab Treatment good  -KP good  -KB good  -KP    Symptoms Noted During/After Treatment none  -KP none  -KB none  -KP    Precautions/Limitations fall precautions;swallowing precautions  -KP fall precautions;swallowing precautions  - fall precautions;swallowing precautions  -KP    Recorded by [KP] Vero York, SARAR [KB] Katherine L Bruton [KP] Vero York, SARAR    Pain Assessment    Pain Assessment No/denies pain  -KP No/denies pain  -KB No/denies pain  -KP    Pain Score 0  -KP  0  -KP    Recorded by [KP] KANDY Sultana [KB] Katherine L Bruton [KP] Vero York, SARAR    Vision Assessment/Intervention    Visual Impairment visual impairment, left  -KP WFL with  corrective lenses  -KB visual impairment, left  -KP    Recorded by [KP] Vero York OTR [KB] Katherine L Bruton [KP] Vero York, OTR    Cognitive Assessment/Intervention    Current Cognitive/Communication Assessment impaired  -KP  impaired  -KP    Orientation Status person;place;time;oriented to  -KP  oriented to;person;place;time  -KP    Follows Commands/Answers Questions 100% of the time;able to follow single-step instructions;needs cueing  -KP  100% of the time;able to follow single-step instructions;needs cueing  -KP    Personal Safety mild impairment  -KP  mild impairment  -KP    Personal Safety Interventions fall prevention program maintained;gait belt;nonskid shoes/slippers when out of bed  -KP  fall prevention program maintained;gait belt;nonskid shoes/slippers when out of bed  -KP    Recorded by [KP] Vero York, SARAR  [KP] Vero York, OTR    Improve functional problem solving    Improve functional problem solving through:  complete high level reasoning task  -KB     Status: Improve functional problem solving  Progressing as expected  -KB     Functional Problem Solving Progress  80%;without cues;100%;with inconsistent cues  -KB     Comments: Improve functional problem solving  Completed two high-level logic puzzles with MIN cues only.  -KB     Recorded by  [KB] Katherine L Bruton     Improve closure at entrance to airway    To improve closure at entrance to airway, patient will:  --   pushing/pulling exercises  -KB     Status: Improve closure at entrance to airway  Progressing as expected  -KB     Closure at Entrance to Airway Progress  continue to adress  -KB     Comments: Improve closure at entrance to airway  Initial cues for how to complete exercises.  -KB     Recorded by  [KB] Katherine L Bruton     Improve tongue base & pharyngeal wall squeeze    To improve tongue base & pharyngeal wall squeeze, patient will:  --   Practiced k/g word list forcefully  -KB      Status: Improve tongue base & pharyngeal wall squeeze  Progressing as expected  -KB     Tongue Base/Pharyngeal Wall Squeeze Progress  continue to adress  -KB     Comments: Improve tongue base & pharyngeal wall squeeze  No cues needed, pt repeated word list x5.   -KB     Recorded by  [KB] Katherine L Bruton     Bed Mobility, Assessment/Treatment    Bed Mobility, Comment up in chair  -KP  pt up in wc  -KP    Recorded by [KP] Vero York OTR  [KP] Vero York, OTR    Transfer Assessment/Treatment    Transfers, Sit-Stand [place]   supervision required  -KP    Transfers, Stand-Sit [place]   supervision required  -KP    Transfers, Sit-Stand-Sit, Assist Device   other (see comments)   wc  -KP    Walk-In Shower Transfer, [place]   set up required;supervision required  -KP    Walk-In Shower Transfer, Assist Device   other (see comments)   tub bench  -KP    Recorded by   [KP] Vero York, SARAR    Upper Body Bathing Assessment/Training    UB Bathing Assess/Train Assistive Device   hand-held shower head;tub bench  -KP    UB Bathing Assess/Train, Position   sitting  -KP    UB Bathing Assess/Train, [place] Level   set up required;stand by assist  -KP    Recorded by   [KP] Vero York OTR    Lower Body Bathing Assessment/Training    LB Bathing Assess/Train Assistive Device   grab bars;hand-held shower head;tub bench  -KP    LB Bathing Assess/Train, Position   sitting;standing  -KP    LB Bathing Assess/Train, [place] Level   set up required;stand by assist  -KP    Recorded by   [KP] Vero York OTR    Upper Body Dressing Assessment/Training    UB Dressing Assess/Train, Clothing Type   doffing:;donning:;t-shirt  -KP    UB Dressing Assess/Train, Position   sitting  -KP    UB Dressing Assess/Train, [place]   set up required;supervision required  -KP    Recorded by   [KP] Vero York OTR    Lower Body Dressing Assessment/Training    LB Dressing  Assess/Train, Clothing Type   doffing:;donning:;socks;shoes;pants  -KP    LB Dressing Assess/Train, Position   sitting;standing  -KP    LB Dressing Assess/Train, [place]   set up required;supervision required  -KP    LB Dressing Assess/Train, Comment   holds onto grab bar intermittently  -KP    Recorded by   [KP] KANDY Sultana    Grooming Assessment/Training    Grooming Assess/Train, Position   sitting  -KP    Grooming Assess/Train, [place] Level   set up required;supervision required  -KP    Recorded by   [KP] KANDY Sultana    Balance Skills Training    Sitting-Level of Assistance Distant supervision  -KP  Close supervision;Distant supervision  -KP    Sitting-Balance Support Feet supported  -KP  Feet supported  -KP    Standing-Level of Assistance   Close supervision  -KP    Recorded by [KP] KANDY Sultana  [KP] KANDY Sultana    Fine Motor Coordination Training    Detail (Fine Motor Coordination Training) pulls pegs from yellow resistive putty using one hand, using L hand to inc FMC and hand/digit strength. rotates wooden block up dowel [place] w. pegs to inc UE coordination w. min difficulty. strings foam beads on lace w. R hand holding string and L hand pushing bead on and vice versus to inc FMC and BLC  -KP      Recorded by [KP] KANDY Sultana      Positioning and Restraints    Pre-Treatment Position sitting in chair/recliner  -KP  sitting in chair/recliner  -KP    Post Treatment Position wheelchair  -KP  wheelchair  -KP    In Wheelchair sitting;with PT  -KP  sitting;call light within reach;encouraged to call for assist  -KP    Recorded by [KP] KANDY Sultana  [KP] KANDY Sultana      11/08/17 1114 11/08/17 1030 11/07/17 1532    Rehab Assessment/Intervention    Discipline physical therapist  -MD speech language pathologist  -ANIYA occupational therapist  -BRIANNA    Document Type therapy note (daily note)  -MD therapy note (daily  note)  -KB therapy note (daily note)  -KP    Subjective Information agree to therapy;no complaints  -MD no complaints;agree to therapy  -KB agree to therapy;no complaints  -KP    Patient Effort, Rehab Treatment good  -MD good  -KB good  -KP    Symptoms Noted During/After Treatment none  -MD none  -KB none  -KP    Precautions/Limitations fall precautions  -MD fall precautions;swallowing precautions  -KB fall precautions;swallowing precautions  -KP    Precautions/Limitations, Vision  WFL with corrective lenses  -KB     Equipment Issued to Patient gait belt;front wheeled walker  -MD      Recorded by [MD] Nereida Llamas, PT [KB] Katherine L Bruton [] Vero York, OTR    Pain Assessment    Pain Assessment No/denies pain  -MD 0-10  -KB No/denies pain  -KP    Post Pain Score  7  -KB     Pain Location  Toe (Comment which one)  -KB     Pain Orientation  Left;Right  -KB     Pain Intervention(s)  Emotional support   Pt did not want medications.  -KB     Recorded by [MD] Nereida Llamas, PT [KB] Katherine L Bruton [] Vero York, OTR    Vision Assessment/Intervention    Visual Impairment visual impairment, left  -MD WFL with corrective lenses  -KB visual impairment, left  -KP    Recorded by [MD] Nereida Llamas, PT [KB] Katherine L Bruton [] Vero York, OTR    Cognitive Assessment/Intervention    Current Cognitive/Communication Assessment impaired  -MD  impaired  -KP    Orientation Status oriented to;person;place;time  -MD  oriented to;person;place;time  -KP    Follows Commands/Answers Questions 100% of the time;needs cueing;needs repetition  -MD  100% of the time;able to follow single-step instructions;needs cueing  -KP    Personal Safety impulsive;decreased insight to deficits  -MD  mild impairment  -KP    Personal Safety Interventions fall prevention program maintained;gait belt;muscle strengthening facilitated;nonskid shoes/slippers when out of bed;supervised activity  -MD  fall prevention program  maintained;gait belt;muscle strengthening facilitated;nonskid shoes/slippers when out of bed  -KP    Recorded by [MD] Nereida Llamas, PT  [KP] Vero York, OTR    Improve functional problem solving    Improve functional problem solving through:  complete high level reasoning task  -KB     Status: Improve functional problem solving  Progressing as expected  -KB     Functional Problem Solving Progress  80%;without cues;100%;with inconsistent cues  -KB     Comments: Improve functional problem solving  Pt completed high-level logic puzzle with MIN verbal cues for attention to detail.   -KB     Recorded by  [KB] Katherine L Bruton     Improve oral skills    To Improve Oral Skills, patient will:  --   pucker/smile, pucker left/right, suck in/puff out cheeks  -KB     Status: Improve Oral Skills  Progressing as expected  -KB     Oral Skills Progress  continue to adress  -KB     Comments: Improve Oral Skills  Completed exercises without cues.  -KB     Recorded by  [KB] Katherine L Bruton     Improve closure at entrance to airway    To improve closure at entrance to airway, patient will:  --   hard swallows  -KB     Status: Improve closure at entrance to airway  Progressing as expected  -KB     Closure at Entrance to Airway Progress  continue to adress  -KB     Comments: Improve closure at entrance to airway  Completed daily exercises to address airway closure without cues.  -KB     Recorded by  [KB] Katherine L Bruton     Improve laryngeal elevation    To improve laryngeal elevation, patient will:  --   pitch glide, high pitch vowel, humming in falsetto  -KB     Status: Improve laryngeal elevation  Progressing as expected  -KB     Laryngeal Elevation Progress  continue to adress  -KB     Comments: Improve laryngeal elevation  Completed exercises to addres laryngeal elevation without cues.   -KB     Recorded by  [KB] Katherine L Bruton     Improve tongue base & pharyngeal wall squeeze    To improve tongue base &  pharyngeal wall squeeze, patient will:  Complete effortful swallow;Complete gargle/hold retraction  -KB     Status: Improve tongue base & pharyngeal wall squeeze  Progressing as expected  -KB     Tongue Base/Pharyngeal Wall Squeeze Progress  continue to adress  -KB     Comments: Improve tongue base & pharyngeal wall squeeze  Completed exercises without cues.  -KB     Recorded by  [KB] Katherine L Bruton     Bed Mobility, Assessment/Treatment    Bed Mob, Supine to Sit, [place] supervision required  -MD  supervision required;set up required  -    Bed Mob, Sit to Supine, [place] supervision required  -MD  not tested  -KP    Recorded by [MD] Nereida Llamas, PT  [KP] Vero York, OTR    Transfer Assessment/Treatment    Transfers, Bed-Chair [place]   stand by assist  -    Transfers, Chair-Bed [place]   not tested  -    Transfers, Bed-Chair-Bed, Assist Device   other (see comments)   wc  -KP    Transfers, Sit-Stand [place] stand by assist  -MD  stand by assist  -KP    Transfers, Stand-Sit [place] stand by assist  -MD  stand by assist  -    Transfers, Sit-Stand-Sit, Assist Device rolling walker  -MD  other (see comments)   wc  -KP    Transfer, Safety Issues step length decreased  -MD      Transfer, Impairments strength decreased;impaired balance  -MD      Recorded by [MD] Nereida Llamas, PT  [KP] Vero York, OTR    Gait Assessment/Treatment    Gait, [place] Level verbal cues required;contact guard assist  -MD      Gait, Assistive Device rolling walker  -MD      Gait, Distance (Feet) 80   x3  -MD      Gait, Gait Deviations bilateral:;janet decreased;forward flexed posture;step length decreased  -MD      Gait, Safety Issues sequencing ability decreased  -MD      Gait, Impairments impaired balance;strength decreased  -MD      Recorded by [MD] Nereida Llamas, PT      Balance Skills Training    Sitting-Level of Assistance   Close supervision;Distant supervision  -     Sitting-Balance Support   Feet supported  -KP    Standing-Level of Assistance   Close supervision  -KP    Static Standing Balance Support   Right upper extremity supported  -KP    Recorded by   [KP] Vero York, SARAR    Therapy Exercises    Right Lower Extremity AROM:;10 reps;sidelying;hip abduction/adduction   x3 sets  -MD      Left Lower Extremity AROM:;10 reps;sidelying;clam shell   x3 sets  -MD      Bilateral Lower Extremities AROM:;20 reps;ankle pumps/circles;hip abduction/adduction;hip flexion;LAQ  -MD      BLE Other Reps --   Nustep at level 2 for 4 min  -MD      BUE Resistance   theraputty   pinches yellow putty w. each digit and thumb  -KP    Trunk Exercises 10 reps;supine;bridging   x3 sets  -MD      Recorded by [MD] Nereida Llamas, PT  [KP] Vero York, OTR    Fine Motor Coordination Training    Detail (Fine Motor Coordination Training)   links paper clip together clipping it on other paper clip w. L hand.  in hand manipulation w. coins in L hand pushing coin up w. L thumb and inserting coin into small slot to inc FMC and in hand manipulation.   -KP    Recorded by   [KP] Vero York, SARAR    Positioning and Restraints    Pre-Treatment Position sitting in chair/recliner  -MD  in bed  -KP    Post Treatment Position wheelchair  -MD  wheelchair  -KP    In Wheelchair sitting;call light within reach  -MD  sitting;call light within reach;encouraged to call for assist;with family/caregiver  -KP    Recorded by [MD] Nereida Llamas, PT  [KP] Vero York, OTR      11/07/17 1201 11/07/17 1100 11/07/17 1030    Rehab Assessment/Intervention    Discipline occupational therapist  -KP speech language pathologist  -KB speech language pathologist  -KB    Document Type therapy note (daily note)  -KP therapy note (daily note)  -KB therapy note (daily note)  -KB    Subjective Information agree to therapy;no complaints  -KP no complaints;agree to therapy  -KB no complaints;agree to therapy  -KB     Patient Effort, Rehab Treatment good  -KP good  -KB good  -KB    Symptoms Noted During/After Treatment none  -KP none  -KB none  -KB    Precautions/Limitations fall precautions;swallowing precautions  -KP fall precautions;swallowing precautions  -KB fall precautions;swallowing precautions  -KB    Recorded by [KP] Vero York OTR [KB] Katherine L Bruton [KB] Katherine L Bruton    Pain Assessment    Pain Assessment No/denies pain  -KP No/denies pain  -KB No/denies pain  -KB    Recorded by [KP] Vero York, SARAR [KB] Katherine L Bruton [KB] Katherine L Bruton    Vision Assessment/Intervention    Visual Impairment visual impairment, left  -KP visual impairment, left;inattention to the left  -KB visual impairment, left;inattention to the left  -KB    Recorded by [KP] Vero York, SARAR [KB] Katherine L Bruton [KB] Katherine L Bruton    Cognitive Assessment/Intervention    Current Cognitive/Communication Assessment impaired  -KP      Orientation Status oriented to;person;place;time  -KP      Follows Commands/Answers Questions 100% of the time;able to follow single-step instructions  -      Personal Safety mild impairment  -      Personal Safety Interventions fall prevention program maintained;gait belt;nonskid shoes/slippers when out of bed  -KP      Recorded by [KP] Vero York, OTR      Improve functional problem solving    Improve functional problem solving through:   complete high level reasoning task  -KB    Status: Improve functional problem solving   Progressing as expected  -KB    Functional Problem Solving Progress   50%;without cues;100%;with consistent cues  -KB    Comments: Improve functional problem solving   Pt required MOD verbal cues throughout logic puzzle for attention and reasoning.   -KB    Recorded by   [KB] Katherine L Bruton    Improve oral skills    Status: Improve Oral Skills   Progressing as expected  -KB    Oral Skills Progress   80%;with inconsistent  cues  -KB    Recorded by   [KB] Katherine L Bruton    Improve closure at entrance to airway    Status: Improve closure at entrance to airway  Progressing as expected  -KB     Closure at Entrance to Airway Progress  continue to adress  -KB     Comments: Improve closure at entrance to airway  Pt observed with mid-day meal. 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   -KB     Recorded by  [KB] Katherine L Bruton     Improve laryngeal elevation    Status: Improve laryngeal elevation   Progressing as expected  -KB    Laryngeal Elevation Progress   60%;with inconsistent cues;continue to adress  -KB    Comments: Improve laryngeal elevation   Pt completing exercises in therapy session as prescribed.   -KB    Recorded by   [KB] Katherine L Bruton    Improve tongue base & pharyngeal wall squeeze    Status: Improve tongue base & pharyngeal wall squeeze   Progressing as expected  -KB    Tongue Base/Pharyngeal Wall Squeeze Progress   60%;with inconsistent cues;continue to adress  -KB    Comments: Improve tongue base & pharyngeal wall squeeze   Pt completing exercises in therapy sessions as prescribed.   -KB    Recorded by   [KB] Katherine L Bruton    Bed Mobility, Assessment/Treatment    Bed Mobility, Comment up in wc  -KP      Recorded by [KP] Vero York, OTR      Transfer Assessment/Treatment    Transfers, Sit-Stand [place] stand by assist  -      Transfers, Stand-Sit [place] stand by assist  -      Transfers, Sit-Stand-Sit, Assist Device other (see comments)    grab bar  -      Transfer, Comment doesn't wantn to shower today. her hair is was fixed last night by [facility]  -KP      Recorded by [KP] Vero York, OTR      Upper Body Bathing Assessment/Training    UB Bathing Assess/Train, Position sitting;sink side  -      UB Bathing Assess/Train, [place] Level stand by assist;set up required  -      Recorded by [KP] Vero  KANDY Tinsley      Lower Body Bathing Assessment/Training    LB Bathing Assess/Train, Position sitting;standing  -KP      LB Bathing Assess/Train, [place] Level set up required;stand by assist  -KP      Recorded by [] KANDY Sultana      Upper Body Dressing Assessment/Training    UB Dressing Assess/Train, Clothing Type donning:;doffing:;t-shirt  -KP      UB Dressing Assess/Train, Position sitting  -KP      UB Dressing Assess/Train, [place] supervision required;set up required  -KP      Recorded by [KP] KANDY Sultana      Lower Body Dressing Assessment/Training    LB Dressing Assess/Train, Clothing Type doffing:;donning:;pants;socks;shoes  -KP      LB Dressing Assess/Train, Position sitting;standing  -KP      LB Dressing Assess/Train, [place] set up required;supervision required  -KP      Recorded by [] KANDY Sultana      Grooming Assessment/Training    Grooming Assess/Train, Position sitting;sink side  -KP      Grooming Assess/Train, [place] Level supervision required;set up required  -KP      Recorded by [] KANDY Sultana      Balance Skills Training    Sitting-Level of Assistance Close supervision;Distant supervision  -KP      Sitting-Balance Support Feet supported  -KP      Standing-Level of Assistance Close supervision  -KP      Static Standing Balance Support Left upper extremity supported  -KP      Recorded by [] KANDY Sultana      Positioning and Restraints    Pre-Treatment Position sitting in chair/recliner  -KP      Post Treatment Position wheelchair  -KP      In Wheelchair sitting   in dining room waiting for RT  -KP      Recorded by [BRIANNA] KANDY Sultana        11/07/17 0819 11/06/17 1550 11/06/17 1330    Rehab Assessment/Intervention    Discipline physical therapist  -MD occupational therapist  -KP speech language pathologist  -OC    Document Type therapy note (daily note)  -MD therapy note (daily note)   -KP therapy note (daily note)  -OC    Subjective Information agree to therapy;no complaints  -MD complains of;agree to therapy;fatigue  -KP no complaints;agree to therapy  -OC    Patient Effort, Rehab Treatment good  -MD good  -KP good  -OC    Symptoms Noted During/After Treatment none  -MD none  -KP none  -OC    Precautions/Limitations fall precautions  -MD fall precautions  -KP     Equipment Issued to Patient gait belt;front wheeled walker  -MD      Recorded by [MD] Nereida Llamas PT [KP] Vero York, OTR [OC] Lilian Mohr MA,[facility]    Pain Assessment    Pain Assessment No/denies pain  -MD No/denies pain  -KP No/denies pain  -OC    Recorded by [MD] Nereida Llamas PT [KP] Vero York, OTR [OC] Lilian Mohr MA,CCC-SLP    Vision Assessment/Intervention    Visual Impairment visual impairment, left;inattention to the left  -MD visual impairment, left;inattention to the left  -KP     Recorded by [MD] Nereida Llamas PT [KP] Vero York, OTJOSSIE     Cognitive Assessment/Intervention    Current Cognitive/Communication Assessment impaired  -MD impaired  -KP impaired  -OC    Orientation Status oriented to;person  -MD oriented to;person;place;time  -KP oriented to;person;place;time  -OC    Follows Commands/Answers Questions 100% of the time;needs cueing;needs repetition  -% of the time;able to follow single-step instructions  -% of the time;able to follow multi-step instructions;needs cueing;able to follow single-step instructions;needs increased time  -OC    Personal Safety decreased insight to deficits  -MD mild impairment  -KP     Personal Safety Interventions fall prevention program maintained;gait belt;muscle strengthening facilitated;nonskid shoes/slippers when out of bed;supervised activity  -MD fall prevention program maintained;gait belt;muscle strengthening facilitated;nonskid shoes/slippers when out of bed  -KP     Recorded by [MD] Nereida Llamas PT [KP] Vero York, SARAR [OC] Lilian  Card, MA,CCC-SLP    Improve executive function skills    Status: Improve executive function skills   Progressing as expected  -OC    Executive Function Skills Progress   20%;without cues;70%;with consistent cues   organization task of dates/holidays  -OC    Comments: Improve executive function skills   Pt demonstrated difficulty with cell phone this date  -OC    Recorded by   [OC] Lilian Mohr MA,CCC-SLP    Improve oral skills    Status: Improve Oral Skills   Progressing as expected  -OC    Oral Skills Progress   70%;with inconsistent cues  -OC    Recorded by   [OC] Lilian Mohr MA,CCC-SLP    Improve laryngeal elevation    Status: Improve laryngeal elevation   Progressing as expected  -OC    Laryngeal Elevation Progress   60%;with inconsistent cues  -OC    Recorded by   [OC] Lilian Mohr MA,BEN-SLP    Improve tongue base & pharyngeal wall squeeze    Status: Improve tongue base & pharyngeal wall squeeze   Progressing as expected  -OC    Tongue Base/Pharyngeal Wall Squeeze Progress   60%;with inconsistent cues  -OC    Recorded by   [OC] Lilian Mohr MA,BEN-SLP    Bed Mobility, Assessment/Treatment    Bed Mob, Supine to Sit, [place] not tested  -MD not tested  -     Bed Mob, Sit to Supine, [place] not tested  -MD set up required;supervision required  -     Recorded by [MD] Nereida Llamas, PT [KP] Vero York, OTR     Transfer Assessment/Treatment    Transfers, Sit-Stand [place] verbal cues required;stand by assist  -MD set up required;supervision required;stand by assist  -KP     Transfers, Stand-Sit [place] verbal cues required;stand by assist  -MD stand by assist  -     Transfers, Sit-Stand-Sit, Assist Device rolling walker  -MD bed rails  -     Transfer, Safety Issues step length decreased  -MD      Transfer, Impairments strength decreased;impaired balance  -MD      Recorded by [MD] Nereida Llamas, PT [KP] Vero York, OTR     Gait Assessment/Treatment    Gait, [place]  Level verbal cues required;contact guard assist  -MD      Gait, Assistive Device rolling walker  -MD      Gait, Distance (Feet) 80   x3  -MD      Gait, Gait Deviations bilateral:;janet decreased;forward flexed posture;step length decreased;toe-to-floor clearance decreased;weight-shifting ability decreased  -MD      Gait, Safety Issues step length decreased  -MD      Gait, Impairments impaired balance;strength decreased  -MD      Recorded by [MD] Nereida Llamas PT      Stairs Assessment/Treatment    Number of Stairs 4  -MD      Stairs, Handrail Location both sides  -MD      Stairs, [place] Level supervision required  -MD      Stairs, Technique Used step over step (ascending);step to step (descending)  -MD      Stairs, Impairments strength decreased;impaired balance  -MD      Recorded by [MD] Nereida Llamas PT      Balance Skills Training    Standing-Level of Assistance  Close supervision  -     Static Standing Balance Support  Right upper extremity supported  -KP     Recorded by  [KP] Vero York OTR     Therapy Exercises    Bilateral Lower Extremities --   nustep 7 min level 2  -MD      Left Upper Extremity  AROM:;15 reps;sitting;elbow flexion/extension;shoulder extension/flexion  -KP     Recorded by [MD] Nereida Llamas PT [KP] Vero York, SARAR     Fine Motor Coordination Training    Detail (Fine Motor Coordination Training)  graps pennies w. L hand using index and thumb and inserting into small slot to inc FMC w. min difficulty. 2pt pinch on sq pegs and inserts them into board x30 to inc FMC no difficulty. Graps small colored pegs to complete peg design w. very little difficulty.  -KP     Recorded by  [KP] Vero York OTR     Positioning and Restraints    Pre-Treatment Position sitting in chair/recliner  -MD sitting in chair/recliner  -KP     Post Treatment Position wheelchair  -MD bed  -KP     In Bed  supine;call light within reach;exit alarm on;encouraged to call for assist  -KP      In Wheelchair patient within staff view   in DR MONTIEL      Recorded by [MD] Nereida Llamas, PT [KP] Vero York, OTR       User Key  (r) = Recorded By, (t) = Taken By, (c) = Cosigned By    Initials Name Effective Dates    OC Lilian Mohr MA,CCC-SLP 04/05/17 -     KP Vero York, OTR 04/13/15 -     MD Nereida Llamas, PT 12/01/15 -     KB Katherine L Bruton 09/29/17 -                 OT Goals       11/01/17 1554 11/01/17 1230       Transfer Training OT STG    Transfer Training OT STG, Date Established  11/01/17  -SO     Transfer Training OT STG, Time to Achieve  1 wk  -SO     Transfer Training OT STG, Activity Type  tub;walk-in shower;shower chair  -SO     Transfer Training OT STG, [place] Level  contact guard assist  -SO     Transfer Training OT STG, Assist Device  walker, rolling;shower chair  -SO     Transfer Training OT LTG    Transfer Training OT LTG, Date Established  11/01/17  -SO     Transfer Training OT LTG, Time to Achieve  by discharge  -SO     Transfer Training OT LTG, Activity Type  tub;walk-in shower;shower chair  -SO     Transfer Training OT LTG, [place] Level  supervision required  -SO     Transfer Training OT LTG, Assist Device  walker, rolling;shower chair  -SO     Transfer Training 2 OT STG    Transfer Training 2 OT STG, Date Established  11/01/17  -SO     Transfer Training 2 OT STG, Time to Achieve  1 wk  -SO     Transfer Training 2 OT STG, Activity Type  toilet  -SO     Transfer Training 2 OT STG, [place] Level  contact guard assist  -SO     Transfer Training 2 OT STG, Assist Device  walker, rolling  -SO     Transfer Training 2 OT LTG    Transfer Training 2 OT LTG, Date Established  11/01/17  -SO     Transfer Training 2 OT LTG, Time to Achieve  by discharge  -SO     Transfer Training 2 OT LTG, Activity Type  toilet  -SO     Transfer Training 2 OT LTG, [place] Level  supervision required  -SO     Transfer Training 2 OT LTG, Assist Device  walker, rolling  -SO      Strength OT LTG    Strength Goal OT LTG, Date Established 11/01/17 -SO      Strength Goal OT LTG, Time to Achieve by discharge  -SO      Strength Goal OT LTG, Measure to Achieve Pt to increase LUE strength to 4-/5 to increase independence with ADLs.  -SO      Caregiver Training OT LTG    Caregiver Training OT LTG, Date Established  11/01/17 -SO     Caregiver Training OT LTG, Time to Achieve  by discharge  -SO     Caregiver Training OT LTG, [place] Level  able to assist adequately;able to cue patient adequately  -SO     Patient Education OT LTG    Patient Education OT LTG, Date Established  11/01/17 -SO     Patient Education OT LTG, Time to Achieve  by discharge  -SO     Patient Education OT LTG, Education Type  written program;HEP;aware of neuro deficits;home safety  -SO     Patient Education OT LTG, Education Understanding  independent;demonstrates adequately;verbalizes understanding  -SO     Isolated Movement OT LTG    Isolated Movement OT LTG, Date Established 11/01/17  -SO      Isolated Movement OT LTG, Time to Achieve by discharge  -SO      Isolated Movement OT LTG, Body Area left scapula;left shoulder;left elbow;left forearm;left wrist;left fingers  -SO      Isolated Movement OT LTG, Level WFL  -SO      Coordination OT LTG    Coordination OT LTG, Date Established 11/01/17 -SO      Coordination OT LTG, Time to Achieve by discharge  -SO      Coordination OT LTG, Activity Type FM written ex program;GM written ex program;FM task;GM task  -SO      Coordination OT LTG, [place] Level independent  -SO      Coordination OT LTG, Additional Goal LUE  -SO      ADL OT STG    ADL OT STG, Date Established  11/01/17 -SO     ADL OT STG, Time to Achieve  1 wk  -SO     ADL OT STG, Activity Type  ADL skills  -SO     ADL OT STG, [place] Level  contact guard;assistive device  -SO     ADL OT LTG    ADL OT LTG, Date Established  11/01/17  -SO     ADL OT LTG, Time to Achieve  by discharge  -SO     ADL OT LTG,  Activity Type  ADL skills  -SO     ADL OT LTG, [place] Level  standby assist;assistive device  -SO       User Key  (r) = Recorded By, (t) = Taken By, (c) = Cosigned By    Initials Name Provider Type    SO Micki Castro OTR Occupational Therapist          Occupational Therapy Education     Title: PT OT SLP Therapies (Active)     Topic: Occupational Therapy (Active)     Point: ADL training (Done)    Description: Instruct learner(s) on proper safety adaptation and remediation techniques during self care or transfers.   Instruct in proper use of assistive devices.    Learning Progress Summary    Learner Readiness Method Response Comment Documented by Status   Patient Acceptance E,D EDUARDO,DU ed pt on shower tsf and safety w. tsf. pt demo w. SBA. tub bench and grab bars  11/08/17 1157 Done    Acceptance ED VU,NR ed pt on shower tsf technique. ed pt on bathing safety. pt demo shower w. CGA to SBA. mostly SBA.  11/06/17 1207 Done    Acceptance E VU Discuss OT goals and POC. SO 11/01/17 1232 Done   Family Acceptance E VU Discuss OT goals and POC. SO 11/01/17 1232 Done               Point: Precautions (Done)    Description: Instruct learner(s) on prescribed precautions during self-care and functional transfers.    Learning Progress Summary    Learner Readiness Method Response Comment Documented by Status   Patient Acceptance CARD VU,NR ed pt on shower tsf technique. ed pt on bathing safety. pt demo shower w. CGA to SBA. mostly SBA.  11/06/17 1207 Done               Point: Body mechanics (Done)    Description: Instruct learner(s) on proper positioning and spine alignment during self-care, functional mobility activities and/or exercises.    Learning Progress Summary    Learner Readiness Method Response Comment Documented by Status   Patient Acceptance E,D EDUARDO,DU ed pt on shower tsf and safety w. tsf. pt demo w. SBA. tub bench and grab bars  11/08/17 1157 Done    Acceptance E,D VU,NR ed pt on shower tsf  technique. ed pt on bathing safety. pt demo shower w. CGA to SBA. mostly SBA.  11/06/17 1207 Done                      User Key     Initials Effective Dates Name Provider Type Discipline    SO 04/13/15 -  Micki Castro, OTJOSSIE Occupational Therapist OT     04/13/15 -  KANDY Sultana Occupational Therapist OT                  OT Recommendation and Plan  Therapy Frequency: 5 times/wk          Time Calculation:         Time Calculation- OT       11/09/17 1213          Time Calculation- OT    OT Start Time 0900  -      OT Stop Time 0930  -      OT Time Calculation (min) 30 min  -        User Key  (r) = Recorded By, (t) = Taken By, (c) = Cosigned By    Initials Name Provider Type     KANDY Sultana Occupational Therapist           Therapy Charges for Today     Code Description Service Date Service Provider Modifiers Qty    91844987107 HC OT SELF CARE/MGMT/TRAIN EA 15 MIN 11/8/2017 KANDY Sultana GO 2    32701331886 HC OT NEUROMUSC RE EDUCATION EA 15 MIN 11/8/2017 KANDY Sultana GO 2    97928223475 HC OT SELF CARE/MGMT/TRAIN EA 15 MIN 11/9/2017 KANDY Sultana GO 2               KANDY Sultana  11/9/2017

## 2017-11-09 NOTE — PLAN OF CARE
Problem: Patient Care Overview (Adult)  Goal: Plan of Care Review  Outcome: Ongoing (interventions implemented as appropriate)    11/09/17 0445   Coping/Psychosocial Response Interventions   Plan Of Care Reviewed With patient   Patient Care Overview   Progress improving   Outcome Evaluation   Outcome Summary/Follow up Plan Pt is calm and cooperative. Denied any pain or incomfort. Sleeping good. No new issues noted at this time.         Problem: Stroke (Ischemic) (Adult)  Goal: Signs and Symptoms of Listed Potential Problems Will be Absent or Manageable (Stroke)  Outcome: Ongoing (interventions implemented as appropriate)    Problem: Fall Risk (Adult)  Goal: Absence of Falls  Outcome: Ongoing (interventions implemented as appropriate)

## 2017-11-09 NOTE — PROGRESS NOTES
Inpatient Rehabilitation Functional Measures Assessment    Functional Measures  YESI Eating:  [facility] Grooming: [facility] Bathing:  [facility] Upper Body Dressing:  [facility] Lower Body Dressing:  [facility] Toileting:  [facility] Bladder Management  Level of Assistance:  [place]  Frequency/Number of Accidents this Shift:  [facility] Bowel Management  Level of Assistance: [place]  Frequency/Number of Accidents this Shift: [facility] Bed/Chair/Wheelchair Transfer:  [facility] Toilet Transfer:  [facility] Tub/Shower Transfer:  [place]    Previously Documented Mode of Locomotion at Discharge: Field  YESI Expected Mode of Locomotion at Discharge: [facility] Walk/Wheelchair:  [facility] Stairs:  [facility] Comprehension:  Auditory comprehension is the usual mode. Comprehension  Score = 7, Independent.  Patient comprehends complex/abstract information in  their primary language.  Patient is completely independent for auditory  comprehension.  There are no activity limitations.  YESI Expression:  Vocal expression is the usual mode. Expression Score = 7,  Independent.  Patient expresses complex/abstract information in their primary  language.  Patient is completely independent for vocal expression.  There are no  activity limitations.  YESI Social Interaction:  Social Interaction Score = 7, Independent. Patient is  completely independent for social interaction.  There are no activity  limitations.  YESI Problem Solving:  Activity was not observed.  YESI Memory:  Memory Score = 7, Independent.  Patient is completely independent  for memory.  There are no activity limitations.    Therapy Mode Minutes  Occupational Therapy: [place]  Physical Therapy: [place]  Speech Language Pathology:  [place]    Signed by: Rossy Falcon RN

## 2017-11-09 NOTE — PROGRESS NOTES
Inpatient Rehabilitation Plan of Care Note    Plan of Care  Care Plan Reviewed - No updates at this time.    Sphincter Control    Performed Intervention(s)  Monitor I & O's  Encourage fluid intake  Timed voids/ scheduled toileting      Safety    Performed Intervention(s)  Falls prevention protocol  Safety rounds  Bed and chair alarms    Signed by: Saad Marquez RN

## 2017-11-09 NOTE — PROGRESS NOTES
"   LOS: 9 days   Patient Care Team:  Calvin Dhillon Jr., DO as PCP - General (Internal Medicine)    Chief Complaint:   S/p R MCA ischemic infarct  Stroke prophylaxis - ASA 81 mg and Lovenox transition to coumadin  Hypercoaguability disorder - Hereditary thrombophilia  Homozygous MTHFR with hyperhomocystinemia  Homozygous factor V Leiden gene mutation  history of malignant neoplasm of breast   Obesity   Hyperlipidemia -   Hypothyroidism   DM    Subjective     History of Present Illness    Subjective  She continues with improved strength on the left side.    History taken from: patient    Objective     Vital Signs  Temp:  [98.2 °F (36.8 °C)-98.4 °F (36.9 °C)] 98.4 °F (36.9 °C)  Heart Rate:  [74-89] 74  Resp:  [18-20] 20  BP: (134-142)/(55-65) 142/55    Objective:  Vital signs: (most recent): Blood pressure 142/55, pulse 74, temperature 98.4 °F (36.9 °C), temperature source Oral, resp. rate 20, height 67\" (170.2 cm), weight 219 lb 6.4 oz (99.5 kg), SpO2 96 %.            Mental status-awake alert  Lungs-clear to auscultation  Heart-regular rate and rhythm  Abdomen abdomen-normoactive bowel sounds, soft, nontender  Extremities-no pretibial edema  Neurologic-mild left hemiparesis .  Left elbow flexion 5, finger flexion 5, knee extension 5, ankle dorsiflexion 5 .  Left facial droop.      better dexterity with left hand.  Skin-blood filled vesicle left upper lip        Results Review:     I reviewed the patient's new clinical results.    Lab Results  Lab Value Date/Time   INR 2.14 (H) 11/09/2017 0657   INR 1.91 (H) 11/08/2017 0722   INR 1.79 (H) 11/07/2017 0506   INR 1.41 (H) 11/06/2017 0627   INR 1.41 (H) 11/05/2017 0555   INR 1.17 (H) 11/04/2017 0647   INR 1.13 (H) 11/03/2017 0520   INR 1.07 11/02/2017 0713   INR 1.09 11/01/2017 0754       Results from last 7 days  Lab Units 11/09/17  0657 11/06/17  0627   WBC 10*3/mm3 7.45 6.72   HEMOGLOBIN g/dL 11.7* 11.4*   HEMATOCRIT % 37.6 36.3   PLATELETS 10*3/mm3 160 172 "         Results from last 7 days  Lab Units 11/06/17  0627   SODIUM mmol/L 141   POTASSIUM mmol/L 4.4   CHLORIDE mmol/L 102   CO2 mmol/L 23.3   BUN mg/dL 15   CREATININE mg/dL 1.10*   CALCIUM mg/dL 9.1   BILIRUBIN mg/dL 0.4   ALK PHOS U/L 72   ALT (SGPT) U/L 24   AST (SGOT) U/L 30   GLUCOSE mg/dL 132*     Glucose   Date/Time Value Ref Range Status   11/08/2017 1629 112 70 - 130 mg/dL Final   11/08/2017 0716 104 70 - 130 mg/dL Final   11/07/2017 1636 88 70 - 130 mg/dL Final   11/07/2017 0728 115 70 - 130 mg/dL Final       Labs on October 31-sodium 139, potassium 3.8, chloride 106, glucose 101, calcium 9.0, come back to 25, BUN 16, creatinine 1.1.  WBC6.64, hemoglobin 11.8, hematocrit 36.0, platelets 125.  October 29 triglycerides 89, cholesterol 125.  October 28 AST 35, ALT 29, alkaline phosphatase 99, total protein 7.7, albumin 4.1  Oct 31 - INR 1.0  Medication Review: done  Scheduled Meds:    allopurinol 200 mg Oral Daily   aspirin 81 mg Oral Daily   atorvastatin 80 mg Oral Nightly   cholecalciferol 1,000 Units Oral BID   enoxaparin 100 mg Subcutaneous Q12H   folic acid 1 mg Oral Daily   levothyroxine 112 mcg Oral Q AM   melatonin 3 mg Oral Nightly   metFORMIN 500 mg Oral Daily With Breakfast   multivitamin 1 tablet Oral Daily   sennosides-docusate sodium 2 tablet Oral BID   warfarin 9 mg Oral Daily     Continuous Infusions:   PRN Meds:.•  acetaminophen  •  bisacodyl  •  dextrose  •  dextrose  •  glucagon (human recombinant)  •  magnesium hydroxide      Assessment/Plan     Active Problems:    Stroke      Assessment & Plan  S/p R MCA ischemic infarct    Left hemiparesis-improving  Left inattention-improved    Dysphagia-November 2-advance from puree to mechanical soft, no mixed consistency, thin liquid diet.    Stroke prophylaxis - ASA 81 mg and Lovenox transition to coumadin.  Daily INR.  November 2-INR still low-Will continue Coumadin 5 mg as recent restarted but may titrate up dose tomorrow depending on lab  result. Nov 3- increased coumadin to 7.5 mg. November 6-INR still low at 1.41.  Has been on Coumadin 5 mg and then increased to 7.5 mg x last 3 days, have changed to 10 mg, continue to follow INR daily.  November 7-INR 1.8-change Coumadin to 7.5 mg daily and continue to follow INR daily. Nov 8 - INR 1.9, changed coumadin to 10 mg, continues on Lovenox. Nov 9- INR 2.14 - coumadin 9 mg , anticipate last dose of Lovenox with overlap tonight.     Hypercoaguability disorder  history of malignant neoplasm of breast   Factor 5 Leiden mutation, heterozygous   Obesity   Hyperlipidemia -atorvastatin   Hypothyroidism -on replacement   Cerebrovascular accident (CVA) due to thrombosis of right middle cerebral artery   DVT prophylaxis-SCDs and anticoagulation  Diabetes mellitus-metformin  History of lability after past stroke  Poststroke fatigue  Viral oral lesion - has taken acyclovir in past . Will treat with Valtrex 2 gm po q 12 hours x 2 doses.      72 to female status post right MCA ischemic infarct with left inattention, left hemiparesis, dysarthria, decreased alertness, impairments with her mobility and self-care and is now admitted for comprehensive inpatient rehabilitation program with physical therapy 1 hour, occupational therapy 1 hour, and speech therapy 1 hour, 5 days a week.  Rehabilitation nursing for carryover and monitoring of her neurologic status, diabetes, bowel bladder and skin.  Ongoing physician follow-up.  Weekly team conferences.  Goals for her to achieve a level of supervision with her mobility and self-care for return home with her family.       TEAM CONF- NOV 3- LEFT INATTENTION. DIFFICULTY DON LEG LEG OF PANTS. FINE MOTOR DEFICITS LEFT HAND. MILD MEMORY DEFICITS. DYSPHAGIA - MECH SOFT, THINS. TRIAL WHOLE MEALS. FLAT AFFECT.  BNE PENDING. TRANSFERS CTG. GAIT 80 FEET MIN ASSIST. 4 STAIRS MIN ASSIST. BLADDER CONTINENT.  ELOS- TWO WEEKS  .  Aric Chao MD  11/09/17  4:58 PM    Time:

## 2017-11-09 NOTE — PROGRESS NOTES
Inpatient Rehabilitation Functional Measures Assessment and Plan of Care    Plan of Care  Updated Problems/Interventions  Mobility    [PT] Bed/Chair/Wheelchair(Active)  Current Status(11/09/2017): SBA, RWx  Weekly Goal(11/16/2017): Supervision, RWx  Discharge Goal: Supervision, RWx    [PT] Walk(Active)  Current Status(11/09/2017): 80 ft, SBA, RWx  Weekly Goal(11/16/2017): to and from BR, Supervision  Discharge Goal: 160 ft, Supervision, RWx    [PT] Stairs(Active)  Current Status(11/09/2017): 4 steps, 2 HR, SBA  Weekly Goal(11/16/2017): PT only  Discharge Goal: 4 steps, 2 HR, SBA    Functional Measures  YESI Eating:  Branch  [facility] Grooming: Branch  [facility] Bathing:  Branch  [facility] Upper Body Dressing:  Branch  [facility] Lower Body Dressing:  [facility] Toileting:  [facility] Bladder Management  Level of Assistance:  [place]  Frequency/Number of Accidents this Shift:  [facility] Bowel Management  Level of Assistance: [place]  Frequency/Number of Accidents this Shift: [place]    [facility] Bed/Chair/Wheelchair Transfer:  Bed/chair/wheelchair Transfer Score = 5.  Patient is supervision/set-up for transferring to and from the  bed/chair/wheelchair, requiring: Stand by assistance. Patient requires the  following assistive device(s): Walker.  [facility] Toilet Transfer:  Branch  [facility] Tub/Shower Transfer:  [place]    Previously Documented Mode of Locomotion at Discharge: Field  YESI Expected Mode of Locomotion at Discharge: [facility] Walk/Wheelchair:  WHEELCHAIR OBSERVATION   Activity was not observed.    WALK OBSERVATION   Walk Distance Scale = 2.  Distance walked is 50 -149 feet. Walk Score = 2.  Patient is able to walk at least 50 feet (household distance) but requires  assistance. Patient walked a distance of 80 feet. Patient requires the following  assistive device(s): Rolling walker.  YESI Stairs:  Stairs Score = 2.  Incidental assistance with lifting or lowering,  contact guard or steadying was provided. Patient performs 75% or more of  effort  and requires minimal contact assistance. Patient negotiated  4 stairs. Patient  requires the following assistive device(s): Handrail(s).    YESI Comprehension:  [place]  YESI Expression:  [place]  YESI Social Interaction:  [place]  YESI Problem Solving:  [place]  YESI Memory:  [place]    Therapy Mode Minutes  Occupational Therapy: Branch  Physical Therapy: Branch  Speech Language Pathology:  [place]    Signed by: Nereida Llamas PT

## 2017-11-09 NOTE — PROGRESS NOTES
Inpatient Rehabilitation Functional Measures Assessment    Functional Measures  YESI Eating:  [facility] Grooming: [facility] Bathing:  [facility] Upper Body Dressing:  [facility] Lower Body Dressing:  [facility] Toileting:  [facility] Bladder Management  Level of Assistance:  [place]  Frequency/Number of Accidents this Shift:  [facility] Bowel Management  Level of Assistance: [place]  Frequency/Number of Accidents this Shift: [facility] Bed/Chair/Wheelchair Transfer:  [facility] Toilet Transfer:  [facility] Tub/Shower Transfer:  [place]    Previously Documented Mode of Locomotion at Discharge: Field  YESI Expected Mode of Locomotion at Discharge: [facility] Walk/Wheelchair:  [facility] Stairs:  [facility] Comprehension:  Both ( auditory and visual) modes of comprehension are used  equally. Comprehension Score = 6, Modified [place].  Patient comprehends  complex/abstract information in their primary language, requiring: Additional  time. Glasses.  YESI Expression:  Vocal expression is the usual mode. Expression Score = 6,  Modified Independent.  Patient expresses complex/abstract information in their  primary language, requiring: Additional time.  YESI Social Interaction:  Social Interaction Score = 6, Modified Independent.  Patient is modified independent for social interaction, requiring: Requires  additional time.  YESI Problem Solving:  Problem Solving Score = 6, Modified [place].  Patient  makes appropriate decisions in order to solve complex problems, but requires  extra time.  YESI Memory:  Memory Score = 6, Modified [place].  Patient is modified  independent for memory, requiring: Requires additional time.    Therapy Mode Minutes  Occupational Therapy: Branch  Physical Therapy: Branch  Speech Language Pathology:  [place]    Signed by: Saad Marquez RN

## 2017-11-10 LAB
GLUCOSE BLDC GLUCOMTR-MCNC: 153 MG/DL (ref 70–130)
GLUCOSE BLDC GLUCOMTR-MCNC: 97 MG/DL (ref 70–130)
INR PPP: 2.42 (ref 0.9–1.1)
PROTHROMBIN TIME: 25.5 SECONDS (ref 11.7–14.2)

## 2017-11-10 PROCEDURE — 97532: CPT

## 2017-11-10 PROCEDURE — 97535 SELF CARE MNGMENT TRAINING: CPT | Performed by: OCCUPATIONAL THERAPIST

## 2017-11-10 PROCEDURE — 97110 THERAPEUTIC EXERCISES: CPT

## 2017-11-10 PROCEDURE — 92526 ORAL FUNCTION THERAPY: CPT

## 2017-11-10 PROCEDURE — 85610 PROTHROMBIN TIME: CPT | Performed by: PHYSICAL MEDICINE & REHABILITATION

## 2017-11-10 PROCEDURE — 97112 NEUROMUSCULAR REEDUCATION: CPT | Performed by: OCCUPATIONAL THERAPIST

## 2017-11-10 PROCEDURE — 82962 GLUCOSE BLOOD TEST: CPT

## 2017-11-10 RX ADMIN — ALLOPURINOL 200 MG: 100 TABLET ORAL at 07:52

## 2017-11-10 RX ADMIN — ASPIRIN 81 MG: 81 TABLET, CHEWABLE ORAL at 07:51

## 2017-11-10 RX ADMIN — Medication 1 TABLET: at 07:51

## 2017-11-10 RX ADMIN — DOCUSATE SODIUM -SENNOSIDES 2 TABLET: 50; 8.6 TABLET, COATED ORAL at 07:51

## 2017-11-10 RX ADMIN — METFORMIN HYDROCHLORIDE 500 MG: 500 TABLET ORAL at 07:51

## 2017-11-10 RX ADMIN — ATORVASTATIN CALCIUM 80 MG: 80 TABLET, FILM COATED ORAL at 21:33

## 2017-11-10 RX ADMIN — LEVOTHYROXINE SODIUM 112 MCG: 112 TABLET ORAL at 05:43

## 2017-11-10 RX ADMIN — Medication 3 MG: at 23:05

## 2017-11-10 RX ADMIN — VITAMIN D, TAB 1000IU (100/BT) 1000 UNITS: 25 TAB at 07:52

## 2017-11-10 RX ADMIN — VITAMIN D, TAB 1000IU (100/BT) 1000 UNITS: 25 TAB at 17:25

## 2017-11-10 RX ADMIN — DOCUSATE SODIUM -SENNOSIDES 2 TABLET: 50; 8.6 TABLET, COATED ORAL at 17:25

## 2017-11-10 RX ADMIN — WARFARIN SODIUM 9 MG: 6 TABLET ORAL at 17:26

## 2017-11-10 RX ADMIN — FOLIC ACID 1 MG: 1 TABLET ORAL at 07:52

## 2017-11-10 NOTE — THERAPY TREATMENT NOTE
Inpatient Rehabilitation - Physical Therapy Treatment Note  [facility]     Patient Name: Alice Maxwell  : 1945  MRN: [id number]  Today's Date: 11/10/2017  Onset of Illness/Injury or Date of Surgery Date: 10/28/17  Date of Referral to PT: 17  Referring Physician: Jereime    Admit Date: 10/31/2017    Visit Dx:    ICD-10-CM ICD-9-CM   1. Oropharyngeal dysphagia R13.12 787.22     Patient Active Problem List   Diagnosis   • Stroke               Adult Rehabilitation Note       11/10/17 1330 11/10/17 1218 11/10/17 0847    Rehab Assessment/Intervention    Discipline speech language pathologist  -KB occupational therapist  -KP physical therapist  -MD    Document Type therapy note (daily note)  -KB therapy note (daily note)  -KP therapy note (daily note)  -MD    Subjective Information no complaints;agree to therapy  -KB agree to therapy;no complaints  -KP agree to therapy;no complaints  -MD    Patient Effort, Rehab Treatment good  -KB excellent  -KP good  -MD    Symptoms Noted During/After Treatment none  -KB none  -KP none  -MD    Precautions/Limitations fall precautions  -KB fall precautions  -KP fall precautions  -MD    Equipment Issued to Patient   gait belt;front wheeled walker  -MD    Recorded by [KB] Katherine L Bruton [] Vero York OTR [MD] Nereida Llamas, PT    Pain Assessment    Pain Assessment No/denies pain  -KB No/denies pain  -KP No/denies pain  -MD    Pain Score  0  -KP     Recorded by [KB] Katherine L Bruton [] KANDY Sultana [MD] Nereida Llamas, PT    Vision Assessment/Intervention    Visual Impairment visual impairment, left;WFL with corrective lenses  -KB visual impairment, left   but is scanning to L. aware of L side all week during ADLs  -KP     Recorded by [KB] Katherine L Bruton [] KANDY Sultana     Cognitive Assessment/Intervention    Current Cognitive/Communication Assessment  impaired  -KP impaired  -MD    Orientation Status  oriented x 4  -KP  oriented to;person;place;time  -MD    Follows Commands/Answers Questions  100% of the time;able to follow single-step instructions  - 100% of the time;needs repetition;needs cueing  -MD    Personal Safety  mild impairment  - decreased insight to deficits  -MD    Personal Safety Interventions  fall prevention program maintained;gait belt;muscle strengthening facilitated;nonskid shoes/slippers when out of bed  - fall prevention program maintained;gait belt;muscle strengthening facilitated;nonskid shoes/slippers when out of bed;supervised activity  -MD    Recorded by  [KP] Vero York, OTR [MD] Nereida Llamas, PT    Improve executive function skills    Improve executive function skills: identify strategies, strengths, limitations  -KB      Status: Improve executive function skills Progressing as expected  -KB      Executive Function Skills Progress continue to address  -KB      Comments: Improve executive function skills Discussed previous performance and strategies of planning ahead and makring completed items off in order to increase performance and help with attention.  -KB      Recorded by [KB] Katherine L Bruton      Improve oral skills    To Improve Oral Skills, patient will: Increase lip closure;Increase tongue A-P movement;Increase back of tongue control  -KB      Status: Improve Oral Skills Progressing as expected  -KB      Oral Skills Progress continue to adress  -KB      Comments: Improve Oral Skills Pt completed exercises to verbal instruction. 10 reps.   -KB      Recorded by [KB] Katherine L Bruton      Improve laryngeal elevation    To improve laryngeal elevation, patient will: Complete pitch-glide  -KB      Status: Improve laryngeal elevation Progressing as expected  -KB      Laryngeal Elevation Progress continue to adress  -KB      Comments: Improve laryngeal elevation Pt completed exercises to verbal instruction. 10 reps.  -KB      Recorded by [KB] Katherine L Bruton      Improve  hyolaryngeal excursion    To improve hyolaryngeal excursion, patient will: Complete head lift repetitive (comment number of lifts)  -KB      Status: Improve hyolaryngeal excursion Progressing as expected  -KB      Hyolaryngeal Excursion Progress continue to adress  -KB      Comments: Improve hyolaryngeal excursion Pt completed exercisess to verbal instruciton. 10 reps.  -KB      Recorded by [KB] Katherine L Bruton      Improve tongue base & pharyngeal wall squeeze    To improve tongue base & pharyngeal wall squeeze, patient will: Complete effortful swallow;Complete yawn/hold retractions  -KB      Status: Improve tongue base & pharyngeal wall squeeze Progressing as expected  -KB      Tongue Base/Pharyngeal Wall Squeeze Progress continue to adress  -KB      Comments: Improve tongue base & pharyngeal wall squeeze Pt completed exercises to verbal instruction. 10 reps.  -KB      Recorded by [KB] Katherine L Bruton      Bed Mobility, Assessment/Treatment    Bed Mob, Supine to Sit, [place]   supervision required  -MD    Bed Mob, Sit to Supine, [place]   supervision required  -MD    Bed Mobility, Comment  up in [facility]     Recorded by  [KP] Vero York, OTR [MD] Nereida Llamas, PT    Transfer Assessment/Treatment    Transfers, Sit-Stand [place]  supervision required;set up required;stand by assist  - set up required;supervision required;stand by assist  -MD    Transfers, Stand-Sit [place]  stand by assist;supervision required;set up required  - set up required;supervision required;stand by assist  -MD    Transfers, Sit-Stand-Sit, Assist Device  other (see comments)     - rolling walker  -MD    Toilet Transfer, [place]  supervision required;set up required  -     Toilet Transfer, Assistive Device  other (see comments);wheelchair  -     Transfer, Safety Issues   step length decreased  -MD    Transfer, Impairments   strength decreased;impaired balance  -MD    Recorded by  [BRIANNA] Vero  Abdirashid York OTR [MD] Nereida Llamas, PT    Gait Assessment/Treatment    Gait, [place] Level   verbal cues required;stand by assist  -MD    Gait, Assistive Device   rolling walker  -MD    Gait, Distance (Feet)   160   x2  -MD    Gait, Gait Deviations   bilateral:;janet decreased;forward flexed posture;step length decreased;other (see comments)   [place] to the L  -MD    Gait, Safety Issues   step length decreased  -MD    Gait, Impairments   impaired balance;strength decreased  -MD    Recorded by   [MD] Nereida Llamas, PT    Upper Body Bathing Assessment/Training    UB Bathing Assess/Train Assistive Device  hand-held shower head;tub bench  -     UB Bathing Assess/Train, Position  sitting  -     UB Bathing Assess/Train, [place] Level  set up required;stand by assist  -     Recorded by  [KP] Vero York OTR     Lower Body Bathing Assessment/Training    LB Bathing Assess/Train Assistive Device  hand-held shower head;tub bench;grab bars  -     LB Bathing Assess/Train, Position  sitting;standing  -     LB Bathing Assess/Train, [place] Level  set up required;stand by assist  -     Recorded by  [KP] Vero York OTR     Upper Body Dressing Assessment/Training    UB Dressing Assess/Train, Clothing Type  doffing:;donning:;t-shirt  -     UB Dressing Assess/Train, Position  sitting  -     UB Dressing Assess/Train, [place]  set up required;supervision required  -     Recorded by  [KP] Vero York OTR     Lower Body Dressing Assessment/Training    LB Dressing Assess/Train, Clothing Type  doffing:;donning:;pants;shoes;slipper socks;socks  -     LB Dressing Assess/Train, Position  sitting;standing  -     LB Dressing Assess/Train, [place]  set up required;supervision required  -     Recorded by  [KP] Vero York OTR     Toileting Assessment/Training    Toileting Assess/Train, Position  sitting;standing  -     Toileting Assess/Train, [place]ep[place]  Level  set up required;supervision required  -KP     Recorded by  [KP] KANDY Sultana     Grooming Assessment/Training    Grooming Assess/Train, Position  sitting  -     Grooming Assess/Train, [place] Level  set up required;supervision required  -KP     Recorded by  [KP] Vero York OTR     Balance Skills Training    Sitting-Level of Assistance  Distant supervision  -KP     Sitting-Balance Support  Feet supported  -KP     Standing-Level of Assistance  Close supervision  -     Static Standing Balance Support  No upper extremity supported;Right upper extremity supported  -KP     Recorded by  [KP] Vero York OTR     Therapy Exercises    Left Lower Extremity   AROM:;sidelying;clam shell;15 reps   x2 sets  -MD    BLE Other Reps   --   nustep: level 2 for 5 min  -MD    Recorded by   [MD] Nereida Llamas, PT    Fine Motor Coordination Training    Detail (Fine Motor Coordination Training)  inserts pegs w. L hand into grooved peg board to inc [facility]. pt demo w. no difficulty using L hand. inserts small square pegs into small peg board colored squares to inc [facility] w. L hand w. no difficulty.  -KP     Recorded by  [KP] KANDY Sultana     Positioning and Restraints    Pre-Treatment Position  sitting in chair/recliner  -KP sitting in chair/recliner  -MD    Post Treatment Position  wheelchair  -KP wheelchair  -MD    In Wheelchair  sitting;call light within reach;encouraged to call for assist;with family/caregiver  -KP sitting;call light within reach  -MD    Recorded by  [KP] Vero York OTR [MD] Nereida Llamas, PT      11/09/17 1445 11/09/17 1330 11/09/17 1210    Rehab Assessment/Intervention    Discipline occupational therapist  - speech language pathologist  -KB occupational therapist  -    Document Type therapy note (daily note)  -KP therapy note (daily note)  -KB therapy note (daily note)  -    Subjective Information agree to therapy;no complaints  - no complaints;agree to  therapy  -KB agree to therapy;no complaints  -KP    Patient Effort, Rehab Treatment excellent  -KP good  -KB good  -KP    Symptoms Noted During/After Treatment none  -KP none  -KB none  -KP    Precautions/Limitations fall precautions;swallowing precautions  -KP fall precautions;swallowing precautions  -KB fall precautions  -KP    Recorded by [KP] KANDY Sultana [KB] Katherine L Bruton [KP] Vero York OTR    Pain Assessment    Pain Assessment No/denies pain  -KP No/denies pain  -KB No/denies pain  -KP    Pain Score   0  -KP    Recorded by [KP] Vero York OTR [KB] Katherine L Bruton [KP] Vero York OTR    Vision Assessment/Intervention    Visual Impairment visual impairment, left  -KP visual impairment, left;WFL with corrective lenses  -KB visual impairment, left  -KP    Recorded by [KP] KANDY Sultana [KB] Katherine L Bruton [KP] Vero York OTR    Cognitive Assessment/Intervention    Current Cognitive/Communication Assessment impaired  -KP  impaired  -KP    Orientation Status oriented to;person;place;time  -KP  oriented to;person;place;time  -KP    Follows Commands/Answers Questions 100% of the time;able to follow single-step instructions  -KP  100% of the time;able to follow single-step instructions  -KP    Personal Safety mild impairment  -KP  mild impairment  -KP    Personal Safety Interventions fall prevention program maintained;gait belt;nonskid shoes/slippers when out of bed  -KP  fall prevention program maintained;gait belt;nonskid shoes/slippers when out of bed  -KP    Recorded by [KP] Vero York, SARAR  [KP] Vero York, OTR    Improve attention    Improve attention by:  complete divided attention task  -KB     Status: Improve attention  Progressing as expected  -KB     Attention Progress  70%;without cues;100%;with inconsistent cues  -KB     Comments: Improve attention  Pt completed attention/organization task with  errors due to inattention.  -KB     Recorded by  [KB] Katherine L Bruton     Improve oral skills    To Improve Oral Skills, patient will:  Increase tongue tip elevation;Increase tongue lateralization;Increase lip closure;Increase back of tongue control;Increase tongue A-P movement  -KB     Status: Improve Oral Skills  Progressing as expected  -KB     Oral Skills Progress  continue to adress  -KB     Comments: Improve Oral Skills  Unable to alternate puckered lips left to right despite MAX cues and instruciton; cues for labial closure.,  -KB     Recorded by  [KB] Katherine L Bruton     Improve closure at entrance to airway    To improve closure at entrance to airway, patient will:  --   hard swallows  -KB     Status: Improve closure at entrance to airway  Progressing as expected  -KB     Closure at Entrance to Airway Progress  continue to adress  -KB     Comments: Improve closure at entrance to airway  Completed exercises with verbal cues for squeezing muscles.  -KB     Recorded by  [KB] Katherine L Bruton     Improve laryngeal elevation    To improve laryngeal elevation, patient will:  Complete pitch-glide  -KB     Status: Improve laryngeal elevation  Progressing as expected  -KB     Laryngeal Elevation Progress  continue to adress  -KB     Comments: Improve laryngeal elevation  Completed exercises independently.   -KB     Recorded by  [KB] Katherine L Bruton     Improve tongue base & pharyngeal wall squeeze    To improve tongue base & pharyngeal wall squeeze, patient will:  Complete tongue base retraction;Complete yawn/hold retractions  -KB     Status: Improve tongue base & pharyngeal wall squeeze  Progressing as expected  -KB     Tongue Base/Pharyngeal Wall Squeeze Progress  continue to adress  -KB     Comments: Improve tongue base & pharyngeal wall squeeze  Completed exercises with verbal instructions.  -KB     Recorded by  [KB] Katherine L Bruton     Bed Mobility, Assessment/Treatment    Bed Mobility, Comment up  in wc  -KP  up in wc  -KP    Recorded by [KP] Vero York OTR  [KP] Vero York OTR    Transfer Assessment/Treatment    Transfers, Sit-Stand [place]   set up required;supervision required;stand by assist  -KP    Transfers, Stand-Sit [place]   set up required;supervision required;stand by assist  -KP    Transfers, Sit-Stand-Sit, Assist Device   other (see comments)   wc grab bars  -KP    Walk-In Shower Transfer, [place]   set up required;supervision required  -KP    Walk-In Shower Transfer, Assist Device   other (see comments)   tub bench, grab bars  -KP    Recorded by   [KP] Vero York OTR    Functional Mobility    Functional Mobility- Comment   a few steps to tub bench SBA  -KP    Recorded by   [KP] Vero York OTR    Upper Body Bathing Assessment/Training    UB Bathing Assess/Train Assistive Device   hand-held shower head;grab bars;tub bench  -KP    UB Bathing Assess/Train, Position   sitting  -KP    UB Bathing Assess/Train, [place] Level   set up required;stand by assist  -KP    Recorded by   [KP] Vero York OTR    Lower Body Bathing Assessment/Training    LB Bathing Assess/Train Assistive Device   hand-held shower head;grab bars;tub bench  -KP    LB Bathing Assess/Train, Position   sitting;standing  -KP    LB Bathing Assess/Train, [place] Level   set up required;stand by assist  -KP    Recorded by   [KP] Vero York OTR    Upper Body Dressing Assessment/Training    UB Dressing Assess/Train, Clothing Type   doffing:;donning:;t-shirt  -KP    UB Dressing Assess/Train, Position   sitting  -KP    UB Dressing Assess/Train, [place]   set up required;supervision required  -KP    Recorded by   [KP] Vero York OTR    Lower Body Dressing Assessment/Training    LB Dressing Assess/Train, Clothing Type   doffing:;donning:;pants;shoes;slipper socks;socks  -KP    LB Dressing Assess/Train, Position   sitting;standing   -KP    LB Dressing Assess/Train, [place]   set up required;supervision required  -KP    Recorded by   [KP] KANDY Sultana    Grooming Assessment/Training    Grooming Assess/Train, Position   sitting  -KP    Grooming Assess/Train, [place]ep[place] Level   conditional independence;set up required  -KP    Recorded by   [KP] KANDY Sultana    Balance Skills Training    Sitting-Level of Assistance Distant supervision  -KP  Distant supervision  -KP    Sitting-Balance Support Feet supported  -KP  Feet supported  -KP    Sitting-Balance Activities   Reaching for objects  -KP    Standing-Level of Assistance   Close supervision  -KP    Recorded by [KP] Vero York OTR  [KP] Vero York OTR    Fine Motor Coordination Training    Detail (Fine Motor Coordination Training) inserts silver pegs into slot, washer on top and other round open peg over top x12 (purdue peg board) to inc FMC w. L hand.  Pulls coins from resistive putty x10 w, L hand to inc FMC. and finger/hand strength. Grasps a lot of coins in her palm, in hand manipulation pushing coins to finger tips then inserting into slot to inc FMC. w. little to no difficulty  -KP      Recorded by [KP] KANDY Sultana      Positioning and Restraints    Pre-Treatment Position sitting in chair/recliner  -KP  sitting in chair/recliner  -KP    Post Treatment Position wheelchair  -KP  wheelchair  -KP    In Wheelchair sitting;with PT  -KP  sitting;with other staff   RT  -KP    Recorded by [KP] KANDY Sultana  [KP] KANDY Sultana      11/09/17 1030 11/09/17 1027 11/08/17 1539    Rehab Assessment/Intervention    Discipline speech language pathologist  -ANIYA physical therapist  -MD occupational therapist  -BRIANNA    Document Type therapy note (daily note)  -ANIYA therapy note (daily note)  -MD therapy note (daily note)  -KP    Subjective Information no complaints;agree to therapy  -ANIYA no complaints;agree to therapy  -MD  agree to therapy;no complaints  -KP    Patient Effort, Rehab Treatment good  -KB good  -MD good  -KP    Symptoms Noted During/After Treatment none  -KB none  -MD none  -KP    Precautions/Limitations fall precautions  -KB fall precautions  -MD fall precautions;swallowing precautions  -    Equipment Issued to Patient  gait belt  -MD     Recorded by [KB] Katherine L Bruton [MD] Nereida Llamas, PT [KP] Vero York, OTR    Pain Assessment    Pain Assessment No/denies pain  -KB No/denies pain  -MD No/denies pain  -KP    Pain Score   0  -KP    Recorded by [KB] Katherine L Bruton [MD] Nereida Llamas, PT [KP] Vero York, OTR    Vision Assessment/Intervention    Visual Impairment visual impairment, left;WFL with corrective lenses  -  visual impairment, left  -    Recorded by [KB] Katherine L Bruton  [KP] Vero York, OTR    Cognitive Assessment/Intervention    Current Cognitive/Communication Assessment  impaired  -MD impaired  -KP    Orientation Status  person;place;time;oriented to  -MD person;place;time;oriented to  -KP    Follows Commands/Answers Questions  100% of the time;needs cueing;needs increased time;needs repetition  -% of the time;able to follow single-step instructions;needs cueing  -    Personal Safety  decreased insight to deficits  -MD mild impairment  -    Personal Safety Interventions  fall prevention program maintained;gait belt;muscle strengthening facilitated;nonskid shoes/slippers when out of bed;supervised activity  -MD fall prevention program maintained;gait belt;nonskid shoes/slippers when out of bed  -KP    Recorded by  [MD] Nereida Llamas, PT [KP] Vero York, OTR    Improve attention    Improve attention by: complete divided attention task  -      Status: Improve attention Progressing as expected  -      Attention Progress 70%;without cues;100%;with inconsistent cues  -      Comments: Improve attention Pt completed complex written directions task with  MIN-MOD cues for reasoning and attention.  -KB      Recorded by [KB] Katherine L Bruton      Improve oral skills    To Improve Oral Skills, patient will: Increase tongue tip elevation;Increase tongue lateralization;Increase lip closure;Increase back of tongue control;Increase tongue A-P movement  -KB      Status: Improve Oral Skills Progressing as expected  -KB      Oral Skills Progress continue to adress  -KB      Comments: Improve Oral Skills Pt with difficulty moving pucker left to right and holding air in mouth to puff up cheeks.  -KB      Recorded by [KB] Katherine L Bruton      Improve closure at entrance to airway    To improve closure at entrance to airway, patient will: --   hard swallows  -KB      Status: Improve closure at entrance to airway Progressing as expected  -KB      Closure at Entrance to Airway Progress continue to adress  -KB      Comments: Improve closure at entrance to airway Completes exercises independently.   -KB      Recorded by [KB] Katherine L Bruton      Improve laryngeal elevation    To improve laryngeal elevation, patient will: Complete pitch-glide  -KB      Status: Improve laryngeal elevation Progressing as expected  -KB      Laryngeal Elevation Progress continue to adress  -KB      Comments: Improve laryngeal elevation Pt completes exercises independently.  -KB      Recorded by [KB] Katherine L Bruton      Improve tongue base & pharyngeal wall squeeze    To improve tongue base & pharyngeal wall squeeze, patient will: Complete effortful swallow;Complete gargle/hold retraction  -KB      Status: Improve tongue base & pharyngeal wall squeeze Progressing as expected  -KB      Tongue Base/Pharyngeal Wall Squeeze Progress continue to adress  -KB      Comments: Improve tongue base & pharyngeal wall squeeze Pt completed exercises independently.   -KB      Recorded by [KB] Katherine L Bruton      Bed Mobility, Assessment/Treatment    Bed Mobility, Roll Left, [place]  supervision  required  -MD     Bed Mobility, Roll Right, [place]  supervision required  -MD     Bed Mobility, [place], [place]  supervision required  -MD     Bed Mob, Supine to Sit, [place]  supervision required  -MD     Bed Mob, Sit to Supine, [place]  supervision required  -MD     Bed Mobility, Comment   up in chair  -KP    Recorded by  [MD] Nereida Llamas PT [KP] Vero York, OTR    Transfer Assessment/Treatment    Transfers, Sit-Stand [place]  supervision required  -MD     Transfers, Stand-Sit [place]  supervision required  -MD     Transfers, Sit-Stand-Sit, Assist Device  rolling walker  -MD     Transfer, Safety Issues  step length decreased  -MD     Transfer, Impairments  strength decreased;impaired balance  -MD     Transfer, Comment  car transfer: SBA, RWx  -MD     Recorded by  [MD] Nereida Llamas PT     Gait Assessment/Treatment    Gait, [place] Level  verbal cues required;stand by assist;contact guard assist  -MD     Gait, Assistive Device  rolling walker  -MD     Gait, Distance (Feet)  80   x3  -MD     Gait, Gait Deviations  bilateral:;janet decreased;forward flexed posture;step length decreased  -MD     Gait, Safety Issues  step length decreased  -MD     Gait, Impairments  impaired balance;strength decreased  -MD     Recorded by  [MD] Nereida Llamas PT     Stairs Assessment/Treatment    Number of Stairs  4  -MD     Stairs, Handrail Location  both sides  -MD     Stairs, [place] Level  supervision required  -MD     Stairs, Technique Used  step over step (ascending);step to step (descending)  -MD     Stairs, Impairments  strength decreased;impaired balance  -MD     Recorded by  [MD] Nereida Llamas PT     Balance Skills Training    Sitting-Level of Assistance   Distant supervision  -    Sitting-Balance Support   Feet supported  -    Standing-Level of Assistance  Contact guard  -MD     Static Standing Balance Support  No upper extremity supported  -MD     Standing-Balance  Activities  Ball toss  -MD     Gait Balance-Level of Assistance  Contact guard  -MD     Gait Balance Support  parallel bars  -MD     Gait Balance Activities  backwards;side-stepping  -MD     Recorded by  [MD] Nereida Llamas, PT [KP] Vero York, OTR    Fine Motor Coordination Training    Detail (Fine Motor Coordination Training)   pulls pegs from yellow resistive putty using one hand, using L hand to inc FMC and hand/digit strength. rotates wooden block up dowel [place] w. pegs to inc UE coordination w. min difficulty. strings foam beads on lace w. R hand holding string and L hand pushing bead on and vice versus to inc FMC and BLC  -KP    Recorded by   [KP] Vero York, OTR    Positioning and Restraints    Pre-Treatment Position  sitting in chair/recliner  -MD sitting in chair/recliner  -KP    Post Treatment Position  bed  -MD wheelchair  -KP    In Bed  supine;call light within reach;exit alarm on  -MD     In Wheelchair   sitting;with PT  -KP    Recorded by  [MD] Nereida Llamas, PT [KP] Vero York, OTR      11/08/17 1330 11/08/17 1152 11/08/17 1114    Rehab Assessment/Intervention    Discipline speech language pathologist  -KB occupational therapist  -KP physical therapist  -MD    Document Type therapy note (daily note)  -KB therapy note (daily note)  -KP therapy note (daily note)  -MD    Subjective Information no complaints;agree to therapy  -KB agree to therapy;no complaints  -KP agree to therapy;no complaints  -MD    Patient Effort, Rehab Treatment good  -KB good  -KP good  -MD    Symptoms Noted During/After Treatment none  -KB none  -KP none  -MD    Precautions/Limitations fall precautions;swallowing precautions  -KB fall precautions;swallowing precautions  -KP fall precautions  -MD    Equipment Issued to Patient   gait belt;front wheeled walker  -MD    Recorded by [KB] Katherine L Bruton [KP] Vreo York, OTR [MD] Nereida Llamas, PT    Pain Assessment    Pain Assessment No/denies pain  -KB  No/denies pain  -KP No/denies pain  -MD    Pain Score  0  -KP     Recorded by [KB] Katherine L Bruton [KP] KANDY Sultana [MD] Nereida Llamas, PT    Vision Assessment/Intervention    Visual Impairment WFL with corrective lenses  -KB visual impairment, left  -KP visual impairment, left  -MD    Recorded by [KB] Katherine L Bruton [KP] KANDY Sultana [MD] Nereida Llamas, PT    Cognitive Assessment/Intervention    Current Cognitive/Communication Assessment  impaired  -KP impaired  -MD    Orientation Status  oriented to;person;place;time  -KP oriented to;person;place;time  -MD    Follows Commands/Answers Questions  100% of the time;able to follow single-step instructions;needs cueing  -% of the time;needs cueing;needs repetition  -MD    Personal Safety  mild impairment  -KP impulsive;decreased insight to deficits  -MD    Personal Safety Interventions  fall prevention program maintained;gait belt;nonskid shoes/slippers when out of bed  -KP fall prevention program maintained;gait belt;muscle strengthening facilitated;nonskid shoes/slippers when out of bed;supervised activity  -MD    Recorded by  [KP] Vero York OTR [MD] Nereida Llamas, PT    Improve functional problem solving    Improve functional problem solving through: complete high level reasoning task  -KB      Status: Improve functional problem solving Progressing as expected  -KB      Functional Problem Solving Progress 80%;without cues;100%;with inconsistent cues  -KB      Comments: Improve functional problem solving Completed two high-level logic puzzles with MIN cues only.  -KB      Recorded by [KB] Katherine L Bruton      Improve closure at entrance to airway    To improve closure at entrance to airway, patient will: --   pushing/pulling exercises  -KB      Status: Improve closure at entrance to airway Progressing as expected  -KB      Closure at Entrance to Airway Progress continue to adress  -KB      Comments: Improve closure at  entrance to airway Initial cues for how to complete exercises.  -KB      Recorded by [KB] Katherine L Bruton      Improve tongue base & pharyngeal wall squeeze    To improve tongue base & pharyngeal wall squeeze, patient will: --   Practiced k/g word list forcefully  -KB      Status: Improve tongue base & pharyngeal wall squeeze Progressing as expected  -KB      Tongue Base/Pharyngeal Wall Squeeze Progress continue to adress  -KB      Comments: Improve tongue base & pharyngeal wall squeeze No cues needed, pt repeated word list x5.   -KB      Recorded by [KB] Katherine L Bruton      Bed Mobility, Assessment/Treatment    Bed Mob, Supine to Sit, [place]   supervision required  -MD    Bed Mob, Sit to Supine, [place]   supervision required  -MD    Bed Mobility, Comment  pt up in   -     Recorded by  [] Vero York, OTR [MD] Nereida Llamas, PT    Transfer Assessment/Treatment    Transfers, Sit-Stand [place]  supervision required  - stand by assist  -MD    Transfers, Stand-Sit [place]  supervision required  - stand by assist  -MD    Transfers, Sit-Stand-Sit, Assist Device  other (see comments)   [facility] rolling walker  -MD    Walk-In Shower Transfer, [place]  set up required;supervision required  -     Walk-In Shower Transfer, Assist Device  other (see comments)   tub bench  -     Transfer, Safety Issues   step length decreased  -MD    Transfer, Impairments   strength decreased;impaired balance  -MD    Recorded by  [KP] Vero York, OTR [MD] Nereida Llamas, PT    Gait Assessment/Treatment    Gait, [place] Level   verbal cues required;contact guard assist  -MD    Gait, Assistive Device   rolling walker  -MD    Gait, Distance (Feet)   80   x3  -MD    Gait, Gait Deviations   bilateral:;janet decreased;forward flexed posture;step length decreased  -MD    Gait, Safety Issues   sequencing ability decreased  -MD    Gait, Impairments   impaired balance;strength decreased  -MD     Recorded by   [MD] Nereida Llamas PT    Upper Body Bathing Assessment/Training    UB Bathing Assess/Train Assistive Device  hand-held shower head;tub bench  -KP     UB Bathing Assess/Train, Position  sitting  -KP     UB Bathing Assess/Train, [place] Level  set up required;stand by assist  -KP     Recorded by  [KP] Vero York OTR     Lower Body Bathing Assessment/Training    LB Bathing Assess/Train Assistive Device  grab bars;hand-held shower head;tub bench  -KP     LB Bathing Assess/Train, Position  sitting;standing  -KP     LB Bathing Assess/Train, [place] Level  set up required;stand by assist  -KP     Recorded by  [KP] Vero York OTR     Upper Body Dressing Assessment/Training    UB Dressing Assess/Train, Clothing Type  doffing:;donning:;t-shirt  -KP     UB Dressing Assess/Train, Position  sitting  -KP     UB Dressing Assess/Train, [place]  set up required;supervision required  -KP     Recorded by  [KP] Vero York OTR     Lower Body Dressing Assessment/Training    LB Dressing Assess/Train, Clothing Type  doffing:;donning:;socks;shoes;pants  -KP     LB Dressing Assess/Train, Position  sitting;standing  -KP     LB Dressing Assess/Train, [place]  set up required;supervision required  -     LB Dressing Assess/Train, Comment  holds onto grab bar intermittently  -KP     Recorded by  [KP] KANDY Sultana     Grooming Assessment/Training    Grooming Assess/Train, Position  sitting  -KP     Grooming Assess/Train, [place] Level  set up required;supervision required  -KP     Recorded by  [KP] KANDY Sultana     Balance Skills Training    Sitting-Level of Assistance  Close supervision;Distant supervision  -     Sitting-Balance Support  Feet supported  -     Standing-Level of Assistance  Close supervision  -KP     Recorded by  [KP] Vero York OTR     Therapy Exercises    Right Lower Extremity   AROM:;10 reps;sidelying;hip  abduction/adduction   x3 sets  -MD    Left Lower Extremity   AROM:;10 reps;sidelying;clam shell   x3 sets  -MD    Bilateral Lower Extremities   AROM:;20 reps;ankle pumps/circles;hip abduction/adduction;hip flexion;LAQ  -MD    BLE Other Reps   --   Nustep at level 2 for 4 min  -MD    Trunk Exercises   10 reps;supine;bridging   x3 sets  -MD    Recorded by   [MD] Nereida Llamas, PT    Positioning and Restraints    Pre-Treatment Position  sitting in chair/recliner  -KP sitting in chair/recliner  -MD    Post Treatment Position  wheelchair  -KP wheelchair  -MD    In Wheelchair  sitting;call light within reach;encouraged to call for assist  -KP sitting;call light within reach  -MD    Recorded by  [KP] Vero York, OTR [MD] Nereida Llamas, PT      11/08/17 1030 11/07/17 1532       Rehab Assessment/Intervention    Discipline speech language pathologist  -KB occupational therapist  -     Document Type therapy note (daily note)  -KB therapy note (daily note)  -     Subjective Information no complaints;agree to therapy  -KB agree to therapy;no complaints  -KP     Patient Effort, Rehab Treatment good  -KB good  -KP     Symptoms Noted During/After Treatment none  -KB none  -KP     Precautions/Limitations fall precautions;swallowing precautions  -KB fall precautions;swallowing precautions  -KP     Precautions/Limitations, Vision WFL with corrective lenses  -KB      Recorded by [KB] Katherine L Bruton [] Vero York, OTR     Pain Assessment    Pain Assessment 0-10  -KB No/denies pain  -     Post Pain Score 7  -KB      Pain Location Toe (Comment which one)  -KB      Pain Orientation Left;Right  -KB      Pain Intervention(s) Emotional support   Pt did not want medications.  -KB      Recorded by [KB] Katherine L Bruton [] Vero York, OTR     Vision Assessment/Intervention    Visual Impairment WFL with corrective lenses  -KB visual impairment, left  -KP     Recorded by [KB] Katherine L Bruton [] Vero  Abdirashid York, OTR     Cognitive Assessment/Intervention    Current Cognitive/Communication Assessment  impaired  -     Orientation Status  oriented to;person;place;time  -     Follows Commands/Answers Questions  100% of the time;able to follow single-step instructions;needs cueing  -     Personal Safety  mild impairment  -     Personal Safety Interventions  fall prevention program maintained;gait belt;muscle strengthening facilitated;nonskid shoes/slippers when out of bed  -KP     Recorded by  [KP] Vero York, OTR     Improve functional problem solving    Improve functional problem solving through: complete high level reasoning task  -KB      Status: Improve functional problem solving Progressing as expected  -KB      Functional Problem Solving Progress 80%;without cues;100%;with inconsistent cues  -KB      Comments: Improve functional problem solving Pt completed high-level logic puzzle with MIN verbal cues for attention to detail.   -KB      Recorded by [KB] Katherine L Bruton      Improve oral skills    To Improve Oral Skills, patient will: --   pucker/smile, pucker left/right, suck in/puff out cheeks  -KB      Status: Improve Oral Skills Progressing as expected  -KB      Oral Skills Progress continue to adress  -KB      Comments: Improve Oral Skills Completed exercises without cues.  -KB      Recorded by [KB] Katherine L Bruton      Improve closure at entrance to airway    To improve closure at entrance to airway, patient will: --   hard swallows  -KB      Status: Improve closure at entrance to airway Progressing as expected  -KB      Closure at Entrance to Airway Progress continue to adress  -KB      Comments: Improve closure at entrance to airway Completed daily exercises to address airway closure without cues.  -KB      Recorded by [KB] Katherine L Bruton      Improve laryngeal elevation    To improve laryngeal elevation, patient will: --   pitch glide, high pitch vowel, humming in  falsetto  -KB      Status: Improve laryngeal elevation Progressing as expected  -KB      Laryngeal Elevation Progress continue to adress  -KB      Comments: Improve laryngeal elevation Completed exercises to addres laryngeal elevation without cues.   -KB      Recorded by [KB] Katherine L Bruton      Improve tongue base & pharyngeal wall squeeze    To improve tongue base & pharyngeal wall squeeze, patient will: Complete effortful swallow;Complete gargle/hold retraction  -KB      Status: Improve tongue base & pharyngeal wall squeeze Progressing as expected  -KB      Tongue Base/Pharyngeal Wall Squeeze Progress continue to adress  -KB      Comments: Improve tongue base & pharyngeal wall squeeze Completed exercises without cues.  -KB      Recorded by [KB] Katherine L Bruton      Bed Mobility, Assessment/Treatment    Bed Mob, Supine to Sit, [place]  supervision required;set up required  -KP     Bed Mob, Sit to Supine, [place]  not tested  -KP     Recorded by  [KP] KANDY Sultana     Transfer Assessment/Treatment    Transfers, Bed-Chair [place]  stand by assist  -KP     Transfers, Chair-Bed [place]  not tested  -KP     Transfers, Bed-Chair-Bed, Assist Device  other (see comments)   wc  -KP     Transfers, Sit-Stand [place]  stand by assist  -KP     Transfers, Stand-Sit [place]  stand by assist  -KP     Transfers, Sit-Stand-Sit, Assist Device  other (see comments)   wc  -KP     Recorded by  [KP] KANDY Sultana     Balance Skills Training    Sitting-Level of Assistance  Close supervision;Distant supervision  -KP     Sitting-Balance Support  Feet supported  -KP     Standing-Level of Assistance  Close supervision  -KP     Static Standing Balance Support  Right upper extremity supported  -KP     Recorded by  [KP] Vero York, KANDY     Therapy Exercises    BUE Resistance  theraputty   pinches yellow putty w. each digit and thumb  -KP     Recorded by  [BRIANNA] Vero  Abdirashid York, KANDY     Fine Motor Coordination Training    Detail (Fine Motor Coordination Training)  links paper clip together clipping it on other paper clip w. L hand.  in hand manipulation w. coins in L hand pushing coin up w. L thumb and inserting coin into small slot to inc FMC and in hand manipulation.   -KP     Recorded by  [KP] KANDY Sultana     Positioning and Restraints    Pre-Treatment Position  in bed  -KP     Post Treatment Position  wheelchair  -KP     In Wheelchair  sitting;call light within reach;encouraged to call for assist;with family/caregiver  -KP     Recorded by  [KP] Vero York, OTR       User Key  (r) = Recorded By, (t) = Taken By, (c) = Cosigned By    Initials Name Effective Dates    KP Vero York, OTR 04/13/15 -     MD Nereida Llamas, PT 12/01/15 -     KB Katherine L Bruton 09/29/17 -                 IP PT Goals       11/08/17 1501 11/01/17 1221       Bed Mobility PT LTG    Bed Mobility PT LTG, Date Established  11/01/17  -MD     Bed Mobility PT LTG, Time to Achieve  2 wks  -MD     Bed Mobility PT LTG, Activity Type  supine to sit/sit to supine  -MD     Bed Mobility PT LTG, [place] Level  independent  -MD     Bed Mobility PT LTG, Date Goal Reviewed 11/08/17  -MD      Bed Mobility PT LTG, Outcome goal ongoing  -MD      Transfer Training PT LTG    Transfer Training PT LTG, Date Established  11/01/17  -MD     Transfer Training PT LTG, Time to Achieve  2 wks  -MD     Transfer Training PT LTG, Activity Type  sit to stand/stand to sit  -MD     Transfer Training PT LTG, [place] Level  supervision required  -MD     Transfer Training PT LTG, Assist Device  walker, rolling  -MD     Transfer Training PT  LTG, Date Goal Reviewed 11/08/17  -MD      Transfer Training PT LTG, Outcome goal met  -MD      Transfer Training 2 PT LTG    Transfer Training PT 2 LTG, Date Established  11/01/17  -MD     Transfer Training PT 2 LTG, Time to Achieve  2 wks  -MD     Transfer  Training PT 2 LTG, Activity Type  other (see comments)   car transfer  -MD     Transfer Training PT 2 LTG, [place] Level  supervision required  -MD     Transfer Training PT 2 LTG, Assist Device  walker, rolling  -MD     Transfer Training PT 2 LTG, Date Goal Reviewed 11/08/17  -MD      Transfer Training PT 2 LTG, Outcome goal ongoing  -MD      Gait Training PT LTG    Gait Training Goal PT LTG, Date Established  11/01/17  -MD     Gait Training Goal PT LTG, Time to Achieve  2 wks  -MD     Gait Training Goal PT LTG, [place] Level  supervision required  -MD     Gait Training Goal PT LTG, Assist Device  walker, rolling  -MD     Gait Training Goal PT LTG, Distance to Achieve  160  -MD     Gait Training Goal PT LTG, Date Goal Reviewed 11/08/17  -MD      Gait Training Goal PT LTG, Outcome goal ongoing  -MD      Stair Training PT LTG    Stair Training Goal PT LTG, Date Established  11/01/17  -MD     Stair Training Goal PT LTG, Time to Achieve  2 wks  -MD     Stair Training Goal PT LTG, Number of Steps  4  -MD     Stair Training Goal PT LTG, [place] Level  supervision required  -MD     Stair Training Goal PT LTG, Assist Device  2 handrails  -MD     Stair Training Goal PT LTG, Date Goal Reviewed 11/08/17  -MD      Stair Training Goal PT LTG, Outcome goal ongoing  -MD      Patient Education PT LTG    Patient Education PT LTG, Date Established  11/01/17  -MD     Patient Education PT LTG, Time to Achieve  2 wks  -MD     Patient Education PT LTG, Education Type  HEP  -MD     Patient Education PT LTG, Education Understanding  demonstrate adequately  -MD     Patient Education PT LTG, Date Goal Reviewed 11/08/17  -MD      Patient Education PT LTG Outcome goal ongoing  -MD        User Key  (r) = Recorded By, (t) = Taken By, (c) = Cosigned By    Initials Name Provider Type    MD Nereida Llamas, PT Physical Therapist          Physical Therapy Education     Title: PT OT SLP Therapies (Active)     Topic: Physical Therapy (Active)      Point: Mobility training (Done)    Learning Progress Summary    Learner Readiness Method Response Comment Documented by Status   Patient Acceptance E EDUARDO  JRALPH 11/02/17 1158 Done               Point: Precautions (Done)    Learning Progress Summary    Learner Readiness Method Response Comment Documented by Status   Patient Acceptance E,D EDUARDO SLAUGHTER 11/10/17 0849 Done    Acceptance E,D EDUARDO SLAUGHTER 11/09/17 1031 Done    Acceptance E,D JUVENAL SLAUGHTER 11/08/17 1217 Active    Acceptance E,D JUVENAL SLAUGHTER 11/07/17 0821 Active    Acceptance E,D JUVENAL SLAUGHTER 11/06/17 1118 Active    Acceptance E,D NR  MD 11/04/17 1012 Active    Acceptance E,D JUVENAL SLAUGHTER 11/03/17 1215 Active    Acceptance E,D NR  MD 11/01/17 1228 Active                      User Key     Initials Effective Dates Name Provider Type Nidhi GIVENS 12/01/15 -  Anna Epps, PT Physical Therapist PT    MD 12/01/15 -  Nereida Llamas, PT Physical Therapist PT                    PT Recommendation and Plan  Anticipated Equipment Needs At Discharge: gait belt, front wheeled walker  Anticipated Discharge Disposition: home with home health  Planned Therapy Interventions: balance training, bed mobility training, gait training, home exercise program, patient/family education, transfer training  PT Frequency: 2 times/day  Plan of Care Review  Plan Of Care Reviewed With: patient  Progress: improving  Outcome Summary/Follow up Plan: Pt progressing towards goals w transfers and ambulation.  Gait deviations continue to compromise pts dynamic standing balance w ambulation.         Time Calculation:         PT Charges       11/10/17 1410 11/10/17 1033       Time Calculation    Start Time 1300  -MD 0800  -MD     Stop Time 1330  -MD 0830  -MD     Time Calculation (min) 30 min  -MD 30 min  -MD     PT Received On  11/10/17  -MD     PT - Next Appointment  11/11/17  -MD       User Key  (r) = Recorded By, (t) = Taken By, (c) = Cosigned By    Initials Name Provider Type    MD Nereida Llamas, PT Physical Therapist           Therapy Charges for Today     Code Description Service Date Service Provider Modifiers Qty    [id number]  PT THER PROC EA 15 MIN 11/9/2017 Nereida Llamas, PT GP 4    [id number]  PT THER PROC EA 15 MIN 11/10/2017 Nereida Llamas, PT GP 4               Nereida Llamas, PT  11/10/2017

## 2017-11-10 NOTE — PROGRESS NOTES
Inpatient Rehabilitation Functional Measures Assessment    Functional Measures  YESI Eating:  [facility] Grooming: [facility] Bathing:  [facility] Upper Body Dressing:  [facility] Lower Body Dressing:  [facility] Toileting:  [facility] Bladder Management  Level of Assistance:  [place]  Frequency/Number of Accidents this Shift:  [facility] Bowel Management  Level of Assistance: [place]  Frequency/Number of Accidents this Shift: [facility] Bed/Chair/Wheelchair Transfer:  [facility] Toilet Transfer:  [facility] Tub/Shower Transfer:  Bruno    Previously Documented Mode of Locomotion at Discharge: Field  YESI Expected Mode of Locomotion at Discharge: [facility] Walk/Wheelchair:  [facility] Stairs:  [facility] Comprehension:  Auditory comprehension is the usual mode. Comprehension  Score = 6, Modified [place].  Patient comprehends complex/abstract  information in their primary language with only mild difficulty.  YESI Expression:  Vocal expression is the usual mode. Expression Score = 6,  Modified Independent.  Patient expresses complex/abstract information in their  primary language with only mild difficulty with tasks.  YESI Social Interaction:  Social Interaction Score = 7, Independent. Patient is  completely independent for social interaction.  There are no activity  limitations.  YESI Problem Solving:  Problem Solving Score = 6, Modified [place].  Patient  makes appropriate decisions in order to solve complex problems with mild  difficulty but self-corrects.  YESI Memory:  Memory Score = 6, Modified [place].  Patient is modified  independent for memory, having only mild difficulty and using self-initiated or  environmental cues to remember.    Therapy Mode Minutes  Occupational Therapy: Branch  Physical Therapy: Branch  Speech Language Pathology:  Branch    Signed by: Daniel Horne RN

## 2017-11-10 NOTE — PROGRESS NOTES
Inpatient Rehabilitation Functional Measures Assessment    Functional Measures  YESI Eating:  Branch  [facility] Grooming: Branch  YESI Bathing:  Branch  YESI Upper Body Dressing:  Branch  [facility] Lower Body Dressing:  Branch  [facility] Toileting:  Branch    [facility] Bladder Management  Level of Assistance:  [place]  Frequency/Number of Accidents this Shift:  [facility] Bowel Management  Level of Assistance: [place]  Frequency/Number of Accidents this Shift: Branch    [facility] Bed/Chair/Wheelchair Transfer:  Activity was not observed.  YESI Toilet Transfer:  [facility] Tub/Shower Transfer:  [place]    Previously Documented Mode of Locomotion at Discharge: Field  YESI Expected Mode of Locomotion at Discharge: [facility] Walk/Wheelchair:  WHEELCHAIR OBSERVATION   Activity was not observed.    WALK OBSERVATION   Walk Distance Scale = 3.  Distance walked is greater than 150 feet. Walk Score  = 5.  Patient requires supervision or set up for walking. Stand by assistance.  Patient walked a distance of  160 feet. Patient requires the following assistive  device(s): Rolling walker.  YESI Stairs:  Activity was not observed.    YESI Comprehension:  Branch  YESI Expression:  Branch  [facility] Social Interaction:  Branch  [facility] Problem Solving:  [facility] Memory:  [place]    Therapy Mode Minutes  Occupational Therapy: Branch  Physical Therapy: Individual: 60 minutes.  Speech Language Pathology:  Branch    Signed by: Nereida Llamas, PT

## 2017-11-10 NOTE — PROGRESS NOTES
Family conference held today with patient, , 2 daughters, sister, and nephew. Another daughter was on speaker phone. PT, OT, ST, NSG, and SW present. Discussed progress and d/c recommendations.   PT reported patient is at SBA level with transfers and walking with rolling walker 160 feet. Patient is independent with bed mobility. Up/down 4 steps with rail SBA. Car transfers are also SBA. PT discussed goal of supervision at home and stressed that patient needs to use rolling walker for now--not ready to use cane.     OT reported that patient also at SBA level with bathing seated on shower seat and dressing both UE and LE. Patient also at SBA for shower and commode transfers. Occasionally uses grab bar. OT also working on left hand strength and coordination which has improved. Will provide home exercise program. Will also plan to do kitchen/cooking task.    ST reported working on oral motor exercises. Patient currently on mechanical soft diet. Will provide written information regarding what she can and can't have on this diet. ST also working on attention and organization tasks. Patient still has decreased attention to details and organizing information. Doesn't recognize mistakes and skips over information. Recommended patient have supervision with financial and medication management as well as with anything she would do on the computer. Recommended she not try to do her job on the computer at this time due to her inattention problems.   Did review BNE results as well which showed overall mild impairment with executive functioning skills and verbal memory.     NSG reviewed medications. Patient on coumadin and getting protimes daily. NSG to contact PCP (nurse practitioner) regarding following protimes after d/c. Discussed follow up appointments and NSG to schedule.     Did discuss option of home pass to daughter's home in [place]. Family to discuss and decide with patient whether they will do pass or  not.    Patient has rolling walker and shower seat to use at home.  plans to put in grab bars in shower.     Team recommended outpatient ST and PT. Will check to see where patient can go in [facility] and get scheduled.   Team also recommended patient have 24 hour assistance at home for safety due to her attention and decreased awareness of her deficits. D/C plan is home with  and her sister plans to stay for first 2 weeks with daughters taking turns helping on weekends.   D/C planned for Tuesday, 11/14. Will assist with plans.

## 2017-11-10 NOTE — THERAPY TREATMENT NOTE
Inpatient Rehabilitation - Speech Language Pathology Treatment Note  [facility]     Patient Name: Alice Maxwell  : 1945  MRN: [id number]  Today's Date: 11/10/2017         Admit Date: 10/31/2017    Visit Dx:      ICD-10-CM ICD-9-CM   1. Oropharyngeal dysphagia R13.12 787.22     Patient Active Problem List   Diagnosis   • Stroke              Adult Rehabilitation Note       11/10/17 1500 11/10/17 1330 11/10/17 1218    Rehab Assessment/Intervention    Discipline speech language pathologist  -KB speech language pathologist  -KB occupational therapist  -KP    Document Type therapy note (daily note)  -KB therapy note (daily note)  -KB therapy note (daily note)  -KP    Subjective Information no complaints;agree to therapy  -KB no complaints;agree to therapy  -KB agree to therapy;no complaints  -KP    Patient Effort, Rehab Treatment good  -KB good  -KB excellent  -KP    Symptoms Noted During/After Treatment none  -KB none  -KB none  -KP    Precautions/Limitations fall precautions  -KB fall precautions  -KB fall precautions  -KP    Recorded by [KB] Katherine L Bruton [KB] Katherine L Bruton [KP] Vero York, OTR    Pain Assessment    Pain Assessment No/denies pain  -KB No/denies pain  -KB No/denies pain  -KP    Pain Score   0  -KP    Recorded by [KB] Katherine L Bruton [KB] Katherine L Bruton [KP] Vero York, OTR    Vision Assessment/Intervention    Visual Impairment visual impairment, left;WFL with corrective lenses  -KB visual impairment, left;WFL with corrective lenses  -KB visual impairment, left   but is scanning to L. aware of L side all week during ADLs  -KP    Recorded by [KB] Katherine L Bruton [KB] Katherine L Bruton [KP] Vero York, OTR    Cognitive Assessment/Intervention    Current Cognitive/Communication Assessment   impaired  -KP    Orientation Status   oriented x 4  -KP    Follows Commands/Answers Questions   100% of the time;able to follow single-step instructions   -KP    Personal Safety   mild impairment  -    Personal Safety Interventions   fall prevention program maintained;gait belt;muscle strengthening facilitated;nonskid shoes/slippers when out of bed  -KP    Recorded by   [KP] KANDY Sultana    Improve functional problem solving    Improve functional problem solving through: sequence steps in a task  -KB      Status: Improve functional problem solving Progressing as expected  -KB      Functional Problem Solving Progress 90%;without cues;100%;with inconsistent cues  -KB      Comments: Improve functional problem solving Pt sequenced 6-steps in common tasks with MIN cues; given verbal cue to check her work, increased accuracy and self-corrections noted.   -KB      Recorded by [KB] Katherine L Bruton      Improve executive function skills    Improve executive function skills:  identify strategies, strengths, limitations  -KB     Status: Improve executive function skills  Progressing as expected  -KB     Executive Function Skills Progress  continue to address  -KB     Comments: Improve executive function skills  Discussed previous performance and strategies of planning ahead and makring completed items off in order to increase performance and help with attention.  -KB     Recorded by  [KB] Katherine L Bruton     Improve oral skills    To Improve Oral Skills, patient will:  Increase lip closure;Increase tongue A-P movement;Increase back of tongue control  -KB     Status: Improve Oral Skills  Progressing as expected  -KB     Oral Skills Progress  continue to adress  -KB     Comments: Improve Oral Skills  Pt completed exercises to verbal instruction. 10 reps.   -KB     Recorded by  [KB] Katherine L Bruton     Improve laryngeal elevation    To improve laryngeal elevation, patient will:  Complete pitch-glide  -KB     Status: Improve laryngeal elevation  Progressing as expected  -KB     Laryngeal Elevation Progress  continue to adress  -KB     Comments: Improve  laryngeal elevation  Pt completed exercises to verbal instruction. 10 reps.  -KB     Recorded by  [KB] Katherine L Bruton     Improve hyolaryngeal excursion    To improve hyolaryngeal excursion, patient will: Complete head lift sustained (comment number of seconds);Complete head lift repetitive (comment number of lifts);Complete chin tuck against resistance (comment number of repetitions)  -KB Complete head lift repetitive (comment number of lifts)  -KB     Status: Improve hyolaryngeal excursion Progressing as expected  -KB Progressing as expected  -KB     Hyolaryngeal Excursion Progress continue to adress  -KB continue to adress  -KB     Comments: Improve hyolaryngeal excursion Completed 20 reps or head lift and chin tuck with resistance; completed sustained head lift for 30 seconds.  -KB Pt completed exercisess to verbal instruciton. 10 reps.  -KB     Recorded by [KB] Katherine L Bruton [KB] Katherine L Bruton     Improve tongue base & pharyngeal wall squeeze    To improve tongue base & pharyngeal wall squeeze, patient will: Complete tongue hold swallow  -KB Complete effortful swallow;Complete yawn/hold retractions  -KB     Status: Improve tongue base & pharyngeal wall squeeze Progressing as expected  -KB Progressing as expected  -KB     Tongue Base/Pharyngeal Wall Squeeze Progress continue to adress  -KB continue to adress  -KB     Comments: Improve tongue base & pharyngeal wall squeeze MAX cues inclusing model to complete tongue out swallow for first time. Able to complete exercise adequately 6/10 repetitions.   -KB Pt completed exercises to verbal instruction. 10 reps.  -KB     Recorded by [KB] Katherine L Bruton [KB] Katherine L Bruton     Bed Mobility, Assessment/Treatment    Bed Mobility, Comment   up in   -    Recorded by   [KP] KANDY Sultana    Transfer Assessment/Treatment    Transfers, Sit-Stand [place]   supervision required;set up required;stand by assist  -    Transfers,  Stand-Sit [place]   stand by assist;supervision required;set up required  -KP    Transfers, Sit-Stand-Sit, Assist Device   other (see comments)   wc  -KP    Toilet Transfer, [place]   supervision required;set up required  -KP    Toilet Transfer, Assistive Device   other (see comments);wheelchair  -KP    Recorded by   [KP] KANDY Sultana    Upper Body Bathing Assessment/Training    UB Bathing Assess/Train Assistive Device   hand-held shower head;tub bench  -KP    UB Bathing Assess/Train, Position   sitting  -KP    UB Bathing Assess/Train, [place] Level   set up required;stand by assist  -KP    Recorded by   [KP] Vero York OTR    Lower Body Bathing Assessment/Training    LB Bathing Assess/Train Assistive Device   hand-held shower head;tub bench;grab bars  -KP    LB Bathing Assess/Train, Position   sitting;standing  -KP    LB Bathing Assess/Train, [place] Level   set up required;stand by assist  -KP    Recorded by   [KP] KANDY Sultana    Upper Body Dressing Assessment/Training    UB Dressing Assess/Train, Clothing Type   doffing:;donning:;t-shirt  -KP    UB Dressing Assess/Train, Position   sitting  -KP    UB Dressing Assess/Train, [place]   set up required;supervision required  -KP    Recorded by   [KP] Vero York OTR    Lower Body Dressing Assessment/Training    LB Dressing Assess/Train, Clothing Type   doffing:;donning:;pants;shoes;slipper socks;socks  -KP    LB Dressing Assess/Train, Position   sitting;standing  -KP    LB Dressing Assess/Train, [place]   set up required;supervision required  -KP    Recorded by   [KP] Vero York OTR    Toileting Assessment/Training    Toileting Assess/Train, Position   sitting;standing  -KP    Toileting Assess/Train, [place] Level   set up required;supervision required  -KP    Recorded by   [KP] Vero York OTR    Grooming Assessment/Training    Grooming Assess/Train, Position    sitting  -KP    Grooming Assess/Train, [place] Level   set up required;supervision required  -KP    Recorded by   [KP] KANDY Sultana    Balance Skills Training    Sitting-Level of Assistance   Distant supervision  -KP    Sitting-Balance Support   Feet supported  -KP    Standing-Level of Assistance   Close supervision  -KP    Static Standing Balance Support   No upper extremity supported;Right upper extremity supported  -KP    Recorded by   [KP] Vero York OTR    Fine Motor Coordination Training    Detail (Fine Motor Coordination Training)   inserts pegs w. L hand into grooved peg board to inc FMC. pt demo w. no difficulty using L hand. inserts small square pegs into small peg board colored squares to inc FMC w. L hand w. no difficulty.  -KP    Recorded by   [KP] KANDY Sultana    Positioning and Restraints    Pre-Treatment Position   sitting in chair/recliner  -KP    Post Treatment Position   wheelchair  -KP    In Wheelchair   sitting;call light within reach;encouraged to call for assist;with family/caregiver  -KP    Recorded by   [KP] KANDY Sultana      11/10/17 0847 11/09/17 1445 11/09/17 1330    Rehab Assessment/Intervention    Discipline physical therapist  -MD occupational therapist  -KP speech language pathologist  -KB    Document Type therapy note (daily note)  -MD therapy note (daily note)  - therapy note (daily note)  -KB    Subjective Information agree to therapy;no complaints  -MD agree to therapy;no complaints  -KP no complaints;agree to therapy  -KB    Patient Effort, Rehab Treatment good  -MD excellent  -KP good  -KB    Symptoms Noted During/After Treatment none  -MD none  -KP none  -KB    Precautions/Limitations fall precautions  -MD fall precautions;swallowing precautions  -KP fall precautions;swallowing precautions  -KB    Equipment Issued to Patient gait belt;front wheeled walker  -MD      Recorded by [MD] Nereida Llamas, PT [KP] Vero York,  OTR [KB] Katherine L Bruton    Pain Assessment    Pain Assessment No/denies pain  -MD No/denies pain  -KP No/denies pain  -KB    Recorded by [MD] Nereida Llamas, PT [KP] Vero York, OTR [KB] Katherine L Bruton    Vision Assessment/Intervention    Visual Impairment  visual impairment, left  -KP visual impairment, left;WFL with corrective lenses  -KB    Recorded by  [KP] Vero York, OTR [KB] Katherine L Bruton    Cognitive Assessment/Intervention    Current Cognitive/Communication Assessment impaired  -MD impaired  -KP     Orientation Status oriented to;person;place;time  -MD oriented to;person;place;time  -KP     Follows Commands/Answers Questions 100% of the time;needs repetition;needs cueing  -% of the time;able to follow single-step instructions  -KP     Personal Safety decreased insight to deficits  -MD mild impairment  -KP     Personal Safety Interventions fall prevention program maintained;gait belt;muscle strengthening facilitated;nonskid shoes/slippers when out of bed;supervised activity  -MD fall prevention program maintained;gait belt;nonskid shoes/slippers when out of bed  -KP     Recorded by [MD] Nereida Llamas, PT [KP] Vero York, OTR     Improve attention    Improve attention by:   complete divided attention task  -KB    Status: Improve attention   Progressing as expected  -KB    Attention Progress   70%;without cues;100%;with inconsistent cues  -KB    Comments: Improve attention   Pt completed attention/organization task with errors due to inattention.  -KB    Recorded by   [KB] Katherine L Bruton    Improve oral skills    To Improve Oral Skills, patient will:   Increase tongue tip elevation;Increase tongue lateralization;Increase lip closure;Increase back of tongue control;Increase tongue A-P movement  -KB    Status: Improve Oral Skills   Progressing as expected  -KB    Oral Skills Progress   continue to adress  -KB    Comments: Improve Oral Skills   Unable to alternate  puckered lips left to right despite MAX cues and instruciton; cues for labial closure.,  -KB    Recorded by   [KB] Katherine L Bruton    Improve closure at entrance to airway    To improve closure at entrance to airway, patient will:   --   hard swallows  -KB    Status: Improve closure at entrance to airway   Progressing as expected  -KB    Closure at Entrance to Airway Progress   continue to adress  -KB    Comments: Improve closure at entrance to airway   Completed exercises with verbal cues for squeezing muscles.  -KB    Recorded by   [KB] Katherine L Bruton    Improve laryngeal elevation    To improve laryngeal elevation, patient will:   Complete pitch-glide  -KB    Status: Improve laryngeal elevation   Progressing as expected  -KB    Laryngeal Elevation Progress   continue to adress  -KB    Comments: Improve laryngeal elevation   Completed exercises independently.   -KB    Recorded by   [KB] Katherine L Bruton    Improve tongue base & pharyngeal wall squeeze    To improve tongue base & pharyngeal wall squeeze, patient will:   Complete tongue base retraction;Complete yawn/hold retractions  -KB    Status: Improve tongue base & pharyngeal wall squeeze   Progressing as expected  -KB    Tongue Base/Pharyngeal Wall Squeeze Progress   continue to adress  -KB    Comments: Improve tongue base & pharyngeal wall squeeze   Completed exercises with verbal instructions.  -KB    Recorded by   [KB] Katherine L Bruton    Bed Mobility, Assessment/Treatment    Bed Mob, Supine to Sit, [place] supervision required  -MD      Bed Mob, Sit to Supine, [place] supervision required  -MD      Bed Mobility, Comment  up in   -KP     Recorded by [MD] Nereida Llamas, PT [KP] Vero York, OTR     Transfer Assessment/Treatment    Transfers, Sit-Stand [place] set up required;supervision required;stand by assist  -MD      Transfers, Stand-Sit [place] set up required;supervision required;stand by assist  -MD       Transfers, Sit-Stand-Sit, Assist Device rolling walker  -MD      Transfer, Safety Issues step length decreased  -MD      Transfer, Impairments strength decreased;impaired balance  -MD      Recorded by [MD] Nereida Llamas PT      Gait Assessment/Treatment    Gait, [place] Level verbal cues required;stand by assist  -MD      Gait, Assistive Device rolling walker  -MD      Gait, Distance (Feet) 160   x2  -MD      Gait, Gait Deviations bilateral:;janet decreased;forward flexed posture;step length decreased;other (see comments)   [place] to the L  -MD      Gait, Safety Issues step length decreased  -MD      Gait, Impairments impaired balance;strength decreased  -MD      Recorded by [MD] Nereida Llamas, PT      Balance Skills Training    Sitting-Level of Assistance  Distant supervision  -KP     Sitting-Balance Support  Feet supported  -KP     Recorded by  [KP] KANDY Sultana     Therapy Exercises    Left Lower Extremity AROM:;sidelying;clam shell;15 reps   x2 sets  -MD      BLE Other Reps --   nustep: level 2 for 5 min  -MD      Recorded by [MD] Nereida Llamas PT      Fine Motor Coordination Training    Detail (Fine Motor Coordination Training)  inserts silver pegs into slot, washer on top and other round open peg over top x12 (purdue peg board) to inc FMC w. L hand.  Pulls coins from resistive putty x10 w, L hand to inc FMC. and finger/hand strength. Grasps a lot of coins in her palm, in hand manipulation pushing coins to finger tips then inserting into slot to inc FMC. w. little to no difficulty  -KP     Recorded by  [KP] KANDY Sultana     Positioning and Restraints    Pre-Treatment Position sitting in chair/recliner  -MD sitting in chair/recliner  -KP     Post Treatment Position wheelchair  -MD wheelchair  -KP     In Wheelchair sitting;call light within reach  -MD sitting;with PT  -KP     Recorded by [MD] Nereida Llamas PT [KP] KANDY Sultana       11/09/17 1210 11/09/17 1030 11/09/17  1027    Rehab Assessment/Intervention    Discipline occupational therapist  -KP speech language pathologist  -KB physical therapist  -MD    Document Type therapy note (daily note)  -KP therapy note (daily note)  -KB therapy note (daily note)  -MD    Subjective Information agree to therapy;no complaints  -KP no complaints;agree to therapy  -KB no complaints;agree to therapy  -MD    Patient Effort, Rehab Treatment good  -KP good  -KB good  -MD    Symptoms Noted During/After Treatment none  -KP none  -KB none  -MD    Precautions/Limitations fall precautions  -KP fall precautions  -KB fall precautions  -MD    Equipment Issued to Patient   gait belt  -MD    Recorded by [KP] Vero York, OTR [KB] Katherine L Bruton [MD] Nereida Llamas, PT    Pain Assessment    Pain Assessment No/denies pain  -KP No/denies pain  -KB No/denies pain  -MD    Pain Score 0  -KP      Recorded by [KP] Vero York OTR [KB] Katherine L Bruton [MD] Nereida Llamas, PT    Vision Assessment/Intervention    Visual Impairment visual impairment, left  - visual impairment, left;WFL with corrective lenses  -KB     Recorded by [KP] Veor York, OTR [KB] Katherine L Bruton     Cognitive Assessment/Intervention    Current Cognitive/Communication Assessment impaired  -KP  impaired  -MD    Orientation Status oriented to;person;place;time  -  person;place;time;oriented to  -MD    Follows Commands/Answers Questions 100% of the time;able to follow single-step instructions  -  100% of the time;needs cueing;needs increased time;needs repetition  -MD    Personal Safety mild impairment  -  decreased insight to deficits  -MD    Personal Safety Interventions fall prevention program maintained;gait belt;nonskid shoes/slippers when out of bed  -  fall prevention program maintained;gait belt;muscle strengthening facilitated;nonskid shoes/slippers when out of bed;supervised activity  -MD    Recorded by [] Vero York OTR  [MD] Nereida  Farhad, PT    Improve attention    Improve attention by:  complete divided attention task  -KB     Status: Improve attention  Progressing as expected  -KB     Attention Progress  70%;without cues;100%;with inconsistent cues  -KB     Comments: Improve attention  Pt completed complex written directions task with MIN-MOD cues for reasoning and attention.  -KB     Recorded by  [KB] Katherine L Bruton     Improve oral skills    To Improve Oral Skills, patient will:  Increase tongue tip elevation;Increase tongue lateralization;Increase lip closure;Increase back of tongue control;Increase tongue A-P movement  -KB     Status: Improve Oral Skills  Progressing as expected  -KB     Oral Skills Progress  continue to adress  -KB     Comments: Improve Oral Skills  Pt with difficulty moving pucker left to right and holding air in mouth to puff up cheeks.  -KB     Recorded by  [KB] Katherine L Bruton     Improve closure at entrance to airway    To improve closure at entrance to airway, patient will:  --   hard swallows  -KB     Status: Improve closure at entrance to airway  Progressing as expected  -KB     Closure at Entrance to Airway Progress  continue to adress  -KB     Comments: Improve closure at entrance to airway  Completes exercises independently.   -KB     Recorded by  [KB] Katherine L Bruton     Improve laryngeal elevation    To improve laryngeal elevation, patient will:  Complete pitch-glide  -KB     Status: Improve laryngeal elevation  Progressing as expected  -KB     Laryngeal Elevation Progress  continue to adress  -KB     Comments: Improve laryngeal elevation  Pt completes exercises independently.  -KB     Recorded by  [KB] Katherine L Bruton     Improve tongue base & pharyngeal wall squeeze    To improve tongue base & pharyngeal wall squeeze, patient will:  Complete effortful swallow;Complete gargle/hold retraction  -KB     Status: Improve tongue base & pharyngeal wall squeeze  Progressing as expected  -KB     Tongue  Base/Pharyngeal Wall Squeeze Progress  continue to adress  -KB     Comments: Improve tongue base & pharyngeal wall squeeze  Pt completed exercises independently.   -KB     Recorded by  [KB] Katherine L Bruton     Bed Mobility, Assessment/Treatment    Bed Mobility, Roll Left, [place]   supervision required  -MD    Bed Mobility, Roll Right, [place]   supervision required  -MD    Bed Mobility, [place], [place]   supervision required  -MD    Bed Mob, Supine to Sit, [place]   supervision required  -MD    Bed Mob, Sit to Supine, [place]   supervision required  -MD    Bed Mobility, Comment up in   -      Recorded by [] KANDY Sultana  [MD] Nereida Llamas, PT    Transfer Assessment/Treatment    Transfers, Sit-Stand [place] set up required;supervision required;stand by assist  -  supervision required  -MD    Transfers, Stand-Sit [place] set up required;supervision required;stand by assist  -  supervision required  -MD    Transfers, Sit-Stand-Sit, Assist Device other (see comments)    grab bars  -  rolling walker  -MD    Walk-In Shower Transfer, [place] set up required;supervision required  -      Walk-In Shower Transfer, Assist Device other (see comments)   tub bench, grab bars  -KP      Transfer, Safety Issues   step length decreased  -MD    Transfer, Impairments   strength decreased;impaired balance  -MD    Transfer, Comment   car transfer: SBA, RWx  -MD    Recorded by [] KANDY Sultana  [MD] Nereida Llamas PT    Gait Assessment/Treatment    Gait, [place] Level   verbal cues required;stand by assist;contact guard assist  -MD    Gait, Assistive Device   rolling walker  -MD    Gait, Distance (Feet)   80   x3  -MD    Gait, Gait Deviations   bilateral:;janet decreased;forward flexed posture;step length decreased  -MD    Gait, Safety Issues   step length decreased  -MD    Gait, Impairments   impaired balance;strength decreased  -MD    Recorded  by   [MD] Nereida Llamas, PT    Stairs Assessment/Treatment    Number of Stairs   4  -MD [place], Handrail Location   both sides  -MD    Stairs, [place] Level   supervision required  -MD [place], Technique Used   step over step (ascending);step to step (descending)  -MD [place], Impairments   strength decreased;impaired balance  -MD    Recorded by   [MD] Nereida Llamas PT    Functional Mobility    Functional Mobility- Comment a few steps to tub bench SBA  -      Recorded by [KP] Vero York, OTR      Upper Body Bathing Assessment/Training    UB Bathing Assess/Train Assistive Device hand-held shower head;grab bars;tub bench  -KP      UB Bathing Assess/Train, Position sitting  -KP      UB Bathing Assess/Train, [place] Level set up required;stand by assist  -KP      Recorded by [KP] Vero York, OTR      Lower Body Bathing Assessment/Training    LB Bathing Assess/Train Assistive Device hand-held shower head;grab bars;tub bench  -KP      LB Bathing Assess/Train, Position sitting;standing  -KP      LB Bathing Assess/Train, [place] Level set up required;stand by assist  -KP      Recorded by [KP] Vero York, OTR      Upper Body Dressing Assessment/Training    UB Dressing Assess/Train, Clothing Type doffing:;donning:;t-shirt  -KP      UB Dressing Assess/Train, Position sitting  -KP      UB Dressing Assess/Train, [place] set up required;supervision required  -KP      Recorded by [KP] Vero York, OTR      Lower Body Dressing Assessment/Training    LB Dressing Assess/Train, Clothing Type doffing:;donning:;pants;shoes;slipper socks;socks  -KP      LB Dressing Assess/Train, Position sitting;standing  -KP      LB Dressing Assess/Train, [place] set up required;supervision required  -KP      Recorded by [KP] Vero York, OTR      Grooming Assessment/Training    Grooming Assess/Train, Position sitting  -      Grooming Assess/Train, [place] Level  conditional independence;set up required  -      Recorded by [KP] Vero York, SARAR      Balance Skills Training    Sitting-Level of Assistance Distant supervision  -      Sitting-Balance Support Feet supported  -KP      Sitting-Balance Activities Reaching for objects  -KP      Standing-Level of Assistance Close supervision  -KP  Contact guard  -MD    Static Standing Balance Support   No upper extremity supported  -MD    Standing-Balance Activities   Ball toss  -MD    Gait Balance-Level of Assistance   Contact guard  -MD    Gait Balance Support   parallel bars  -MD    Gait Balance Activities   backwards;side-stepping  -MD    Recorded by [KP] Vero York, OTR  [MD] Nereida Llamas, PT    Positioning and Restraints    Pre-Treatment Position sitting in chair/recliner  -KP  sitting in chair/recliner  -MD    Post Treatment Position wheelchair  -KP  bed  -MD    In Bed   supine;call light within reach;exit alarm on  -MD    In Wheelchair sitting;with other staff   RT  -KP      Recorded by [] Vero York OTR  [MD] Nereida Llamas, PT      11/08/17 1539 11/08/17 1330 11/08/17 1152    Rehab Assessment/Intervention    Discipline occupational therapist  - speech language pathologist  - occupational therapist  -    Document Type therapy note (daily note)  - therapy note (daily note)  - therapy note (daily note)  -    Subjective Information agree to therapy;no complaints  - no complaints;agree to therapy  - agree to therapy;no complaints  -KP    Patient Effort, Rehab Treatment good  -KP good  -KB good  -KP    Symptoms Noted During/After Treatment none  -KP none  -KB none  -KP    Precautions/Limitations fall precautions;swallowing precautions  - fall precautions;swallowing precautions  - fall precautions;swallowing precautions  -    Recorded by [] KANDY Sultana [KB] Katherine L Bruton [] Vero York, SARAR    Pain Assessment    Pain Assessment No/denies pain   -KP No/denies pain  -KB No/denies pain  -KP    Pain Score 0  -KP  0  -KP    Recorded by [KP] Vero York OTR [KB] Katherine L Bruton [KP] Vero York OTR    Vision Assessment/Intervention    Visual Impairment visual impairment, left  -KP WFL with corrective lenses  -KB visual impairment, left  -KP    Recorded by [KP] KANDY Sultana [KB] Katherine L Bruton [KP] Vero York OTR    Cognitive Assessment/Intervention    Current Cognitive/Communication Assessment impaired  -KP  impaired  -KP    Orientation Status person;place;time;oriented to  -KP  oriented to;person;place;time  -KP    Follows Commands/Answers Questions 100% of the time;able to follow single-step instructions;needs cueing  -KP  100% of the time;able to follow single-step instructions;needs cueing  -KP    Personal Safety mild impairment  -KP  mild impairment  -KP    Personal Safety Interventions fall prevention program maintained;gait belt;nonskid shoes/slippers when out of bed  -KP  fall prevention program maintained;gait belt;nonskid shoes/slippers when out of bed  -KP    Recorded by [KP] Vero York, SARAR  [KP] Vero York, OTR    Improve functional problem solving    Improve functional problem solving through:  complete high level reasoning task  -KB     Status: Improve functional problem solving  Progressing as expected  -KB     Functional Problem Solving Progress  80%;without cues;100%;with inconsistent cues  -KB     Comments: Improve functional problem solving  Completed two high-level logic puzzles with MIN cues only.  -KB     Recorded by  [KB] Katherine L Bruton     Improve closure at entrance to airway    To improve closure at entrance to airway, patient will:  --   pushing/pulling exercises  -KB     Status: Improve closure at entrance to airway  Progressing as expected  -KB     Closure at Entrance to Airway Progress  continue to adress  -KB     Comments: Improve closure at entrance  to airway  Initial cues for how to complete exercises.  -KB     Recorded by  [KB] Katherine L Bruton     Improve tongue base & pharyngeal wall squeeze    To improve tongue base & pharyngeal wall squeeze, patient will:  --   Practiced k/g word list forcefully  -KB     Status: Improve tongue base & pharyngeal wall squeeze  Progressing as expected  -KB     Tongue Base/Pharyngeal Wall Squeeze Progress  continue to adress  -KB     Comments: Improve tongue base & pharyngeal wall squeeze  No cues needed, pt repeated word list x5.   -KB     Recorded by  [KB] Katherine L Bruton     Bed Mobility, Assessment/Treatment    Bed Mobility, Comment up in chair  -KP  pt up in wc  -KP    Recorded by [KP] Vero York, SARAR  [KP] Vero York, SARAR    Transfer Assessment/Treatment    Transfers, Sit-Stand [place]   supervision required  -KP    Transfers, Stand-Sit [place]   supervision required  -    Transfers, Sit-Stand-Sit, Assist Device   other (see comments)   wc  -KP    Walk-In Shower Transfer, [place]   set up required;supervision required  -KP    Walk-In Shower Transfer, Assist Device   other (see comments)   tub bench  -KP    Recorded by   [KP] Vero York, OTR    Upper Body Bathing Assessment/Training    UB Bathing Assess/Train Assistive Device   hand-held shower head;tub bench  -KP    UB Bathing Assess/Train, Position   sitting  -KP    UB Bathing Assess/Train, [place] Level   set up required;stand by assist  -KP    Recorded by   [KP] Vero York, OTR    Lower Body Bathing Assessment/Training    LB Bathing Assess/Train Assistive Device   grab bars;hand-held shower head;tub bench  -KP    LB Bathing Assess/Train, Position   sitting;standing  -KP    LB Bathing Assess/Train, [place] Level   set up required;stand by assist  -    Recorded by   [KP] Vero York, OTR    Upper Body Dressing Assessment/Training    UB Dressing Assess/Train, Clothing Type    doffing:;donning:;t-shirt  -KP    UB Dressing Assess/Train, Position   sitting  -KP    UB Dressing Assess/Train, [place]   set up required;supervision required  -KP    Recorded by   [KP] Vero York OTR    Lower Body Dressing Assessment/Training    LB Dressing Assess/Train, Clothing Type   doffing:;donning:;socks;shoes;pants  -KP    LB Dressing Assess/Train, Position   sitting;standing  -KP    LB Dressing Assess/Train, [place]   set up required;supervision required  -KP    LB Dressing Assess/Train, Comment   holds onto grab bar intermittently  -KP    Recorded by   [KP] Vero York OTR    Grooming Assessment/Training    Grooming Assess/Train, Position   sitting  -KP    Grooming Assess/Train, [place] Level   set up required;supervision required  -KP    Recorded by   [KP] Vero York OTR    Balance Skills Training    Sitting-Level of Assistance Distant supervision  -KP  Close supervision;Distant supervision  -KP    Sitting-Balance Support Feet supported  -KP  Feet supported  -KP    Standing-Level of Assistance   Close supervision  -KP    Recorded by [KP] Vero York OTR  [KP] Vero York, SARAR    Fine Motor Coordination Training    Detail (Fine Motor Coordination Training) pulls pegs from yellow resistive putty using one hand, using L hand to inc FMC and hand/digit strength. rotates wooden block up dowel [place] w. pegs to inc UE coordination w. min difficulty. strings foam beads on lace w. R hand holding string and L hand pushing bead on and vice versus to inc FMC and BLC  -KP      Recorded by [KP] Vero York OTR      Positioning and Restraints    Pre-Treatment Position sitting in chair/recliner  -KP  sitting in chair/recliner  -KP    Post Treatment Position wheelchair  -KP  wheelchair  -KP    In Wheelchair sitting;with PT  -KP  sitting;call light within reach;encouraged to call for assist  -KP    Recorded by [KP] Vero York OTR  [KP]  Vero York, OTR      11/08/17 1114 11/08/17 1030       Rehab Assessment/Intervention    Discipline physical therapist  -MD speech language pathologist  -KB     Document Type therapy note (daily note)  -MD therapy note (daily note)  -KB     Subjective Information agree to therapy;no complaints  -MD no complaints;agree to therapy  -KB     Patient Effort, Rehab Treatment good  -MD good  -ANIYA     Symptoms Noted During/After Treatment none  -MD none  -ANIYA     Precautions/Limitations fall precautions  -MD fall precautions;swallowing precautions  -KB     Precautions/Limitations, Vision  WFL with corrective lenses  -KB     Equipment Issued to Patient gait belt;front wheeled walker  -MD      Recorded by [MD] Nereida Llamas, PT [KB] Katherine L Bruton     Pain Assessment    Pain Assessment No/denies pain  -MD 0-10  -KB     Post Pain Score  7  -KB     Pain Location  Toe (Comment which one)  -KB     Pain Orientation  Left;Right  -KB     Pain Intervention(s)  Emotional support   Pt did not want medications.  -KB     Recorded by [MD] Nereida Llamas PT [KB] Katherine L Bruton     Vision Assessment/Intervention    Visual Impairment visual impairment, left  -MD WFL with corrective lenses  -KB     Recorded by [MD] Nereida Llamas, PT [KB] Katherine L Bruton     Cognitive Assessment/Intervention    Current Cognitive/Communication Assessment impaired  -MD      Orientation Status oriented to;person;place;time  -MD      Follows Commands/Answers Questions 100% of the time;needs cueing;needs repetition  -MD      Personal Safety impulsive;decreased insight to deficits  -MD      Personal Safety Interventions fall prevention program maintained;gait belt;muscle strengthening facilitated;nonskid shoes/slippers when out of bed;supervised activity  -MD      Recorded by [MD] Nereida Llamas, PT      Improve functional problem solving    Improve functional problem solving through:  complete high level reasoning task  -KB     Status: Improve functional problem  solving  Progressing as expected  -KB     Functional Problem Solving Progress  80%;without cues;100%;with inconsistent cues  -KB     Comments: Improve functional problem solving  Pt completed high-level logic puzzle with MIN verbal cues for attention to detail.   -KB     Recorded by  [KB] Katherine L Bruton     Improve oral skills    To Improve Oral Skills, patient will:  --   pucker/smile, pucker left/right, suck in/puff out cheeks  -KB     Status: Improve Oral Skills  Progressing as expected  -KB     Oral Skills Progress  continue to adress  -KB     Comments: Improve Oral Skills  Completed exercises without cues.  -KB     Recorded by  [KB] Katherine L Bruton     Improve closure at entrance to airway    To improve closure at entrance to airway, patient will:  --   hard swallows  -KB     Status: Improve closure at entrance to airway  Progressing as expected  -KB     Closure at Entrance to Airway Progress  continue to adress  -KB     Comments: Improve closure at entrance to airway  Completed daily exercises to address airway closure without cues.  -KB     Recorded by  [KB] Katherine L Bruton     Improve laryngeal elevation    To improve laryngeal elevation, patient will:  --   pitch glide, high pitch vowel, humming in falsetto  -KB     Status: Improve laryngeal elevation  Progressing as expected  -KB     Laryngeal Elevation Progress  continue to adress  -KB     Comments: Improve laryngeal elevation  Completed exercises to addres laryngeal elevation without cues.   -KB     Recorded by  [KB] Katherine L Bruton     Improve tongue base & pharyngeal wall squeeze    To improve tongue base & pharyngeal wall squeeze, patient will:  Complete effortful swallow;Complete gargle/hold retraction  -KB     Status: Improve tongue base & pharyngeal wall squeeze  Progressing as expected  -KB     Tongue Base/Pharyngeal Wall Squeeze Progress  continue to adress  -KB     Comments: Improve tongue base & pharyngeal wall squeeze  Completed  exercises without cues.  -KB     Recorded by  [ANIYA] Katherine L Bruton     Bed Mobility, Assessment/Treatment    Bed Mob, Supine to Sit, [place] supervision required  -MD      Bed Mob, Sit to Supine, [place] supervision required  -MD      Recorded by [MD] Nereida Llamas PT      Transfer Assessment/Treatment    Transfers, Sit-Stand [place] stand by assist  -MD      Transfers, Stand-Sit [place] stand by assist  -MD      Transfers, Sit-Stand-Sit, Assist Device rolling walker  -MD      Transfer, Safety Issues step length decreased  -MD      Transfer, Impairments strength decreased;impaired balance  -MD      Recorded by [MD] Nereida Llamas PT      Gait Assessment/Treatment    Gait, [place] Level verbal cues required;contact guard assist  -MD      Gait, Assistive Device rolling walker  -MD      Gait, Distance (Feet) 80   x3  -MD      Gait, Gait Deviations bilateral:;janet decreased;forward flexed posture;step length decreased  -MD      Gait, Safety Issues sequencing ability decreased  -MD      Gait, Impairments impaired balance;strength decreased  -MD      Recorded by [MD] Nereida Llamas PT      Therapy Exercises    Right Lower Extremity AROM:;10 reps;sidelying;hip abduction/adduction   x3 sets  -MD      Left Lower Extremity AROM:;10 reps;sidelying;clam shell   x3 sets  -MD      Bilateral Lower Extremities AROM:;20 reps;ankle pumps/circles;hip abduction/adduction;hip flexion;LAQ  -MD      BLE Other Reps --   Nustep at level 2 for 4 min  -MD      Trunk Exercises 10 reps;supine;bridging   x3 sets  -MD      Recorded by [MD] Nereida Llamas PT      Positioning and Restraints    Pre-Treatment Position sitting in chair/recliner  -MD      Post Treatment Position wheelchair  -MD      In Wheelchair sitting;call light within reach  -MD      Recorded by [MD] Nereida Llamas PT        User Key  (r) = Recorded By, (t) = Taken By, (c) = Cosigned By    Initials Name Effective Dates    BRIANNA York, OTR 04/13/15 -     MD  Nereida Llamas, PT 12/01/15 -     KB Katherine L Bruton 09/29/17 -               IP SLP Goals       11/06/17 1330 11/01/17 1215       Safely Consume Diet    Safely Consume Diet- SLP, Date Established  11/01/17  -OC     Safely Consume Diet- SLP, Time to Achieve by discharge  -OC by discharge  -OC     Safely Consume Diet- SLP, Outcome goal ongoing  -OC goal ongoing  -OC     Cognitive Linguistic- Improve Safety and Awareness    Cognitive Linguistic Improve Safety and Awareness- SLP, Date Established 11/06/17  -OC      Cognitive Linguistic Improve Safety and Awareness- SLP, Time to Achieve by discharge  -OC      Cognitive Linguistic Improve Safety and Awareness- SLP, Outcome goal ongoing  -OC        User Key  (r) = Recorded By, (t) = Taken By, (c) = Cosigned By    Initials Name Provider Type    OC Lilian Mohr MA,CCC-SLP Speech and Language Pathologist          EDUCATION  The patient has been educated in the following areas:   Cognitive Impairment Dysphagia (Swallowing Impairment).    SLP Recommendation and Plan      Continue speech therapy 3-5 times per week until discharge.                                    Time Calculation:         Time Calculation- SLP       11/10/17 1626 11/10/17 1330 11/10/17 1106    Time Calculation- SLP    SLP Start Time 1500  -KB 1330  -KB     SLP Stop Time 1530  -KB 1400  -KB     SLP Time Calculation (min) 30 min  -KB 30 min  -KB     SLP Non-Billable Time (min)   30 min   family conference  -KB      User Key  (r) = Recorded By, (t) = Taken By, (c) = Cosigned By    Initials Name Provider Type    KB Katherine L Bruton Speech and Language Pathologist          Therapy Charges for Today     Code Description Service Date Service Provider Modifiers Qty    55453539445 HC ST TREATMENT SWALLOW 2 11/9/2017 Katherine L Bruton GN 1    84600720832 HC ST DEV OF COGN SKILLS EACH 15 MIN 11/9/2017 Katherine L Bruton GN 2    [id number] HC ST DEV OF COGN SKILLS EACH 15 MIN 11/10/2017 Katherine L Bruton GN 2     [id number] [facility] TREATMENT SWALLOW 2 11/10/2017 Katherine L Bruton GN 1               Katherine L Bruton  11/10/2017

## 2017-11-10 NOTE — PROGRESS NOTES
"   LOS: 10 days   Patient Care Team:  Calvin Dhillon Jr., DO as PCP - General (Internal Medicine)    Chief Complaint:   S/p R MCA ischemic infarct  Stroke prophylaxis - ASA 81 mg and Lovenox transition to coumadin  Hypercoaguability disorder - Hereditary thrombophilia  Homozygous MTHFR with hyperhomocystinemia  Homozygous factor V Leiden gene mutation  history of malignant neoplasm of breast   Obesity   Hyperlipidemia -   Hypothyroidism   DM    Subjective     History of Present Illness    Subjective     Strength continues better.  The therapy continues to work on attention to the left side.  History taken from: patient    Objective     Vital Signs  Temp:  [97.7 °F (36.5 °C)-98.4 °F (36.9 °C)] 97.7 °F (36.5 °C)  Heart Rate:  [67-82] 67  Resp:  [16-20] 16  BP: (112-142)/(55-74) 112/55    Objective:  Vital signs: (most recent): Blood pressure 112/55, pulse 67, temperature 97.7 °F (36.5 °C), temperature source Oral, resp. rate 16, height 67\" (170.2 cm), weight 219 lb 6.4 oz (99.5 kg), SpO2 97 %.            Mental status-awake alert  Lungs-clear to auscultation  Heart-regular rate and rhythm  Abdomen abdomen-normoactive bowel sounds, soft, nontender  Extremities-no pretibial edema  Neurologic- Left elbow flexion 5, finger flexion 5, knee extension 5, ankle dorsiflexion 5 . .  Skin-blood filled vesicle left upper lip        Results Review:     I reviewed the patient's new clinical results.    Lab Results  Lab Value Date/Time   INR 2.14 (H) 11/09/2017 0657   INR 1.91 (H) 11/08/2017 0722   INR 1.79 (H) 11/07/2017 0506   INR 1.41 (H) 11/06/2017 0627   INR 1.41 (H) 11/05/2017 0555   INR 1.17 (H) 11/04/2017 0647   INR 1.13 (H) 11/03/2017 0520   INR 1.07 11/02/2017 0713   INR 1.09 11/01/2017 0754       Results from last 7 days  Lab Units 11/09/17  0657 11/06/17  0627   WBC 10*3/mm3 7.45 6.72   HEMOGLOBIN g/dL 11.7* 11.4*   HEMATOCRIT % 37.6 36.3   PLATELETS 10*3/mm3 160 172         Results from last 7 days  Lab Units " 11/06/17  0627   SODIUM mmol/L 141   POTASSIUM mmol/L 4.4   CHLORIDE mmol/L 102   CO2 mmol/L 23.3   BUN mg/dL 15   CREATININE mg/dL 1.10*   CALCIUM mg/dL 9.1   BILIRUBIN mg/dL 0.4   ALK PHOS U/L 72   ALT (SGPT) U/L 24   AST (SGOT) U/L 30   GLUCOSE mg/dL 132*     Glucose   Date/Time Value Ref Range Status   11/08/2017 1629 112 70 - 130 mg/dL Final   11/08/2017 0716 104 70 - 130 mg/dL Final   11/07/2017 1636 88 70 - 130 mg/dL Final       Labs on October 31-sodium 139, potassium 3.8, chloride 106, glucose 101, calcium 9.0, come back to 25, BUN 16, creatinine 1.1.  WBC6.64, hemoglobin 11.8, hematocrit 36.0, platelets 125.  October 29 triglycerides 89, cholesterol 125.  October 28 AST 35, ALT 29, alkaline phosphatase 99, total protein 7.7, albumin 4.1  Oct 31 - INR 1.0  Medication Review: done  Scheduled Meds:    allopurinol 200 mg Oral Daily   aspirin 81 mg Oral Daily   atorvastatin 80 mg Oral Nightly   cholecalciferol 1,000 Units Oral BID   folic acid 1 mg Oral Daily   levothyroxine 112 mcg Oral Q AM   melatonin 3 mg Oral Nightly   metFORMIN 500 mg Oral Daily With Breakfast   multivitamin 1 tablet Oral Daily   sennosides-docusate sodium 2 tablet Oral BID   warfarin 9 mg Oral Daily     Continuous Infusions:   PRN Meds:.•  acetaminophen  •  bisacodyl  •  dextrose  •  dextrose  •  glucagon (human recombinant)  •  magnesium hydroxide      Assessment/Plan     Active Problems:    Stroke      Assessment & Plan  S/p R MCA ischemic infarct    Left hemiparesis-improving  Left inattention-improved    Dysphagia-November 2-advance from puree to mechanical soft, no mixed consistency, thin liquid diet.    Stroke prophylaxis - ASA 81 mg and Lovenox transition to coumadin.  Daily INR.  November 2-INR still low-Will continue Coumadin 5 mg as recent restarted but may titrate up dose tomorrow depending on lab result. Nov 3- increased coumadin to 7.5 mg. November 6-INR still low at 1.41.  Has been on Coumadin 5 mg and then increased to 7.5  mg x last 3 days, have changed to 10 mg, continue to follow INR daily.  November 7-INR 1.8-change Coumadin to 7.5 mg daily and continue to follow INR daily. Nov 8 - INR 1.9, changed coumadin to 10 mg, continues on Lovenox. Nov 9- INR 2.14 - coumadin 9 mg , anticipate last dose of Lovenox with overlap tonight.     Hypercoaguability disorder  history of malignant neoplasm of breast   Factor 5 Leiden mutation, heterozygous   Obesity   Hyperlipidemia -atorvastatin   Hypothyroidism -on replacement   Cerebrovascular accident (CVA) due to thrombosis of right middle cerebral artery   DVT prophylaxis-SCDs and anticoagulation  Diabetes mellitus-metformin  History of lability after past stroke  Poststroke fatigue  Viral oral lesion - has taken acyclovir in past . Will treat with Valtrex 2 gm po q 12 hours x 2 doses.      72 to female status post right MCA ischemic infarct with left inattention, left hemiparesis, dysarthria, decreased alertness, impairments with her mobility and self-care and is now admitted for comprehensive inpatient rehabilitation program with physical therapy 1 hour, occupational therapy 1 hour, and speech therapy 1 hour, 5 days a week.  Rehabilitation nursing for carryover and monitoring of her neurologic status, diabetes, bowel bladder and skin.  Ongoing physician follow-up.  Weekly team conferences.  Goals for her to achieve a level of supervision with her mobility and self-care for return home with her family.       TEAM CONF- NOV 3- LEFT INATTENTION. DIFFICULTY DON LEG LEG OF PANTS. FINE MOTOR DEFICITS LEFT HAND. MILD MEMORY DEFICITS. DYSPHAGIA - MECH SOFT, THINS. TRIAL WHOLE MEALS. FLAT AFFECT.  BNE PENDING. TRANSFERS CTG. GAIT 80 FEET MIN ASSIST. 4 STAIRS MIN ASSIST. BLADDER CONTINENT.  ELOS- TWO WEEKS    TEAM CONF- NOV 10- LEFT INATTENTION. CUES WITH REC THERAPY. ADLS SET UP/SUPERVISION. TRANSFERS SBA. GAIT 80 FEET RW CTG.  BNE (Active)  Att'n. - WNL  Exec. Fx. - Mildly Imp., slowed processing  speed  Rsng/Jgmnt - WNL  Arith - Mildly Imp.  Visuospatial Skills - Mildly Imp.  Visual Mem. - Mildly Imp.  Verbal Mem. - Min Imp.  Emot. - Pt reports improved mood, but mild anx  ELOS- TUES    .  Aric Chao MD  11/10/17  7:45 AM    Time:

## 2017-11-10 NOTE — PLAN OF CARE
Problem: Patient Care Overview (Adult)  Goal: Plan of Care Review  Outcome: Ongoing (interventions implemented as appropriate)    11/10/17 0353   Coping/Psychosocial Response Interventions   Plan Of Care Reviewed With patient   Patient Care Overview   Progress improving   Outcome Evaluation   Outcome Summary/Follow up Plan Pt denied any pain. Sleeping good. No new issues noted at this time.         Problem: Stroke (Ischemic) (Adult)  Goal: Signs and Symptoms of Listed Potential Problems Will be Absent or Manageable (Stroke)  Outcome: Ongoing (interventions implemented as appropriate)    Problem: Fall Risk (Adult)  Goal: Absence of Falls  Outcome: Ongoing (interventions implemented as appropriate)

## 2017-11-10 NOTE — PROGRESS NOTES
Inpatient Rehabilitation Functional Measures Assessment    Functional Measures  YESI Eating:  [facility] Grooming: [facility] Bathing:  [facility] Upper Body Dressing:  [facility] Lower Body Dressing:  [facility] Toileting:  [facility] Bladder Management  Level of Assistance:  [place]  Frequency/Number of Accidents this Shift:  [facility] Bowel Management  Level of Assistance: [place]  Frequency/Number of Accidents this Shift: [facility] Bed/Chair/Wheelchair Transfer:  [facility] Toilet Transfer:  [facility] Tub/Shower Transfer:  [place]    Previously Documented Mode of Locomotion at Discharge: Field  YESI Expected Mode of Locomotion at Discharge: [facility] Walk/Wheelchair:  [facility] Stairs:  [facility] Comprehension:  Both ( auditory and visual) modes of comprehension are used  equally. Comprehension Score = 6, Modified [place].  Patient comprehends  complex/abstract information in their primary language, requiring: Additional  time. Hearing Aid. Glasses.  YESI Expression:  Vocal expression is the usual mode. Expression Score = 6,  Modified Independent.  Patient expresses complex/abstract information in their  primary language, requiring: Additional time.  YESI Social Interaction:  Social Interaction Score = 6, Modified Independent.  Patient is modified independent for social interaction, requiring: Requires  additional time.  YESI Problem Solving:  Problem Solving Score = 6, Modified [place].  Patient  makes appropriate decisions in order to solve complex problems, but requires  extra time.  YESI Memory:  Memory Score = 6, Modified [place].  Patient is modified  independent for memory, requiring: Requires additional time.    Therapy Mode Minutes  Occupational Therapy: [facility]  Physical Therapy: [facility]  Speech Language Pathology:  [facility]    Signed by: Saad Marquez RN

## 2017-11-10 NOTE — PROGRESS NOTES
Inpatient Rehabilitation Plan of Care Note    Plan of Care  Care Plan Reviewed - No updates at this time.    Signed by: Saad Marquez RN

## 2017-11-10 NOTE — PROGRESS NOTES
Case Management  Inpatient Rehabilitation Team Conference    Conference Date/Time: 11/10/2017 7:39:00 AM    Team Conference Attendees:  Luis Armando Romero MSSW Mary Dean, PT  Vero York, OT  Katherine Bruton, SLP  Dc Ku, CTRS  Modesta Ramirez RD, LD  Daniel Horne RN    Demographics            Age: 72Y            Gender: Female    Admission Date: 10/31/2017 3:39:38 PM  Rehabilitation Diagnosis:  CVA left jennifer  Past Medical History: [place], allergies, DOMITILA cataracts; HLD, LE edema; WALLY; OA,  chronic pain, RA, back sx, foot sx 2016; multiple UTIs, urgency; hernia repair,  gastric bypass; 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  Worked part time from home.  has [employer] business a mile from their  home. Three children and sister live in [place].  Family conference scheduled for Friday, 11/10 at 10:00 a.m.  D/C plan is either home with  or to daughter's home here in [place].  HH or outpatient therapies.  Medical Necessity Expected Level Rationale: Supervision/SBA  Intensity and Duration: an average of 3 hours/5 days per week  Medical Supervision and 24 Hour Rehab Nursing: x  Physical Therapy: x  PT Intensity/Duration: 60 minutes/day, 5 days/week  Occupational Therapy: x  OT Intensity/Duration: 60 minutes/day, 5 days/week  Speech and Language Therapy: x  SLP Intensity/Duration: 60 minutes/day, 5 days/week  Social Work: x  Therapeutic Recreation: x  Psychology: x  Updated (if changes indicated)    Anticipated Discharge Date/Estimated Length of Stay:   ELOS: plan D/C 11/14    Based on the patient's medical and functional status,  their prognosis and  expected level of functional improvement is: Supervision/SBA      Interdisciplinary Problem/Goals/Status    All Rehab Problems:  Body Systems    [RN] Endocrine(Active)  Current Status(11/03/2017): Blood sugar well controlled, accuchecks down to BID  Weekly Goal(11/10/2017): Blood sugar will be within acceptable limits  Discharge Goal: Independent with diabetes regimen        Cognition    [ST] Attention(Active)  Current Status(11/10/2017): mild attention deficit  Weekly Goal(11/17/2017): Pt will attend to details in daily tasks.  Discharge Goal: Pt will attend to high level tasks with minimal errors.        Mobility    [OT] Toilet Transfers(Active)  Current Status(11/09/2017): SBA  Weekly Goal(11/16/2017): SBA  Discharge Goal: SUP    [OT] Tub/Shower Transfers(Active)  Current Status(11/09/2017): SBA  Weekly Goal(11/16/2017): SBA  Discharge Goal: SBA    [PT] Bed/Chair/Wheelchair(Active)  Current Status(11/09/2017): SBA, RWx  Weekly Goal(11/16/2017): Supervision, RWx  Discharge Goal: Supervision, RWx    [PT] Walk(Active)  Current Status(11/09/2017): 80 ft, SBA, RWx  Weekly Goal(11/16/2017): to and from BR, Supervision  Discharge Goal: 160 ft, Supervision, RWx    [PT] Stairs(Active)  Current Status(11/09/2017): 4 steps, 2 HR, SBA  Weekly Goal(11/16/2017): PT only  Discharge Goal: 4 steps, 2 HR, SBA        Psychosocial    [RN] Coping/Adjustment(Active)  Current Status(10/31/2017): Expresses appropriate coping  Weekly Goal(11/14/2017): Allow opportunity to express concerns regarding current  status  Discharge Goal: Adequate coping regarding life changes with ongoing support        Safety    [RN] Potential for Injury(Active)  Current Status(10/31/2017): History of fall  Weekly Goal(11/14/2017): Cue to use call bell  Discharge Goal: Patient/family will be aware of risk of fall and safety in the  home setting        Self Care    [OT] Bathing(Active)  Current Status(11/09/2017): SBA  Weekly  Goal(11/16/2017): SBA  Discharge Goal: SUP to SBA    [OT] Dressing (Lower)(Active)  Current Status(11/09/2017): SBA  Weekly Goal(11/16/2017): SBA  Discharge Goal: SBA    [OT] Dressing (Upper)(Active)  Current Status(11/02/2017): MIN  Weekly Goal(11/10/2017): SBA  Discharge Goal: SUP    [OT] Grooming(Active)  Current Status(11/09/2017): Mod I  Weekly Goal(11/16/2017): Mod I  Discharge Goal: Mod I    [OT] Toileting(Active)  Current Status(11/09/2017): SBA  Weekly Goal(11/16/2017): SBA  Discharge Goal: SUP to SBA        Sphincter Control    [RN] Bowel and Bladder Management(Active)  Current Status(10/31/2017): Continent of bowel and bladder  Weekly Goal(11/14/2017): 100% continent  Discharge Goal: 100% continent        Swallow Function    [ST] Swallowing(Active)  Current Status(11/10/2017): Mech soft and Thin  Weekly Goal(11/17/2017): Tolerate diet with no overt s/s aspiration  Discharge Goal: Return to regular textures and thin liquids        Comments: 11/3: some left neglect; left hand with fine motor issues; test tray  with mech soft yesterday, did well, SLP focusing on swallow; poor sleep; poor  insight into deficits at times;    Signed by: Ella Bae,    Physician CoSigned By: Aric Chao 11/10/2017 08:51:32

## 2017-11-10 NOTE — PLAN OF CARE
Problem: Patient Care Overview (Adult)  Goal: Plan of Care Review  Outcome: Ongoing (interventions implemented as appropriate)    11/10/17 1419   Coping/Psychosocial Response Interventions   Plan Of Care Reviewed With patient   Patient Care Overview   Progress improving   Outcome Evaluation   Outcome Summary/Follow up Plan To DC on Tuesday. made appt w/ PCP to draw INR on the 16th.         Problem: Stroke (Ischemic) (Adult)  Goal: Signs and Symptoms of Listed Potential Problems Will be Absent or Manageable (Stroke)  Outcome: Ongoing (interventions implemented as appropriate)    11/10/17 1419   Stroke (Ischemic)   Problems Assessed (Stroke (Ischemic)/TIA) motor/sensory impairment   Problems Present (Stroke (Ischemic)/TIA) motor/sensory impairment         Problem: Fall Risk (Adult)  Goal: Absence of Falls  Outcome: Ongoing (interventions implemented as appropriate)    11/10/17 1419   Fall Risk (Adult)   Absence of Falls making progress toward outcome

## 2017-11-10 NOTE — THERAPY TREATMENT NOTE
Inpatient Rehabilitation - Occupational Therapy Treatment Note  [facility]     Patient Name: Alice Maxwell  : 1945  MRN: [id number]  Today's Date: 11/10/2017  Onset of Illness/Injury or Date of Surgery Date: 10/28/17  Date of Referral to OT: 17  Referring Physician: Jeremie      Admit Date: 10/31/2017    Visit Dx:     ICD-10-CM ICD-9-CM   1. Oropharyngeal dysphagia R13.12 787.22     Patient Active Problem List   Diagnosis   • Stroke             Adult Rehabilitation Note       11/10/17 1218 11/10/17 0847 17 1445    Rehab Assessment/Intervention    Discipline occupational therapist  -KP physical therapist  -MD occupational therapist  -    Document Type therapy note (daily note)  -KP therapy note (daily note)  -MD therapy note (daily note)  -KP    Subjective Information agree to therapy;no complaints  -KP agree to therapy;no complaints  -MD agree to therapy;no complaints  -KP    Patient Effort, Rehab Treatment excellent  -KP good  -MD excellent  -KP    Symptoms Noted During/After Treatment none  -KP none  -MD none  -KP    Precautions/Limitations fall precautions  -KP fall precautions  -MD fall precautions;swallowing precautions  -    Equipment Issued to Patient  gait belt;front wheeled walker  -MD     Recorded by [KP] Vero York, OTR [MD] Nereida Llamas, PT [KP] Vero York OTR    Pain Assessment    Pain Assessment No/denies pain  -KP No/denies pain  -MD No/denies pain  -KP    Pain Score 0  -KP      Recorded by [KP] Vero York OTR [MD] Nereida Llamas, PT [KP] Vero York OTR    Vision Assessment/Intervention    Visual Impairment visual impairment, left   but is scanning to L. aware of L side all week during ADLs  -KP  visual impairment, left  -KP    Recorded by [] Vero York OTR  [KP] Vero York OTR    Cognitive Assessment/Intervention    Current Cognitive/Communication Assessment impaired  -KP impaired  -MD impaired  -KP     Orientation Status oriented x 4  -KP oriented to;person;place;time  -MD oriented to;person;place;time  -KP    Follows Commands/Answers Questions 100% of the time;able to follow single-step instructions  -% of the time;needs repetition;needs cueing  -% of the time;able to follow single-step instructions  -KP    Personal Safety mild impairment  -KP decreased insight to deficits  -MD mild impairment  -KP    Personal Safety Interventions fall prevention program maintained;gait belt;muscle strengthening facilitated;nonskid shoes/slippers when out of bed  -KP fall prevention program maintained;gait belt;muscle strengthening facilitated;nonskid shoes/slippers when out of bed;supervised activity  -MD fall prevention program maintained;gait belt;nonskid shoes/slippers when out of bed  -KP    Recorded by [KP] Vero York OTR [MD] Nereida Llamas, PT [KP] Vero York OTR    Bed Mobility, Assessment/Treatment    Bed Mobility, Comment up in   -  up in   -    Recorded by [KP] KANDY Sultana  [KP] Vero York, SARAR    Transfer Assessment/Treatment    Transfers, Sit-Stand [place] supervision required;set up required;stand by assist  - set up required;supervision required;stand by assist  -MD     Transfers, Stand-Sit [place] stand by assist;supervision required;set up required  - set up required;supervision required;stand by assist  -MD     Transfers, Sit-Stand-Sit, Assist Device other (see comments)   wc  -KP rolling walker  -MD     Toilet Transfer, [place] supervision required;set up required  -      Toilet Transfer, Assistive Device other (see comments);wheelchair  -KP      Transfer, Safety Issues  step length decreased  -MD     Transfer, Impairments  strength decreased;impaired balance  -MD     Recorded by [KP] Vero York OTR [MD] Nereida Llamas, PT     Gait Assessment/Treatment    Gait, [place] Level  verbal cues required;stand by assist   -MD     Gait, Assistive Device  rolling walker  -MD     Gait, Distance (Feet)  160   x2  -MD     Gait, Gait Deviations  bilateral:;janet decreased;forward flexed posture;step length decreased;other (see comments)   [place] to the L  -MD     Gait, Safety Issues  step length decreased  -MD     Gait, Impairments  impaired balance;strength decreased  -MD     Recorded by  [MD] Nereida Llamas, PT     Upper Body Bathing Assessment/Training    UB Bathing Assess/Train Assistive Device hand-held shower head;tub bench  -      UB Bathing Assess/Train, Position sitting  -      UB Bathing Assess/Train, [place] Level set up required;stand by assist  -KP      Recorded by [KP] Vero York OTR      Lower Body Bathing Assessment/Training    LB Bathing Assess/Train Assistive Device hand-held shower head;tub bench;grab bars  -      LB Bathing Assess/Train, Position sitting;standing  -KP      LB Bathing Assess/Train, [place] Level set up required;stand by assist  -KP      Recorded by [KP] KANDY Sultana      Upper Body Dressing Assessment/Training    UB Dressing Assess/Train, Clothing Type doffing:;donning:;t-shirt  -KP      UB Dressing Assess/Train, Position sitting  -      UB Dressing Assess/Train, [place] set up required;supervision required  -KP      Recorded by [KP] KANDY Sultana      Lower Body Dressing Assessment/Training    LB Dressing Assess/Train, Clothing Type doffing:;donning:;pants;shoes;slipper socks;socks  -KP      LB Dressing Assess/Train, Position sitting;standing  -KP      LB Dressing Assess/Train, [place] set up required;supervision required  -KP      Recorded by [KP] KANDY Sultana      Toileting Assessment/Training    Toileting Assess/Train, Position sitting;standing  -KP      Toileting Assess/Train, [place] Level set up required;supervision required  -KP      Recorded by [KP] Vero York OTR      Grooming Assessment/Training     Grooming Assess/Train, Position sitting  -KP      Grooming Assess/Train, [place]ep[place] Level set up required;supervision required  -KP      Recorded by [KP] KANDY Sultana      Balance Skills Training    Sitting-Level of Assistance Distant supervision  -KP  Distant supervision  -KP    Sitting-Balance Support Feet supported  -KP  Feet supported  -KP    Standing-Level of Assistance Close supervision  -KP      Static Standing Balance Support No upper extremity supported;Right upper extremity supported  -KP      Recorded by [KP] KANDY Sultana  [KP] Vero York OTR    Fine Motor Coordination Training    Detail (Fine Motor Coordination Training) inserts pegs w. L hand into grooved peg board to inc FMC. pt demo w. no difficulty using L hand. inserts small square pegs into small peg board colored squares to inc FMC w. L hand w. no difficulty.  -KP  inserts silver pegs into slot, washer on top and other round open peg over top x12 (purdue peg board) to inc FMC w. L hand.  Pulls coins from resistive putty x10 w, L hand to inc FMC. and finger/hand strength. Grasps a lot of coins in her palm, in hand manipulation pushing coins to finger tips then inserting into slot to inc FMC. w. little to no difficulty  -KP    Recorded by [KP] KANDY Sultana  [KP] KANDY Sultana    Positioning and Restraints    Pre-Treatment Position sitting in chair/recliner  -KP sitting in chair/recliner  -MD sitting in chair/recliner  -KP    Post Treatment Position wheelchair  -KP  wheelchair  -KP    In Wheelchair sitting;call light within reach;encouraged to call for assist;with family/caregiver  -KP  sitting;with PT  -KP    Recorded by [KP] KANDY Sultana [MD] Nereida Llamas, PT [KP] KANDY Sultana      11/09/17 1330 11/09/17 1210 11/09/17 1030    Rehab Assessment/Intervention    Discipline speech language pathologist  -ANIYA occupational therapist  -BRIANNA speech language pathologist  -ANIYA     Document Type therapy note (daily note)  -KB therapy note (daily note)  -KP therapy note (daily note)  -KB    Subjective Information no complaints;agree to therapy  -KB agree to therapy;no complaints  -KP no complaints;agree to therapy  -KB    Patient Effort, Rehab Treatment good  -KB good  -KP good  -KB    Symptoms Noted During/After Treatment none  -KB none  -KP none  -KB    Precautions/Limitations fall precautions;swallowing precautions  -KB fall precautions  -KP fall precautions  -KB    Recorded by [KB] Katherine L Bruton [KP] Vero York, OTR [KB] Katherine L Bruton    Pain Assessment    Pain Assessment No/denies pain  -KB No/denies pain  -KP No/denies pain  -KB    Pain Score  0  -KP     Recorded by [KB] Katherine L Bruton [KP] Vero York OTR [KB] Katherine L Bruton    Vision Assessment/Intervention    Visual Impairment visual impairment, left;WFL with corrective lenses  -KB visual impairment, left  -KP visual impairment, left;WFL with corrective lenses  -KB    Recorded by [KB] Katherine L Bruton [KP] Vero York, OTR [KB] Katherine L Bruton    Cognitive Assessment/Intervention    Current Cognitive/Communication Assessment  impaired  -KP     Orientation Status  oriented to;person;place;time  -     Follows Commands/Answers Questions  100% of the time;able to follow single-step instructions  -     Personal Safety  mild impairment  -     Personal Safety Interventions  fall prevention program maintained;gait belt;nonskid shoes/slippers when out of bed  -KP     Recorded by  [KP] Vero York, OTR     Improve attention    Improve attention by: complete divided attention task  -KB  complete divided attention task  -KB    Status: Improve attention Progressing as expected  -KB  Progressing as expected  -KB    Attention Progress 70%;without cues;100%;with inconsistent cues  -KB  70%;without cues;100%;with inconsistent cues  -KB    Comments: Improve attention Pt completed  attention/organization task with errors due to inattention.  -KB  Pt completed complex written directions task with MIN-MOD cues for reasoning and attention.  -KB    Recorded by [KB] Katherine L Bruton  [KB] Katherine L Bruton    Improve oral skills    To Improve Oral Skills, patient will: Increase tongue tip elevation;Increase tongue lateralization;Increase lip closure;Increase back of tongue control;Increase tongue A-P movement  -KB  Increase tongue tip elevation;Increase tongue lateralization;Increase lip closure;Increase back of tongue control;Increase tongue A-P movement  -KB    Status: Improve Oral Skills Progressing as expected  -KB  Progressing as expected  -KB    Oral Skills Progress continue to adress  -KB  continue to adress  -KB    Comments: Improve Oral Skills Unable to alternate puckered lips left to right despite MAX cues and instruciton; cues for labial closure.,  -KB  Pt with difficulty moving pucker left to right and holding air in mouth to puff up cheeks.  -KB    Recorded by [KB] Katherine L Bruton  [KB] Katherine L Bruton    Improve closure at entrance to airway    To improve closure at entrance to airway, patient will: --   hard swallows  -KB  --   hard swallows  -KB    Status: Improve closure at entrance to airway Progressing as expected  -KB  Progressing as expected  -KB    Closure at Entrance to Airway Progress continue to adress  -KB  continue to adress  -KB    Comments: Improve closure at entrance to airway Completed exercises with verbal cues for squeezing muscles.  -KB  Completes exercises independently.   -KB    Recorded by [KB] Katherine L Bruton  [KB] Katherine L Bruton    Improve laryngeal elevation    To improve laryngeal elevation, patient will: Complete pitch-glide  -KB  Complete pitch-glide  -KB    Status: Improve laryngeal elevation Progressing as expected  -KB  Progressing as expected  -KB    Laryngeal Elevation Progress continue to adress  -KB  continue to adress  -KB     Comments: Improve laryngeal elevation Completed exercises independently.   -KB  Pt completes exercises independently.  -KB    Recorded by [KB] Katherine L Bruton  [KB] Katherine L Bruton    Improve tongue base & pharyngeal wall squeeze    To improve tongue base & pharyngeal wall squeeze, patient will: Complete tongue base retraction;Complete yawn/hold retractions  -KB  Complete effortful swallow;Complete gargle/hold retraction  -KB    Status: Improve tongue base & pharyngeal wall squeeze Progressing as expected  -KB  Progressing as expected  -KB    Tongue Base/Pharyngeal Wall Squeeze Progress continue to adress  -KB  continue to adress  -KB    Comments: Improve tongue base & pharyngeal wall squeeze Completed exercises with verbal instructions.  -KB  Pt completed exercises independently.   -KB    Recorded by [KB] Katherine L Bruton  [KB] Katherine L Bruton    Bed Mobility, Assessment/Treatment    Bed Mobility, Comment  up in wc  -KP     Recorded by  [KP] Vero York, SARAR     Transfer Assessment/Treatment    Transfers, Sit-Stand [place]  set up required;supervision required;stand by assist  -     Transfers, Stand-Sit [place]  set up required;supervision required;stand by assist  -     Transfers, Sit-Stand-Sit, Assist Device  other (see comments)   wc grab bars  -     Walk-In Shower Transfer, [place]  set up required;supervision required  -KP     Walk-In Shower Transfer, Assist Device  other (see comments)   tub bench, grab bars  -     Recorded by  [KP] Vero York, SARAR     Functional Mobility    Functional Mobility- Comment  a few steps to tub bench SBA  -KP     Recorded by  [KP] Vero York OTR     Upper Body Bathing Assessment/Training    UB Bathing Assess/Train Assistive Device  hand-held shower head;grab bars;tub bench  -     UB Bathing Assess/Train, Position  sitting  -     UB Bathing Assess/Train, [place] Level  set up required;stand by assist  \[facility] [place] [facility]\""      Recorded by  [KP] KANDY Sultana     Lower Body Bathing Assessment/Training    LB Bathing Assess/Train Assistive Device  hand-held shower head;grab bars;tub bench  -KP     LB Bathing Assess/Train, Position  sitting;standing  -KP     LB Bathing Assess/Train, [place] Level  set up required;stand by assist  -KP     Recorded by  [KP] KANDY Sultana     Upper Body Dressing Assessment/Training    UB Dressing Assess/Train, Clothing Type  doffing:;donning:;t-shirt  -KP     UB Dressing Assess/Train, Position  sitting  -KP     UB Dressing Assess/Train, [place]  set up required;supervision required  -KP     Recorded by  [KP] KANDY Sultana     Lower Body Dressing Assessment/Training    LB Dressing Assess/Train, Clothing Type  doffing:;donning:;pants;shoes;slipper socks;socks  -KP     LB Dressing Assess/Train, Position  sitting;standing  -KP     LB Dressing Assess/Train, [place]  set up required;supervision required  -KP     Recorded by  [KP] KANDY Sultana     Grooming Assessment/Training    Grooming Assess/Train, Position  sitting  -KP     Grooming Assess/Train, Indep[place] Level  conditional independence;set up required  -KP     Recorded by  [KP] KANDY Sultana     Balance Skills Training    Sitting-Level of Assistance  Distant supervision  -KP     Sitting-Balance Support  Feet supported  -KP     Sitting-Balance Activities  Reaching for objects  -KP     Standing-Level of Assistance  Close supervision  -KP     Recorded by  [KP] KANDY Sultana     Positioning and Restraints    Pre-Treatment Position  sitting in chair/recliner  -BRIANNA     Post Treatment Position  wheelchair  -KP     In Wheelchair  sitting;with other staff   RT  -KP     Recorded by  [KP] KANDY Sultana       11/09/17 1027 11/08/17 1539 11/08/17 1330    Rehab Assessment/Intervention    Discipline physical therapist  -MD occupational therapist  -BRIANNA speech language  pathologist  -KB    Document Type therapy note (daily note)  -MD therapy note (daily note)  -KP therapy note (daily note)  -KB    Subjective Information no complaints;agree to therapy  -MD agree to therapy;no complaints  -KP no complaints;agree to therapy  -KB    Patient Effort, Rehab Treatment good  -MD good  -KP good  -KB    Symptoms Noted During/After Treatment none  -MD none  -KP none  -KB    Precautions/Limitations fall precautions  -MD fall precautions;swallowing precautions  -KP fall precautions;swallowing precautions  -KB    Equipment Issued to Patient gait belt  -MD      Recorded by [MD] Nereida Llamas, PT [KP] Vero York, OTR [KB] Katherine L Bruton    Pain Assessment    Pain Assessment No/denies pain  -MD No/denies pain  -KP No/denies pain  -KB    Pain Score  0  -KP     Recorded by [MD] Nereida Llamas, PT [KP] Vero York, OTR [KB] Katherine L Bruton    Vision Assessment/Intervention    Visual Impairment  visual impairment, left  -KP WFL with corrective lenses  -KB    Recorded by  [KP] Vero York OTR [KB] Katherine L Bruton    Cognitive Assessment/Intervention    Current Cognitive/Communication Assessment impaired  -MD impaired  -KP     Orientation Status person;place;time;oriented to  -MD person;place;time;oriented to  -KP     Follows Commands/Answers Questions 100% of the time;needs cueing;needs increased time;needs repetition  -% of the time;able to follow single-step instructions;needs cueing  -     Personal Safety decreased insight to deficits  -MD mild impairment  -     Personal Safety Interventions fall prevention program maintained;gait belt;muscle strengthening facilitated;nonskid shoes/slippers when out of bed;supervised activity  -MD fall prevention program maintained;gait belt;nonskid shoes/slippers when out of bed  -KP     Recorded by [MD] Nereida Llamas, PT [KP] Vero York, OTR     Improve functional problem solving    Improve functional problem solving  through:   complete high level reasoning task  -KB    Status: Improve functional problem solving   Progressing as expected  -KB    Functional Problem Solving Progress   80%;without cues;100%;with inconsistent cues  -KB    Comments: Improve functional problem solving   Completed two high-level logic puzzles with MIN cues only.  -KB    Recorded by   [KB] Katherine L Bruton    Improve closure at entrance to airway    To improve closure at entrance to airway, patient will:   --   pushing/pulling exercises  -KB    Status: Improve closure at entrance to airway   Progressing as expected  -KB    Closure at Entrance to Airway Progress   continue to adress  -KB    Comments: Improve closure at entrance to airway   Initial cues for how to complete exercises.  -KB    Recorded by   [KB] Katherine L Bruton    Improve tongue base & pharyngeal wall squeeze    To improve tongue base & pharyngeal wall squeeze, patient will:   --   Practiced k/g word list forcefully  -KB    Status: Improve tongue base & pharyngeal wall squeeze   Progressing as expected  -KB    Tongue Base/Pharyngeal Wall Squeeze Progress   continue to adress  -KB    Comments: Improve tongue base & pharyngeal wall squeeze   No cues needed, pt repeated word list x5.   -KB    Recorded by   [KB] Katherine L Bruton    Bed Mobility, Assessment/Treatment    Bed Mobility, Roll Left, [place] supervision required  -MD      Bed Mobility, Roll Right, [place] supervision required  -MD      Bed Mobility, Scoot/Bridge, [place] supervision required  -MD      Bed Mob, Supine to Sit, [place] supervision required  -MD      Bed Mob, Sit to Supine, [place] supervision required  -MD      Bed Mobility, Comment  up in chair  -KP     Recorded by [MD] Nereida Llamas, PT [KP] Vero York, OTR     Transfer Assessment/Treatment    Transfers, Sit-Stand [place] supervision required  -MD      Transfers, Stand-Sit [place] supervision required  -MD       Transfers, Sit-Stand-Sit, Assist Device rolling walker  -MD      Transfer, Safety Issues step length decreased  -MD      Transfer, Impairments strength decreased;impaired balance  -MD      Transfer, Comment car transfer: SBA, RWx  -MD      Recorded by [MD] Nereida Llamas PT      Gait Assessment/Treatment    Gait, [place] Level verbal cues required;stand by assist;contact guard assist  -MD      Gait, Assistive Device rolling walker  -MD      Gait, Distance (Feet) 80   x3  -MD      Gait, Gait Deviations bilateral:;janet decreased;forward flexed posture;step length decreased  -MD      Gait, Safety Issues step length decreased  -MD      Gait, Impairments impaired balance;strength decreased  -MD      Recorded by [MD] Nereida Llamas PT      Stairs Assessment/Treatment    Number of Stairs 4  -MD      Stairs, Handrail Location both sides  -MD      Stairs, [place] Level supervision required  -MD      Stairs, Technique Used step over step (ascending);step to step (descending)  -MD      Stairs, Impairments strength decreased;impaired balance  -MD      Recorded by [MD] Nereida Llamas PT      Balance Skills Training    Sitting-Level of Assistance  Distant supervision  -     Sitting-Balance Support  Feet supported  -     Standing-Level of Assistance Contact guard  -MD      Static Standing Balance Support No upper extremity supported  -MD      Standing-Balance Activities Ball toss  -MD      Gait Balance-Level of Assistance Contact guard  -MD      Gait Balance Support parallel bars  -MD      Gait Balance Activities backwards;side-stepping  -MD      Recorded by [MD] Nereida Llamas PT [KP] KANDY Sultana     Fine Motor Coordination Training    Detail (Fine Motor Coordination Training)  pulls pegs from yellow resistive putty using one hand, using L hand to inc FMC and hand/digit strength. rotates wooden block up dowel [place] w. pegs to inc UE coordination w. min difficulty. strings foam beads on lace w. R hand holding string  and L hand pushing bead on and vice versus to inc FMC and BLC  -KP     Recorded by  [KP] KANDY Sultana     Positioning and Restraints    Pre-Treatment Position sitting in chair/recliner  -MD sitting in chair/recliner  -KP     Post Treatment Position bed  -MD wheelchair  -KP     In Bed supine;call light within reach;exit alarm on  -MD      In Wheelchair  sitting;with PT  -KP     Recorded by [MD] Nereida Llamas PT [KP] Vero York OTR       11/08/17 1152 11/08/17 1114 11/08/17 1030    Rehab Assessment/Intervention    Discipline occupational therapist  -BRIANNA physical therapist  -MD speech language pathologist  -KB    Document Type therapy note (daily note)  -KP therapy note (daily note)  -MD therapy note (daily note)  -KB    Subjective Information agree to therapy;no complaints  -KP agree to therapy;no complaints  -MD no complaints;agree to therapy  -KB    Patient Effort, Rehab Treatment good  -KP good  -MD good  -KB    Symptoms Noted During/After Treatment none  -KP none  -MD none  -KB    Precautions/Limitations fall precautions;swallowing precautions  -KP fall precautions  -MD fall precautions;swallowing precautions  -KB    Precautions/Limitations, Vision   WFL with corrective lenses  -KB    Equipment Issued to Patient  gait belt;front wheeled walker  -MD     Recorded by [] Vero York OTR [MD] Nereida Llamas, PT [KB] Katherine L Bruton    Pain Assessment    Pain Assessment No/denies pain  -KP No/denies pain  -MD 0-10  -KB    Pain Score 0  -KP      Post Pain Score   7  -KB    Pain Location   Toe (Comment which one)  -KB    Pain Orientation   Left;Right  -KB    Pain Intervention(s)   Emotional support   Pt did not want medications.  -KB    Recorded by [KP] Vero York OTR [MD] Nereida Llamas, PT [KB] Katherine L Bruton    Vision Assessment/Intervention    Visual Impairment visual impairment, left  -KP visual impairment, left  -MD WFL with corrective lenses  -KB    Recorded by [KP]  Vero York, OTR [MD] Nereida Llamas, PT [KB] Katherine L Bruton    Cognitive Assessment/Intervention    Current Cognitive/Communication Assessment impaired  -KP impaired  -MD     Orientation Status oriented to;person;place;time  -KP oriented to;person;place;time  -MD     Follows Commands/Answers Questions 100% of the time;able to follow single-step instructions;needs cueing  -% of the time;needs cueing;needs repetition  -MD     Personal Safety mild impairment  -KP impulsive;decreased insight to deficits  -MD     Personal Safety Interventions fall prevention program maintained;gait belt;nonskid shoes/slippers when out of bed  -KP fall prevention program maintained;gait belt;muscle strengthening facilitated;nonskid shoes/slippers when out of bed;supervised activity  -MD     Recorded by [KP] Vero York, OTR [MD] Nereida Llamas, PT     Improve functional problem solving    Improve functional problem solving through:   complete high level reasoning task  -KB    Status: Improve functional problem solving   Progressing as expected  -KB    Functional Problem Solving Progress   80%;without cues;100%;with inconsistent cues  -KB    Comments: Improve functional problem solving   Pt completed high-level logic puzzle with MIN verbal cues for attention to detail.   -KB    Recorded by   [KB] Katherine L Bruton    Improve oral skills    To Improve Oral Skills, patient will:   --   pucker/smile, pucker left/right, suck in/puff out cheeks  -KB    Status: Improve Oral Skills   Progressing as expected  -KB    Oral Skills Progress   continue to adress  -KB    Comments: Improve Oral Skills   Completed exercises without cues.  -KB    Recorded by   [KB] Katherine L Bruton    Improve closure at entrance to airway    To improve closure at entrance to airway, patient will:   --   hard swallows  -KB    Status: Improve closure at entrance to airway   Progressing as expected  -KB    Closure at Entrance to Airway Progress    continue to adress  -KB    Comments: Improve closure at entrance to airway   Completed daily exercises to address airway closure without cues.  -KB    Recorded by   [KB] Katherine L Bruton    Improve laryngeal elevation    To improve laryngeal elevation, patient will:   --   pitch glide, high pitch vowel, humming in falsetto  -KB    Status: Improve laryngeal elevation   Progressing as expected  -KB    Laryngeal Elevation Progress   continue to adress  -KB    Comments: Improve laryngeal elevation   Completed exercises to addres laryngeal elevation without cues.   -KB    Recorded by   [KB] Katherine L Bruton    Improve tongue base & pharyngeal wall squeeze    To improve tongue base & pharyngeal wall squeeze, patient will:   Complete effortful swallow;Complete gargle/hold retraction  -KB    Status: Improve tongue base & pharyngeal wall squeeze   Progressing as expected  -KB    Tongue Base/Pharyngeal Wall Squeeze Progress   continue to adress  -KB    Comments: Improve tongue base & pharyngeal wall squeeze   Completed exercises without cues.  -KB    Recorded by   [KB] Katherine L Bruton    Bed Mobility, Assessment/Treatment    Bed Mob, Supine to Sit, [place]  supervision required  -MD     Bed Mob, Sit to Supine, [place]  supervision required  -MD     Bed Mobility, Comment pt up in [facility]      Recorded by [KP] Vero York, OTR [MD] Nereida Llamas, PT     Transfer Assessment/Treatment    Transfers, Sit-Stand [place] supervision required  - stand by assist  -MD     Transfers, Stand-Sit [place] supervision required  - stand by assist  -MD     Transfers, Sit-Stand-Sit, Assist Device other (see comments)   [facility] rolling walker  -MD     Walk-In Shower Transfer, [place] set up required;supervision required  -      Walk-In Shower Transfer, Assist Device other (see comments)   tub bench  -      Transfer, Safety Issues  step length decreased  -MD     Transfer, Impairments  strength  decreased;impaired balance  -MD     Recorded by [KP] Vero York, OTR [MD] Nereida Llamas, PT     Gait Assessment/Treatment    Gait, [place] Level  verbal cues required;contact guard assist  -MD     Gait, Assistive Device  rolling walker  -MD     Gait, Distance (Feet)  80   x3  -MD     Gait, Gait Deviations  bilateral:;janet decreased;forward flexed posture;step length decreased  -MD     Gait, Safety Issues  sequencing ability decreased  -MD     Gait, Impairments  impaired balance;strength decreased  -MD     Recorded by  [MD] Nereida Llamas, PT     Upper Body Bathing Assessment/Training    UB Bathing Assess/Train Assistive Device hand-held shower head;tub bench  -      UB Bathing Assess/Train, Position sitting  -      UB Bathing Assess/Train, [place] Level set up required;stand by assist  -      Recorded by [KP] Vero York OTR      Lower Body Bathing Assessment/Training    LB Bathing Assess/Train Assistive Device grab bars;hand-held shower head;tub bench  -      LB Bathing Assess/Train, Position sitting;standing  -      LB Bathing Assess/Train, [place] Level set up required;stand by assist  -      Recorded by [KP] Vero York, OTR      Upper Body Dressing Assessment/Training    UB Dressing Assess/Train, Clothing Type doffing:;donning:;t-shirt  -      UB Dressing Assess/Train, Position sitting  -      UB Dressing Assess/Train, [place] set up required;supervision required  -      Recorded by [KP] Vero York, OTR      Lower Body Dressing Assessment/Training    LB Dressing Assess/Train, Clothing Type doffing:;donning:;socks;shoes;pants  -      LB Dressing Assess/Train, Position sitting;standing  -      LB Dressing Assess/Train, [place] set up required;supervision required  -      LB Dressing Assess/Train, Comment holds onto grab bar intermittently  -      Recorded by [KP] Vero York, OTR      Grooming Assessment/Training     Grooming Assess/Train, Position sitting  -      Grooming Assess/Train, [place] Level set up required;supervision required  -KP      Recorded by [KP] KANDY Sultana      Balance Skills Training    Sitting-Level of Assistance Close supervision;Distant supervision  -      Sitting-Balance Support Feet supported  -      Standing-Level of Assistance Close supervision  -KP      Recorded by [KP] Vero York OTR      Therapy Exercises    Right Lower Extremity  AROM:;10 reps;sidelying;hip abduction/adduction   x3 sets  -MD     Left Lower Extremity  AROM:;10 reps;sidelying;clam shell   x3 sets  -MD     Bilateral Lower Extremities  AROM:;20 reps;ankle pumps/circles;hip abduction/adduction;hip flexion;LAQ  -MD     BLE Other Reps  --   Nustep at level 2 for 4 min  -MD     Trunk Exercises  10 reps;supine;bridging   x3 sets  -MD     Recorded by  [MD] Nereida Llamas, PT     Positioning and Restraints    Pre-Treatment Position sitting in chair/recliner  -KP sitting in chair/recliner  -MD     Post Treatment Position wheelchair  - wheelchair  -MD     In Wheelchair sitting;call light within reach;encouraged to call for assist  -KP sitting;call light within reach  -MD     Recorded by [KP] Vero York OTR [MD] Nereida Llamas, PT       11/07/17 3862          Rehab Assessment/Intervention    Discipline occupational therapist  -      Document Type therapy note (daily note)  -      Subjective Information agree to therapy;no complaints  -      Patient Effort, Rehab Treatment good  -KP      Symptoms Noted During/After Treatment none  -KP      Precautions/Limitations fall precautions;swallowing precautions  -KP      Recorded by [KP] KANDY Sultana      Pain Assessment    Pain Assessment No/denies pain  -KP      Recorded by [KP] KANDY Sultana      Vision Assessment/Intervention    Visual Impairment visual impairment, left  -KP      Recorded by [KP] Vero York OTR       Cognitive Assessment/Intervention    Current Cognitive/Communication Assessment impaired  -      Orientation Status oriented to;person;place;time  -      Follows Commands/Answers Questions 100% of the time;able to follow single-step instructions;needs cueing  -      Personal Safety mild impairment  -      Personal Safety Interventions fall prevention program maintained;gait belt;muscle strengthening facilitated;nonskid shoes/slippers when out of bed  -KP      Recorded by [KP] KANDY Sultana      Bed Mobility, Assessment/Treatment    Bed Mob, Supine to Sit, [place] supervision required;set up required  -      Bed Mob, Sit to Supine, [place] not tested  -KP      Recorded by [KP] KANDY Sultana      Transfer Assessment/Treatment    Transfers, Bed-Chair [place] stand by assist  -KP      Transfers, Chair-Bed [place] not tested  -      Transfers, Bed-Chair-Bed, Assist Device other (see comments)   wc  -KP      Transfers, Sit-Stand [place] stand by assist  -KP      Transfers, Stand-Sit [place] stand by assist  -KP      Transfers, Sit-Stand-Sit, Assist Device other (see comments)   wc  -KP      Recorded by [KP] KANDY Sultana      Balance Skills Training    Sitting-Level of Assistance Close supervision;Distant supervision  -KP      Sitting-Balance Support Feet supported  -KP      Standing-Level of Assistance Close supervision  -KP      Static Standing Balance Support Right upper extremity supported  -KP      Recorded by [KP] KANDY Sultana      Therapy Exercises    BUE Resistance theraputty   pinches yellow putty w. each digit and thumb  -KP      Recorded by [KP] KANDY Sultana      Fine Motor Coordination Training    Detail (Fine Motor Coordination Training) links paper clip together clipping it on other paper clip w. L hand.  in hand manipulation w. coins in L hand pushing coin up w. L thumb and inserting coin into small  slot to [facility] and in hand manipulation.   -KP      Recorded by [KP] Vero York OTR      Positioning and Restraints    Pre-Treatment Position in bed  -KP      Post Treatment Position wheelchair  -KP      In Wheelchair sitting;call light within reach;encouraged to call for assist;with family/caregiver  -KP      Recorded by [KP] Vero York, OTR        User Key  (r) = Recorded By, (t) = Taken By, (c) = Cosigned By    Initials Name Effective Dates    KP Vero York, OTR 04/13/15 -     MD Nereida Llamas, PT 12/01/15 -     KB Katherine L Bruton 09/29/17 -                 OT Goals       11/01/17 1554 11/01/17 1230       Transfer Training OT STG    Transfer Training OT STG, Date Established  11/01/17  -SO     Transfer Training OT STG, Time to Achieve  1 wk  -SO     Transfer Training OT STG, Activity Type  tub;walk-in shower;shower chair  -SO     Transfer Training OT STG, [place] Level  contact guard assist  -SO     Transfer Training OT STG, Assist Device  walker, rolling;shower chair  -SO     Transfer Training OT LTG    Transfer Training OT LTG, Date Established  11/01/17  -SO     Transfer Training OT LTG, Time to Achieve  by discharge  -SO     Transfer Training OT LTG, Activity Type  tub;walk-in shower;shower chair  -SO     Transfer Training OT LTG, [place] Level  supervision required  -SO     Transfer Training OT LTG, Assist Device  walker, rolling;shower chair  -SO     Transfer Training 2 OT STG    Transfer Training 2 OT STG, Date Established  11/01/17  -SO     Transfer Training 2 OT STG, Time to Achieve  1 wk  -SO     Transfer Training 2 OT STG, Activity Type  toilet  -SO     Transfer Training 2 OT STG, [place] Level  contact guard assist  -SO     Transfer Training 2 OT STG, Assist Device  walker, rolling  -SO     Transfer Training 2 OT LTG    Transfer Training 2 OT LTG, Date Established  11/01/17  -SO     Transfer Training 2 OT LTG, Time to Achieve  by discharge  -SO      Transfer Training 2 OT LTG, Activity Type  toilet  -SO     Transfer Training 2 OT LTG, [place] Level  supervision required  -SO     Transfer Training 2 OT LTG, Assist Device  walker, rolling  -SO     Strength OT LTG    Strength Goal OT LTG, Date Established 11/01/17 -SO      Strength Goal OT LTG, Time to Achieve by discharge  -SO      Strength Goal OT LTG, Measure to Achieve Pt to increase LUE strength to 4-/5 to increase independence with ADLs.  -SO      Caregiver Training OT LTG    Caregiver Training OT LTG, Date Established  11/01/17 -SO     Caregiver Training OT LTG, Time to Achieve  by discharge  -SO     Caregiver Training OT LTG, [place] Level  able to assist adequately;able to cue patient adequately  -SO     Patient Education OT LTG    Patient Education OT LTG, Date Established  11/01/17 -SO     Patient Education OT LTG, Time to Achieve  by discharge  -SO     Patient Education OT LTG, Education Type  written program;HEP;aware of neuro deficits;home safety  -SO     Patient Education OT LTG, Education Understanding  independent;demonstrates adequately;verbalizes understanding  -SO     Isolated Movement OT LTG    Isolated Movement OT LTG, Date Established 11/01/17  -SO      Isolated Movement OT LTG, Time to Achieve by discharge  -SO      Isolated Movement OT LTG, Body Area left scapula;left shoulder;left elbow;left forearm;left wrist;left fingers  -SO      Isolated Movement OT LTG, Level WFL  -SO      Coordination OT LTG    Coordination OT LTG, Date Established 11/01/17 -SO      Coordination OT LTG, Time to Achieve by discharge  -SO      Coordination OT LTG, Activity Type FM written ex program;GM written ex program;FM task;GM task  -SO      Coordination OT LTG, [place] Level independent  -SO      Coordination OT LTG, Additional Goal LUE  -SO      ADL OT STG    ADL OT STG, Date Established  11/01/17 -SO     ADL OT STG, Time to Achieve  1 wk  -SO     ADL OT STG, Activity Type  ADL skills  -SO      ADL OT STG, [place] Level  contact guard;assistive device  -SO     ADL OT LTG    ADL OT LTG, Date Established  11/01/17  -SO     ADL OT LTG, Time to Achieve  by discharge  -SO     ADL OT LTG, Activity Type  ADL skills  -SO     ADL OT LTG, [place] Level  standby assist;assistive device  -SO       User Key  (r) = Recorded By, (t) = Taken By, (c) = Cosigned By    Initials Name Provider Type    SO Micki Castro, OTR Occupational Therapist          Occupational Therapy Education     Title: PT OT SLP Therapies (Active)     Topic: Occupational Therapy (Active)     Point: ADL training (Done)    Description: Instruct learner(s) on proper safety adaptation and remediation techniques during self care or transfers.   Instruct in proper use of assistive devices.    Learning Progress Summary    Learner Readiness Method Response Comment Documented by Status   Patient Acceptance E,D EDUARDO,AYDE ed pt, family, staff etc. on pts current level w. OT regarding self-care, toilet/shower tsf. and her increase in FMC and HEP to send home. Ed OT will ed on her Monday last day prior to d/c on HEP for FMC and hand strength.  11/10/17 1223 Done    Acceptance E,D EDUARDO,AYDE ed pt on shower tsf and safety w. tsf. pt demo w. SBA. tub bench and grab bars KP 11/08/17 1157 Done    Acceptance E,D VU,JUVENAL ed pt on shower tsf technique. ed pt on bathing safety. pt demo shower w. CGA to SBA. mostly SBA. KP 11/06/17 1207 Done    Acceptance E VU Discuss OT goals and POC. SO 11/01/17 1232 Done   Family Acceptance E,AYDE WHITE ed pt, family, staff etc. on pts current level w. OT regarding self-care, toilet/shower tsf. and her increase in FMC and HEP to send home. Ed OT will ed on her Monday last day prior to d/c on HEP for FMC and hand strength. KP 11/10/17 1223 Done    Acceptance E VU Discuss OT goals and POC. SO 11/01/17 1232 Done   Significant Other Acceptance E,AYDE WHITE ed pt, family, staff etc. on pts current level w. OT regarding self-care,  toilet/shower tsf. and her increase in FMC and HEP to send home. Ed OT will ed on her Monday last day prior to d/c on HEP for FMC and hand strength.  11/10/17 1223 Done   Caregiver Acceptance E,D AYDE CLARK ed pt, family, staff etc. on pts current level w. OT regarding self-care, toilet/shower tsf. and her increase in FMC and HEP to send home. Ed OT will ed on her Monday last day prior to d/c on HEP for FMC and hand strength.  11/10/17 1223 Done               Point: Precautions (Done)    Description: Instruct learner(s) on prescribed precautions during self-care and functional transfers.    Learning Progress Summary    Learner Readiness Method Response Comment Documented by Status   Patient Acceptance E,AYDE WHITE ed pt, family, staff etc. on pts current level w. OT regarding self-care, toilet/shower tsf. and her increase in FMC and HEP to send home. Ed OT will ed on her Monday last day prior to d/c on HEP for FMC and hand strength.  11/10/17 1223 Done    Acceptance E,D EDUARDO,JUVENAL ed pt on shower tsf technique. ed pt on bathing safety. pt demo shower w. CGA to SBA. mostly SBA.  11/06/17 1207 Done   Family Acceptance E,D AYDE CLARK ed pt, family, staff etc. on pts current level w. OT regarding self-care, toilet/shower tsf. and her increase in FMC and HEP to send home. Ed OT will ed on her Monday last day prior to d/c on HEP for FMC and hand strength.  11/10/17 1223 Done   Significant Other Acceptance E,AYDE WHITE ed pt, family, staff etc. on pts current level w. OT regarding self-care, toilet/shower tsf. and her increase in FMC and HEP to send home. Ed OT will ed on her Monday last day prior to d/c on HEP for FMC and hand strength.  11/10/17 1223 Done   Caregiver Acceptance E,D AYDE CLARK ed pt, family, staff etc. on pts current level w. OT regarding self-care, toilet/shower tsf. and her increase in FMC and HEP to send home. Ed OT will ed on her Monday last day prior to d/c on HEP for FMC and hand strength.  11/10/17 1223 Done                Point: Body mechanics (Done)    Description: Instruct learner(s) on proper positioning and spine alignment during self-care, functional mobility activities and/or exercises.    Learning Progress Summary    Learner Readiness Method Response Comment Documented by Status   Patient Acceptance NOEMY YOON DU ed pt, family, staff etc. on pts current level w. OT regarding self-care, toilet/shower tsf. and her increase in FMC and HEP to send home. Ed OT will ed on her Monday last day prior to d/c on HEP for FMC and hand strength.  11/10/17 1223 Done    Acceptance E,AYDE WHITE ed pt on shower tsf and safety w. tsf. pt demo w. SBA. tub bench and grab bars  11/08/17 1157 Done    Acceptance NOEMY YOON NR ed pt on shower tsf technique. ed pt on bathing safety. pt demo shower w. CGA to SBA. mostly SBA.  11/06/17 1207 Done   Family Acceptance NOEMY YOON DU ed pt, family, staff etc. on pts current level w. OT regarding self-care, toilet/shower tsf. and her increase in FMC and HEP to send home. Ed OT will ed on her Monday last day prior to d/c on HEP for FMC and hand strength.  11/10/17 1223 Done   Significant Other Acceptance CAR,AYDE WHITE ed pt, family, staff etc. on pts current level w. OT regarding self-care, toilet/shower tsf. and her increase in FMC and HEP to send home. Ed OT will ed on her Monday last day prior to d/c on HEP for FMC and hand strength.  11/10/17 1223 Done   Caregiver Acceptance CAR,AYDE WHITE ed pt, family, staff etc. on pts current level w. OT regarding self-care, toilet/shower tsf. and her increase in FMC and HEP to send home. Ed OT will ed on her Monday last day prior to d/c on HEP for FMC and hand strength.  11/10/17 1223 Done                      User Key     Initials Effective Dates Name Provider Type Discipline     04/13/15 -  Micki Castro, OTR Occupational Therapist OT     04/13/15 -  Vero York, OTR Occupational Therapist OT                  OT Recommendation and  Plan  Therapy Frequency: 5 times/wk          Time Calculation:         Time Calculation- OT       11/10/17 1224          Time Calculation- OT    OT Start Time 0900  -      OT Stop Time 1000  -      OT Time Calculation (min) 60 min  -      OT Non-Billable Time (min) 45 min   [id number] CP 2 for family conference. Team rounds 1cp  -        User Key  (r) = Recorded By, (t) = Taken By, (c) = Cosigned By    Initials Name Provider Type     KANDY Sultana Occupational Therapist           Therapy Charges for Today     Code Description Service Date Service Provider Modifiers Qty    22575766762 HC OT SELF CARE/MGMT/TRAIN EA 15 MIN 11/9/2017 Vero York OTR GO 2    94754629852 HC OT NEUROMUSC RE EDUCATION EA 15 MIN 11/9/2017 KANDY Sultana GO 2    28171535323 HC OT CARE PLAN EA 15 MIN 11/10/2017 Vero York OTR GO 3    97306973367 HC OT SELF CARE/MGMT/TRAIN EA 15 MIN 11/10/2017 KANDY Sultana GO 3    84552426274 HC OT NEUROMUSC RE EDUCATION EA 15 MIN 11/10/2017 KANDY Sultana GO 1               KANDY Sultana  11/10/2017

## 2017-11-11 LAB
GLUCOSE BLDC GLUCOMTR-MCNC: 107 MG/DL (ref 70–130)
GLUCOSE BLDC GLUCOMTR-MCNC: 140 MG/DL (ref 70–130)
INR PPP: 2.47 (ref 0.9–1.1)
PROTHROMBIN TIME: 25.9 SECONDS (ref 11.7–14.2)

## 2017-11-11 PROCEDURE — 82962 GLUCOSE BLOOD TEST: CPT

## 2017-11-11 PROCEDURE — 97110 THERAPEUTIC EXERCISES: CPT | Performed by: PHYSICAL THERAPIST

## 2017-11-11 PROCEDURE — 85610 PROTHROMBIN TIME: CPT | Performed by: PHYSICAL MEDICINE & REHABILITATION

## 2017-11-11 RX ADMIN — FOLIC ACID 1 MG: 1 TABLET ORAL at 07:55

## 2017-11-11 RX ADMIN — VITAMIN D, TAB 1000IU (100/BT) 1000 UNITS: 25 TAB at 07:55

## 2017-11-11 RX ADMIN — LEVOTHYROXINE SODIUM 112 MCG: 112 TABLET ORAL at 06:44

## 2017-11-11 RX ADMIN — VITAMIN D, TAB 1000IU (100/BT) 1000 UNITS: 25 TAB at 17:02

## 2017-11-11 RX ADMIN — Medication 3 MG: at 23:03

## 2017-11-11 RX ADMIN — DOCUSATE SODIUM -SENNOSIDES 2 TABLET: 50; 8.6 TABLET, COATED ORAL at 07:55

## 2017-11-11 RX ADMIN — WARFARIN SODIUM 9 MG: 6 TABLET ORAL at 17:02

## 2017-11-11 RX ADMIN — ATORVASTATIN CALCIUM 80 MG: 80 TABLET, FILM COATED ORAL at 19:55

## 2017-11-11 RX ADMIN — ASPIRIN 81 MG: 81 TABLET, CHEWABLE ORAL at 07:55

## 2017-11-11 RX ADMIN — Medication 1 TABLET: at 07:55

## 2017-11-11 RX ADMIN — ALLOPURINOL 200 MG: 100 TABLET ORAL at 07:55

## 2017-11-11 RX ADMIN — DOCUSATE SODIUM -SENNOSIDES 2 TABLET: 50; 8.6 TABLET, COATED ORAL at 17:02

## 2017-11-11 RX ADMIN — METFORMIN HYDROCHLORIDE 500 MG: 500 TABLET ORAL at 07:55

## 2017-11-11 NOTE — PROGRESS NOTES
Inpatient Rehabilitation Functional Measures Assessment    Functional Measures  YESI Eating:  Branch  [facility] Grooming: Branch  [facility] Bathing:  [place]  [facility] Upper Body Dressing:  [place]  [facility] Lower Body Dressing:  [place]  [facility] Toileting:  Toileting Score = 4.  Patient requires minimal assistance for  toileting, such as steadying for balance while cleansing or adjusting clothes.  No assistive devices were required.    YESI Bladder Management  Level of Assistance:  Bladder Score = 3. Patient performs 50-74% of tasks and  requires moderate assistance for bladder management requiring assistive  device/method: bathroom .  Frequency/Number of Accidents this Shift:  Bladder accidents this shift:  0 .  Patient has not had an accident this shift.    YESI Bowel Management  Level of Assistance: Bowel Score = 2. Patient performs 25-49% of tasks and  requires maximal assistance for bowel management requiring assistive  device/method: bathroom .  Frequency/Number of Accidents this Shift: Bowel accidents this shift: 0 .  Patient has not had an accident this shift.    YESI Bed/Chair/Wheelchair Transfer:  Bed/chair/wheelchair Transfer Score = 2.  Patient performs 25-49% of effort and requires maximal assistance (most of the  lifting) for transferring to and from the bed/chair/wheelchair. Patient requires  the following assistive device(s): Elevated head of bed. Elevated bed/surface.  Bed rails. Grab bars.  YESI Toilet Transfer:  Toilet Transfer Score = 2.  Patient performs 25-49% of  effort and requires maximal assistance (most of the lifting) for transferring to  and from the toilet/commode. Patient requires the following assistive device(s):  Walker. Grab bars.  YESI Tub/Shower Transfer:  [place]    Previously Documented Mode of Locomotion at Discharge: Field  YESI Expected Mode of Locomotion at Discharge: [facility] Walk/Wheelchair:  [facility] Stairs:  [facility] Comprehension:  Branch  [facility] Expression:  Branch  [facility] Social Interaction:   Branch  YESI Problem Solving:  Branch  YESI Memory:  Branch    Therapy Mode Minutes  Occupational Therapy: Branch  Physical Therapy: Branch  Speech Language Pathology:  Branch    Signed by: KAYLEN Umanzor

## 2017-11-11 NOTE — PROGRESS NOTES
LOS: 11 days   Patient Care Team:  Calvin Dhillon Jr., DO as PCP - General (Internal Medicine)    Chief Complaint: same    Subjective     History of Present Illness    Subjective Pt is awake and alert. Pt requests day pass tomorrow    History taken from: patient    Objective     Vital Signs  Temp:  [97.6 °F (36.4 °C)-97.7 °F (36.5 °C)] 97.7 °F (36.5 °C)  Heart Rate:  [69-87] 69  Resp:  [16-18] 18  BP: (143-157)/(70-78) 157/75    Objectiveexam unchanged calves soft NT resp unlabpored and regular    Results Review:     I reviewed the patient's new clinical results.    Medication Review:     Assessment/Plan     Active Problems:    Stroke      Assessment & Plan Continue to prepare for dc.Day pass clari Becerra MD  11/11/17  6:04 AM    Time:

## 2017-11-11 NOTE — PLAN OF CARE
Problem: Patient Care Overview (Adult)  Goal: Plan of Care Review  Outcome: Ongoing (interventions implemented as appropriate)    11/11/17 0441   Coping/Psychosocial Response Interventions   Plan Of Care Reviewed With patient   Patient Care Overview   Progress improving   Outcome Evaluation   Outcome Summary/Follow up Plan Pt. denied any pain. Doing well at night. Sleeping good. No unsafety issues noted at this time.         Problem: Stroke (Ischemic) (Adult)  Goal: Signs and Symptoms of Listed Potential Problems Will be Absent or Manageable (Stroke)  Outcome: Ongoing (interventions implemented as appropriate)    Problem: Fall Risk (Adult)  Goal: Absence of Falls  Outcome: Ongoing (interventions implemented as appropriate)

## 2017-11-11 NOTE — NURSING NOTE
Patient misplaced right hearing aid. Last had in AM she thinks. Called [facility] the  and I will complete lost item report in [employer].  Joselyn found hearing aid in bed. Now safely stored for night.

## 2017-11-11 NOTE — PROGRESS NOTES
Inpatient Rehabilitation Functional Measures Assessment    Functional Measures  YESI Eating:  [facility] Grooming: [facility] Bathing:  [facility] Upper Body Dressing:  [facility] Lower Body Dressing:  [facility] Toileting:  [facility] Bladder Management  Level of Assistance:  [place]  Frequency/Number of Accidents this Shift:  [facility] Bowel Management  Level of Assistance: [place]  Frequency/Number of Accidents this Shift: [facility] Bed/Chair/Wheelchair Transfer:  [facility] Toilet Transfer:  [facility] Tub/Shower Transfer:  [place]    Previously Documented Mode of Locomotion at Discharge: Field  YESI Expected Mode of Locomotion at Discharge: [facility] Walk/Wheelchair:  [facility] Stairs:  [facility] Comprehension:  Both ( auditory and visual) modes of comprehension are used  equally. Comprehension Score = 6, Modified [place].  Patient comprehends  complex/abstract information in their primary language, requiring: Additional  time. Hearing Aid. Glasses.  YESI Expression:  Vocal expression is the usual mode. Expression Score = 7,  Independent.  Patient expresses complex/abstract information in their primary  language.  Patient is completely independent for vocal expression.  There are no  activity limitations.  YESI Social Interaction:  Social Interaction Score = 6, Modified Independent.  Patient is modified independent for social interaction, requiring: Requires  additional time.  YESI Problem Solving:  Problem Solving Score = 6, Modified [place].  Patient  makes appropriate decisions in order to solve complex problems, but requires  extra time.  YESI Memory:  Memory Score = 6, Modified [place].  Patient is modified  independent for memory, requiring: Requires additional time.    Therapy Mode Minutes  Occupational Therapy: [place]  Physical Therapy: [facility]  Speech Language Pathology:  [place]    Signed by: Saad Marquez RN

## 2017-11-11 NOTE — PROGRESS NOTES
Inpatient Rehabilitation Functional Measures Assessment    Functional Measures  YESI Eating:  [place]  [facility] Grooming: [place]  [facility] Bathing:  [place]  [facility] Upper Body Dressing:  [place]  [facility] Lower Body Dressing:  [place]  [facility] Toileting:  [place]    [facility] Bladder Management  Level of Assistance:  [place]  Frequency/Number of Accidents this Shift:  [facility] Bowel Management  Level of Assistance: [place]  Frequency/Number of Accidents this Shift: [place]    [facility] Bed/Chair/Wheelchair Transfer:  Bed/chair/wheelchair Transfer Score = 4.  Patient performs 75% or more of effort and minimal assistance (little/incidental  help/lifting of one limb/steadying) for transferring to and from the  bed/chair/wheelchair, requiring: Steadying. Contact guard. Patient requires the  following assistive device(s): Bed rails. Arm rest. Walker.  YESI Toilet Transfer:  [facility] Tub/Shower Transfer:  [place]    Previously Documented Mode of Locomotion at Discharge: Field  YESI Expected Mode of Locomotion at Discharge: [facility] Walk/Wheelchair:  WHEELCHAIR OBSERVATION   Activity was not observed.    WALK OBSERVATION   Walk Distance Scale = 2.  Distance walked is 50 -149 feet. Walk Score = 2.  Patient performs 75% or more of effort and requires minimal assistance.  Incidental assistance, contact guard or steadying was provided. Patient walked a  distance of 120 feet. Patient requires the following assistive device(s):  Rolling walker.  YESI Stairs:  Stairs Score = 1.  Incidental assistance with lifting or lowering,  contact guard or steadying was provided. Patient requires/performs 75% or more  of effort and minimal contact assistance with negotiating stairs. Patient  negotiated 1 stairs.  Patient requires the following assistive device(s):  Rolling walker.    YESI Comprehension:  [place]  [facility] Expression:  [place]  [facility] Social Interaction:  [facility] Problem Solving:  [facility] Memory:  [place]    Therapy Mode Minutes  Occupational Therapy:  Branch  Physical Therapy: Individual: 40 minutes.  Speech Language Pathology:  [place]    Signed by: Anna Epps PT

## 2017-11-11 NOTE — PROGRESS NOTES
Inpatient Rehabilitation Functional Measures Assessment    Functional Measures  YESI Eating:  [facility] Grooming: [facility] Bathing:  [facility] Upper Body Dressing:  [facility] Lower Body Dressing:  [facility] Toileting:  [facility] Bladder Management  Level of Assistance:  [place]  Frequency/Number of Accidents this Shift:  [facility] Bowel Management  Level of Assistance: [place]  Frequency/Number of Accidents this Shift: [facility] Bed/Chair/Wheelchair Transfer:  [facility] Toilet Transfer:  [facility] Tub/Shower Transfer:  [place]    Previously Documented Mode of Locomotion at Discharge: Field  YESI Expected Mode of Locomotion at Discharge: [facility] Walk/Wheelchair:  [facility] Stairs:  [facility] Comprehension:  Auditory comprehension is the usual mode. Comprehension  Score = 6, Modified [place].  Patient comprehends complex/abstract  information in their primary language with only mild difficulty.  YESI Expression:  Vocal expression is the usual mode. Expression Score = 7,  Independent.  Patient expresses complex/abstract information in their primary  language.  Patient is completely independent for vocal expression.  There are no  activity limitations.  YESI Social Interaction:  Social Interaction Score = 7, Independent. Patient is  completely independent for social interaction.  There are no activity  limitations.  YESI Problem Solving:  Problem Solving Score = 6, Modified [place].  Patient  makes appropriate decisions in order to solve complex problems with mild  difficulty but self-corrects.  YESI Memory:  Memory Score = 6, Modified [place].  Patient is modified  independent for memory, requiring: Requires additional time.    Therapy Mode Minutes  Occupational Therapy: [place]  Physical Therapy: [place]  Speech Language Pathology:  [place]    Signed by: Daniel Horne RN

## 2017-11-11 NOTE — PROGRESS NOTES
Inpatient Rehabilitation Plan of Care Note    Plan of Care  Care Plan Reviewed - No updates at this time.    Sphincter Control    Performed Intervention(s)  Monitor I & O's  Encourage fluid intake  Timed voids/ scheduled toileting      Safety    Performed Intervention(s)  Falls prevention protocol  Safety rounds  Bed and chair alarms      Psychosocial    Performed Intervention(s)  Therapeutic environmental set-up    Signed by: Saad Marquez RN

## 2017-11-11 NOTE — PLAN OF CARE
Problem: Patient Care Overview (Adult)  Goal: Plan of Care Review  Outcome: Ongoing (interventions implemented as appropriate)    11/11/17 1053   Coping/Psychosocial Response Interventions   Plan Of Care Reviewed With patient   Patient Care Overview   Progress improving   Outcome Evaluation   Outcome Summary/Follow up Plan DC this week         Problem: Stroke (Ischemic) (Adult)  Goal: Signs and Symptoms of Listed Potential Problems Will be Absent or Manageable (Stroke)  Outcome: Ongoing (interventions implemented as appropriate)    11/11/17 1053   Stroke (Ischemic)   Problems Assessed (Stroke (Ischemic)/TIA) motor/sensory impairment   Problems Present (Stroke (Ischemic)/TIA) motor/sensory impairment         Problem: Fall Risk (Adult)  Goal: Absence of Falls  Outcome: Ongoing (interventions implemented as appropriate)    11/11/17 1053   Fall Risk (Adult)   Absence of [place] making progress toward outcome

## 2017-11-11 NOTE — THERAPY TREATMENT NOTE
Inpatient Rehabilitation - Physical Therapy Treatment Note  [facility]     Patient Name: Alice Maxwell  : 1945  MRN: [id number]  Today's Date: 2017  Onset of Illness/Injury or Date of Surgery Date: 10/28/17  Date of Referral to PT: 17  Referring Physician: Jeremie    Admit Date: 10/31/2017    Visit Dx:    ICD-10-CM ICD-9-CM   1. Oropharyngeal dysphagia R13.12 787.22     Patient Active Problem List   Diagnosis   • Stroke               Adult Rehabilitation Note       17 1122 11/10/17 1500 11/10/17 1330    Rehab Assessment/Intervention    Discipline physical therapist  -JK speech language pathologist  -KB speech language pathologist  -KB    Document Type therapy note (daily note)  -JK therapy note (daily note)  -KB therapy note (daily note)  -KB    Subjective Information no complaints;agree to therapy  -JK no complaints;agree to therapy  -KB no complaints;agree to therapy  -KB    Patient Effort, Rehab Treatment good  -JK good  -KB good  -KB    Symptoms Noted During/After Treatment none  -JK none  -KB none  -KB    Precautions/Limitations fall precautions  -JK fall precautions  -KB fall precautions  -KB    Precautions/Limitations, Vision WFL with corrective lenses  -JK      Precautions/Limitations, Hearing WFL  -JK      Recorded by [JK] Anna Epps, PT [KB] Katherine L Bruton [KB] Katherine L Bruton    Pain Assessment    Pain Assessment No/denies pain  -JK No/denies pain  -KB No/denies pain  -KB    Recorded by [JK] Anna Epps, PT [KB] Katherine L Bruton [KB] Katherine L Bruton    Vision Assessment/Intervention    Visual Impairment visual impairment, left;WFL with corrective lenses  -JK visual impairment, left;WFL with corrective lenses  -KB visual impairment, left;WFL with corrective lenses  -KB    Recorded by [JK] Anna Epps, PT [KB] Katherine L Bruton [KB] Katherine L Bruton    Cognitive Assessment/Intervention    Current Cognitive/Communication Assessment impaired  -TIFFANIEK       Orientation Status oriented x 4  -JK      Follows Commands/Answers Questions 100% of the time;able to follow single-step instructions  -JK      Personal Safety mild impairment  -JK      Personal Safety Interventions fall prevention program maintained  -JK      Recorded by [JK] Anna Epps, PT      Improve functional problem solving    Improve functional problem solving through:  sequence steps in a task  -KB     Status: Improve functional problem solving  Progressing as expected  -KB     Functional Problem Solving Progress  90%;without cues;100%;with inconsistent cues  -KB     Comments: Improve functional problem solving  Pt sequenced 6-steps in common tasks with MIN cues; given verbal cue to check her work, increased accuracy and self-corrections noted.   -KB     Recorded by  [KB] Katherine L Bruton     Improve executive function skills    Improve executive function skills:   identify strategies, strengths, limitations  -KB    Status: Improve executive function skills   Progressing as expected  -KB    Executive Function Skills Progress   continue to address  -KB    Comments: Improve executive function skills   Discussed previous performance and strategies of planning ahead and makring completed items off in order to increase performance and help with attention.  -KB    Recorded by   [KB] Katherine L Bruton    Improve oral skills    To Improve Oral Skills, patient will:   Increase lip closure;Increase tongue A-P movement;Increase back of tongue control  -KB    Status: Improve Oral Skills   Progressing as expected  -KB    Oral Skills Progress   continue to adress  -KB    Comments: Improve Oral Skills   Pt completed exercises to verbal instruction. 10 reps.   -KB    Recorded by   [KB] Katherine L Bruton    Improve laryngeal elevation    To improve laryngeal elevation, patient will:   Complete pitch-glide  -KB    Status: Improve laryngeal elevation   Progressing as expected  -KB    Laryngeal Elevation Progress    continue to adress  -KB    Comments: Improve laryngeal elevation   Pt completed exercises to verbal instruction. 10 reps.  -KB    Recorded by   [KB] Katherine L Bruton    Improve hyolaryngeal excursion    To improve hyolaryngeal excursion, patient will:  Complete head lift sustained (comment number of seconds);Complete head lift repetitive (comment number of lifts);Complete chin tuck against resistance (comment number of repetitions)  -KB Complete head lift repetitive (comment number of lifts)  -KB    Status: Improve hyolaryngeal excursion  Progressing as expected  -KB Progressing as expected  -KB    Hyolaryngeal Excursion Progress  continue to adress  -KB continue to adress  -KB    Comments: Improve hyolaryngeal excursion  Completed 20 reps or head lift and chin tuck with resistance; completed sustained head lift for 30 seconds.  -KB Pt completed exercisess to verbal instruciton. 10 reps.  -KB    Recorded by  [KB] Katherine L Bruton [KB] Katherine L Bruton    Improve tongue base & pharyngeal wall squeeze    To improve tongue base & pharyngeal wall squeeze, patient will:  Complete tongue hold swallow  -KB Complete effortful swallow;Complete yawn/hold retractions  -KB    Status: Improve tongue base & pharyngeal wall squeeze  Progressing as expected  -KB Progressing as expected  -KB    Tongue Base/Pharyngeal Wall Squeeze Progress  continue to adress  -KB continue to adress  -KB    Comments: Improve tongue base & pharyngeal wall squeeze  MAX cues inclusing model to complete tongue out swallow for first time. Able to complete exercise adequately 6/10 repetitions.   -KB Pt completed exercises to verbal instruction. 10 reps.  -KB    Recorded by  [KB] Katherine L Bruton [KB] Katherine L Bruton    Transfer Assessment/Treatment    Transfers, Sit-Stand [place] contact guard assist  -JK      Transfers, Stand-Sit [place] contact guard assist  -JK      Transfers, Sit-Stand-Sit, Assist Device rolling walker  -JK       "Transfer, Safety Issues step length decreased  -JK      Transfer, Impairments strength decreased;impaired balance  -JK      Transfer, Comment car tsf CGA  -JK      Recorded by [JK] Anna Epps, PT      Gait Assessment/Treatment    Gait, [place] Level verbal cues required;contact guard assist  -JK      Gait, Assistive Device rolling walker  -JK      Gait, Distance (Feet) 160  -JK      Gait, Gait Deviations bilateral:;janet decreased;forward flexed posture;step length decreased  -JK      Gait, Safety Issues step length decreased  -JK      Gait, Impairments impaired balance;strength decreased  -JK      Gait, Comment Pt practiced ambulating with roll wx with dgt [place]  -JK      Recorded by [JK] Anna Epps, PT      Stairs Assessment/Treatment    Number of Stairs 6\" curb  -JK      Stairs, Handrail Location none  -JK      Stairs, [place] Level minimum assist (75% patient effort);contact guard assist;verbal cues required  -JK      Stairs, Assistive Device walker  -JK      Stairs, Technique Used step to step (descending);step to step (ascending)  -JK      Stairs, Safety Issues sequencing ability decreased  -JK      Stairs, Impairments strength decreased;impaired balance  -JK      Recorded by [JK] Anna Epps, PT      Positioning and Restraints    Pre-Treatment Position in bed  -JK      Post Treatment Position wheelchair  -JK      In Bed sitting;with family/caregiver  -JK      Recorded by [TIFFANIEK] Anna Epps, PT        11/10/17 1218 11/10/17 0847 11/09/17 1445    Rehab Assessment/Intervention    Discipline occupational therapist  -KP physical therapist  -MD occupational therapist  -KP    Document Type therapy note (daily note)  -KP therapy note (daily note)  -MD therapy note (daily note)  -KP    Subjective Information agree to therapy;no complaints  -KP agree to therapy;no complaints  -MD agree to therapy;no complaints  -KP    Patient Effort, Rehab Treatment excellent  -KP good  -MD excellent  -KP    " Symptoms Noted During/After Treatment none  -KP none  -MD none  -KP    Precautions/Limitations fall precautions  -KP fall precautions  -MD fall precautions;swallowing precautions  -KP    Equipment Issued to Patient  gait belt;front wheeled walker  -MD     Recorded by [KP] Vero York OTR [MD] Nereida Llamas, PT [KP] Vero York OTR    Pain Assessment    Pain Assessment No/denies pain  -KP No/denies pain  -MD No/denies pain  -KP    Pain Score 0  -KP      Recorded by [KP] KANDY Sultana [MD] Nereida Llamas, PT [KP] Vero York OTR    Vision Assessment/Intervention    Visual Impairment visual impairment, left   but is scanning to L. aware of L side all week during ADLs  -KP  visual impairment, left  -KP    Recorded by [] KANDY Sultana  [KP] KANDY Sultana    Cognitive Assessment/Intervention    Current Cognitive/Communication Assessment impaired  -KP impaired  -MD impaired  -KP    Orientation Status oriented x 4  -KP oriented to;person;place;time  -MD oriented to;person;place;time  -KP    Follows Commands/Answers Questions 100% of the time;able to follow single-step instructions  -% of the time;needs repetition;needs cueing  -% of the time;able to follow single-step instructions  -KP    Personal Safety mild impairment  -KP decreased insight to deficits  -MD mild impairment  -KP    Personal Safety Interventions fall prevention program maintained;gait belt;muscle strengthening facilitated;nonskid shoes/slippers when out of bed  -KP fall prevention program maintained;gait belt;muscle strengthening facilitated;nonskid shoes/slippers when out of bed;supervised activity  -MD fall prevention program maintained;gait belt;nonskid shoes/slippers when out of bed  -KP    Recorded by [KP] KANDY Sultana [MD] Nereida Llamas, PT [KP] Vero York OTR    Bed Mobility, Assessment/Treatment    Bed Mob, Supine to Sit, [place]  supervision  required  -MD     Bed Mob, Sit to Supine, [place]  supervision required  -MD     Bed Mobility, Comment up in   -  up in   -    Recorded by [KP] KANDY Sultana [MD] Nereida Llamas, PT [KP] KANDY Sultana    Transfer Assessment/Treatment    Transfers, Sit-Stand [place] supervision required;set up required;stand by assist  -KP set up required;supervision required;stand by assist  -MD     Transfers, Stand-Sit [place] stand by assist;supervision required;set up required  -KP set up required;supervision required;stand by assist  -MD     Transfers, Sit-Stand-Sit, Assist Device other (see comments)   wc  -KP rolling walker  -MD     Toilet Transfer, [place] supervision required;set up required  -      Toilet Transfer, Assistive Device other (see comments);wheelchair  -      Transfer, Safety Issues  step length decreased  -MD     Transfer, Impairments  strength decreased;impaired balance  -MD     Recorded by [KP] KANDY Sultana [MD] Nereida Llamas, PT     Gait Assessment/Treatment    Gait, [place] Level  verbal cues required;stand by assist  -MD     Gait, Assistive Device  rolling walker  -MD     Gait, Distance (Feet)  160   x2  -MD     Gait, Gait Deviations  bilateral:;janet decreased;forward flexed posture;step length decreased;other (see comments)   [place] to the L  -MD     Gait, Safety Issues  step length decreased  -MD     Gait, Impairments  impaired balance;strength decreased  -MD     Recorded by  [MD] Nereida Llamas, PT     Upper Body Bathing Assessment/Training    UB Bathing Assess/Train Assistive Device hand-held shower head;tub bench  -      UB Bathing Assess/Train, Position sitting  -      UB Bathing Assess/Train, [place] Level set up required;stand by assist  -      Recorded by [KP] KANDY Sultana      Lower Body Bathing Assessment/Training    LB Bathing Assess/Train Assistive Device hand-held shower head;tub bench;grab bars   -KP      LB Bathing Assess/Train, Position sitting;standing  -KP      LB Bathing Assess/Train, [place] Level set up required;stand by assist  -KP      Recorded by [KP] KANDY Sultana      Upper Body Dressing Assessment/Training    UB Dressing Assess/Train, Clothing Type doffing:;donning:;t-shirt  -KP      UB Dressing Assess/Train, Position sitting  -KP      UB Dressing Assess/Train, [place] set up required;supervision required  -KP      Recorded by [KP] Vero York OTR      Lower Body Dressing Assessment/Training    LB Dressing Assess/Train, Clothing Type doffing:;donning:;pants;shoes;slipper socks;socks  -KP      LB Dressing Assess/Train, Position sitting;standing  -KP      LB Dressing Assess/Train, [place] set up required;supervision required  -KP      Recorded by [KP] KANDY Sultana      Toileting Assessment/Training    Toileting Assess/Train, Position sitting;standing  -KP      Toileting Assess/Train, [place] Level set up required;supervision required  -KP      Recorded by [KP] KANDY Sultana      Grooming Assessment/Training    Grooming Assess/Train, Position sitting  -KP      Grooming Assess/Train, [place] Level set up required;supervision required  -KP      Recorded by [KP] KANDY Sultana      Balance Skills Training    Sitting-Level of Assistance Distant supervision  -  Distant supervision  -    Sitting-Balance Support Feet supported  -KP  Feet supported  -    Standing-Level of Assistance Close supervision  -KP      Static Standing Balance Support No upper extremity supported;Right upper extremity supported  -KP      Recorded by [KP] KANDY Sultana  [KP] Vero York OTR    Therapy Exercises    Left Lower Extremity  AROM:;sidelying;clam shell;15 reps   x2 sets  -MD     BLE Other Reps  --   nustep: level 2 for 5 min  -MD     Recorded by  [MD] Nereida Llamas, PT     Fine Motor Coordination Training    Detail (Fine  Motor Coordination Training) inserts pegs w. L hand into grooved peg board to inc FMC. pt demo w. no difficulty using L hand. inserts small square pegs into small peg board colored squares to inc FMC w. L hand w. no difficulty.  -  inserts silver pegs into slot, washer on top and other round open peg over top x12 (purdue peg board) to inc FMC w. L hand.  Pulls coins from resistive putty x10 w, L hand to inc FMC. and finger/hand strength. Grasps a lot of coins in her palm, in hand manipulation pushing coins to finger tips then inserting into slot to inc FMC. w. little to no difficulty  -KP    Recorded by [KP] Vero York, OTR  [KP] Vero York, SARAR    Positioning and Restraints    Pre-Treatment Position sitting in chair/recliner  -KP sitting in chair/recliner  -MD sitting in chair/recliner  -KP    Post Treatment Position wheelchair  -KP wheelchair  -MD wheelchair  -KP    In Wheelchair sitting;call light within reach;encouraged to call for assist;with family/caregiver  -KP sitting;call light within reach  -MD sitting;with PT  -KP    Recorded by [KP] Vero York OTR [MD] Nereida Llamas, PT [KP] Vero York, OTR      11/09/17 1330 11/09/17 1210 11/09/17 1030    Rehab Assessment/Intervention    Discipline speech language pathologist  -KB occupational therapist  -KP speech language pathologist  -KB    Document Type therapy note (daily note)  -KB therapy note (daily note)  -KP therapy note (daily note)  -KB    Subjective Information no complaints;agree to therapy  -KB agree to therapy;no complaints  -KP no complaints;agree to therapy  -KB    Patient Effort, Rehab Treatment good  -KB good  -KP good  -KB    Symptoms Noted During/After Treatment none  -KB none  -KP none  -KB    Precautions/Limitations fall precautions;swallowing precautions  -KB fall precautions  -KP fall precautions  -KB    Recorded by [KB] Katherine L Bruton [KP] Vero York, OTR [KB] Katherine L Bruton     Pain Assessment    Pain Assessment No/denies pain  -KB No/denies pain  -KP No/denies pain  -KB    Pain Score  0  -KP     Recorded by [KB] Katherine L Bruton [KP] Vero York, SARAR [KB] Katherine L Bruton    Vision Assessment/Intervention    Visual Impairment visual impairment, left;WFL with corrective lenses  -KB visual impairment, left  -KP visual impairment, left;WFL with corrective lenses  -KB    Recorded by [KB] Katherine L Bruton [KP] Vero York, OTR [KB] Katherine L Bruton    Cognitive Assessment/Intervention    Current Cognitive/Communication Assessment  impaired  -KP     Orientation Status  oriented to;person;place;time  -KP     Follows Commands/Answers Questions  100% of the time;able to follow single-step instructions  -KP     Personal Safety  mild impairment  -KP     Personal Safety Interventions  fall prevention program maintained;gait belt;nonskid shoes/slippers when out of bed  -KP     Recorded by  [KP] Vero York, OTR     Improve attention    Improve attention by: complete divided attention task  -KB  complete divided attention task  -KB    Status: Improve attention Progressing as expected  -KB  Progressing as expected  -KB    Attention Progress 70%;without cues;100%;with inconsistent cues  -KB  70%;without cues;100%;with inconsistent cues  -KB    Comments: Improve attention Pt completed attention/organization task with errors due to inattention.  -KB  Pt completed complex written directions task with MIN-MOD cues for reasoning and attention.  -KB    Recorded by [KB] Katherine L Bruton  [KB] Katherine L Bruton    Improve oral skills    To Improve Oral Skills, patient will: Increase tongue tip elevation;Increase tongue lateralization;Increase lip closure;Increase back of tongue control;Increase tongue A-P movement  -KB  Increase tongue tip elevation;Increase tongue lateralization;Increase lip closure;Increase back of tongue control;Increase tongue A-P movement  -KB     Status: Improve Oral Skills Progressing as expected  -KB  Progressing as expected  -KB    Oral Skills Progress continue to adress  -KB  continue to adress  -KB    Comments: Improve Oral Skills Unable to alternate puckered lips left to right despite MAX cues and instruciton; cues for labial closure.,  -KB  Pt with difficulty moving pucker left to right and holding air in mouth to puff up cheeks.  -KB    Recorded by [KB] Katherine L Bruton  [KB] Katherine L Bruton    Improve closure at entrance to airway    To improve closure at entrance to airway, patient will: --   hard swallows  -KB  --   hard swallows  -KB    Status: Improve closure at entrance to airway Progressing as expected  -KB  Progressing as expected  -KB    Closure at Entrance to Airway Progress continue to adress  -KB  continue to adress  -KB    Comments: Improve closure at entrance to airway Completed exercises with verbal cues for squeezing muscles.  -KB  Completes exercises independently.   -KB    Recorded by [KB] Katherine L Bruton  [KB] Katherine L Bruton    Improve laryngeal elevation    To improve laryngeal elevation, patient will: Complete pitch-glide  -KB  Complete pitch-glide  -KB    Status: Improve laryngeal elevation Progressing as expected  -KB  Progressing as expected  -KB    Laryngeal Elevation Progress continue to adress  -KB  continue to adress  -KB    Comments: Improve laryngeal elevation Completed exercises independently.   -KB  Pt completes exercises independently.  -KB    Recorded by [KB] Katherine L Bruton  [KB] Katherine L Bruton    Improve tongue base & pharyngeal wall squeeze    To improve tongue base & pharyngeal wall squeeze, patient will: Complete tongue base retraction;Complete yawn/hold retractions  -KB  Complete effortful swallow;Complete gargle/hold retraction  -KB    Status: Improve tongue base & pharyngeal wall squeeze Progressing as expected  -KB  Progressing as expected  -KB    Tongue Base/Pharyngeal Wall Squeeze  Progress continue to adress  -KB  continue to adress  -KB    Comments: Improve tongue base & pharyngeal wall squeeze Completed exercises with verbal instructions.  -KB  Pt completed exercises independently.   -KB    Recorded by [KB] Katherine L Bruton  [KB] Katherine L Bruton    Bed Mobility, Assessment/Treatment    Bed Mobility, Comment  up in wc  -KP     Recorded by  [KP] KANDY Sultana     Transfer Assessment/Treatment    Transfers, Sit-Stand [place]  set up required;supervision required;stand by assist  -KP     Transfers, Stand-Sit [place]  set up required;supervision required;stand by assist  -KP     Transfers, Sit-Stand-Sit, Assist Device  other (see comments)   wc grab bars  -KP     Walk-In Shower Transfer, [place]  set up required;supervision required  -KP     Walk-In Shower Transfer, Assist Device  other (see comments)   tub bench, grab bars  -KP     Recorded by  [KP] KANDY Sultana     Functional Mobility    Functional Mobility- Comment  a few steps to tub bench SBA  -KP     Recorded by  [KP] KANDY Sultana     Upper Body Bathing Assessment/Training    UB Bathing Assess/Train Assistive Device  hand-held shower head;grab bars;tub bench  -KP     UB Bathing Assess/Train, Position  sitting  -KP     UB Bathing Assess/Train, [place] Level  set up required;stand by assist  -KP     Recorded by  [KP] Vero York OTR     Lower Body Bathing Assessment/Training    LB Bathing Assess/Train Assistive Device  hand-held shower head;grab bars;tub bench  -KP     LB Bathing Assess/Train, Position  sitting;standing  -KP     LB Bathing Assess/Train, [place] Level  set up required;stand by assist  -     Recorded by  [KP] Vero York OTR     Upper Body Dressing Assessment/Training    UB Dressing Assess/Train, Clothing Type  doffing:;donning:;t-shirt  -KP     UB Dressing Assess/Train, Position  sitting  -KP     UB Dressing Assess/Train,  [place]  set up required;supervision required  -KP     Recorded by  [KP] Vero York OTR     Lower Body Dressing Assessment/Training    LB Dressing Assess/Train, Clothing Type  doffing:;donning:;pants;shoes;slipper socks;socks  -KP     LB Dressing Assess/Train, Position  sitting;standing  -KP     LB Dressing Assess/Train, [place]  set up required;supervision required  -KP     Recorded by  [KP] Vero York OTR     Grooming Assessment/Training    Grooming Assess/Train, Position  sitting  -KP     Grooming Assess/Train, [place] Level  conditional independence;set up required  -KP     Recorded by  [KP] Vero York OTR     Balance Skills Training    Sitting-Level of Assistance  Distant supervision  -KP     Sitting-Balance Support  Feet supported  -KP     Sitting-Balance Activities  Reaching for objects  -KP     Standing-Level of Assistance  Close supervision  -KP     Recorded by  [KP] KANDY Sultana     Positioning and Restraints    Pre-Treatment Position  sitting in chair/recliner  -KP     Post Treatment Position  wheelchair  -KP     In Wheelchair  sitting;with other staff   RT  -KP     Recorded by  [KP] KANDY Sultana       11/09/17 1027 11/08/17 1539       Rehab Assessment/Intervention    Discipline physical therapist  -MD occupational therapist  -     Document Type therapy note (daily note)  -MD therapy note (daily note)  -KP     Subjective Information no complaints;agree to therapy  -MD agree to therapy;no complaints  -KP     Patient Effort, Rehab Treatment good  -MD good  -KP     Symptoms Noted During/After Treatment none  -MD none  -KP     Precautions/Limitations fall precautions  -MD fall precautions;swallowing precautions  -KP     Equipment Issued to Patient gait belt  -MD      Recorded by [MD] Nereida Llamas, PT [] Vero York OTR     Pain Assessment    Pain Assessment No/denies pain  -MD No/denies pain  -KP     Pain Score  0  -KP      Recorded by [MD] Nereida Llamas PT [KP] Vero York OTR     Vision Assessment/Intervention    Visual Impairment  visual impairment, left  -KP     Recorded by  [KP] Vero York OTR     Cognitive Assessment/Intervention    Current Cognitive/Communication Assessment impaired  -MD impaired  -KP     Orientation Status person;place;time;oriented to  -MD person;place;time;oriented to  -KP     Follows Commands/Answers Questions 100% of the time;needs cueing;needs increased time;needs repetition  -% of the time;able to follow single-step instructions;needs cueing  -KP     Personal Safety decreased insight to deficits  -MD mild impairment  -KP     Personal Safety Interventions fall prevention program maintained;gait belt;muscle strengthening facilitated;nonskid shoes/slippers when out of bed;supervised activity  -MD fall prevention program maintained;gait belt;nonskid shoes/slippers when out of bed  -KP     Recorded by [MD] Nereida Llamas, PT [KP] Vero York, SARAR     Bed Mobility, Assessment/Treatment    Bed Mobility, Roll Left, [place] supervision required  -MD      Bed Mobility, Roll Right, [place] supervision required  -MD      Bed Mobility, Scoot/Bridge, [place] supervision required  -MD      Bed Mob, Supine to Sit, [place] supervision required  -MD      Bed Mob, Sit to Supine, [place] supervision required  -MD      Bed Mobility, Comment  up in chair  -     Recorded by [MD] Nereida Llamas, PT [KP] Vero York, OTR     Transfer Assessment/Treatment    Transfers, Sit-Stand [place] supervision required  -MD      Transfers, Stand-Sit [place] supervision required  -MD      Transfers, Sit-Stand-Sit, Assist Device rolling walker  -MD      Transfer, Safety Issues step length decreased  -MD      Transfer, Impairments strength decreased;impaired balance  -MD      Transfer, Comment car transfer: Ernie WHITTINGTON  -MD      Recorded by [MD] Nereida Llamas, PT      Gait  Assessment/Treatment    Gait, [place] Level verbal cues required;stand by assist;contact guard assist  -MD      Gait, Assistive Device rolling walker  -MD      Gait, Distance (Feet) 80   x3  -MD      Gait, Gait Deviations bilateral:;janet decreased;forward flexed posture;step length decreased  -MD      Gait, Safety Issues step length decreased  -MD      Gait, Impairments impaired balance;strength decreased  -MD      Recorded by [MD] Nereida Llamas, PT      Stairs Assessment/Treatment    Number of Stairs 4  -MD      Stairs, Handrail Location both sides  -MD      Stairs, [place] Level supervision required  -MD      Stairs, Technique Used step over step (ascending);step to step (descending)  -MD      Stairs, Impairments strength decreased;impaired balance  -MD      Recorded by [MD] Nereida Llamas PT      Balance Skills Training    Sitting-Level of Assistance  Distant supervision  -     Sitting-Balance Support  Feet supported  -     Standing-Level of Assistance Contact guard  -MD      Static Standing Balance Support No upper extremity supported  -MD      Standing-Balance Activities Ball toss  -MD      Gait Balance-Level of Assistance Contact guard  -MD      Gait Balance Support parallel bars  -MD      Gait Balance Activities backwards;side-stepping  -MD      Recorded by [MD] Nereida Llamas, PT [KP] KANDY Sultana     Fine Motor Coordination Training    Detail (Fine Motor Coordination Training)  pulls pegs from yellow resistive putty using one hand, using L hand to inc FMC and hand/digit strength. rotates wooden block up dowel [place] w. pegs to inc UE coordination w. min difficulty. strings foam beads on lace w. R hand holding string and L hand pushing bead on and vice versus to inc FMC and BLC  -KP     Recorded by  [KP] KANDY Sultana     Positioning and Restraints    Pre-Treatment Position sitting in chair/recliner  -MD sitting in chair/recliner  -KP     Post Treatment Position bed  -MD  wheelchair  -KP     In Bed supine;call light within reach;exit alarm on  -MD      In Wheelchair  sitting;with PT  -KP     Recorded by [MD] Nereida Llamas, PT [KP] Vero York, OTR       User Key  (r) = Recorded By, (t) = Taken By, (c) = Cosigned By    Initials Name Effective Dates    KP Vero Abdirashid York, OTR 04/13/15 -     JK Anna Epps, PT 12/01/15 -     MD Nereida Llamas, PT 12/01/15 -     KB Katherine L Bruton 09/29/17 -                 IP PT Goals       11/08/17 1501 11/01/17 1221       Bed Mobility PT LTG    Bed Mobility PT LTG, Date Established  11/01/17  -MD     Bed Mobility PT LTG, Time to Achieve  2 wks  -MD     Bed Mobility PT LTG, Activity Type  supine to sit/sit to supine  -MD     Bed Mobility PT LTG, [place] Level  independent  -MD     Bed Mobility PT LTG, Date Goal Reviewed 11/08/17  -MD      Bed Mobility PT LTG, Outcome goal ongoing  -MD      Transfer Training PT LTG    Transfer Training PT LTG, Date Established  11/01/17  -MD     Transfer Training PT LTG, Time to Achieve  2 wks  -MD     Transfer Training PT LTG, Activity Type  sit to stand/stand to sit  -MD     Transfer Training PT LTG, [place] Level  supervision required  -MD     Transfer Training PT LTG, Assist Device  walker, rolling  -MD     Transfer Training PT  LTG, Date Goal Reviewed 11/08/17  -MD      Transfer Training PT LTG, Outcome goal met  -MD      Transfer Training 2 PT LTG    Transfer Training PT 2 LTG, Date Established  11/01/17  -MD     Transfer Training PT 2 LTG, Time to Achieve  2 wks  -MD     Transfer Training PT 2 LTG, Activity Type  other (see comments)   car transfer  -MD     Transfer Training PT 2 LTG, [place] Level  supervision required  -MD     Transfer Training PT 2 LTG, Assist Device  walker, rolling  -MD     Transfer Training PT 2 LTG, Date Goal Reviewed 11/08/17  -MD      Transfer Training PT 2 LTG, Outcome goal ongoing  -MD      Gait Training PT LTG    Gait Training Goal PT LTG, Date Established   11/01/17  -MD     Gait Training Goal PT LTG, Time to Achieve  2 wks  -MD     Gait Training Goal PT LTG, [place] Level  supervision required  -MD     Gait Training Goal PT LTG, Assist Device  walker, rolling  -MD     Gait Training Goal PT LTG, Distance to Achieve  160  -MD     Gait Training Goal PT LTG, Date Goal Reviewed 11/08/17  -MD      Gait Training Goal PT LTG, Outcome goal ongoing  -MD      Stair Training PT LTG    Stair Training Goal PT LTG, Date Established  11/01/17  -MD     Stair Training Goal PT LTG, Time to Achieve  2 wks  -MD     Stair Training Goal PT LTG, Number of Steps  4  -MD     Stair Training Goal PT LTG, [place] Level  supervision required  -MD     Stair Training Goal PT LTG, Assist Device  2 handrails  -MD     Stair Training Goal PT LTG, Date Goal Reviewed 11/08/17  -MD      Stair Training Goal PT LTG, Outcome goal ongoing  -MD      Patient Education PT LTG    Patient Education PT LTG, Date Established  11/01/17  -MD     Patient Education PT LTG, Time to Achieve  2 wks  -MD     Patient Education PT LTG, Education Type  HEP  -MD     Patient Education PT LTG, Education Understanding  demonstrate adequately  -MD     Patient Education PT LTG, Date Goal Reviewed 11/08/17  -MD      Patient Education PT LTG Outcome goal ongoing  -MD        User Key  (r) = Recorded By, (t) = Taken By, (c) = Cosigned By    Initials Name Provider Type    MD Nereida Llamas, PT Physical Therapist          Physical Therapy Education     Title: PT OT SLP Therapies (Active)     Topic: Physical Therapy (Active)     Point: Mobility training (Done)    Learning Progress Summary    Learner Readiness Method Response Comment Documented by Status   Patient Acceptance E,D,TB AYDE CLARK Pt's daughter Lanette was instructed and demonstrated safe assist with pt for: sit to stand tsf with CGA, ambulation 80' with roll wx CGA; car tsf CGA; up/down 6' curb with min/CGA; tsf WC in/out of trunk. TOSHA 11/11/17 5119 Done    Acceptance CAR GIVENS  11/02/17 1158 Done   Family Acceptance E,D,AYDE OJEDA Pt's daughter Lanette was instructed and demonstrated safe assist with pt for: sit to stand tsf with CGA, ambulation 80' with roll wx CGA; car tsf CGA; up/down 6' curb with min/CGA; tsf WC in/out of trunk.  11/11/17 1458 Done               Point: Precautions (Done)    Learning Progress Summary    Learner Readiness Method Response Comment Documented by Status   Patient Acceptance E,D,AYDE OJEDA Pt's daughter Lanette was instructed and demonstrated safe assist with pt for: sit to stand tsf with CGA, ambulation 80' with roll wx CGA; car tsf CGA; up/down 6' curb with min/CGA; tsf WC in/out of trunk.  11/11/17 1458 Done    Acceptance E,D EDUARDO SLAUGHTER 11/10/17 0849 Done    Acceptance E,D EDUARDO SLAUGHTER 11/09/17 1031 Done    Acceptance E,D NR  MD 11/08/17 1217 Active    Acceptance E,D NR  MD 11/07/17 0821 Active    Acceptance E,D NR  MD 11/06/17 1118 Active    Acceptance E,D NR  MD 11/04/17 1012 Active    Acceptance E,D NR  MD 11/03/17 1215 Active    Acceptance E,D NR  MD 11/01/17 1228 Active   Family Acceptance E,D,AYDE OJEDA Pt's daughter Lanette was instructed and demonstrated safe assist with pt for: sit to stand tsf with CGA, ambulation 80' with roll wx CGA; car tsf CGA; up/down 6' curb with min/CGA; tsf WC in/out of trunk.  11/11/17 1458 Done                      User Key     Initials Effective Dates Name Provider Type Discipline    [facility] 12/01/15 -  Anna Epps, PT Physical Therapist PT    MD 12/01/15 -  Nereida Llamas, PT Physical Therapist PT                    PT Recommendation and Plan  Anticipated Equipment Needs At Discharge: gait belt, front wheeled walker  Anticipated Discharge Disposition: home with home health  Planned Therapy Interventions: balance training, bed mobility training, gait training, home exercise program, patient/family education, transfer training  PT Frequency: 2 times/day            Time Calculation:         PT Charges       11/11/17 1500          Time Calculation     Start Time 1100  -JK      Stop Time 1140  -JK      Time Calculation (min) 40 min  -JK        User Key  (r) = Recorded By, (t) = Taken By, (c) = Cosigned By    Initials Name Provider Type    TOSHA Epps, PT Physical Therapist          Therapy Charges for Today     Code Description Service Date Service Provider Modifiers Qty    [id number]  PT THER PROC EA 15 MIN 11/11/2017 Anna Epps, ONEIDA GP 3               Anna Epps, PT  11/11/2017

## 2017-11-12 LAB
GLUCOSE BLDC GLUCOMTR-MCNC: 105 MG/DL (ref 70–130)
GLUCOSE BLDC GLUCOMTR-MCNC: 128 MG/DL (ref 70–130)
INR PPP: 2.52 (ref 0.9–1.1)
PROTHROMBIN TIME: 26.4 SECONDS (ref 11.7–14.2)

## 2017-11-12 PROCEDURE — 82962 GLUCOSE BLOOD TEST: CPT

## 2017-11-12 PROCEDURE — 85610 PROTHROMBIN TIME: CPT | Performed by: PHYSICAL MEDICINE & REHABILITATION

## 2017-11-12 RX ADMIN — ALLOPURINOL 200 MG: 100 TABLET ORAL at 07:58

## 2017-11-12 RX ADMIN — ASPIRIN 81 MG: 81 TABLET, CHEWABLE ORAL at 07:57

## 2017-11-12 RX ADMIN — FOLIC ACID 1 MG: 1 TABLET ORAL at 07:57

## 2017-11-12 RX ADMIN — Medication 3 MG: at 23:11

## 2017-11-12 RX ADMIN — WARFARIN SODIUM 9 MG: 6 TABLET ORAL at 17:08

## 2017-11-12 RX ADMIN — VITAMIN D, TAB 1000IU (100/BT) 1000 UNITS: 25 TAB at 17:07

## 2017-11-12 RX ADMIN — ATORVASTATIN CALCIUM 80 MG: 80 TABLET, FILM COATED ORAL at 19:43

## 2017-11-12 RX ADMIN — Medication 1 TABLET: at 07:57

## 2017-11-12 RX ADMIN — LEVOTHYROXINE SODIUM 112 MCG: 112 TABLET ORAL at 05:48

## 2017-11-12 RX ADMIN — DOCUSATE SODIUM -SENNOSIDES 2 TABLET: 50; 8.6 TABLET, COATED ORAL at 17:07

## 2017-11-12 RX ADMIN — VITAMIN D, TAB 1000IU (100/BT) 1000 UNITS: 25 TAB at 07:57

## 2017-11-12 RX ADMIN — METFORMIN HYDROCHLORIDE 500 MG: 500 TABLET ORAL at 07:58

## 2017-11-12 NOTE — PLAN OF CARE
Problem: Patient Care Overview (Adult)  Goal: Plan of Care Review  Outcome: Ongoing (interventions implemented as appropriate)    11/12/17 0057   Coping/Psychosocial Response Interventions   Plan Of Care Reviewed With patient   Patient Care Overview   Progress improving   Outcome Evaluation   Outcome Summary/Follow up Plan Pt. doing fine tonight. Pleasant and cooperative. Daughter at bedside. Pt. will go on a day pass in the AM.       Goal: Adult Individualization and Mutuality  Outcome: Ongoing (interventions implemented as appropriate)  Goal: Discharge Needs Assessment  Outcome: Ongoing (interventions implemented as appropriate)    Problem: Stroke (Ischemic) (Adult)  Goal: Signs and Symptoms of Listed Potential Problems Will be Absent or Manageable (Stroke)  Outcome: Ongoing (interventions implemented as appropriate)    Problem: Fall Risk (Adult)  Goal: Absence of Falls  Outcome: Ongoing (interventions implemented as appropriate)

## 2017-11-12 NOTE — PLAN OF CARE
Problem: Patient Care Overview (Adult)  Goal: Plan of Care Review  Outcome: Ongoing (interventions implemented as appropriate)    11/12/17 7836   Coping/Psychosocial Response Interventions   Plan Of Care Reviewed With patient   Patient Care Overview   Progress improving   Outcome Evaluation   Outcome Summary/Follow up Plan Went on pass today to [place] and to eat. Daughter reported pt was tearful both when happy and sad and she tired easily. Daughter states she followed all the mobilty rules and no unsafe behavior.         Problem: Stroke (Ischemic) (Adult)  Goal: Signs and Symptoms of Listed Potential Problems Will be Absent or Manageable (Stroke)  Outcome: Ongoing (interventions implemented as appropriate)    Problem: Fall Risk (Adult)  Goal: Absence of Falls  Outcome: Ongoing (interventions implemented as appropriate)

## 2017-11-12 NOTE — PROGRESS NOTES
Inpatient Rehabilitation Functional Measures Assessment    Functional Measures  YESI Eating:  [place]  [facility] Grooming: [place]  [facility] Bathing:  [place]  [facility] Upper Body Dressing:  [place]  [facility] Lower Body Dressing:  [place]  [facility] Toileting:  Toileting Score = 4.  Patient requires minimal assistance for  toileting, such as steadying for balance while cleansing or adjusting clothes.  Patient requires the following assistive device(s): Grab bar.    YESI Bladder Management  Level of Assistance:  Bladder Score = 5.  Patient is supervision/set-up for  bladder management, requiring: Verbal cuing, prompting, or instructing. Patient  requires the following assistive device(s): toilet .  Frequency/Number of Accidents this Shift:  Bladder accidents this shift:  0 .  Patient has not had an accident this shift.    YESI Bowel Management  Level of Assistance: Activity was not observed.  Frequency/Number of Accidents this Shift: Bowel accidents this shift: 0 .  Patient has not had an accident this shift.    YESI Bed/Chair/Wheelchair Transfer:  Bed/chair/wheelchair Transfer Score = 2.  Patient performs 25-49% of effort and requires maximal assistance (most of the  lifting) for transferring to and from the bed/chair/wheelchair. No assistive  devices were required.  YESI Toilet Transfer:  Toilet Transfer Score = 4.  Patient performs 75% or more  of effort and minimal assistance (little/incidental help/steadying) for  transferring to and from the toilet/commode, requiring: Patient requires the  following assistive device(s): Grab bars.  YESI Tub/Shower Transfer:  [place]    Previously Documented Mode of Locomotion at Discharge: Field  YESI Expected Mode of Locomotion at Discharge: [facility] Walk/Wheelchair:  [facility] Stairs:  [facility] Comprehension:  [place]  [facility] Expression:  [facility] Social Interaction:  [facility] Problem Solving:  [facility] Memory:  [place]    Therapy Mode Minutes  Occupational Therapy: [place]  Physical Therapy:  [place]anch  Speech Language Pathology:  [place]    Signed by: KAYLEN Umanzor

## 2017-11-12 NOTE — PROGRESS NOTES
Inpatient Rehabilitation Plan of Care Note    Plan of Care  Care Plan Reviewed - No updates at this time.    Sphincter Control    Performed Intervention(s)  Monitor I & O's  Encourage fluid intake  Timed voids/ scheduled toileting      Safety    Performed Intervention(s)  Falls prevention protocol  Safety rounds  Bed and chair alarms      Psychosocial    Performed Intervention(s)  Verbalizes needs and concerns  Therapeutic environmental set-up      Body Systems    Performed Intervention(s)  Blood glucose testing  Monitor labs  Medication    Signed by: Beatriz Cherry RN

## 2017-11-12 NOTE — PROGRESS NOTES
Inpatient Rehabilitation Functional Measures Assessment    Functional Measures  YESI Eating:  [facility] Grooming: [facility] Bathing:  [facility] Upper Body Dressing:  [facility] Lower Body Dressing:  [facility] Toileting:  [facility] Bladder Management  Level of Assistance:  [facility]  Frequency/Number of Accidents this Shift:  [facility] Bowel Management  Level of Assistance: [facility]  Frequency/Number of Accidents this Shift: [facility] Bed/Chair/Wheelchair Transfer:  [facility] Toilet Transfer:  [facility] Tub/Shower Transfer:  [facility]    Previously Documented Mode of Locomotion at Discharge: Field  YESI Expected Mode of Locomotion at Discharge: [facility] Walk/Wheelchair:  [facility] Stairs:  [facility] Comprehension:  Auditory comprehension is the usual mode. Patient does not  comprehend complex/abstract information in their primary language without  assistance from a helper. Comprehension Score = 5, Supervision. Patient  comprehends basic daily needs or ideas greater than 90% of the time. Patient  requires stand by/rare prompting. Patient requires the following assistive  device(s): Glasses.  YESI Expression:  Vocal expression is the usual mode. Patient does not express  complex/abstract information in their primary language without a helper.  Expression Score = 5, Stand By Prompting. Patient expresses basic daily needs or  ideas without prompting. No assistive devices were required.  YESI Social Interaction:  Social Interaction Score = 7, Independent. Patient is  completely independent for social interaction.  There are no activity  limitations.  YESI Problem Solving:  Activity was not observed.  YESI Memory:  Memory Score = 6, Modified [place].  Patient is modified  independent for memory, having only mild difficulty and using self-initiated or  environmental cues to remember.    Therapy Mode Minutes  Occupational Therapy: [facility]  Physical Therapy: [facility]  Speech Language Pathology:   Kevin    Signed by: Chio Purcell RN

## 2017-11-12 NOTE — PROGRESS NOTES
Inpatient Rehabilitation Functional Measures Assessment    Functional Measures  YESI Eating:  [facility] Grooming: [facility] Bathing:  [facility] Upper Body Dressing:  [facility] Lower Body Dressing:  [facility] Toileting:  [facility] Bladder Management  Level of Assistance:  [place]  Frequency/Number of Accidents this Shift:  [facility] Bowel Management  Level of Assistance: [place]  Frequency/Number of Accidents this Shift: [facility] Bed/Chair/Wheelchair Transfer:  [facility] Toilet Transfer:  [facility] Tub/Shower Transfer:  [place]    Previously Documented Mode of Locomotion at Discharge: Field  YESI Expected Mode of Locomotion at Discharge: [facility] Walk/Wheelchair:  [facility] Stairs:  [facility] Comprehension:  Auditory comprehension is the usual mode. Comprehension  Score = 6, Modified [place].  Patient comprehends complex/abstract  information in their primary language with only mild difficulty.  YESI Expression:  Vocal expression is the usual mode. Expression Score = 6,  Modified Independent.  Patient expresses complex/abstract information in their  primary language with only mild difficulty with tasks.  YESI Social Interaction:  Social Interaction Score = 6, Modified Independent.  Patient is modified independent for social interaction, requiring: Requires  additional time.  YESI Problem Solving:  Problem Solving Score = 6, [place].  Patient  makes appropriate decisions in order to solve complex problems with mild  difficulty but self-corrects.  YESI Memory:  Memory Score = 6, [place].  Patient is modified  independent for memory, having only mild difficulty and using self-initiated or  environmental cues to remember.    Therapy Mode Minutes  Occupational Therapy: [place]  Physical Therapy: [place]  Speech Language Pathology:  [place]    Signed by: Beatriz Cherry RN

## 2017-11-12 NOTE — PROGRESS NOTES
Inpatient Rehabilitation Plan of Care Note    Plan of Care  Care Plan Reviewed - Updates as Follows    Body Systems    [RN] Endocrine(Active)  Current Status(11/12/2017): Blood sugar well controlled, accuchecks down to BID  Weekly Goal(11/19/2017): Blood sugar will be within acceptable limits  Discharge Goal: Independent with diabetes regimen    Performed Intervention(s)  Blood glucose testing  Monitor labs  Medication      Psychosocial    [RN] Coping/Adjustment(Active)  Current Status(11/12/2017): Expresses appropriate coping  Weekly Goal(11/19/2017): Allow opportunity to express concerns regarding current  status  Discharge Goal: Adequate coping regarding life changes with ongoing support    Performed Intervention(s)  Verbalizes needs and concerns  Therapeutic environmental set-up      Safety    [RN] Potential for Injury(Active)  Current Status(11/12/2017): History of fall  Weekly Goal(11/19/2017): Cue to use call bell  Discharge Goal: Patient/family will be aware of risk of fall and safety in the  home setting    Performed Intervention(s)  Falls prevention protocol  Safety rounds, items within reach  Bed and chair alarms      Sphincter Control    [RN] Bowel and Bladder Management(Active)  Current Status(11/12/2017): Continent of bowel and bladder  Weekly Goal(11/19/2017): 100% continent  Discharge Goal: 100% continent    Performed Intervention(s)  Monitor I & O's  Encourage fluid intake  Timed voids/ scheduled toileting    Signed by: Chio Purcell RN

## 2017-11-13 LAB
ALBUMIN SERPL-MCNC: 3.3 G/DL (ref 3.5–5.2)
ALBUMIN/GLOB SERPL: 1 G/DL
ALP SERPL-CCNC: 72 U/L (ref 39–117)
ALT SERPL W P-5'-P-CCNC: 17 U/L (ref 1–33)
ANION GAP SERPL CALCULATED.3IONS-SCNC: 10.4 MMOL/L
AST SERPL-CCNC: 26 U/L (ref 1–32)
BASOPHILS # BLD AUTO: 0.01 10*3/MM3 (ref 0–0.2)
BASOPHILS NFR BLD AUTO: 0.1 % (ref 0–1.5)
BILIRUB SERPL-MCNC: 0.9 MG/DL (ref 0.1–1.2)
BUN BLD-MCNC: 15 MG/DL (ref 8–23)
BUN/CREAT SERPL: 15.3 (ref 7–25)
CALCIUM SPEC-SCNC: 8.7 MG/DL (ref 8.6–10.5)
CHLORIDE SERPL-SCNC: 101 MMOL/L (ref 98–107)
CO2 SERPL-SCNC: 27.6 MMOL/L (ref 22–29)
CREAT BLD-MCNC: 0.98 MG/DL (ref 0.57–1)
DEPRECATED RDW RBC AUTO: 47.7 FL (ref 37–54)
EOSINOPHIL # BLD AUTO: 0.06 10*3/MM3 (ref 0–0.7)
EOSINOPHIL NFR BLD AUTO: 0.9 % (ref 0.3–6.2)
ERYTHROCYTE [DISTWIDTH] IN BLOOD BY AUTOMATED COUNT: 14.8 % (ref 11.7–13)
GFR SERPL CREATININE-BSD FRML MDRD: 55 ML/MIN/1.73
GLOBULIN UR ELPH-MCNC: 3.2 GM/DL
GLUCOSE BLD-MCNC: 134 MG/DL (ref 65–99)
GLUCOSE BLDC GLUCOMTR-MCNC: 140 MG/DL (ref 70–130)
GLUCOSE BLDC GLUCOMTR-MCNC: 90 MG/DL (ref 70–130)
HCT VFR BLD AUTO: 34.4 % (ref 35.6–45.5)
HGB BLD-MCNC: 10.7 G/DL (ref 11.9–15.5)
IMM GRANULOCYTES # BLD: 0.02 10*3/MM3 (ref 0–0.03)
IMM GRANULOCYTES NFR BLD: 0.3 % (ref 0–0.5)
INR PPP: 2.13 (ref 0.9–1.1)
LYMPHOCYTES # BLD AUTO: 2.07 10*3/MM3 (ref 0.9–4.8)
LYMPHOCYTES NFR BLD AUTO: 29.7 % (ref 19.6–45.3)
MCH RBC QN AUTO: 27.9 PG (ref 26.9–32)
MCHC RBC AUTO-ENTMCNC: 31.1 G/DL (ref 32.4–36.3)
MCV RBC AUTO: 89.6 FL (ref 80.5–98.2)
MONOCYTES # BLD AUTO: 0.68 10*3/MM3 (ref 0.2–1.2)
MONOCYTES NFR BLD AUTO: 9.8 % (ref 5–12)
NEUTROPHILS # BLD AUTO: 4.13 10*3/MM3 (ref 1.9–8.1)
NEUTROPHILS NFR BLD AUTO: 59.2 % (ref 42.7–76)
PLATELET # BLD AUTO: 181 10*3/MM3 (ref 140–500)
PMV BLD AUTO: 11.9 FL (ref 6–12)
POTASSIUM BLD-SCNC: 3.9 MMOL/L (ref 3.5–5.2)
PROT SERPL-MCNC: 6.5 G/DL (ref 6–8.5)
PROTHROMBIN TIME: 23.1 SECONDS (ref 11.7–14.2)
RBC # BLD AUTO: 3.84 10*6/MM3 (ref 3.9–5.2)
SODIUM BLD-SCNC: 139 MMOL/L (ref 136–145)
WBC NRBC COR # BLD: 6.97 10*3/MM3 (ref 4.5–10.7)

## 2017-11-13 PROCEDURE — 85610 PROTHROMBIN TIME: CPT | Performed by: PHYSICAL MEDICINE & REHABILITATION

## 2017-11-13 PROCEDURE — 97535 SELF CARE MNGMENT TRAINING: CPT | Performed by: OCCUPATIONAL THERAPIST

## 2017-11-13 PROCEDURE — 97112 NEUROMUSCULAR REEDUCATION: CPT | Performed by: OCCUPATIONAL THERAPIST

## 2017-11-13 PROCEDURE — 82962 GLUCOSE BLOOD TEST: CPT

## 2017-11-13 PROCEDURE — 97110 THERAPEUTIC EXERCISES: CPT

## 2017-11-13 PROCEDURE — 80053 COMPREHEN METABOLIC PANEL: CPT | Performed by: PHYSICAL MEDICINE & REHABILITATION

## 2017-11-13 PROCEDURE — 92526 ORAL FUNCTION THERAPY: CPT

## 2017-11-13 PROCEDURE — 97532: CPT

## 2017-11-13 PROCEDURE — 85025 COMPLETE CBC W/AUTO DIFF WBC: CPT | Performed by: PHYSICAL MEDICINE & REHABILITATION

## 2017-11-13 RX ADMIN — DOCUSATE SODIUM -SENNOSIDES 2 TABLET: 50; 8.6 TABLET, COATED ORAL at 08:47

## 2017-11-13 RX ADMIN — WARFARIN SODIUM 9 MG: 6 TABLET ORAL at 17:25

## 2017-11-13 RX ADMIN — Medication 1 TABLET: at 08:47

## 2017-11-13 RX ADMIN — VITAMIN D, TAB 1000IU (100/BT) 1000 UNITS: 25 TAB at 08:47

## 2017-11-13 RX ADMIN — ASPIRIN 81 MG: 81 TABLET, CHEWABLE ORAL at 08:47

## 2017-11-13 RX ADMIN — VITAMIN D, TAB 1000IU (100/BT) 1000 UNITS: 25 TAB at 17:25

## 2017-11-13 RX ADMIN — ATORVASTATIN CALCIUM 80 MG: 80 TABLET, FILM COATED ORAL at 19:57

## 2017-11-13 RX ADMIN — FOLIC ACID 1 MG: 1 TABLET ORAL at 08:47

## 2017-11-13 RX ADMIN — Medication 3 MG: at 23:02

## 2017-11-13 RX ADMIN — ALLOPURINOL 200 MG: 100 TABLET ORAL at 08:47

## 2017-11-13 RX ADMIN — LEVOTHYROXINE SODIUM 112 MCG: 112 TABLET ORAL at 05:53

## 2017-11-13 RX ADMIN — METFORMIN HYDROCHLORIDE 500 MG: 500 TABLET ORAL at 08:47

## 2017-11-13 RX ADMIN — DOCUSATE SODIUM -SENNOSIDES 2 TABLET: 50; 8.6 TABLET, COATED ORAL at 17:25

## 2017-11-13 NOTE — THERAPY DISCHARGE NOTE
Inpatient Rehabilitation - Physical Therapy Treatment Note/Discharge  [facility]     Patient Name: Alice Maxwell  : 1945  MRN: [id number]  Today's Date: 2017  Onset of Illness/Injury or Date of Surgery Date: 10/28/17  Date of Referral to PT: 17  Referring Physician: Jeremie    Admit Date: 10/31/2017    Visit Dx:    ICD-10-CM ICD-9-CM   1. Oropharyngeal dysphagia R13.12 787.22     Patient Active Problem List   Diagnosis   • Stroke       Physical Therapy Education     Title: PT OT SLP Therapies (Done)     Topic: Physical Therapy (Resolved)     Point: Mobility training (Resolved)    Learning Progress Summary    Learner Readiness Method Response Comment Documented by Status   Patient Acceptance NOEMY YOON TB VU, DU Pt's daughter Lanette was instructed and demonstrated safe assist with pt for: sit to stand tsf with CGA, ambulation 80' with roll wx CGA; car tsf CGA; up/down 6' curb with min/CGA; tsf WC in/out of trunk.  17 1458 Done    Acceptance CAR CLARK   17 1158 Done   Family Acceptance NOEMY YOON TB VU, DU Pt's daughter Lanette was instructed and demonstrated safe assist with pt for: sit to stand tsf with CGA, ambulation 80' with roll wx CGA; car tsf CGA; up/down 6' curb with min/CGA; tsf WC in/out of trunk.  17 1458 Done               Point: Home exercise program (Resolved)    Learning Progress Summary    Learner Readiness Method Response Comment Documented by Status   Patient Acceptance NOEMY YOON,AYDE LERMA MD 17 1319 Done               Point: Precautions (Resolved)    Learning Progress Summary    Learner Readiness Method Response Comment Documented by Status   Patient Acceptance NOEMY YOON TB VU, DU Pt's daughter Lanette was instructed and demonstrated safe assist with pt for: sit to stand tsf with CGA, ambulation 80' with roll wx CGA; car tsf CGA; up/down 6' curb with min/CGA; tsf WC in/out of trunk. JK 17 1458 Done    Acceptance NOEMY YOON MD 11/10/17 0849 Done    Acceptance NOEMY YOON MD 17 1031  Done    Acceptance E,D NR  MD 11/08/17 1217 Active    Acceptance E,D NR  MD 11/07/17 0821 Active    Acceptance E,D NR  MD 11/06/17 1118 Active    Acceptance E,D NR  MD 11/04/17 1012 Active    Acceptance E,D NR  MD 11/03/17 1215 Active    Acceptance E,D NR  MD 11/01/17 1228 Active   Family Acceptance E,D,TB VU,DU Pt's daughter Lanette was instructed and demonstrated safe assist with pt for: sit to stand tsf with CGA, ambulation 80' with roll wx CGA; car tsf CGA; up/down 6' curb with min/CGA; tsf WC in/out of trunk. TOSHA 11/11/17 1458 Done                      User Key     Initials Effective Dates Name Provider Type Discipline    TOSHA 12/01/15 -  Anna Epps, PT Physical Therapist PT    MD 12/01/15 -  Nereida Llamas, PT Physical Therapist PT                    IP PT Goals       11/13/17 1220 11/13/17 1209 11/08/17 1501    Bed Mobility PT LTG    Bed Mobility PT LTG, Date Goal Reviewed 11/13/17  -MD (P)  11/13/17  -MD 11/08/17  -MD    Bed Mobility PT LTG, Outcome goal met  -MD (P)  goal met  -MD goal ongoing  -MD    Transfer Training PT LTG    Transfer Training PT  LTG, Date Goal Reviewed   11/08/17  -MD    Transfer Training PT LTG, Outcome   goal met  -MD    Transfer Training 2 PT LTG    Transfer Training PT 2 LTG, Date Goal Reviewed 11/13/17  -MD  11/08/17  -MD    Transfer Training PT 2 LTG, Outcome goal met  -MD  goal ongoing  -MD    Gait Training PT LTG    Gait Training Goal PT LTG, Date Goal Reviewed 11/13/17  -MD  11/08/17  -MD    Gait Training Goal PT LTG, Outcome goal met  -MD  goal ongoing  -MD    Stair Training PT LTG    Stair Training Goal PT LTG, Date Goal Reviewed 11/13/17  -MD  11/08/17  -MD    Stair Training Goal PT LTG, Outcome goal met  -MD  goal ongoing  -MD    Patient Education PT LTG    Patient Education PT LTG, Date Goal Reviewed 11/13/17  -MD  11/08/17  -MD    Patient Education PT LTG Outcome goal met  -MD  goal ongoing  -MD      11/01/17 1221          Bed Mobility PT LTG    Bed Mobility PT LTG, Date  Established 11/01/17  -MD      Bed Mobility PT LTG, Time to Achieve 2 wks  -MD      Bed Mobility PT LTG, Activity Type supine to sit/sit to supine  -MD      Bed Mobility PT LTG, [place] Level independent  -MD      Transfer Training PT LTG    Transfer Training PT LTG, Date Established 11/01/17  -MD      Transfer Training PT LTG, Time to Achieve 2 wks  -MD      Transfer Training PT LTG, Activity Type sit to stand/stand to sit  -MD      Transfer Training PT LTG, [place] Level supervision required  -MD      Transfer Training PT LTG, Assist Device walker, rolling  -MD      Transfer Training 2 PT LTG    Transfer Training PT 2 LTG, Date Established 11/01/17  -MD      Transfer Training PT 2 LTG, Time to Achieve 2 wks  -MD      Transfer Training PT 2 LTG, Activity Type other (see comments)   car transfer  -MD      Transfer Training PT 2 LTG, [place] Level supervision required  -MD      Transfer Training PT 2 LTG, Assist Device walker, rolling  -MD      Gait Training PT LTG    Gait Training Goal PT LTG, Date Established 11/01/17  -MD      Gait Training Goal PT LTG, Time to Achieve 2 wks  -MD      Gait Training Goal PT LTG, [place] Level supervision required  -MD      Gait Training Goal PT LTG, Assist Device walker, rolling  -MD      Gait Training Goal PT LTG, Distance to Achieve 160  -MD      Stair Training PT LTG    Stair Training Goal PT LTG, Date Established 11/01/17  -MD      Stair Training Goal PT LTG, Time to Achieve 2 wks  -MD      Stair Training Goal PT LTG, Number of Steps 4  -MD      Stair Training Goal PT LTG, [place] Level supervision required  -MD      Stair Training Goal PT LTG, Assist Device 2 handrails  -MD      Patient Education PT LTG    Patient Education PT LTG, Date Established 11/01/17  -MD      Patient Education PT LTG, Time to Achieve 2 wks  -MD      Patient Education PT LTG, Education Type HEP  -MD      Patient Education PT LTG, Education Understanding demonstrate adequately   -MD        User Key  (r) = Recorded By, (t) = Taken By, (c) = Cosigned By    Initials Name Provider Type    MD Nereida Llamas, PT Physical Therapist              Adult Rehabilitation Note       11/13/17 1208 11/13/17 1130 11/13/17 1009    Rehab Assessment/Intervention    Discipline occupational therapist  -KP speech language pathologist  -KB physical therapist  -MD    Document Type discharge summary;therapy note (daily note)  -KP therapy note (daily note)  -KB discharge summary;therapy note (daily note)  -MD    Subjective Information agree to therapy;no complaints  -KP no complaints;agree to therapy  -KB agree to therapy;no complaints  -MD    Patient Effort, Rehab Treatment excellent  -KP good  -KB good  -MD    Symptoms Noted During/After Treatment none  -KP none  -KB none  -MD    Precautions/Limitations fall precautions   pt had LOB when going from walker to w/c OT A her to w/c  -KP fall precautions;swallowing precautions  -KB fall precautions  -MD    Specific Treatment Considerations pt went to sit in w/c before OT had w/c all the way behind her and locked. OT A pt to w/c to prevent a fall   -      Equipment Issued to Patient   gait belt;front wheeled walker  -MD    Recorded by [] Vero York, OTR [KB] Katherine L Bruton [MD] Nereida Llamas, PT    Pain Assessment    Pain Assessment No/denies pain  - No/denies pain  - No/denies pain  -MD    Pain Score 0  -      Recorded by [] Vero York, OTR [KB] Katherine L Bruton [MD] Nereida Llamas, PT    Vision Assessment/Intervention    Vision Comment her scanning ability has improved to the L and doesn't demo any issues during bathing  -      Recorded by [] Vero York, OTR      Cognitive Assessment/Intervention    Current Cognitive/Communication Assessment impaired  -KP  impaired  -MD    Orientation Status oriented x 4  -KP  oriented to;person;place;situation  -MD    Follows Commands/Answers Questions 100% of the time;able to follow  single-step instructions  -KP  100% of the time  -MD    Personal Safety at risk behaviors demonstrated   decr judgment when going from walker to w/c   -KP  decreased insight to deficits  -MD    Personal Safety Interventions fall prevention program maintained;gait belt;nonskid shoes/slippers when out of bed;muscle strengthening facilitated  -KP  fall prevention program maintained;gait belt;muscle strengthening facilitated;nonskid shoes/slippers when out of bed;supervised activity  -MD    Recorded by [KP] Vero York, OTR  [MD] Nereida Llamas, PT    Dysphagia/Swallow Intervention    Dysphagia/Swallow Therapeutic Feed  Pt observed at lunch with [facility] soft and regular thins; used lingual sweep and throat clear as needed without cues; some cues given for double swallow. Answered pt questions regarding current diet restrictions.   -KB     Recorded by  [KB] Katherine L Bruton     Improve tongue base & pharyngeal wall squeeze    To improve tongue base & pharyngeal wall squeeze, patient will:  Complete effortful swallow  -KB     Status: Improve tongue base & pharyngeal wall squeeze  Progressing as expected  -KB     Tongue Base/Pharyngeal Wall Squeeze Progress  60%;without cues;90%;with inconsistent cues  -KB     Comments: Improve tongue base & pharyngeal wall squeeze  Pt reported use of hard swallow during meals as needed. Encouraged increased use of this strategy.   -KB     Recorded by  [KB] Katherine L Bruton     ROM (Range of Motion)    General ROM upper extremity range of motion deficits identified  -      General ROM Detail R UE 8/8 L UE shoulder 90% to 95% AROM  -KP      Recorded by [KP] Vero York, SARAR      MMT (Manual Muscle Testing)    General MMT Assessment upper extremity strength deficits identified  -      General MMT Assessment Detail 4/5 B UE. R grip45 L  40. R tri pinch8 L tri pinch 7. R lat pinch7 L lat pinch 6.  -KP      Recorded by [KP] Vero York, SARAR      Bed Mobility,  Assessment/Treatment    Bed Mobility, Roll Left, [place]   independent  -MD    Bed Mobility, Roll Right, [place]   independent  -MD    Bed Mobility, Scoot/Bridge, [place]   independent  -MD    Bed Mob, Supine to Sit, [place]   independent  -MD    Bed Mob, Sit to Supine, [place]   independent  -MD    Recorded by   [MD] Nereida Llamas, PT    Transfer Assessment/Treatment    Transfers, Sit-Stand [place] supervision required  -  supervision required  -MD    Transfers, Stand-Sit [place] supervision required  -KP  supervision required  -MD    Transfers, Sit-Stand-Sit, Assist Device rolling walker  -KP  rolling walker  -MD    Toilet Transfer, [place] supervision required  -KP      Toilet Transfer, Assistive Device rolling walker  -KP      Walk-In Shower Transfer, [place] supervision required  -KP      Walk-In Shower Transfer, Assist Device rolling walker;other (see comments)   tub bench  -KP      Transfer, Safety Issues   step length decreased  -MD    Transfer, Impairments   strength decreased  -MD    Transfer, Comment   car transfer: SBA, RWx  -MD    Recorded by [KP] Vero York, OTR  [MD] Nereida Llamas, PT    Gait Assessment/Treatment    Gait, [place] Level   supervision required  -MD    Gait, Assistive Device   rolling walker  -MD    Gait, Distance (Feet)   160  -MD    Gait, Gait Deviations   bilateral:;janet decreased;forward flexed posture  -MD    Gait, Safety Issues   step length decreased  -MD    Gait, Impairments   strength decreased  -MD    Recorded by   [MD] Nereida Llamas, PT    Stairs Assessment/Treatment    Number of Stairs   4  -MD    Stairs, Handrail Location   both sides  -MD    Stairs, [place] Level   supervision required  -MD    Stairs, Technique Used   step over step (ascending);step over step (descending)  -MD    Stairs, Safety Issues   weight-shifting ability decreased  -MD    Stairs, Impairments   strength decreased  -MD    Stairs, Comment   1  curb step Ernie [place]ITT[place]    Recorded by   [MD] Nereida Llamas, PT    Functional Mobility    Functional Mobility- Ind. Level set up required;supervision required  -      Functional Mobility- Device rolling walker  -      Functional Mobility- Comment walks to shower room, walks to her room. walks to the kitchen and to the dining rom.  -KP      Recorded by [KP] Vero York, OTR      ADL Assessment/Intervention    IADL Assess/Train, Comment pt completes kitchen task in kitchen. gets pan from cabinet , opens and pours soup into sauce pan. Turns on burner all at walker level. sits to rest while soup is cooking. Scoops and pours soup into bowl a. supervision.  -KP      Additional Documentation IADL Assess/Train, Comment (Row)  -KP      Recorded by [KP] Vero York OTR      Upper Body Bathing Assessment/Training    UB Bathing Assess/Train Assistive Device hand-held shower head;tub bench  -KP      UB Bathing Assess/Train, Position sitting  -      UB Bathing Assess/Train, [place] Level supervision required  -KP      Recorded by [KP] Vero York, SARAR      Lower Body Bathing Assessment/Training    LB Bathing Assess/Train Assistive Device hand-held shower head;grab bars;tub bench  -KP      LB Bathing Assess/Train, Position sitting;standing  -KP      LB Bathing Assess/Train, [place] Level set up required;supervision required  -KP      Recorded by [KP] Vero York OTR      Upper Body Dressing Assessment/Training    UB Dressing Assess/Train, Clothing Type donning:;doffing:;bra;t-shirt  -KP      UB Dressing Assess/Train, Position sitting  -KP      UB Dressing Assess/Train, [place] supervision required;set up required  -KP      Recorded by [KP] Vero York OTR      Lower Body Dressing Assessment/Training    LB Dressing Assess/Train, Clothing Type doffing:;pants;donning:;shoes;slipper socks;socks  -KP      LB Dressing Assess/Train, Position sitting;standing  -KP       LB Dressing Assess/Train, [place] set up required;supervision required  -KP      LB Dressing Assess/Train, Comment grab bar  -KP      Recorded by [KP] Vero York OTR      Toileting Assessment/Training    Toileting Assess/Train, Assistive Device grab bars  -KP      Toileting Assess/Train, Position sitting;standing  -KP      Toileting Assess/Train, [place] Level set up required;supervision required  -KP      Recorded by [KP] Vero York OTR      Grooming Assessment/Training    Grooming Assess/Train, Position sitting  -KP      Grooming Assess/Train, [place] Level conditional independence  -KP      Recorded by [KP] KANDY Sultana      Motor Skills/Interventions    Additional Documentation Balance Skills Training (Group);Fine Motor Coordination Training (Group);Gross Motor Coordination Training (Group);Neuromuscular Re-education (Group)  -KP      Recorded by [KP] KANDY Sultana      Balance Skills Training    Sitting-Level of Assistance Distant supervision  -KP      Sitting-Balance Support Feet supported  -KP      Sitting-Balance Activities Reaching for objects  -KP      Standing-Level of Assistance Close supervision  -KP  Close supervision  -MD    Static Standing Balance Support assistive device  -KP  assistive device  -MD    Standing-Balance Activities Reaching for objects;Forward lean   kitchen task and in shower room  -KP  Retrieve object from floor  -MD    Gait Balance-Level of Assistance   Close supervision  -MD    Gait Balance Support   assistive device  -MD    Gait Balance Activities   uneven surface  -MD    Gait Balance # of Minutes   --   20 ft  -MD    Recorded by [KP] Vero York OTR  [MD] Nereida Llamas, PT    Therapy Exercises    Bilateral Lower Extremities   AROM:;20 reps;sitting;ankle pumps/circles;hip abduction/adduction;hip flexion;LAQ  -MD    Recorded by   [MD] Nereida Llamas, PT    Fine Motor Coordination Training    9-Hole Peg Right 23  -KP       9-Hole Peg Left 30  -KP      Box and Blocks Right 43  -KP      Box and Blocks Left 45  -KP      Opposition Right:;Left:;intact  -KP      Recorded by [KP] KANDY Sultana      Positioning and Restraints    Pre-Treatment Position sitting in chair/recliner  -KP  sitting in chair/recliner  -MD    Post Treatment Position chair  -KP  wheelchair  -MD    In Chair sitting;with other staff   in dining room after second session. in dining room w. RT 10  -KP      In Wheelchair   with SLP  -MD    Recorded by [KP] Vero York, OTR  [MD] Nereida Llamas, PT      11/11/17 1122 11/10/17 1500       Rehab Assessment/Intervention    Discipline physical therapist  -JK speech language pathologist  -KB     Document Type therapy note (daily note)  -JK therapy note (daily note)  -KB     Subjective Information no complaints;agree to therapy  -JK no complaints;agree to therapy  -KB     Patient Effort, Rehab Treatment good  -JK good  -KB     Symptoms Noted During/After Treatment none  -JK none  -KB     Precautions/Limitations fall precautions  -JK fall precautions  -KB     Precautions/Limitations, Vision WFL with corrective lenses  -JK      Precautions/Limitations, Hearing WFL  -JK      Recorded by [JK] Anna Epps, PT [KB] Katherine L Bruton     Pain Assessment    Pain Assessment No/denies pain  -JK No/denies pain  -KB     Recorded by [JK] Anna Epps, PT [KB] Katherine L Bruton     Vision Assessment/Intervention    Visual Impairment visual impairment, left;WFL with corrective lenses  -JK visual impairment, left;WFL with corrective lenses  -KB     Recorded by [JK] Anna Epps, PT [KB] Katherine L Bruton     Cognitive Assessment/Intervention    Current Cognitive/Communication Assessment impaired  -JK      Orientation Status oriented x 4  -JK      Follows Commands/Answers Questions 100% of the time;able to follow single-step instructions  -JK      Personal Safety mild impairment  -JK      Personal Safety Interventions  fall prevention program maintained  -JK      Recorded by [JK] Anna Epps, PT      Improve functional problem solving    Improve functional problem solving through:  sequence steps in a task  -KB     Status: Improve functional problem solving  Progressing as expected  -KB     Functional Problem Solving Progress  90%;without cues;100%;with inconsistent cues  -KB     Comments: Improve functional problem solving  Pt sequenced 6-steps in common tasks with MIN cues; given verbal cue to check her work, increased accuracy and self-corrections noted.   -KB     Recorded by  [KB] Katherine L Bruton     Improve hyolaryngeal excursion    To improve hyolaryngeal excursion, patient will:  Complete head lift sustained (comment number of seconds);Complete head lift repetitive (comment number of lifts);Complete chin tuck against resistance (comment number of repetitions)  -KB     Status: Improve hyolaryngeal excursion  Progressing as expected  -KB     Hyolaryngeal Excursion Progress  continue to adress  -KB     Comments: Improve hyolaryngeal excursion  Completed 20 reps or head lift and chin tuck with resistance; completed sustained head lift for 30 seconds.  -KB     Recorded by  [KB] Katherine L Bruton     Improve tongue base & pharyngeal wall squeeze    To improve tongue base & pharyngeal wall squeeze, patient will:  Complete tongue hold swallow  -KB     Status: Improve tongue base & pharyngeal wall squeeze  Progressing as expected  -KB     Tongue Base/Pharyngeal Wall Squeeze Progress  continue to adress  -KB     Comments: Improve tongue base & pharyngeal wall squeeze  MAX cues inclusing model to complete tongue out swallow for first time. Able to complete exercise adequately 6/10 repetitions.   -KB     Recorded by  [KB] Katherine L Bruton     Transfer Assessment/Treatment    Transfers, Sit-Stand [place] contact guard assist  -JK      Transfers, Stand-Sit [place] contact guard assist  -JK      Transfers,  "Sit-Stand-Sit, Assist Device rolling walker  -JK      Transfer, Safety Issues step length decreased  -JK      Transfer, Impairments strength decreased;impaired balance  -JK      Transfer, Comment car tsf CGA  -JK      Recorded by [JK] Anna Epps PT      Gait Assessment/Treatment    Gait, [place] Level verbal cues required;contact guard assist  -JK      Gait, Assistive Device rolling walker  -JK      Gait, Distance (Feet) 160  -JK      Gait, Gait Deviations bilateral:;janet decreased;forward flexed posture;step length decreased  -JK      Gait, Safety Issues step length decreased  -JK      Gait, Impairments impaired balance;strength decreased  -JK      Gait, Comment Pt practiced ambulating with roll wx with dgt Lanette  -JK      Recorded by [JK] Anna Epps PT      Stairs Assessment/Treatment    Number of Stairs 6\" curb  -JK      Stairs, Handrail Location none  -JK      Stairs, [place] Level minimum assist (75% patient effort);contact guard assist;verbal cues required  -JK      Stairs, Assistive Device walker  -JK      Stairs, Technique Used step to step (descending);step to step (ascending)  -JK      Stairs, Safety Issues sequencing ability decreased  -JK      Stairs, Impairments strength decreased;impaired balance  -JK      Recorded by [TIFFANIEK] Anna Epps PT      Positioning and Restraints    Pre-Treatment Position in bed  -JK      Post Treatment Position wheelchair  -JK      In Bed sitting;with family/caregiver  -JK      Recorded by [JK] Anna Epps PT        User Key  (r) = Recorded By, (t) = Taken By, (c) = Cosigned By    Initials Name Effective Dates     Vero York, OTR 04/13/15 -     TOSHA Epps, PT 12/01/15 -     MD Nereida Llamas, PT 12/01/15 -     KB Katherine L Bruton 09/29/17 -           PT Recommendation and Plan  Anticipated Equipment Needs At Discharge: gait belt, front wheeled walker  Anticipated Discharge Disposition: home with home health  Planned Therapy " Interventions: balance training, bed mobility training, gait training, home exercise program, patient/family education, transfer training  PT Frequency: 2 times/day  Plan of Care Review  Plan Of Care Reviewed With: patient  Progress: improving  Outcome Summary/Follow up Plan: Pt progressing towards goals w transfers and ambulation.  Gait deviations continue to compromise pts dynamic standing balance w ambulation.         Time Calculation:         PT Charges       11/13/17 1412 11/13/17 1009       Time Calculation    Start Time 1300  -MD 0800  -MD     Stop Time 1330  -MD 0830  -MD     Time Calculation (min) 30 min  -MD 30 min  -MD     PT Received On  11/13/17  -MD       User Key  (r) = Recorded By, (t) = Taken By, (c) = Cosigned By    Initials Name Provider Type    MD Nereida Llamas, PT Physical Therapist          Therapy Charges for Today     Code Description Service Date Service Provider Modifiers Qty    [id number] HC PT THER PROC EA 15 MIN 11/13/2017 Nereida Llamas, PT GP 4               PT Discharge Summary  Anticipated Discharge Disposition: home with home health    Nereida Llamas, PT  11/13/2017

## 2017-11-13 NOTE — PLAN OF CARE
Problem: Patient Care Overview (Adult)  Goal: Plan of Care Review  Outcome: Ongoing (interventions implemented as appropriate)    11/13/17 7618   Coping/Psychosocial Response Interventions   Plan Of Care Reviewed With patient   Patient Care Overview   Progress improving   Outcome Evaluation   Outcome Summary/Follow up Plan No unsafe behavior. Ready for discharge tomorrow. Continent of bowel and bladder.         Problem: Stroke (Ischemic) (Adult)  Goal: Signs and Symptoms of Listed Potential Problems Will be Absent or Manageable (Stroke)  Outcome: Ongoing (interventions implemented as appropriate)    Problem: Fall Risk (Adult)  Goal: Absence of Falls  Outcome: Ongoing (interventions implemented as appropriate)

## 2017-11-13 NOTE — PROGRESS NOTES
Inpatient Rehabilitation Functional Measures Assessment and Plan of Care    Plan of Care  Updated Problems/Interventions  Field    Functional Measures  YESI Eating:  Eating Score = 5. Patient is supervision/set-up for eating,  requiring: Verbal cuing, prompting, or instructing. No assistive devices were  required.  YESI Grooming: [facility] Bathing:  [facility] Upper Body Dressing:  [facility] Lower Body Dressing:  [facility] Toileting:  [facility] Bladder Management  Level of Assistance:  [place]  Frequency/Number of Accidents this Shift:  [facility] Bowel Management  Level of Assistance: [place]  Frequency/Number of Accidents this Shift: [facility] Bed/Chair/Wheelchair Transfer:  [facility] Toilet Transfer:  [facility] Tub/Shower Transfer:  [place]    Previously Documented Mode of Locomotion at Discharge: Field  YESI Expected Mode of Locomotion at Discharge: [facility] Walk/Wheelchair:  [facility] Stairs:  [facility] Comprehension:  Auditory comprehension is the usual mode. Comprehension  Score = 7, Independent.  Patient comprehends complex/abstract information in  their primary language.  Patient is completely independent for auditory  comprehension.  There are no activity limitations.  YESI Expression:  Vocal expression is the usual mode. Expression Score = 7,  Independent.  Patient expresses complex/abstract information in their primary  language.  Patient is completely independent for vocal expression.  There are no  activity limitations.  YESI Social Interaction:  Social Interaction Score = 7, Independent. Patient is  completely independent for social interaction.  There are no activity  limitations.  YESI Problem Solving:  Patient does not make appropriate decisions in order to  solve complex problems without assistance from a helper. Problem Solving Score =  4, Minimal Direction. Patient makes appropriate decisions in order to solve  routine problems 75-90% of the time. Patient requires  minimal/occasional  direction for the following behavior(s): Decreased awareness of performance.  Difficulty weighing alternatives/making choices. Difficulty with  self-correction.  YESI Memory:  Memory Score = 6, Modified [place].  Patient is modified  independent for memory, having only mild difficulty and using self-initiated or  environmental cues to remember.    Therapy Mode Minutes  Occupational Therapy: [place]  Physical Therapy: [place]  Speech Language Pathology:  Individual: 60 minutes.    Signed by: Katherine Bruton, SLP

## 2017-11-13 NOTE — PROGRESS NOTES
Inpatient Rehabilitation Functional Measures Assessment and Plan of Care    Plan of Care  Updated Problems/Interventions  Mobility    [OT] Toilet Transfers(Active)  Current Status(11/13/2017): sup  Weekly Goal(11/16/2017): sup  Discharge Goal: SUP    [OT] Tub/Shower Transfers(Active)  Current Status(11/13/2017): sup  Weekly Goal(11/14/2017): sup  Discharge Goal: supervision        Self Care    [OT] Bathing(Active)  Current Status(11/13/2017): sup  Weekly Goal(11/15/2017): sup  Discharge Goal: sup    [OT] Dressing (Lower)(Active)  Current Status(11/09/2017): SBA/sup  Weekly Goal(11/16/2017): SBA/sup  Discharge Goal: SBA/sup    [OT] Dressing (Upper)(Active)  Current Status(11/13/2017): supervision  Weekly Goal(11/14/2017): Sup  Discharge Goal: SUP    [OT] Grooming(Active)  Current Status(11/13/2017): Mod I  Weekly Goal(11/14/2017): Mod I  Discharge Goal: Mod I    [OT] Toileting(Active)  Current Status(11/13/2017): SBA/sup  Weekly Goal(11/14/2017): SBA/sup  Discharge Goal: SBA/sup    Functional Measures  YESI Eating:  [place]  YESI Grooming: Grooming Score = 6.  Patient is modified independent for grooming,  requiring: Safety considerations.  YESI Bathing:  Patient bathed in shower. Bathing Score = 5.  Patient is  supervision/set-up for bathing, requiring: Patient requires the following  assistive device(s): Hand held shower. Grab bar/arm rest to maintain balance.  YESI Upper Body Dressing:  Upper Body Dressing Score = 5. Patient is supervision  for upper body dressing, requiring: Stand by assistance. No assistive devices  were required.  YESI Lower Body Dressing:  Lower Body Dressing Score = 5. Patient is  supervision/set-up for lower body dressing, requiring: Standing by. No assistive  devices were required.  YESI Toileting:  Toileting Score = 5.  Patient is supervision/set-up for  toileting, requiring: Stand by assistance. Patient requires the following  assistive device(s): Grab bar.    YESI Bladder Management  Level of  Assistance:  [place]  Frequency/Number of Accidents this Shift:  [facility] Bowel Management  Level of Assistance: [place]  Frequency/Number of Accidents this Shift: Branch    [facility] Bed/Chair/Wheelchair Transfer:  [place]  [facility] Toilet Transfer:  Toilet Transfer Score = 5.  Patient is supervision/set-up  for transferring to and from the toilet/commode, requiring: Stand by assistance.  Patient requires the following assistive device(s): Walker. Grab bars.  YESI Tub/Shower Transfer:  Shower Transfer Score = 5.  Patient is  supervision/set-up for transferring to and from the shower, requiring: Stand by  assistance. Patient requires the following assistive device(s): Walker. Tub  bench.    Previously Documented Mode of Locomotion at Discharge: Field  YESI Expected Mode of Locomotion at Discharge: [place]  [facility] Walk/Wheelchair:  [place]  [facility] Stairs:  [place]    [facility] Comprehension:  [place]  [facility] Expression:  [place]  [facility] Social Interaction:  [place]  [facility] Problem Solving:  [place]  [facility] Memory:  [place]    Therapy Mode Minutes  Occupational Therapy: Individual: 90 minutes.  Physical Therapy: Branch  Speech Language Pathology:  [place]    Signed by: KANDY Altamirano/L

## 2017-11-13 NOTE — PLAN OF CARE
Problem: Patient Care Overview (Adult)  Goal: Plan of Care Review  Outcome: Ongoing (interventions implemented as appropriate)    11/13/17 0152   Coping/Psychosocial Response Interventions   Plan Of Care Reviewed With patient   Patient Care Overview   Progress improving   Outcome Evaluation   Outcome Summary/Follow up Plan No complained of any pain. Pt. required to take sleeping pill after 2300 and given. Sleeping well. Warm compresses to abdominal wall hematomas. No new issues noted at this time.          Problem: Stroke (Ischemic) (Adult)  Goal: Signs and Symptoms of Listed Potential Problems Will be Absent or Manageable (Stroke)  Outcome: Ongoing (interventions implemented as appropriate)    Problem: Fall Risk (Adult)  Goal: Absence of Falls  Outcome: Ongoing (interventions implemented as appropriate)

## 2017-11-13 NOTE — PLAN OF CARE
Problem: Inpatient Physical Therapy  Goal: Bed Mobility Goal LTG- PT  Outcome: Outcome(s) achieved Date Met:  11/13/17 11/13/17 1220   Bed Mobility PT LTG   Bed Mobility PT LTG, Date Goal Reviewed 11/13/17   Bed Mobility PT LTG, Outcome goal met       Goal: Transfer Training Goal 2 LTG- PT  Outcome: Outcome(s) achieved Date Met:  11/13/17 11/13/17 1220   Transfer Training 2 PT LTG   Transfer Training PT 2 LTG, Date Goal Reviewed 11/13/17   Transfer Training PT 2 LTG, Outcome goal met       Goal: Gait Training Goal LTG- PT  Outcome: Outcome(s) achieved Date Met:  11/13/17 11/13/17 1220   Gait Training PT LTG   Gait Training Goal PT LTG, Date Goal Reviewed 11/13/17   Gait Training Goal PT LTG, Outcome goal met       Goal: Stair Training Goal LTG- PT  Outcome: Outcome(s) achieved Date Met:  11/13/17 11/13/17 1220   Stair Training PT LTG   Stair Training Goal PT LTG, Date Goal Reviewed 11/13/17   Stair Training Goal PT LTG, Outcome goal met       Goal: Patient Education Goal LTG- PT  Outcome: Outcome(s) achieved Date Met:  11/13/17 11/13/17 1220   Patient Education PT LTG   Patient Education PT LTG, Date Goal Reviewed 11/13/17   Patient Education PT LTG Outcome goal met

## 2017-11-13 NOTE — PROGRESS NOTES
Inpatient Rehabilitation Functional Measures Assessment    Functional Measures  YESI Eating:  [facility] Grooming: [facility] Bathing:  [facility] Upper Body Dressing:  [facility] Lower Body Dressing:  [facility] Toileting:  [facility] Bladder Management  Level of Assistance:  [place]  Frequency/Number of Accidents this Shift:  [facility] Bowel Management  Level of Assistance: [place]  Frequency/Number of Accidents this Shift: [facility] Bed/Chair/Wheelchair Transfer:  [facility] Toilet Transfer:  [facility] Tub/Shower Transfer:  [place]    Previously Documented Mode of Locomotion at Discharge: [place]  YESI Expected Mode of Locomotion at Discharge: [facility] Walk/Wheelchair:  [facility] Stairs:  [facility] Comprehension:  Auditory comprehension is the usual mode. Patient does not  comprehend complex/abstract information in their primary language without  assistance from a helper. Comprehension Score = 5, Supervision. Patient  comprehends basic daily needs or ideas greater than 90% of the time. Patient  requires stand by/rare prompting. Patient requires the following assistive  device(s): Glasses.  YESI Expression:  Vocal expression is the usual mode. Patient does not express  complex/abstract information in their primary language without a helper.  Expression Score = 5, Stand By Prompting. Patient expresses basic daily needs or  ideas without prompting. No assistive devices were required.  YESI Social Interaction:  Social Interaction Score = 7, Independent. Patient is  completely independent for social interaction.  There are no activity  limitations.  YESI Problem Solving:  Activity was not observed.  YESI Memory:  Memory Score = 6, Modified [place].  Patient is modified  independent for memory, having only mild difficulty and using self-initiated or  environmental cues to remember.    Therapy Mode Minutes  Occupational Therapy: [place]  Physical Therapy: [place]  Speech Language Pathology:   [place]    Signed by: Chio Purcell RN

## 2017-11-13 NOTE — PROGRESS NOTES
Inpatient Rehabilitation Functional Measures Assessment    Functional Measures  YESI Eating:  [facility] Grooming: [facility] Bathing:  [facility] Upper Body Dressing:  [facility] Lower Body Dressing:  [facility] Toileting:  [facility] Bladder Management  Level of Assistance:  [place]  Frequency/Number of Accidents this Shift:  [facility] Bowel Management  Level of Assistance: [place]  Frequency/Number of Accidents this Shift: [facility] Bed/Chair/Wheelchair Transfer:  [facility] Toilet Transfer:  [facility] Tub/Shower Transfer:  [place]    Previously Documented Mode of Locomotion at Discharge: [place]  YESI Expected Mode of Locomotion at Discharge: [facility] Walk/Wheelchair:  [facility] Stairs:  [facility] Comprehension:  Both ( auditory and visual) modes of comprehension are used  equally. Comprehension Score = 6, Modified [place].  Patient comprehends  complex/abstract information in their primary language, requiring: Additional  time. Glasses.  YESI Expression:  Vocal expression is the usual mode. Expression Score = 6,  Modified [place].  Patient expresses complex/abstract information in their  primary language, requiring: Additional time.  YESI Social Interaction:  Social Interaction Score = 6, Modified [place].  Patient is modified independent for social interaction, requiring: Requires  additional time.  YESI Problem Solving:  Problem Solving Score = 6, Modified [place].  Patient  makes appropriate decisions in order to solve complex problems, but requires  extra time.  YESI Memory:  Memory Score = 6, Modified [place].  Patient is modified  independent for memory, requiring: Requires additional time.    Therapy Mode Minutes  Occupational Therapy: [place]  Physical Therapy: [place]  Speech Language Pathology:  [place]    Signed by: Saad Marquez RN

## 2017-11-13 NOTE — PROGRESS NOTES
Talked with patient regarding pass over weekend. Patient stated she went to [place] and to restaurant to eat with family. She reported pass went well. Stated her rolling walker she has is not in good shape, so does need rolling walker ordered for home. Discussed outpatient therapy. Scheduled her to start outpatient ST on Thursday, 11/16 at 1:30 p.m. at [facility]. They will schedule her for the PT at that visit.   D/C planned for tomorrow, 11/14. She will d/c to her home with . Sister plans to stay with her for 2 weeks to help out since  works during day. Daughter, Lanette, plans to take patient home tomorrow. Will assist with plans.

## 2017-11-13 NOTE — PLAN OF CARE
Problem: Inpatient Occupational Therapy  Goal: Transfer Training Goal 1 STG- OT  Outcome: Outcome(s) achieved Date Met:  11/13/17 11/01/17 1230 11/13/17 1219   Transfer Training OT STG   Transfer Training OT STG, Date Established 11/01/17 --    Transfer Training OT STG, Time to Achieve 1 wk --    Transfer Training OT STG, Activity Type tub;walk-in shower;shower chair --    Transfer Training OT STG, [place] Level contact guard assist --    Transfer Training OT STG, Assist Device walker, rolling;shower chair --    Transfer Training OT STG, Date Goal Reviewed --  11/13/17   Transfer Training OT STG, Outcome --  goal met       Goal: Transfer Training Goal 1 LTG- OT  Outcome: Outcome(s) achieved Date Met:  11/13/17 11/01/17 1230 11/13/17 1219   Transfer Training OT LTG   Transfer Training OT LTG, Date Established 11/01/17 --    Transfer Training OT LTG, Time to Achieve by discharge --    Transfer Training OT LTG, Activity Type tub;walk-in shower;shower chair --    Transfer Training OT LTG, [place] Level supervision required --    Transfer Training OT LTG, Assist Device walker, rolling;shower chair --    Transfer Training OT LTG, Date Goal Reviewed --  11/13/17   Transfer Training OT LTG, Outcome --  goal met       Goal: Transfer Training Goal 2 STG- OT  Outcome: Outcome(s) achieved Date Met:  11/13/17 11/01/17 1230 11/13/17 1219   Transfer Training 2 OT STG   Transfer Training 2 OT STG, Date Established 11/01/17 --    Transfer Training 2 OT STG, Time to Achieve 1 wk --    Transfer Training 2 OT STG, Activity Type toilet --    Transfer Training 2 OT STG, [place] Level contact guard assist --    Transfer Training 2 OT STG, Assist Device walker, rolling --    Transfer Training 2 OT STG, Date Goal Reviewed --  11/13/17   Transfer Training 2 OT STG, Outcome --  goal met       Goal: Transfer Training Goal 2 LTG- OT  Outcome: Outcome(s) achieved Date Met:  11/13/17 11/01/17 1230 11/13/17 1219    Transfer Training 2 OT LTG   Transfer Training 2 OT LTG, Date Established 11/01/17 --    Transfer Training 2 OT LTG, Time to Achieve by discharge --    Transfer Training 2 OT LTG, Activity Type toilet --    Transfer Training 2 OT LTG, [place] Level supervision required --    Transfer Training 2 OT LTG, Assist Device walker, rolling --    Transfer Training 2 OT LTG, Date Goal Reviewed --  11/13/17   Transfer Training 2 OT LTG, Outcome --  goal met       Goal: Caregiver Training Goal LTG- OT  Outcome: Outcome(s) achieved Date Met:  11/13/17 11/01/17 1230 11/13/17 1219   Caregiver Training OT LTG   Caregiver Training OT LTG, Date Established 11/01/17 --    Caregiver Training OT LTG, Time to Achieve by discharge --    Caregiver Training OT LTG, [place] Level able to assist adequately;able to cue patient adequately --    Caregiver Training OT LTG, Outcome --  goal met       Goal: Patient Education Goal LTG- OT  Outcome: Outcome(s) achieved Date Met:  11/13/17 11/01/17 1230 11/13/17 1219   Patient Education OT LTG   Patient Education OT LTG, Date Established 11/01/17 --    Patient Education OT LTG, Time to Achieve by discharge --    Patient Education OT LTG, Education Type written program;HEP;aware of neuro deficits;home safety --    Patient Education OT LTG, Education Understanding independent;demonstrates adequately;verbalizes understanding --    Patient Education OT LTG, Date Goal Reviewed --  11/13/17   Patient Education OT LTG Outcome --  goal met       Goal: ADL Goal STG- OT  Outcome: Outcome(s) achieved Date Met:  11/13/17 11/01/17 1230 11/13/17 1219   ADL OT STG   ADL OT STG, Date Established 11/01/17 --    ADL OT STG, Time to Achieve 1 wk --    ADL OT STG, Activity Type ADL skills --    ADL OT STG, [place] Level contact guard;assistive device --    ADL OT STG, Outcome --  goal met       Goal: ADL Goal LTG- OT  Outcome: Outcome(s) achieved Date Met:  11/13/17 11/01/17 1230 11/13/17  1219   ADL OT LTG   ADL OT LTG, Date Established 11/01/17 --    ADL OT LTG, Time to Achieve by discharge --    ADL OT LTG, Activity Type ADL skills --    ADL OT LTG, [place] Level standby assist;assistive device --    ADL OT LTG, Date Goal Reviewed --  11/13/17   ADL OT LTG, Outcome --  goal met       Goal: Strength Goal LTG- OT  Outcome: Outcome(s) achieved Date Met:  11/13/17 11/01/17 1554 11/13/17 1219   Strength OT LTG   Strength Goal OT LTG, Date Established 11/01/17 --    Strength Goal OT LTG, Time to Achieve by discharge --    Strength Goal OT LTG, Measure to Achieve Pt to increase LUE strength to 4-/5 to increase independence with ADLs. --    Strength Goal OT LTG, Date Goal Reviewed --  11/13/17   Strength Goal OT LTG, Outcome --  goal met       Goal: Isolated Movement Goal LTG- OT  Outcome: Outcome(s) achieved Date Met:  11/13/17 11/01/17 1554 11/13/17 1219   Isolated Movement OT LTG   Isolated Movement OT LTG, Date Established 11/01/17 --    Isolated Movement OT LTG, Time to Achieve by discharge --    Isolated Movement OT LTG, Body Area left scapula;left shoulder;left elbow;left forearm;left wrist;left fingers --    Isolated Movement OT LTG, Level WFL --    Isolated Movement OT LTG, Date Goal Reviewed --  11/13/17   Isolated Movement OT LTG, Outcome --  goal met       Goal: Coordination Goal LTG- OT  Outcome: Outcome(s) achieved Date Met:  11/13/17 11/01/17 1554 11/13/17 1219   Coordination OT LTG   Coordination OT LTG, Date Established 11/01/17 --    Coordination OT LTG, Time to Achieve by discharge --    Coordination OT LTG, Activity Type FM written ex program;GM written ex program;FM task;GM task --    Coordination OT LTG, [place] Level independent --    Coordination OT LTG, Additional Goal LUE --    Coordination OT LTG, Date Goal Reviewed --  11/13/17   Coordination OT LTG, Outcome --  goal met

## 2017-11-13 NOTE — PROGRESS NOTES
SECTION GG      Self Care Performance Discharge:   Oral Hygiene: [place] sets up or cleans up; patient completes activity. [place]  assists only prior to or following the activity.   Toileting Hygiene: : [place] sets up or cleans up; patient completes activity.  [place] assists only prior to or following the activity.   Shower/Bathe Self: [place] sets up or cleans up; patient completes activity.  [place] assists only prior to or following the activity.   Upper Body Dressing: [place] sets up or cleans up; patient completes activity.  [place] assists only prior to or following the activity.   Lower Body Dressing: [place] sets up or cleans up; patient completes activity.  [place] assists only prior to or following the activity.   Putting On/Taking Off Footwear: [place] sets up or cleans up; patient completes  activity. [place] assists only prior to or following the activity.    Mobility Toilet Transfer Discharge: [place] sets up or cleans up; patient  completes activity. [place] assists only prior to or following the activity.    Signed by: Vero York OTR/L

## 2017-11-13 NOTE — PROGRESS NOTES
Inpatient Rehabilitation Functional Measures Assessment    Functional Measures  YESI Eating:  [place]  [facility] Grooming: [place]  [facility] Bathing:  [place]  [facility] Upper Body Dressing:  [place]  [facility] Lower Body Dressing:  [place]  [facility] Toileting:  [facility] Bladder Management  Level of Assistance:  [place]  Frequency/Number of Accidents this Shift:  [facility] Bowel Management  Level of Assistance: [place]  Frequency/Number of Accidents this Shift: [place]    [facility] Bed/Chair/Wheelchair Transfer:  Bed/chair/wheelchair Transfer Score = 5.  Patient is supervision/set-up for transferring to and from the  bed/chair/wheelchair, requiring: Stand by assistance. Patient requires the  following assistive device(s): Walker.  YESI Toilet Transfer:  [facility] Tub/Shower Transfer:  [place]    Previously Documented Mode of Locomotion at Discharge: [place]  YESI Expected Mode of Locomotion at Discharge: [facility] Walk/Wheelchair:  WHEELCHAIR OBSERVATION   Activity was not observed.    WALK OBSERVATION   Walk Distance Scale = 3.  Distance walked is greater than 150 feet. Walk Score  = 5.  Patient requires supervision or set up for walking. Stand by assistance.  Patient walked a distance of  160 feet. Patient requires the following assistive  device(s): Rolling walker.  YESI Stairs:  Stairs Score = 2.  Patient requires supervision for household  negotiation of stairs. Patient negotiated  4 stairs. Patient requires the  following assistive device(s): Handrail(s).    YESI Comprehension:  [facility] Expression:  [facility] Social Interaction:  [facility] Problem Solving:  [facility] Memory:  [place]    Therapy Mode Minutes  Occupational Therapy: [place]  Physical Therapy: Individual: 60 minutes.  Speech Language Pathology:  [place]    Signed by: Nereida Llamas, PT

## 2017-11-13 NOTE — THERAPY TREATMENT NOTE
Inpatient Rehabilitation - Physical Therapy Treatment Note  [facility]     Patient Name: Alice Maxwell  : 1945  MRN: [id number]  Today's Date: 2017  Onset of Illness/Injury or Date of Surgery Date: 10/28/17  Date of Referral to PT: 17  Referring Physician: Jeremie    Admit Date: 10/31/2017    Visit Dx:    ICD-10-CM ICD-9-CM   1. Oropharyngeal dysphagia R13.12 787.22     Patient Active Problem List   Diagnosis   • Stroke               Adult Rehabilitation Note       17 1208 17 1130 17 1009    Rehab Assessment/Intervention    Discipline occupational therapist  -KP speech language pathologist  -KB physical therapist  -MD    Document Type discharge summary;therapy note (daily note)  -KP therapy note (daily note)  -KB discharge summary;therapy note (daily note)  -MD    Subjective Information agree to therapy;no complaints  -KP no complaints;agree to therapy  -KB agree to therapy;no complaints  -MD    Patient Effort, Rehab Treatment excellent  -KP good  -KB good  -MD    Symptoms Noted During/After Treatment none  -KP none  -KB none  -MD    Precautions/Limitations fall precautions   pt had LOB when going from walker to w/c OT A her to w/c  -KP fall precautions;swallowing precautions  -KB fall precautions  -MD    Specific Treatment Considerations pt went to sit in w/c before OT had w/c all the way behind her and locked. OT A pt to w/c to prevent a fall   -KP      Equipment Issued to Patient   gait belt;front wheeled walker  -MD    Recorded by [KP] Vero York, OTR [KB] Katherine L Bruton [MD] Nereida Llamas, PT    Pain Assessment    Pain Assessment No/denies pain  -KP No/denies pain  -KB No/denies pain  -MD    Pain Score 0  -KP      Recorded by [KP] Vero York, OTR [KB] Katherine L Bruton [MD] Nereida Llamas, PT    Vision Assessment/Intervention    Vision Comment her scanning ability has improved to the L and doesn't demo any issues during bathing  -KP      Recorded by  [KP] KANDY uSltana      Cognitive Assessment/Intervention    Current Cognitive/Communication Assessment impaired  -KP  impaired  -MD    Orientation Status oriented x 4  -KP  oriented to;person;place;situation  -MD    Follows Commands/Answers Questions 100% of the time;able to follow single-step instructions  -KP  100% of the time  -MD    Personal Safety at risk behaviors demonstrated   decr judgment when going from walker to w/c   -KP  decreased insight to deficits  -MD    Personal Safety Interventions fall prevention program maintained;gait belt;nonskid shoes/slippers when out of bed;muscle strengthening facilitated  -KP  fall prevention program maintained;gait belt;muscle strengthening facilitated;nonskid shoes/slippers when out of bed;supervised activity  -MD    Recorded by [KP] Vero York, OTR  [MD] Nereida Llamas, PT    Dysphagia/Swallow Intervention    Dysphagia/Swallow Therapeutic Feed  Pt observed at lunch with [facility] soft and regular thins; used lingual sweep and throat clear as needed without cues; some cues given for double swallow. Answered pt questions regarding current diet restrictions.   -KB     Recorded by  [KB] Katherine L Bruton     Improve tongue base & pharyngeal wall squeeze    To improve tongue base & pharyngeal wall squeeze, patient will:  Complete effortful swallow  -KB     Status: Improve tongue base & pharyngeal wall squeeze  Progressing as expected  -KB     Tongue Base/Pharyngeal Wall Squeeze Progress  60%;without cues;90%;with inconsistent cues  -KB     Comments: Improve tongue base & pharyngeal wall squeeze  Pt reported use of hard swallow during meals as needed. Encouraged increased use of this strategy.   -KB     Recorded by  [KB] Katherine L Bruton     ROM (Range of Motion)    General ROM upper extremity range of motion deficits identified  -      General ROM Detail R UE 8/8 L UE shoulder 90% to 95% AROM  -KP      Recorded by [KP] Vero York, SARAR       MMT (Manual Muscle Testing)    General MMT Assessment upper extremity strength deficits identified  -      General MMT Assessment Detail 4/5 B UE. R grip45 L  40. R tri pinch8 L tri pinch 7. R lat pinch7 L lat pinch 6.  -      Recorded by [KP] Vero York OTR      Bed Mobility, Assessment/Treatment    Bed Mobility, Roll Left, [place]   independent  -MD    Bed Mobility, Roll Right, [place]   independent  -MD    Bed Mobility, Scoot/Bridge, [place]   independent  -MD    Bed Mob, Supine to Sit, [place]   independent  -MD    Bed Mob, Sit to Supine, [place]   independent  -MD    Recorded by   [MD] Nereida Llamas PT    Transfer Assessment/Treatment    Transfers, Sit-Stand [place] supervision required  -  supervision required  -MD    Transfers, Stand-Sit [place] supervision required  -KP  supervision required  -MD    Transfers, Sit-Stand-Sit, Assist Device rolling walker  -  rolling walker  -MD    Toilet Transfer, [place] supervision required  -      Toilet Transfer, Assistive Device rolling walker  -      Walk-In Shower Transfer, [place] supervision required  -      Walk-In Shower Transfer, Assist Device rolling walker;other (see comments)   tub bench  -KP      Transfer, Safety Issues   step length decreased  -MD    Transfer, Impairments   strength decreased  -MD    Transfer, Comment   car transfer: SBA, RWx  -MD    Recorded by [] Vero York OTR  [MD] Nereida Llamas PT    Gait Assessment/Treatment    Gait, [place] Level   supervision required  -MD    Gait, Assistive Device   rolling walker  -MD    Gait, Distance (Feet)   160  -MD    Gait, Gait Deviations   bilateral:;janet decreased;forward flexed posture  -MD    Gait, Safety Issues   step length decreased  -MD    Gait, Impairments   strength decreased  -MD    Recorded by   [MD] Nereida Llamas PT    Stairs Assessment/Treatment    Number of Stairs   4  -MD    Stairs, Handrail Location   both  sides  -MD    Stairs, [place] Level   supervision required  -MD    Stairs, Technique Used   step over step (ascending);step over step (descending)  -MD    Stairs, Safety Issues   weight-shifting ability decreased  -MD    Stairs, Impairments   strength decreased  -MD    Stairs, Comment   1 curb step Ernie WHITTINGTON  -MD    Recorded by   [MD] Nereida Llamas, PT    Functional Mobility    Functional Mobility- Ind. Level set up required;supervision required  -      Functional Mobility- Device rolling walker  -      Functional Mobility- Comment walks to shower room, walks to her room. walks to the kitchen and to the dining rom.  -KP      Recorded by [KP] Vero York, OTR      ADL Assessment/Intervention    IADL Assess/Train, Comment pt completes kitchen task in kitchen. gets pan from cabinet , opens and pours soup into sauce pan. Turns on burner all at walker level. sits to rest while soup is cooking. Scoops and pours soup into bowl a. supervision.  -KP      Additional Documentation IADL Assess/Train, Comment (Row)  -KP      Recorded by [KP] Vero York, OTR      Upper Body Bathing Assessment/Training    UB Bathing Assess/Train Assistive Device hand-held shower head;tub bench  -      UB Bathing Assess/Train, Position sitting  -      UB Bathing Assess/Train, [place] Level supervision required  -KP      Recorded by [KP] Vero York, OTR      Lower Body Bathing Assessment/Training    LB Bathing Assess/Train Assistive Device hand-held shower head;grab bars;tub bench  -KP      LB Bathing Assess/Train, Position sitting;standing  -      LB Bathing Assess/Train, [place] Level set up required;supervision required  -KP      Recorded by [KP] Vero York, OTR      Upper Body Dressing Assessment/Training    UB Dressing Assess/Train, Clothing Type donning:;doffing:;bra;t-shirt  -      UB Dressing Assess/Train, Position sitting  -      UB Dressing Assess/Train, [place]  supervision required;set up required  -KP      Recorded by [KP] Vero York OTR      Lower Body Dressing Assessment/Training    LB Dressing Assess/Train, Clothing Type doffing:;pants;donning:;shoes;slipper socks;socks  -KP      LB Dressing Assess/Train, Position sitting;standing  -KP      LB Dressing Assess/Train, [place] set up required;supervision required  -KP      LB Dressing Assess/Train, Comment grab bar  -KP      Recorded by [KP] Vero York OTR      Toileting Assessment/Training    Toileting Assess/Train, Assistive Device grab bars  -KP      Toileting Assess/Train, Position sitting;standing  -KP      Toileting Assess/Train, Indepen Level set up required;supervision required  -KP      Recorded by [KP] Vero York OTR      Grooming Assessment/Training    Grooming Assess/Train, Position sitting  -KP      Grooming Assess/Train, Indepen Level conditional independence  -KP      Recorded by [KP] KANDY Sultana      Motor Skills/Interventions    Additional Documentation Balance Skills Training (Group);Fine Motor Coordination Training (Group);Gross Motor Coordination Training (Group);Neuromuscular Re-education (Group)  -KP      Recorded by [KP] KANDY Sultana      Balance Skills Training    Sitting-Level of Assistance Distant supervision  -KP      Sitting-Balance Support Feet supported  -KP      Sitting-Balance Activities Reaching for objects  -KP      Standing-Level of Assistance Close supervision  -KP  Close supervision  -MD    Static Standing Balance Support assistive device  -KP  assistive device  -MD    Standing-Balance Activities Reaching for objects;Forward lean   kitchen task and in shower room  -KP  Retrieve object from floor  -MD    Gait Balance-Level of Assistance   Close supervision  -MD    Gait Balance Support   assistive device  -MD    Gait Balance Activities   uneven surface  -MD    Gait Balance # of Minutes   --   20 ft  -MD    Recorded by  [KP] Vero York, OTR  [MD] Nereida Llamas, PT    Therapy Exercises    Bilateral Lower Extremities   AROM:;20 reps;sitting;ankle pumps/circles;hip abduction/adduction;hip flexion;LAQ  -MD    Recorded by   [MD] Nereida Llamas PT    Fine Motor Coordination Training    9-Hole Peg Right 23  -KP      9-Hole Peg Left 30  -KP      Box and Blocks Right 43  -KP      Box and Blocks Left 45  -KP      Opposition Right:;Left:;intact  -KP      Recorded by [KP] Vero York OTR      Positioning and Restraints    Pre-Treatment Position sitting in chair/recliner  -KP  sitting in chair/recliner  -MD    Post Treatment Position chair  -KP  wheelchair  -MD    In Chair sitting;with other staff   in dining room after second session. in dining room w. RT 10  -KP      In Wheelchair   with SLP  -MD    Recorded by [BRIANNA] Vero York OTR  [MD] Nereida Llamas, PT      11/11/17 1122 11/10/17 1500       Rehab Assessment/Intervention    Discipline physical therapist  -JK speech language pathologist  -KB     Document Type therapy note (daily note)  -JK therapy note (daily note)  -KB     Subjective Information no complaints;agree to therapy  -JK no complaints;agree to therapy  -KB     Patient Effort, Rehab Treatment good  -JK good  -KB     Symptoms Noted During/After Treatment none  -JK none  -KB     Precautions/Limitations fall precautions  -JK fall precautions  -KB     Precautions/Limitations, Vision WFL with corrective lenses  -JK      Precautions/Limitations, Hearing WFL  -JK      Recorded by [JK] Anna Epps, PT [KB] Katherine L Bruton     Pain Assessment    Pain Assessment No/denies pain  -JK No/denies pain  -KB     Recorded by [JK] Anna Epps, PT [KB] Katherine L Bruton     Vision Assessment/Intervention    Visual Impairment visual impairment, left;WFL with corrective lenses  -JK visual impairment, left;WFL with corrective lenses  -KB     Recorded by [JK] Anna Epps, PT [KB] Katherine L Bruton     Cognitive  Assessment/Intervention    Current Cognitive/Communication Assessment impaired  -JK      Orientation Status oriented x 4  -JK      Follows Commands/Answers Questions 100% of the time;able to follow single-step instructions  -JK      Personal Safety mild impairment  -JK      Personal Safety Interventions fall prevention program maintained  -JK      Recorded by [JK] Anna Epps, PT      Improve functional problem solving    Improve functional problem solving through:  sequence steps in a task  -KB     Status: Improve functional problem solving  Progressing as expected  -KB     Functional Problem Solving Progress  90%;without cues;100%;with inconsistent cues  -KB     Comments: Improve functional problem solving  Pt sequenced 6-steps in common tasks with MIN cues; given verbal cue to check her work, increased accuracy and self-corrections noted.   -KB     Recorded by  [KB] Katherine L Bruton     Improve hyolaryngeal excursion    To improve hyolaryngeal excursion, patient will:  Complete head lift sustained (comment number of seconds);Complete head lift repetitive (comment number of lifts);Complete chin tuck against resistance (comment number of repetitions)  -KB     Status: Improve hyolaryngeal excursion  Progressing as expected  -KB     Hyolaryngeal Excursion Progress  continue to adress  -KB     Comments: Improve hyolaryngeal excursion  Completed 20 reps or head lift and chin tuck with resistance; completed sustained head lift for 30 seconds.  -KB     Recorded by  [KB] Katherine L Bruton     Improve tongue base & pharyngeal wall squeeze    To improve tongue base & pharyngeal wall squeeze, patient will:  Complete tongue hold swallow  -KB     Status: Improve tongue base & pharyngeal wall squeeze  Progressing as expected  -KB     Tongue Base/Pharyngeal Wall Squeeze Progress  continue to adress  -KB     Comments: Improve tongue base & pharyngeal wall squeeze  MAX cues inclusing model to complete tongue out swallow for  "first time. Able to complete exercise adequately 6/10 repetitions.   -KB     Recorded by  [KB] Katherine L Bruton     Transfer Assessment/Treatment    Transfers, Sit-Stand [place] contact guard assist  -JK      Transfers, Stand-Sit [place] contact guard assist  -JK      Transfers, Sit-Stand-Sit, Assist Device rolling walker  -JK      Transfer, Safety Issues step length decreased  -JK      Transfer, Impairments strength decreased;impaired balance  -JK      Transfer, Comment car tsf CGA  -JK      Recorded by [JK] Anna Epps PT      Gait Assessment/Treatment    Gait, [place] Level verbal cues required;contact guard assist  -JK      Gait, Assistive Device rolling walker  -JK      Gait, Distance (Feet) 160  -JK      Gait, Gait Deviations bilateral:;janet decreased;forward flexed posture;step length decreased  -JK      Gait, Safety Issues step length decreased  -JK      Gait, Impairments impaired balance;strength decreased  -JK      Gait, Comment Pt practiced ambulating with roll wx with dgt Lanette  -JK      Recorded by [JK] Anna Epps PT      Stairs Assessment/Treatment    Number of Stairs 6\" curb  -JK      Stairs, Handrail Location none  -JK      Stairs, [place] Level minimum assist (75% patient effort);contact guard assist;verbal cues required  -JK      Stairs, Assistive Device walker  -JK      Stairs, Technique Used step to step (descending);step to step (ascending)  -JK      Stairs, Safety Issues sequencing ability decreased  -JK      Stairs, Impairments strength decreased;impaired balance  -JK      Recorded by [JK] Anna Epps PT      Positioning and Restraints    Pre-Treatment Position in bed  -JK      Post Treatment Position wheelchair  -JK      In Bed sitting;with family/caregiver  -JK      Recorded by [JK] Anna Epps PT        User Key  (r) = Recorded By, (t) = Taken By, (c) = Cosigned By    Initials Name Effective Dates    BRIANNA York, OTR 04/13/15 -     TIFFANIEK " Anna Epps, PT 12/01/15 -     MD Nereida Llamas, PT 12/01/15 -     ANIYA MccormickAyleenine L Bruton 09/29/17 -                 IP PT Goals       11/13/17 1220 11/13/17 1209 11/08/17 1501    Bed Mobility PT LTG    Bed Mobility PT LTG, Date Goal Reviewed 11/13/17  -MD (P)  11/13/17  -MD 11/08/17  -MD    Bed Mobility PT LTG, Outcome goal met  -MD (P)  goal met  -MD goal ongoing  -MD    Transfer Training PT LTG    Transfer Training PT  LTG, Date Goal Reviewed   11/08/17  -MD    Transfer Training PT LTG, Outcome   goal met  -MD    Transfer Training 2 PT LTG    Transfer Training PT 2 LTG, Date Goal Reviewed 11/13/17  -MD  11/08/17  -MD    Transfer Training PT 2 LTG, Outcome goal met  -MD  goal ongoing  -MD    Gait Training PT LTG    Gait Training Goal PT LTG, Date Goal Reviewed 11/13/17  -MD  11/08/17  -MD    Gait Training Goal PT LTG, Outcome goal met  -MD  goal ongoing  -MD    Stair Training PT LTG    Stair Training Goal PT LTG, Date Goal Reviewed 11/13/17  -MD  11/08/17  -MD    Stair Training Goal PT LTG, Outcome goal met  -MD  goal ongoing  -MD    Patient Education PT LTG    Patient Education PT LTG, Date Goal Reviewed 11/13/17  -MD  11/08/17  -MD    Patient Education PT LTG Outcome goal met  -MD  goal ongoing  -MD      11/01/17 1221          Bed Mobility PT LTG    Bed Mobility PT LTG, Date Established 11/01/17  -MD      Bed Mobility PT LTG, Time to Achieve 2 wks  -MD      Bed Mobility PT LTG, Activity Type supine to sit/sit to supine  -MD      Bed Mobility PT LTG, [place] Level independent  -MD      Transfer Training PT LTG    Transfer Training PT LTG, Date Established 11/01/17  -MD      Transfer Training PT LTG, Time to Achieve 2 wks  -MD      Transfer Training PT LTG, Activity Type sit to stand/stand to sit  -MD      Transfer Training PT LTG, [place] Level supervision required  -MD      Transfer Training PT LTG, Assist Device walker, rolling  -MD      Transfer Training 2 PT LTG    Transfer Training PT 2 LTG, Date  Established 11/01/17  -MD      Transfer Training PT 2 LTG, Time to Achieve 2 wks  -MD      Transfer Training PT 2 LTG, Activity Type other (see comments)   car transfer  -MD      Transfer Training PT 2 LTG, [place] Level supervision required  -MD      Transfer Training PT 2 LTG, Assist Device walker, rolling  -MD      Gait Training PT LTG    Gait Training Goal PT LTG, Date Established 11/01/17  -MD      Gait Training Goal PT LTG, Time to Achieve 2 wks  -MD      Gait Training Goal PT LTG, [place] Level supervision required  -MD      Gait Training Goal PT LTG, Assist Device walker, rolling  -MD      Gait Training Goal PT LTG, Distance to Achieve 160  -MD      Stair Training PT LTG    Stair Training Goal PT LTG, Date Established 11/01/17  -MD      Stair Training Goal PT LTG, Time to Achieve 2 wks  -MD      Stair Training Goal PT LTG, Number of Steps 4  -MD      Stair Training Goal PT LTG, [place] Level supervision required  -MD      Stair Training Goal PT LTG, Assist Device 2 handrails  -MD      Patient Education PT LTG    Patient Education PT LTG, Date Established 11/01/17  -MD      Patient Education PT LTG, Time to Achieve 2 wks  -MD      Patient Education PT LTG, Education Type HEP  -MD      Patient Education PT LTG, Education Understanding demonstrate adequately  -MD        User Key  (r) = Recorded By, (t) = Taken By, (c) = Cosigned By    Initials Name Provider Type    MD Nereida Llamas, PT Physical Therapist          Physical Therapy Education     Title: PT OT SLP Therapies (Done)     Topic: Physical Therapy (Resolved)     Point: Mobility training (Resolved)    Learning Progress Summary    Learner Readiness Method Response Comment Documented by Status   Patient Acceptance E,D,TB VU,DU Pt's daughter Lanette was instructed and demonstrated safe assist with pt for: sit to stand tsf with CGA, ambulation 80' with roll wx CGA; car tsf CGA; up/down 6' curb with min/CGA; tsf WC in/out of trunk. JK 11/11/17 1393  Done    Acceptance E VU   11/02/17 1158 Done   Family Acceptance E,D,AYDE OJEDA Pt's daughter Lanette was instructed and demonstrated safe assist with pt for: sit to stand tsf with CGA, ambulation 80' with roll wx CGA; car tsf CGA; up/down 6' curb with min/CGA; tsf WC in/out of trunk.  11/11/17 1458 Done               Point: Home exercise program (Resolved)    Learning Progress Summary    Learner Readiness Method Response Comment Documented by Status   Patient Acceptance E,D,H AYDE CLARK MD 11/13/17 1319 Done               Point: Precautions (Resolved)    Learning Progress Summary    Learner Readiness Method Response Comment Documented by Status   Patient Acceptance E,D,AYDE OJEDA Pt's daughter Lanette was instructed and demonstrated safe assist with pt for: sit to stand tsf with CGA, ambulation 80' with roll wx CGA; car tsf CGA; up/down 6' curb with min/CGA; tsf WC in/out of trunk.  11/11/17 1458 Done    Acceptance E,D EDUARDO SLAUGHTER 11/10/17 0849 Done    Acceptance E,D EDUARDO SLAUGHTER 11/09/17 1031 Done    Acceptance E,D NR  MD 11/08/17 1217 Active    Acceptance E,D NR  MD 11/07/17 0821 Active    Acceptance E,D NR  MD 11/06/17 1118 Active    Acceptance E,D NR  MD 11/04/17 1012 Active    Acceptance E,D NR  MD 11/03/17 1215 Active    Acceptance E,D NR  MD 11/01/17 1228 Active   Family Acceptance E,D,AYDE OJEDA Pt's daughter Lanette was instructed and demonstrated safe assist with pt for: sit to stand tsf with CGA, ambulation 80' with roll wx CGA; car tsf CGA; up/down 6' curb with min/CGA; tsf WC in/out of trunk.  11/11/17 1458 Done                      User Key     Initials Effective Dates Name Provider Type Discipline    TOSHA 12/01/15 -  Anna Epps, PT Physical Therapist PT    MD 12/01/15 -  Nereida Llamas PT Physical Therapist PT                    PT Recommendation and Plan  Anticipated Equipment Needs At Discharge: gait belt, front wheeled walker  Anticipated Discharge Disposition: home with home health  Planned Therapy Interventions:  balance training, bed mobility training, gait training, home exercise program, patient/family education, transfer training  PT Frequency: 2 times/day  Plan of Care Review  Plan Of Care Reviewed With: patient  Progress: improving  Outcome Summary/Follow up Plan: Pt progressing towards goals w transfers and ambulation.  Gait deviations continue to compromise pts dynamic standing balance w ambulation.         Time Calculation:         PT Charges       11/13/17 1412 11/13/17 1009       Time Calculation    Start Time 1300  -MD 0800  -MD     Stop Time 1330  -MD 0830  -MD     Time Calculation (min) 30 min  -MD 30 min  -MD     PT Received On  11/13/17  -MD       User Key  (r) = Recorded By, (t) = Taken By, (c) = Cosigned By    Initials Name Provider Type    MD Nereida Llamas, PT Physical Therapist          Therapy Charges for Today     Code Description Service Date Service Provider Modifiers Qty    [id number] HC PT THER PROC EA 15 MIN 11/13/2017 Nereida Llamas, PT GP 4               Nereida Llamas PT  11/13/2017

## 2017-11-13 NOTE — PROGRESS NOTES
SECTION GG      Mobility Performance Discharge:   Roll Left and Right: Patient completed the activities by him/herself with no  assistance from a helper.   Sit to Lying: Patient completed the activities by him/herself with no  assistance from a helper.   Lying to Sitting on Side of Bed: Patient completed the activities by  him/herself with no assistance from a helper.   Sit to Stand: Mcdonough provides verbal cues or touching/steadying assistance as  patient completes activity.   Chair/Bed to Chair Transfer: Mcdonough provides verbal cues or touching/steadying  assistance as patient completes activity.   Car Transfer: Mcdonough provides verbal cues or touching/steadying assistance as  patient completes activity.    Patient Walks:  Yes   Walk 10 Feet: Mcdonough provides verbal cues or touching/steadying assistance as  patient completes activity.   Walk 50 Feet With 2 Turns: Mcdonough provides verbal cues or touching/steadying  assistance as patient completes activity.   Walk 150 Feet: Mcdonough provides verbal cues or touching/steadying assistance as  patient completes activity.   Walking 10 Feet on Uneven Surfaces: Mcdonough provides verbal cues or  touching/steadying assistance as patient completes activity.   1 Step Over Curb or Up/Down Stair: Mcdonough provides verbal cues or  touching/steadying assistance as patient completes activity.   4 Steps Up and Down, With/Without Rail: Mcdonough provides verbal cues or  touching/steadying assistance as patient completes activity.   12 Steps Up and Down, With/Without Rail: Not attempted due to medical or safety  concerns.   Picking up an Object: Mcdonough provides verbal cues or touching/steadying  assistance as patient completes activity.     Uses Wheelchair/Scooter: No    Signed by: Nereida Llamas, PT

## 2017-11-13 NOTE — THERAPY DISCHARGE NOTE
Inpatient Rehabilitation - Occupational Therapy Treatment Note/Discharge  [facility]     Patient Name: Alice Maxwell  : 1945  MRN: [id number]  Today's Date: 2017  Onset of Illness/Injury or Date of Surgery Date: 10/28/17  Date of Referral to OT: 17  Referring Physician: Jeremie      Admit Date: 10/31/2017    Visit Dx:     ICD-10-CM ICD-9-CM   1. Oropharyngeal dysphagia R13.12 787.22     Patient Active Problem List   Diagnosis   • Stroke             Adult Rehabilitation Note       17 1208 17 1130 17 1009    Rehab Assessment/Intervention    Discipline occupational therapist  -KP speech language pathologist  -KB physical therapist  -MD    Document Type discharge summary;therapy note (daily note)  -KP therapy note (daily note)  -KB discharge summary;therapy note (daily note)  -MD    Subjective Information agree to therapy;no complaints  -KP no complaints;agree to therapy  -KB agree to therapy;no complaints  -MD    Patient Effort, Rehab Treatment excellent  -KP good  -KB good  -MD    Symptoms Noted During/After Treatment none  -KP none  -KB none  -MD    Precautions/Limitations fall precautions   pt had LOB when going from walker to w/c OT A her to w/c  -KP fall precautions;swallowing precautions  -KB fall precautions  -MD    Specific Treatment Considerations pt went to sit in w/c before OT had w/c all the way behind her and locked. OT A pt to w/c to prevent a fall   -      Equipment Issued to Patient   gait belt;front wheeled walker  -MD    Recorded by [] Vero York, OTR [KB] Katherine L Bruton [MD] Nereida Llamas, PT    Pain Assessment    Pain Assessment No/denies pain  - No/denies pain  -KB No/denies pain  -MD    Pain Score 0  -KP      Recorded by [] Vero York, OTR [KB] Katherine L Bruton [MD] Nereida Llamas, PT    Vision Assessment/Intervention    Vision Comment her scanning ability has improved to the L and doesn't demo any issues during bathing  -       Recorded by [KP] Vero York, OTR      Cognitive Assessment/Intervention    Current Cognitive/Communication Assessment impaired  -KP  impaired  -MD    Orientation Status oriented x 4  -KP  oriented to;person;place;situation  -MD    Follows Commands/Answers Questions 100% of the time;able to follow single-step instructions  -KP  100% of the time  -MD    Personal Safety at risk behaviors demonstrated   decr judgment when going from walker to w/c   -KP  decreased insight to deficits  -MD    Personal Safety Interventions fall prevention program maintained;gait belt;nonskid shoes/slippers when out of bed;muscle strengthening facilitated  -KP  fall prevention program maintained;gait belt;muscle strengthening facilitated;nonskid shoes/slippers when out of bed;supervised activity  -MD    Recorded by [KP] Vero York, OTR  [MD] Nereida Llamas, ONEIDA    Dysphagia/Swallow Intervention    Dysphagia/Swallow Therapeutic Feed  Pt observed at lunch with [facility] soft and regular thins; used lingual sweep and throat clear as needed without cues; some cues given for double swallow. Answered pt questions regarding current diet restrictions.   -KB     Recorded by  [KB] Katherine L Bruton     Improve tongue base & pharyngeal wall squeeze    To improve tongue base & pharyngeal wall squeeze, patient will:  Complete effortful swallow  -KB     Status: Improve tongue base & pharyngeal wall squeeze  Progressing as expected  -KB     Tongue Base/Pharyngeal Wall Squeeze Progress  60%;without cues;90%;with inconsistent cues  -KB     Comments: Improve tongue base & pharyngeal wall squeeze  Pt reported use of hard swallow during meals as needed. Encouraged increased use of this strategy.   -KB     Recorded by  [KB] Katherine L Bruton     ROM (Range of Motion)    General ROM upper extremity range of motion deficits identified  -      General ROM Detail R UE 8/8 L UE shoulder 90% to 95% AROM  -KP      Recorded by [KP] Vero York,  OTR      MMT (Manual Muscle Testing)    General MMT Assessment upper extremity strength deficits identified  -      General MMT Assessment Detail 4/5 B UE. R grip45 L  40. R tri pinch8 L tri pinch 7. R lat pinch7 L lat pinch 6.  -      Recorded by [KP] Vero York OTR      Bed Mobility, Assessment/Treatment    Bed Mobility, Roll Left, [place]   independent  -MD    Bed Mobility, Roll Right, [place]   independent  -MD    Bed Mobility, Scoot/Bridge, [place]   independent  -MD    Bed Mob, Supine to Sit, [place]   independent  -MD    Bed Mob, Sit to Supine, [place]   independent  -MD    Recorded by   [MD] Nereida Llamas PT    Transfer Assessment/Treatment    Transfers, Sit-Stand [place] supervision required  -  supervision required  -MD    Transfers, Stand-Sit [place] supervision required  -  supervision required  -MD    Transfers, Sit-Stand-Sit, Assist Device rolling walker  -  rolling walker  -MD    Toilet Transfer, [place] supervision required  -      Toilet Transfer, Assistive Device rolling walker  -      Walk-In Shower Transfer, [place] supervision required  -      Walk-In Shower Transfer, Assist Device rolling walker;other (see comments)   tub bench  -KP      Transfer, Safety Issues   step length decreased  -MD    Transfer, Impairments   strength decreased  -MD    Transfer, Comment   car transfer: SBA, RWx  -MD    Recorded by [] Vero York, OTR  [MD] Nereida Llamas PT    Gait Assessment/Treatment    Gait, [place] Level   supervision required  -MD    Gait, Assistive Device   rolling walker  -MD    Gait, Distance (Feet)   160  -MD    Gait, Gait Deviations   bilateral:;janet decreased;forward flexed posture  -MD    Gait, Safety Issues   step length decreased  -MD    Gait, Impairments   strength decreased  -MD    Recorded by   [MD] Nereida Llamas PT    Stairs Assessment/Treatment    Number of Stairs   4  -MD    Stairs, Handrail  Location   both sides  -MD    Stairs, [place] Level   supervision required  -MD    Stairs, Technique Used   step over step (ascending);step over step (descending)  -MD    Stairs, Safety Issues   weight-shifting ability decreased  -MD    [place], Impairments   strength decreased  -MD    Recorded by   [MD] Nereida Llamas, PT    Functional Mobility    Functional Mobility- Ind. Level set up required;supervision required  -      Functional Mobility- Device rolling walker  -      Functional Mobility- Comment walks to shower room, walks to her room. walks to the kitchen and to the dining rom.  -KP      Recorded by [KP] Vero York, SARAR      ADL Assessment/Intervention    IADL Assess/Train, Comment pt completes kitchen task in kitchen. gets pan from cabinet , opens and pours soup into sauce pan. Turns on burner all at walker level. sits to rest while soup is cooking. Scoops and pours soup into bowl a. supervision.  -KP      Additional Documentation IADL Assess/Train, Comment (Row)  -KP      Recorded by [KP] KANDY Sultana      Upper Body Bathing Assessment/Training    UB Bathing Assess/Train Assistive Device hand-held shower head;tub bench  -      UB Bathing Assess/Train, Position sitting  -      UB Bathing Assess/Train, [place] Level supervision required  -KP      Recorded by [KP] Vero York, SARAR      Lower Body Bathing Assessment/Training    LB Bathing Assess/Train Assistive Device hand-held shower head;grab bars;tub bench  -KP      LB Bathing Assess/Train, Position sitting;standing  -      LB Bathing Assess/Train, [place] Level set up required;supervision required  -KP      Recorded by [KP] Vero York OTR      Upper Body Dressing Assessment/Training    UB Dressing Assess/Train, Clothing Type donning:;doffing:;bra;t-shirt  -      UB Dressing Assess/Train, Position sitting  -      UB Dressing Assess/Train, [place] supervision required;set up required   -KP      Recorded by [KP] KANDY Sultana      Lower Body Dressing Assessment/Training    LB Dressing Assess/Train, Clothing Type doffing:;pants;donning:;shoes;slipper socks;socks  -KP      LB Dressing Assess/Train, Position sitting;standing  -KP      LB Dressing Assess/Train, [place] set up required;supervision required  -KP      LB Dressing Assess/Train, Comment grab bar  -KP      Recorded by [KP] Vero York OTR      Toileting Assessment/Training    Toileting Assess/Train, Assistive Device grab bars  -KP      Toileting Assess/Train, Position sitting;standing  -KP      Toileting Assess/Train, Indepen Level set up required;supervision required  -KP      Recorded by [KP] KANDY Sultana      Grooming Assessment/Training    Grooming Assess/Train, Position sitting  -KP      Grooming Assess/Train, Indepen Level conditional independence  -KP      Recorded by [KP] KANDY Sultana      Motor Skills/Interventions    Additional Documentation Balance Skills Training (Group);Fine Motor Coordination Training (Group);Gross Motor Coordination Training (Group);Neuromuscular Re-education (Group)  -KP      Recorded by [KP] KANDY Sultana      Balance Skills Training    Sitting-Level of Assistance Distant supervision  -KP      Sitting-Balance Support Feet supported  -KP      Sitting-Balance Activities Reaching for objects  -KP      Standing-Level of Assistance Close supervision  -KP  Close supervision  -MD    Static Standing Balance Support assistive device  -KP  assistive device  -MD    Standing-Balance Activities Reaching for objects;Forward lean   kitchen task and in shower room  -KP  Retrieve object from floor  -MD    Gait Balance-Level of Assistance   Close supervision  -MD    Gait Balance Support   assistive device  -MD    Gait Balance Activities   uneven surface  -MD    Gait Balance # of Minutes   --   20 ft  -MD    Recorded by [KP] Vero York OTR  [MD]  Nereida Llamas, PT    Fine Motor Coordination Training    9-Hole Peg Right 23  -KP      9-Hole Peg Left 30  -KP      Box and Blocks Right 43  -KP      Box and Blocks Left 45  -KP      Opposition Right:;Left:;intact  -KP      Recorded by [KP] Vero York OTR      Positioning and Restraints    Pre-Treatment Position sitting in chair/recliner  -KP  sitting in chair/recliner  -MD    Post Treatment Position chair  -KP      In Chair sitting;with other staff   in dining room after second session. in dining room w. RT 10  -KP      Recorded by [BRIANNA] Vero York, KANDY  [MD] Nereida Llamas, PT      11/11/17 1122 11/10/17 1500 11/10/17 1330    Rehab Assessment/Intervention    Discipline physical therapist  -JK speech language pathologist  -KB speech language pathologist  -KB    Document Type therapy note (daily note)  -JK therapy note (daily note)  -KB therapy note (daily note)  -KB    Subjective Information no complaints;agree to therapy  -JK no complaints;agree to therapy  -KB no complaints;agree to therapy  -KB    Patient Effort, Rehab Treatment good  -JK good  -KB good  -KB    Symptoms Noted During/After Treatment none  -JK none  -KB none  -KB    Precautions/Limitations fall precautions  -JK fall precautions  -KB fall precautions  -KB    Precautions/Limitations, Vision WFL with corrective lenses  -JK      Precautions/Limitations, Hearing WFL  -JK      Recorded by [JK] Anna Epps, PT [KB] Katherine L Bruton [KB] Katherine L Bruton    Pain Assessment    Pain Assessment No/denies pain  -JK No/denies pain  -KB No/denies pain  -KB    Recorded by [JK] Anna Epps, PT [KB] Katherine L Bruton [KB] Katherine L Bruton    Vision Assessment/Intervention    Visual Impairment visual impairment, left;WFL with corrective lenses  -JK visual impairment, left;WFL with corrective lenses  -KB visual impairment, left;WFL with corrective lenses  -KB    Recorded by [JK] Anna Epps, PT [KB] Katherine L Bruton [KB] Ayleen LAAN  Bruton    Cognitive Assessment/Intervention    Current Cognitive/Communication Assessment impaired  -JK      Orientation Status oriented x 4  -TIFFANIEK      Follows Commands/Answers Questions 100% of the time;able to follow single-step instructions  -JK      Personal Safety mild impairment  -JK      Personal Safety Interventions fall prevention program maintained  -JK      Recorded by [JK] Anna Epps, PT      Improve functional problem solving    Improve functional problem solving through:  sequence steps in a task  -KB     Status: Improve functional problem solving  Progressing as expected  -KB     Functional Problem Solving Progress  90%;without cues;100%;with inconsistent cues  -KB     Comments: Improve functional problem solving  Pt sequenced 6-steps in common tasks with MIN cues; given verbal cue to check her work, increased accuracy and self-corrections noted.   -KB     Recorded by  [KB] Katherine L Bruton     Improve executive function skills    Improve executive function skills:   identify strategies, strengths, limitations  -KB    Status: Improve executive function skills   Progressing as expected  -KB    Executive Function Skills Progress   continue to address  -KB    Comments: Improve executive function skills   Discussed previous performance and strategies of planning ahead and makring completed items off in order to increase performance and help with attention.  -KB    Recorded by   [KB] Katherine L Bruton    Improve oral skills    To Improve Oral Skills, patient will:   Increase lip closure;Increase tongue A-P movement;Increase back of tongue control  -KB    Status: Improve Oral Skills   Progressing as expected  -KB    Oral Skills Progress   continue to adress  -KB    Comments: Improve Oral Skills   Pt completed exercises to verbal instruction. 10 reps.   -KB    Recorded by   [KB] Katherine L Bruton    Improve laryngeal elevation    To improve laryngeal elevation, patient will:   Complete pitch-glide   -KB    Status: Improve laryngeal elevation   Progressing as expected  -KB    Laryngeal Elevation Progress   continue to adress  -KB    Comments: Improve laryngeal elevation   Pt completed exercises to verbal instruction. 10 reps.  -KB    Recorded by   [KB] Katherine L Bruton    Improve hyolaryngeal excursion    To improve hyolaryngeal excursion, patient will:  Complete head lift sustained (comment number of seconds);Complete head lift repetitive (comment number of lifts);Complete chin tuck against resistance (comment number of repetitions)  -KB Complete head lift repetitive (comment number of lifts)  -KB    Status: Improve hyolaryngeal excursion  Progressing as expected  -KB Progressing as expected  -KB    Hyolaryngeal Excursion Progress  continue to adress  -KB continue to adress  -KB    Comments: Improve hyolaryngeal excursion  Completed 20 reps or head lift and chin tuck with resistance; completed sustained head lift for 30 seconds.  -KB Pt completed exercisess to verbal instruciton. 10 reps.  -KB    Recorded by  [KB] Katherine L Bruton [KB] Katherine L Bruton    Improve tongue base & pharyngeal wall squeeze    To improve tongue base & pharyngeal wall squeeze, patient will:  Complete tongue hold swallow  -KB Complete effortful swallow;Complete yawn/hold retractions  -KB    Status: Improve tongue base & pharyngeal wall squeeze  Progressing as expected  -KB Progressing as expected  -KB    Tongue Base/Pharyngeal Wall Squeeze Progress  continue to adress  -KB continue to adress  -KB    Comments: Improve tongue base & pharyngeal wall squeeze  MAX cues inclusing model to complete tongue out swallow for first time. Able to complete exercise adequately 6/10 repetitions.   -KB Pt completed exercises to verbal instruction. 10 reps.  -KB    Recorded by  [KB] Katherine L Bruton [KB] Katherine L Bruton    Transfer Assessment/Treatment    Transfers, Sit-Stand [place] contact guard assist  -JK      Transfers, Stand-Sit  "[place] contact guard assist  -JK      Transfers, Sit-Stand-Sit, Assist Device rolling walker  -JK      Transfer, Safety Issues step length decreased  -JK      Transfer, Impairments strength decreased;impaired balance  -JK      Transfer, Comment car tsf CGA  -JK      Recorded by [TIFFANIEK] Anna Epps PT      Gait Assessment/Treatment    Gait, [place] Level verbal cues required;contact guard assist  -JK      Gait, Assistive Device rolling walker  -JK      Gait, Distance (Feet) 160  -JK      Gait, Gait Deviations bilateral:;janet decreased;forward flexed posture;step length decreased  -JK      Gait, Safety Issues step length decreased  -JK      Gait, Impairments impaired balance;strength decreased  -JK      Gait, Comment Pt practiced ambulating with roll wx with dgt [place]  -JK      Recorded by [TIFFANIEK] Anna Epps PT      Stairs Assessment/Treatment    Number of Stairs 6\" curb  -JK      Stairs, Handrail Location none  -JK      Stairs, [place] Level minimum assist (75% patient effort);contact guard assist;verbal cues required  -JK      Stairs, Assistive Device walker  -JK      Stairs, Technique Used step to step (descending);step to step (ascending)  -JK      Stairs, Safety Issues sequencing ability decreased  -JK      Stairs, Impairments strength decreased;impaired balance  -JK      Recorded by [TIFFANIEK] Anna Epps PT      Positioning and Restraints    Pre-Treatment Position in bed  -JK      Post Treatment Position wheelchair  -JK      In Bed sitting;with family/caregiver  -JK      Recorded by [TOSHA] Anna Epps PT        User Key  (r) = Recorded By, (t) = Taken By, (c) = Cosigned By    Initials Name Effective Dates    BRIANNA York, OTR 04/13/15 -     TOSHA Epps, PT 12/01/15 -     MD Nereida Llamas, PT 12/01/15 -     KB Katherine L Bruton 09/29/17 -                 OT Goals       11/13/17 1219 11/01/17 1554 11/01/17 1230    Transfer Training OT STG    Transfer Training OT STG, Date " Established   11/01/17 -SO    Transfer Training OT STG, Time to Achieve   1 wk  -SO    Transfer Training OT STG, Activity Type   tub;walk-in shower;shower chair  -SO    Transfer Training OT STG, [place] Level   contact guard assist  -SO    Transfer Training OT STG, Assist Device   walker, rolling;shower chair  -SO    Transfer Training OT STG, Date Goal Reviewed 11/13/17  -KP      Transfer Training OT STG, Outcome goal met  -KP      Transfer Training OT LTG    Transfer Training OT LTG, Date Established   11/01/17 -SO    Transfer Training OT LTG, Time to Achieve   by discharge  -SO    Transfer Training OT LTG, Activity Type   tub;walk-in shower;shower chair  -SO    Transfer Training OT LTG, [place] Level   supervision required  -SO    Transfer Training OT LTG, Assist Device   walker, rolling;shower chair  -SO    Transfer Training OT LTG, Date Goal Reviewed 11/13/17 -KP      Transfer Training OT LTG, Outcome goal met  -KP      Transfer Training 2 OT STG    Transfer Training 2 OT STG, Date Established   11/01/17 -SO    Transfer Training 2 OT STG, Time to Achieve   1 wk  -SO    Transfer Training 2 OT STG, Activity Type   toilet  -SO    Transfer Training 2 OT STG, [place] Level   contact guard assist  -SO    Transfer Training 2 OT STG, Assist Device   walker, rolling  -SO    Transfer Training 2 OT STG, Date Goal Reviewed 11/13/17 -KP      Transfer Training 2 OT STG, Outcome goal met  -KP      Transfer Training 2 OT LTG    Transfer Training 2 OT LTG, Date Established   11/01/17 -SO    Transfer Training 2 OT LTG, Time to Achieve   by discharge  -SO    Transfer Training 2 OT LTG, Activity Type   toilet  -SO    Transfer Training 2 OT LTG, [place] Level   supervision required  -SO    Transfer Training 2 OT LTG, Assist Device   walker, rolling  -SO    Transfer Training 2 OT LTG, Date Goal Reviewed 11/13/17 -KP      Transfer Training 2 OT LTG, Outcome goal met  -KP      Strength OT LTG    Strength Goal  OT LTG, Date Established  11/01/17  -SO     Strength Goal OT LTG, Time to Achieve  by discharge  -SO     Strength Goal OT LTG, Measure to Achieve  Pt to increase LUE strength to 4-/5 to increase independence with ADLs.  -SO     Strength Goal OT LTG, Date Goal Reviewed 11/13/17  -      Strength Goal OT LTG, Outcome goal met  -      Caregiver Training OT LTG    Caregiver Training OT LTG, Date Established   11/01/17  -SO    Caregiver Training OT LTG, Time to Achieve   by discharge  -SO    Caregiver Training OT LTG, [place] Level   able to assist adequately;able to cue patient adequately  -SO    Caregiver Training OT LTG, Outcome goal met  -      Patient Education OT LTG    Patient Education OT LTG, Date Established   11/01/17  -SO    Patient Education OT LTG, Time to Achieve   by discharge  -SO    Patient Education OT LTG, Education Type   written program;HEP;aware of neuro deficits;home safety  -SO    Patient Education OT LTG, Education Understanding   independent;demonstrates adequately;verbalizes understanding  -SO    Patient Education OT LTG, Date Goal Reviewed 11/13/17  -      Patient Education OT LTG Outcome goal met  -      Isolated Movement OT LTG    Isolated Movement OT LTG, Date Established  11/01/17  -SO     Isolated Movement OT LTG, Time to Achieve  by discharge  -SO     Isolated Movement OT LTG, Body Area  left scapula;left shoulder;left elbow;left forearm;left wrist;left fingers  -SO     Isolated Movement OT LTG, Level  WFL  -SO     Isolated Movement OT LTG, Date Goal Reviewed 11/13/17  -      Isolated Movement OT LTG, Outcome goal met  -      Coordination OT LTG    Coordination OT LTG, Date Established  11/01/17  -SO     Coordination OT LTG, Time to Achieve  by discharge  -SO     Coordination OT LTG, Activity Type  FM written ex program;GM written ex program;FM task;GM task  -SO     Coordination OT LTG, [place] Level  independent  -SO     Coordination OT LTG, Additional Goal  LUE   -SO     Coordination OT LTG, Date Goal Reviewed 11/13/17  -KP      Coordination OT LTG, Outcome goal met  -KP      ADL OT STG    ADL OT STG, Date Established   11/01/17  -SO    ADL OT STG, Time to Achieve   1 wk  -SO    ADL OT STG, Activity Type   ADL skills  -SO    ADL OT STG, [place] Level   contact guard;assistive device  -SO    ADL OT STG, Outcome goal met  -KP      ADL OT LTG    ADL OT LTG, Date Established   11/01/17  -SO    ADL OT LTG, Time to Achieve   by discharge  -SO    ADL OT LTG, Activity Type   ADL skills  -SO    ADL OT LTG, [place] Level   standby assist;assistive device  -SO    ADL OT LTG, Date Goal Reviewed 11/13/17  -      ADL OT LTG, Outcome goal met  -KP        User Key  (r) = Recorded By, (t) = Taken By, (c) = Cosigned By    Initials Name Provider Type    SO Micki Castro, OTR Occupational Therapist    KP Vero York, OTR Occupational Therapist          Occupational Therapy Education     Title: PT OT SLP Therapies (Active)     Topic: Occupational Therapy (Resolved)     Point: ADL training (Resolved)    Description: Instruct learner(s) on proper safety adaptation and remediation techniques during self care or transfers.   Instruct in proper use of assistive devices.    Learning Progress Summary    Learner Readiness Method Response Comment Documented by Status   Patient Acceptance E,TB,D,H EDUARDO MESSER ed pt on safety w. ADLs and tsf . ed pt on HEP for FMC. provided handout w. tasks. added more games and ideas plus websites to the list for FMC. provided pt w. HEP for theraputty and next level up of putty to cont to inc hand/finger strength and FMC.  11/13/17 1218 Done    Acceptance E,AYDE WHITE ed pt, family, staff etc. on pts current level w. OT regarding self-care, toilet/shower tsf. and her increase in FMC and HEP to send home. Ed OT will ed on her Monday last day prior to d/c on HEP for FMC and hand strength.  11/10/17 1223 Done    Acceptance E,D AYDE CLARK ed pt on shower  tsf and safety w. tsf. pt demo w. SBA. tub bench and grab bars KP 11/08/17 1157 Done    Acceptance E,D EDUARDO,NR ed pt on shower tsf technique. ed pt on bathing safety. pt demo shower w. CGA to SBA. mostly SBA. KP 11/06/17 1207 Done    Acceptance E VU Discuss OT goals and POC. SO 11/01/17 1232 Done   Family Acceptance E,D EDUARDO,AYDE ed pt, family, staff etc. on pts current level w. OT regarding self-care, toilet/shower tsf. and her increase in FMC and HEP to send home. Ed OT will ed on her Monday last day prior to d/c on HEP for FMC and hand strength. KP 11/10/17 1223 Done    Acceptance E VU Discuss OT goals and POC. SO 11/01/17 1232 Done   Significant Other Acceptance E,D AYDE CLARK ed pt, family, staff etc. on pts current level w. OT regarding self-care, toilet/shower tsf. and her increase in FMC and HEP to send home. Ed OT will ed on her Monday last day prior to d/c on HEP for FMC and hand strength. KP 11/10/17 1223 Done   Caregiver Acceptance E,D AYDE CLARK ed pt, family, staff etc. on pts current level w. OT regarding self-care, toilet/shower tsf. and her increase in FMC and HEP to send home. Ed OT will ed on her Monday last day prior to d/c on HEP for FMC and hand strength. KP 11/10/17 1223 Done               Point: Home exercise program (Resolved)    Description: Instruct learner(s) on appropriate technique for monitoring, assisting and/or progressing therapeutic exercises/activities.    Learning Progress Summary    Learner Readiness Method Response Comment Documented by Status   Patient Acceptance E,TB,D,H AYDE,EDUARDO ed pt on safety w. ADLs and tsf . ed pt on HEP for FMC. provided handout w. tasks. added more games and ideas plus websites to the list for FMC. provided pt w. HEP for theraputty and next level up of putty to cont to inc hand/finger strength and FMC. KP 11/13/17 1218 Done               Point: Precautions (Resolved)    Description: Instruct learner(s) on prescribed precautions during self-care and functional transfers.     Learning Progress Summary    Learner Readiness Method Response Comment Documented by Status   Patient Acceptance E,TB,D,H DU,VU ed pt on safety w. ADLs and tsf . ed pt on HEP for FMC. provided handout w. tasks. added more games and ideas plus websites to the list for FMC. provided pt w. HEP for theraputty and next level up of putty to cont to inc hand/finger strength and FMC.  11/13/17 1218 Done    Acceptance E,D VU,DU ed pt, family, staff etc. on pts current level w. OT regarding self-care, toilet/shower tsf. and her increase in FMC and HEP to send home. Ed OT will ed on her Monday last day prior to d/c on HEP for FMC and hand strength.  11/10/17 1223 Done    Acceptance E,D VU,NR ed pt on shower tsf technique. ed pt on bathing safety. pt demo shower w. CGA to SBA. mostly SBA.  11/06/17 1207 Done   Family Acceptance E,D VU,DU ed pt, family, staff etc. on pts current level w. OT regarding self-care, toilet/shower tsf. and her increase in FMC and HEP to send home. Ed OT will ed on her Monday last day prior to d/c on HEP for FMC and hand strength.  11/10/17 1223 Done   Significant Other Acceptance E,D EDUARDO,AYDE ed pt, family, staff etc. on pts current level w. OT regarding self-care, toilet/shower tsf. and her increase in FMC and HEP to send home. Ed OT will ed on her Monday last day prior to d/c on HEP for FMC and hand strength.  11/10/17 1223 Done   Caregiver Acceptance E,D VU,DU ed pt, family, staff etc. on pts current level w. OT regarding self-care, toilet/shower tsf. and her increase in FMC and HEP to send home. Ed OT will ed on her Monday last day prior to d/c on HEP for FMC and hand strength.  11/10/17 1223 Done               Point: Body mechanics (Resolved)    Description: Instruct learner(s) on proper positioning and spine alignment during self-care, functional mobility activities and/or exercises.    Learning Progress Summary    Learner Readiness Method Response Comment Documented by Status    Patient Acceptance E,TB,D,H AYDE,EDUARDO ed pt on safety w. ADLs and tsf . ed pt on HEP for FMC. provided handout w. tasks. added more games and ideas plus websites to the list for FMC. provided pt w. HEP for theraputty and next level up of putty to cont to inc hand/finger strength and FMC.  11/13/17 1218 Done    Acceptance E,D AYDE CLARK ed pt, family, staff etc. on pts current level w. OT regarding self-care, toilet/shower tsf. and her increase in FMC and HEP to send home. Ed OT will ed on her Monday last day prior to d/c on HEP for FMC and hand strength.  11/10/17 1223 Done    Acceptance E,D AYDE CLARK ed pt on shower tsf and safety w. tsf. pt demo w. SBA. tub bench and grab bars  11/08/17 1157 Done    Acceptance E,D EDUARDO,JUVENAL ed pt on shower tsf technique. ed pt on bathing safety. pt demo shower w. CGA to SBA. mostly SBA.  11/06/17 1207 Done   Family Acceptance E,D EDUARDO,AYDE ed pt, family, staff etc. on pts current level w. OT regarding self-care, toilet/shower tsf. and her increase in FMC and HEP to send home. Ed OT will ed on her Monday last day prior to d/c on HEP for FMC and hand strength.  11/10/17 1223 Done   Significant Other Acceptance E,AYDE WHITE ed pt, family, staff etc. on pts current level w. OT regarding self-care, toilet/shower tsf. and her increase in FMC and HEP to send home. Ed OT will ed on her Monday last day prior to d/c on HEP for FMC and hand strength.  11/10/17 1223 Done   Caregiver Acceptance E,D AYDE CLARK ed pt, family, staff etc. on pts current level w. OT regarding self-care, toilet/shower tsf. and her increase in FMC and HEP to send home. Ed OT will ed on her Monday last day prior to d/c on HEP for FMC and hand strength.  11/10/17 1223 Done                      User Key     Initials Effective Dates Name Provider Type Discipline    SO 04/13/15 -  Micki Castro, OTR Occupational Therapist OT     04/13/15 -  Vero York, OTR Occupational Therapist OT                OT  Recommendation and Plan  Therapy Frequency: 5 times/wk            Time Calculation:          Time Calculation- OT       11/13/17 1224 11/13/17 1223       Time Calculation- OT    OT Start Time 1100  -KP 0900  -     OT Stop Time 1130  - 1000  -     OT Time Calculation (min) 30 min  -KP 60 min  -       User Key  (r) = Recorded By, (t) = Taken By, (c) = Cosigned By    Initials Name Provider Type     KANDY Sultana Occupational Therapist          Therapy Charges for Today     Code Description Service Date Service Provider Modifiers Qty    51118567588 HC OT SELF CARE/MGMT/TRAIN EA 15 MIN 11/13/2017 KANDY Sultana GO 5    63366828150 HC OT NEUROMUSC RE EDUCATION EA 15 MIN 11/13/2017 KANDY Sultana GO 1                    KANDY Sultana  11/13/2017

## 2017-11-13 NOTE — PROGRESS NOTES
Adult Nutrition  Assessment/PES    Patient Name:  Alice Maxwell  YOB: 1945  MRN: [id number]  Admit Date:  10/31/2017    Assessment Date:  11/13/2017    Comments:    Intake/appetite good. Cont to monitor.          Reason for Assessment       11/13/17 1057    Reason for Assessment    Reason For Assessment/Visit follow up protocol                  Labs/Tests/Procedures/Meds       11/13/17 1057    Labs/Tests/Procedures/Meds    Labs/Tests Review Reviewed    Medication Review Reviewed, pertinent    Significant Vitals reviewed                Nutrition Prescription Ordered       11/13/17 1057    Nutrition Prescription PO    Current PO Diet Soft Texture    Texture Whole foods    Fluid Consistency Thin    Common Modifiers Consistent Carbohydrate            Evaluation of Received Nutrient/Fluid Intake       11/13/17 1057    PO Evaluation    Number of Days PO Intake Evaluated 3 days    % PO Intake 100% most meals, some 50 & 75%        Problem/Interventions:          Intervention Goal       11/13/17 1059    Intervention Goal    General Maintain nutrition    PO Maintain intake            Nutrition Intervention       11/13/17 1059    Nutrition Intervention    RD/Tech Action Menu provided;Encourage intake;Follow Tx progress              Education/Evaluation       11/13/17 1059    Education    Education Previous education by NAT/LD    Monitor/Evaluation    Monitor Per protocol        Electronically signed by:  Modesta Ramirez RD  11/13/17 10:59 AM

## 2017-11-13 NOTE — PROGRESS NOTES
"   LOS: 13 days   Patient Care Team:  Calvin Dhillon Jr. DO as PCP - General (Internal Medicine)    Chief Complaint:   S/p R MCA ischemic infarct  Stroke prophylaxis - ASA 81 mg and Lovenox transition to coumadin  Hypercoaguability disorder - Hereditary thrombophilia  Homozygous MTHFR with hyperhomocystinemia  Homozygous factor V Leiden gene mutation  history of malignant neoplasm of breast   Obesity   Hyperlipidemia -   Hypothyroidism   DM    Subjective     History of Present Illness    Subjective     Pass this weekend  Strength same on left side.    History taken from: patient    Objective     Vital Signs  Temp:  [97.6 °F (36.4 °C)-98.4 °F (36.9 °C)] 97.6 °F (36.4 °C)  Heart Rate:  [74-90] 74  Resp:  [18-20] 18  BP: (125-139)/(58-65) 125/59    Objective:  Vital signs: (most recent): Blood pressure 125/59, pulse 74, temperature 97.6 °F (36.4 °C), temperature source Oral, resp. rate 18, height 67\" (170.2 cm), weight 219 lb 6.4 oz (99.5 kg), SpO2 95 %.            Mental status-awake alert  Lungs-clear to auscultation  Heart-regular rate and rhythm  Abdomen abdomen-normoactive bowel sounds, soft, nontender  Extremities-no pretibial edema  Neurologic- Left elbow flexion 5, finger flexion 5, knee extension 5, ankle dorsiflexion 5 . .  Skin-blood filled vesicle left upper lip        Results Review:     I reviewed the patient's new clinical results.    Lab Results  Lab Value Date/Time   INR 2.13 (H) 11/13/2017 0704   INR 2.52 (H) 11/12/2017 0544   INR 2.47 (H) 11/11/2017 0911   INR 2.42 (H) 11/10/2017 0759   INR 2.14 (H) 11/09/2017 0657   INR 1.91 (H) 11/08/2017 0722   INR 1.79 (H) 11/07/2017 0506   INR 1.41 (H) 11/06/2017 0627   INR 1.41 (H) 11/05/2017 0555   INR 1.17 (H) 11/04/2017 0647   INR 1.13 (H) 11/03/2017 0520   INR 1.07 11/02/2017 0713   INR 1.09 11/01/2017 0754       Results from last 7 days  Lab Units 11/13/17  0704 11/09/17  0657   WBC 10*3/mm3 6.97 7.45   HEMOGLOBIN g/dL 10.7* 11.7*   HEMATOCRIT % " 34.4* 37.6   PLATELETS 10*3/mm3 181 160         Results from last 7 days  Lab Units 11/13/17  0704   SODIUM mmol/L 139   POTASSIUM mmol/L 3.9   CHLORIDE mmol/L 101   CO2 mmol/L 27.6   BUN mg/dL 15   CREATININE mg/dL 0.98   CALCIUM mg/dL 8.7   BILIRUBIN mg/dL 0.9   ALK PHOS U/L 72   ALT (SGPT) U/L 17   AST (SGOT) U/L 26   GLUCOSE mg/dL 134*     Glucose   Date/Time Value Ref Range Status   11/13/2017 0718 140 (H) 70 - 130 mg/dL Final   11/12/2017 1615 105 70 - 130 mg/dL Final   11/12/2017 0720 128 70 - 130 mg/dL Final   11/11/2017 1628 107 70 - 130 mg/dL Final   11/11/2017 0733 140 (H) 70 - 130 mg/dL Final   11/10/2017 1631 97 70 - 130 mg/dL Final       Labs on October 31-sodium 139, potassium 3.8, chloride 106, glucose 101, calcium 9.0, come back to 25, BUN 16, creatinine 1.1.  WBC6.64, hemoglobin 11.8, hematocrit 36.0, platelets 125.  October 29 triglycerides 89, cholesterol 125.  October 28 AST 35, ALT 29, alkaline phosphatase 99, total protein 7.7, albumin 4.1  Oct 31 - INR 1.0  Medication Review: done  Scheduled Meds:    allopurinol 200 mg Oral Daily   aspirin 81 mg Oral Daily   atorvastatin 80 mg Oral Nightly   cholecalciferol 1,000 Units Oral BID   folic acid 1 mg Oral Daily   levothyroxine 112 mcg Oral Q AM   melatonin 3 mg Oral Nightly   metFORMIN 500 mg Oral Daily With Breakfast   multivitamin 1 tablet Oral Daily   sennosides-docusate sodium 2 tablet Oral BID   warfarin 9 mg Oral Daily     Continuous Infusions:   PRN Meds:.•  acetaminophen  •  bisacodyl  •  dextrose  •  dextrose  •  glucagon (human recombinant)  •  magnesium hydroxide      Assessment/Plan     Active Problems:    Stroke      Assessment & Plan  S/p R MCA ischemic infarct    Left hemiparesis-improving  Left inattention-improved    Dysphagia-November 2-advance from puree to mechanical soft, no mixed consistency, thin liquid diet.    Stroke prophylaxis - ASA 81 mg and Lovenox transition to coumadin.  Daily INR.  November 2-INR still low-Will  continue Coumadin 5 mg as recent restarted but may titrate up dose tomorrow depending on lab result. Nov 3- increased coumadin to 7.5 mg. November 6-INR still low at 1.41.  Has been on Coumadin 5 mg and then increased to 7.5 mg x last 3 days, have changed to 10 mg, continue to follow INR daily.  November 7-INR 1.8-change Coumadin to 7.5 mg daily and continue to follow INR daily. Nov 8 - INR 1.9, changed coumadin to 10 mg, continues on Lovenox. Nov 9- INR 2.14 - coumadin 9 mg , anticipate last dose of Lovenox with overlap tonight.     Hypercoaguability disorder  history of malignant neoplasm of breast   Factor 5 Leiden mutation, heterozygous   Obesity   Hyperlipidemia -atorvastatin   Hypothyroidism -on replacement   Cerebrovascular accident (CVA) due to thrombosis of right middle cerebral artery   DVT prophylaxis-SCDs and anticoagulation  Diabetes mellitus-metformin  History of lability after past stroke  Poststroke fatigue  Viral oral lesion - has taken acyclovir in past . Will treat with Valtrex 2 gm po q 12 hours x 2 doses.      72 to female status post right MCA ischemic infarct with left inattention, left hemiparesis, dysarthria, decreased alertness, impairments with her mobility and self-care and is now admitted for comprehensive inpatient rehabilitation program with physical therapy 1 hour, occupational therapy 1 hour, and speech therapy 1 hour, 5 days a week.  Rehabilitation nursing for carryover and monitoring of her neurologic status, diabetes, bowel bladder and skin.  Ongoing physician follow-up.  Weekly team conferences.  Goals for her to achieve a level of supervision with her mobility and self-care for return home with her family.       TEAM CONF- NOV 3- LEFT INATTENTION. DIFFICULTY DON LEG LEG OF PANTS. FINE MOTOR DEFICITS LEFT HAND. MILD MEMORY DEFICITS. DYSPHAGIA - MECH SOFT, THINS. TRIAL WHOLE MEALS. FLAT AFFECT.  BNE PENDING. TRANSFERS CTG. GAIT 80 FEET MIN ASSIST. 4 STAIRS MIN ASSIST. BLADDER  CONTINENT.  ELOS- TWO WEEKS    TEAM CONF- NOV 10- LEFT INATTENTION. CUES WITH REC THERAPY. ADLS SET UP/SUPERVISION. TRANSFERS SBA. GAIT 80 FEET RW CTG.  BNE (Active)  Att'n. - WNL  Exec. Fx. - Mildly Imp., slowed processing speed  Rsng/Jgmnt - WNL  Arith - Mildly Imp.  Visuospatial Skills - Mildly Imp.  Visual Mem. - Mildly Imp.  Verbal Mem. - Min Imp.  Emot. - Pt reports improved mood, but mild anx  ELOS- TUES    .  Aric Chao MD  11/13/17  8:37 AM    Time:

## 2017-11-13 NOTE — PROGRESS NOTES
Inpatient Rehabilitation Plan of Care Note    Plan of Care  Care Plan Reviewed - No updates at this time.    Sphincter Control    Performed Intervention(s)  Monitor I & O's  Encourage fluid intake  Timed voids/ scheduled toileting      Safety    Performed Intervention(s)  Falls prevention protocol  Safety rounds, items within reach  Bed and chair alarms      Psychosocial    Performed Intervention(s)  Verbalizes needs and concerns  Therapeutic environmental set-up      Body Systems    Performed Intervention(s)  Blood glucose testing  Monitor labs  Medication    Signed by: Chio Purcell RN

## 2017-11-13 NOTE — THERAPY DISCHARGE NOTE
Inpatient Rehabilitation - Speech Language Pathology /Discharge  [facility]     Patient Name: Alice Maxwell  : 1945  MRN: [id number]  Today's Date: 2017         Admit Date: 10/31/2017    Visit Dx:     ICD-10-CM ICD-9-CM   1. Oropharyngeal dysphagia R13.12 787.22     Patient Active Problem List   Diagnosis   • Stroke              Adult Rehabilitation Note       17 1330 17 1208 17 1130    Rehab Assessment/Intervention    Discipline speech language pathologist  -KB occupational therapist  -KP speech language pathologist  -KB    Document Type therapy note (daily note);discharge summary  -KB discharge summary;therapy note (daily note)  -KP therapy note (daily note)  -KB    Subjective Information no complaints;agree to therapy  -KB agree to therapy;no complaints  -KP no complaints;agree to therapy  -KB    Patient Effort, Rehab Treatment good  -KB excellent  -KP good  -KB    Symptoms Noted During/After Treatment none  -KB none  -KP none  -KB    Precautions/Limitations fall precautions  -KB fall precautions   pt had LOB when going from walker to w/c OT A her to w/c  -KP fall precautions;swallowing precautions  -KB    Specific Treatment Considerations  pt went to sit in w/c before OT had w/c all the way behind her and locked. OT A pt to w/c to prevent a fall   -KP     Recorded by [KB] Katherine L Bruton [KP] Vero York, OTR [KB] Katherine L Bruton    Pain Assessment    Pain Assessment No/denies pain  -KB No/denies pain  -KP No/denies pain  -KB    Pain Score  0  -KP     Recorded by [KB] Katherine L Bruton [KP] Vero York, OTR [KB] Katherine L Bruton    Vision Assessment/Intervention    Vision Comment  her scanning ability has improved to the L and doesn't demo any issues during bathing  -KP     Recorded by  [KP] Vero York, OTR     Cognitive Assessment/Intervention    Current Cognitive/Communication Assessment  impaired  -KP     Orientation Status  oriented  x 4  -KP     Follows Commands/Answers Questions  100% of the time;able to follow single-step instructions  -KP     Personal Safety  at risk behaviors demonstrated   decr judgment when going from walker to w/c   -KP     Personal Safety Interventions  fall prevention program maintained;gait belt;nonskid shoes/slippers when out of bed;muscle strengthening facilitated  -KP     Recorded by  [KP] Vero York, OTR     Improve attention    Improve attention by: complete divided attention task  -KB      Status: Improve attention Progressing as expected  -KB      Attention Progress 70%;without cues;100%;with inconsistent cues  -KB      Comments: Improve attention written direcitons activity; MIN verbal cues needed for reasoning and attention  -KB      Recorded by [KB] Katherine L Bruton      Dysphagia/Swallow Intervention    Dysphagia/Swallow Therapeutic Feed   Pt observed at lunch with [facility] soft and regular thins; used lingual sweep and throat clear as needed without cues; some cues given for double swallow. Answered pt questions regarding current diet restrictions.   -KB    Recorded by   [KB] Katherine L Bruton    Improve oral skills    To Improve Oral Skills, patient will: Increase back of tongue control;Increase tongue tip elevation  -KB      Status: Improve Oral Skills Progressing as expected  -KB      Oral Skills Progress continue to adress  -KB      Comments: Improve Oral Skills Completed tongue tip and base of tongue exercises independently.   -KB      Recorded by [KB] Katherine L Bruton      Improve laryngeal elevation    To improve laryngeal elevation, patient will: Complete pitch-glide  -KB      Status: Improve laryngeal elevation Progressing as expected  -KB      Laryngeal Elevation Progress continue to adress  -KB      Comments: Improve laryngeal elevation Completed exercises independently.   -KB      Recorded by [KB] Katherine L Bruton      Improve tongue base & pharyngeal wall squeeze    To improve  tongue base & pharyngeal wall squeeze, patient will: Complete tongue base retraction;Complete effortful swallow  -KB  Complete effortful swallow  -KB    Status: Improve tongue base & pharyngeal wall squeeze Progressing as expected  -KB  Progressing as expected  -KB    Tongue Base/Pharyngeal Wall Squeeze Progress continue to adress  -KB  60%;without cues;90%;with inconsistent cues  -KB    Comments: Improve tongue base & pharyngeal wall squeeze Completed exercises independently.   -KB  Pt reported use of hard swallow during meals as needed. Encouraged increased use of this strategy.   -KB    Recorded by [KB] Katherine L Bruton  [KB] Katherine L Bruton    ROM (Range of Motion)    General ROM  upper extremity range of motion deficits identified  -     General ROM Detail  R UE 8/8 L UE shoulder 90% to 95% AROM  -KP     Recorded by  [KP] KANDY Sultana     MMT (Manual Muscle Testing)    General MMT Assessment  upper extremity strength deficits identified  -     General MMT Assessment Detail  4/5 B UE. R grip45 L  40. R tri pinch8 L tri pinch 7. R lat pinch7 L lat pinch 6.  -KP     Recorded by  [KP] KANDY Sultana     Transfer Assessment/Treatment    Transfers, Sit-Stand [place]  supervision required  -     Transfers, Stand-Sit [place]  supervision required  -     Transfers, Sit-Stand-Sit, Assist Device  rolling walker  -     Toilet Transfer, [place]  supervision required  -     Toilet Transfer, Assistive Device  rolling walker  -     Walk-In Shower Transfer, [place]  supervision required  -     Walk-In Shower Transfer, Assist Device  rolling walker;other (see comments)   tub bench  -KP     Recorded by  [KP] KANDY Sultana     Functional Mobility    Functional Mobility- Ind. Level  set up required;supervision required  -     Functional Mobility- Device  rolling walker  -     Functional Mobility- Comment  walks to shower room, walks to her room.  walks to the kitchen and to the dining rom.  -KP     Recorded by  [KP] Vero York, OTR     ADL Assessment/Intervention    IADL Assess/Train, Comment  pt completes kitchen task in kitchen. gets pan from cabinet , opens and pours soup into sauce pan. Turns on burner all at walker level. sits to rest while soup is cooking. Scoops and pours soup into bowl a. supervision.  -KP     Additional Documentation  IADL Assess/Train, Comment (Row)  -KP     Recorded by  [KP] Vero York, OTR     Upper Body Bathing Assessment/Training    UB Bathing Assess/Train Assistive Device  hand-held shower head;tub bench  -KP     UB Bathing Assess/Train, Position  sitting  -KP     UB Bathing Assess/Train, [place] Level  supervision required  -KP     Recorded by  [KP] Vero York, SARAR     Lower Body Bathing Assessment/Training    LB Bathing Assess/Train Assistive Device  hand-held shower head;grab bars;tub bench  -KP     LB Bathing Assess/Train, Position  sitting;standing  -KP     LB Bathing Assess/Train, [place] Level  set up required;supervision required  -KP     Recorded by  [KP] Vero York OTR     Upper Body Dressing Assessment/Training    UB Dressing Assess/Train, Clothing Type  donning:;doffing:;bra;t-shirt  -KP     UB Dressing Assess/Train, Position  sitting  -KP     UB Dressing Assess/Train, [place]  supervision required;set up required  -KP     Recorded by  [KP] Vero York OTR     Lower Body Dressing Assessment/Training    LB Dressing Assess/Train, Clothing Type  doffing:;pants;donning:;shoes;slipper socks;socks  -KP     LB Dressing Assess/Train, Position  sitting;standing  -KP     LB Dressing Assess/Train, [place]  set up required;supervision required  -KP     LB Dressing Assess/Train, Comment  grab bar  -KP     Recorded by  [KP] Vero York OTR     Toileting Assessment/Training    Toileting Assess/Train, Assistive Device  grab bars  -KP      Toileting Assess/Train, Position  sitting;standing  -KP     Toileting Assess/Train, Indepen Level  set up required;supervision required  -KP     Recorded by  [KP] KANDY Sultana     Grooming Assessment/Training    Grooming Assess/Train, Position  sitting  -KP     Grooming Assess/Train, Indepen Level  conditional independence  -KP     Recorded by  [KP] KANDY Sultana     Motor Skills/Interventions    Additional Documentation  Balance Skills Training (Group);Fine Motor Coordination Training (Group);Gross Motor Coordination Training (Group);Neuromuscular Re-education (Group)  -KP     Recorded by  [KP] KANDY Sultana     Balance Skills Training    Sitting-Level of Assistance  Distant supervision  -KP     Sitting-Balance Support  Feet supported  -KP     Sitting-Balance Activities  Reaching for objects  -KP     Standing-Level of Assistance  Close supervision  -KP     Static Standing Balance Support  assistive device  -KP     Standing-Balance Activities  Reaching for objects;Forward lean   kitchen task and in shower room  -KP     Recorded by  [KP] KANDY Sultana     Fine Motor Coordination Training    9-Hole Peg Right  23  -KP     9-Hole Peg Left  30  -KP     Box and Blocks Right  43  -KP     Box and Blocks Left  45  -KP     Opposition  Right:;Left:;intact  -KP     Recorded by  [KP] KANDY Sultana     Positioning and Restraints    Pre-Treatment Position  sitting in chair/recliner  -KP     Post Treatment Position  chair  -KP     In Chair  sitting;with other staff   in dining room after second session. in dining room w. RT 10  -KP     Recorded by  [KP] KANDY Sultana       11/13/17 1009 11/11/17 1122       Rehab Assessment/Intervention    Discipline physical therapist  -MD physical therapist  -TOSHA     Document Type discharge summary;therapy note (daily note)  -MD therapy note (daily note)  -TOSHA     Subjective Information agree to therapy;no complaints  -MD  no complaints;agree to therapy  -JK     Patient Effort, Rehab Treatment good  -MD good  -JK     Symptoms Noted During/After Treatment none  -MD none  -JK     Precautions/Limitations fall precautions  -MD fall precautions  -JK     Precautions/Limitations, Vision  WFL with corrective lenses  -JK     Precautions/Limitations, Hearing  WFL  -JK     Equipment Issued to Patient gait belt;front wheeled walker  -MD      Recorded by [MD] Nereida Llamas PT [JK] Anna Epps PT     Pain Assessment    Pain Assessment No/denies pain  -MD No/denies pain  -JK     Recorded by [MD] Nereida Llamas PT [JK] Anna Epps PT     Vision Assessment/Intervention    Visual Impairment  visual impairment, left;WFL with corrective lenses  -JK     Recorded by  [JK] Anna Epps PT     Cognitive Assessment/Intervention    Current Cognitive/Communication Assessment impaired  -MD impaired  -JK     Orientation Status oriented to;person;place;situation  -MD oriented x 4  -JK     Follows Commands/Answers Questions 100% of the time  -% of the time;able to follow single-step instructions  -JK     Personal Safety decreased insight to deficits  -MD mild impairment  -JK     Personal Safety Interventions fall prevention program maintained;gait belt;muscle strengthening facilitated;nonskid shoes/slippers when out of bed;supervised activity  -MD fall prevention program maintained  -JK     Recorded by [MD] Nereida Llamas, PT [JK] Anna Epps PT     Bed Mobility, Assessment/Treatment    Bed Mobility, Roll Left, [place] independent  -MD      Bed Mobility, Roll Right, [place] independent  -MD      Bed Mobility, Scoot/Bridge, [place] independent  -MD      Bed Mob, Supine to Sit, [place] independent  -MD      Bed Mob, Sit to Supine, [place] independent  -MD      Recorded by [MD] Nereida Llamas PT      Transfer Assessment/Treatment    Transfers, Sit-Stand [place] supervision required  -MD contact guard assist  -JK     Transfers,  "Stand-Sit [place] supervision required  -MD contact guard assist  -JK     Transfers, Sit-Stand-Sit, Assist Device rolling walker  -MD rolling walker  -JK     Transfer, Safety Issues step length decreased  -MD step length decreased  -JK     Transfer, Impairments strength decreased  -MD strength decreased;impaired balance  -JK     Transfer, Comment car transfer: Ernie WHITTINGTON car tsf CGA  -JK     Recorded by [MD] Nereida Llamas, PT [JK] Anna Epps, PT     Gait Assessment/Treatment    Gait, [place] Level supervision required  -MD verbal cues required;contact guard assist  -JK     Gait, Assistive Device rolling walker  -MD rolling walker  -JK     Gait, Distance (Feet) 160  -  -JK     Gait, Gait Deviations bilateral:;janet decreased;forward flexed posture  -MD bilateral:;janet decreased;forward flexed posture;step length decreased  -JK     Gait, Safety Issues step length decreased  -MD step length decreased  -JK     Gait, Impairments strength decreased  -MD impaired balance;strength decreased  -JK     Gait, Comment  Pt practiced ambulating with roll wx with dgt [place]  -JK     Recorded by [MD] Nereida Llamas, PT [JK] Anna Epps, PT     Stairs Assessment/Treatment    Number of Stairs 4  -MD 6\" curb  -JK     Stairs, Handrail Location both sides  -MD none  -JK     Stairs, [place] Level supervision required  -MD minimum assist (75% patient effort);contact guard assist;verbal cues required  -JK     Stairs, Assistive Device  walker  -JK     Stairs, Technique Used step over step (ascending);step over step (descending)  -MD step to step (descending);step to step (ascending)  -JK     Stairs, Safety Issues weight-shifting ability decreased  -MD sequencing ability decreased  -JK     Stairs, Impairments strength decreased  -MD strength decreased;impaired balance  -JK     Stairs, Comment 1 curb step Ernie WHITTINGTON      Recorded by [MD] Nereida Llamas, PT [JK] Anna Epps, PT     Balance Skills Training    " Standing-Level of Assistance Close supervision  -MD      Static Standing Balance Support assistive device  -MD      Standing-Balance Activities Retrieve object from floor  -MD      Gait Balance-Level of Assistance Close supervision  -MD      Gait Balance Support assistive device  -MD      Gait Balance Activities uneven surface  -MD      Gait Balance # of Minutes --   20 ft  -MD      Recorded by [MD] Nereida Llamas PT      Therapy Exercises    Bilateral Lower Extremities AROM:;20 reps;sitting;ankle pumps/circles;hip abduction/adduction;hip flexion;LAQ  -MD      Recorded by [MD] Nereida Llamas PT      Positioning and Restraints    Pre-Treatment Position sitting in chair/recliner  -MD in bed  -JK     Post Treatment Position wheelchair  -MD wheelchair  -JK     In Bed  sitting;with family/caregiver  -JK     In Wheelchair with SLP  -MD      Recorded by [MD] Nereida Llamas, PT [JK] Anna Epps PT       User Key  (r) = Recorded By, (t) = Taken By, (c) = Cosigned By    Initials Name Effective Dates     Vero York, OTR 04/13/15 -     TOSHA Epps, PT 12/01/15 -     MD Nereida Llamas, PT 12/01/15 -     KB Katherine L Bruton 09/29/17 -               IP SLP Goals       11/06/17 1330 11/01/17 1215       Safely Consume Diet    Safely Consume Diet- SLP, Date Established  11/01/17  -OC     Safely Consume Diet- SLP, Time to Achieve by discharge  -OC by discharge  -OC     Safely Consume Diet- SLP, Outcome goal ongoing  -OC goal ongoing  -OC     Cognitive Linguistic- Improve Safety and Awareness    Cognitive Linguistic Improve Safety and Awareness- SLP, Date Established 11/06/17  -OC      Cognitive Linguistic Improve Safety and Awareness- SLP, Time to Achieve by discharge  -OC      Cognitive Linguistic Improve Safety and Awareness- SLP, Outcome goal ongoing  -OC        User Key  (r) = Recorded By, (t) = Taken By, (c) = Cosigned By    Initials Name Provider Type    OC Lilian Mohr MA,[facility]-SLP Speech and Language Pathologist           EDUCATION  The patient has been educated in the following areas:   Cognitive Impairment Dysphagia (Swallowing Impairment) Modified Diet Instruction.    SLP Recommendation and Plan   Continued speech therapy through outpatient service upon discharge.                                          Time Calculation:         Time Calculation- SLP       11/13/17 1537 11/13/17 1130       Time Calculation- SLP    SLP Start Time 1330  -KB 1130  -KB     SLP Stop Time 1400  -KB 1200  -KB     SLP Time Calculation (min) 30 min  -KB 30 min  -KB       User Key  (r) = Recorded By, (t) = Taken By, (c) = Cosigned By    Initials Name Provider Type    KB Katherine L Bruton Speech and Language Pathologist          Therapy Charges for Today     Code Description Service Date Service Provider Modifiers Qty    [id number] [facility] DEV OF COGN SKILLS EACH 15 MIN 11/13/2017 Katherine L Bruton GN 1    [id number]  ST TREATMENT SWALLOW 3 11/13/2017 Katherine L Bruton GN 1                    Katherine L Bruton  11/13/2017

## 2017-11-14 VITALS
BODY MASS INDEX: 34.44 KG/M2 | RESPIRATION RATE: 18 BRPM | SYSTOLIC BLOOD PRESSURE: 115 MMHG | WEIGHT: 219.4 LBS | DIASTOLIC BLOOD PRESSURE: 57 MMHG | HEIGHT: 67 IN | TEMPERATURE: 98 F | HEART RATE: 71 BPM | OXYGEN SATURATION: 98 %

## 2017-11-14 LAB
GLUCOSE BLDC GLUCOMTR-MCNC: 113 MG/DL (ref 70–130)
INR PPP: 2.36 (ref 0.9–1.1)
PROTHROMBIN TIME: 25 SECONDS (ref 11.7–14.2)

## 2017-11-14 PROCEDURE — 85610 PROTHROMBIN TIME: CPT | Performed by: PHYSICAL MEDICINE & REHABILITATION

## 2017-11-14 PROCEDURE — 82962 GLUCOSE BLOOD TEST: CPT

## 2017-11-14 RX ORDER — FOLIC ACID 1 MG/1
1 TABLET ORAL DAILY
Start: 2017-11-14

## 2017-11-14 RX ORDER — LANOLIN ALCOHOL/MO/W.PET/CERES
3 CREAM (GRAM) TOPICAL NIGHTLY
Start: 2017-11-14 | End: 2020-07-15

## 2017-11-14 RX ORDER — DIPHENOXYLATE HYDROCHLORIDE AND ATROPINE SULFATE 2.5; .025 MG/1; MG/1
1 TABLET ORAL DAILY
Qty: 30 TABLET
Start: 2017-11-14 | End: 2020-07-15

## 2017-11-14 RX ORDER — ALLOPURINOL 100 MG/1
200 TABLET ORAL DAILY
Status: ON HOLD
Start: 2017-11-14 | End: 2020-12-29

## 2017-11-14 RX ORDER — ATORVASTATIN CALCIUM 80 MG/1
80 TABLET, FILM COATED ORAL NIGHTLY
Qty: 30 TABLET | Refills: 1 | Status: ON HOLD | OUTPATIENT
Start: 2017-11-14 | End: 2020-12-29

## 2017-11-14 RX ORDER — ACETAMINOPHEN 325 MG/1
650 TABLET ORAL EVERY 6 HOURS PRN
Start: 2017-11-14 | End: 2020-07-15

## 2017-11-14 RX ORDER — SENNA AND DOCUSATE SODIUM 50; 8.6 MG/1; MG/1
2 TABLET, FILM COATED ORAL 2 TIMES DAILY
Start: 2017-11-14

## 2017-11-14 RX ORDER — WARFARIN SODIUM 5 MG/1
TABLET ORAL
Status: ON HOLD
Start: 2017-11-14 | End: 2020-12-29

## 2017-11-14 RX ADMIN — DOCUSATE SODIUM -SENNOSIDES 2 TABLET: 50; 8.6 TABLET, COATED ORAL at 07:53

## 2017-11-14 RX ADMIN — VITAMIN D, TAB 1000IU (100/BT) 1000 UNITS: 25 TAB at 07:53

## 2017-11-14 RX ADMIN — ASPIRIN 81 MG: 81 TABLET, CHEWABLE ORAL at 07:54

## 2017-11-14 RX ADMIN — FOLIC ACID 1 MG: 1 TABLET ORAL at 07:53

## 2017-11-14 RX ADMIN — METFORMIN HYDROCHLORIDE 500 MG: 500 TABLET ORAL at 07:53

## 2017-11-14 RX ADMIN — LEVOTHYROXINE SODIUM 112 MCG: 112 TABLET ORAL at 05:00

## 2017-11-14 RX ADMIN — Medication 1 TABLET: at 07:53

## 2017-11-14 RX ADMIN — ALLOPURINOL 200 MG: 100 TABLET ORAL at 07:53

## 2017-11-14 NOTE — PROGRESS NOTES
Inpatient Rehabilitation Plan of Care Note    Plan of Care  Care Plan Reviewed - No updates at this time.    Safety    Performed Intervention(s)  Safety rounds, items within reach      Psychosocial    Performed Intervention(s)  Verbalizes needs and concerns      Body Systems    Performed Intervention(s)  Blood glucose testing    Signed by: Daniel Horne RN

## 2017-11-14 NOTE — PROGRESS NOTES
Inpatient Rehabilitation Functional Measures Assessment    Functional Measures  YESI Eating:  Branch  YESI Grooming: Branch  YESI Bathing:  Branch  YESI Upper Body Dressing:  Branch  [facility] Lower Body Dressing:  Branch  YESI Toileting:  Branch    [facility] Bladder Management  Level of Assistance:  Bladder Score = 2. Patient performs 25-49% of tasks and  requires maximal assistance for bladder management requiring assistive  device/method: bathroom .  Frequency/Number of Accidents this Shift:  Bladder accidents this shift:  0 .  Patient has not had an accident this shift.    YESI Bowel Management  Level of Assistance: Activity was not observed.  Frequency/Number of Accidents this Shift: Bowel accidents this shift: 0 .  Patient has not had an accident this shift.    YESI Bed/Chair/Wheelchair Transfer:  Bed/chair/wheelchair Transfer Score = 2.  Patient performs 25-49% of effort and requires maximal assistance (most of the  lifting) for transferring to and from the bed/chair/wheelchair. Patient requires  the following assistive device(s): Bed rails. Elevated head of bed. Elevated  bed/surface. Grab bars.  YESI Toilet Transfer:  Toilet Transfer Score = 2.  Patient performs 25-49% of  effort and requires maximal assistance (most of the lifting) for transferring to  and from the toilet/commode. Patient requires the following assistive device(s):  Walker. Grab bars.  YESI Tub/Shower Transfer:  [place]    Previously Documented Mode of Locomotion at Discharge: Field  YESI Expected Mode of Locomotion at Discharge: Branch  [facility] Walk/Wheelchair:  Branch  [facility] Stairs:  Branch    [facility] Comprehension:  Branch  YESI Expression:  Branch  [facility] Social Interaction:  Branch  [facility] Problem Solving:  Branch  [facility] Memory:  Branch    Therapy Mode Minutes  Occupational Therapy: Branch  Physical Therapy: Branch  Speech Language Pathology:  Branch    Signed by: KAYLEN Umanzor

## 2017-11-14 NOTE — PLAN OF CARE
Problem: Patient Care Overview (Adult)  Goal: Plan of Care Review  Outcome: Ongoing (interventions implemented as appropriate)    11/14/17 1045   Coping/Psychosocial Response Interventions   Plan Of Care Reviewed With patient   Patient Care Overview   Progress improving   Outcome Evaluation   Outcome Summary/Follow up Plan DC home today         Problem: Stroke (Ischemic) (Adult)  Goal: Signs and Symptoms of Listed Potential Problems Will be Absent or Manageable (Stroke)  Outcome: Ongoing (interventions implemented as appropriate)    11/14/17 1045   Stroke (Ischemic)   Problems Assessed (Stroke (Ischemic)/TIA) motor/sensory impairment   Problems Present (Stroke (Ischemic)/TIA) motor/sensory impairment

## 2017-11-14 NOTE — DISCHARGE INSTRUCTIONS
No driving. No ETOH.  Outpt PT, SLP at [facility]    F/u PCP on Thursday for INR and then as directed.     Coumadin prescription called into [facility]  Coumadin 5 mg #30 and Coumadin 4 mg #30.  Take 5 mg tab with 4 mg tab for total 9 mg po q PM, or as directed.   INR goal 2.0 - 3.0.

## 2017-11-14 NOTE — PROGRESS NOTES
Inpatient Rehabilitation Functional Measures Assessment    Functional Measures  YESI Eating:  [facility] Grooming: [facility] Bathing:  [facility] Upper Body Dressing:  [facility] Lower Body Dressing:  [facility] Toileting:  [facility] Bladder Management  Level of Assistance:  [place]  Frequency/Number of Accidents this Shift:  [facility] Bowel Management  Level of Assistance: [place]  Frequency/Number of Accidents this Shift: [facility] Bed/Chair/Wheelchair Transfer:  [facility] Toilet Transfer:  [facility] Tub/Shower Transfer:  [place]    Previously Documented Mode of Locomotion at Discharge: Field  YESI Expected Mode of Locomotion at Discharge: [facility] Walk/Wheelchair:  [facility] Stairs:  [facility] Comprehension:  Auditory comprehension is the usual mode. Comprehension  Score = 6, Modified [place].  Patient comprehends complex/abstract  information in their primary language with only mild difficulty.  YESI Expression:  Vocal expression is the usual mode. Expression Score = 6,  Modified Independent.  Patient expresses complex/abstract information in their  primary language with only mild difficulty with tasks.  YESI Social Interaction:  Social Interaction Score = 6, Modified Independent.  Patient is modified independent for social interaction, requiring: Requires  additional time.  YESI Problem Solving:  Problem Solving Score = 6, Modified [place].  Patient  makes appropriate decisions in order to solve complex problems with mild  difficulty but self-corrects.  YESI Memory:  Memory Score = 6, Modified [place].  Patient is modified  independent for memory, having only mild difficulty and using self-initiated or  environmental cues to remember.    Therapy Mode Minutes  Occupational Therapy: Branch  Physical Therapy: Branch  Speech Language Pathology:  Branch    Signed by: Beatriz Cherry RN

## 2017-11-14 NOTE — DISCHARGE SUMMARY
DELILAH WISE   - 1945    ADMIT- 10/31/2017  DISCHARGE - 2017    Chief complaint   S/p R MCA ischemic infarct  Stroke prophylaxis - ASA 81 mg and Lovenox transition to coumadin  Hypercoaguability disorder - Hereditary thrombophilia  Homozygous MTHFR with hyperhomocystinemia  Homozygous factor V Leiden gene mutation  history of malignant neoplasm of breast   Obesity   Hyperlipidemia -   Hypothyroidism   DM  Cerebrovascular accident (CVA) due to thrombosis of right middle cerebral artery             History of Present Illness  The patient is a 72-year-old female with a history of factor V Leiden, prior ischemic stroke 3 years ago (she sees Dr. Maldonado), and HLD who presented to the Emergency Department via EMS due to left-sided weakness, facial droop, drooling and acute confusion. The pt was at [facility] with her  when she said she didn't feel well and went to the restroom where she called for help. EMS was called and noticed the left-sided weakness and she was taken to the helicopter where she was stat-flown to [facility]. By the time she arrived there her symptoms had improved significantly.   CT Head w/o:  1. No acute intracranial hemorrhage.  2. Subtle loss of gray-white differentiation in the right frontal lobe is nonspecific but could represent early infarct    MRI brain:   1. Acute infarct involving perisylvian right frontal lobe and small portion of the insular cortex. No intracranial hemorrhage is identified.  2. Chronic ischemic small vessel disease and old lacunar infarcts.     CTA Head:  1. Thrombus and occlusion within proximal anterior right M2 branch with reconstitution of flow of the distal vessels.  2. 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     CTA Neck:  1. No hemodynamically significant stenosis involving arteries of the neck.  2. Plaque formation at the proximal left ICA with 50% narrowing.    Echo-transthoracic: EF 61%, normal LA    Lipid panel review: LDL  51    A1C: 5.7    Holter: predominantly sinus rhythm    EEG: This EEG is suggestive of a focal cortical   dysfunction over the right hemisphere, but is nonspecific as to   Etiology.     She has had lethargy, that showing some improvement.  The left side inattention.  Her  reports that her to achieve good peripheral vision.  She continues with left facial droop.  She is on a regular consistency diet.  Has some dysarthria.  Weakness on the left side.  He's been minimal assist with activities of daily living and minimum assistance with her ambulation.  She does not describe any headache.     Review of Systems   No headache.  Tolerating by mouth diet if she takes her time.  Continues weak on the left side.  Lethargy but joint improvement.  Previously independent.   Medical History         Past Medical History:   Diagnosis Date   • Abnormal Pap smear of cervix     • Arthritis     • Cancer       breast   • Disease of thyroid gland     • Stroke     • Urinary tract infection        • Anxiety   • Arthritis   • Breast cancer (HCC) 2002   Left breast cancer diagnosed 2002, infiltrating ductal carcinoma grade I measuring 1.1 cm. Estrogen receptor positive 94% progesterone receptor positive 72% Her-2/zulema negative, no lymphovascular invasion was identified. Ki-67 reported as 20%.   • Chronic pain   • Diabetes mellitus (HCC)   RECENTLY DIAGNOSED, NOT ON MEDS   • E-coli UTI   HX OF MULTIPLE PAST FEW YEARS   • Ear infection   • Headache   • Hearing loss   • Hypercholesterolemia   • Hypercoagulable state (HCC)   with MTHFR homozygous, Factor V Leiden homozygous and elevated homocysteine level, never had a clotting episode   • Hyperlipidemia   • Left leg swelling   • Neuropathy   • Obesity   • PONV (postoperative nausea and vomiting)   • Restless leg   • Rheumatoid arthritis (HCC)   • Seasonal allergies   • Shortness of breath on exertion   • Sleep apnea   pt stated , she was cleared and does not use CPAP anymore   • Sleepiness    • Snoring   • Stroke (HCC) 2013   RIGHT LOBE, NO RESIDUAL EFFECTS   • Thyroid disease   • Wears dentures   • Wears glasses     Past Surgical History:  Past Surgical History:   Procedure Laterality Date   • ABDOMINAL HERNIA REPAIR 2012   • arm surgery   • BACK SURGERY 1997   disc surgery   • BREAST LUMPECTOMY   left   • CARDIAC CATHETERIZATION 2006   NEGATIVE   • CATARACT EXTRACTION, BILATERAL   • CERVICAL CONE BIOPSY 1976   cervical dysplasia   • CHOLECYSTECTOMY   • GASTRIC BYPASS 1980   • HERNIA REPAIR   • LAPAROSCOPIC GASTRIC BANDING   • THYROIDECTOMY, PARTIAL   left thyroid removal due to enlargement- benign          Surgical History          Past Surgical History:   Procedure Laterality Date   • BACK SURGERY       • BREAST LUMPECTOMY       • CERVICAL BIOPSY  W/ LOOP ELECTRODE EXCISION       • CHOLECYSTECTOMY       • FOOT SURGERY       • GASTRIC BYPASS       • HERNIA REPAIR       • THYROIDECTOMY                   Family History   Problem Relation Age of Onset   • Breast cancer Daughter     Family History   Problem Relation Age of Onset   • Lung cancer Mother 78   • CAD Mother   • Heart disease Mother   • Kidney disease Mother   • Peripheral vascular disease Father   • Lung cancer Father   • CAD Father   • Emphysema Sister   • Breast cancer Daughter 41   BRCA 1 positive   • CA: BRCA+ Daughter   • Factor V Leiden deficiency Daughter   • CA: BRCA+ Daughter   • Factor V Leiden deficiency Daughter   • Breast cancer Daughter   • Uterine cancer Maternal Aunt 35   • Heart disease Maternal Aunt   • Tuberculosis Maternal Uncle   • Heart disease Maternal Aunt   • Hyperlipidemia Maternal Aunt   • Factor V Leiden deficiency Son   • Factor V Leiden deficiency Daughter           Social History   Substance Use Topics   • Smoking status: Never Smoker   • Smokeless tobacco: None   • Alcohol use No   . Lives with spouse, ramp entrance, single story home.  Tub bench.  Uses cane outside the home.  Allergies:   Shantell    HOSPITAL  COURSE:  73 yo female status post right MCA ischemic infarct with left inattention, left hemiparesis, dysarthria, decreased alertness, impairments with her mobility and self-care and is now admitted for comprehensive inpatient rehabilitation program with physical therapy 1 hour, occupational therapy 1 hour, and speech therapy 1 hour, 5 days a week.  Rehabilitation nursing for carryover and monitoring of her neurologic status, diabetes, bowel bladder and skin.  Ongoing physician follow-up.  Weekly team conferences.  Goals for her to achieve a level of supervision with her mobility and self-care for return home with her family.     S/p R MCA ischemic infarct     Left hemiparesis-improving  Left inattention-improved     Dysphagia-November 2-advance from puree to mechanical soft, no mixed consistency, thin liquid diet.     Stroke prophylaxis - ASA 81 mg and Lovenox transition to coumadin.  Daily INR.  November 2-INR still low-Will continue Coumadin 5 mg as recent restarted but may titrate up dose tomorrow depending on lab result. Nov 3- increased coumadin to 7.5 mg. November 6-INR still low at 1.41.  Has been on Coumadin 5 mg and then increased to 7.5 mg x last 3 days, have changed to 10 mg, continue to follow INR daily.  November 7-INR 1.8-change Coumadin to 7.5 mg daily and continue to follow INR daily. Nov 8 - INR 1.9, changed coumadin to 10 mg, continues on Lovenox. Nov 9- INR 2.14 - coumadin 9 mg , anticipate last dose of Lovenox with overlap tonight.      Hypercoaguability disorder  history of malignant neoplasm of breast   Factor 5 Leiden mutation, heterozygous     Obesity     Hyperlipidemia -atorvastatin - on increased dose    Hypothyroidism -on replacement     Cerebrovascular accident (CVA) due to thrombosis of right middle cerebral artery     Diabetes mellitus-metformin    History of lability after past stroke  Poststroke fatigue    Viral oral lesion - has taken acyclovir in past  .Treated with Valtrex 2 gm po q 12 hours x 2 doses.               TEAM CONF- NOV 3- LEFT INATTENTION. DIFFICULTY DON LEG LEG OF PANTS. FINE MOTOR DEFICITS LEFT HAND. MILD MEMORY DEFICITS. DYSPHAGIA - MECH SOFT, THINS. TRIAL WHOLE MEALS. FLAT AFFECT.  BNE PENDING. TRANSFERS CTG. GAIT 80 FEET MIN ASSIST. 4 STAIRS MIN ASSIST. BLADDER CONTINENT.  ELOS- TWO WEEKS     TEAM CONF- NOV 10- LEFT INATTENTION. CUES WITH REC THERAPY. ADLS SET UP/SUPERVISION. TRANSFERS SBA. GAIT 80 FEET RW CTG.  BNE (Active)  Att'n. - WNL  Exec. Fx. - Mildly Imp., slowed processing speed  Rsng/Jgmnt - WNL  Arith - Mildly Imp.  Visuospatial Skills - Mildly Imp.  Visual Mem. - Mildly Imp.  Verbal Mem. - Min Imp.  Emot. - Pt reports improved mood, but mild anx  ELOS- TUES         PHYSICAL EXAM NEAR DISCHARGE  Mental status-awake alert  Lungs-clear to auscultation  Heart-regular rate and rhythm  Abdomen abdomen-normoactive bowel sounds, soft, nontender  Extremities-no pretibial edema  Neurologic- Left elbow flexion 5, finger flexion 5, knee extension 5, ankle dorsiflexion 5 . .  Skin-blood filled vesicle left upper lip    Result Date: 10/29/2017  RADIOLOGY REPORT FACILITY: [facility] UNIT/AGE/GENDER: J.ED ER AGE:72 Y SEX:F PATIENT NAME/: DELILAH WISE A 1945 UNIT NUMBER: [id number] ACCOUNT NUMBER: [id number] ACCESSION NUMBER: [id number] MRI OF THE BRAIN WITHOUT IV CONTRAST. DATE: 10/29/2017 1:18 AM CLINICAL HISTORY: Left facial droop. Confusion.. COMPARISONS: 2017 TECHNIQUE: Multisequence, multiplanar MR images of the brain are provided without IV contrast. FINDINGS: Diffusion-weighted images reveal a region of restricted diffusion in the anterior perisylvian right frontal lobe extending to the insular cortex. This measures 3.3 x 3.4 cm. This is not associated with hemorrhage. No other infarct is identified. No hydrocephalus is present. No midline shift is identified. Scattered FLAIR signal changes are  noted in the subcortical and periventricular white matter. A few old lacunar infarcts are noted and there are also small old lacunar infarcts in the cerebellum. No suprasellar mass or pineal lesion is present. No Chiari malformation is identified. Lens extractions of been performed. Sinuses are clear. Small amount of fluid is noted in the left mastoid air cells. IMPRESSION: 1. Acute infarct involving perisylvian right frontal lobe and small portion of the insular cortex. No intracranial hemorrhage is identified. 2. Chronic ischemic small vessel disease and old lacunar infarcts.      Labs on  October 29 triglycerides 89, cholesterol 125.       Lab Results  Lab Value Date/Time   INR 2.36 (H) 11/14/2017 0500   INR 2.13 (H) 11/13/2017 0704   INR 2.52 (H) 11/12/2017 0544   INR 2.47 (H) 11/11/2017 0911   INR 2.42 (H) 11/10/2017 0759   INR 2.14 (H) 11/09/2017 0657   INR 1.91 (H) 11/08/2017 0722   INR 1.79 (H) 11/07/2017 0506   INR 1.41 (H) 11/06/2017 0627   INR 1.41 (H) 11/05/2017 0555   INR 1.17 (H) 11/04/2017 0647   INR 1.13 (H) 11/03/2017 0520   INR 1.07 11/02/2017 0713   INR 1.09 11/01/2017 0754   Patient has been on coumadin 9 mg since Nov 9       Results from last 7 days  Lab Units 11/13/17  0704 11/09/17  0657   WBC 10*3/mm3 6.97 7.45   HEMOGLOBIN g/dL 10.7* 11.7*   HEMATOCRIT % 34.4* 37.6   PLATELETS 10*3/mm3 181 160         Results from last 7 days  Lab Units 11/13/17  0704   SODIUM mmol/L 139   POTASSIUM mmol/L 3.9   CHLORIDE mmol/L 101   CO2 mmol/L 27.6   BUN mg/dL 15   CREATININE mg/dL 0.98   CALCIUM mg/dL 8.7   BILIRUBIN mg/dL 0.9   ALK PHOS U/L 72   ALT (SGPT) U/L 17   AST (SGOT) U/L 26   GLUCOSE mg/dL 134*     No driving. No ETOH.     Outpt PT, SLP at [facility]    F/u PCP on Thursday for INR and then as directed.     Name Relationship Specialty Phone Fax Address Order                 [facility]     [address] [place] [address] [phone]       Next Steps: Go on 11/16/2017       Instructions: You are scheduled to start outpatient ST on Thursday, 11/16, at 1:30 p.m. Please arrive 15 minutes early. They will schedule you for outpatient PT at this visit.        Kenny Maldonado MD  Neurology [phone] [address]  [facility] [address]      Instructions: appointment on november 21,2017 @ 2:45 pm       Calvin Dhillon Jr., DO PCP - General Internal Medicine [phone] [address] [facility] [address]      Aric Chao MD  Physical Medicine and Rehabilitation [phone] [address]  [facility] [address]      Instructions: as needed             Alice Maxwell   Home Medication Instructions ALEXIS:[id number]    Printed on:11/14/17 1126   Medication Information                      acetaminophen (TYLENOL) 325 MG tablet  Take 2 tablets by mouth Every 6 (Six) Hours As Needed for Mild Pain .             acyclovir (ZOVIRAX) 400 MG tablet               allopurinol (ZYLOPRIM) 100 MG tablet  Take 2 tablets by mouth Daily.             aspirin 81 MG chewable tablet  Chew 81 mg Daily.             atorvastatin (LIPITOR) 80 MG tablet  Take 1 tablet by mouth Every Night.             calcium carbonate-cholecalciferol 500-400 MG-UNIT tablet tablet  Take  by mouth 2 (Two) Times a Day.             cholecalciferol (VITAMIN D3) 1000 units tablet  Take 2,000 Units by mouth Daily.             fluticasone (FLONASE) 50 MCG/ACT nasal spray               folic acid (FOLVITE) 1 MG tablet  Take 1 tablet by mouth Daily.             levothyroxine (SYNTHROID, LEVOTHROID) 112 MCG tablet               melatonin 3 MG tablet  Take 1 tablet by mouth Every Night.             metFORMIN (GLUCOPHAGE) 500 MG tablet  Take 1 tablet by mouth Daily With Breakfast.             multivitamin (THERAGRAN) tablet tablet  Take 1 tablet by mouth Daily.             sennosides-docusate sodium (SENOKOT-S) 8.6-50 MG tablet  Take  2 tablets by mouth 2 (Two) Times a Day.             warfarin (COUMADIN) 5 MG tablet  Take 5 mg tab with 4 mg tab for total 9 mg po q PM, or as directed. INR goal 2.0 - 3.0.                   Coumadin prescription called into [facility]  Coumadin 5 mg #30 and Coumadin 4 mg #30.  Take 5 mg tab with 4 mg tab for total 9 mg po q PM, or as directed.   INR goal 2.0 - 3.0.     >30 on discharge

## 2017-11-14 NOTE — PROGRESS NOTES
Inpatient Rehabilitation Functional Measures Assessment    Functional Measures  YESI Eating:  [facility] Grooming: [facility] Bathing:  [facility] Upper Body Dressing:  [facility] Lower Body Dressing:  [facility] Toileting:  [facility] Bladder Management  Level of Assistance:  [place]  Frequency/Number of Accidents this Shift:  [facility] Bowel Management  Level of Assistance: [place]  Frequency/Number of Accidents this Shift: [facility] Bed/Chair/Wheelchair Transfer:  [facility] Toilet Transfer:  [facility] Tub/Shower Transfer:  [place]    Previously Documented Mode of Locomotion at Discharge: Field  YESI Expected Mode of Locomotion at Discharge: [facility] Walk/Wheelchair:  [facility] Stairs:  [facility] Comprehension:  Auditory comprehension is the usual mode. Comprehension  Score = 7, Independent.  Patient comprehends complex/abstract information in  their primary language.  Patient is completely independent for auditory  comprehension.  There are no activity limitations.  YESI Expression:  Vocal expression is the usual mode. Expression Score = 6,  Modified Independent.  Patient expresses complex/abstract information in their  primary language with only mild difficulty with tasks.  YESI Social Interaction:  Social Interaction Score = 7, Independent. Patient is  completely independent for social interaction.  There are no activity  limitations.  YESI Problem Solving:  Problem Solving Score = 7, Independent.  Patient makes  appropriate decisions in order to solve complex problems.  Patient is completely  independent for problem solving.  There are no activity limitations.  YESI Memory:  Memory Score = 6, Modified [place].  Patient is modified  independent for memory, having only mild difficulty and using self-initiated or  environmental cues to remember.    Therapy Mode Minutes  Occupational Therapy: [place]  Physical Therapy: [place]  Speech Language Pathology:  [place]    Signed by: Daniel Horne  RN

## 2017-11-14 NOTE — PLAN OF CARE
Problem: Patient Care Overview (Adult)  Goal: Plan of Care Review  Outcome: Ongoing (interventions implemented as appropriate)    11/13/17 7263   Coping/Psychosocial Response Interventions   Plan Of Care Reviewed With patient   Patient Care Overview   Progress improving   Outcome Evaluation   Outcome Summary/Follow up Plan Pt .doing fine tonight. No complaints. Will DC tomorrow.        Goal: Adult Individualization and Mutuality  Outcome: Ongoing (interventions implemented as appropriate)  Goal: Discharge Needs Assessment  Outcome: Ongoing (interventions implemented as appropriate)    Problem: Stroke (Ischemic) (Adult)  Goal: Signs and Symptoms of Listed Potential Problems Will be Absent or Manageable (Stroke)  Outcome: Ongoing (interventions implemented as appropriate)    Problem: Fall Risk (Adult)  Goal: Absence of Falls  Outcome: Ongoing (interventions implemented as appropriate)

## 2017-11-14 NOTE — PROGRESS NOTES
SECTION GG    Eating Performance Discharge: [place] sets up or cleans up; patient completes  activity. [place] assists only prior to or following the activity.    Signed by: Daniel Horne RN

## 2017-11-14 NOTE — PROGRESS NOTES
Patient to d/c home today, 11/14. Daughter to provide transportation home. Daughter plans to stay with patient and  for remainder of week to assist. Patient's sister to stay some with them next week and daughter also will stay with them over Thanksgiving. Daughter stated the biggest issue she noticed while patient on pass was that she got very fatigued. They went to Baptism, which was emotional for patient, and then out to lunch with other family. Daughter noticed more left side neglect as patient got more fatigued. Discussed outpatient ST to start on Thursday, 11/16 at 1:30 p.m. at [facility] outpatient rehab department. Patient will be scheduled for PT also to start on another day. Patient ready to go home.

## 2017-11-21 NOTE — PROGRESS NOTES
PPS CMG Coordinator  Inpatient Rehabilitation Discharge    Mode of Locomotion: Walking.    Discharge Against Medical Advice:  No.  Discharge Information  Patient Discharged Alive:  Yes  Discharge Destination/Living Setting: Home.  At discharge, the patient was discharged to live (with) (02)  Family / Relatives    Diagnosis for Interruption/Death: ICD    Impairment Group: Stroke: 01.1 Left Body Involvement (Right Brain)    Comorbidities: ICD    Complications: ICD      YESI Bladder Accidents: 0  - Accidents.  Bladder Score = 6. Patient has not had an accident, but uses a  device/medication.  YESI Bowel Accident: 0  - Accidents.  Patient used medications/device this shift.   11/5/2017 10:12:00 AM  Bowel Score = 6. Patient has no accidents, but uses a device/medications.      QUALITY INDICATORS  Health Conditions: Fall(s) Since Admission:  No  Section M. Skin Conditions Discharge:  Unhealed Pressure Ulcer(s) at Stage 1 or  Higher:  No    . Current Number of Unhealed Pressure Ulcers  [place]    . Worsening in Pressure Ulcer Status Since Admission:  [place]    . Healed Pressure Ulcer(s):    Number of Healed Stage 1: 0  Number of Healed Stage 2: 0  Number of Healed Stage 3: 0  Number of Healed Stage 4: 0    . Influenza Vaccine - Discharge  Received in this facility for this year's influenza vaccination season:  No.  Influenza Vaccine Not Received Due To: Received outside of this facility.    Date Influenza Vaccine Received (if applicable)  Text Entry    Signed by: Ella Bae

## 2018-03-12 ENCOUNTER — OFFICE VISIT (OUTPATIENT)
Dept: OBSTETRICS AND GYNECOLOGY | Facility: CLINIC | Age: 73
End: 2018-03-12

## 2018-03-12 VITALS
BODY MASS INDEX: 34.37 KG/M2 | DIASTOLIC BLOOD PRESSURE: 78 MMHG | SYSTOLIC BLOOD PRESSURE: 120 MMHG | HEIGHT: 67 IN | WEIGHT: 219 LBS

## 2018-03-12 DIAGNOSIS — Z85.3 PERSONAL HISTORY OF BREAST CANCER: Primary | ICD-10-CM

## 2018-03-12 DIAGNOSIS — Z98.890 HISTORY OF LOOP ELECTRICAL EXCISION PROCEDURE (LEEP): ICD-10-CM

## 2018-03-12 PROCEDURE — G0101 CA SCREEN;PELVIC/BREAST EXAM: HCPCS | Performed by: OBSTETRICS & GYNECOLOGY

## 2018-03-12 NOTE — PROGRESS NOTES
Subjective    Alice Maxwell is a 73 y.o. female for annual gyn exam    History of Present Illness   73-year-old postmenopausal white female presents for annual GYN exam.  She has a history of left breast cancer as well as previous abnormal Pap smears with postmenopausal bleeding.  She has been completely asymptomatic.  She denies vaginal bleeding or vaginal discharge.  She was hospitalized last fall with a cerebral vascular accident and her symptoms are improving.  She also was treated for a persistent urinary tract infection in January but this has resolved as well.  She has no complaints.  She has received a mammogram this year which was reassuring.      The following portions of the patient's history were reviewed and updated as appropriate: allergies, current medications, past family history, past medical history, past social history, past surgical history and problem list.      Review of Systems   Gastrointestinal: Negative for abdominal distention and abdominal pain.   Genitourinary: Negative for difficulty urinating, pelvic pain, vaginal bleeding and vaginal discharge.           Physical Exam   Constitutional: She is oriented to person, place, and time. Vital signs are normal. She appears well-developed and well-nourished. She is cooperative.   HENT:   Head: Normocephalic.   Eyes: Conjunctivae are normal. Pupils are equal, round, and reactive to light.   Neck: Normal range of motion. Neck supple. No tracheal deviation present. No thyromegaly present.   Cardiovascular: Normal rate, regular rhythm and normal heart sounds.  Exam reveals no gallop and no friction rub.    No murmur heard.  Pulmonary/Chest: Effort normal and breath sounds normal. No respiratory distress. She has no wheezes. Right breast exhibits no inverted nipple, no mass, no nipple discharge, no skin change and no tenderness. Left breast exhibits no inverted nipple, no mass, no nipple discharge, no skin change and no tenderness. Breasts are  symmetrical. There is no breast swelling.   Abdominal: Soft. Bowel sounds are normal. She exhibits no distension, no ascites and no mass. There is no hepatosplenomegaly. There is no tenderness. There is no rebound, no guarding and no CVA tenderness. No hernia. Hernia confirmed negative in the right inguinal area and confirmed negative in the left inguinal area.   Genitourinary: Rectal exam shows no fissure, no mass, no tenderness and anal tone normal. Pelvic exam was performed with patient supine. No labial fusion. There is no rash, tenderness, lesion or injury on the right labia. There is no rash, tenderness, lesion or injury on the left labia. Uterus is not deviated, not enlarged, not fixed and not tender. Cervix exhibits no motion tenderness, no discharge and no friability. Right adnexum displays no mass, no tenderness and no fullness. Left adnexum displays no mass, no tenderness and no fullness. No erythema, tenderness or bleeding in the vagina. No foreign body in the vagina. No signs of injury around the vagina. No vaginal discharge found.   Musculoskeletal: She exhibits no edema or deformity.   Lymphadenopathy:     She has no cervical adenopathy.        Right: No inguinal adenopathy present.        Left: No inguinal adenopathy present.   Neurological: She is alert and oriented to person, place, and time. No cranial nerve deficit. Coordination normal.   Skin: Skin is warm and dry. No rash noted. No erythema.   Psychiatric: She has a normal mood and affect. Her behavior is normal.   Vitals reviewed.        Alice was seen today for gynecologic exam.    Diagnoses and all orders for this visit:    Personal history of breast cancer    History of loop electrical excision procedure (LEEP)  -     Pap IG, Rfx HPV ASCU     the patient appears to be doing well.  Her exam was reassuring.  She will call for test results.  Annual exams were recommended.

## 2018-03-22 ENCOUNTER — TELEPHONE (OUTPATIENT)
Dept: OBSTETRICS AND GYNECOLOGY | Facility: CLINIC | Age: 73
End: 2018-03-22

## 2018-04-06 ENCOUNTER — TELEPHONE (OUTPATIENT)
Dept: OBSTETRICS AND GYNECOLOGY | Facility: CLINIC | Age: 73
End: 2018-04-06

## 2019-05-08 ENCOUNTER — OFFICE VISIT (OUTPATIENT)
Dept: OBSTETRICS AND GYNECOLOGY | Facility: CLINIC | Age: 74
End: 2019-05-08

## 2019-05-08 VITALS
HEIGHT: 67 IN | BODY MASS INDEX: 34.21 KG/M2 | WEIGHT: 218 LBS | DIASTOLIC BLOOD PRESSURE: 78 MMHG | SYSTOLIC BLOOD PRESSURE: 120 MMHG

## 2019-05-08 DIAGNOSIS — Z87.42 HISTORY OF ABNORMAL CERVICAL PAP SMEAR: ICD-10-CM

## 2019-05-08 DIAGNOSIS — Z85.3 PERSONAL HISTORY OF BREAST CANCER: ICD-10-CM

## 2019-05-08 DIAGNOSIS — Z01.419 ENCOUNTER FOR GYNECOLOGICAL EXAMINATION WITHOUT ABNORMAL FINDING: Primary | ICD-10-CM

## 2019-05-08 DIAGNOSIS — Z98.890 HISTORY OF LOOP ELECTRICAL EXCISION PROCEDURE (LEEP): ICD-10-CM

## 2019-05-08 PROCEDURE — G0101 CA SCREEN;PELVIC/BREAST EXAM: HCPCS | Performed by: OBSTETRICS & GYNECOLOGY

## 2019-05-08 NOTE — PROGRESS NOTES
Subjective    Alice Maxwell is a 74 y.o. female for annual gyn exam    History of Present Illness   74-year-old postmenopausal white female presents for routine gynecologic exam and repeat Pap smear.  She has a history of a LEEP conization and abnormal Pap smears back in the 1980s.  She denies vaginal bleeding or discharge.  She also has a history of a breast cancer and follow-up has been negative.  She is current on her screening mammograms and colonoscopies.  She has no complaints.      The following portions of the patient's history were reviewed and updated as appropriate: allergies, current medications, past family history, past medical history, past social history, past surgical history and problem list.      Review of Systems   Constitutional: Negative for activity change, appetite change, fatigue and unexpected weight change.   HENT: Negative.    Eyes: Negative.    Respiratory: Negative.    Cardiovascular: Negative.    Gastrointestinal: Negative for abdominal distention, abdominal pain, anal bleeding, constipation, diarrhea, nausea and vomiting.   Endocrine: Negative for cold intolerance, heat intolerance, polydipsia, polyphagia and polyuria.   Genitourinary: Negative for difficulty urinating, dysuria, flank pain, frequency, hematuria, pelvic pain, urgency, vaginal bleeding and vaginal discharge.   Musculoskeletal: Negative.    Skin: Negative.    Allergic/Immunologic: Negative.    Neurological: Negative.    Hematological: Negative.    Psychiatric/Behavioral: Negative.            Physical Exam   Constitutional: She is oriented to person, place, and time. Vital signs are normal. She appears well-developed and well-nourished. She is cooperative.   HENT:   Head: Normocephalic.   Eyes: Conjunctivae are normal. Pupils are equal, round, and reactive to light.   Neck: Normal range of motion. Neck supple. No tracheal deviation present. No thyromegaly present.   Cardiovascular: Normal rate, regular rhythm and normal  heart sounds. Exam reveals no gallop and no friction rub.   No murmur heard.  Pulmonary/Chest: Effort normal and breath sounds normal. No respiratory distress. She has no wheezes. Right breast exhibits no inverted nipple, no mass, no nipple discharge, no skin change and no tenderness. Left breast exhibits no inverted nipple, no mass, no nipple discharge, no skin change and no tenderness. Breasts are symmetrical. There is no breast swelling.   Abdominal: Soft. Bowel sounds are normal. She exhibits no distension, no ascites and no mass. There is no hepatosplenomegaly. There is no tenderness. There is no rebound, no guarding and no CVA tenderness. No hernia. Hernia confirmed negative in the right inguinal area and confirmed negative in the left inguinal area.   Genitourinary: Rectal exam shows no fissure, no mass, no tenderness and anal tone normal. Pelvic exam was performed with patient supine. No labial fusion. There is no rash, tenderness, lesion or injury on the right labia. There is no rash, tenderness, lesion or injury on the left labia. Uterus is not deviated, not enlarged, not fixed and not tender. Cervix exhibits no motion tenderness, no discharge and no friability. Right adnexum displays no mass, no tenderness and no fullness. Left adnexum displays no mass, no tenderness and no fullness. No erythema, tenderness or bleeding in the vagina. No foreign body in the vagina. No signs of injury around the vagina. No vaginal discharge found.   Musculoskeletal: Normal range of motion. She exhibits no edema or deformity.   Lymphadenopathy:     She has no cervical adenopathy.        Right: No inguinal adenopathy present.        Left: No inguinal adenopathy present.   Neurological: She is alert and oriented to person, place, and time. She has normal reflexes. No cranial nerve deficit. Coordination normal.   Skin: Skin is warm and dry. No rash noted. No erythema.   Psychiatric: She has a normal mood and affect. Her  behavior is normal.         Alice was seen today for gynecologic exam.    Diagnoses and all orders for this visit:    Encounter for gynecological examination without abnormal finding    Personal history of breast cancer    History of loop electrical excision procedure (LEEP)  -     Pap IG, Rfx HPV ASCU    History of abnormal cervical Pap smear  -     Pap IG, Rfx HPV ASCU    The patient is doing well.  I encouraged nutritional supplementation and weightbearing exercise for bone health.  Annual Pap smears were recommended along with annual breast exams and mammograms.

## 2019-05-10 LAB
CONV .: NORMAL
CYTOLOGIST CVX/VAG CYTO: NORMAL
CYTOLOGY CVX/VAG DOC CYTO: NORMAL
CYTOLOGY CVX/VAG DOC THIN PREP: NORMAL
DX ICD CODE: NORMAL
HIV 1 & 2 AB SER-IMP: NORMAL
OTHER STN SPEC: NORMAL
STAT OF ADQ CVX/VAG CYTO-IMP: NORMAL

## 2020-07-15 ENCOUNTER — OFFICE VISIT (OUTPATIENT)
Dept: OBSTETRICS AND GYNECOLOGY | Facility: CLINIC | Age: 75
End: 2020-07-15

## 2020-07-15 VITALS
BODY MASS INDEX: 36.88 KG/M2 | DIASTOLIC BLOOD PRESSURE: 78 MMHG | HEIGHT: 67 IN | WEIGHT: 235 LBS | SYSTOLIC BLOOD PRESSURE: 120 MMHG

## 2020-07-15 DIAGNOSIS — Z12.4 ENCOUNTER FOR SCREENING FOR MALIGNANT NEOPLASM OF CERVIX: ICD-10-CM

## 2020-07-15 DIAGNOSIS — Z85.3 PERSONAL HISTORY OF BREAST CANCER: Primary | ICD-10-CM

## 2020-07-15 DIAGNOSIS — Z87.42 HISTORY OF ABNORMAL CERVICAL PAP SMEAR: ICD-10-CM

## 2020-07-15 DIAGNOSIS — Z98.890 HISTORY OF LOOP ELECTRICAL EXCISION PROCEDURE (LEEP): ICD-10-CM

## 2020-07-15 PROCEDURE — 99213 OFFICE O/P EST LOW 20 MIN: CPT | Performed by: OBSTETRICS & GYNECOLOGY

## 2020-07-15 NOTE — PROGRESS NOTES
Subjective   Alice Maxwell is a 75 y.o. female presents for follow-up       History of Present Illness    75-year-old postmenopausal white female presents with a history of left breast cancer in 2002 and a history of abnormal Pap smears, postmenopausal bleeding, and LEEP conization.  She is entirely asymptomatic.  Follow-up for her breast cancer has been negative.  She has 2 daughters diagnosed with breast cancer in their 40s.  Genetic testing was negative.  She denies any vaginal bleeding.  She has noticed some difficulty emptying her bladder.  She fell and broke her left arm and was found to have osteoporosis.  She is to begin on Prolia in the near future.    The following portions of the patient's history were reviewed and updated as appropriate: allergies, current medications, past family history, past medical history, past social history, past surgical history and problem list.    Review of Systems   Genitourinary: Positive for difficulty urinating. Negative for pelvic pain, vaginal bleeding and vaginal discharge.       Objective   Physical Exam   The patient is alert and conversant and in no acute distress.  She is obese weighing 235 pounds.  The breasts are without mass, tenderness, or discharge from the nipples.  No axillary masses were palpable.  The abdomen is soft, obese and nontender.  No masses were palpable.  There is no inguinal adenopathy.  The vulva and perineum are without lesion and are atrophic.  The vagina is atrophic.  There is good anterior support.  The cervix is without gross lesion.  Uterus is anteverted and normal size and shape and contour.  The adnexa are without mass or tenderness.  The rectovaginal septum is without induration or tenderness.    Assessment/Plan   Alice was seen today for gynecologic exam.    Diagnoses and all orders for this visit:    Personal history of breast cancer    History of loop electrical excision procedure (LEEP)  -     Pap IG, Rfx HPV ASCU    History of  abnormal cervical Pap smear  -     Pap IG, Rfx HPV ASCU    Encounter for screening for malignant neoplasm of cervix   -     Pap IG, Rfx HPV ASCU    The patient is doing well.  We discussed timed voiding for her urinary symptoms.  She will call for Pap smear results.

## 2020-12-27 ENCOUNTER — HOSPITAL ENCOUNTER (INPATIENT)
Facility: HOSPITAL | Age: 75
LOS: 16 days | Discharge: HOME HEALTH | End: 2021-01-12
Attending: PHYSICAL MEDICINE & REHABILITATION | Admitting: PHYSICAL MEDICINE & REHABILITATION

## 2020-12-27 DIAGNOSIS — R12 HEARTBURN: Primary | ICD-10-CM

## 2020-12-27 DIAGNOSIS — Z87.81 S/P LEFT HIP FRACTURE: ICD-10-CM

## 2020-12-27 LAB
INR PPP: 1.74 (ref 0.9–1.1)
PROTHROMBIN TIME: 20.1 SECONDS (ref 11.7–14.2)
SARS-COV-2 RNA RESP QL NAA+PROBE: NOT DETECTED

## 2020-12-27 PROCEDURE — 85610 PROTHROMBIN TIME: CPT | Performed by: PHYSICAL MEDICINE & REHABILITATION

## 2020-12-27 PROCEDURE — U0003 INFECTIOUS AGENT DETECTION BY NUCLEIC ACID (DNA OR RNA); SEVERE ACUTE RESPIRATORY SYNDROME CORONAVIRUS 2 (SARS-COV-2) (CORONAVIRUS DISEASE [COVID-19]), AMPLIFIED PROBE TECHNIQUE, MAKING USE OF HIGH THROUGHPUT TECHNOLOGIES AS DESCRIBED BY CMS-2020-01-R: HCPCS | Performed by: PHYSICAL MEDICINE & REHABILITATION

## 2020-12-27 RX ORDER — HYDROCODONE BITARTRATE AND ACETAMINOPHEN 7.5; 325 MG/1; MG/1
1 TABLET ORAL EVERY 4 HOURS PRN
Status: DISCONTINUED | OUTPATIENT
Start: 2020-12-27 | End: 2021-01-12 | Stop reason: HOSPADM

## 2020-12-27 RX ORDER — MELATONIN
2000 DAILY
Status: DISCONTINUED | OUTPATIENT
Start: 2020-12-28 | End: 2021-01-12 | Stop reason: HOSPADM

## 2020-12-27 RX ORDER — AMLODIPINE BESYLATE 2.5 MG/1
2.5 TABLET ORAL
Status: DISCONTINUED | OUTPATIENT
Start: 2020-12-28 | End: 2020-12-29

## 2020-12-27 RX ORDER — SENNA PLUS 8.6 MG/1
1 TABLET ORAL 2 TIMES DAILY PRN
Status: DISCONTINUED | OUTPATIENT
Start: 2020-12-27 | End: 2021-01-12

## 2020-12-27 RX ORDER — CALCIUM CARBONATE 200(500)MG
1 TABLET,CHEWABLE ORAL 2 TIMES DAILY WITH MEALS
Status: DISCONTINUED | OUTPATIENT
Start: 2020-12-27 | End: 2020-12-29

## 2020-12-27 RX ORDER — WARFARIN SODIUM 6 MG/1
6 TABLET ORAL
Status: DISCONTINUED | OUTPATIENT
Start: 2020-12-27 | End: 2020-12-27

## 2020-12-27 RX ORDER — HYDROCODONE BITARTRATE AND ACETAMINOPHEN 7.5; 325 MG/1; MG/1
2 TABLET ORAL
Status: CANCELLED | OUTPATIENT
Start: 2020-12-27 | End: 2021-01-03

## 2020-12-27 RX ORDER — LEVOTHYROXINE SODIUM 0.1 MG/1
100 TABLET ORAL
Status: DISCONTINUED | OUTPATIENT
Start: 2020-12-28 | End: 2021-01-12 | Stop reason: HOSPADM

## 2020-12-27 RX ORDER — ALLOPURINOL 100 MG/1
100 TABLET ORAL DAILY
Status: DISCONTINUED | OUTPATIENT
Start: 2020-12-27 | End: 2021-01-12 | Stop reason: HOSPADM

## 2020-12-27 RX ORDER — FOLIC ACID 1 MG/1
1 TABLET ORAL DAILY
Status: DISCONTINUED | OUTPATIENT
Start: 2020-12-28 | End: 2021-01-12 | Stop reason: HOSPADM

## 2020-12-27 RX ORDER — ATORVASTATIN CALCIUM 20 MG/1
20 TABLET, FILM COATED ORAL NIGHTLY
Status: DISCONTINUED | OUTPATIENT
Start: 2020-12-27 | End: 2021-01-12 | Stop reason: HOSPADM

## 2020-12-27 RX ORDER — POLYETHYLENE GLYCOL 3350 17 G/17G
17 POWDER, FOR SOLUTION ORAL DAILY
Status: CANCELLED | OUTPATIENT
Start: 2020-12-28

## 2020-12-27 RX ORDER — DOCUSATE SODIUM 100 MG/1
100 CAPSULE, LIQUID FILLED ORAL 2 TIMES DAILY
Status: DISCONTINUED | OUTPATIENT
Start: 2020-12-27 | End: 2020-12-30

## 2020-12-27 RX ORDER — UREA 10 %
3 LOTION (ML) TOPICAL NIGHTLY PRN
Status: DISCONTINUED | OUTPATIENT
Start: 2020-12-27 | End: 2021-01-12 | Stop reason: HOSPADM

## 2020-12-27 RX ORDER — WARFARIN SODIUM 6 MG/1
6 TABLET ORAL
Status: DISCONTINUED | OUTPATIENT
Start: 2020-12-27 | End: 2020-12-30

## 2020-12-27 RX ORDER — ACETAMINOPHEN 325 MG/1
650 TABLET ORAL EVERY 4 HOURS PRN
Status: CANCELLED | OUTPATIENT
Start: 2020-12-27

## 2020-12-27 RX ORDER — HYDROCODONE BITARTRATE AND ACETAMINOPHEN 7.5; 325 MG/1; MG/1
1 TABLET ORAL
Status: DISCONTINUED | OUTPATIENT
Start: 2020-12-27 | End: 2020-12-27

## 2020-12-27 RX ADMIN — Medication 3 MG: at 21:28

## 2020-12-27 RX ADMIN — DOCUSATE SODIUM 100 MG: 100 CAPSULE, LIQUID FILLED ORAL at 20:17

## 2020-12-27 RX ADMIN — ALLOPURINOL 100 MG: 100 TABLET ORAL at 20:16

## 2020-12-27 RX ADMIN — CALCIUM CARBONATE 1 TABLET: 500 TABLET, CHEWABLE ORAL at 16:58

## 2020-12-27 RX ADMIN — HYDROCODONE BITARTRATE AND ACETAMINOPHEN 1 TABLET: 7.5; 325 TABLET ORAL at 16:58

## 2020-12-27 RX ADMIN — ATORVASTATIN CALCIUM 20 MG: 20 TABLET, FILM COATED ORAL at 20:16

## 2020-12-27 RX ADMIN — HYDROCODONE BITARTRATE AND ACETAMINOPHEN 1 TABLET: 7.5; 325 TABLET ORAL at 21:28

## 2020-12-27 RX ADMIN — WARFARIN 6 MG: 6 TABLET ORAL at 20:16

## 2020-12-28 LAB
INR PPP: 2.04 (ref 0.9–1.1)
PROTHROMBIN TIME: 22.8 SECONDS (ref 11.7–14.2)

## 2020-12-28 PROCEDURE — 97162 PT EVAL MOD COMPLEX 30 MIN: CPT

## 2020-12-28 PROCEDURE — 97116 GAIT TRAINING THERAPY: CPT

## 2020-12-28 PROCEDURE — 97530 THERAPEUTIC ACTIVITIES: CPT

## 2020-12-28 PROCEDURE — 97110 THERAPEUTIC EXERCISES: CPT

## 2020-12-28 PROCEDURE — 85610 PROTHROMBIN TIME: CPT | Performed by: PHYSICAL MEDICINE & REHABILITATION

## 2020-12-28 PROCEDURE — 97166 OT EVAL MOD COMPLEX 45 MIN: CPT

## 2020-12-28 PROCEDURE — 97535 SELF CARE MNGMENT TRAINING: CPT

## 2020-12-28 RX ORDER — TRAZODONE HYDROCHLORIDE 50 MG/1
25 TABLET ORAL NIGHTLY
Status: DISCONTINUED | OUTPATIENT
Start: 2020-12-28 | End: 2021-01-12

## 2020-12-28 RX ORDER — ACETAMINOPHEN 325 MG/1
325 TABLET ORAL EVERY 4 HOURS PRN
Status: DISCONTINUED | OUTPATIENT
Start: 2020-12-28 | End: 2021-01-12 | Stop reason: HOSPADM

## 2020-12-28 RX ADMIN — ATORVASTATIN CALCIUM 20 MG: 20 TABLET, FILM COATED ORAL at 20:16

## 2020-12-28 RX ADMIN — DOCUSATE SODIUM 100 MG: 100 CAPSULE, LIQUID FILLED ORAL at 20:16

## 2020-12-28 RX ADMIN — AMLODIPINE BESYLATE 2.5 MG: 2.5 TABLET ORAL at 07:50

## 2020-12-28 RX ADMIN — Medication 2000 UNITS: at 07:51

## 2020-12-28 RX ADMIN — CALCIUM CARBONATE 1 TABLET: 500 TABLET, CHEWABLE ORAL at 17:37

## 2020-12-28 RX ADMIN — Medication 3 MG: at 21:47

## 2020-12-28 RX ADMIN — HYDROCODONE BITARTRATE AND ACETAMINOPHEN 1 TABLET: 7.5; 325 TABLET ORAL at 21:44

## 2020-12-28 RX ADMIN — MICONAZOLE NITRATE: 2 OINTMENT TOPICAL at 20:16

## 2020-12-28 RX ADMIN — FOLIC ACID 1 MG: 1 TABLET ORAL at 07:51

## 2020-12-28 RX ADMIN — WARFARIN 6 MG: 6 TABLET ORAL at 17:36

## 2020-12-28 RX ADMIN — ALLOPURINOL 100 MG: 100 TABLET ORAL at 07:50

## 2020-12-28 RX ADMIN — ACETAMINOPHEN 325 MG: 325 TABLET, FILM COATED ORAL at 08:53

## 2020-12-28 RX ADMIN — HYDROCODONE BITARTRATE AND ACETAMINOPHEN 1 TABLET: 7.5; 325 TABLET ORAL at 05:24

## 2020-12-28 RX ADMIN — HYDROCODONE BITARTRATE AND ACETAMINOPHEN 1 TABLET: 7.5; 325 TABLET ORAL at 01:22

## 2020-12-28 RX ADMIN — ACETAMINOPHEN 325 MG: 325 TABLET, FILM COATED ORAL at 19:30

## 2020-12-28 RX ADMIN — CALCIUM CARBONATE 1 TABLET: 500 TABLET, CHEWABLE ORAL at 07:51

## 2020-12-28 RX ADMIN — MICONAZOLE NITRATE: 2 OINTMENT TOPICAL at 13:51

## 2020-12-28 RX ADMIN — HYDROCODONE BITARTRATE AND ACETAMINOPHEN 1 TABLET: 7.5; 325 TABLET ORAL at 13:48

## 2020-12-28 RX ADMIN — DOCUSATE SODIUM 100 MG: 100 CAPSULE, LIQUID FILLED ORAL at 07:51

## 2020-12-28 RX ADMIN — HYDROCODONE BITARTRATE AND ACETAMINOPHEN 1 TABLET: 7.5; 325 TABLET ORAL at 17:36

## 2020-12-28 RX ADMIN — TRAZODONE HYDROCHLORIDE 25 MG: 50 TABLET ORAL at 20:16

## 2020-12-28 RX ADMIN — LEVOTHYROXINE SODIUM 100 MCG: 0.1 TABLET ORAL at 05:24

## 2020-12-28 RX ADMIN — HYDROCODONE BITARTRATE AND ACETAMINOPHEN 1 TABLET: 7.5; 325 TABLET ORAL at 09:35

## 2020-12-29 LAB
INR PPP: 2.39 (ref 0.9–1.1)
PROTHROMBIN TIME: 25.9 SECONDS (ref 11.7–14.2)

## 2020-12-29 PROCEDURE — 85610 PROTHROMBIN TIME: CPT | Performed by: PHYSICAL MEDICINE & REHABILITATION

## 2020-12-29 PROCEDURE — 97116 GAIT TRAINING THERAPY: CPT

## 2020-12-29 PROCEDURE — 96125 COGNITIVE TEST BY HC PRO: CPT

## 2020-12-29 PROCEDURE — 97110 THERAPEUTIC EXERCISES: CPT

## 2020-12-29 PROCEDURE — 97535 SELF CARE MNGMENT TRAINING: CPT

## 2020-12-29 PROCEDURE — 97530 THERAPEUTIC ACTIVITIES: CPT

## 2020-12-29 RX ORDER — POLYETHYLENE GLYCOL 3350 17 G/17G
17 POWDER, FOR SOLUTION ORAL DAILY PRN
Status: DISCONTINUED | OUTPATIENT
Start: 2020-12-29 | End: 2021-01-12

## 2020-12-29 RX ORDER — ATORVASTATIN CALCIUM 20 MG/1
20 TABLET, FILM COATED ORAL DAILY
COMMUNITY

## 2020-12-29 RX ORDER — AMLODIPINE BESYLATE 5 MG/1
5 TABLET ORAL
Status: DISCONTINUED | OUTPATIENT
Start: 2020-12-30 | End: 2021-01-12 | Stop reason: HOSPADM

## 2020-12-29 RX ORDER — ALLOPURINOL 100 MG/1
100 TABLET ORAL DAILY
COMMUNITY

## 2020-12-29 RX ORDER — LEVOTHYROXINE SODIUM 0.1 MG/1
100 TABLET ORAL DAILY
COMMUNITY

## 2020-12-29 RX ORDER — WARFARIN SODIUM 6 MG/1
6 TABLET ORAL DAILY
COMMUNITY
End: 2021-01-12 | Stop reason: HOSPADM

## 2020-12-29 RX ORDER — CALCIUM CARBONATE 200(500)MG
1 TABLET,CHEWABLE ORAL 3 TIMES DAILY PRN
Status: DISCONTINUED | OUTPATIENT
Start: 2020-12-29 | End: 2021-01-12 | Stop reason: HOSPADM

## 2020-12-29 RX ADMIN — DOCUSATE SODIUM 100 MG: 100 CAPSULE, LIQUID FILLED ORAL at 07:23

## 2020-12-29 RX ADMIN — CALCIUM CARBONATE 1 TABLET: 500 TABLET, CHEWABLE ORAL at 17:10

## 2020-12-29 RX ADMIN — Medication 3 MG: at 20:01

## 2020-12-29 RX ADMIN — Medication 2000 UNITS: at 07:22

## 2020-12-29 RX ADMIN — AMLODIPINE BESYLATE 2.5 MG: 2.5 TABLET ORAL at 07:22

## 2020-12-29 RX ADMIN — CALCIUM CARBONATE 1 TABLET: 500 TABLET, CHEWABLE ORAL at 07:22

## 2020-12-29 RX ADMIN — DOCUSATE SODIUM 100 MG: 100 CAPSULE, LIQUID FILLED ORAL at 20:01

## 2020-12-29 RX ADMIN — ATORVASTATIN CALCIUM 20 MG: 20 TABLET, FILM COATED ORAL at 20:00

## 2020-12-29 RX ADMIN — WARFARIN 6 MG: 6 TABLET ORAL at 17:10

## 2020-12-29 RX ADMIN — POLYETHYLENE GLYCOL 3350 17 G: 17 POWDER, FOR SOLUTION ORAL at 19:17

## 2020-12-29 RX ADMIN — HYDROCODONE BITARTRATE AND ACETAMINOPHEN 1 TABLET: 7.5; 325 TABLET ORAL at 07:22

## 2020-12-29 RX ADMIN — HYDROCODONE BITARTRATE AND ACETAMINOPHEN 1 TABLET: 7.5; 325 TABLET ORAL at 20:01

## 2020-12-29 RX ADMIN — FOLIC ACID 1 MG: 1 TABLET ORAL at 07:23

## 2020-12-29 RX ADMIN — ALLOPURINOL 100 MG: 100 TABLET ORAL at 07:22

## 2020-12-29 RX ADMIN — HYDROCODONE BITARTRATE AND ACETAMINOPHEN 1 TABLET: 7.5; 325 TABLET ORAL at 11:41

## 2020-12-29 RX ADMIN — HYDROCODONE BITARTRATE AND ACETAMINOPHEN 1 TABLET: 7.5; 325 TABLET ORAL at 15:31

## 2020-12-29 RX ADMIN — HYDROCODONE BITARTRATE AND ACETAMINOPHEN 1 TABLET: 7.5; 325 TABLET ORAL at 02:45

## 2020-12-29 RX ADMIN — MICONAZOLE NITRATE 1 APPLICATION: 2 OINTMENT TOPICAL at 20:02

## 2020-12-29 RX ADMIN — MICONAZOLE NITRATE: 2 OINTMENT TOPICAL at 07:23

## 2020-12-29 RX ADMIN — TRAZODONE HYDROCHLORIDE 25 MG: 50 TABLET ORAL at 20:00

## 2020-12-29 RX ADMIN — LEVOTHYROXINE SODIUM 100 MCG: 0.1 TABLET ORAL at 05:19

## 2020-12-30 PROBLEM — Z87.81 S/P LEFT HIP FRACTURE: Status: ACTIVE | Noted: 2020-12-30

## 2020-12-30 LAB
INR PPP: 2.73 (ref 0.9–1.1)
PROTHROMBIN TIME: 28.7 SECONDS (ref 11.7–14.2)

## 2020-12-30 PROCEDURE — 97110 THERAPEUTIC EXERCISES: CPT

## 2020-12-30 PROCEDURE — 97116 GAIT TRAINING THERAPY: CPT

## 2020-12-30 PROCEDURE — 97530 THERAPEUTIC ACTIVITIES: CPT

## 2020-12-30 PROCEDURE — 97129 THER IVNTJ 1ST 15 MIN: CPT

## 2020-12-30 PROCEDURE — 85610 PROTHROMBIN TIME: CPT | Performed by: PHYSICAL MEDICINE & REHABILITATION

## 2020-12-30 PROCEDURE — 97535 SELF CARE MNGMENT TRAINING: CPT

## 2020-12-30 PROCEDURE — 97130 THER IVNTJ EA ADDL 15 MIN: CPT

## 2020-12-30 RX ORDER — AMOXICILLIN 250 MG
2 CAPSULE ORAL NIGHTLY
Status: DISCONTINUED | OUTPATIENT
Start: 2020-12-30 | End: 2021-01-12 | Stop reason: HOSPADM

## 2020-12-30 RX ORDER — POLYETHYLENE GLYCOL 3350 17 G/17G
17 POWDER, FOR SOLUTION ORAL DAILY
Status: DISCONTINUED | OUTPATIENT
Start: 2020-12-31 | End: 2021-01-12 | Stop reason: HOSPADM

## 2020-12-30 RX ORDER — WARFARIN SODIUM 5 MG/1
5 TABLET ORAL
Status: DISCONTINUED | OUTPATIENT
Start: 2020-12-30 | End: 2020-12-31

## 2020-12-30 RX ADMIN — WARFARIN 5 MG: 5 TABLET ORAL at 16:20

## 2020-12-30 RX ADMIN — DOCUSATE SODIUM 100 MG: 100 CAPSULE, LIQUID FILLED ORAL at 07:51

## 2020-12-30 RX ADMIN — POLYETHYLENE GLYCOL 3350 17 G: 17 POWDER, FOR SOLUTION ORAL at 07:51

## 2020-12-30 RX ADMIN — ACETAMINOPHEN 325 MG: 325 TABLET, FILM COATED ORAL at 02:55

## 2020-12-30 RX ADMIN — CALCIUM CARBONATE 1 TABLET: 500 TABLET, CHEWABLE ORAL at 16:21

## 2020-12-30 RX ADMIN — FOLIC ACID 1 MG: 1 TABLET ORAL at 07:52

## 2020-12-30 RX ADMIN — HYDROCODONE BITARTRATE AND ACETAMINOPHEN 1 TABLET: 7.5; 325 TABLET ORAL at 13:10

## 2020-12-30 RX ADMIN — HYDROCODONE BITARTRATE AND ACETAMINOPHEN 1 TABLET: 7.5; 325 TABLET ORAL at 21:20

## 2020-12-30 RX ADMIN — MICONAZOLE NITRATE: 2 OINTMENT TOPICAL at 07:54

## 2020-12-30 RX ADMIN — Medication 2000 UNITS: at 07:51

## 2020-12-30 RX ADMIN — HYDROCODONE BITARTRATE AND ACETAMINOPHEN 1 TABLET: 7.5; 325 TABLET ORAL at 04:54

## 2020-12-30 RX ADMIN — ALLOPURINOL 100 MG: 100 TABLET ORAL at 07:52

## 2020-12-30 RX ADMIN — TRAZODONE HYDROCHLORIDE 25 MG: 50 TABLET ORAL at 21:20

## 2020-12-30 RX ADMIN — Medication 3 MG: at 21:20

## 2020-12-30 RX ADMIN — CALCIUM CARBONATE 1 TABLET: 500 TABLET, CHEWABLE ORAL at 12:35

## 2020-12-30 RX ADMIN — ACETAMINOPHEN 325 MG: 325 TABLET, FILM COATED ORAL at 07:53

## 2020-12-30 RX ADMIN — HYDROCODONE BITARTRATE AND ACETAMINOPHEN 1 TABLET: 7.5; 325 TABLET ORAL at 09:04

## 2020-12-30 RX ADMIN — HYDROCODONE BITARTRATE AND ACETAMINOPHEN 1 TABLET: 7.5; 325 TABLET ORAL at 00:05

## 2020-12-30 RX ADMIN — DOCUSATE SODIUM 50MG AND SENNOSIDES 8.6MG 2 TABLET: 8.6; 5 TABLET, FILM COATED ORAL at 21:20

## 2020-12-30 RX ADMIN — AMLODIPINE BESYLATE 5 MG: 5 TABLET ORAL at 07:52

## 2020-12-30 RX ADMIN — HYDROCODONE BITARTRATE AND ACETAMINOPHEN 1 TABLET: 7.5; 325 TABLET ORAL at 17:12

## 2020-12-30 RX ADMIN — ATORVASTATIN CALCIUM 20 MG: 20 TABLET, FILM COATED ORAL at 21:20

## 2020-12-30 RX ADMIN — LEVOTHYROXINE SODIUM 100 MCG: 0.1 TABLET ORAL at 04:54

## 2020-12-31 LAB
ALBUMIN SERPL-MCNC: 3.2 G/DL (ref 3.5–5.2)
ALBUMIN/GLOB SERPL: 1 G/DL
ALP SERPL-CCNC: 79 U/L (ref 39–117)
ALT SERPL W P-5'-P-CCNC: 24 U/L (ref 1–33)
ANION GAP SERPL CALCULATED.3IONS-SCNC: 10.2 MMOL/L (ref 5–15)
AST SERPL-CCNC: 36 U/L (ref 1–32)
BASOPHILS # BLD AUTO: 0.04 10*3/MM3 (ref 0–0.2)
BASOPHILS NFR BLD AUTO: 0.4 % (ref 0–1.5)
BILIRUB SERPL-MCNC: 0.6 MG/DL (ref 0–1.2)
BUN SERPL-MCNC: 24 MG/DL (ref 8–23)
BUN/CREAT SERPL: 13.6 (ref 7–25)
CALCIUM SPEC-SCNC: 8 MG/DL (ref 8.6–10.5)
CHLORIDE SERPL-SCNC: 103 MMOL/L (ref 98–107)
CO2 SERPL-SCNC: 23.8 MMOL/L (ref 22–29)
CREAT SERPL-MCNC: 1.77 MG/DL (ref 0.57–1)
DEPRECATED RDW RBC AUTO: 45.6 FL (ref 37–54)
EOSINOPHIL # BLD AUTO: 0.2 10*3/MM3 (ref 0–0.4)
EOSINOPHIL NFR BLD AUTO: 2.2 % (ref 0.3–6.2)
ERYTHROCYTE [DISTWIDTH] IN BLOOD BY AUTOMATED COUNT: 14.1 % (ref 12.3–15.4)
GFR SERPL CREATININE-BSD FRML MDRD: 28 ML/MIN/1.73
GLOBULIN UR ELPH-MCNC: 3.3 GM/DL
GLUCOSE SERPL-MCNC: 153 MG/DL (ref 65–99)
HCT VFR BLD AUTO: 28.7 % (ref 34–46.6)
HGB BLD-MCNC: 8.9 G/DL (ref 12–15.9)
IMM GRANULOCYTES # BLD AUTO: 0.09 10*3/MM3 (ref 0–0.05)
IMM GRANULOCYTES NFR BLD AUTO: 1 % (ref 0–0.5)
INR PPP: 3.44 (ref 0.9–1.1)
LYMPHOCYTES # BLD AUTO: 1.15 10*3/MM3 (ref 0.7–3.1)
LYMPHOCYTES NFR BLD AUTO: 12.9 % (ref 19.6–45.3)
MCH RBC QN AUTO: 27.6 PG (ref 26.6–33)
MCHC RBC AUTO-ENTMCNC: 31 G/DL (ref 31.5–35.7)
MCV RBC AUTO: 88.9 FL (ref 79–97)
MONOCYTES # BLD AUTO: 0.45 10*3/MM3 (ref 0.1–0.9)
MONOCYTES NFR BLD AUTO: 5 % (ref 5–12)
NEUTROPHILS NFR BLD AUTO: 6.99 10*3/MM3 (ref 1.7–7)
NEUTROPHILS NFR BLD AUTO: 78.5 % (ref 42.7–76)
NRBC BLD AUTO-RTO: 0 /100 WBC (ref 0–0.2)
PLATELET # BLD AUTO: 270 10*3/MM3 (ref 140–450)
PMV BLD AUTO: 10.2 FL (ref 6–12)
POTASSIUM SERPL-SCNC: 5 MMOL/L (ref 3.5–5.2)
PROT SERPL-MCNC: 6.5 G/DL (ref 6–8.5)
PROTHROMBIN TIME: 34.4 SECONDS (ref 11.7–14.2)
RBC # BLD AUTO: 3.23 10*6/MM3 (ref 3.77–5.28)
SODIUM SERPL-SCNC: 137 MMOL/L (ref 136–145)
WBC # BLD AUTO: 8.92 10*3/MM3 (ref 3.4–10.8)

## 2020-12-31 PROCEDURE — 97116 GAIT TRAINING THERAPY: CPT

## 2020-12-31 PROCEDURE — 85025 COMPLETE CBC W/AUTO DIFF WBC: CPT | Performed by: PHYSICAL MEDICINE & REHABILITATION

## 2020-12-31 PROCEDURE — 97530 THERAPEUTIC ACTIVITIES: CPT

## 2020-12-31 PROCEDURE — 85610 PROTHROMBIN TIME: CPT | Performed by: PHYSICAL MEDICINE & REHABILITATION

## 2020-12-31 PROCEDURE — 97110 THERAPEUTIC EXERCISES: CPT

## 2020-12-31 PROCEDURE — 80053 COMPREHEN METABOLIC PANEL: CPT | Performed by: PHYSICAL MEDICINE & REHABILITATION

## 2020-12-31 PROCEDURE — 97130 THER IVNTJ EA ADDL 15 MIN: CPT

## 2020-12-31 PROCEDURE — 97129 THER IVNTJ 1ST 15 MIN: CPT

## 2020-12-31 PROCEDURE — 97535 SELF CARE MNGMENT TRAINING: CPT

## 2020-12-31 RX ORDER — HYDRALAZINE HYDROCHLORIDE 10 MG/1
10 TABLET, FILM COATED ORAL EVERY 8 HOURS PRN
Status: DISCONTINUED | OUTPATIENT
Start: 2020-12-31 | End: 2021-01-12

## 2020-12-31 RX ADMIN — HYDROCODONE BITARTRATE AND ACETAMINOPHEN 1 TABLET: 7.5; 325 TABLET ORAL at 01:26

## 2020-12-31 RX ADMIN — AMLODIPINE BESYLATE 5 MG: 5 TABLET ORAL at 07:38

## 2020-12-31 RX ADMIN — MICONAZOLE NITRATE: 2 OINTMENT TOPICAL at 07:41

## 2020-12-31 RX ADMIN — ALLOPURINOL 100 MG: 100 TABLET ORAL at 07:38

## 2020-12-31 RX ADMIN — HYDROCODONE BITARTRATE AND ACETAMINOPHEN 1 TABLET: 7.5; 325 TABLET ORAL at 21:55

## 2020-12-31 RX ADMIN — LEVOTHYROXINE SODIUM 100 MCG: 0.1 TABLET ORAL at 05:34

## 2020-12-31 RX ADMIN — CALCIUM CARBONATE 1 TABLET: 500 TABLET, CHEWABLE ORAL at 13:50

## 2020-12-31 RX ADMIN — HYDROCODONE BITARTRATE AND ACETAMINOPHEN 1 TABLET: 7.5; 325 TABLET ORAL at 05:34

## 2020-12-31 RX ADMIN — DOCUSATE SODIUM 50MG AND SENNOSIDES 8.6MG 2 TABLET: 8.6; 5 TABLET, FILM COATED ORAL at 20:44

## 2020-12-31 RX ADMIN — ATORVASTATIN CALCIUM 20 MG: 20 TABLET, FILM COATED ORAL at 20:44

## 2020-12-31 RX ADMIN — Medication 2000 UNITS: at 07:38

## 2020-12-31 RX ADMIN — HYDROCODONE BITARTRATE AND ACETAMINOPHEN 1 TABLET: 7.5; 325 TABLET ORAL at 17:48

## 2020-12-31 RX ADMIN — MICONAZOLE NITRATE 1 APPLICATION: 2 OINTMENT TOPICAL at 20:45

## 2020-12-31 RX ADMIN — Medication 3 MG: at 21:55

## 2020-12-31 RX ADMIN — POLYETHYLENE GLYCOL 3350 17 G: 17 POWDER, FOR SOLUTION ORAL at 07:39

## 2020-12-31 RX ADMIN — HYDROCODONE BITARTRATE AND ACETAMINOPHEN 1 TABLET: 7.5; 325 TABLET ORAL at 09:23

## 2020-12-31 RX ADMIN — TRAZODONE HYDROCHLORIDE 25 MG: 50 TABLET ORAL at 20:44

## 2020-12-31 RX ADMIN — HYDROCODONE BITARTRATE AND ACETAMINOPHEN 1 TABLET: 7.5; 325 TABLET ORAL at 13:50

## 2020-12-31 RX ADMIN — FOLIC ACID 1 MG: 1 TABLET ORAL at 07:39

## 2020-12-31 RX ADMIN — CALCIUM CARBONATE 1 TABLET: 500 TABLET, CHEWABLE ORAL at 17:48

## 2021-01-01 LAB
INR PPP: 2.85 (ref 0.9–1.1)
PROTHROMBIN TIME: 29.7 SECONDS (ref 11.7–14.2)

## 2021-01-01 PROCEDURE — 97130 THER IVNTJ EA ADDL 15 MIN: CPT

## 2021-01-01 PROCEDURE — 97535 SELF CARE MNGMENT TRAINING: CPT

## 2021-01-01 PROCEDURE — 97110 THERAPEUTIC EXERCISES: CPT

## 2021-01-01 PROCEDURE — 85610 PROTHROMBIN TIME: CPT | Performed by: PHYSICAL MEDICINE & REHABILITATION

## 2021-01-01 PROCEDURE — 97129 THER IVNTJ 1ST 15 MIN: CPT

## 2021-01-01 RX ORDER — WARFARIN SODIUM 5 MG/1
5 TABLET ORAL
Status: DISCONTINUED | OUTPATIENT
Start: 2021-01-01 | End: 2021-01-01 | Stop reason: DRUGHIGH

## 2021-01-01 RX ORDER — WARFARIN SODIUM 4 MG/1
4 TABLET ORAL
Status: DISCONTINUED | OUTPATIENT
Start: 2021-01-01 | End: 2021-01-02

## 2021-01-01 RX ADMIN — LEVOTHYROXINE SODIUM 100 MCG: 0.1 TABLET ORAL at 06:50

## 2021-01-01 RX ADMIN — Medication 3 MG: at 19:36

## 2021-01-01 RX ADMIN — MICONAZOLE NITRATE: 2 OINTMENT TOPICAL at 20:45

## 2021-01-01 RX ADMIN — AMLODIPINE BESYLATE 5 MG: 5 TABLET ORAL at 07:51

## 2021-01-01 RX ADMIN — POLYETHYLENE GLYCOL 3350 17 G: 17 POWDER, FOR SOLUTION ORAL at 07:50

## 2021-01-01 RX ADMIN — TRAZODONE HYDROCHLORIDE 25 MG: 50 TABLET ORAL at 19:36

## 2021-01-01 RX ADMIN — HYDROCODONE BITARTRATE AND ACETAMINOPHEN 1 TABLET: 7.5; 325 TABLET ORAL at 15:16

## 2021-01-01 RX ADMIN — HYDROCODONE BITARTRATE AND ACETAMINOPHEN 1 TABLET: 7.5; 325 TABLET ORAL at 06:50

## 2021-01-01 RX ADMIN — MICONAZOLE NITRATE: 2 OINTMENT TOPICAL at 07:53

## 2021-01-01 RX ADMIN — FOLIC ACID 1 MG: 1 TABLET ORAL at 07:51

## 2021-01-01 RX ADMIN — HYDROCODONE BITARTRATE AND ACETAMINOPHEN 1 TABLET: 7.5; 325 TABLET ORAL at 19:37

## 2021-01-01 RX ADMIN — WARFARIN 4 MG: 4 TABLET ORAL at 16:58

## 2021-01-01 RX ADMIN — HYDROCODONE BITARTRATE AND ACETAMINOPHEN 1 TABLET: 7.5; 325 TABLET ORAL at 02:15

## 2021-01-01 RX ADMIN — Medication 2000 UNITS: at 07:51

## 2021-01-01 RX ADMIN — ATORVASTATIN CALCIUM 20 MG: 20 TABLET, FILM COATED ORAL at 19:36

## 2021-01-01 RX ADMIN — ALLOPURINOL 100 MG: 100 TABLET ORAL at 07:51

## 2021-01-01 RX ADMIN — CALCIUM CARBONATE 1 TABLET: 500 TABLET, CHEWABLE ORAL at 07:53

## 2021-01-01 RX ADMIN — DOCUSATE SODIUM 50MG AND SENNOSIDES 8.6MG 2 TABLET: 8.6; 5 TABLET, FILM COATED ORAL at 19:36

## 2021-01-01 RX ADMIN — HYDROCODONE BITARTRATE AND ACETAMINOPHEN 1 TABLET: 7.5; 325 TABLET ORAL at 10:46

## 2021-01-01 RX ADMIN — HYDRALAZINE HYDROCHLORIDE 10 MG: 10 TABLET, FILM COATED ORAL at 19:37

## 2021-01-01 NOTE — THERAPY TREATMENT NOTE
Inpatient Rehabilitation - Occupational Therapy Treatment Note    [facility]     Patient Name: Alice Maxwell  : 1945  MRN: [id number]    Today's Date: 2021                 Admit Date: 2020         ICD-10-CM ICD-9-CM   1. Heartburn  R12 787.1       Patient Active Problem List   Diagnosis   • Stroke (CMS/HCC)   • Personal history of breast cancer   • History of loop electrical excision procedure (LEEP)   • History of abnormal cervical Pap smear   • S/p left hip fracture       Past Medical History:   Diagnosis Date   • Abnormal Pap smear of cervix    • Arthritis    • Cancer (CMS/HCC)     breast   • Disease of thyroid gland    • Osteoporosis    • Stroke (CMS/HCC)    • Stroke (CMS/HCC)     x2   • Urinary tract infection        Past Surgical History:   Procedure Laterality Date   • BACK SURGERY     • BREAST LUMPECTOMY     • CERVICAL BIOPSY  W/ LOOP ELECTRODE EXCISION     • CHOLECYSTECTOMY     • COLONOSCOPY      had polyps    • FOOT SURGERY     • GASTRIC BYPASS     • HERNIA REPAIR     • THYROIDECTOMY                  IRF OT ASSESSMENT FLOWSHEET (last 12 hours)      IRF OT Evaluation and Treatment     Row Name 21 1117          OT Time and Intention    Document Type  daily treatment  -AF     Mode of Treatment  occupational therapy  -AF     Patient Effort  good  -AF     Symptoms Noted During/After Treatment  fatigue  -AF     Row Name 21 1117          General Information    General Observations of Patient  pt sitting up in w/c   -AF     Existing Precautions/Restrictions  fall;hip, anterior  -AF     Row Name 21 1117          Cognition/Psychosocial    Affect/Mental Status (Cognitive)  WFL  -AF     Orientation Status (Cognition)  oriented x 4  -AF     Follows Commands (Cognition)  follows one-step commands  -AF     Personal Safety Interventions  fall prevention program maintained;gait belt;nonskid shoes/slippers when out of bed  -AF     Row Name 21 1117          Pain Scale:  Numbers Pre/Post-Treatment    Pretreatment Pain Rating  6/10  -AF     Posttreatment Pain Rating  7/10  -AF     Pain Location - Side  Left  -AF     Pain Location - Orientation  lower  -AF     Pain Location  hip  -AF     Row Name 01/01/21 1117          Transfers    [place] Level (Toilet Transfer)  minimum assist (75% patient effort);contact guard;verbal cues  -AF     Assistive Device (Toilet Transfer)  commode;grab bars/safety frame;wheelchair  -AF     Row Name 01/01/21 1117          Toilet Transfer    Type (Toilet Transfer)  stand pivot/stand step  -AF     Row Name 01/01/21 1117          Balance    Static Standing Balance  WFL;mild impairment stood for 4-5 mins during ADL tasks   -AF     Row Name 01/01/21 1117          Bathing    [place] Level (Bathing)  bathing skills;upper body;set up;lower body;moderate assist (50% patient effort)  -AF     Assistive Device (Bathing)  long-handled sponge  -AF     Position (Bathing)  supported sitting;supported standing  -AF     Comment (Bathing)  seated on commode   -AF     Row Name 01/01/21 1117          Upper Body Dressing    [place] Level (Upper Body Dressing)  upper body dressing skills;set up assistance  -AF     Position (Upper Body Dressing)  supported sitting  -AF     Row Name 01/01/21 1117          Lower Body Dressing    [place] Level (Lower Body Dressing)  doff;don;pants/bottoms;socks;verbal cues;moderate assist (50% patient effort)  -AF     Assistive Device Use (Lower Body Dressing)  reacher  -AF     Position (Lower Body Dressing)  supported sitting;supported standing  -AF     Set-up Assistance (Lower Body Dressing)  obtain clothing  -AF     Comment (Lower Body Dressing)  seated on commode   -AF     Row Name 01/01/21 1117          Grooming    [place] Level (Grooming)  grooming skills;set up  -AF     Position (Grooming)  supported sitting;sink side  -AF     Set-up Assistance (Grooming)  obtain supplies  -AF     Row Name 01/01/21 1117           Toileting    [place] Level (Toileting)  toileting skills;maximum assist (25% patient effort);moderate assist (50% patient effort);verbal cues  -AF     Assistive Device Use (Toileting)  grab bar/safety frame;raised toilet seat  -AF     Position (Toileting)  supported sitting;supported standing  -AF     Comment (Toileting)  assist with hygiene   -AF     Row Name 01/01/21 1117          Positioning and Restraints    Pre-Treatment Position  sitting in chair/recliner  -AF     Post Treatment Position  wheelchair  -AF     In Wheelchair  sitting;call light within reach;encouraged to call for assist;exit alarm on  -AF       User Key  (r) = Recorded By, (t) = Taken By, (c) = Cosigned By    Initials Name Effective Dates    AF Misa Oneal, OTR 04/14/20 -            Occupational Therapy Education                 Title: PT OT SLP Therapies (In Progress)     Topic: Occupational Therapy (In Progress)     Point: ADL training (Done)     Description:   Instruct learner(s) on proper safety adaptation and remediation techniques during self care or transfers.   Instruct in proper use of assistive devices.              Learning Progress Summary           Patient Acceptance, E,D, VU,NR by  at 12/30/2020 1426    Comment: educated on LH sponge and LH reacher with LB dressing tasks will require further edcuation on process    Acceptance, E, VU,NR by  at 12/28/2020 1418    Comment: educated on safety with ADLs and transfers                   Point: Home exercise program (Not Started)     Description:   Instruct learner(s) on appropriate technique for monitoring, assisting and/or progressing therapeutic exercises/activities.              Learner Progress:  Not documented in this visit.          Point: Precautions (Not Started)     Description:   Instruct learner(s) on prescribed precautions during self-care and functional transfers.              Learner Progress:  Not documented in this visit.          Point: Body mechanics (Not  Started)     Description:   Instruct learner(s) on proper positioning and spine alignment during self-care, functional mobility activities and/or exercises.              Learner Progress:  Not documented in this visit.                      User Key     Initials Effective Dates Name Provider Type Discipline    AF 04/14/20 -  Misa Oneal OTR Occupational Therapist OT                    OT Recommendation and Plan    Planned Therapy Interventions (OT): activity tolerance training, BADL retraining, patient/caregiver education/training, ROM/therapeutic exercise, strengthening exercise, transfer/mobility retraining, occupation/activity based interventions                    Time Calculation:     Time Calculation- OT     Row Name 01/01/21 1119             Time Calculation- OT    OT Start Time  0930  -AF      OT Stop Time  1000  -AF      OT Time Calculation (min)  30 min  -AF        User Key  (r) = Recorded By, (t) = Taken By, (c) = Cosigned By    Initials Name Provider Type    AF Misa Oneal, [id number] Occupational Therapist        Therapy Charges for Today     Code Description Service Date Service Provider Modifiers Qty    88013999327 HC OT SELF CARE/MGMT/TRAIN EA 15 MIN 12/31/2020 Misa Oneal OTR GO 5    94415207715 HC OT THER PROC EA 15 MIN 12/31/2020 Misa Oneal OTR GO 1    47560232324 HC OT SELF CARE/MGMT/TRAIN EA 15 MIN 1/1/2021 Misa Oneal OTR GO 2                   KANDY Adames  1/1/2021

## 2021-01-01 NOTE — PLAN OF CARE
Goal Outcome Evaluation:  Plan of Care Reviewed With: patient  Progress: improving  Outcome Summary: Patent takes boles medication every 4 hours, meds with water, and assist x1. She frequentlly requests ice packs, medication, or repositioning.  Incision to L hip with island dressing and blister LLE covered with mepilex. Also, patient has open area to left side, under abdominal fold,- ointment applied and cloth in place.

## 2021-01-01 NOTE — PLAN OF CARE
Goal Outcome Evaluation:  Plan of Care Reviewed With: patient  Progress: improving  Outcome Summary: Awakened for pain med per pt request. Ice packs used to bilat groin and L hip. Assist 1 using walker to bsc. L hip island dressing and Mepilex intact. PRN Hydralazine not required so far tonight.

## 2021-01-01 NOTE — PROGRESS NOTES
Inpatient Rehabilitation Plan of Care Note    Plan of Care  Care Plan Reviewed - No updates at this time.    Psychosocial    Performed Intervention(s)  Verbalizes needs & concerns      Safety    Performed Intervention(s)  Items within reach (call bell, etc), safety rounds, falls precautions, &  bed/chair alarms.      Sphincter Control    Performed Intervention(s)  Monitor intake, output, & BM's, Encourage appropriate diet & fluids, & perineal  care.      Pain    Performed Intervention(s)  Pain medication as ordered, distraction, & repositioning assistance as needed.      Body Systems    Performed Intervention(s)  Daily skin inspections, wound care, assist with repositioning, & dressing  changes as ordered.    Signed by: Verna Robin RN

## 2021-01-01 NOTE — THERAPY TREATMENT NOTE
Inpatient Rehabilitation - Physical Therapy Treatment Note       [facility]     Patient Name: Alice Maxwell  : 1945  MRN: [id number]    Today's Date: 2021                    Admit Date: 2020      Visit Dx:     ICD-10-CM ICD-9-CM   1. Heartburn  R12 787.1       Patient Active Problem List   Diagnosis   • Stroke (CMS/HCC)   • Personal history of breast cancer   • History of loop electrical excision procedure (LEEP)   • History of abnormal cervical Pap smear   • S/p left hip fracture       Past Medical History:   Diagnosis Date   • Abnormal Pap smear of cervix    • Arthritis    • Cancer (CMS/HCC)     breast   • Disease of thyroid gland    • Osteoporosis    • Stroke (CMS/HCC)    • Stroke (CMS/HCC)     x2   • Urinary tract infection        Past Surgical History:   Procedure Laterality Date   • BACK SURGERY     • BREAST LUMPECTOMY     • CERVICAL BIOPSY  W/ LOOP ELECTRODE EXCISION     • CHOLECYSTECTOMY     • COLONOSCOPY      had polyps    • FOOT SURGERY     • GASTRIC BYPASS     • HERNIA REPAIR     • THYROIDECTOMY            PT ASSESSMENT (last 12 hours)      IRF PT Evaluation and Treatment     Row Name 21 1046          PT Time and Intention    Document Type  daily treatment  -LB     Mode of Treatment  physical therapy  -LB     Patient/Family/Caregiver Comments/Observations  pnt seated in w/c  -LB     Row Name 21 1046          General Information    Existing Precautions/Restrictions  fall;hip, anterior  -LB     Row Name 21 1046          Pain Scale: Numbers Pre/Post-Treatment    Pretreatment Pain Rating  10/10  -LB     Pain Location - Side  Left  -LB     Pain Location  hip and thigh. Amb w pnt. Nsg gave meds.  -LB     Row Name 21 1046          Bed Mobility    Comment (Bed Mobility)  NT  -LB     Row Name 21 1046          Transfers    Sit-Stand [place] (Transfers)  contact guard;minimum assist (75% patient effort) Lobo the 1st time. CGA the 2nd and 3rd times  -LB      Stand-Sit [place] (Transfers)  verbal cues;contact guard  -LB     Row Name 01/01/21 1046          Sit-Stand Transfer    Assistive Device (Sit-Stand Transfers)  walker, front-wheeled  -LB     Row Name 01/01/21 1046          Stand-Sit Transfer    Assistive Device (Stand-Sit Transfers)  walker, front-wheeled  -LB     Row Name 01/01/21 1046          Gait/Stairs (Locomotion)    [place] Level (Gait)  minimum assist (75% patient effort);verbal cues;1 person to manage equipment  -LB     Assistive Device (Gait)  walker, front-wheeled  -LB     Distance in Feet (Gait)  11', 23', 14'  -LB     Pattern (Gait)  step-to  -LB     Deviations/Abnormal Patterns (Gait)  stride length decreased;weight shifting decreased  -LB     Bilateral Gait Deviations  forward flexed posture;weight shift ability decreased  -LB     Left Sided Gait Deviations  heel strike decreased drags L foot  -LB     Row Name 01/01/21 1046          Knee (Therapeutic Exercise)    Knee (Therapeutic Exercise)  AROM (active range of motion)  -LB     Knee AROM (Therapeutic Exercise)  left;LAQ (long arc quad);flexion;sitting;10 repetitions  -LB     Row Name 01/01/21 1046          Ankle (Therapeutic Exercise)    Ankle (Therapeutic Exercise)  AROM (active range of motion)  -LB     Ankle AROM (Therapeutic Exercise)  bilateral;dorsiflexion;plantarflexion;sitting;10 repetitions  -LB     Row Name 01/01/21 1046          Positioning and Restraints    Post Treatment Position  wheelchair  -LB     In Wheelchair  sitting;exit alarm on;with SLP  -LB       User Key  (r) = Recorded By, (t) = Taken By, (c) = Cosigned By    Initials Name Provider Type    LB Munira Azevedo PTA Physical Therapy Assistant        Wound 12/27/20 1601 Left anterior thigh Incision (Active)   Dressing Appearance dry;intact 12/31/20 2048   Closure NADIA 01/01/21 0748   Base dressing in place, unable to visualize 12/31/20 2048       Wound 12/27/20 1602 Left lateral thigh Blisters (Active)   Dressing  Appearance dry;intact 12/31/20 2048   Closure NADIA 01/01/21 0748   Base dressing in place, unable to visualize 12/31/20 2048       Wound 12/27/20 1602 Left anterior groin (Active)   Care, Wound other (see comments) 12/31/20 2048     Physical Therapy Education                 Title: PT OT SLP Therapies (In Progress)     Topic: Physical Therapy (Done)     Point: Mobility training (Done)     Learning Progress Summary           Patient Acceptance, E, VU,NR by LB at 1/1/2021 1204    Acceptance, E,TB, VU,NR by LB1 at 12/31/2020 1057    Acceptance, E,TB, VU,NR by LB1 at 12/30/2020 1133    Comment: walking    Acceptance, E,TB, VU,NR by LB1 at 12/29/2020 1050    Acceptance, E,TB, NR,VU by LB1 at 12/28/2020 1138                   Point: Home exercise program (Done)     Learning Progress Summary           Patient Acceptance, E,TB, NR,VU by 1 at 12/28/2020 1138                               User Key     Initials Effective Dates Name Provider Type Discipline    [facility] 04/03/18 -  Luly Francisco, PT Physical Therapist PT     03/07/18 -  Munira Azevedo PTA Physical Therapy Assistant PT                PT Recommendation and Plan            Patient was intermittently wearing a face mask during this therapy encounter. Therapist used appropriate personal protective equipment including eye protection, mask, and gloves.  Mask used was standard procedure mask. Appropriate PPE was worn during the entire therapy session. Hand hygiene was completed before and after therapy session. Patient is not in enhanced droplet precautions.                  Time Calculation:     PT Charges     Row Name 01/01/21 1045             Time Calculation    Start Time  1030  -LB      Stop Time  1100  -LB      Time Calculation (min)  30 min  -LB        User Key  (r) = Recorded By, (t) = Taken By, (c) = Cosigned By    Initials Name Provider Type    LB Munira Azevedo PTA Physical Therapy Assistant          Therapy Charges for Today     Code Description  Service Date Service Provider Modifiers Qty    78236714990 HC PT THER PROC EA 15 MIN 1/1/2021 Munira Azevedo, PTA GP 2                   Munira Azevedo, NAILA  1/1/2021

## 2021-01-01 NOTE — PROGRESS NOTES
"   LOS: 5 days   Patient Care Team:  Elsy Ventura as PCP - General (Nurse Practitioner)  Elsy Ventura as Nurse Practitioner (Nurse Practitioner)    Chief Complaint:   1. Lfemur fx s/p Bipolar hemiarthroplasty  2. Previous LHP/CVA  3. Gouty arthritis  4. HTN  5. HLD  6. Factor V Leiden        Subjective     History of Present Illness    Subjective     Slept better last night.  Denies fever/ chills/ headaches/ bodyaches/ sorethroat/ congestion/ change in taste or smell/ sob/ cough/ cp/ abdominal discomfort/ loose stools      History taken from: patient    Objective     Vital Signs  Temp:  [97.8 °F (36.6 °C)-98.4 °F (36.9 °C)] 98.4 °F (36.9 °C)  Heart Rate:  [] 86  Resp:  [18] 18  BP: (131-175)/(63-83) 131/63    Objective:  Vital signs: (most recent): Blood pressure 131/63, pulse 86, temperature 98.4 °F (36.9 °C), temperature source Oral, resp. rate 18, height 170.2 cm (67\"), weight 113 kg (249 lb 12.8 oz), SpO2 100 %.           Gen: NAD. Resting in bed.   HEENT: Trach midline.   Pulm: CTAB; no r/r/w; non-labored   Heart; RRR; no m/r/g appreciated   Abd: soft; NT/ND; +BS   Neuro: verbal; appropriate   Skin: no rashes on exposed skin   MSK: calves soft    : no Indwelling catheter      Results Review:     I reviewed the patient's new clinical results.  Results from last 7 days   Lab Units 12/31/20  1202   WBC 10*3/mm3 8.92   HEMOGLOBIN g/dL 8.9*   HEMATOCRIT % 28.7*   PLATELETS 10*3/mm3 270       Results from last 7 days   Lab Units 12/31/20  1202   SODIUM mmol/L 137   POTASSIUM mmol/L 5.0   CHLORIDE mmol/L 103   CO2 mmol/L 23.8   BUN mg/dL 24*   CREATININE mg/dL 1.77*   CALCIUM mg/dL 8.0*   BILIRUBIN mg/dL 0.6   ALK PHOS U/L 79   ALT (SGPT) U/L 24   AST (SGOT) U/L 36*   GLUCOSE mg/dL 153*       Results from last 7 days   Lab Units 01/01/21  0958 12/31/20  1202 12/30/20  0750   INR  2.85* 3.44* 2.73*       Medication Review: done  Scheduled Meds:allopurinol, 100 mg, Oral, Daily  amLODIPine, 5 mg, Oral, " Q24H  atorvastatin, 20 mg, Oral, Nightly  cholecalciferol, 2,000 Units, Oral, Daily  folic acid, 1 mg, Oral, Daily  levothyroxine, 100 mcg, Oral, Q AM  miconazole nitrate, , Topical, Q12H  polyethylene glycol, 17 g, Oral, Daily  senna-docusate sodium, 2 tablet, Oral, Nightly  traZODone, 25 mg, Oral, Nightly      Continuous Infusions:Pharmacy to dose warfarin,       PRN Meds:.•  acetaminophen  •  calcium carbonate  •  hydrALAZINE  •  HYDROcodone-acetaminophen  •  melatonin  •  Pharmacy to dose warfarin  •  polyethylene glycol  •  senna      Assessment/Plan       S/p left hip fracture      Assessment & Plan     Left femur fx s/p Bipolar hemiarthroplasty  anterior approach bipolar hemiarthroplasty on 12/22.  Patient is now weightbearing as tolerated.     Previous LHP/CVA    Gouty arthritis    HTN  Dec 31 -  today. Home med does not list any anti-hypertensive medication. Presently on amlodipine 5 mg daily.  Will add hydralazine to milligrams p.o. every 8 hours as needed systolic blood pressure greater than 170    HLD    Renal insuff - Cr 1.73 at outside hospital  Dec 1-creatinine 1.77    Anemia - HGB 8.9 on Dec 26  Dec 31-hemoglobin 8.9    Factor V Leiden - coumadin 6 mg daily and ASA 81 mg daily at home(presently off ASA)   Dec 29 - INR trending up on Coumadin 5 mg - daily INR  December 31-INR trended 3.44-dose adjustment per clinical pharmacist    DVT prophylaxis - coumadin/ SCDs    Pain management - FERN not available. On Norco 7.5 mg q 4 hours prn, ice pack    GI - constipation - multi-factorial - pain /immobility/pain med  Dec 30 - change colace to Senokot-S, add miralax    Impaired sleep  Dec 30 - trazodone 25 mg added on Dec 28  December 31-reports slept better    1/1/21  Stable. Working on ADLs , Functional mobility - PT and OT for transfers, mobility. Patient to increase endurance and aerobic capacity. having ongoing medical issues that require medication changes or non-pharmacological regimen  adjustments. All questions answered. Appreciate pharm recs. INR back to therapeutic levels (2-3). Will resume to 4 mg as rec'd by pharmacy. Check labs tomorrow.        Now admit for comprehensive acute inpatient rehabilitation .  This would be an interdisciplinary program with physical therapy 1 hour,  occupational therapy 1 hour, and speech therapy 1 hour, 5 days a week.  Rehabilitation nursing for carryover, monitoring of  Incision, pain, volume  status, bowel and bladder, and skin  Ongoing physician follow-up.  Weekly team conferences.      Goal is for home with  Home health   therapies.  Barrier to discharge:  Impaired mobility and ADLs  - work on  Strength, transfers, balance, gait, ADLs  to overcome.           Huy Browne MD  01/01/21  13:51 EST    Time:   During rounds, used appropriate personal protective equipment including mask and gloves.  Additional gown if indicated.  Mask used was standard procedure mask. Appropriate PPE was worn during the entire visit.  Hand hygiene was completed before and after.

## 2021-01-01 NOTE — PROGRESS NOTES
Inpatient Rehabilitation Plan of Care Note    Plan of Care  Care Plan Reviewed - No updates at this time.    Psychosocial    Performed Intervention(s)  Verbalizes needs & concerns      Safety    Performed Intervention(s)  Items within reach (call bell, etc), safety rounds, falls precautions, &  bed/chair alarms.      Pain    Performed Intervention(s)  Pain medication as ordered, distraction, & repositioning assistance as needed.      Body Systems    Performed Intervention(s)  Daily skin inspections, wound care, assist with repositioning, & dressing  changes as ordered.    Signed by: Carolina King RN

## 2021-01-01 NOTE — PROGRESS NOTES
Pharmacy Consult: Warfarin Dosing/ Monitoring    Alice Maxwell is a 75 y.o. female, estimated creatinine clearance is 34.5 mL/min (A) (by C-G formula based on SCr of 1.77 mg/dL (H)). weighing 113 kg (249 lb 12.8 oz).     has a past medical history of Abnormal Pap smear of cervix, Arthritis, Cancer (CMS/HCC), Disease of thyroid gland, Osteoporosis, Stroke (CMS/HCC), Stroke (CMS/HCC), and Urinary tract infection.    Social History     Tobacco Use    Smoking status: Never Smoker   Substance Use Topics    Alcohol use: No    Drug use: No       Results from last 7 days   Lab Units 01/01/21  0958 12/31/20  1202 12/30/20  0750 12/29/20  0734 12/28/20  0422 12/27/20  1725   INR  2.85* 3.44* 2.73* 2.39* 2.04* 1.74*   HEMOGLOBIN g/dL  --  8.9*  --   --   --   --    HEMATOCRIT %  --  28.7*  --   --   --   --    PLATELETS 10*3/mm3  --  270  --   --   --   --      Results from last 7 days   Lab Units 12/31/20  1202   SODIUM mmol/L 137   POTASSIUM mmol/L 5.0   CHLORIDE mmol/L 103   CO2 mmol/L 23.8   BUN mg/dL 24*   CREATININE mg/dL 1.77*   CALCIUM mg/dL 8.0*   BILIRUBIN mg/dL 0.6   ALK PHOS U/L 79   ALT (SGPT) U/L 24   AST (SGOT) U/L 36*   GLUCOSE mg/dL 153*     Anticoagulation history: Warfarin 6mg daily (home dose). Warfarin managed by [facility] for hx of Factor V Leiden.    Hospital Anticoagulation:  Consulting provider: Aric Chao MD  Start date: 12/27/20  Indication: Post Surgical - Hip, hypercoagulable disorder and hx of CVA  Target INR: 2-3  Expected duration: indefinite   Bridge Therapy: No                  Date 12/27 12/28 12/29 12/30 12/31 1/1/21       INR 1.74 2.04 2.39 2.73 3.44 2.85       Warfarin dose 6mg 6mg 6mg 5mg HOLD 4 mg         Potential drug interactions:   Allopurinol - may enhance the anticoagulant effect of Vitamin K Antagonists  Levothyroxine- Thyroid Products may enhance the anticoagulant effect of Vitamin K Antagonists  Norco - Patients taking acetaminophen at higher doses  and/or for longer duration may be at greater risk for experiencing a clinically significant interaction    Relevant nutrition status: diet regular; % of meals eaten per RN charting in the last 24 hrs.     Other:     Education complete?/ Date: Home medication    Assessment/Plan:  -INR is now therapeutic at 2.85    -Will restart at lower dose of Warfarin 4 mg daily.  Warfarin  6mg daily now appears to be too much even though this was patient's home dose previously.   -Continue to monitor INR daily given change in dose.     Pharmacy will continue to follow until discharge or discontinuation of warfarin.     Nicole Barrett,PharmD  1/1/2021

## 2021-01-01 NOTE — THERAPY TREATMENT NOTE
Inpatient Rehabilitation - Speech Language Pathology Treatment Note    [facility]     Patient Name: Alice Maxwell  : 1945  MRN: [id number]    Today's Date: 2021                   Admit Date: 2020       Visit Dx:      ICD-10-CM ICD-9-CM   1. Heartburn  R12 787.1       Patient Active Problem List   Diagnosis   • Stroke (CMS/HCC)   • Personal history of breast cancer   • History of loop electrical excision procedure (LEEP)   • History of abnormal cervical Pap smear   • S/p left hip fracture       Past Medical History:   Diagnosis Date   • Abnormal Pap smear of cervix    • Arthritis    • Cancer (CMS/HCC)     breast   • Disease of thyroid gland    • Osteoporosis    • Stroke (CMS/HCC)    • Stroke (CMS/HCC)     x2   • Urinary tract infection        Past Surgical History:   Procedure Laterality Date   • BACK SURGERY     • BREAST LUMPECTOMY     • CERVICAL BIOPSY  W/ LOOP ELECTRODE EXCISION     • CHOLECYSTECTOMY     • COLONOSCOPY      had polyps    • FOOT SURGERY     • GASTRIC BYPASS     • HERNIA REPAIR     • THYROIDECTOMY       Patient was intermittently wearing a face mask during this therapy encounter. Therapist used appropriate personal protective equipment including mask, eye protection and gloves.  Mask used was N95/duckbill. Appropriate PPE was worn during the entire therapy session. Hand hygiene was completed before and after therapy session. Patient is not in enhanced droplet precautions.       SLP GOALS     Row Name 21 1500 20 0942 20 1021       Memory Skills Goal 1 (SLP)    Improve Memory Skills Through Goal 1 (SLP)  recall details of the day;use memory strategies;use written schedule;listen to a paragraph and answer questions;visual memory task;read a paragraph and answer questions;select a word from a list by exclusion;recalling unrelated word lists with an imposed delay;recalling related word lists with an imposed delay;90%  -SH  --  --    Barriers (Memory Skills Goal  1, SLP)  --  --  could not recall where she left her juice this am  -    Progress (Memory Skills Goal 1, SLP)  --  50%;with minimal cues (75-90%) mental manipulation- 4 words- ranking  -SL  70%;80%;independently (over 90% accuracy) immed recall- digit sequences/sentences  -    Progress/Outcomes (Memory Skills Goal 1, SLP)  goal ongoing  -  goal ongoing  -  goal ongoing  -    Comment (Memory Skills Goal 1, SLP)  Word list retention-90% acc without cues, array of 4 words  -  70% for recall of short stories/paragraph  -SL  40% for recall of short paragraph;    90% for recall  of pictured scene;   2/3 items recalled after short 10 min delay  -       Organizational Skills Goal 1 (SLP)    Improve Thought Organization Through Goal 1 (SLP)  --  --  completing mental manipulation task;naming similarities and differences;completing a convergent naming task;completing a verbal sequencing task;90%;independently (over 90% accuracy)  -    Time Frame (Thought Organization Skills Goal 1, SLP)  --  --  by discharge  -    Progress (Thought Organization Skills Goal 1, SLP)  --  --  40%;independently (over 90% accuracy) category differentiation  -    Progress/Outcomes (Thought Organization Skills Goal 1, SLP)  --  --  goal ongoing  -    Comment (Thought Organization Skills Goal 1, SLP)  --  --  100% for simple abstract convergent categorization  -       Reasoning Goal 1 (SLP)    Improve Reasoning Through Goal 1 (SLP)  complete logic/creative thinking task;complete mental flexibility task;complete basic reasoning task;complete deductive reasoning task;90%;independently (over 90% accuracy)  -  --  --    Time Frame (Reasoning Goal 1, SLP)  by discharge  -  --  --    Progress (Reasoning Goal 1, SLP)  --  60%;70%;independently (over 90% accuracy) written IF THEN directives  -  40%;independently (over 90% accuracy) word deductions  -    Progress/Outcomes (Reasoning Goal 1, SLP)  goal ongoing  -  goal ongoing   -SL  goal ongoing  -SL    Comment (Reasoning Goal 1, SLP)  Naming category members when provided initial letter: 63% acc with min cues, sorting and remembering categories task-100% acc without cues.   -  --  68% for verbal analogies  -SL       Functional Problem Solving Skills Goal 1 (SLP)    Barriers (Problem Solving Goal 1, SLP)  --  decreased attn to details  -  --    Progress (Problem Solving Goal 1, SLP)  --  70%;80%;independently (over 90% accuracy) sequencing of 6 steps  -SL  70%;independently (over 90% accuracy);with minimal cues (75-90%) similarity/differences  -    Progress/Outcomes (Problem Solving Goal 1, SLP)  --  goal ongoing  -  goal ongoing  -SL    Comment (Problem Solving Goal 1, SLP)  --  90% for answering  questions re: simple bank statement  -  100% for 2 idea situational problem solving  -       Functional Math Skills Goal 1 (SLP)    Improve Functional Math Skills Through Goal 1 (SLP)  --  --  complete word problems involving time;complete word problems involving money;90%;independently (over 90% accuracy)  -SL    Time Frame (Functional Math Skills Goal 1, SLP)  --  --  by discharge  -    Progress (Functional Math Skills Goal 1, SLP)  --  --  30%;40%;independently (over 90% accuracy) simple word problems  -SL    Progress/Outcomes (Functional Math Skills Goal 1, SLP)  --  --  goal ongoing  -       Executive Functional Skills Goal 1 (SLP)    Barriers (Executive Function Skills Goal 1, SLP)  --  poor attn to details and impaired analysis and integration of info  -  --    Progress (Executive Function Skills Goal 1, SLP)  --  30%;independently (over 90% accuracy) planning/organizational task kitchen shelves-mult sent clues  -  --    Progress/Outcomes (Executive Function Skills Goal 1, SLP)  --  goal ongoing  -  --      User Key  (r) = Recorded By, (t) = Taken By, (c) = Cosigned By    Initials Name Provider Type    Gifty Martin MS CCC-SLP Speech and Language Pathologist      Lana Duke MS CCC-SLP Speech and Language Pathologist                      Time Calculation:       Time Calculation- SLP     Row Name 01/01/21 1511             Time Calculation- [facility]    SLP Start Time  1100  -      SLP Stop Time  1130  -      SLP Time Calculation (min)  30 min  -      SLP Received On  01/01/21  -        User Key  (r) = Recorded By, (t) = Taken By, (c) = Cosigned By    Initials Name Provider Type     Lana uDke MS CCC-SLP Speech and Language Pathologist            Therapy Charges for Today     Code Description Service Date Service Provider Modifiers Qty    84332560107 HC ST DEV OF COGN SKILLS INITIAL 15 MIN 1/1/2021 Lana Duke MS CCC-SLP  1    30811944599 HC ST DEV OF COGN SKILLS EACH ADDT'L 15 MIN 1/1/2021 Lana Duke MS CCC-SLP  1                           Lana Duke MS CCC-SLP  1/1/2021

## 2021-01-02 LAB
ANION GAP SERPL CALCULATED.3IONS-SCNC: 11.7 MMOL/L (ref 5–15)
BACTERIA UR QL AUTO: ABNORMAL /HPF
BILIRUB UR QL STRIP: NEGATIVE
BUN SERPL-MCNC: 22 MG/DL (ref 8–23)
BUN/CREAT SERPL: 12.2 (ref 7–25)
CALCIUM SPEC-SCNC: 8.1 MG/DL (ref 8.6–10.5)
CHLORIDE SERPL-SCNC: 104 MMOL/L (ref 98–107)
CLARITY UR: CLEAR
CO2 SERPL-SCNC: 23.3 MMOL/L (ref 22–29)
COLOR UR: YELLOW
CREAT SERPL-MCNC: 1.8 MG/DL (ref 0.57–1)
CREAT UR-MCNC: 37.8 MG/DL
GFR SERPL CREATININE-BSD FRML MDRD: 27 ML/MIN/1.73
GLUCOSE SERPL-MCNC: 147 MG/DL (ref 65–99)
GLUCOSE UR STRIP-MCNC: NEGATIVE MG/DL
HGB UR QL STRIP.AUTO: NEGATIVE
HYALINE CASTS UR QL AUTO: ABNORMAL /LPF
INR PPP: 3.05 (ref 0.9–1.1)
KETONES UR QL STRIP: NEGATIVE
LEUKOCYTE ESTERASE UR QL STRIP.AUTO: ABNORMAL
NITRITE UR QL STRIP: NEGATIVE
PH UR STRIP.AUTO: 8 [PH] (ref 5–8)
POTASSIUM SERPL-SCNC: 5.2 MMOL/L (ref 3.5–5.2)
PROT UR QL STRIP: NEGATIVE
PROT UR-MCNC: 7 MG/DL
PROT/CREAT UR: 185.2 MG/G CREA (ref 0–200)
PROTHROMBIN TIME: 31.3 SECONDS (ref 11.7–14.2)
RBC # UR: ABNORMAL /HPF
REF LAB TEST METHOD: ABNORMAL
SODIUM SERPL-SCNC: 139 MMOL/L (ref 136–145)
SP GR UR STRIP: 1.01 (ref 1–1.03)
SQUAMOUS #/AREA URNS HPF: ABNORMAL /HPF
UROBILINOGEN UR QL STRIP: ABNORMAL
WBC UR QL AUTO: ABNORMAL /HPF

## 2021-01-02 PROCEDURE — 84156 ASSAY OF PROTEIN URINE: CPT | Performed by: INTERNAL MEDICINE

## 2021-01-02 PROCEDURE — 80048 BASIC METABOLIC PNL TOTAL CA: CPT | Performed by: PHYSICAL MEDICINE & REHABILITATION

## 2021-01-02 PROCEDURE — 81001 URINALYSIS AUTO W/SCOPE: CPT | Performed by: INTERNAL MEDICINE

## 2021-01-02 PROCEDURE — 97530 THERAPEUTIC ACTIVITIES: CPT

## 2021-01-02 PROCEDURE — 63710000001 ONDANSETRON PER 8 MG: Performed by: PHYSICAL MEDICINE & REHABILITATION

## 2021-01-02 PROCEDURE — 97110 THERAPEUTIC EXERCISES: CPT

## 2021-01-02 PROCEDURE — 82570 ASSAY OF URINE CREATININE: CPT | Performed by: INTERNAL MEDICINE

## 2021-01-02 PROCEDURE — 85610 PROTHROMBIN TIME: CPT | Performed by: PHYSICAL MEDICINE & REHABILITATION

## 2021-01-02 RX ORDER — ONDANSETRON 4 MG/1
4 TABLET, FILM COATED ORAL EVERY 6 HOURS PRN
Status: DISCONTINUED | OUTPATIENT
Start: 2021-01-02 | End: 2021-01-12 | Stop reason: HOSPADM

## 2021-01-02 RX ORDER — WARFARIN SODIUM 2 MG/1
2 TABLET ORAL
Status: DISCONTINUED | OUTPATIENT
Start: 2021-01-02 | End: 2021-01-03

## 2021-01-02 RX ADMIN — CALCIUM CARBONATE 1 TABLET: 500 TABLET, CHEWABLE ORAL at 17:55

## 2021-01-02 RX ADMIN — Medication 2000 UNITS: at 08:24

## 2021-01-02 RX ADMIN — AMLODIPINE BESYLATE 5 MG: 5 TABLET ORAL at 08:24

## 2021-01-02 RX ADMIN — DOCUSATE SODIUM 50MG AND SENNOSIDES 8.6MG 2 TABLET: 8.6; 5 TABLET, FILM COATED ORAL at 20:45

## 2021-01-02 RX ADMIN — POLYETHYLENE GLYCOL 3350 17 G: 17 POWDER, FOR SOLUTION ORAL at 08:24

## 2021-01-02 RX ADMIN — ONDANSETRON HYDROCHLORIDE 4 MG: 4 TABLET, FILM COATED ORAL at 18:02

## 2021-01-02 RX ADMIN — MICONAZOLE NITRATE: 2 OINTMENT TOPICAL at 08:26

## 2021-01-02 RX ADMIN — HYDROCODONE BITARTRATE AND ACETAMINOPHEN 1 TABLET: 7.5; 325 TABLET ORAL at 16:08

## 2021-01-02 RX ADMIN — HYDROCODONE BITARTRATE AND ACETAMINOPHEN 1 TABLET: 7.5; 325 TABLET ORAL at 00:10

## 2021-01-02 RX ADMIN — HYDROCODONE BITARTRATE AND ACETAMINOPHEN 1 TABLET: 7.5; 325 TABLET ORAL at 20:46

## 2021-01-02 RX ADMIN — ALLOPURINOL 100 MG: 100 TABLET ORAL at 08:24

## 2021-01-02 RX ADMIN — TRAZODONE HYDROCHLORIDE 25 MG: 50 TABLET ORAL at 20:45

## 2021-01-02 RX ADMIN — ATORVASTATIN CALCIUM 20 MG: 20 TABLET, FILM COATED ORAL at 20:45

## 2021-01-02 RX ADMIN — ACETAMINOPHEN 325 MG: 325 TABLET, FILM COATED ORAL at 18:04

## 2021-01-02 RX ADMIN — WARFARIN 2 MG: 2 TABLET ORAL at 17:55

## 2021-01-02 RX ADMIN — HYDROCODONE BITARTRATE AND ACETAMINOPHEN 1 TABLET: 7.5; 325 TABLET ORAL at 12:04

## 2021-01-02 RX ADMIN — LEVOTHYROXINE SODIUM 100 MCG: 0.1 TABLET ORAL at 05:17

## 2021-01-02 RX ADMIN — MICONAZOLE NITRATE: 2 OINTMENT TOPICAL at 20:46

## 2021-01-02 RX ADMIN — Medication 3 MG: at 20:45

## 2021-01-02 RX ADMIN — HYDROCODONE BITARTRATE AND ACETAMINOPHEN 1 TABLET: 7.5; 325 TABLET ORAL at 04:12

## 2021-01-02 RX ADMIN — FOLIC ACID 1 MG: 1 TABLET ORAL at 08:24

## 2021-01-02 RX ADMIN — HYDROCODONE BITARTRATE AND ACETAMINOPHEN 1 TABLET: 7.5; 325 TABLET ORAL at 09:00

## 2021-01-02 NOTE — PLAN OF CARE
Goal Outcome Evaluation:  Plan of Care Reviewed With: patient  Progress: improving  Outcome Summary: Pt. A&OX4. Calm, cooperative, and pleasant. Complained of high level of pain. Given Noroc 7.5 every 4 hours. Ice pack also applied to hip. Need to keep blister on R. thigh covered with silicone border dressing. Changed dressing over incison to hip. Cover with ABD pad and paper tape. Miconazole nitrate ointment to groin. Ate better at meals. Tired but was able to get good sleep due to weekend therapy schedule. Diet: Reg, thin liquids. Meds a few at a time with liquid. Nephrology consult due to elevated kidney function labs today.

## 2021-01-02 NOTE — THERAPY TREATMENT NOTE
Inpatient Rehabilitation - Physical Therapy Treatment Note       [facility]     Patient Name: Alice Maxwell  : 1945  MRN: [id number]    Today's Date: 2021                    Admit Date: 2020      Visit Dx:     ICD-10-CM ICD-9-CM   1. Heartburn  R12 787.1       Patient Active Problem List   Diagnosis   • Stroke (CMS/HCC)   • Personal history of breast cancer   • History of loop electrical excision procedure (LEEP)   • History of abnormal cervical Pap smear   • S/p left hip fracture       Past Medical History:   Diagnosis Date   • Abnormal Pap smear of cervix    • Arthritis    • Cancer (CMS/HCC)     breast   • Disease of thyroid gland    • Osteoporosis    • Stroke (CMS/HCC)    • Stroke (CMS/HCC)     x2   • Urinary tract infection        Past Surgical History:   Procedure Laterality Date   • BACK SURGERY     • BREAST LUMPECTOMY     • CERVICAL BIOPSY  W/ LOOP ELECTRODE EXCISION     • CHOLECYSTECTOMY     • COLONOSCOPY      had polyps    • FOOT SURGERY     • GASTRIC BYPASS     • HERNIA REPAIR     • THYROIDECTOMY            PT ASSESSMENT (last 12 hours)      IRF PT Evaluation and Treatment     Row Name 21          PT Time and Intention    Document Type  daily treatment  -EE     Mode of Treatment  physical therapy  -EE     Patient/Family/Caregiver Comments/Observations  Pt sitting up in WC, agreeable to PT.   -EE     Row Name 21          General Information    Existing Precautions/Restrictions  fall;left;hip, anterior  -EE     Row Name 21          Cognition/Psychosocial    Orientation Status (Cognition)  oriented x 4  -EE     Follows Commands (Cognition)  follows one-step commands  -EE     Personal Safety Interventions  fall prevention program maintained;gait belt;muscle strengthening facilitated;nonskid shoes/slippers when out of bed;supervised activity  -EE     Row Name 21          Pain Scale: Numbers Pre/Post-Treatment    Pretreatment Pain Rating  6/10   -EE     Posttreatment Pain Rating  7/10  -EE     Pain Location - Side  Left  -EE     Pain Location  hip  -EE     Row Name 01/02/21 0918          Bed Mobility    Sit-Supine Rich (Bed Mobility)  moderate assist (50% patient effort);verbal cues  -EE     Row Name 01/02/21 0918          Transfers    Chair-Bed Rich (Transfers)  contact guard;verbal cues  -EE     Sit-Stand Rich (Transfers)  contact guard;minimum assist (75% patient effort);verbal cues  -EE     Stand-Sit Rich (Transfers)  contact guard;verbal cues  -EE     Row Name 01/02/21 0918          Chair-Bed Transfer    Assistive Device (Chair-Bed Transfers)  walker, front-wheeled  -EE     Row Name 01/02/21 0918          Sit-Stand Transfer    Assistive Device (Sit-Stand Transfers)  walker, front-wheeled;wheelchair  -EE     Row Name 01/02/21 0918          Stand-Sit Transfer    Assistive Device (Stand-Sit Transfers)  walker, front-wheeled;wheelchair  -EE     Row Name 01/02/21 0918          Gait/Stairs (Locomotion)    Rich Level (Gait)  contact guard;minimum assist (75% patient effort);verbal cues  -EE     Assistive Device (Gait)  walker, front-wheeled  -EE     Distance in Feet (Gait)  40', 25'  -EE     Pattern (Gait)  step-to;step-through step through ~25% of the time  -EE     Deviations/Abnormal Patterns (Gait)  janet decreased;stride length decreased;left sided deviations;antalgic;weight shifting decreased  -EE     Bilateral Gait Deviations  forward flexed posture  -EE     Left Sided Gait Deviations  heel strike decreased  -EE     Comment (Gait/Stairs)  Vc's for upright posture and gait sequencing with walker. Pt inititated step through gait pattern, able to step through ~25% of the time.   -EE     Row Name 01/02/21 0918          Safety Issues, Functional Mobility    Impairments Affecting Function (Mobility)  balance;endurance/activity tolerance;pain;strength  -EE     Row Name 01/02/21 0918          Knee (Therapeutic  Exercise)    Knee AROM (Therapeutic Exercise)  bilateral;LAQ (long arc quad);10 repetitions;sitting  -EE     Row Name 01/02/21 0918          Ankle (Therapeutic Exercise)    Ankle AROM (Therapeutic Exercise)  bilateral;dorsiflexion;plantarflexion;sitting;10 repetitions  -     Row Name 01/02/21 0918          Positioning and Restraints    Pre-Treatment Position  sitting in chair/recliner  -EE     Post Treatment Position  bed  -EE     In Bed  fowlers;call light within reach;encouraged to call for assist;exit alarm on;notified [facility]  -       User Key  (r) = Recorded By, (t) = Taken By, (c) = Cosigned By    Initials Name Provider Type     Jaqueline Soni, PT Physical Therapist        Wound 12/27/20 1601 Left anterior thigh Incision (Active)   Dressing Appearance dry;intact 01/01/21 2000   Closure NADIA 01/01/21 2000   Base dressing in place, unable to visualize 01/01/21 2000   Periwound intact 01/01/21 2000   Drainage Amount scant 01/01/21 1516   Care, Wound cleansed with;sterile half normal saline 01/01/21 1516   Dressing Care dressing changed 01/01/21 1516       Wound 12/27/20 1602 Left lateral thigh Blisters (Active)   Dressing Appearance dry;intact 01/01/21 2000   Closure NADIA 01/01/21 2000   Base dressing in place, unable to visualize 01/01/21 2000   Drainage Characteristics/Odor clear 01/01/21 1516   Drainage Amount scant 01/01/21 1516   Care, Wound cleansed with;sterile normal saline 01/01/21 1516   Dressing Care foam 01/01/21 1516       Wound 12/27/20 1602 Left anterior groin (Active)   Dressing Appearance open to air 01/01/21 2000   Closure None 01/01/21 2000   Base white 01/01/21 2000   Drainage Amount none 01/01/21 2000   Care, Wound cleansed with;soap and water;other (see comments) 01/01/21 2000     Physical Therapy Education                 Title: PT OT SLP Therapies (In Progress)     Topic: Physical Therapy (In Progress)     Point: Mobility training (Done)     Learning Progress Summary           Patient  Acceptance, E,TB, VU,NR by EE at 1/2/2021 0921    Acceptance, E, VU,NR by LB at 1/1/2021 1204    Acceptance, E,TB, VU,NR by LB1 at 12/31/2020 1057    Acceptance, E,TB, VU,NR by LB1 at 12/30/2020 1133    Comment: walking    Acceptance, E,TB, VU,NR by LB1 at 12/29/2020 1050    Acceptance, E,TB, NR,VU by LB1 at 12/28/2020 1138                   Point: Home exercise program (Done)     Learning Progress Summary           Patient Acceptance, E,TB, VU,NR by EE at 1/2/2021 0921    Acceptance, E,TB, NR,VU by LB1 at 12/28/2020 1138                   Point: Body mechanics (Not Started)     Learner Progress:  Not documented in this visit.          Point: Precautions (Done)     Learning Progress Summary           Patient Acceptance, E,TB, VU,NR by EE at 1/2/2021 0921                               User Key     Initials Effective Dates Name Provider Type Discipline    [facility] 04/03/18 -  Luly Francisco, PT Physical Therapist PT    [facility] 03/07/18 -  Munira Azevedo, PTA Physical Therapy Assistant PT     04/03/18 -  Jaqueline Soni PT Physical Therapist PT                PT Recommendation and Plan                          Time Calculation:     PT Charges     Row Name 01/02/21 1011             Time Calculation    Start Time  0900  -EE      Stop Time  0930  -EE      Time Calculation (min)  30 min  -EE      PT Received On  01/02/21  -EE      PT - Next Appointment  01/03/21  -EE         Time Calculation- PT    Total Timed Code Minutes- PT  30 minute(s)  -EE        User Key  (r) = Recorded By, (t) = Taken By, (c) = Cosigned By    Initials Name Provider Type    EE Jaqueline Soni, PT Physical Therapist          Therapy Charges for Today     Code Description Service Date Service Provider Modifiers Qty    73898682719 HC PT THERAPEUTIC ACT EA 15 MIN 1/2/2021 Jaqueline Soni, PT GP 1    00147360570 HC PT THER PROC EA 15 MIN 1/2/2021 Jaqueline Soni, PT GP 1              Patient was wearing a face mask during this therapy encounter. Therapist used  appropriate personal protective equipment including eye protection, mask, and gloves.  Mask used was standard procedure mask. Appropriate PPE was worn during the entire therapy session. Hand hygiene was completed before and after therapy session. Patient is not in enhanced droplet precautions.         Jaqueline Soni, PT  1/2/2021

## 2021-01-02 NOTE — PROGRESS NOTES
"   LOS: 6 days   Patient Care Team:  Elsy Ventura as PCP - General (Nurse Practitioner)  Elsy Ventura as Nurse Practitioner (Nurse Practitioner)    Chief Complaint:   1. Lfemur fx s/p Bipolar hemiarthroplasty  2. Previous LHP/CVA  3. Gouty arthritis  4. HTN  5. HLD  6. Factor V Leiden        Subjective     History of Present Illness    Subjective     Slept better last night.  Denies fever/ chills/ headaches/ bodyaches/ sorethroat/ congestion/ change in taste or smell/ sob/ cough/ cp/ abdominal discomfort/ loose stools. C/o persistent leg pain. Slept better last night.       History taken from: patient    Objective     Vital Signs  Temp:  [97.8 °F (36.6 °C)-98.2 °F (36.8 °C)] 97.8 °F (36.6 °C)  Heart Rate:  [92-98] 92  Resp:  [18] 18  BP: (130-183)/(62-81) 141/69    Objective:  Vital signs: (most recent): Blood pressure 141/69, pulse 92, temperature 97.8 °F (36.6 °C), temperature source Oral, resp. rate 18, height 170.2 cm (67\"), weight 113 kg (249 lb 12.8 oz), SpO2 99 %.           Gen: NAD. Eating lunch.    HEENT: Trach midline.   Pulm: CTAB; no r/r/w; non-labored   Heart; RRR; no m/r/g appreciated   Abd: soft; NT/ND; +BS   Neuro: verbal; appropriate   Skin: no rashes on exposed skin   : no Indwelling catheter      Results Review:     I reviewed the patient's new clinical results.  Results from last 7 days   Lab Units 12/31/20  1202   WBC 10*3/mm3 8.92   HEMOGLOBIN g/dL 8.9*   HEMATOCRIT % 28.7*   PLATELETS 10*3/mm3 270       Results from last 7 days   Lab Units 01/02/21  1012 12/31/20  1202   SODIUM mmol/L 139 137   POTASSIUM mmol/L 5.2 5.0   CHLORIDE mmol/L 104 103   CO2 mmol/L 23.3 23.8   BUN mg/dL 22 24*   CREATININE mg/dL 1.80* 1.77*   CALCIUM mg/dL 8.1* 8.0*   BILIRUBIN mg/dL  --  0.6   ALK PHOS U/L  --  79   ALT (SGPT) U/L  --  24   AST (SGOT) U/L  --  36*   GLUCOSE mg/dL 147* 153*       Results from last 7 days   Lab Units 01/02/21  1012 01/01/21  0958 12/31/20  1202   INR  3.05* 2.85* 3.44* "       Medication Review: done  Scheduled Meds:allopurinol, 100 mg, Oral, Daily  amLODIPine, 5 mg, Oral, Q24H  atorvastatin, 20 mg, Oral, Nightly  cholecalciferol, 2,000 Units, Oral, Daily  folic acid, 1 mg, Oral, Daily  levothyroxine, 100 mcg, Oral, Q AM  miconazole nitrate, , Topical, Q12H  polyethylene glycol, 17 g, Oral, Daily  senna-docusate sodium, 2 tablet, Oral, Nightly  traZODone, 25 mg, Oral, Nightly  warfarin, 4 mg, Oral, Daily      Continuous Infusions:Pharmacy to dose warfarin,       PRN Meds:.•  acetaminophen  •  calcium carbonate  •  hydrALAZINE  •  HYDROcodone-acetaminophen  •  melatonin  •  Pharmacy to dose warfarin  •  polyethylene glycol  •  senna      Assessment/Plan       S/p left hip fracture      Assessment & Plan     Left femur fx s/p Bipolar hemiarthroplasty  anterior approach bipolar hemiarthroplasty on 12/22.  Patient is now weightbearing as tolerated.     Previous LHP/CVA    Gouty arthritis    HTN  Dec 31 -  today. Home med does not list any anti-hypertensive medication. Presently on amlodipine 5 mg daily.  Will add hydralazine to milligrams p.o. every 8 hours as needed systolic blood pressure greater than 170    HLD    Renal insuff - Cr 1.73 at outside hospital  Dec 1-creatinine 1.77    Anemia - HGB 8.9 on Dec 26  Dec 31-hemoglobin 8.9    Factor V Leiden - coumadin 6 mg daily and ASA 81 mg daily at home(presently off ASA)   Dec 29 - INR trending up on Coumadin 5 mg - daily INR  December 31-INR trended 3.44-dose adjustment per clinical pharmacist    DVT prophylaxis - coumadin/ SCDs    Pain management - FERN not available. On Norco 7.5 mg q 4 hours prn, ice pack    GI - constipation - multi-factorial - pain /immobility/pain med  Dec 30 - change colace to Senokot-S, add miralax    Impaired sleep  Dec 30 - trazodone 25 mg added on Dec 28  December 31-reports slept better    1/1/21  Stable. Working on ADLs , Functional mobility - PT and OT for transfers, mobility. Patient to increase  endurance and aerobic capacity. having ongoing medical issues that require medication changes or non-pharmacological regimen adjustments. All questions answered. Appreciate pharm recs. INR back to therapeutic levels (2-3). Will resume to 4 mg as rec'd by pharmacy. Check labs tomorrow.     1/2/21  Remains stable. Continues to meet criteria for IRF. Chart reviewed. Continue therapies, Medical management and education, as well as ongoing active work on discharge planning  All questions answered. INr back to 3. Will decrease warfarin again to 2mg. Patient w h/o CVA in 2017. Creat trending up, will consult renal for evaluation. Considering Left LE xr if continues w pain. Pt participating in therapies.        Now admit for comprehensive acute inpatient rehabilitation .  This would be an interdisciplinary program with physical therapy 1 hour,  occupational therapy 1 hour, and speech therapy 1 hour, 5 days a week.  Rehabilitation nursing for carryover, monitoring of  Incision, pain, volume  status, bowel and bladder, and skin  Ongoing physician follow-up.  Weekly team conferences.      Goal is for home with  Home health   therapies.  Barrier to discharge:  Impaired mobility and ADLs  - work on  Strength, transfers, balance, gait, ADLs  to overcome.           Huy Browne MD  01/02/21  12:49 EST    Time:   During rounds, used appropriate personal protective equipment including mask and gloves.  Additional gown if indicated.  Mask used was standard procedure mask. Appropriate PPE was worn during the entire visit.  Hand hygiene was completed before and after.

## 2021-01-02 NOTE — PROGRESS NOTES
Inpatient Rehabilitation Plan of Care Note    Plan of Care  Care Plan Reviewed - No updates at this time.    Psychosocial    Performed Intervention(s)  Verbalizes needs & concerns      Safety    Performed Intervention(s)  Items within reach (call bell, etc), safety rounds, falls precautions, &  bed/chair alarms.      Sphincter Control    Performed Intervention(s)  Monitor intake, output, & BM's, Encourage appropriate diet & fluids, & perineal  care.      Pain    Performed Intervention(s)  Pain medication as ordered, distraction, & repositioning assistance as needed.      Body Systems    Performed Intervention(s)  Daily skin inspections, wound care, assist with repositioning, & dressing  changes as ordered.    Signed by: Jerry Aquino RN

## 2021-01-02 NOTE — CONSULTS
Referring Provider: Dr Browne   Reason for Consultation: elevated Cr     Subjective     Chief complaint No chief complaint on file.      History of present illness:  76 yo WF with h/o CVA, HTN, dyslipidemia, gout, factor V Leiden def transferred from [facility] two days ago for rehabilitation following left femur fracture.  DC summary noted, had coumadin toxicity, which was reversed, and underwent left bipolar hemiarthroplasty on 12/22/20.  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  Also treated for pseudomonal UTI with cefepime then levaquin.  Creatinine 1.8 yesterday and today.  It was 0.8 on 1/31/20 (a year ago), 2.4 mg/dL on 12/25/20, 2.3 on 12/26, 1.9 on 12/27.  Renal US on 12/24 showed diffuse cortical atrophy, RK 8.3cm and LK 7.5cm in size, no hydronephrosis.      BP labile here, 130s - 180s systolic, 141/62 most recently.  Potassium is 5.2, INR 3.0, and hgb 8.9.  Meds fairly benign ie no diuretics or ACE/ARB.  Denies nausea, vomiting, diarrhea, dysuria, or hesitancy.  Dr Ceron, my partner, saw patient in [facility].    Past Medical History:   Diagnosis Date   • Abnormal Pap smear of cervix    • Arthritis    • Cancer (CMS/HCC)     breast   • Disease of thyroid gland    • Osteoporosis    • Stroke (CMS/HCC)    • Stroke (CMS/HCC)     x2   • Urinary tract infection      Past Surgical History:   Procedure Laterality Date   • BACK SURGERY     • BREAST LUMPECTOMY     • CERVICAL BIOPSY  W/ LOOP ELECTRODE EXCISION     • CHOLECYSTECTOMY     • COLONOSCOPY      had polyps 2015   • FOOT SURGERY     • GASTRIC BYPASS     • HERNIA REPAIR     • THYROIDECTOMY       Family History   Problem Relation Age of Onset   • Breast cancer Daughter    • Breast cancer Daughter        Social History     Tobacco Use   • Smoking status: Never Smoker   Substance Use Topics   • Alcohol use: No   • Drug use: No     Medications Prior to Admission   Medication Sig Dispense Refill Last Dose   • allopurinol  "(ZYLOPRIM) 100 MG tablet Take 100 mg by mouth Daily.      • aspirin 81 MG chewable tablet Chew 81 mg Daily.   Past Week at Unknown time   • atorvastatin (LIPITOR) 20 MG tablet Take 20 mg by mouth Daily.      • calcium carbonate-cholecalciferol 500-400 MG-UNIT tablet tablet Take 1 tablet by mouth 2 (Two) Times a Day.   12/28/2020 at Unknown time   • cholecalciferol (VITAMIN D3) 1000 units tablet Take 2,000 Units by mouth Daily.   12/27/2020 at Unknown time   • folic acid (FOLVITE) 1 MG tablet Take 1 tablet by mouth Daily.   12/27/2020 at Unknown time   • levothyroxine (SYNTHROID, LEVOTHROID) 100 MCG tablet Take 100 mcg by mouth Daily.      • sennosides-docusate sodium (SENOKOT-S) 8.6-50 MG tablet Take 2 tablets by mouth 2 (Two) Times a Day.   12/27/2020 at Unknown time   • warfarin (COUMADIN) 6 MG tablet Take 6 mg by mouth Daily.        Allergies:  Penicillins    Review of Systems  Pertinent items are noted in HPI.    Objective     Vital Signs  Temp:  [97.7 °F (36.5 °C)-98.2 °F (36.8 °C)] 97.7 °F (36.5 °C)  Heart Rate:  [87-98] 87  Resp:  [18] 18  BP: (130-183)/(62-81) 141/62    Flowsheet Rows      First Filed Value   Admission Height  170.2 cm (67\") Documented at 12/27/2020 1530   Admission Weight  113 kg (249 lb 12.8 oz) Documented at 12/27/2020 1530           I/O this shift:  In: 680 [P.O.:680]  Out: -   I/O last 3 completed shifts:  In: 1180 [P.O.:1180]  Out: 500 [Urine:500]    Intake/Output Summary (Last 24 hours) at 1/2/2021 1556  Last data filed at 1/2/2021 1200  Gross per 24 hour   Intake 1040 ml   Output 300 ml   Net 740 ml       Physical Exam:  GEN pleasant WF in no acute distress, comfortable, alert  HEENT NC/ AT OP clear  Neck supple no JVD  Lungs CTA bilat no rales  CV RRR no m/g  abd soft NT/ND  vasc 1+ LLE pedal/ankle edema, trace RLE edema  MS no joint warmth or erythema  Derm normal turgor, no rash  Neuro oriented x3 speech intact     Results Review:  Results for orders placed or performed during the " hospital encounter of 12/27/20   COVID-19, MARGIE IN-HOUSE CEPHEID, NP SWAB IN TRANSPORT MEDIA 8-12 HR TAT - Swab, Nasopharynx    Specimen: Nasopharynx; Swab   Result Value Ref Range    COVID19 Not Detected Not Detected - Ref. Range   Protime-INR    Specimen: Blood   Result Value Ref Range    Protime 20.1 (H) 11.7 - 14.2 Seconds    INR 1.74 (H) 0.90 - 1.10   Protime-INR    Specimen: Blood   Result Value Ref Range    Protime 22.8 (H) 11.7 - 14.2 Seconds    INR 2.04 (H) 0.90 - 1.10   Protime-INR    Specimen: Blood   Result Value Ref Range    Protime 25.9 (H) 11.7 - 14.2 Seconds    INR 2.39 (H) 0.90 - 1.10   Protime-INR    Specimen: Blood   Result Value Ref Range    Protime 28.7 (H) 11.7 - 14.2 Seconds    INR 2.73 (H) 0.90 - 1.10   Protime-INR    Specimen: Hand, Left; Blood   Result Value Ref Range    Protime 34.4 (H) 11.7 - 14.2 Seconds    INR 3.44 (H) 0.90 - 1.10   Comprehensive Metabolic Panel    Specimen: Blood   Result Value Ref Range    Glucose 153 (H) 65 - 99 mg/dL    BUN 24 (H) 8 - 23 mg/dL    Creatinine 1.77 (H) 0.57 - 1.00 mg/dL    Sodium 137 136 - 145 mmol/L    Potassium 5.0 3.5 - 5.2 mmol/L    Chloride 103 98 - 107 mmol/L    CO2 23.8 22.0 - 29.0 mmol/L    Calcium 8.0 (L) 8.6 - 10.5 mg/dL    Total Protein 6.5 6.0 - 8.5 g/dL    Albumin 3.20 (L) 3.50 - 5.20 g/dL    ALT (SGPT) 24 1 - 33 U/L    AST (SGOT) 36 (H) 1 - 32 U/L    Alkaline Phosphatase 79 39 - 117 U/L    Total Bilirubin 0.6 0.0 - 1.2 mg/dL    eGFR Non African Amer 28 (L) >60 mL/min/1.73    Globulin 3.3 gm/dL    A/G Ratio 1.0 g/dL    BUN/Creatinine Ratio 13.6 7.0 - 25.0    Anion Gap 10.2 5.0 - 15.0 mmol/L   CBC Auto Differential    Specimen: Blood   Result Value Ref Range    WBC 8.92 3.40 - 10.80 10*3/mm3    RBC 3.23 (L) 3.77 - 5.28 10*6/mm3    Hemoglobin 8.9 (L) 12.0 - 15.9 g/dL    Hematocrit 28.7 (L) 34.0 - 46.6 %    MCV 88.9 79.0 - 97.0 fL    MCH 27.6 26.6 - 33.0 pg    MCHC 31.0 (L) 31.5 - 35.7 g/dL    RDW 14.1 12.3 - 15.4 %    RDW-SD 45.6 37.0 -  54.0 fl    MPV 10.2 6.0 - 12.0 fL    Platelets 270 140 - 450 10*3/mm3    Neutrophil % 78.5 (H) 42.7 - 76.0 %    Lymphocyte % 12.9 (L) 19.6 - 45.3 %    Monocyte % 5.0 5.0 - 12.0 %    Eosinophil % 2.2 0.3 - 6.2 %    Basophil % 0.4 0.0 - 1.5 %    Immature Grans % 1.0 (H) 0.0 - 0.5 %    Neutrophils, Absolute 6.99 1.70 - 7.00 10*3/mm3    Lymphocytes, Absolute 1.15 0.70 - 3.10 10*3/mm3    Monocytes, Absolute 0.45 0.10 - 0.90 10*3/mm3    Eosinophils, Absolute 0.20 0.00 - 0.40 10*3/mm3    Basophils, Absolute 0.04 0.00 - 0.20 10*3/mm3    Immature Grans, Absolute 0.09 (H) 0.00 - 0.05 10*3/mm3    nRBC 0.0 0.0 - 0.2 /100 WBC   Protime-INR    Specimen: Blood   Result Value Ref Range    Protime 29.7 (H) 11.7 - 14.2 Seconds    INR 2.85 (H) 0.90 - 1.10   Protime-INR    Specimen: Blood   Result Value Ref Range    Protime 31.3 (H) 11.7 - 14.2 Seconds    INR 3.05 (H) 0.90 - 1.10   Basic Metabolic Panel    Specimen: Blood   Result Value Ref Range    Glucose 147 (H) 65 - 99 mg/dL    BUN 22 8 - 23 mg/dL    Creatinine 1.80 (H) 0.57 - 1.00 mg/dL    Sodium 139 136 - 145 mmol/L    Potassium 5.2 3.5 - 5.2 mmol/L    Chloride 104 98 - 107 mmol/L    CO2 23.3 22.0 - 29.0 mmol/L    Calcium 8.1 (L) 8.6 - 10.5 mg/dL    eGFR Non African Amer 27 (L) >60 mL/min/1.73    BUN/Creatinine Ratio 12.2 7.0 - 25.0    Anion Gap 11.7 5.0 - 15.0 mmol/L     Imaging Results (Last 72 Hours)     ** No results found for the last 72 hours. **            allopurinol, 100 mg, Oral, Daily  amLODIPine, 5 mg, Oral, Q24H  atorvastatin, 20 mg, Oral, Nightly  cholecalciferol, 2,000 Units, Oral, Daily  folic acid, 1 mg, Oral, Daily  levothyroxine, 100 mcg, Oral, Q AM  miconazole nitrate, , Topical, Q12H  polyethylene glycol, 17 g, Oral, Daily  senna-docusate sodium, 2 tablet, Oral, Nightly  traZODone, 25 mg, Oral, Nightly  warfarin, 2 mg, Oral, Daily      Pharmacy to dose warfarin,         Assessment/Plan   Non olig ELDER - ATN related to vancomycin nephrotoxicity suspected at [facility]  per DC summary; peak Cr 2.5 there from what I could gather, now slowly improving, 1.8 yesterday/today.  Cr was normal 0.8 a year ago.  K generous 5.2.  Mild edema largely limited to surgical LLE.  Albumin low 3.2, will assess for proteinuria.      HTN - BP labile but adequate for now on norvasc 5mg  Left femur fracture s/p hemiarthroplasty 12/22/20   FVL def - on coumadin, INR 3.0   Dyslipidemia, on statin   Hypothyroidism, on synthroid   Gout, on allopurinol low dose 100mg; no acute flair   Anemia - hgb 8.9 post op     Plan  - low K diet for now  - recheck labs MON; will reassess then  - check UA, pr/cr ratio, iron stores    Thank you Dr Browne for involving me in pt's care.  D/w RN      S/p left hip fracture        I discussed the patient's findings and my recommendations with patient and nursing staff    Irving Larson MD  01/02/21  15:56 EST      Much of this encounter note is an electronic transcription/translation of spoken language to printed text. The electronic translation of spoken language may permit erroneous, or at times, nonsensical words or phrases to be inadvertently transcribed; Although I have reviewed the note for such errors, some may still exist

## 2021-01-02 NOTE — PROGRESS NOTES
Inpatient Rehabilitation Plan of Care Note    Plan of Care  Care Plan Reviewed - No updates at this time.    Psychosocial    Performed Intervention(s)  Verbalizes needs & concerns      Safety    Performed Intervention(s)  Items within reach (call bell, etc), safety rounds, falls precautions, &  bed/chair alarms.      Sphincter Control    Performed Intervention(s)  Monitor intake, output, & BM's, Encourage appropriate diet & fluids, & perineal  care.      Pain    Performed Intervention(s)  Pain medication as ordered, distraction, & repositioning assistance as needed.      Body Systems    Performed Intervention(s)  Daily skin inspections, wound care, assist with repositioning, & dressing  changes as ordered.    Signed by: Zee Poole RN

## 2021-01-02 NOTE — PLAN OF CARE
Goal Outcome Evaluation:  Plan of Care Reviewed With: patient  Progress: no change  Outcome Summary: Pt restred well this shift.  C/o pain to left hip.  Ice applied.  along with PO pain meds, q4.  Meds whole.  Up assist x1 in room.  Pt had [employer] store.

## 2021-01-03 LAB
INR PPP: 3.11 (ref 0.9–1.1)
PROTHROMBIN TIME: 31.7 SECONDS (ref 11.7–14.2)

## 2021-01-03 PROCEDURE — 85610 PROTHROMBIN TIME: CPT | Performed by: PHYSICAL MEDICINE & REHABILITATION

## 2021-01-03 PROCEDURE — 97530 THERAPEUTIC ACTIVITIES: CPT

## 2021-01-03 PROCEDURE — 63710000001 ONDANSETRON PER 8 MG: Performed by: PHYSICAL MEDICINE & REHABILITATION

## 2021-01-03 PROCEDURE — 97110 THERAPEUTIC EXERCISES: CPT

## 2021-01-03 RX ORDER — WARFARIN SODIUM 1 MG/1
1 TABLET ORAL
Status: DISCONTINUED | OUTPATIENT
Start: 2021-01-03 | End: 2021-01-04

## 2021-01-03 RX ADMIN — FOLIC ACID 1 MG: 1 TABLET ORAL at 08:05

## 2021-01-03 RX ADMIN — ATORVASTATIN CALCIUM 20 MG: 20 TABLET, FILM COATED ORAL at 22:16

## 2021-01-03 RX ADMIN — DOCUSATE SODIUM 50MG AND SENNOSIDES 8.6MG 2 TABLET: 8.6; 5 TABLET, FILM COATED ORAL at 22:16

## 2021-01-03 RX ADMIN — TRAZODONE HYDROCHLORIDE 25 MG: 50 TABLET ORAL at 22:16

## 2021-01-03 RX ADMIN — POLYETHYLENE GLYCOL 3350 17 G: 17 POWDER, FOR SOLUTION ORAL at 08:04

## 2021-01-03 RX ADMIN — ACETAMINOPHEN 325 MG: 325 TABLET, FILM COATED ORAL at 16:33

## 2021-01-03 RX ADMIN — Medication 3 MG: at 22:17

## 2021-01-03 RX ADMIN — Medication 2000 UNITS: at 08:05

## 2021-01-03 RX ADMIN — MICONAZOLE NITRATE: 2 OINTMENT TOPICAL at 08:08

## 2021-01-03 RX ADMIN — HYDROCODONE BITARTRATE AND ACETAMINOPHEN 1 TABLET: 7.5; 325 TABLET ORAL at 06:16

## 2021-01-03 RX ADMIN — HYDROCODONE BITARTRATE AND ACETAMINOPHEN 1 TABLET: 7.5; 325 TABLET ORAL at 22:16

## 2021-01-03 RX ADMIN — LEVOTHYROXINE SODIUM 100 MCG: 0.1 TABLET ORAL at 06:16

## 2021-01-03 RX ADMIN — ALLOPURINOL 100 MG: 100 TABLET ORAL at 08:05

## 2021-01-03 RX ADMIN — HYDROCODONE BITARTRATE AND ACETAMINOPHEN 1 TABLET: 7.5; 325 TABLET ORAL at 14:29

## 2021-01-03 RX ADMIN — ONDANSETRON HYDROCHLORIDE 4 MG: 4 TABLET, FILM COATED ORAL at 16:53

## 2021-01-03 RX ADMIN — HYDROCODONE BITARTRATE AND ACETAMINOPHEN 1 TABLET: 7.5; 325 TABLET ORAL at 02:34

## 2021-01-03 RX ADMIN — HYDROCODONE BITARTRATE AND ACETAMINOPHEN 1 TABLET: 7.5; 325 TABLET ORAL at 10:26

## 2021-01-03 RX ADMIN — WARFARIN 1 MG: 1 TABLET ORAL at 15:57

## 2021-01-03 RX ADMIN — MICONAZOLE NITRATE 1 APPLICATION: 2 OINTMENT TOPICAL at 22:19

## 2021-01-03 RX ADMIN — HYDROCODONE BITARTRATE AND ACETAMINOPHEN 1 TABLET: 7.5; 325 TABLET ORAL at 18:10

## 2021-01-03 RX ADMIN — AMLODIPINE BESYLATE 5 MG: 5 TABLET ORAL at 08:05

## 2021-01-03 NOTE — PLAN OF CARE
Goal Outcome Evaluation:  Plan of Care Reviewed With: patient  Progress: improving  Outcome Summary: Pt. A&OX4. Calm, cooperative, and pleasant. Less complaints of pain today. Given Noroc 7.5 every 4 hours. Ice pack applied PRN to hip. Need to keep blister on R. thigh covered with silicone border dressing. Changed dressing over incison to hip. Cover with ABD pad and paper tape. Miconazole nitrate ointment to groin. Ate better at meals. Tired but was able to get good sleep due to weekend therapy schedule. Diet: Reg, thin liquids. Meds a few at a time with liquid. Nephrology consulted y/d due to elevated kidney function labs y/d. Now on low potassium diet. Will have labs on Monday. Zofran added PRN. Consult to dietician.

## 2021-01-03 NOTE — PROGRESS NOTES
Pharmacy Consult: Warfarin Dosing/ Monitoring    Alice Maxwell is a 75 y.o. female, estimated creatinine clearance is 34 mL/min (A) (by C-G formula based on SCr of 1.8 mg/dL (H)). weighing 113 kg (249 lb 12.8 oz).     has a past medical history of Abnormal Pap smear of cervix, Arthritis, Cancer (CMS/HCC), Disease of thyroid gland, Osteoporosis, Stroke (CMS/HCC), Stroke (CMS/HCC), and Urinary tract infection.    Social History     Tobacco Use   • Smoking status: Never Smoker   Substance Use Topics   • Alcohol use: No   • Drug use: No     Results from last 7 days   Lab Units 01/03/21  0647 01/02/21  1012 01/01/21  0958 12/31/20  1202 12/30/20  0750 12/29/20  0734 12/28/20  0422   INR  3.11* 3.05* 2.85* 3.44* 2.73* 2.39* 2.04*   HEMOGLOBIN g/dL  --   --   --  8.9*  --   --   --    HEMATOCRIT %  --   --   --  28.7*  --   --   --    PLATELETS 10*3/mm3  --   --   --  270  --   --   --      Results from last 7 days   Lab Units 01/02/21  1012 12/31/20  1202   SODIUM mmol/L 139 137   POTASSIUM mmol/L 5.2 5.0   CHLORIDE mmol/L 104 103   CO2 mmol/L 23.3 23.8   BUN mg/dL 22 24*   CREATININE mg/dL 1.80* 1.77*   CALCIUM mg/dL 8.1* 8.0*   BILIRUBIN mg/dL  --  0.6   ALK PHOS U/L  --  79   ALT (SGPT) U/L  --  24   AST (SGOT) U/L  --  36*   GLUCOSE mg/dL 147* 153*     Anticoagulation history: Warfarin 6mg daily (home dose). Warfarin managed by [facility] for hx of Factor V Leiden.    Hospital Anticoagulation:  Consulting provider: Aric Chao MD  Start date: 12/27/20  Indication: Post Surgical - Hip, hypercoagulable disorder and hx of CVA  Target INR: 2-3  Expected duration: indefinite   Bridge Therapy: No                Date 12/27 12/28 12/29 12/30 12/31 01/01 01/02 01/03     INR 1.74 2.04 2.39 2.73 3.44 2.85 3.05 3.11     Warfarin dose 6mg 6mg 6mg 5mg HOLD 4 mg 2 mg        Potential drug interactions:   • Allopurinol - may enhance the anticoagulant effect of warfarin (home med)  • Levothyroxine- may enhance  the anticoagulant effect of warfarin (home med)  • Norco - Patients taking acetaminophen at higher doses and/or for longer duration may be at greater risk for experiencing a clinically significant interaction (not listed on med rec)   • Trazodone- May diminish the anticoagulant effect of warfarin. (not listed on med rec)  • Miconazole (Aloe Vesta) 2% ointment- May enhance the anticoagulant effect of warfarin.     Relevant nutrition status: regular diet; ~50% of meals eaten per RN charting in the last 24 hrs.     Education complete?/ Date: Home medication    Assessment/Plan:  1) Decrease warfarin to 1 mg PO daily.   2) INR ordered daily with AM labs.    Pharmacy will continue to follow until discharge or discontinuation of warfarin.     Irving Bauer, PharmD, MICHELLE, BCPS  01/03/21 09:42 EST

## 2021-01-03 NOTE — THERAPY TREATMENT NOTE
Inpatient Rehabilitation - Physical Therapy Treatment Note       [facility]     Patient Name: Alice Maxwell  : 1945  MRN: [id number]    Today's Date: 1/3/2021                    Admit Date: 2020      Visit Dx:     ICD-10-CM ICD-9-CM   1. Heartburn  R12 787.1       Patient Active Problem List   Diagnosis   • Stroke (CMS/HCC)   • Personal history of breast cancer   • History of loop electrical excision procedure (LEEP)   • History of abnormal cervical Pap smear   • S/p left hip fracture       Past Medical History:   Diagnosis Date   • Abnormal Pap smear of cervix    • Arthritis    • Cancer (CMS/HCC)     breast   • Disease of thyroid gland    • Osteoporosis    • Stroke (CMS/HCC)    • Stroke (CMS/HCC)     x2   • Urinary tract infection        Past Surgical History:   Procedure Laterality Date   • BACK SURGERY     • BREAST LUMPECTOMY     • CERVICAL BIOPSY  W/ LOOP ELECTRODE EXCISION     • CHOLECYSTECTOMY     • COLONOSCOPY      had polyps    • FOOT SURGERY     • GASTRIC BYPASS     • HERNIA REPAIR     • THYROIDECTOMY            PT ASSESSMENT (last 12 hours)      IRF PT Evaluation and Treatment     Row Name 21 0908          PT Time and Intention    Document Type  daily treatment  -EE     Mode of Treatment  physical therapy  -EE     Patient/Family/Caregiver Comments/Observations  Pt supine in bed, fatigued this AM.   -EE     Row Name 21 0908          General Information    Existing Precautions/Restrictions  fall;left;hip, anterior  -EE     Row Name 21 0908          Cognition/Psychosocial    Orientation Status (Cognition)  oriented x 4  -EE     Follows Commands (Cognition)  follows one-step commands  -EE     Personal Safety Interventions  fall prevention program maintained;gait belt;muscle strengthening facilitated;nonskid shoes/slippers when out of bed;supervised activity  -EE     Row Name 21 0908          Pain Assessment    Additional Documentation  Pain Scale: Numbers  Pre/Post-Treatment (Group)  -EE     Row Name 01/03/21 0908          Pain Scale: Numbers Pre/Post-Treatment    Pretreatment Pain Rating  0/10 - no pain while supine in bed  -EE     Posttreatment Pain Rating  7/10 after ambulating and exercising  -EE     Pain Location - Side  Left  -EE     Pain Location  hip  -EE     Row Name 01/03/21 0908          Bed Mobility    Supine-Sit [place] (Bed Mobility)  minimum assist (75% patient effort);verbal cues  -EE     Assistive Device (Bed Mobility)  bed rails;head of bed elevated  -EE     Row Name 01/03/21 0908          Transfers    Sit-Stand [place] (Transfers)  contact guard;verbal cues  -EE     Stand-Sit [place] (Transfers)  contact guard;verbal cues  -EE     Row Name 01/03/21 0908          Sit-Stand Transfer    Assistive Device (Sit-Stand Transfers)  walker, front-wheeled  -EE     Row Name 01/03/21 0908          Stand-Sit Transfer    Assistive Device (Stand-Sit Transfers)  walker, front-wheeled  -EE     Row Name 01/03/21 0908          Gait/Stairs (Locomotion)    [place] Level (Gait)  contact guard;verbal cues  -EE     Assistive Device (Gait)  walker, front-wheeled  -EE     Distance in Feet (Gait)  30'  -EE     Pattern (Gait)  step-to;step-through step through 50% of the time  -EE     Deviations/Abnormal Patterns (Gait)  janet decreased;stride length decreased;left sided deviations;antalgic;weight shifting decreased  -EE     Bilateral Gait Deviations  forward flexed posture  -EE     Left Sided Gait Deviations  heel strike decreased  -EE     Comment (Gait/Stairs)  vc's for upright posture  -EE     Row Name 01/03/21 0908          Safety Issues, Functional Mobility    Impairments Affecting Function (Mobility)  balance;endurance/activity tolerance;strength;pain  -EE     Row Name 01/03/21 0908          Hip (Therapeutic Exercise)    Hip (Therapeutic Exercise)  isometric exercises;AROM (active range of motion)  -EE     Hip AROM (Therapeutic Exercise)   bilateral;supine;aBduction;aDduction x15 reps, assist with L hip abd  -EE     Hip Isometrics (Therapeutic Exercise)  bilateral;supine;gluteal sets x15 reps  -EE     Row Name 01/03/21 0908          Knee (Therapeutic Exercise)    Knee (Therapeutic Exercise)  isometric exercises  -EE     Knee AROM (Therapeutic Exercise)  bilateral;supine;heel slides;sitting;LAQ (long arc quad) x15 reps, assist with L heel slide  -EE     Knee Isometrics (Therapeutic Exercise)  bilateral;supine;quad sets x15 reps  -EE     Row Name 01/03/21 0908          Ankle (Therapeutic Exercise)    Ankle AROM (Therapeutic Exercise)  bilateral;supine;dorsiflexion;plantarflexion x15 reps  -EE     Row Name 01/03/21 0908          Positioning and Restraints    Pre-Treatment Position  in bed  -EE     Post Treatment Position  chair  -EE     In Chair  reclined;call light within reach;encouraged to call for assist;exit alarm on;legs elevated;notified [facility]  -       User Key  (r) = Recorded By, (t) = Taken By, (c) = Cosigned By    Initials Name Provider Type     Jaqueline Soni, PT Physical Therapist        Wound 12/27/20 1601 Left anterior thigh Incision (Active)   Closure NADIA 01/02/21 2000   Base dressing in place, unable to visualize 01/02/21 2000   Periwound intact 01/02/21 2000       Wound 12/27/20 1602 Left lateral thigh Blisters (Active)   Dressing Appearance moist drainage 01/02/21 2000   Base red;pink 01/02/21 2000   Dressing Care dressing changed;foam 01/02/21 2000       Wound 12/27/20 1602 Left anterior groin (Active)   Closure None 01/02/21 2000   Base white 01/02/21 2000   Drainage Amount none 01/02/21 2000     Physical Therapy Education                 Title: PT OT SLP Therapies (In Progress)     Topic: Physical Therapy (In Progress)     Point: Mobility training (Done)     Learning Progress Summary           Patient Acceptance, E,TB, VU,NR by EE at 1/3/2021 0917    Acceptance, E,TB, VU,NR by EE at 1/2/2021 0921    Acceptance, E, VU,NR by LB at  1/1/2021 1204    Acceptance, E,TB, VU,NR by LB1 at 12/31/2020 1057    Acceptance, E,TB, VU,NR by LB1 at 12/30/2020 1133    Comment: walking    Acceptance, E,TB, VU,NR by LB1 at 12/29/2020 1050    Acceptance, E,TB, NR,VU by LB1 at 12/28/2020 1138                   Point: Home exercise program (Done)     Learning Progress Summary           Patient Acceptance, E,TB, VU,NR by EE at 1/3/2021 0917    Acceptance, E,TB, VU,NR by EE at 1/2/2021 0921    Acceptance, E,TB, NR,VU by LB1 at 12/28/2020 1138                   Point: Body mechanics (Not Started)     Learner Progress:  Not documented in this visit.          Point: Precautions (Done)     Learning Progress Summary           Patient Acceptance, E,TB, VU,NR by EE at 1/3/2021 0917    Acceptance, E,TB, VU,NR by EE at 1/2/2021 0921                               User Key     Initials Effective Dates Name Provider Type Discipline    [facility] 04/03/18 -  Luly Francisco, PT Physical Therapist PT    LB 03/07/18 -  Munira Azevedo, NAILA Physical Therapy Assistant PT     04/03/18 -  Jaqueline Soni, PT Physical Therapist PT                PT Recommendation and Plan                          Time Calculation:     PT Charges     Row Name 01/03/21 0959             Time Calculation    Start Time  0900  -EE      Stop Time  0930  -EE      Time Calculation (min)  30 min  -EE      PT Received On  01/03/21  -EE      PT - Next Appointment  01/04/21  -EE         Time Calculation- PT    Total Timed Code Minutes- PT  30 minute(s)  -EE        User Key  (r) = Recorded By, (t) = Taken By, (c) = Cosigned By    Initials Name Provider Type    EE Jaqueline Soni, PT Physical Therapist          Therapy Charges for Today     Code Description Service Date Service Provider Modifiers Qty    27575464073 HC PT THERAPEUTIC ACT EA 15 MIN 1/2/2021 Jaqueline Soni, PT GP 1    33884053421 HC PT THER PROC EA 15 MIN 1/2/2021 Jaqueline Soni, PT GP 1    02220396984 HC PT THER PROC EA 15 MIN 1/3/2021 Jaqueline Soni, PT GP 1     [id number]  PT THERAPEUTIC ACT EA 15 MIN 1/3/2021 Jaqueline Soni, PT GP 1               Patient was intermittently wearing a face mask during this therapy encounter. Therapist used appropriate personal protective equipment including eye protection, mask, and gloves.  Mask used was standard procedure mask. Appropriate PPE was worn during the entire therapy session. Hand hygiene was completed before and after therapy session. Patient is not in enhanced droplet precautions.       Jaqueline Soni, PT  1/3/2021

## 2021-01-03 NOTE — PROGRESS NOTES
"   LOS: 7 days   Patient Care Team:  Elsy Ventura as PCP - General (Nurse Practitioner)  Elsy Ventura as Nurse Practitioner (Nurse Practitioner)    Chief Complaint:   1. Lfemur fx s/p Bipolar hemiarthroplasty  2. Previous LHP/CVA  3. Gouty arthritis  4. HTN  5. HLD  6. Factor V Leiden        Subjective     History of Present Illness    Subjective  Feeling better this morning. Denies fever/ chills/ headaches/ bodyaches/ sorethroat/ congestion/ change in taste or smell/ sob/ cough/ cp/ abdominal discomfort/ loose stools. C/o persistent leg pain. Slept better last night.       History taken from: patient    Objective     Vital Signs  Temp:  [97.9 °F (36.6 °C)-98.3 °F (36.8 °C)] 97.9 °F (36.6 °C)  Heart Rate:  [83-96] 83  Resp:  [18] 18  BP: (121-143)/(53-72) 121/53    Objective:  Vital signs: (most recent): Blood pressure 121/53, pulse 83, temperature 97.9 °F (36.6 °C), temperature source Oral, resp. rate 18, height 170.2 cm (67\"), weight 113 kg (249 lb 12.8 oz), SpO2 98 %.           Gen: NAD. Eating lunch.    HEENT: Trach midline.   Pulm: CTAB; no r/r/w; non-labored   Heart; RRR; no m/r/g appreciated   Abd: soft; NT/ND; +BS   Neuro: verbal; appropriate   Skin: no rashes on exposed skin   : no Indwelling catheter      Results Review:     I reviewed the patient's new clinical results.  Results from last 7 days   Lab Units 12/31/20  1202   WBC 10*3/mm3 8.92   HEMOGLOBIN g/dL 8.9*   HEMATOCRIT % 28.7*   PLATELETS 10*3/mm3 270       Results from last 7 days   Lab Units 01/02/21  1012 12/31/20  1202   SODIUM mmol/L 139 137   POTASSIUM mmol/L 5.2 5.0   CHLORIDE mmol/L 104 103   CO2 mmol/L 23.3 23.8   BUN mg/dL 22 24*   CREATININE mg/dL 1.80* 1.77*   CALCIUM mg/dL 8.1* 8.0*   BILIRUBIN mg/dL  --  0.6   ALK PHOS U/L  --  79   ALT (SGPT) U/L  --  24   AST (SGOT) U/L  --  36*   GLUCOSE mg/dL 147* 153*       Results from last 7 days   Lab Units 01/03/21  0647 01/02/21  1012 01/01/21  0958   INR  3.11* 3.05* 2.85* "       Medication Review: done  Scheduled Meds:allopurinol, 100 mg, Oral, Daily  amLODIPine, 5 mg, Oral, Q24H  atorvastatin, 20 mg, Oral, Nightly  cholecalciferol, 2,000 Units, Oral, Daily  folic acid, 1 mg, Oral, Daily  levothyroxine, 100 mcg, Oral, Q AM  miconazole nitrate, , Topical, Q12H  polyethylene glycol, 17 g, Oral, Daily  senna-docusate sodium, 2 tablet, Oral, Nightly  traZODone, 25 mg, Oral, Nightly  warfarin, 1 mg, Oral, Daily      Continuous Infusions:Pharmacy to dose warfarin,       PRN Meds:.•  acetaminophen  •  calcium carbonate  •  hydrALAZINE  •  HYDROcodone-acetaminophen  •  melatonin  •  ondansetron  •  Pharmacy to dose warfarin  •  polyethylene glycol  •  senna      Assessment/Plan       S/p left hip fracture      Assessment & Plan     Left femur fx s/p Bipolar hemiarthroplasty  anterior approach bipolar hemiarthroplasty on 12/22.  Patient is now weightbearing as tolerated.     Previous LHP/CVA    Gouty arthritis    HTN  Dec 31 -  today. Home med does not list any anti-hypertensive medication. Presently on amlodipine 5 mg daily.  Will add hydralazine to milligrams p.o. every 8 hours as needed systolic blood pressure greater than 170    HLD    Renal insuff - Cr 1.73 at outside hospital  Dec 1-creatinine 1.77    Anemia - HGB 8.9 on Dec 26  Dec 31-hemoglobin 8.9    Factor V Leiden - coumadin 6 mg daily and ASA 81 mg daily at home(presently off ASA)   Dec 29 - INR trending up on Coumadin 5 mg - daily INR  December 31-INR trended 3.44-dose adjustment per clinical pharmacist    DVT prophylaxis - coumadin/ SCDs    Pain management - FERN not available. On Norco 7.5 mg q 4 hours prn, ice pack    GI - constipation - multi-factorial - pain /immobility/pain med  Dec 30 - change colace to Senokot-S, add miralax    Impaired sleep  Dec 30 - trazodone 25 mg added on Dec 28  December 31-reports slept better    1/1/21  Stable. Working on ADLs , Functional mobility - PT and OT for transfers, mobility.  Patient to increase endurance and aerobic capacity. having ongoing medical issues that require medication changes or non-pharmacological regimen adjustments. All questions answered. Appreciate pharm recs. INR back to therapeutic levels (2-3). Will resume to 4 mg as rec'd by pharmacy. Check labs tomorrow.     1/2/21  Remains stable. Continues to meet criteria for IRF. Chart reviewed. Continue therapies, Medical management and education, as well as ongoing active work on discharge planning  All questions answered. INr back to 3. Will decrease warfarin again to 2mg. Patient w h/o CVA in 2017. Creat trending up, will consult renal for evaluation. Considering Left LE xr if continues w pain. Pt participating in therapies.     1/3/21  Weekend coverage: Remains stable. Continues to meet criteria for IRF. Chart reviewed. Team working on ADLs , Functional mobility -,as well as cognition.  Continues to require medical management and education, as well as ongoing active work on discharge planning  All questions answered. Appreciates renal input. Labs schedule for MON. INR elevated. Continue 1 mg qhs.  Pharmacy to continue dosage of warfarin tomorrow. Able to walk with PT. Continue pain medication. Patient continues to make gains.        Now admit for comprehensive acute inpatient rehabilitation .  This would be an interdisciplinary program with physical therapy 1 hour,  occupational therapy 1 hour, and speech therapy 1 hour, 5 days a week.  Rehabilitation nursing for carryover, monitoring of  Incision, pain, volume  status, bowel and bladder, and skin  Ongoing physician follow-up.  Weekly team conferences.      Goal is for home with  Home health   therapies.  Barrier to discharge:  Impaired mobility and ADLs  - work on  Strength, transfers, balance, gait, ADLs  to overcome.           Huy Browne MD  01/03/21  12:51 EST    Time:   During rounds, used appropriate personal protective equipment including mask and gloves.   Additional gown if indicated.  Mask used was standard procedure mask. Appropriate PPE was worn during the entire visit.  Hand hygiene was completed before and after.

## 2021-01-03 NOTE — PLAN OF CARE
Goal Outcome Evaluation:  Plan of Care Reviewed With: patient  Progress: improving  Outcome Summary: Pt rested well this shift.  Up to bedside commode.  Uses call light for assistance.  Requested pain meds as available.  wound dressing changed d/t moist drainage on mepilex.  Meds whole with water.  cont bladder.  Miconazole applied to abdominal fold.

## 2021-01-04 LAB
ALBUMIN SERPL-MCNC: 3.4 G/DL (ref 3.5–5.2)
ALBUMIN/GLOB SERPL: 1.1 G/DL
ALP SERPL-CCNC: 69 U/L (ref 39–117)
ALT SERPL W P-5'-P-CCNC: 18 U/L (ref 1–33)
ANION GAP SERPL CALCULATED.3IONS-SCNC: 12.5 MMOL/L (ref 5–15)
AST SERPL-CCNC: 29 U/L (ref 1–32)
BASOPHILS # BLD AUTO: 0.05 10*3/MM3 (ref 0–0.2)
BASOPHILS NFR BLD AUTO: 0.7 % (ref 0–1.5)
BILIRUB SERPL-MCNC: 0.5 MG/DL (ref 0–1.2)
BUN SERPL-MCNC: 21 MG/DL (ref 8–23)
BUN/CREAT SERPL: 12.5 (ref 7–25)
CALCIUM SPEC-SCNC: 7.7 MG/DL (ref 8.6–10.5)
CHLORIDE SERPL-SCNC: 104 MMOL/L (ref 98–107)
CO2 SERPL-SCNC: 23.5 MMOL/L (ref 22–29)
CREAT SERPL-MCNC: 1.68 MG/DL (ref 0.57–1)
DEPRECATED RDW RBC AUTO: 44.2 FL (ref 37–54)
EOSINOPHIL # BLD AUTO: 0.2 10*3/MM3 (ref 0–0.4)
EOSINOPHIL NFR BLD AUTO: 2.7 % (ref 0.3–6.2)
ERYTHROCYTE [DISTWIDTH] IN BLOOD BY AUTOMATED COUNT: 14.1 % (ref 12.3–15.4)
FERRITIN SERPL-MCNC: 156 NG/ML (ref 13–150)
GFR SERPL CREATININE-BSD FRML MDRD: 30 ML/MIN/1.73
GLOBULIN UR ELPH-MCNC: 3.1 GM/DL
GLUCOSE SERPL-MCNC: 104 MG/DL (ref 65–99)
HCT VFR BLD AUTO: 28.5 % (ref 34–46.6)
HGB BLD-MCNC: 8.8 G/DL (ref 12–15.9)
IMM GRANULOCYTES # BLD AUTO: 0.03 10*3/MM3 (ref 0–0.05)
IMM GRANULOCYTES NFR BLD AUTO: 0.4 % (ref 0–0.5)
INR PPP: 2.45 (ref 0.9–1.1)
IRON 24H UR-MRATE: 58 MCG/DL (ref 37–145)
IRON SATN MFR SERPL: 20 % (ref 20–50)
LYMPHOCYTES # BLD AUTO: 2.17 10*3/MM3 (ref 0.7–3.1)
LYMPHOCYTES NFR BLD AUTO: 29.5 % (ref 19.6–45.3)
MCH RBC QN AUTO: 27 PG (ref 26.6–33)
MCHC RBC AUTO-ENTMCNC: 30.9 G/DL (ref 31.5–35.7)
MCV RBC AUTO: 87.4 FL (ref 79–97)
MONOCYTES # BLD AUTO: 0.5 10*3/MM3 (ref 0.1–0.9)
MONOCYTES NFR BLD AUTO: 6.8 % (ref 5–12)
NEUTROPHILS NFR BLD AUTO: 4.4 10*3/MM3 (ref 1.7–7)
NEUTROPHILS NFR BLD AUTO: 59.9 % (ref 42.7–76)
NRBC BLD AUTO-RTO: 0 /100 WBC (ref 0–0.2)
PHOSPHATE SERPL-MCNC: 4.3 MG/DL (ref 2.5–4.5)
PLATELET # BLD AUTO: 343 10*3/MM3 (ref 140–450)
PMV BLD AUTO: 10 FL (ref 6–12)
POTASSIUM SERPL-SCNC: 4.8 MMOL/L (ref 3.5–5.2)
PROT SERPL-MCNC: 6.5 G/DL (ref 6–8.5)
PROTHROMBIN TIME: 26.4 SECONDS (ref 11.7–14.2)
RBC # BLD AUTO: 3.26 10*6/MM3 (ref 3.77–5.28)
SODIUM SERPL-SCNC: 140 MMOL/L (ref 136–145)
TIBC SERPL-MCNC: 288 MCG/DL (ref 298–536)
TRANSFERRIN SERPL-MCNC: 193 MG/DL (ref 200–360)
WBC # BLD AUTO: 7.35 10*3/MM3 (ref 3.4–10.8)

## 2021-01-04 PROCEDURE — 97535 SELF CARE MNGMENT TRAINING: CPT

## 2021-01-04 PROCEDURE — 97530 THERAPEUTIC ACTIVITIES: CPT

## 2021-01-04 PROCEDURE — 85610 PROTHROMBIN TIME: CPT | Performed by: PHYSICAL MEDICINE & REHABILITATION

## 2021-01-04 PROCEDURE — 97110 THERAPEUTIC EXERCISES: CPT

## 2021-01-04 PROCEDURE — 97129 THER IVNTJ 1ST 15 MIN: CPT

## 2021-01-04 PROCEDURE — 82728 ASSAY OF FERRITIN: CPT | Performed by: INTERNAL MEDICINE

## 2021-01-04 PROCEDURE — 84100 ASSAY OF PHOSPHORUS: CPT | Performed by: INTERNAL MEDICINE

## 2021-01-04 PROCEDURE — 97116 GAIT TRAINING THERAPY: CPT

## 2021-01-04 PROCEDURE — 83540 ASSAY OF IRON: CPT | Performed by: INTERNAL MEDICINE

## 2021-01-04 PROCEDURE — 80053 COMPREHEN METABOLIC PANEL: CPT | Performed by: PHYSICAL MEDICINE & REHABILITATION

## 2021-01-04 PROCEDURE — 84466 ASSAY OF TRANSFERRIN: CPT | Performed by: INTERNAL MEDICINE

## 2021-01-04 PROCEDURE — 97130 THER IVNTJ EA ADDL 15 MIN: CPT

## 2021-01-04 PROCEDURE — 85025 COMPLETE CBC W/AUTO DIFF WBC: CPT | Performed by: PHYSICAL MEDICINE & REHABILITATION

## 2021-01-04 RX ORDER — TORSEMIDE 20 MG/1
40 TABLET ORAL DAILY
Status: DISCONTINUED | OUTPATIENT
Start: 2021-01-04 | End: 2021-01-06

## 2021-01-04 RX ORDER — WARFARIN SODIUM 3 MG/1
3 TABLET ORAL
Status: DISCONTINUED | OUTPATIENT
Start: 2021-01-04 | End: 2021-01-05

## 2021-01-04 RX ADMIN — POLYETHYLENE GLYCOL 3350 17 G: 17 POWDER, FOR SOLUTION ORAL at 07:24

## 2021-01-04 RX ADMIN — HYDROCODONE BITARTRATE AND ACETAMINOPHEN 1 TABLET: 7.5; 325 TABLET ORAL at 07:22

## 2021-01-04 RX ADMIN — AMLODIPINE BESYLATE 5 MG: 5 TABLET ORAL at 07:23

## 2021-01-04 RX ADMIN — MICONAZOLE NITRATE 1 APPLICATION: 2 OINTMENT TOPICAL at 22:05

## 2021-01-04 RX ADMIN — Medication 2000 UNITS: at 07:22

## 2021-01-04 RX ADMIN — HYDROCODONE BITARTRATE AND ACETAMINOPHEN 1 TABLET: 7.5; 325 TABLET ORAL at 15:39

## 2021-01-04 RX ADMIN — ALLOPURINOL 100 MG: 100 TABLET ORAL at 07:23

## 2021-01-04 RX ADMIN — HYDROCODONE BITARTRATE AND ACETAMINOPHEN 1 TABLET: 7.5; 325 TABLET ORAL at 22:04

## 2021-01-04 RX ADMIN — TORSEMIDE 40 MG: 20 TABLET ORAL at 11:29

## 2021-01-04 RX ADMIN — WARFARIN 3 MG: 3 TABLET ORAL at 16:44

## 2021-01-04 RX ADMIN — ATORVASTATIN CALCIUM 20 MG: 20 TABLET, FILM COATED ORAL at 22:03

## 2021-01-04 RX ADMIN — LEVOTHYROXINE SODIUM 100 MCG: 0.1 TABLET ORAL at 05:36

## 2021-01-04 RX ADMIN — CALCIUM CARBONATE 1 TABLET: 500 TABLET, CHEWABLE ORAL at 11:44

## 2021-01-04 RX ADMIN — FOLIC ACID 1 MG: 1 TABLET ORAL at 07:23

## 2021-01-04 RX ADMIN — HYDROCODONE BITARTRATE AND ACETAMINOPHEN 1 TABLET: 7.5; 325 TABLET ORAL at 02:35

## 2021-01-04 RX ADMIN — MICONAZOLE NITRATE 1 APPLICATION: 2 OINTMENT TOPICAL at 07:24

## 2021-01-04 RX ADMIN — HYDROCODONE BITARTRATE AND ACETAMINOPHEN 1 TABLET: 7.5; 325 TABLET ORAL at 11:25

## 2021-01-04 RX ADMIN — TRAZODONE HYDROCHLORIDE 25 MG: 50 TABLET ORAL at 22:03

## 2021-01-04 RX ADMIN — Medication 3 MG: at 22:03

## 2021-01-04 RX ADMIN — CALCIUM CARBONATE 1 TABLET: 500 TABLET, CHEWABLE ORAL at 18:45

## 2021-01-04 RX ADMIN — DOCUSATE SODIUM 50MG AND SENNOSIDES 8.6MG 2 TABLET: 8.6; 5 TABLET, FILM COATED ORAL at 22:03

## 2021-01-04 NOTE — PLAN OF CARE
"Goal Outcome Evaluation:  Plan of Care Reviewed With: patient  Progress: improving    Pt is alert and oriented, yet forgetful. She follows all commands. She is frequently on the call light wanting help with \"searching for her phone\" or \" not being comfortable\". Pain medication given every 4 hours for left hip pain. States pain medication only works for \"a little.\". Left hip incision intact. Staples intact. There was yellow drainage on the island dressing, but no active drainage on the site when RN pushes along the incision site. Mepliex changed where left hip blister was, no drainage to the area. Cleaned site with NS. No unsafe behaviors seen.       "

## 2021-01-04 NOTE — PLAN OF CARE
Goal Outcome Evaluation:  Plan of Care Reviewed With: patient    Problem: Rehabilitation (IRF) Plan of Care  Goal: Plan of Care Review  Outcome: Ongoing, Progressing  Flowsheets  Taken 1/4/2021 0252 by Svetlana Reynaga RN  Outcome Summary: Pt is alert and oriented x 4. Flat affect. Seeks out staff frequently and can be demanding. Takes meds whole PO. Regular, low potassium diet with thin liquids. Continent of bowel and bladder. Uses bedside commode at night. last BM 1/3. Assist x 1 during transfers with walker. Moist area under left abd fold, antifungal cream applied. Incision to left hip, staples intact. Awaiting orders to remove staples. Covered with island border dressing. No redness or drainage. Blister to left hip previously popped, covered with foam dressing. WBAT. INR daily. Having ferritin, iron and phopsphorus labs in am. PRN [place] administered at 2215 and 0230 for left hip. Resting quietly at this time. Call light within reach.

## 2021-01-04 NOTE — THERAPY PROGRESS REPORT/RE-CERT
Inpatient Rehabilitation - Occupational Therapy Progress Note    [facility]     Patient Name: Alice Maxwell  : 1945  MRN: [id number]    Today's Date: 2021                 Admit Date: 2020         ICD-10-CM ICD-9-CM   1. Heartburn  R12 787.1       Patient Active Problem List   Diagnosis   • Stroke (CMS/HCC)   • Personal history of breast cancer   • History of loop electrical excision procedure (LEEP)   • History of abnormal cervical Pap smear   • S/p left hip fracture       Past Medical History:   Diagnosis Date   • Abnormal Pap smear of cervix    • Arthritis    • Cancer (CMS/HCC)     breast   • Disease of thyroid gland    • Osteoporosis    • Stroke (CMS/HCC)    • Stroke (CMS/HCC)     x2   • Urinary tract infection        Past Surgical History:   Procedure Laterality Date   • BACK SURGERY     • BREAST LUMPECTOMY     • CERVICAL BIOPSY  W/ LOOP ELECTRODE EXCISION     • CHOLECYSTECTOMY     • COLONOSCOPY      had polyps    • FOOT SURGERY     • GASTRIC BYPASS     • HERNIA REPAIR     • THYROIDECTOMY                  IRF OT ASSESSMENT FLOWSHEET (last 12 hours)      IRF OT Evaluation and Treatment     Row Name 21 0800          OT Time and Intention    Document Type  progress note  -BT     Mode of Treatment  occupational therapy  -BT     Patient Effort  adequate  -BT     Symptoms Noted During/After Treatment  fatigue  -BT     Row Name 21 0800          Bed Mobility    Comment (Bed Mobility)  pt up in chair upon OT arrival   -BT     Row Name 21 0800          Transfer Assessment/Treatment    Transfers  sit-stand transfer;toilet transfer  -BT     Row Name 21 0800          Transfers    Sit-Stand [place] (Transfers)  contact guard;verbal cues  -BT     Stand-Sit [place] (Transfers)  contact guard;verbal cues  -BT     [place] Level (Toilet Transfer)  contact guard;verbal cues  -BT     Assistive Device (Toilet Transfer)  commode;grab bars/safety frame;wheelchair  -BT      Row Name 01/04/21 0800          Sit-Stand Transfer    Assistive Device (Sit-Stand Transfers)  walker, front-wheeled  -BT     Row Name 01/04/21 0800          Stand-Sit Transfer    Assistive Device (Stand-Sit Transfers)  walker, front-wheeled  -BT     Row Name 01/04/21 0800          Toilet Transfer    Type (Toilet Transfer)  stand pivot/stand step  -BT     Row Name 01/04/21 0800          Safety Issues, Functional Mobility    Impairments Affecting Function (Mobility)  balance;endurance/activity tolerance;strength;pain  -BT     Row Name 01/04/21 0800          Bathing    [place] Level (Bathing)  bathing skills;upper body;set up;lower body;minimum assist (75% patient effort)  -BT     Position (Bathing)  supported sitting;supported standing  -BT     Row Name 01/04/21 0800          Upper Body Dressing    [place] Level (Upper Body Dressing)  upper body dressing skills;set up assistance  -BT     Position (Upper Body Dressing)  supported sitting  -BT     Row Name 01/04/21 0800          Lower Body Dressing    [place] Level (Lower Body Dressing)  doff;don;pants/bottoms;verbal cues;moderate assist (50% patient effort)  -BT     Position (Lower Body Dressing)  supported sitting;supported standing  -BT     Row Name 01/04/21 0800          Grooming    [place] Level (Grooming)  grooming skills;set up  -BT     Position (Grooming)  supported sitting;sink side  -BT     Row Name 01/04/21 0800          Toileting    [place] Level (Toileting)  toileting skills;verbal cues;minimum assist (75% patient effort)  -BT     Position (Toileting)  supported sitting;supported standing  -BT     Row Name 01/04/21 0800          Transfer Goal 1 (OT-IRF)    Activity/Assistive Device (Transfer Goal 1, OT-IRF)  toilet;walk-in shower;walker, rolling  -BT     [place] Level (Transfer Goal 1, OT-IRF)  minimum assist (75% or more patient effort);verbal cues required  -BT     Time Frame (Transfer Goal 1, OT-IRF)  short-term goal (STG)   -BT     Progress/Outcomes (Transfer Goal 1, OT-IRF)  goal partially met  -BT     Row Name 01/04/21 0800          Transfer Goal 2 (OT-IRF)    Activity/Assistive Device (Transfer Goal 2, OT-IRF)  toilet;walk-in shower;tub bench;walker, rolling  -BT     [place] Level (Transfer Goal 2, OT-IRF)  standby assist  -BT     Progress/Outcomes (Transfer Goal 2, OT-IRF)  goal ongoing  -BT     Row Name 01/04/21 0800          Bathing Goal 1 (OT-IRF)    Activity/Device (Bathing Goal 1, OT-IRF)  bathing skills, all;grab bar, tub/shower;hand-held shower spray hose;tub bench  -BT     [place] Level (Bathing Goal 1, OT-IRF)  moderate assist (50-74% patient effort);verbal cues required  -BT     Time Frame (Bathing Goal 1, OT-IRF)  short-term goal (STG)  -BT     Progress/Outcomes (Bathing Goal 1, OT-IRF)  goal partially met  -BT     Row Name 01/04/21 0800          Bathing Goal 2 (OT-IRF)    Activity/Device (Bathing Goal 2, OT-IRF)  bathing skills, all;grab bar, tub/shower;hand-held shower spray hose;tub bench  -BT     [place] Level (Bathing Goal 2, OT-IRF)  contact guard assist;standby assist  -BT     Time Frame (Bathing Goal 2, OT-IRF)  long-term goal (LTG)  -BT     Progress/Outcomes (Bathing Goal 2, OT-IRF)  goal ongoing  -BT     Row Name 01/04/21 0800          UB Dressing Goal 1 (OT-IRF)    Activity/Device (UB Dressing Goal 1, OT-IRF)  upper body dressing  -BT     [place] (UB Dress Goal 1, OT-IRF)  set-up required  -BT     Time Frame (UB Dressing Goal 1, OT-IRF)  long-term goal (LTG)  -BT     Progress/Outcomes (UB Dressing Goal 1, OT-IRF)  goal met  -BT     Row Name 01/04/21 0800          LB Dressing Goal 1 (OT-IRF)    Activity/Device (LB Dressing Goal 1, OT-IRF)  lower body dressing;reacher;sock aid;long-handled shoe horn  -BT     [place] (LB Dressing Goal 1, OT-IRF)  moderate assist (50-74% patient effort);verbal cues required  -BT     Time Frame (LB Dressing Goal 1, OT-IRF)  short-term goal (STG)  -BT      Progress/Outcomes (LB Dressing Goal 1, OT-IRF)  goal met  -BT     Row Name 01/04/21 0800          LB Dressing Goal 2 (OT-IRF)    Activity/Device (LB Dressing Goal 2, OT-IRF)  lower body dressing;long-handled shoe horn;reacher;sock aid  -BT     [place] (LB Dressing Goal 2, OT-IRF)  contact guard assist;verbal cues required  -BT     Time Frame (LB Dressing Goal 2, OT-IRF)  long-term goal (LTG)  -BT     Progress/Outcomes (LB Dressing Goal 2, OT-IRF)  goal ongoing  -BT     Row Name 01/04/21 0800          Grooming Goal 1 (OT-IRF)    Activity/Device (Grooming Goal 1, OT-IRF)  grooming skills, all  -BT     [place] (Grooming Goal 1, OT-IRF)  set-up required  -BT     Time Frame (Grooming Goal 1, OT-IRF)  long-term goal (LTG)  -BT     Progress/Outcomes (Grooming Goal 1, OT-IRF)  goal met  -BT     Row Name 01/04/21 0800          Toileting Goal 1 (OT-IRF)    Activity/Device (Toileting Goal 1, OT-IRF)  toileting skills, all;grab bar/safety frame;raised toilet seat  -BT     [place] Level (Toileting Goal 1, OT-IRF)  maximum assist (25-49% patient effort);verbal cues required  -BT     Progress/Outcomes (Toileting Goal 1, OT-IRF)  goal met  -BT     Time Frame (Toileting Goal 1, OT-IRF)  short-term goal (STG)  -BT     Row Name 01/04/21 0800          Toileting Goal 2 (OT-IRF)    Activity/Device (Toileting Goal 2, OT-IRF)  toileting skills, all;grab bar/safety frame;raised toilet seat  -BT     [place] Level (Toileting Goal 2, OT-IRF)  contact guard assist;verbal cues required  -BT     Progress/Outcomes (Toileting Goal 2, OT-IRF)  goal ongoing  -BT     Time Frame (Toileting Goal 2, OT-IRF)  long-term goal (LTG)  -BT     Row Name 01/04/21 0800          Strength Goal 1 (OT-IRF)    Strength Goal 1 (OT-IRF)  To increase  BUE strength 1 MMT to assist with transfers and ADL tasks   -BT     Time Frame (Strength Goal 1, OT-IRF)  long-term goal (LTG)  -BT     Progress/Outcomes (Strength Goal 1, OT-IRF)  goal ongoing  -BT      Row Name 01/04/21 0800          Balance Goal 1 (OT)    Activity/Assistive Device (Balance Goal 1, OT)  standing, dynamic;walker, rolling  -BT     [place] Level/Cues Needed (Balance Goal 1, OT)  supervision required  -BT     Time Frame (Balance Goal 1, OT)  long term goal (LTG)  -BT     Progress/Outcomes (Balance Goal 1, OT)  goal ongoing  -BT     Row Name 01/04/21 0800           Endurance Goal 1 (OT)    Endurance Goal 1 (OT)  during ADL tasks   -BT     Activity Level (Endurance Goal 1, OT)  endurance 2 good -  -BT     Time Frame (Endurance Goal 1, OT)  long term goal (LTG)  -BT     Progress/Outcome (Endurance Goal 1, OT)  goal ongoing  -BT     Row Name 01/04/21 0800          Functional Mobility Goal 1 (OT)    Activity/Assistive Device (Functional Mobility Goal 1, OT)  walker, rolling  -BT     [place] Level/Cues Needed (Functional Mobility Goal 1, OT)  contact guard assist;verbal cues required  -BT     Distance Goal 1 (Functional Mobility, OT)  to and from bathroom  -BT     Time Frame (Functional Mobility Goal 1, OT)  long term goal (LTG)  -BT     Progress/Outcome (Functional Mobility Goal 1, OT)  goal ongoing  -BT     Row Name 01/04/21 0800          Caregiver Training Goal 1 (OT-IRF)    Caregiver Training Goal 1 (OT-IRF)  Pt will demo safe anterior hip precautions during ADL tasks   -BT     Time Frame (Caregiver Training Goal 1, OT-IRF)  long-term goal (LTG)  -BT     Progress/Outcomes (Caregiver Training Goal 1, OT-IRF)  goal ongoing  -BT     Row Name 01/04/21 0800          Positioning and Restraints    Pre-Treatment Position  sitting in chair/recliner  -BT     Post Treatment Position  bed  -BT     In Bed  notified nsg;supine;call light within reach;encouraged to call for assist;exit alarm on  -BT       User Key  (r) = Recorded By, (t) = Taken By, (c) = Cosigned By    Initials Name Effective Dates    BT Zenaida Saenz OT 11/16/20 -            Occupational Therapy Education                 Title: PT OT SLP  Therapies (In Progress)     Topic: Occupational Therapy (In Progress)     Point: ADL training (Done)     Description:   Instruct learner(s) on proper safety adaptation and remediation techniques during self care or transfers.   Instruct in proper use of assistive devices.              Learning Progress Summary           Patient Acceptance, E,D, VU,NR by AF at 12/30/2020 1426    Comment: educated on LH sponge and LH reacher with LB dressing tasks will require further edcuation on process    Acceptance, E, VU,NR by AF at 12/28/2020 1418    Comment: educated on safety with ADLs and transfers                   Point: Home exercise program (Not Started)     Description:   Instruct learner(s) on appropriate technique for monitoring, assisting and/or progressing therapeutic exercises/activities.              Learner Progress:  Not documented in this visit.          Point: Precautions (Not Started)     Description:   Instruct learner(s) on prescribed precautions during self-care and functional transfers.              Learner Progress:  Not documented in this visit.          Point: Body mechanics (Not Started)     Description:   Instruct learner(s) on proper positioning and spine alignment during self-care, functional mobility activities and/or exercises.              Learner Progress:  Not documented in this visit.                      User Key     Initials Effective Dates Name Provider Type Discipline     04/14/20 -  Misa Oneal, OTR Occupational Therapist OT                    OT Recommendation and Plan                         Time Calculation:     Time Calculation- OT     Row Name 01/04/21 1038             Time Calculation- OT    OT Start Time  0800  -BT      OT Stop Time  0900  -BT      OT Time Calculation (min)  60 min  -BT      Total Timed Code Minutes- OT  60 minute(s)  -BT      OT Received On  01/04/21  -BT      OT - Next Appointment  01/05/21  -BT        User Key  (r) = Recorded By, (t) = Taken By, (c) =  Cosigned By    Initials Name Provider Type    BT Zenaida Saenz OT Occupational Therapist        Therapy Charges for Today     Code Description Service Date Service Provider Modifiers Qty    [id number] HC OT SELF CARE/MGMT/TRAIN EA 15 MIN 1/4/2021 Zenaida Saenz OT GO 4                   Zenaida Saenz OT  1/4/2021

## 2021-01-04 NOTE — THERAPY TREATMENT NOTE
Inpatient Rehabilitation - Speech Language Pathology Treatment Note    [facility]     Patient Name: Alice Maxwell  : 1945  MRN: [id number]    Today's Date: 2021                   Admit Date: 2020       Visit Dx:      ICD-10-CM ICD-9-CM   1. Heartburn  R12 787.1       Patient Active Problem List   Diagnosis   • Stroke (CMS/HCC)   • Personal history of breast cancer   • History of loop electrical excision procedure (LEEP)   • History of abnormal cervical Pap smear   • S/p left hip fracture       Past Medical History:   Diagnosis Date   • Abnormal Pap smear of cervix    • Arthritis    • Cancer (CMS/HCC)     breast   • Disease of thyroid gland    • Osteoporosis    • Stroke (CMS/HCC)    • Stroke (CMS/HCC)     x2   • Urinary tract infection        Past Surgical History:   Procedure Laterality Date   • BACK SURGERY     • BREAST LUMPECTOMY     • CERVICAL BIOPSY  W/ LOOP ELECTRODE EXCISION     • CHOLECYSTECTOMY     • COLONOSCOPY      had polyps    • FOOT SURGERY     • GASTRIC BYPASS     • HERNIA REPAIR     • THYROIDECTOMY       Patient wore a face mask during this therapy encounter, however, she frequently pulled down face mask during session. Therapist used appropriate personal protective equipment including mask and eye protection.  Mask used was standard procedure mask. Appropriate PPE was worn during the entire therapy session. Hand hygiene was completed before and after therapy session. Patient is not in enhanced droplet precautions.       SLP EVALUATION (last 72 hours)      SLP SLC Evaluation     Row Name 21 0931                   Communication Assessment/Intervention    Document Type  therapy note (daily note)  -LN        Subjective Information  complains of;pain hip  -LN        Patient Observations  alert;poorly cooperative  -LN        Patient/Family/Caregiver Comments/Observations  Patient c/o 10/10 hip pain; pain medication already administered per RN during AM. During PM session hip  pain exacerbated as session progressed. Pain was a barrier to many cognitive tasks.  -LN        Patient Effort  fair  -LN        Symptoms Noted During/After Treatment  none  -LN           Organizational Skills Goal 1 (SLP)    Progress (Thought Organization Skills Goal 1, SLP)  50%;independently (over 90% accuracy);80%;with minimal cues (75-90%)  -LN        Progress/Outcomes (Thought Organization Skills Goal 1, SLP)  goal ongoing  -LN        Comment (Thought Organization Skills Goal 1, SLP)  Similarities/differences  -LN           Functional Problem Solving Skills Goal 1 (SLP)    Improve Problem Solving Through Goal 1 (SLP)  sequence steps in a task;complete organization/home management task;determine multiple solutions to problems;determine solutions to multifactorial problems;90%;independently (over 90% accuracy)  -LN        Time Frame (Problem Solving Goal 1, SLP)  by discharge  -LN        Barriers (Problem Solving Goal 1, SLP)  Pain appeared to limit concentration/attention  -LN        Progress (Problem Solving Goal 1, SLP)  70%;independently (over 90% accuracy);100%;with minimal cues (75-90%)  -LN        Progress/Outcomes (Problem Solving Goal 1, SLP)  goal ongoing  -LN        Comment (Problem Solving Goal 1, SLP)  Multi-factor problem solving  -LN           Functional Math Skills Goal 1 (SLP)    Improve Functional Math Skills Through Goal 1 (SLP)  complete word problems involving time;complete word problems involving money;90%;independently (over 90% accuracy)  -LN        Time Frame (Functional Math Skills Goal 1, SLP)  by discharge  -LN        Progress (Functional Math Skills Goal 1, SLP)  40%;independently (over 90% accuracy);70%;with minimal cues (75-90%);90%;with moderate cues (50-74%)  -LN        Progress/Outcomes (Functional Math Skills Goal 1, SLP)  goal ongoing  -LN        Comment (Functional Math Skills Goal 1, SLP)  Simple word problems  -LN          User Key  (r) = Recorded By, (t) = Taken By, (c) =  Cosigned By    Initials Name Effective Dates    LN Roxy Conte 12/17/19 -              EDUCATION    The patient has been educated in the following areas:       Cognitive Impairment.      SLP Recommendation and Plan                                                  SLP GOALS     Row Name 01/04/21 0931             Memory Skills Goal 1 (SLP)    Improve Memory Skills Through Goal 1 (SLP)  recall details of the day;use memory strategies;use written schedule;listen to a paragraph and answer questions;visual memory task;read a paragraph and answer questions;select a word from a list by exclusion;recalling unrelated word lists with an imposed delay;recalling related word lists with an imposed delay;90%  -LN      Time Frame (Memory Skills Goal 1, SLP)  by discharge  -LN      Progress (Memory Skills Goal 1, SLP)  70%;independently (over 90% accuracy);100%;with minimal cues (75-90%)  -LN      Progress/Outcomes (Memory Skills Goal 1, SLP)  goal ongoing  -LN      Comment (Memory Skills Goal 1, SLP)  Recallings related items after a 5 minutes delay  -LN         Organizational Skills Goal 1 (SLP)    Improve Thought Organization Through Goal 1 (SLP)  completing mental manipulation task;naming similarities and differences;completing a convergent naming task;completing a verbal sequencing task;90%;independently (over 90% accuracy)  -LN      Time Frame (Thought Organization Skills Goal 1, SLP)  by discharge  -LN      Progress (Thought Organization Skills Goal 1, SLP)  50%;independently (over 90% accuracy);80%;with minimal cues (75-90%)  -LN      Progress/Outcomes (Thought Organization Skills Goal 1, SLP)  goal ongoing  -LN      Comment (Thought Organization Skills Goal 1, SLP)  Similarities/differences  -LN         Reasoning Goal 1 (SLP)    Improve Reasoning Through Goal 1 (SLP)  complete logic/creative thinking task;complete mental flexibility task;complete basic reasoning task;complete deductive reasoning task;90%;independently  (over 90% accuracy)  -LN      Time Frame (Reasoning Goal 1, SLP)  by discharge  -LN      Progress (Reasoning Goal 1, SLP)  20%;independently (over 90% accuracy);80%;with minimal cues (75-90%)  -LN      Progress/Outcomes (Reasoning Goal 1, SLP)  goal ongoing  -LN      Comment (Reasoning Goal 1, SLP)  High-level mental flexibility (identifying three meanings for words)  -LN         Functional Problem Solving Skills Goal 1 (SLP)    Improve Problem Solving Through Goal 1 (SLP)  sequence steps in a task;complete organization/home management task;determine multiple solutions to problems;determine solutions to multifactorial problems;90%;independently (over 90% accuracy)  -LN      Time Frame (Problem Solving Goal 1, SLP)  by discharge  -LN      Barriers (Problem Solving Goal 1, SLP)  Pain appeared to limit concentration/attention  -LN      Progress (Problem Solving Goal 1, SLP)  70%;independently (over 90% accuracy);100%;with minimal cues (75-90%)  -LN      Progress/Outcomes (Problem Solving Goal 1, SLP)  goal ongoing  -LN      Comment (Problem Solving Goal 1, SLP)  Multi-factor problem solving  -LN         Functional Math Skills Goal 1 (SLP)    Improve Functional Math Skills Through Goal 1 (SLP)  complete word problems involving time;complete word problems involving money;90%;independently (over 90% accuracy)  -LN      Time Frame (Functional Math Skills Goal 1, SLP)  by discharge  -LN      Progress (Functional Math Skills Goal 1, SLP)  40%;independently (over 90% accuracy);70%;with minimal cues (75-90%);90%;with moderate cues (50-74%)  -LN      Progress/Outcomes (Functional Math Skills Goal 1, SLP)  goal ongoing  -LN      Comment (Functional Math Skills Goal 1, SLP)  Simple word problems  -LN        User Key  (r) = Recorded By, (t) = Taken By, (c) = Cosigned By    Initials Name Provider Type    LN Roxy Conte Speech and Language Pathologist                      Time Calculation:       Time Calculation- SLP     Steve  Name 01/04/21 1513 01/04/21 1512          Time Calculation- SLP    SLP Start Time  1430  -LN  0930  -LN     SLP Stop Time  1500  -LN  1000  -LN     SLP Time Calculation (min)  30 min  -LN  30 min  -LN     SLP Received On  01/04/21  -LN  01/04/21  -LN       User Key  (r) = Recorded By, (t) = Taken By, (c) = Cosigned By    Initials Name Provider Type    LN Roxy Conte Speech and Language Pathologist            Therapy Charges for Today     Code Description Service Date Service Provider Modifiers Qty    25397665863  ST DEV OF COGN SKILLS INITIAL 15 MIN 1/4/2021 Roxy Conte  1    [id number]  ST DEV OF COGN SKILLS EACH ADDT'L 15 MIN 1/4/2021 Roxy Conte  3                           Roxy Conte  1/4/2021

## 2021-01-04 NOTE — PROGRESS NOTES
Inpatient Rehabilitation Plan of Care Note    Plan of Care  Updated Problems/Interventions  Medical Problem(s)    BNE (Active)  Att'n. - WNL  Exec. Fx. - Mildly Imp.  Rsng/Jgmnt - WNL  Arith - Mod. Imp.  Visuospatial Skills - Min-Mildly Imp.  Visual Mem. - Mildly Imp.  Verbal Mem. - Severely Imp. for immediate recall, improved after a delay Emot -  pt noted mild emotional distress related to pain condition    Signed by: Teodoro Calderon Psy.d

## 2021-01-04 NOTE — THERAPY TREATMENT NOTE
Inpatient Rehabilitation - Occupational Therapy Treatment Note    [facility]     Patient Name: Alice Maxwell  : 1945  MRN: [id number]    Today's Date: 2021                 Admit Date: 2020         ICD-10-CM ICD-9-CM   1. Heartburn  R12 787.1       Patient Active Problem List   Diagnosis   • Stroke (CMS/HCC)   • Personal history of breast cancer   • History of loop electrical excision procedure (LEEP)   • History of abnormal cervical Pap smear   • S/p left hip fracture       Past Medical History:   Diagnosis Date   • Abnormal Pap smear of cervix    • Arthritis    • Cancer (CMS/HCC)     breast   • Disease of thyroid gland    • Osteoporosis    • Stroke (CMS/HCC)    • Stroke (CMS/HCC)     x2   • Urinary tract infection        Past Surgical History:   Procedure Laterality Date   • BACK SURGERY     • BREAST LUMPECTOMY     • CERVICAL BIOPSY  W/ LOOP ELECTRODE EXCISION     • CHOLECYSTECTOMY     • COLONOSCOPY      had polyps    • FOOT SURGERY     • GASTRIC BYPASS     • HERNIA REPAIR     • THYROIDECTOMY                  IRF OT ASSESSMENT FLOWSHEET (last 12 hours)      IRF OT Evaluation and Treatment     Row Name 21 1300 21 0800       OT Time and Intention    Document Type  daily treatment  -BT  progress note  -BT    Mode of Treatment  occupational therapy  -BT  occupational therapy  -BT    Patient Effort  good  -BT  adequate  -BT    Symptoms Noted During/After Treatment  fatigue  -BT  fatigue  -BT    Row Name 21 1300          General Information    Existing Precautions/Restrictions  fall;left;hip, anterior  -BT     Row Name 21 1300 21 0800       Bed Mobility    Supine-Sit [place] (Bed Mobility)  minimum assist (75% patient effort);verbal cues  -BT  --    Sit-Supine [place] (Bed Mobility)  moderate assist (50% patient effort);verbal cues;2 person assist 2 person assist for scooting   -BT  --    Bed Mobility, Safety Issues  decreased use of legs for  bridging/pushing  -BT  --    Assistive Device (Bed Mobility)  bed rails  -BT  --    Comment (Bed Mobility)  --  pt up in chair upon OT arrival   -BT    Row Name 01/04/21 0800          Transfer Assessment/Treatment    Transfers  sit-stand transfer;toilet transfer  -BT     Row Name 01/04/21 1300 01/04/21 0800       Transfers    Chair-Bed [place] (Transfers)  contact guard;verbal cues  -BT  --    Sit-Stand [place] (Transfers)  contact guard  -BT  contact guard;verbal cues  -BT    Stand-Sit [place] (Transfers)  --  contact guard;verbal cues  -BT    [place] Level (Toilet Transfer)  contact guard;verbal cues  -BT  contact guard;verbal cues  -BT    Assistive Device (Toilet Transfer)  commode;grab bars/safety frame;wheelchair  -BT  commode;grab bars/safety frame;wheelchair  -BT    Row Name 01/04/21 1300          Chair-Bed Transfer    Assistive Device (Chair-Bed Transfers)  walker, front-wheeled  -BT     Row Name 01/04/21 1300 01/04/21 0800       Sit-Stand Transfer    Assistive Device (Sit-Stand Transfers)  walker, front-wheeled;wheelchair  -BT  walker, front-wheeled  -BT    Row Name 01/04/21 0800          Stand-Sit Transfer    Assistive Device (Stand-Sit Transfers)  walker, front-wheeled  -BT     Row Name 01/04/21 1300 01/04/21 0800       Toilet Transfer    Type (Toilet Transfer)  stand pivot/stand step  -BT  stand pivot/stand step  -BT    Row Name 01/04/21 1300 01/04/21 0800       Safety Issues, Functional Mobility    Impairments Affecting Function (Mobility)  balance;endurance/activity tolerance;strength;pain  -BT  balance;endurance/activity tolerance;strength;pain  -BT    Row Name 01/04/21 1300          Shoulder (Therapeutic Exercise)    Shoulder (Therapeutic Exercise)  strengthening exercise  -BT     Shoulder Strengthening (Therapeutic Exercise)  bilateral;flexion;extension;aBduction;aDduction;2 lb free weight;10 repetitions;3 sets;other (see comments) pt also completed 1 x 10 set of 2.5 lb dowel  road exercise   -BT     Row Name 01/04/21 1300          Elbow/Forearm (Therapeutic Exercise)    Elbow/Forearm (Therapeutic Exercise)  strengthening exercise  -BT     Elbow/Forearm Strengthening (Therapeutic Exercise)  bilateral;flexion;extension;2 lb free weight;10 repetitions;2 sets  -BT     Row Name 01/04/21 1300          Wrist (Therapeutic Exercise)    Wrist (Therapeutic Exercise)  strengthening exercise  -BT     Wrist Strengthening (Therapeutic Exercise)  bilateral;flexion;extension;2 lb free weight;10 repetitions;3 sets  -BT     Row Name 01/04/21 0800          Bathing    [place] Level (Bathing)  bathing skills;upper body;set up;lower body;minimum assist (75% patient effort)  -BT     Position (Bathing)  supported sitting;supported standing  -BT     Row Name 01/04/21 0800          Upper Body Dressing    [place] Level (Upper Body Dressing)  upper body dressing skills;set up assistance  -BT     Position (Upper Body Dressing)  supported sitting  -BT     Row Name 01/04/21 0800          Lower Body Dressing    [place] Level (Lower Body Dressing)  doff;don;pants/bottoms;verbal cues;moderate assist (50% patient effort)  -BT     Position (Lower Body Dressing)  supported sitting;supported standing  -BT     Row Name 01/04/21 0800          Grooming    [place] Level (Grooming)  grooming skills;set up  -BT     Position (Grooming)  supported sitting;sink side  -BT     Row Name 01/04/21 1300 01/04/21 0800       Toileting    [place] Level (Toileting)  toileting skills;verbal cues;minimum assist (75% patient effort)  -BT  toileting skills;verbal cues;minimum assist (75% patient effort)  -BT    Position (Toileting)  supported sitting;supported standing  -BT  supported sitting;supported standing  -BT    Comment (Toileting)  pt able to complete hygiene from front and while standing/sititing, but needs assistance in the back due to inability to reach behind self   -BT  --    Row Name 01/04/21 0800           Transfer Goal 1 (OT-IRF)    Activity/Assistive Device (Transfer Goal 1, OT-IRF)  toilet;walk-in shower;walker, rolling  -BT     [place] Level (Transfer Goal 1, OT-IRF)  minimum assist (75% or more patient effort);verbal cues required  -BT     Time Frame (Transfer Goal 1, OT-IRF)  short-term goal (STG)  -BT     Progress/Outcomes (Transfer Goal 1, OT-IRF)  goal partially met  -BT     Row Name 01/04/21 0800          Transfer Goal 2 (OT-IRF)    Activity/Assistive Device (Transfer Goal 2, OT-IRF)  toilet;walk-in shower;tub bench;walker, rolling  -BT     [place] Level (Transfer Goal 2, OT-IRF)  standby assist  -BT     Progress/Outcomes (Transfer Goal 2, OT-IRF)  goal ongoing  -BT     Row Name 01/04/21 0800          Bathing Goal 1 (OT-IRF)    Activity/Device (Bathing Goal 1, OT-IRF)  bathing skills, all;grab bar, tub/shower;hand-held shower spray hose;tub bench  -BT     [place] Level (Bathing Goal 1, OT-IRF)  moderate assist (50-74% patient effort);verbal cues required  -BT     Time Frame (Bathing Goal 1, OT-IRF)  short-term goal (STG)  -BT     Progress/Outcomes (Bathing Goal 1, OT-IRF)  goal partially met  -BT     Row Name 01/04/21 0800          Bathing Goal 2 (OT-IRF)    Activity/Device (Bathing Goal 2, OT-IRF)  bathing skills, all;grab bar, tub/shower;hand-held shower spray hose;tub bench  -BT     [place] Level (Bathing Goal 2, OT-IRF)  contact guard assist;standby assist  -BT     Time Frame (Bathing Goal 2, OT-IRF)  long-term goal (LTG)  -BT     Progress/Outcomes (Bathing Goal 2, OT-IRF)  goal ongoing  -BT     Row Name 01/04/21 0800          UB Dressing Goal 1 (OT-IRF)    Activity/Device (UB Dressing Goal 1, OT-IRF)  upper body dressing  -BT     [place] (UB Dress Goal 1, OT-IRF)  set-up required  -BT     Time Frame (UB Dressing Goal 1, OT-IRF)  long-term goal (LTG)  -BT     Progress/Outcomes (UB Dressing Goal 1, OT-IRF)  goal met  -BT     Row Name 01/04/21 0800          LB Dressing Goal 1  (OT-IRF)    Activity/Device (LB Dressing Goal 1, OT-IRF)  lower body dressing;reacher;sock aid;long-handled shoe horn  -BT     [place] (LB Dressing Goal 1, OT-IRF)  moderate assist (50-74% patient effort);verbal cues required  -BT     Time Frame (LB Dressing Goal 1, OT-IRF)  short-term goal (STG)  -BT     Progress/Outcomes (LB Dressing Goal 1, OT-IRF)  goal met  -BT     Row Name 01/04/21 0800          LB Dressing Goal 2 (OT-IRF)    Activity/Device (LB Dressing Goal 2, OT-IRF)  lower body dressing;long-handled shoe horn;reacher;sock aid  -BT     [place] (LB Dressing Goal 2, OT-IRF)  contact guard assist;verbal cues required  -BT     Time Frame (LB Dressing Goal 2, OT-IRF)  long-term goal (LTG)  -BT     Progress/Outcomes (LB Dressing Goal 2, OT-IRF)  goal ongoing  -BT     Row Name 01/04/21 0800          Grooming Goal 1 (OT-IRF)    Activity/Device (Grooming Goal 1, OT-IRF)  grooming skills, all  -BT     [place] (Grooming Goal 1, OT-IRF)  set-up required  -BT     Time Frame (Grooming Goal 1, OT-IRF)  long-term goal (LTG)  -BT     Progress/Outcomes (Grooming Goal 1, OT-IRF)  goal met  -BT     Row Name 01/04/21 0800          Toileting Goal 1 (OT-IRF)    Activity/Device (Toileting Goal 1, OT-IRF)  toileting skills, all;grab bar/safety frame;raised toilet seat  -BT     [place] Level (Toileting Goal 1, OT-IRF)  maximum assist (25-49% patient effort);verbal cues required  -BT     Progress/Outcomes (Toileting Goal 1, OT-IRF)  goal met  -BT     Time Frame (Toileting Goal 1, OT-IRF)  short-term goal (STG)  -BT     Row Name 01/04/21 0800          Toileting Goal 2 (OT-IRF)    Activity/Device (Toileting Goal 2, OT-IRF)  toileting skills, all;grab bar/safety frame;raised toilet seat  -BT     [place] Level (Toileting Goal 2, OT-IRF)  contact guard assist;verbal cues required  -BT     Progress/Outcomes (Toileting Goal 2, OT-IRF)  goal ongoing  -BT     Time Frame (Toileting Goal 2, OT-IRF)  long-term goal (LTG)   -BT     Row Name 01/04/21 0800          Strength Goal 1 (OT-IRF)    Strength Goal 1 (OT-IRF)  To increase  BUE strength 1 MMT to assist with transfers and ADL tasks   -BT     Time Frame (Strength Goal 1, OT-IRF)  long-term goal (LTG)  -BT     Progress/Outcomes (Strength Goal 1, OT-IRF)  goal ongoing  -BT     Row Name 01/04/21 0800          Balance Goal 1 (OT)    Activity/Assistive Device (Balance Goal 1, OT)  standing, dynamic;walker, rolling  -BT     [place] Level/Cues Needed (Balance Goal 1, OT)  supervision required  -BT     Time Frame (Balance Goal 1, OT)  long term goal (LTG)  -BT     Progress/Outcomes (Balance Goal 1, OT)  goal ongoing  -BT     Row Name 01/04/21 0800           Endurance Goal 1 (OT)    Endurance Goal 1 (OT)  during ADL tasks   -BT     Activity Level (Endurance Goal 1, OT)  endurance 2 good -  -BT     Time Frame (Endurance Goal 1, OT)  long term goal (LTG)  -BT     Progress/Outcome (Endurance Goal 1, OT)  goal ongoing  -BT     Row Name 01/04/21 0800          Functional Mobility Goal 1 (OT)    Activity/Assistive Device (Functional Mobility Goal 1, OT)  walker, rolling  -BT     [place] Level/Cues Needed (Functional Mobility Goal 1, OT)  contact guard assist;verbal cues required  -BT     Distance Goal 1 (Functional Mobility, OT)  to and from bathroom  -BT     Time Frame (Functional Mobility Goal 1, OT)  long term goal (LTG)  -BT     Progress/Outcome (Functional Mobility Goal 1, OT)  goal ongoing  -BT     Row Name 01/04/21 0800          Caregiver Training Goal 1 (OT-IRF)    Caregiver Training Goal 1 (OT-IRF)  Pt will demo safe anterior hip precautions during ADL tasks   -BT     Time Frame (Caregiver Training Goal 1, OT-IRF)  long-term goal (LTG)  -BT     Progress/Outcomes (Caregiver Training Goal 1, OT-IRF)  goal ongoing  -BT     Row Name 01/04/21 1300 01/04/21 0800       Positioning and Restraints    Pre-Treatment Position  other (comment)  -BT  sitting in chair/recliner  -BT    Post  Treatment Position  bed in gym with PT   -BT  bed  -BT    In Bed  notified nsg;supine;call light within reach;encouraged to call for assist;exit alarm on  -BT  notified nsg;supine;call light within reach;encouraged to call for assist;exit alarm on  -BT      User Key  (r) = Recorded By, (t) = Taken By, (c) = Cosigned By    Initials Name Effective Dates    BT Zenaida Saenz OT 11/16/20 -            Occupational Therapy Education                 Title: PT OT SLP Therapies (In Progress)     Topic: Occupational Therapy (In Progress)     Point: ADL training (Done)     Description:   Instruct learner(s) on proper safety adaptation and remediation techniques during self care or transfers.   Instruct in proper use of assistive devices.              Learning Progress Summary           Patient Acceptance, E,D, VU,NR by AF at 12/30/2020 1426    Comment: educated on LH sponge and LH reacher with LB dressing tasks will require further edcuation on process    Acceptance, E, VU,NR by AF at 12/28/2020 1418    Comment: educated on safety with ADLs and transfers                   Point: Home exercise program (Not Started)     Description:   Instruct learner(s) on appropriate technique for monitoring, assisting and/or progressing therapeutic exercises/activities.              Learner Progress:  Not documented in this visit.          Point: Precautions (Not Started)     Description:   Instruct learner(s) on prescribed precautions during self-care and functional transfers.              Learner Progress:  Not documented in this visit.          Point: Body mechanics (Not Started)     Description:   Instruct learner(s) on proper positioning and spine alignment during self-care, functional mobility activities and/or exercises.              Learner Progress:  Not documented in this visit.                      User Key     Initials Effective Dates Name Provider Type Discipline     04/14/20 -  Misa Oneal, OTR Occupational Therapist OT                     OT Recommendation and Plan                         Time Calculation:     Time Calculation- OT     Row Name 01/04/21 1356 01/04/21 1038          Time Calculation- OT    OT Start Time  1300  -BT  0800  -BT     OT Stop Time  1330  -BT  0900  -BT     OT Time Calculation (min)  30 min  -BT  60 min  -BT     Total Timed Code Minutes- OT  --  60 minute(s)  -BT     OT Received On  --  01/04/21  -BT     OT - Next Appointment  --  01/05/21  -BT     OT Goal Re-Cert Due Date  01/18/21  -BT  --       User Key  (r) = Recorded By, (t) = Taken By, (c) = Cosigned By    Initials Name Provider Type    BT Zenaida Saenz, OT Occupational Therapist        Therapy Charges for Today     Code Description Service Date Service Provider Modifiers Qty    61380577770 HC OT SELF CARE/MGMT/TRAIN EA 15 MIN 1/4/2021 Zenaida Saenz, OT GO 4    03909742543 HC OT THER PROC EA 15 MIN 1/4/2021 Zenaida Saenz OT GO 1    24651159250 HC OT SELF CARE/MGMT/TRAIN EA 15 MIN 1/4/2021 Zenaida Saenz, OT GO 1                   Zenaida Saenz OT  1/4/2021

## 2021-01-04 NOTE — PROGRESS NOTES
Inpatient Rehabilitation Plan of Care Note    Plan of Care  Updated Problems/Interventions  Cognition    [ST] Executive Functions(Active)  Current Status(01/04/2021): MIN cues needed for functional problem solving and  reasoning. MOD cues needed for simple (math/temporal) word problems.  Weekly Goal(01/11/2021): follow daily schedule indep; recall daily events indep  Discharge Goal: fxnl cognition for home with assist/supervision    Signed by: Roxy Conte, SLP

## 2021-01-04 NOTE — PROGRESS NOTES
LOS: 8 days   Patient Care Team:  Elsy Ventura as PCP - General (Nurse Practitioner)  Elsy Ventura as Nurse Practitioner (Nurse Practitioner)    Chief Complaint: same    Subjective  Pt is awake and alert. Pain control is still an issue.     History of Present Illness    Subjective    History taken from: patient    Objective exam unchanged calves soft and NT resp unlabored and regular    Vital Signs  Temp:  [97.9 °F (36.6 °C)-98.7 °F (37.1 °C)] 98.1 °F (36.7 °C)  Heart Rate:  [83-85] 84  Resp:  [16-18] 16  BP: (121-128)/(53-70) 128/70    Objective    Results Review:     I reviewed the patient's new clinical results.    Medication Review:     Assessment/Plan  Continue to prepare for dc. ELOS 1-2 weeks. Renal consult pending      S/p left hip fracture      Assessment & Plan    Kwabena Becerra MD  01/04/21  07:24 EST    Time:

## 2021-01-04 NOTE — PROGRESS NOTES
Pharmacy Consult: Warfarin Dosing/ Monitoring    Alice Maxwell is a 75 y.o. female, estimated creatinine clearance is 34 mL/min (A) (by C-G formula based on SCr of 1.8 mg/dL (H)). weighing 113 kg (249 lb 12.8 oz).     has a past medical history of Abnormal Pap smear of cervix, Arthritis, Cancer (CMS/HCC), Disease of thyroid gland, Osteoporosis, Stroke (CMS/HCC), Stroke (CMS/HCC), and Urinary tract infection.    Social History     Tobacco Use    Smoking status: Never Smoker   Substance Use Topics    Alcohol use: No    Drug use: No     Results from last 7 days   Lab Units 01/04/21  0721 01/03/21  0647 01/02/21  1012 01/01/21  0958 12/31/20  1202 12/30/20  0750 12/29/20  0734   INR  2.45* 3.11* 3.05* 2.85* 3.44* 2.73* 2.39*   HEMOGLOBIN g/dL 8.8*  --   --   --  8.9*  --   --    HEMATOCRIT % 28.5*  --   --   --  28.7*  --   --    PLATELETS 10*3/mm3 343  --   --   --  270  --   --      Results from last 7 days   Lab Units 01/02/21  1012 12/31/20  1202   SODIUM mmol/L 139 137   POTASSIUM mmol/L 5.2 5.0   CHLORIDE mmol/L 104 103   CO2 mmol/L 23.3 23.8   BUN mg/dL 22 24*   CREATININE mg/dL 1.80* 1.77*   CALCIUM mg/dL 8.1* 8.0*   BILIRUBIN mg/dL  --  0.6   ALK PHOS U/L  --  79   ALT (SGPT) U/L  --  24   AST (SGOT) U/L  --  36*   GLUCOSE mg/dL 147* 153*     Anticoagulation history: Warfarin 6mg daily (home dose). Warfarin managed by [facility] for hx of Factor V Leiden.    Hospital Anticoagulation:  Consulting provider: Aric Chao MD  Start date: 12/27/20  Indication: Post Surgical - Hip, hypercoagulable disorder and hx of CVA  Target INR: 2-3  Expected duration: indefinite   Bridge Therapy: No                  Date 12/27 12/28 12/29 12/30 12/31 1/1 1/2 1/3 1/4       INR 1.74 2.04 2.39 2.73 3.44 2.85 3.05 3.11 2.45       Warfarin dose 6mg 6mg 6mg 5mg HOLD 4mg 2mg 1mg 3mg         Potential drug interactions:   Allopurinol - may enhance the anticoagulant effect of warfarin (home med)  Levothyroxine-  may enhance the anticoagulant effect of warfarin (home med)  Norco - Patients taking acetaminophen at higher doses and/or for longer duration may be at greater risk for experiencing a clinically significant interaction (not listed on med rec)   Trazodone- May diminish the anticoagulant effect of warfarin. (not listed on med rec)    Relevant nutrition status: regular diet; eating better per RN notes    Education complete?/ Date: Home medication    Assessment/Plan:  -INR therapeutic today at 2.45  -Will trial warfarin 3mg daily now that patient is eating better (down from patient's home dose of 6mg)  -Continue to follow INR daily for now due to recent dose adjustments    Pharmacy will continue to follow until discharge or discontinuation of warfarin.     Mando Wright, PharmD  01/04/21 09:00 EST

## 2021-01-04 NOTE — PROGRESS NOTES
NEPHROLOGY PROGRESS NOTE    PATIENT IDENTIFICATION:   Name:  Alice Maxwell      MRN:  [id number]     75 y.o.  female             Reason for visit: Tanya    SUBJECTIVE:   Seen and examined.  No shortness of air or chest pain  OBJECTIVE:  Vitals:    01/03/21 1223 01/03/21 1925 01/04/21 0534 01/04/21 0722   BP: 121/53 121/53 126/64 128/70   BP Location: Right arm Right arm Right arm    Patient Position: Sitting Lying Sitting    Pulse: 83 85 84    Resp: 18 16 16    Temp: 97.9 °F (36.6 °C) 98.7 °F (37.1 °C) 98.1 °F (36.7 °C)    TempSrc: Oral Oral Oral    SpO2: 98% 98% 98%    Weight:       Height:               Body mass index is 39.12 kg/m².    Intake/Output Summary (Last 24 hours) at 1/4/2021 0938  Last data filed at 1/4/2021 0800  Gross per 24 hour   Intake 690 ml   Output --   Net 690 ml         Exam:  GEN:  No distress, appears stated age  EYES:   Anicteric sclera  ENT:    External ears/nose normal, MM are moist  NECK:  No adenopathy, JVP none  LUNGS: Normal chest on inspection; not labored  CV:  Normal S1S2, without murmur  ABD:  Non-tender, non-distended, no hepatosplenomegaly, +BS  EXT:  No edema; no cyanosis; clubbing    Scheduled meds:  allopurinol, 100 mg, Oral, Daily  amLODIPine, 5 mg, Oral, Q24H  atorvastatin, 20 mg, Oral, Nightly  cholecalciferol, 2,000 Units, Oral, Daily  folic acid, 1 mg, Oral, Daily  levothyroxine, 100 mcg, Oral, Q AM  miconazole nitrate, , Topical, Q12H  polyethylene glycol, 17 g, Oral, Daily  senna-docusate sodium, 2 tablet, Oral, Nightly  traZODone, 25 mg, Oral, Nightly  warfarin, 3 mg, Oral, Daily With Dinner      IV meds:                      Pharmacy to dose warfarin,         Data Review:    Results from last 7 days   Lab Units 01/04/21  0721 01/02/21  1012 12/31/20  1202   SODIUM mmol/L 140 139 137   POTASSIUM mmol/L 4.8 5.2 5.0   CHLORIDE mmol/L 104 104 103   CO2 mmol/L 23.5 23.3 23.8   BUN mg/dL 21 22 24*   CREATININE mg/dL 1.68* 1.80* 1.77*   CALCIUM mg/dL 7.7* 8.1* 8.0*    BILIRUBIN mg/dL 0.5  --  0.6   ALK PHOS U/L 69  --  79   ALT (SGPT) U/L 18  --  24   AST (SGOT) U/L 29  --  36*   GLUCOSE mg/dL 104* 147* 153*       Estimated Creatinine Clearance: 36.4 mL/min (A) (by C-G formula based on SCr of 1.68 mg/dL (H)).    Results from last 7 days   Lab Units 01/04/21  0721   PHOSPHORUS mg/dL 4.3       Results from last 7 days   Lab Units 01/04/21  0721 12/31/20  1202   WBC 10*3/mm3 7.35 8.92   HEMOGLOBIN g/dL 8.8* 8.9*   PLATELETS 10*3/mm3 343 270       Results from last 7 days   Lab Units 01/04/21  0721 01/03/21  0647 01/02/21  1012 01/01/21  0958 12/31/20  1202   INR  2.45* 3.11* 3.05* 2.85* 3.44*             ASSESSMENT:     S/p left hip fracture      Non olig ELDER - ATN related to vancomycin nephrotoxicity suspected at [facility] per DC summary; peak Cr 2.5 and today is 1.6.    Potassium is down to 4.8     HTN - BP acceptable  Left femur fracture s/p hemiarthroplasty 12/22/20   FVL def - on coumadin  Dyslipidemia, on statin   Hypothyroidism, on synthroid   Gout, on allopurinol low dose 100mg; no acute flair   Anemia - hgb 8.8 post op      Plan    Kidney function is improving potassium is 4.8.  Start torsemide 40 mg p.o. daily  Surveillance lab    Dodie Girard MD  1/4/2021    09:38 EST

## 2021-01-04 NOTE — PROGRESS NOTES
Inpatient Rehabilitation Plan of Care Note    Plan of Care  Updated Problems/Interventions  Mobility    [PT] Bed/Chair/Wheelchair(Active)  Current Status(01/04/2021): CGA Rwx  Weekly Goal(01/12/2021): SBA Rwx  Discharge Goal: SBA Rwx    [PT] Bed Mobility(Active)  Current Status(12/28/2020): Mod  Weekly Goal(01/12/2021): Min  Discharge Goal: CGA/Min    [PT] Walk(Active)  Current Status(01/04/2021): 40-50 ft Rwx Min with wc following  Weekly Goal(01/12/2021): BR Rwx Min  Discharge Goal: 50 ft Rwx CGA    Signed by: Luly Francisco PT

## 2021-01-04 NOTE — PROGRESS NOTES
Inpatient Rehabilitation Functional Measures Assessment and Plan of Care    Plan of Care  Updated Problems/Interventions  Mobility    [OT] Toilet Transfers(Active)  Current Status(01/04/2021): CGA with RWX  Weekly Goal(01/11/2021): SBA with RWX  Discharge Goal: SBA with RWX    [OT] Tub/Shower Transfers(Active)  Current Status(01/04/2021): TBA  Weekly Goal(01/12/2021): CGA  Discharge Goal: CGA        Self Care    [OT] Bathing(Active)  Current Status(01/04/2021): UB set up LB min  Weekly Goal(01/12/2021): CGA/SBA  Discharge Goal: CGA/SBA    [OT] Dressing (Lower)(Active)  Current Status(01/04/2021): max  Weekly Goal(01/12/2021): MOD/min with AE  Discharge Goal: CGA/SBA with AE    [OT] Dressing (Upper)(Active)  Current Status(01/04/2021): set up  Weekly Goal(01/04/2021): set up  Discharge Goal: set up    [OT] Grooming(Active)  Current Status(01/04/2021): Set up  Weekly Goal(01/11/2021): Indep  Discharge Goal: Indep    [OT] Toileting(Active)  Current Status(01/04/2021): Min  Weekly Goal(01/12/2021): SBA  Discharge Goal: SBA    Functional Measures  YESI Eating:  Branch  YESI Grooming: Branch  YESI Bathing:  Branch  YESI Upper Body Dressing:  Branch  YESI Lower Body Dressing:  Branch  YESI Toileting:  Branch    YESI Bladder Management  Level of Assistance:  Branch  Frequency/Number of Accidents this Shift:  Branch    YESI Bowel Management  Level of Assistance: Branch  Frequency/Number of Accidents this Shift: Branch    YESI Bed/Chair/Wheelchair Transfer:  Branch  YESI Toilet Transfer:  Branch  YESI Tub/Shower Transfer:  Branch    Previously Documented Mode of Locomotion at Discharge: Field  YESI Expected Mode of Locomotion at Discharge: Branch  YESI Walk/Wheelchair:  Branch  YESI Stairs:  Branch    YESI Comprehension:  Branch  YESI Expression:  Branch  YESI Social Interaction:  Branch  YESI Problem Solving:  Branch  YESI Memory:  Branch    Therapy Mode Minutes  Occupational Therapy: Branch  Physical Therapy: Branch  Speech Language  Pathology:  Branch    Signed by: Cindi Coelho, OTR/L

## 2021-01-04 NOTE — PROGRESS NOTES
Inpatient Rehabilitation Plan of Care Note    Plan of Care  Care Plan Reviewed - No updates at this time.    Psychosocial    Performed Intervention(s)  Verbalizes needs & concerns      Safety    Performed Intervention(s)  Items within reach (call bell, etc), safety rounds, falls precautions, &  bed/chair alarms.      Sphincter Control    Performed Intervention(s)  Monitor intake, output, & BM's, Encourage appropriate diet & fluids, & perineal  care.      Pain    Performed Intervention(s)  Pain medication as ordered, distraction, & repositioning assistance as needed.      Body Systems    Performed Intervention(s)  Daily skin inspections, wound care, assist with repositioning, & dressing  changes as ordered.    Signed by: Svetlana Reynaga RN

## 2021-01-04 NOTE — PROGRESS NOTES
Inpatient Rehabilitation Plan of Care Note    Plan of Care  Care Plan Reviewed - No updates at this time.    Body Systems    [RN] Integumentary(Active)  Current Status(12/31/2020): Lt. hip incision, Lt. hip blister, & Crack in skin  at coccyx & Lt. groin area.  Weekly Goal(01/15/2021): Teach family/patient dressing changes. Incision  healing, no infection.  Discharge Goal: Incision, blister, & cracked areas healed.    Performed Intervention(s)  Daily skin inspections, wound care, assist with repositioning, & dressing  changes as ordered.      Pain    [RN] Pain Management(Active)  Current Status(12/31/2020): Pain related to Lt. hip surgery  Weekly Goal(01/21/2021): Able to tolerate therapies. Pain level < 5 on 1-10 pain  scale.  Discharge Goal: Pain resolved or under control.    Performed Intervention(s)  Pain medication as ordered, distraction, & repositioning assistance as needed.      Psychosocial    [RN] Coping/Adjustment(Active)  Current Status(12/31/2020): Expresses appropriate coping  Weekly Goal(01/20/2021): Allow opportunity to express concerns regarding current  status.  Discharge Goal: Adequate coping regarding life changes with ongoing support.    Performed Intervention(s)  Verbalizes needs & concerns      Safety    [RN] Potential for Injury(Active)  Current Status(12/31/2020): Uses call light appropriately. No unsafe behaviors,  but history of falls.  Weekly Goal(01/22/2021): Instruct family/patient regarding safety precautions &  need for close supervision.  Discharge Goal: No falls, no injuries. Patient/family will be aware of risk of  fall & safety in the home setting.    Performed Intervention(s)  Items within reach (call bell, etc), safety rounds, falls precautions, &  bed/chair alarms.      Sphincter Control    [RN] Bladder & bowel management(Active)  Current Status(12/31/2020): Bladder & bowel  continent 100%  Weekly Goal(01/20/2021): B&B Continent 100%  Discharge Goal: B&B Remains 100%  continent    Performed Intervention(s)  Monitor intake, output, & BM's, Encourage appropriate diet & fluids, & perineal  care.    Signed by: Rossy Falcon RN

## 2021-01-04 NOTE — THERAPY PROGRESS REPORT/RE-CERT
Inpatient Rehabilitation - Physical Therapy Progress Note       [facility]     Patient Name: Alice Maxwell  : 1945  MRN: [id number]    Today's Date: 2021                    Admit Date: 2020      Visit Dx:     ICD-10-CM ICD-9-CM   1. Heartburn  R12 787.1       Patient Active Problem List   Diagnosis   • Stroke (CMS/HCC)   • Personal history of breast cancer   • History of loop electrical excision procedure (LEEP)   • History of abnormal cervical Pap smear   • S/p left hip fracture       Past Medical History:   Diagnosis Date   • Abnormal Pap smear of cervix    • Arthritis    • Cancer (CMS/HCC)     breast   • Disease of thyroid gland    • Osteoporosis    • Stroke (CMS/HCC)    • Stroke (CMS/HCC)     x2   • Urinary tract infection        Past Surgical History:   Procedure Laterality Date   • BACK SURGERY     • BREAST LUMPECTOMY     • CERVICAL BIOPSY  W/ LOOP ELECTRODE EXCISION     • CHOLECYSTECTOMY     • COLONOSCOPY      had polyps    • FOOT SURGERY     • GASTRIC BYPASS     • HERNIA REPAIR     • THYROIDECTOMY            PT ASSESSMENT (last 12 hours)      [facility] PT Evaluation and Treatment     Row Name 21 1019          PT Time and Intention    Document Type  daily treatment  -LB     Mode of Treatment  physical therapy  -LB     Patient/Family/Caregiver Comments/Observations  pt complaining of lack of sleep and pain in left hip. , Explained that pain is unfortunately normal following fracture and surgery.    -LB     Row Name 21 1019          General Information    Existing Precautions/Restrictions  fall;left;hip, anterior  -LB     Row Name 21 1019          Cognition/Psychosocial    Affect/Mental Status (Cognitive)  WFL  -LB     Follows Commands (Cognition)  follows one-step commands  -LB     Personal Safety Interventions  fall prevention program maintained;gait belt;muscle strengthening facilitated;nonskid shoes/slippers when out of bed  -LB     Row Name 21 1019          Pain  Scale: Numbers Pre/Post-Treatment    Pretreatment Pain Rating  2/10  -LB     Posttreatment Pain Rating  7/10  -LB     Pain Location - Side  Left  -LB     Pain Location  hip  -LB     Row Name 01/04/21 1019          Bed Mobility    Supine-Sit [place] (Bed Mobility)  minimum assist (75% patient effort);verbal cues  -LB     Assistive Device (Bed Mobility)  bed rails;head of bed elevated  -LB     Row Name 01/04/21 1019          Transfers    Bed-Chair [place] (Transfers)  contact guard;verbal cues  -LB     Assistive Device (Bed-Chair Transfers)  walker, front-wheeled;wheelchair  -LB     Sit-Stand [place] (Transfers)  contact guard  -LB     Stand-Sit [place] (Transfers)  contact guard  -LB     [place] Level (Toilet Transfer)  contact guard;verbal cues  -LB     Assistive Device (Toilet Transfer)  commode;grab bars/safety frame;wheelchair  -LB     Row Name 01/04/21 1019          Sit-Stand Transfer    Assistive Device (Sit-Stand Transfers)  walker, front-wheeled;wheelchair  -LB     Row Name 01/04/21 1019          Stand-Sit Transfer    Assistive Device (Stand-Sit Transfers)  walker, front-wheeled;wheelchair  -LB     Row Name 01/04/21 1019          Toilet Transfer    Type (Toilet Transfer)  stand pivot/stand step  -LB     Row Name 01/04/21 1019          Gait/Stairs (Locomotion)    [place] Level (Gait)  contact guard;verbal cues  -LB     Assistive Device (Gait)  walker, front-wheeled  -LB     Distance in Feet (Gait)  20; 45 x 2; 50  -LB     Pattern (Gait)  step-through  -LB     Deviations/Abnormal Patterns (Gait)  janet decreased;stride length decreased;left sided deviations;antalgic;weight shifting decreased  -LB     Bilateral Gait Deviations  forward flexed posture  -LB     Row Name 01/04/21 1019          Safety Issues, Functional Mobility    Impairments Affecting Function (Mobility)  balance;endurance/activity tolerance;strength;pain  -LB     Row Name 01/04/21 1019          Hip (Therapeutic  Exercise)    Hip Strengthening (Therapeutic Exercise)  left;heel slides;aBduction;aDduction;supine;5 repetitions;3 sets  -LB     Row Name 01/04/21 1019          Knee (Therapeutic Exercise)    Knee AROM (Therapeutic Exercise)  bilateral;flexion;extension;sitting  -LB     Knee Strengthening (Therapeutic Exercise)  left;SAQ (short arc quad);3 sets;5 repetitions  -LB     Row Name 01/04/21 1019          Ankle (Therapeutic Exercise)    Ankle AROM (Therapeutic Exercise)  bilateral;plantarflexion  -LB     Ankle Strengthening (Therapeutic Exercise)  bilateral;plantarflexion;dorsiflexion;supine;10 repetitions;2 sets  -LB     Row Name 01/04/21 1019          Positioning and Restraints    Pre-Treatment Position  in bed  -LB     Post Treatment Position  wheelchair  -LB     In Wheelchair  call light within reach;encouraged to call for assist;exit alarm on  -LB     Row Name 01/04/21 1019          Weekly Progress Summary (PT)    Weekly Progress Summary (PT)  The patient has made slow progress due to pain.  Today the patient has started to pick her left foot during swing phase of gait.  Goals were updated as needed.  Pt is requiring less assist for mobility overall.    -LB       User Key  (r) = Recorded By, (t) = Taken By, (c) = Cosigned By    Initials Name Provider Type    Luly Adams, PT Physical Therapist        Wound 12/27/20 1601 Left anterior thigh Incision (Active)   Dressing Appearance dry;intact 01/03/21 2000   Closure NADIA 01/04/21 0815   Base clean 01/03/21 2000   Periwound intact 01/03/21 2000   Periwound Temperature warm 01/03/21 2000   Periwound Skin Turgor firm 01/03/21 2000   Edges rolled/closed 01/03/21 2000   Drainage Amount none 01/03/21 2000   Care, Wound cleansed with;sterile normal saline 01/03/21 2000   Dressing Care dressing changed;border dressing 01/03/21 2000   Periwound Care dry periwound area maintained 01/03/21 2000       Wound 12/27/20 1602 Left lateral thigh Blisters (Active)   Dressing Appearance  moist drainage 01/03/21 2000   Closure NADIA 01/04/21 0815   Base clean;dry 01/03/21 2000   Periwound dry;intact 01/03/21 2000   Periwound Temperature warm 01/03/21 2000   Periwound Skin Turgor firm 01/03/21 2000   Drainage Characteristics/Odor clear 01/03/21 2100   Drainage Amount scant 01/03/21 2100   Care, Wound cleansed with;sterile normal saline 01/03/21 2100   Dressing Care dressing changed;foam 01/03/21 2100   Periwound Care dry periwound area maintained 01/03/21 2100       Wound 12/27/20 1602 Left anterior groin (Active)   Dressing Appearance open to air 01/04/21 0815   Closure None 01/04/21 0815   Base pink 01/04/21 0815   Periwound blanchable;moist 01/04/21 0815   Periwound Temperature warm 01/04/21 0815   Drainage Amount none 01/04/21 0815   Care, Wound cleansed with;soap and water;other (see comments) 01/04/21 0815   Dressing Care open to air 01/04/21 0815   Periwound Care dry periwound area maintained 01/03/21 2100     Physical Therapy Education                 Title: PT OT SLP Therapies (In Progress)     Topic: Physical Therapy (In Progress)     Point: Mobility training (Done)     Learning Progress Summary           Patient Acceptance, E,TB, VU,NR by EE at 1/3/2021 0917    Acceptance, E,TB, VU,NR by EE at 1/2/2021 0921    Acceptance, E, VU,NR by LB at 1/1/2021 1204    Acceptance, E,TB, VU,NR by LB1 at 12/31/2020 1057    Acceptance, E,TB, VU,NR by LB1 at 12/30/2020 1133    Comment: walking    Acceptance, E,TB, VU,NR by LB1 at 12/29/2020 1050    Acceptance, E,TB, NR,VU by LB1 at 12/28/2020 1138                   Point: Home exercise program (Done)     Learning Progress Summary           Patient Acceptance, E,TB, VU,NR by LB1 at 1/4/2021 1038    Acceptance, E,TB, VU,NR by EE at 1/3/2021 0917    Acceptance, E,TB, VU,NR by EE at 1/2/2021 0921    Acceptance, E,TB, NR,VU by LB1 at 12/28/2020 1138                   Point: Body mechanics (Not Started)     Learner Progress:  Not documented in this visit.           Point: Precautions (Done)     Learning Progress Summary           Patient Acceptance, E,TB, VU,NR by EE at 1/3/2021 0917    Acceptance, E,TB, VU,NR by EE at 1/2/2021 0921                               User Key     Initials Effective Dates Name Provider Type Discipline    LB1 04/03/18 -  Luly Francisco, PT Physical Therapist PT    LB 03/07/18 -  Munira Azevedo PTA Physical Therapy Assistant PT    EE 04/03/18 -  Jaqueline Soni PT Physical Therapist PT                PT Recommendation and Plan    Planned Therapy Interventions (PT): bed mobility training, gait training, home exercise program, strengthening, transfer training  Frequency of Treatment (PT): 90 minutes per session                     Time Calculation:     PT Charges     Row Name 01/04/21 1424 01/04/21 1059          Time Calculation    Start Time  1230  -LB  1000  -LB     Stop Time  1300  -LB  1100  -LB     Time Calculation (min)  30 min  -LB  60 min  -LB     PT Received On  01/04/21  -LB  01/04/21  -LB     PT - Next Appointment  01/05/21  -LB  01/04/21  -LB     PT Goal Re-Cert Due Date  --  01/11/21  -LB       User Key  (r) = Recorded By, (t) = Taken By, (c) = Cosigned By    Initials Name Provider Type    LB Luly Francisco, PT Physical Therapist          Therapy Charges for Today     Code Description Service Date Service Provider Modifiers Qty    [id number]  GAIT TRAINING EA 15 MIN 1/4/2021 Luly Francisco, PT GP 3    [id number]  PT THERAPEUTIC ACT EA 15 MIN 1/4/2021 Luly Francisco, PT GP 1    63050994446 HC PT THER PROC EA 15 MIN 1/4/2021 Luly Francisco B, PT GP 2    [id number] HC PT THER SUPP EA 15 MIN 1/4/2021 Luly Francisco, PT GP 2        Patient was wearing a face mask during this therapy encounter. Therapist used appropriate personal protective equipment including eye protection, mask, and gloves.  Mask used was standard procedure mask. Appropriate PPE was worn during the entire therapy session. Hand hygiene was completed before and after therapy  session. Patient is not in enhanced droplet precautions.              Luly Francisco, PT  1/4/2021

## 2021-01-05 LAB
GLUCOSE BLDC GLUCOMTR-MCNC: 125 MG/DL (ref 70–130)
GLUCOSE BLDC GLUCOMTR-MCNC: 132 MG/DL (ref 70–130)
GLUCOSE BLDC GLUCOMTR-MCNC: 156 MG/DL (ref 70–130)
INR PPP: 2.09 (ref 0.9–1.1)
PROTHROMBIN TIME: 23.2 SECONDS (ref 11.7–14.2)

## 2021-01-05 PROCEDURE — 85610 PROTHROMBIN TIME: CPT | Performed by: PHYSICAL MEDICINE & REHABILITATION

## 2021-01-05 PROCEDURE — 97129 THER IVNTJ 1ST 15 MIN: CPT

## 2021-01-05 PROCEDURE — 97535 SELF CARE MNGMENT TRAINING: CPT

## 2021-01-05 PROCEDURE — 97116 GAIT TRAINING THERAPY: CPT

## 2021-01-05 PROCEDURE — 97530 THERAPEUTIC ACTIVITIES: CPT

## 2021-01-05 PROCEDURE — 97110 THERAPEUTIC EXERCISES: CPT

## 2021-01-05 PROCEDURE — 82962 GLUCOSE BLOOD TEST: CPT

## 2021-01-05 RX ORDER — WARFARIN SODIUM 4 MG/1
4 TABLET ORAL
Status: DISCONTINUED | OUTPATIENT
Start: 2021-01-05 | End: 2021-01-06

## 2021-01-05 RX ADMIN — HYDROCODONE BITARTRATE AND ACETAMINOPHEN 1 TABLET: 7.5; 325 TABLET ORAL at 03:00

## 2021-01-05 RX ADMIN — DOCUSATE SODIUM 50MG AND SENNOSIDES 8.6MG 2 TABLET: 8.6; 5 TABLET, FILM COATED ORAL at 21:55

## 2021-01-05 RX ADMIN — LEVOTHYROXINE SODIUM 100 MCG: 0.1 TABLET ORAL at 05:20

## 2021-01-05 RX ADMIN — FOLIC ACID 1 MG: 1 TABLET ORAL at 07:28

## 2021-01-05 RX ADMIN — POLYETHYLENE GLYCOL 3350 17 G: 17 POWDER, FOR SOLUTION ORAL at 07:27

## 2021-01-05 RX ADMIN — ALLOPURINOL 100 MG: 100 TABLET ORAL at 07:28

## 2021-01-05 RX ADMIN — ATORVASTATIN CALCIUM 20 MG: 20 TABLET, FILM COATED ORAL at 21:55

## 2021-01-05 RX ADMIN — CALCIUM CARBONATE 1 TABLET: 500 TABLET, CHEWABLE ORAL at 07:31

## 2021-01-05 RX ADMIN — TRAZODONE HYDROCHLORIDE 25 MG: 50 TABLET ORAL at 21:55

## 2021-01-05 RX ADMIN — WARFARIN 4 MG: 4 TABLET ORAL at 14:31

## 2021-01-05 RX ADMIN — HYDROCODONE BITARTRATE AND ACETAMINOPHEN 1 TABLET: 7.5; 325 TABLET ORAL at 07:29

## 2021-01-05 RX ADMIN — AMLODIPINE BESYLATE 5 MG: 5 TABLET ORAL at 07:27

## 2021-01-05 RX ADMIN — MICONAZOLE NITRATE: 2 OINTMENT TOPICAL at 08:01

## 2021-01-05 RX ADMIN — MICONAZOLE NITRATE 1 APPLICATION: 2 OINTMENT TOPICAL at 21:55

## 2021-01-05 RX ADMIN — HYDROCODONE BITARTRATE AND ACETAMINOPHEN 1 TABLET: 7.5; 325 TABLET ORAL at 19:30

## 2021-01-05 RX ADMIN — TORSEMIDE 40 MG: 20 TABLET ORAL at 07:28

## 2021-01-05 RX ADMIN — HYDROCODONE BITARTRATE AND ACETAMINOPHEN 1 TABLET: 7.5; 325 TABLET ORAL at 11:09

## 2021-01-05 RX ADMIN — HYDROCODONE BITARTRATE AND ACETAMINOPHEN 1 TABLET: 7.5; 325 TABLET ORAL at 15:24

## 2021-01-05 RX ADMIN — CALCIUM CARBONATE 1 TABLET: 500 TABLET, CHEWABLE ORAL at 11:27

## 2021-01-05 RX ADMIN — Medication 2000 UNITS: at 07:28

## 2021-01-05 RX ADMIN — Medication 3 MG: at 22:06

## 2021-01-05 NOTE — THERAPY TREATMENT NOTE
Inpatient Rehabilitation - Physical Therapy Treatment Note       [facility]     Patient Name: Alice Maxwell  : 1945  MRN: [id number]    Today's Date: 2021                    Admit Date: 2020      Visit Dx:     ICD-10-CM ICD-9-CM   1. Heartburn  R12 787.1       Patient Active Problem List   Diagnosis   • Stroke (CMS/HCC)   • Personal history of breast cancer   • History of loop electrical excision procedure (LEEP)   • History of abnormal cervical Pap smear   • S/p left hip fracture       Past Medical History:   Diagnosis Date   • Abnormal Pap smear of cervix    • Arthritis    • Cancer (CMS/HCC)     breast   • Disease of thyroid gland    • Osteoporosis    • Stroke (CMS/HCC)    • Stroke (CMS/HCC)     x2   • Urinary tract infection        Past Surgical History:   Procedure Laterality Date   • BACK SURGERY     • BREAST LUMPECTOMY     • CERVICAL BIOPSY  W/ LOOP ELECTRODE EXCISION     • CHOLECYSTECTOMY     • COLONOSCOPY      had polyps    • FOOT SURGERY     • GASTRIC BYPASS     • HERNIA REPAIR     • THYROIDECTOMY            PT ASSESSMENT (last 12 hours)      IRF PT Evaluation and Treatment     Row Name 21 1025          PT Time and Intention    Document Type  daily treatment  -LB     Mode of Treatment  physical therapy  -LB     Patient/Family/Caregiver Comments/Observations  pt worried about lunch menu today.    -LB     Row Name 21 1025          General Information    General Observations of Patient  pt in wheelchair, agreed to therapy  -LB     Existing Precautions/Restrictions  fall;left;hip, anterior  -LB     Row Name 21 1025          Cognition/Psychosocial    Affect/Mental Status (Cognitive)  WFL  -LB     Orientation Status (Cognition)  oriented x 4  -LB     Follows Commands (Cognition)  follows one-step commands  -LB     Personal Safety Interventions  fall prevention program maintained;gait belt;muscle strengthening facilitated;nonskid shoes/slippers when out of bed  -LB      Row Name 01/05/21 1025          Pain Scale: Numbers Pre/Post-Treatment    Pretreatment Pain Rating  9/10  -LB     Posttreatment Pain Rating  9/10  -LB     Pain Location - Side  Left  -LB     Pain Location  hip  -LB     Row Name 01/05/21 1025          Transfers    Sit-Stand [place] (Transfers)  standby assist  -LB     Stand-Sit [place] (Transfers)  standby assist  -LB     [place] Level (Toilet Transfer)  standby assist;contact guard  -LB     Assistive Device (Toilet Transfer)  wheelchair;commode, bedside with drop arms;walker, front-wheeled  -LB     Row Name 01/05/21 1025          Sit-Stand Transfer    Assistive Device (Sit-Stand Transfers)  walker, front-wheeled;wheelchair  -LB     Row Name 01/05/21 1025          Stand-Sit Transfer    Assistive Device (Stand-Sit Transfers)  walker, front-wheeled;wheelchair  -LB     Row Name 01/05/21 1025          Toilet Transfer    Type (Toilet Transfer)  stand pivot/stand step  -LB     Row Name 01/05/21 1025          Gait/Stairs (Locomotion)    [place] Level (Gait)  contact guard  -LB     Assistive Device (Gait)  walker, front-wheeled  -LB     Distance in Feet (Gait)  50 x 2; 30 x 2  -LB     Pattern (Gait)  step-through  -LB     Deviations/Abnormal Patterns (Gait)  stride length decreased;left sided deviations;antalgic  -LB     Bilateral Gait Deviations  forward flexed posture  -LB     Left Sided Gait Deviations  heel strike decreased  -LB     Row Name 01/05/21 1025          Safety Issues, Functional Mobility    Impairments Affecting Function (Mobility)  endurance/activity tolerance;pain;strength  -LB     Row Name 01/05/21 1025          Hip (Therapeutic Exercise)    Hip Strengthening (Therapeutic Exercise)  left;flexion;2 sets;10 repetitions;sitting  -LB     Row Name 01/05/21 1025          Knee (Therapeutic Exercise)    Knee Strengthening (Therapeutic Exercise)  bilateral;flexion;LAQ (long arc quad);right;hamstring curls;3 sets;10 repetitions  -LB     Row Name  01/05/21 1025          Ankle (Therapeutic Exercise)    Ankle Strengthening (Therapeutic Exercise)  bilateral;dorsiflexion;plantarflexion;sitting;3 sets;10 repetitions  -LB     Row Name 01/05/21 1025          Positioning and Restraints    Pre-Treatment Position  sitting in chair/recliner  -LB     Post Treatment Position  wheelchair  -LB       User Key  (r) = Recorded By, (t) = Taken By, (c) = Cosigned By    Initials Name Provider Type    Luly Adams, PT Physical Therapist        Wound 12/27/20 1601 Left anterior thigh Incision (Active)   Dressing Appearance dry;intact 01/05/21 0725   Closure [place] 01/05/21 0725   Base moist;clean 01/05/21 0725   Periwound intact 01/05/21 0725   Periwound Temperature warm 01/05/21 0725   Periwound Skin Turgor firm 01/05/21 0725   Edges rolled/closed 01/04/21 2203   Drainage Amount scant 01/05/21 0725   Care, Wound cleansed with;sterile normal saline 01/05/21 0725   Dressing Care border dressing;dressing changed 01/05/21 0725   Periwound Care dry periwound area maintained 01/05/21 0725       Wound 12/27/20 1602 Left lateral thigh Blisters (Active)   Dressing Appearance dry;intact 01/05/21 0725   Closure Adhesive bandage;Tape 01/05/21 0725   Base clean;dry 01/05/21 0725   Periwound intact;dry 01/05/21 0725   Periwound Temperature warm 01/05/21 0725   Periwound Skin Turgor firm 01/05/21 0725   Drainage Characteristics/Odor clear 01/05/21 0725   Drainage Amount scant 01/05/21 0725   Care, Wound cleansed with;sterile normal saline 01/05/21 0725   Dressing Care dressing changed 01/05/21 0725   Periwound Care dry periwound area maintained 01/05/21 0725       Wound 12/27/20 1602 Left anterior groin (Active)   Dressing Appearance open to air 01/04/21 2203   Closure None 01/05/21 0725   Base white 01/05/21 0725   Periwound blanchable;moist;redness 01/05/21 0725   Periwound Temperature warm 01/05/21 0725   Drainage Amount none 01/05/21 0734   Care, Wound cleansed with;soap and water  01/05/21 0725   Dressing Care open to air 01/05/21 0725   Periwound Care dry periwound area maintained 01/05/21 0725     Physical Therapy Education                 Title: PT OT SLP Therapies (In Progress)     Topic: Physical Therapy (In Progress)     Point: Mobility training (Done)     Learning Progress Summary           Patient Acceptance, E,TB, VU,NR by LB at 1/5/2021 1027    Acceptance, E,TB, VU,NR by EE at 1/3/2021 0917    Acceptance, E,TB, VU,NR by EE at 1/2/2021 0921    Acceptance, E, VU,NR by LB1 at 1/1/2021 1204    Acceptance, E,TB, VU,NR by LB at 12/31/2020 1057    Acceptance, E,TB, VU,NR by LB at 12/30/2020 1133    Comment: walking    Acceptance, E,TB, VU,NR by LB at 12/29/2020 1050    Acceptance, E,TB, NR,VU by LB at 12/28/2020 1138                   Point: Home exercise program (Done)     Learning Progress Summary           Patient Acceptance, E,TB, VU,NR by LB at 1/4/2021 1038    Acceptance, E,TB, VU,NR by EE at 1/3/2021 0917    Acceptance, E,TB, VU,NR by EE at 1/2/2021 0921    Acceptance, E,TB, NR,VU by LB at 12/28/2020 1138                   Point: Body mechanics (Not Started)     Learner Progress:  Not documented in this visit.          Point: Precautions (Done)     Learning Progress Summary           Patient Acceptance, E,TB, VU,NR by EE at 1/3/2021 0917    Acceptance, E,TB, VU,NR by EE at 1/2/2021 0921                               User Key     Initials Effective Dates Name Provider Type Discipline    LB 04/03/18 -  Luly Francisco, PT Physical Therapist PT    LB1 03/07/18 -  Munira Azevedo, PTA Physical Therapy Assistant PT     04/03/18 -  Jaqueline Soni, ONEIDA Physical Therapist PT                PT Recommendation and Plan    Planned Therapy Interventions (PT): bed mobility training, gait training, home exercise program, strengthening, transfer training  Frequency of Treatment (PT): 90 minutes per session                     Time Calculation:     PT Charges     Row Name 01/05/21 1258 01/05/21 115           Time Calculation    Start Time  1230  -LB  1000  -LB     Stop Time  1300  -LB  1100  -LB     Time Calculation (min)  30 min  -LB  60 min  -LB     PT Received On  01/05/21  -LB  01/05/21  -LB     PT - Next Appointment  01/06/21  -LB  01/05/21  -LB       User Key  (r) = Recorded By, (t) = Taken By, (c) = Cosigned By    Initials Name Provider Type    LB Luly Francisco, PT Physical Therapist          Therapy Charges for Today     Code Description Service Date Service Provider Modifiers Qty    00073144727 HC GAIT TRAINING EA 15 MIN 1/4/2021 Baehl, Luly B, PT GP 3    00999744732 HC PT THERAPEUTIC ACT EA 15 MIN 1/4/2021 Baehl, Luly B, PT GP 1    93557192749 HC PT THER PROC EA 15 MIN 1/4/2021 Baehl, Luly B, PT GP 2    58385087595 HC PT THER SUPP EA 15 MIN 1/4/2021 Baehl, Luly B, PT GP 2    15964591086 HC GAIT TRAINING EA 15 MIN 1/5/2021 Baehl, Luly B, PT GP 3    67326845936 HC PT THER PROC EA 15 MIN 1/5/2021 Baehl, Luly B, PT GP 2    84732783528 HC PT THERAPEUTIC ACT EA 15 MIN 1/5/2021 Baehl, Luly B, PT GP 1    73035777446 HC PT THER SUPP EA 15 MIN 1/5/2021 Baehl, Ully B, PT GP 2          Patient was wearing a face mask during this therapy encounter. Therapist used appropriate personal protective equipment including eye protection, mask, and gloves.  Mask used was standard procedure mask. Appropriate PPE was worn during the entire therapy session. Hand hygiene was completed before and after therapy session. Patient is not in enhanced droplet precautions.   Sanya Ku present during part of session           Luly Francisco PT  1/5/2021

## 2021-01-05 NOTE — PROGRESS NOTES
NEPHROLOGY PROGRESS NOTE    PATIENT IDENTIFICATION:   Name:  Alice Maxwell      MRN:  [id number]     75 y.o.  female             Reason for visit: Tanya    SUBJECTIVE:   Seen and examined.  No shortness of air or chest pain.  Sitting up in chair and eating her breakfast  OBJECTIVE:  Vitals:    01/04/21 1406 01/04/21 1957 01/05/21 0520 01/05/21 0535   BP: 137/65 159/80  141/62   BP Location: Right arm Right arm  Right arm   Patient Position: Lying Lying  Lying   Pulse: 88 98  83   Resp: 16 18 20    Temp: 98.6 °F (37 °C) 98.9 °F (37.2 °C) 98.3 °F (36.8 °C)    TempSrc: Oral Oral Oral    SpO2: 97% 97%  96%   Weight:       Height:               Body mass index is 39.12 kg/m².    Intake/Output Summary (Last 24 hours) at 1/5/2021 0758  Last data filed at 1/5/2021 0505  Gross per 24 hour   Intake 680 ml   Output --   Net 680 ml         Exam:  GEN:  No distress, appears stated age  EYES:   Anicteric sclera  ENT:    External ears/nose normal, MM are moist  NECK:  No adenopathy, JVP none  LUNGS: Normal chest on inspection; not labored  CV:  Normal S1S2, without murmur  ABD:  Non-tender, non-distended, no hepatosplenomegaly, +BS  EXT:  No edema; no cyanosis; clubbing    Scheduled meds:  allopurinol, 100 mg, Oral, Daily  amLODIPine, 5 mg, Oral, Q24H  atorvastatin, 20 mg, Oral, Nightly  cholecalciferol, 2,000 Units, Oral, Daily  folic acid, 1 mg, Oral, Daily  levothyroxine, 100 mcg, Oral, Q AM  miconazole nitrate, , Topical, Q12H  polyethylene glycol, 17 g, Oral, Daily  senna-docusate sodium, 2 tablet, Oral, Nightly  torsemide, 40 mg, Oral, Daily  traZODone, 25 mg, Oral, Nightly  warfarin, 3 mg, Oral, Daily With Dinner      IV meds:                      Pharmacy to dose warfarin,         Data Review:    Results from last 7 days   Lab Units 01/04/21  0721 01/02/21  1012 12/31/20  1202   SODIUM mmol/L 140 139 137   POTASSIUM mmol/L 4.8 5.2 5.0   CHLORIDE mmol/L 104 104 103   CO2 mmol/L 23.5 23.3 23.8   BUN mg/dL 21 22 24*    CREATININE mg/dL 1.68* 1.80* 1.77*   CALCIUM mg/dL 7.7* 8.1* 8.0*   BILIRUBIN mg/dL 0.5  --  0.6   ALK PHOS U/L 69  --  79   ALT (SGPT) U/L 18  --  24   AST (SGOT) U/L 29  --  36*   GLUCOSE mg/dL 104* 147* 153*       Estimated Creatinine Clearance: 36.4 mL/min (A) (by C-G formula based on SCr of 1.68 mg/dL (H)).    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   Continue torsemide 40 mg p.o. daily  Surveillance lab    Dodie Girard MD  1/5/2021    07:58 EST

## 2021-01-05 NOTE — CONSULTS
Adult Nutrition  Assessment/PES    Patient Name:  Alice Maxwell  YOB: 1945  MRN: [id number]  Admit Date:  12/27/2020    Assessment Date:  1/5/2021    Comments:  Weekly follow up  Tolerating diet, PO 53% avg - nursing notes she had questions about navigating low K+ restriction. Will provide written materials and discuss with pt today (or when available), though not sure she will dc home on this restriction. Team reports cognitive issues. Plan dc on 1/13, to daughter's home. RD to follow.     Addendum: pt seen at lunch time (~11:40 am), provided info on potassium content of foods and specifically which to avoid/limit in diet. Explained daily limit = 2000 mg and that we are not provided high K+ foods on her menus per the MD ordered restriction. She's asking if she will need to follow at home - encouraged her to d/w renal MD closer to dc date.    D/w RN Zee GENTILE     Reason for Assessment     Row Name 01/05/21 0733          Reason for Assessment    Reason For Assessment  follow-up protocol         Nutrition/Diet History     Row Name 01/05/21 0734          Nutrition/Diet History    Typical Food/Fluid Intake  Intake/appetite affected by pain. Also had low K+ restriction placed on diet (1/2). Nsg reports pt has some questions on this in conjunction with diabetes. Pt with significant cognitive issues. Plan to stay with dtr at [place]. Pt was previously here in 2017 (s/p stroke), educated on coumadin/diabetes at that time.     Factors Affecting Nutritional Intake  impaired cognitive status/motor control;pain           Labs/Tests/Procedures/Meds     Row Name 01/05/21 0796          Labs/Procedures/Meds    Lab Results Reviewed  reviewed     Lab Results Comments  Glu 104, Cr 1.68, K 4.8, h/h low        Diagnostic Tests/Procedures    Diagnostic Test/Procedure Reviewed  reviewed     Diagnostic Test/Procedures Comments  hip surgery 12/22        Medications    Pertinent Medications Reviewed  reviewed     Pertinent  Medications Comments  allopurinol, vit D, folic acid, synthroid, diuretic, coumadin, colace         Physical Findings     Row Name 01/05/21 0738          Physical Findings    Overall Physical Appearance  obese     Skin  other (see comments) L anterior thigh incision, groin blisters noted, Rock 18           Nutrition Prescription Ordered     Row Name 01/05/21 0739          Nutrition Prescription PO    Current PO Diet  Regular     Common Modifiers  Low Potassium         Evaluation of Received Nutrient/Fluid Intake     Row Name 01/05/21 0741          PO Evaluation    Number of Days PO Intake Evaluated  3 days     Number of Meals  7     % PO Intake  53%               Problem/Interventions:    Problem 2     Row Name 01/05/21 0742          Nutrition Diagnoses Problem 2    Problem 2  Knowledge Deficit     Signs/Symptoms (evidenced by)  Report/Observation     Other Comment  Hx DM2, now on K+ restriction. Pt with questions, trouble understanding how to navigate the two restrictions. Will discuss with pt - not sure she will dc on low K             Intervention Goal     Row Name 01/05/21 0743          Intervention Goal    General  Maintain nutrition;Disease management/therapy;Provide information regarding MNT for treatment/condition     PO  Tolerate PO     PO Intake %  75 %     Weight  Appropriate weight loss         Nutrition Intervention     Row Name 01/05/21 0743          Nutrition Intervention    RD/Tech Action  Follow Tx progress;Other (comment) will provide diet information to pt later today after therapies.           Education/Evaluation     Row Name 01/05/21 0764          Education    Education  Previous education by NAT/ELI;Will Instruct as appropriate;Education topics     Education Topics  Potassium        Monitor/Evaluation    Monitor  Per protocol           Electronically signed by:  Modesta Ramirez RD  01/05/21 07:46 EST

## 2021-01-05 NOTE — PROGRESS NOTES
Reviewed team conference report with patient and daughter, by phone. Discussed d/c date set for Wednesday, 1/13. D/C plan is still for patient to go home with daughter here in [place]. Scheduled family conference for Monday, 1/11 at 2:00 p.m. Will plan to also call  to review patient's progress and d/c plans.

## 2021-01-05 NOTE — THERAPY TREATMENT NOTE
Inpatient Rehabilitation - Occupational Therapy Treatment Note    [facility]     Patient Name: Alice Maxwell  : 1945  MRN: [id number]    Today's Date: 2021                 Admit Date: 2020         ICD-10-CM ICD-9-CM   1. Heartburn  R12 787.1       Patient Active Problem List   Diagnosis   • Stroke (CMS/HCC)   • Personal history of breast cancer   • History of loop electrical excision procedure (LEEP)   • History of abnormal cervical Pap smear   • S/p left hip fracture       Past Medical History:   Diagnosis Date   • Abnormal Pap smear of cervix    • Arthritis    • Cancer (CMS/HCC)     breast   • Disease of thyroid gland    • Osteoporosis    • Stroke (CMS/HCC)    • Stroke (CMS/HCC)     x2   • Urinary tract infection        Past Surgical History:   Procedure Laterality Date   • BACK SURGERY     • BREAST LUMPECTOMY     • CERVICAL BIOPSY  W/ LOOP ELECTRODE EXCISION     • CHOLECYSTECTOMY     • COLONOSCOPY      had polyps    • FOOT SURGERY     • GASTRIC BYPASS     • HERNIA REPAIR     • THYROIDECTOMY                  IRF OT ASSESSMENT FLOWSHEET (last 12 hours)      IRF OT Evaluation and Treatment     Row Name 21 0800          OT Time and Intention    Document Type  daily treatment  -BT     Mode of Treatment  occupational therapy  -BT     Patient Effort  good  -BT     Symptoms Noted During/After Treatment  fatigue  -BT     Row Name 21 0800          Bed Mobility    Comment (Bed Mobility)  pt up in wheelchair upon arrival   -BT     Row Name 21 0800          Transfers    Sit-Stand [place] (Transfers)  standby assist  -BT     Stand-Sit [place] (Transfers)  standby assist  -BT     [place] Level (Toilet Transfer)  standby assist;contact guard  -BT     Assistive Device (Toilet Transfer)  commode;grab bars/safety frame;wheelchair  -BT     Row Name 21 0800          Sit-Stand Transfer    Assistive Device (Sit-Stand Transfers)  walker, front-wheeled;wheelchair  -BT      Row Name 01/05/21 0800          Stand-Sit Transfer    Assistive Device (Stand-Sit Transfers)  walker, front-wheeled;wheelchair  -BT     Row Name 01/05/21 0800          Toilet Transfer    Type (Toilet Transfer)  stand pivot/stand step  -BT     Row Name 01/05/21 0800          Safety Issues, Functional Mobility    Impairments Affecting Function (Mobility)  balance;endurance/activity tolerance;strength;pain  -BT     Row Name 01/05/21 0800          Bathing    [place] Level (Bathing)  bathing skills;upper body;set up;lower body;contact guard assist  -BT     Assistive Device (Bathing)  long-handled sponge  -BT     Position (Bathing)  supported sitting;supported standing  -BT     Row Name 01/05/21 0800          Upper Body Dressing    [place] Level (Upper Body Dressing)  upper body dressing skills;set up assistance  -BT     Position (Upper Body Dressing)  supported sitting  -BT     Row Name 01/05/21 0800          Lower Body Dressing    [place] Level (Lower Body Dressing)  doff;don;pants/bottoms;shoes/slippers;socks;verbal cues;contact guard assist;minimum assist (75% patient effort)  -BT     Assistive Device Use (Lower Body Dressing)  sock-aid  -BT     Position (Lower Body Dressing)  supported sitting;supported standing  -BT     Row Name 01/05/21 0800          Grooming    [place] Level (Grooming)  grooming skills;set up  -BT     Position (Grooming)  supported sitting;sink side  -BT     Row Name 01/05/21 0800          Toileting    [place] Level (Toileting)  toileting skills;minimum assist (75% patient effort);verbal cues  -BT     Assistive Device Use (Toileting)  -- pt may benefit from toileting aide   -BT     Position (Toileting)  supported sitting;supported standing  -BT     Row Name 01/05/21 0800          Positioning and Restraints    Pre-Treatment Position  sitting in chair/recliner  -BT     Post Treatment Position  wheelchair  -BT     In Wheelchair  notified nsg;sitting;call light within  reach;encouraged to call for assist;exit alarm on  -BT       User Key  (r) = Recorded By, (t) = Taken By, (c) = Cosigned By    Initials Name Effective Dates    BT LettyZenaida guzmanSARA 11/16/20 -            Occupational Therapy Education                 Title: PT OT SLP Therapies (In Progress)     Topic: Occupational Therapy (In Progress)     Point: ADL training (Done)     Description:   Instruct learner(s) on proper safety adaptation and remediation techniques during self care or transfers.   Instruct in proper use of assistive devices.              Learning Progress Summary           Patient Acceptance, E,D, VU,NR by AF at 12/30/2020 1426    Comment: educated on LH sponge and LH reacher with LB dressing tasks will require further edcuation on process    Acceptance, E, VU,NR by AF at 12/28/2020 1418    Comment: educated on safety with ADLs and transfers                   Point: Home exercise program (Not Started)     Description:   Instruct learner(s) on appropriate technique for monitoring, assisting and/or progressing therapeutic exercises/activities.              Learner Progress:  Not documented in this visit.          Point: Precautions (Not Started)     Description:   Instruct learner(s) on prescribed precautions during self-care and functional transfers.              Learner Progress:  Not documented in this visit.          Point: Body mechanics (Not Started)     Description:   Instruct learner(s) on proper positioning and spine alignment during self-care, functional mobility activities and/or exercises.              Learner Progress:  Not documented in this visit.                      User Key     Initials Effective Dates Name Provider Type Discipline     04/14/20 -  Misa Oneal OTR Occupational Therapist OT                    OT Recommendation and Plan                         Time Calculation:     Time Calculation- OT     Row Name 01/05/21 0800             Time Calculation- OT    OT Start Time  0800  -BT       OT Stop Time  0900  -BT      OT Time Calculation (min)  60 min  -BT      Total Timed Code Minutes- OT  60 minute(s)  -BT      OT Received On  01/05/21  -BT      OT - Next Appointment  01/06/21  -BT        User Key  (r) = Recorded By, (t) = Taken By, (c) = Cosigned By    Initials Name Provider Type    BT Zenaida Saenz, OT Occupational Therapist        Therapy Charges for Today     Code Description Service Date Service Provider Modifiers Qty    16053388180 HC OT SELF CARE/MGMT/TRAIN EA 15 MIN 1/4/2021 Zenaida Saenz, OT GO 4    14137481462 HC OT THER PROC EA 15 MIN 1/4/2021 LettyZenaida guzman, OT GO 1    07681845051 HC OT SELF CARE/MGMT/TRAIN EA 15 MIN 1/4/2021 LettyZenaida guzman, OT GO 1    47642526480 HC OT SELF CARE/MGMT/TRAIN EA 15 MIN 1/5/2021 LettyZenaida guzman, OT GO 4                   Zenaidabertram Saenz, OT  1/5/2021

## 2021-01-05 NOTE — PROGRESS NOTES
Case Management  Inpatient Rehabilitation Team Conference    Conference Date/Time: 1/5/2021 7:32:21 AM    Team Conference Attendees:  Luis Armando Sharp MSSW Lori Baehl, ONEIDA Ashraf, SLP  Gifty Connolly, CTRS  Modesta Ramirez RD, LD  Jean Claude Harkins, JOSE RAUL Poole, RN   Dr. 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; DM, obesity, partial  thyroidectomy; factor V [place], breast cancer, lumpectomy; anxiety      Plan of Care  Anticipated Discharge Date/Estimated Length of Stay: ELOS: 2 weeks  Anticipated Discharge Destination: Community discharge with assistance  Discharge Plan : Home with daughter  Medical Necessity Expected Level Rationale: CGA with HH  Intensity and Duration: an average of 3 hours/5 days per week  Medical Supervision and 24 Hour Rehab Nursing: x  Physical Therapy: x  PT Intensity/Duration: 1 hour/day, 5 days/week for approximately 10 - 15 days  Occupational Therapy: x  OT Intensity/Duration: 1 hour/day, 5 days/week for approximately 10 - 15 days  Speech and Language Therapy: x  SLP Intensity/Duration: 1 hour/day, 5 days/week for approximately 10 - 15 days  Social Work: x  Therapeutic Recreation: x  Updated (if changes indicated)    Anticipated Discharge Date/Estimated Length of Stay:   ELOS: DC 1/13    Based on the patient's medical and functional status, their prognosis and  expected level of functional improvement is: CGA with HH      Interdisciplinary Problem/Goals/Status    All Rehab Problems:  Body Systems    [RN] Integumentary(Active)  Current Status(01/05/2021): Lt. hip incision, Lt. hip blister, & Crack in skin  at coccyx & Lt.  groin area.  Weekly Goal(01/12/2021): Teach family/patient dressing changes. Incision  healing, no infection.  Discharge Goal: Incision, blister, & cracked areas healed.        Cognition    [ST] Executive Functions(Active)  Current Status(01/04/2021): MIN cues needed for functional problem solving and  reasoning. MOD cues needed for simple (math/temporal) word problems.  Weekly Goal(01/11/2021): follow daily schedule indep; recall daily events indep  Discharge Goal: fxnl cognition for home with assist/supervision        Mobility    [OT] Toilet Transfers(Active)  Current Status(01/04/2021): CGA with RWX  Weekly Goal(01/11/2021): SBA with RWX  Discharge Goal: SBA with RWX    [OT] Tub/Shower Transfers(Active)  Current Status(01/04/2021): TBA  Weekly Goal(01/12/2021): CGA  Discharge Goal: CGA    [PT] Bed/Chair/Wheelchair(Active)  Current Status(01/04/2021): CGA Rwx  Weekly Goal(01/12/2021): SBA Rwx  Discharge Goal: SBA Rwx    [PT] Bed Mobility(Active)  Current Status(12/28/2020): Mod  Weekly Goal(01/12/2021): Min  Discharge Goal: CGA/Min    [PT] Walk(Active)  Current Status(01/04/2021): 40-50 ft Rwx Min with wc following  Weekly Goal(01/12/2021): BR Rwx Min  Discharge Goal: 50 ft Rwx CGA        Pain    [RN] Pain Management(Active)  Current Status(01/05/2021): Pain related to Lt. hip surgery  Weekly Goal(01/13/2021): Able to tolerate therapies. Pain level < 5 on 1-10 pain  scale.  Discharge Goal: Pain resolved or under control.        Psychosocial    [RN] Coping/Adjustment(Active)  Current Status(01/05/2021): Expresses appropriate coping  Weekly Goal(01/12/2021): Allow opportunity to express concerns regarding current  status.  Discharge Goal: Adequate coping regarding life changes with ongoing support.        Safety    [RN] Potential for Injury(Active)  Current Status(01/05/2021): Uses call light appropriately. No unsafe behaviors,  but history of falls.  Weekly Goal(01/13/2021): Instruct family/patient regarding safety  precautions &  need for close supervision.  Discharge Goal: No falls, no injuries. Patient/family will be aware of risk of  fall & safety in the home setting.        Self Care    [OT] Bathing(Active)  Current Status(01/04/2021): UB set up LB min  Weekly Goal(01/12/2021): CGA/SBA  Discharge Goal: CGA/SBA    [OT] Dressing (Lower)(Active)  Current Status(01/04/2021): max  Weekly Goal(01/12/2021): MOD/min with AE  Discharge Goal: CGA/SBA with AE    [OT] Dressing (Upper)(Active)  Current Status(01/04/2021): set up  Weekly Goal(01/04/2021): set up  Discharge Goal: set up    [OT] Grooming(Active)  Current Status(01/04/2021): Set up  Weekly Goal(01/11/2021): Indep  Discharge Goal: Indep    [OT] Toileting(Active)  Current Status(01/04/2021): Min  Weekly Goal(01/12/2021): SBA  Discharge Goal: SBA        Sphincter Control    [RN] Bladder & bowel management(Active)  Current Status(01/05/2021): Bladder & bowel  continent 100%  Weekly Goal(01/12/2021): B&B Continent 100%  Discharge Goal: B&B Remains 100% continent        Comments: 12/29: pain is a barrier; SLP to eval today related to hx of strokes;  BNE to be ordered;        Signed by: Jean Claude Harkins RN    Physician CoSigned By: Kwabena Becerra 01/05/2021 08:59:52

## 2021-01-05 NOTE — PROGRESS NOTES
LOS: 9 days   Patient Care Team:  Elsy Ventura as PCP - General (Nurse Practitioner)  Elsy Ventura as Nurse Practitioner (Nurse Practitioner)    Chief Complaint: same    Subjective  Pt is awake and alert. Pt  Denies Covid sx this am    History of Present Illness    Subjective    History taken from: patient    Objective exam unchanged calves soft and NT. Resp unlabored and regular    Vital Signs  Temp:  [98.3 °F (36.8 °C)-98.9 °F (37.2 °C)] 98.3 °F (36.8 °C)  Heart Rate:  [83-98] 83  Resp:  [16-20] 20  BP: (126-159)/(62-80) 141/62    Objective    Results Review:     I reviewed the patient's new clinical results.    Medication Review:     Assessment/Plan Continue to prepare for dc. Pt making good progress with mobility per therapists. Needs assistance with self care, Pain is limiting factor. I participated in team mtg this am and returned to pt's room to discuss dc plan with th patient. Plan dc 1/13 with HHtx to follow. FC to be scheduled. Team recommends close supervision.       S/p left hip fracture      Assessment & Plan    Kwabena Becerra MD  01/05/21  07:35 EST    Time:

## 2021-01-05 NOTE — THERAPY TREATMENT NOTE
Inpatient Rehabilitation - Speech Language Pathology Treatment Note    [facility]     Patient Name: Alice Maxwell  : 1945  MRN: [id number]    Today's Date: 2021                   Admit Date: 2020       Visit Dx:      ICD-10-CM ICD-9-CM   1. Heartburn  R12 787.1       Patient Active Problem List   Diagnosis   • Stroke (CMS/HCC)   • Personal history of breast cancer   • History of loop electrical excision procedure (LEEP)   • History of abnormal cervical Pap smear   • S/p left hip fracture       Past Medical History:   Diagnosis Date   • Abnormal Pap smear of cervix    • Arthritis    • Cancer (CMS/HCC)     breast   • Disease of thyroid gland    • Osteoporosis    • Stroke (CMS/HCC)    • Stroke (CMS/HCC)     x2   • Urinary tract infection        Past Surgical History:   Procedure Laterality Date   • BACK SURGERY     • BREAST LUMPECTOMY     • CERVICAL BIOPSY  W/ LOOP ELECTRODE EXCISION     • CHOLECYSTECTOMY     • COLONOSCOPY      had polyps    • FOOT SURGERY     • GASTRIC BYPASS     • HERNIA REPAIR     • THYROIDECTOMY       Patient wore a face mask during this therapy encounter. Therapist used appropriate personal protective equipment including mask, eye protection, and gloves.  Mask used was standard procedure mask. Appropriate PPE was worn during the entire therapy session. Hand hygiene was completed before and after therapy session. Patient is not in enhanced droplet precautions.       SLP EVALUATION (last 72 hours)      SLP SLC Evaluation     Row Name 21 0901 21 0931                Communication Assessment/Intervention    Document Type  therapy note (daily note)  -LN  therapy note (daily note)  -LN       Subjective Information  no complaints  -LN  complains of;pain hip  -LN       Patient Observations  alert;cooperative;agree to therapy  -LN  alert;poorly cooperative  -LN       Patient/Family/Caregiver Comments/Observations  --  Patient c/o 10/10 hip pain; pain medication already  administered per RN.  -LN       Patient Effort  good  -LN  fair  -LN       Symptoms Noted During/After Treatment  none  -LN  none  -LN          Organizational Skills Goal 1 (SLP)    Progress (Thought Organization Skills Goal 1, SLP)  --  50%;independently (over 90% accuracy);80%;with minimal cues (75-90%)  -LN       Progress/Outcomes (Thought Organization Skills Goal 1, SLP)  --  goal ongoing  -LN       Comment (Thought Organization Skills Goal 1, SLP)  --  Similarities/differences  -LN          Functional Problem Solving Skills Goal 1 (SLP)    Improve Problem Solving Through Goal 1 (SLP)  --  sequence steps in a task;complete organization/home management task;determine multiple solutions to problems;determine solutions to multifactorial problems;90%;independently (over 90% accuracy)  -LN       Time Frame (Problem Solving Goal 1, SLP)  --  by discharge  -LN       Barriers (Problem Solving Goal 1, SLP)  --  Pain appeared to limit concentration/attention  -LN       Progress (Problem Solving Goal 1, SLP)  --  70%;independently (over 90% accuracy);100%;with minimal cues (75-90%)  -LN       Progress/Outcomes (Problem Solving Goal 1, SLP)  --  goal ongoing  -LN       Comment (Problem Solving Goal 1, SLP)  --  Multi-factor problem solving  -LN          Functional Math Skills Goal 1 (SLP)    Improve Functional Math Skills Through Goal 1 (SLP)  --  complete word problems involving time;complete word problems involving money;90%;independently (over 90% accuracy)  -LN       Time Frame (Functional Math Skills Goal 1, SLP)  --  by discharge  -LN       Progress (Functional Math Skills Goal 1, SLP)  --  40%;independently (over 90% accuracy);70%;with minimal cues (75-90%);90%;with moderate cues (50-74%)  -LN       Progress/Outcomes (Functional Math Skills Goal 1, SLP)  --  goal ongoing  -LN       Comment (Functional Math Skills Goal 1, SLP)  --  Simple word problems  -LN         User Key  (r) = Recorded By, (t) = Taken By, (c) =  Cosigned By    Initials Name Effective Dates    LN Roxy Conte 12/17/19 -              EDUCATION    The patient has been educated in the following areas:       Cognitive Impairment.      SLP Recommendation and Plan                                                  SLP GOALS     Row Name 01/05/21 0901 01/04/21 0931          Memory Skills Goal 1 (SLP)    Improve Memory Skills Through Goal 1 (SLP)  --  recall details of the day;use memory strategies;use written schedule;listen to a paragraph and answer questions;visual memory task;read a paragraph and answer questions;select a word from a list by exclusion;recalling unrelated word lists with an imposed delay;recalling related word lists with an imposed delay;90%  -LN     Time Frame (Memory Skills Goal 1, SLP)  --  by discharge  -LN     Progress (Memory Skills Goal 1, SLP)  --  70%;independently (over 90% accuracy);100%;with minimal cues (75-90%)  -LN     Progress/Outcomes (Memory Skills Goal 1, SLP)  --  goal ongoing  -LN     Comment (Memory Skills Goal 1, SLP)  --  Recallings related items after a 5 minutes delay  -LN        Organizational Skills Goal 1 (SLP)    Improve Thought Organization Through Goal 1 (SLP)  completing mental manipulation task;naming similarities and differences;completing a convergent naming task;completing a verbal sequencing task;90%;independently (over 90% accuracy)  -LN  completing mental manipulation task;naming similarities and differences;completing a convergent naming task;completing a verbal sequencing task;90%;independently (over 90% accuracy)  -LN     Time Frame (Thought Organization Skills Goal 1, SLP)  by discharge  -LN  by discharge  -LN     Progress (Thought Organization Skills Goal 1, SLP)  60%;independently (over 90% accuracy);90%;with minimal cues (75-90%)  -LN  50%;independently (over 90% accuracy);80%;with minimal cues (75-90%)  -LN     Progress/Outcomes (Thought Organization Skills Goal 1, SLP)  goal ongoing  -LN  goal  ongoing  -LN     Comment (Thought Organization Skills Goal 1, SLP)  Abstract convergent  -LN  Similarities/differences  -LN        Reasoning Goal 1 (SLP)    Improve Reasoning Through Goal 1 (SLP)  complete logic/creative thinking task;complete mental flexibility task;complete basic reasoning task;complete deductive reasoning task;90%;independently (over 90% accuracy)  -LN  complete logic/creative thinking task;complete mental flexibility task;complete basic reasoning task;complete deductive reasoning task;90%;independently (over 90% accuracy)  -LN     Time Frame (Reasoning Goal 1, SLP)  by discharge  -LN  by discharge  -LN     Progress (Reasoning Goal 1, SLP)  70%;independently (over 90% accuracy);80%;with minimal cues (75-90%)  -LN  20%;independently (over 90% accuracy);80%;with minimal cues (75-90%)  -LN     Progress/Outcomes (Reasoning Goal 1, SLP)  goal ongoing  -LN  goal ongoing  -LN     Comment (Reasoning Goal 1, SLP)  Basic logic puzzle  -LN  High-level mental flexibility (identifying three meanings for words)  -LN        Functional Problem Solving Skills Goal 1 (SLP)    Improve Problem Solving Through Goal 1 (SLP)  sequence steps in a task;complete organization/home management task;determine multiple solutions to problems;determine solutions to multifactorial problems;90%;independently (over 90% accuracy)  -LN  sequence steps in a task;complete organization/home management task;determine multiple solutions to problems;determine solutions to multifactorial problems;90%;independently (over 90% accuracy)  -LN     Time Frame (Problem Solving Goal 1, SLP)  by discharge  -LN  by discharge  -LN     Barriers (Problem Solving Goal 1, SLP)  Reduced attention  -LN  Pain appeared to limit concentration/attention  -LN     Progress (Problem Solving Goal 1, SLP)  40%;independently (over 90% accuracy);100%;with minimal cues (75-90%)  -LN  70%;independently (over 90% accuracy);100%;with minimal cues (75-90%)  -LN      Progress/Outcomes (Problem Solving Goal 1, SLP)  goal ongoing  -LN  goal ongoing  -LN     Comment (Problem Solving Goal 1, SLP)  6-step sequence  -LN  Multi-factor problem solving  -LN        Functional Math Skills Goal 1 (SLP)    Improve Functional Math Skills Through Goal 1 (SLP)  complete word problems involving time;complete word problems involving money;90%;independently (over 90% accuracy)  -LN  complete word problems involving time;complete word problems involving money;90%;independently (over 90% accuracy)  -LN     Time Frame (Functional Math Skills Goal 1, SLP)  by discharge  -LN  by discharge  -LN     Barriers (Functional Math Skills Goal 1, SLP)  Reduced organization and attention to detial  -LN  --     Progress (Functional Math Skills Goal 1, SLP)  50%;independently (over 90% accuracy);80%;with minimal cues (75-90%)  -LN  40%;independently (over 90% accuracy);70%;with minimal cues (75-90%);90%;with moderate cues (50-74%)  -LN     Progress/Outcomes (Functional Math Skills Goal 1, SLP)  goal ongoing  -LN  goal ongoing  -LN     Comment (Functional Math Skills Goal 1, SLP)  Balanced a checkbook  -LN  Simple word problems  -LN       User Key  (r) = Recorded By, (t) = Taken By, (c) = Cosigned By    Initials Name Provider Type    Roxy Valles Speech and Language Pathologist                      Time Calculation:       Time Calculation- SLP     Row Name 01/05/21 1514 01/05/21 1513          Time Calculation- SLP    SLP Start Time  1430  -LN  0900  -LN     SLP Stop Time  1500  -LN  0930  -LN     SLP Time Calculation (min)  30 min  -LN  30 min  -LN     SLP Received On  01/05/21  -LN  01/05/21  -LN       User Key  (r) = Recorded By, (t) = Taken By, (c) = Cosigned By    Initials Name Provider Type    Roxy Valles Speech and Language Pathologist            Therapy Charges for Today     Code Description Service Date Service Provider Modifiers Qty    36126921288 [facility] DEV OF COGN SKILLS INITIAL 15 MIN  1/4/2021 Roxy Conte  1    53411138963 [facility] DEV OF COGN SKILLS EACH ADDT'L 15 MIN 1/4/2021 Roxy Conte  3    55263463374 [facility] SELF CARE/MGMT/TRAIN EA 15 MIN 1/5/2021 Roxy Conte 1    [id number] [facility] DEV OF COGN SKILLS INITIAL 15 MIN 1/5/2021 Roxy Conte  1/5/2021

## 2021-01-05 NOTE — PLAN OF CARE
Goal Outcome Evaluation:  Plan of Care Reviewed With: patient  Progress: improving  Outcome Summary: Assist 1 with walker to [facility]. Pain med and ice pack for L hip. Using call light. Has been sleeping.

## 2021-01-05 NOTE — PROGRESS NOTES
Inpatient Rehabilitation Plan of Care Note    Plan of Care  Care Plan Reviewed - No updates at this time.    Body Systems    [RN] Integumentary(Active)  Current Status(01/05/2021): Lt. hip incision, Lt. hip blister, & Crack in skin  at coccyx & Lt. groin area.  Weekly Goal(01/12/2021): Teach family/patient dressing changes. Incision  healing, no infection.  Discharge Goal: Incision, blister, & cracked areas healed.    Performed Intervention(s)  Daily skin inspections, wound care, assist with repositioning, & dressing  changes as ordered.      Pain    [RN] Pain Management(Active)  Current Status(01/05/2021): Pain related to Lt. hip surgery  Weekly Goal(01/13/2021): Able to tolerate therapies. Pain level < 5 on 1-10 pain  scale.  Discharge Goal: Pain resolved or under control.    Performed Intervention(s)  Pain medication as ordered, distraction, & repositioning assistance as needed.      Psychosocial    [RN] Coping/Adjustment(Active)  Current Status(01/05/2021): Expresses appropriate coping  Weekly Goal(01/12/2021): Allow opportunity to express concerns regarding current  status.  Discharge Goal: Adequate coping regarding life changes with ongoing support.    Performed Intervention(s)  Verbalizes needs & concerns      Safety    [RN] Potential for Injury(Active)  Current Status(01/05/2021): Uses call light appropriately. No unsafe behaviors,  but history of falls.  Weekly Goal(01/13/2021): Instruct family/patient regarding safety precautions &  need for close supervision.  Discharge Goal: No falls, no injuries. Patient/family will be aware of risk of  fall & safety in the home setting.    Performed Intervention(s)  Items within reach (call bell, etc), safety rounds, falls precautions, &  bed/chair alarms.      Sphincter Control    [RN] Bladder & bowel management(Active)  Current Status(01/05/2021): Bladder & bowel  continent 100%  Weekly Goal(01/12/2021): B&B Continent 100%  Discharge Goal: B&B Remains 100%  continent    Performed Intervention(s)  Monitor intake, output, & BM's, Encourage appropriate diet & fluids, & perineal  care.    Signed by: Verna Robin RN

## 2021-01-05 NOTE — THERAPY TREATMENT NOTE
Inpatient Rehabilitation - Occupational Therapy Treatment Note    [facility]     Patient Name: Alice Maxwell  : 1945  MRN: [id number]    Today's Date: 2021                 Admit Date: 2020         ICD-10-CM ICD-9-CM   1. Heartburn  R12 787.1       Patient Active Problem List   Diagnosis   • Stroke (CMS/HCC)   • Personal history of breast cancer   • History of loop electrical excision procedure (LEEP)   • History of abnormal cervical Pap smear   • S/p left hip fracture       Past Medical History:   Diagnosis Date   • Abnormal Pap smear of cervix    • Arthritis    • Cancer (CMS/HCC)     breast   • Disease of thyroid gland    • Osteoporosis    • Stroke (CMS/HCC)    • Stroke (CMS/HCC)     x2   • Urinary tract infection        Past Surgical History:   Procedure Laterality Date   • BACK SURGERY     • BREAST LUMPECTOMY     • CERVICAL BIOPSY  W/ LOOP ELECTRODE EXCISION     • CHOLECYSTECTOMY     • COLONOSCOPY      had polyps    • FOOT SURGERY     • GASTRIC BYPASS     • HERNIA REPAIR     • THYROIDECTOMY                  IRF OT ASSESSMENT FLOWSHEET (last 12 hours)      IRF OT Evaluation and Treatment     Row Name 21 1300 21 0800       OT Time and Intention    Document Type  daily treatment  -BT  daily treatment  -BT    Mode of Treatment  occupational therapy  -BT  occupational therapy  -BT    Patient Effort  --  good  -BT    Symptoms Noted During/After Treatment  fatigue  -BT  fatigue  -BT    Row Name 21 1300          General Information    Existing Precautions/Restrictions  fall;left;hip, anterior  -BT     Row Name 21 1300 21 0800       Bed Mobility    Bed Mobility  supine-sit;sit-supine  -BT  --    Rolling Right [place] (Bed Mobility)  maximum assist (25% patient effort)  -BT  --    Supine-Sit [place] (Bed Mobility)  minimum assist (75% patient effort);verbal cues  -BT  --    Sit-Supine [place] (Bed Mobility)  moderate assist (50% patient effort);verbal  cues;2 person assist  -BT  --    Bed Mobility, Safety Issues  decreased use of legs for bridging/pushing  -BT  --    Assistive Device (Bed Mobility)  bed rails  -BT  --    Comment (Bed Mobility)  --  pt up in wheelchair upon arrival   -BT    Row Name 01/05/21 1300          Transfer Assessment/Treatment    Transfers  sit-stand transfer;toilet transfer  -BT     Row Name 01/05/21 1300 01/05/21 0800       Transfers    Chair-Bed [place] (Transfers)  contact guard;verbal cues  -BT  --    Sit-Stand [place] (Transfers)  standby assist  -BT  standby assist  -BT    Stand-Sit [place] (Transfers)  standby assist  -BT  standby assist  -BT    [place] Level (Toilet Transfer)  --  standby assist;contact guard  -BT    Assistive Device (Toilet Transfer)  --  commode;grab bars/safety frame;wheelchair  -BT    Row Name 01/05/21 1300          Chair-Bed Transfer    Assistive Device (Chair-Bed Transfers)  walker, front-wheeled  -BT     Row Name 01/05/21 1300 01/05/21 0800       Sit-Stand Transfer    Assistive Device (Sit-Stand Transfers)  walker, front-wheeled;wheelchair  -BT  walker, front-wheeled;wheelchair  -BT    Row Name 01/05/21 1300 01/05/21 0800       Stand-Sit Transfer    Assistive Device (Stand-Sit Transfers)  walker, front-wheeled;wheelchair  -BT  walker, front-wheeled;wheelchair  -BT    Row Name 01/05/21 0800          Toilet Transfer    Type (Toilet Transfer)  stand pivot/stand step  -BT     Row Name 01/05/21 0800          Safety Issues, Functional Mobility    Impairments Affecting Function (Mobility)  balance;endurance/activity tolerance;strength;pain  -BT     Row Name 01/05/21 1300          Shoulder (Therapeutic Exercise)    Shoulder (Therapeutic Exercise)  strengthening exercise  -BT     Shoulder Strengthening (Therapeutic Exercise)  bilateral;flexion;extension;aBduction;aDduction;2 lb free weight;3 sets;10 repetitions also did 2 x 10 2.5 dowel [place] exercises   -BT     Row Name 01/05/21 1300           Elbow/Forearm (Therapeutic Exercise)    Elbow/Forearm (Therapeutic Exercise)  strengthening exercise  -BT     Elbow/Forearm Strengthening (Therapeutic Exercise)  bilateral;flexion;extension;2 lb free weight;10 repetitions;3 sets  -BT     Row Name 01/05/21 1300          Wrist (Therapeutic Exercise)    Wrist (Therapeutic Exercise)  strengthening exercise  -BT     Wrist Strengthening (Therapeutic Exercise)  2 lb free weight;10 repetitions;3 sets  -BT     Row Name 01/05/21 0800          Bathing    [place] Level (Bathing)  bathing skills;upper body;set up;lower body;contact guard assist  -BT     Assistive Device (Bathing)  long-handled sponge  -BT     Position (Bathing)  supported sitting;supported standing  -BT     Row Name 01/05/21 0800          Upper Body Dressing    [place] Level (Upper Body Dressing)  upper body dressing skills;set up assistance  -BT     Position (Upper Body Dressing)  supported sitting  -BT     Row Name 01/05/21 0800          Lower Body Dressing    [place] Level (Lower Body Dressing)  doff;don;pants/bottoms;shoes/slippers;socks;verbal cues;contact guard assist;minimum assist (75% patient effort)  -BT     Assistive Device Use (Lower Body Dressing)  sock-aid  -BT     Position (Lower Body Dressing)  supported sitting;supported standing  -BT     Row Name 01/05/21 0800          Grooming    [place] Level (Grooming)  grooming skills;set up  -BT     Position (Grooming)  supported sitting;sink side  -BT     Row Name 01/05/21 0800          Toileting    [place] Level (Toileting)  toileting skills;minimum assist (75% patient effort);verbal cues  -BT     Assistive Device Use (Toileting)  -- pt may benefit from toileting aide   -BT     Position (Toileting)  supported sitting;supported standing  -BT     Row Name 01/05/21 0800          Positioning and Restraints    Pre-Treatment Position  sitting in chair/recliner  -BT     Post Treatment Position  wheelchair  -BT     In Wheelchair   notified nsg;sitting;call light within reach;encouraged to call for assist;exit alarm on  -BT       User Key  (r) = Recorded By, (t) = Taken By, (c) = Cosigned By    Initials Name Effective Dates    BT Zenaida Saenz OT 11/16/20 -            Occupational Therapy Education                 Title: PT OT SLP Therapies (In Progress)     Topic: Occupational Therapy (In Progress)     Point: ADL training (Done)     Description:   Instruct learner(s) on proper safety adaptation and remediation techniques during self care or transfers.   Instruct in proper use of assistive devices.              Learning Progress Summary           Patient Acceptance, E,D, VU,NR by AF at 12/30/2020 1426    Comment: educated on LH sponge and LH reacher with LB dressing tasks will require further edcuation on process    Acceptance, E, VU,NR by AF at 12/28/2020 1418    Comment: educated on safety with ADLs and transfers                   Point: Home exercise program (Not Started)     Description:   Instruct learner(s) on appropriate technique for monitoring, assisting and/or progressing therapeutic exercises/activities.              Learner Progress:  Not documented in this visit.          Point: Precautions (Not Started)     Description:   Instruct learner(s) on prescribed precautions during self-care and functional transfers.              Learner Progress:  Not documented in this visit.          Point: Body mechanics (Not Started)     Description:   Instruct learner(s) on proper positioning and spine alignment during self-care, functional mobility activities and/or exercises.              Learner Progress:  Not documented in this visit.                      User Key     Initials Effective Dates Name Provider Type Discipline    AF 04/14/20 -  Misa Oneal OTR Occupational Therapist OT                    OT Recommendation and Plan                         Time Calculation:     Time Calculation- OT     Row Name 01/05/21 1300 01/05/21 0800           Time Calculation- OT    OT Start Time  1300  -BT  0800  -BT     OT Stop Time  1330  -BT  0900  -BT     OT Time Calculation (min)  30 min  -BT  60 min  -BT     Total Timed Code Minutes- OT  30 minute(s)  -BT  60 minute(s)  -BT     OT Received On  --  01/05/21  -BT     OT - Next Appointment  --  01/06/21  -BT       User Key  (r) = Recorded By, (t) = Taken By, (c) = Cosigned By    Initials Name Provider Type    BT Zenaida Saenz OT Occupational Therapist        Therapy Charges for Today     Code Description Service Date Service Provider Modifiers Qty    79873319005 HC OT SELF CARE/MGMT/TRAIN EA 15 MIN 1/4/2021 Letty, Zenaida, OT GO 4    57036515363 HC OT THER PROC EA 15 MIN 1/4/2021 LettySosa guzmanona, OT GO 1    23180821812 HC OT SELF CARE/MGMT/TRAIN EA 15 MIN 1/4/2021 Letty, Zenaida, OT GO 1    54946244229 HC OT SELF CARE/MGMT/TRAIN EA 15 MIN 1/5/2021 LettySosaZenaida, OT GO 4    57327533872 HC OT THER PROC EA 15 MIN 1/5/2021 Letty, Zenaida, OT GO 2                   Zenaida Letty, OT  1/5/2021

## 2021-01-05 NOTE — PROGRESS NOTES
Pharmacy Consult: Warfarin Dosing/ Monitoring    Alice Maxwell is a 75 y.o. female, estimated creatinine clearance is 36.4 mL/min (A) (by C-G formula based on SCr of 1.68 mg/dL (H)). weighing 113 kg (249 lb 12.8 oz).     has a past medical history of Abnormal Pap smear of cervix, Arthritis, Cancer (CMS/HCC), Disease of thyroid gland, Osteoporosis, Stroke (CMS/HCC), Stroke (CMS/HCC), and Urinary tract infection.    Social History     Tobacco Use    Smoking status: Never Smoker   Substance Use Topics    Alcohol use: No    Drug use: No     Results from last 7 days   Lab Units 01/05/21  0846 01/04/21  0721 01/03/21  0647 01/02/21  1012 01/01/21  0958 12/31/20  1202 12/30/20  0750   INR  2.09* 2.45* 3.11* 3.05* 2.85* 3.44* 2.73*   HEMOGLOBIN g/dL  --  8.8*  --   --   --  8.9*  --    HEMATOCRIT %  --  28.5*  --   --   --  28.7*  --    PLATELETS 10*3/mm3  --  343  --   --   --  270  --      Results from last 7 days   Lab Units 01/04/21  0721 01/02/21  1012 12/31/20  1202   SODIUM mmol/L 140 139 137   POTASSIUM mmol/L 4.8 5.2 5.0   CHLORIDE mmol/L 104 104 103   CO2 mmol/L 23.5 23.3 23.8   BUN mg/dL 21 22 24*   CREATININE mg/dL 1.68* 1.80* 1.77*   CALCIUM mg/dL 7.7* 8.1* 8.0*   BILIRUBIN mg/dL 0.5  --  0.6   ALK PHOS U/L 69  --  79   ALT (SGPT) U/L 18  --  24   AST (SGOT) U/L 29  --  36*   GLUCOSE mg/dL 104* 147* 153*     Anticoagulation history: Warfarin 6mg daily (home dose). Warfarin managed by [facility] for hx of Factor V Leiden.    Hospital Anticoagulation:  Consulting provider: Aric Chao MD  Start date: 12/27/20  Indication: Post Surgical - Hip, hypercoagulable disorder and hx of CVA  Target INR: 2-3  Expected duration: indefinite   Bridge Therapy: No                  Date 12/27 12/28 12/29 12/30 12/31 1/1 1/2 1/3 1/4 1/5      INR 1.74 2.04 2.39 2.73 3.44 2.85 3.05 3.11 2.45 2.09      Warfarin dose 6mg 6mg 6mg 5mg HOLD 4mg 2mg 1mg 3mg 4mg        Potential drug interactions:   Allopurinol -  may enhance the anticoagulant effect of warfarin (home med)  Levothyroxine- may enhance the anticoagulant effect of warfarin (home med)  Norco - Patients taking acetaminophen at higher doses and/or for longer duration may be at greater risk for experiencing a clinically significant interaction (not listed on med rec)   Trazodone- May diminish the anticoagulant effect of warfarin. (not listed on med rec)    Relevant nutrition status: regular diet; eating better per RN notes    Education complete?/ Date: Home medication    Assessment/Plan:  -INR still therapeutic today at 2.09; however, it is continuing to drop  -Will increase warfarin to 4mg daily (still down from patient's home dose of 6mg)  -Continue to follow INR daily for now due to recent dose adjustments    Pharmacy will continue to follow until discharge or discontinuation of warfarin.     Mando Wright, PharmD  01/05/21 10:21 EST

## 2021-01-05 NOTE — PLAN OF CARE
Goal Outcome Evaluation:  Plan of Care Reviewed With: patient  Progress: improving  Pt. A&OX4. Calm, cooperative, and pleasant. Given Noroc 7.5 every 4 hours. Ice pack applied PRN to hip. Need to keep blister on R. thigh covered with silicone border dressing. Changed dressing over incison to hip. Cover with ABD pad and paper tape. Miconazole nitrate ointment to groin. Ate better at meals. Tired and drowsy due to activity with therapies. Diet: Reg, low potassium, thin liquids. Meds a few at a time with liquid. Nephrology following, no metformin due to impaired kidney function. DM type2: BG checked ACHS per Dr. Becerra. Asked for PRN TUMs with meals due to heart burn. Dietician met with pt. and talked with pt. about low potassium/low carb diet. Staples were approved to come out today but were not removed due to moderate moist drainage from the wound. Dr. Becerra wants Dr. Chao to see the wound tomorrow to see if the staples are ready to be removed.. CBC/CMP M/Th.

## 2021-01-05 NOTE — THERAPY TREATMENT NOTE
Inpatient Rehabilitation - Occupational Therapy Treatment Note    [facility]     Patient Name: Alice Maxwell  : 1945  MRN: [id number]    Today's Date: 2021                 Admit Date: 2020         ICD-10-CM ICD-9-CM   1. Heartburn  R12 787.1       Patient Active Problem List   Diagnosis   • Stroke (CMS/HCC)   • Personal history of breast cancer   • History of loop electrical excision procedure (LEEP)   • History of abnormal cervical Pap smear   • S/p left hip fracture       Past Medical History:   Diagnosis Date   • Abnormal Pap smear of cervix    • Arthritis    • Cancer (CMS/HCC)     breast   • Disease of thyroid gland    • Osteoporosis    • Stroke (CMS/HCC)    • Stroke (CMS/HCC)     x2   • Urinary tract infection        Past Surgical History:   Procedure Laterality Date   • BACK SURGERY     • BREAST LUMPECTOMY     • CERVICAL BIOPSY  W/ LOOP ELECTRODE EXCISION     • CHOLECYSTECTOMY     • COLONOSCOPY      had polyps    • FOOT SURGERY     • GASTRIC BYPASS     • HERNIA REPAIR     • THYROIDECTOMY                  IRF OT ASSESSMENT FLOWSHEET (last 12 hours)      IRF OT Evaluation and Treatment     Row Name 21 1300 21 0800       OT Time and Intention    Document Type  daily treatment  -BT  daily treatment  -BT    Mode of Treatment  occupational therapy  -BT  occupational therapy  -BT    Patient Effort  --  good  -BT    Symptoms Noted During/After Treatment  fatigue  -BT  fatigue  -BT    Row Name 21 1300          General Information    Existing Precautions/Restrictions  fall;left;hip, anterior  -BT     Row Name 21 1300 21 0800       Bed Mobility    Bed Mobility  supine-sit;sit-supine  -BT  --    Rolling Right [place] (Bed Mobility)  maximum assist (25% patient effort)  -BT  --    Supine-Sit [place] (Bed Mobility)  minimum assist (75% patient effort);verbal cues  -BT  --    Sit-Supine [place] (Bed Mobility)  moderate assist (50% patient effort);verbal  cues;2 person assist  -BT  --    Bed Mobility, Safety Issues  decreased use of legs for bridging/pushing  -BT  --    Assistive Device (Bed Mobility)  bed rails  -BT  --    Comment (Bed Mobility)  --  pt up in wheelchair upon arrival   -BT    Row Name 01/05/21 1300          Transfer Assessment/Treatment    Transfers  sit-stand transfer;toilet transfer  -BT     Row Name 01/05/21 1300 01/05/21 0800       Transfers    Chair-Bed [place] (Transfers)  contact guard;verbal cues  -BT  --    Sit-Stand [place] (Transfers)  standby assist  -BT  standby assist  -BT    Stand-Sit [place] (Transfers)  standby assist  -BT  standby assist  -BT    [place] Level (Toilet Transfer)  --  standby assist;contact guard  -BT    Assistive Device (Toilet Transfer)  --  commode;grab bars/safety frame;wheelchair  -BT    Row Name 01/05/21 1300          Chair-Bed Transfer    Assistive Device (Chair-Bed Transfers)  walker, front-wheeled  -BT     Row Name 01/05/21 1300 01/05/21 0800       Sit-Stand Transfer    Assistive Device (Sit-Stand Transfers)  walker, front-wheeled;wheelchair  -BT  walker, front-wheeled;wheelchair  -BT    Row Name 01/05/21 1300 01/05/21 0800       Stand-Sit Transfer    Assistive Device (Stand-Sit Transfers)  walker, front-wheeled;wheelchair  -BT  walker, front-wheeled;wheelchair  -BT    Row Name 01/05/21 0800          Toilet Transfer    Type (Toilet Transfer)  stand pivot/stand step  -BT     Row Name 01/05/21 0800          Safety Issues, Functional Mobility    Impairments Affecting Function (Mobility)  balance;endurance/activity tolerance;strength;pain  -BT     Row Name 01/05/21 1300          Shoulder (Therapeutic Exercise)    Shoulder (Therapeutic Exercise)  strengthening exercise  -BT     Shoulder Strengthening (Therapeutic Exercise)  bilateral;flexion;extension;aBduction;aDduction;2 lb free weight;3 sets;10 repetitions also did 2 x 10 2.5 dowel [place] exercises   -BT     Row Name 01/05/21 1300           Elbow/Forearm (Therapeutic Exercise)    Elbow/Forearm (Therapeutic Exercise)  strengthening exercise  -BT     Elbow/Forearm Strengthening (Therapeutic Exercise)  bilateral;flexion;extension;2 lb free weight;10 repetitions;3 sets  -BT     Row Name 01/05/21 1300          Wrist (Therapeutic Exercise)    Wrist (Therapeutic Exercise)  strengthening exercise  -BT     Wrist Strengthening (Therapeutic Exercise)  2 lb free weight;10 repetitions;3 sets  -BT     Row Name 01/05/21 0800          Bathing    [place] Level (Bathing)  bathing skills;upper body;set up;lower body;contact guard assist  -BT     Assistive Device (Bathing)  long-handled sponge  -BT     Position (Bathing)  supported sitting;supported standing  -BT     Row Name 01/05/21 0800          Upper Body Dressing    [place] Level (Upper Body Dressing)  upper body dressing skills;set up assistance  -BT     Position (Upper Body Dressing)  supported sitting  -BT     Row Name 01/05/21 0800          Lower Body Dressing    [place] Level (Lower Body Dressing)  doff;don;pants/bottoms;shoes/slippers;socks;verbal cues;contact guard assist;minimum assist (75% patient effort)  -BT     Assistive Device Use (Lower Body Dressing)  sock-aid  -BT     Position (Lower Body Dressing)  supported sitting;supported standing  -BT     Row Name 01/05/21 0800          Grooming    [place] Level (Grooming)  grooming skills;set up  -BT     Position (Grooming)  supported sitting;sink side  -BT     Row Name 01/05/21 0800          Toileting    [place] Level (Toileting)  toileting skills;minimum assist (75% patient effort);verbal cues  -BT     Assistive Device Use (Toileting)  -- pt may benefit from toileting aide   -BT     Position (Toileting)  supported sitting;supported standing  -BT     Row Name 01/05/21 0800          Positioning and Restraints    Pre-Treatment Position  sitting in chair/recliner  -BT     Post Treatment Position  wheelchair  -BT     In Wheelchair   notified nsg;sitting;call light within reach;encouraged to call for assist;exit alarm on  -BT       User Key  (r) = Recorded By, (t) = Taken By, (c) = Cosigned By    Initials Name Effective Dates    BT Zenaida Saenz OT 11/16/20 -            Occupational Therapy Education                 Title: PT OT SLP Therapies (In Progress)     Topic: Occupational Therapy (In Progress)     Point: ADL training (Done)     Description:   Instruct learner(s) on proper safety adaptation and remediation techniques during self care or transfers.   Instruct in proper use of assistive devices.              Learning Progress Summary           Patient Acceptance, E,D, VU,NR by AF at 12/30/2020 1426    Comment: educated on LH sponge and LH reacher with LB dressing tasks will require further edcuation on process    Acceptance, E, VU,NR by AF at 12/28/2020 1418    Comment: educated on safety with ADLs and transfers                   Point: Home exercise program (Not Started)     Description:   Instruct learner(s) on appropriate technique for monitoring, assisting and/or progressing therapeutic exercises/activities.              Learner Progress:  Not documented in this visit.          Point: Precautions (Not Started)     Description:   Instruct learner(s) on prescribed precautions during self-care and functional transfers.              Learner Progress:  Not documented in this visit.          Point: Body mechanics (Not Started)     Description:   Instruct learner(s) on proper positioning and spine alignment during self-care, functional mobility activities and/or exercises.              Learner Progress:  Not documented in this visit.                      User Key     Initials Effective Dates Name Provider Type Discipline    AF 04/14/20 -  Misa Oneal OTR Occupational Therapist OT                    OT Recommendation and Plan                         Time Calculation:     Time Calculation- OT     Row Name 01/05/21 0800             Time  Calculation- OT    OT Start Time  0800  -BT      OT Stop Time  0900  -BT      OT Time Calculation (min)  60 min  -BT      Total Timed Code Minutes- OT  60 minute(s)  -BT      OT Received On  01/05/21  -BT      OT - Next Appointment  01/06/21  -BT        User Key  (r) = Recorded By, (t) = Taken By, (c) = Cosigned By    Initials Name Provider Type    BT Zenaida Saenz, OT Occupational Therapist        Therapy Charges for Today     Code Description Service Date Service Provider Modifiers Qty    68627315571 HC OT SELF CARE/MGMT/TRAIN EA 15 MIN 1/4/2021 LettyZenaida guzman, OT GO 4    67651364164 HC OT THER PROC EA 15 MIN 1/4/2021 LettyZenaida guzman, OT GO 1    00589181430 HC OT SELF CARE/MGMT/TRAIN EA 15 MIN 1/4/2021 LettyZenaida guzman, OT GO 1    40615343097 HC OT SELF CARE/MGMT/TRAIN EA 15 MIN 1/5/2021 Zenaida Saenz, OT GO 4                   Zenaida Saenz, OT  1/5/2021

## 2021-01-06 LAB
GLUCOSE BLDC GLUCOMTR-MCNC: 111 MG/DL (ref 70–130)
GLUCOSE BLDC GLUCOMTR-MCNC: 123 MG/DL (ref 70–130)
GLUCOSE BLDC GLUCOMTR-MCNC: 123 MG/DL (ref 70–130)
GLUCOSE BLDC GLUCOMTR-MCNC: 138 MG/DL (ref 70–130)
INR PPP: 1.84 (ref 0.9–1.1)
PROTHROMBIN TIME: 21 SECONDS (ref 11.7–14.2)

## 2021-01-06 PROCEDURE — 97530 THERAPEUTIC ACTIVITIES: CPT

## 2021-01-06 PROCEDURE — 97116 GAIT TRAINING THERAPY: CPT

## 2021-01-06 PROCEDURE — 97130 THER IVNTJ EA ADDL 15 MIN: CPT

## 2021-01-06 PROCEDURE — 97110 THERAPEUTIC EXERCISES: CPT

## 2021-01-06 PROCEDURE — 82962 GLUCOSE BLOOD TEST: CPT

## 2021-01-06 PROCEDURE — 97535 SELF CARE MNGMENT TRAINING: CPT

## 2021-01-06 PROCEDURE — 97112 NEUROMUSCULAR REEDUCATION: CPT

## 2021-01-06 PROCEDURE — 85610 PROTHROMBIN TIME: CPT | Performed by: PHYSICAL MEDICINE & REHABILITATION

## 2021-01-06 PROCEDURE — 97129 THER IVNTJ 1ST 15 MIN: CPT

## 2021-01-06 PROCEDURE — 63710000001 ONDANSETRON PER 8 MG: Performed by: PHYSICAL MEDICINE & REHABILITATION

## 2021-01-06 RX ORDER — TORSEMIDE 20 MG/1
80 TABLET ORAL DAILY
Status: DISCONTINUED | OUTPATIENT
Start: 2021-01-07 | End: 2021-01-08

## 2021-01-06 RX ORDER — WARFARIN SODIUM 5 MG/1
5 TABLET ORAL
Status: DISCONTINUED | OUTPATIENT
Start: 2021-01-06 | End: 2021-01-10

## 2021-01-06 RX ADMIN — Medication 3 MG: at 22:36

## 2021-01-06 RX ADMIN — POLYETHYLENE GLYCOL 3350 17 G: 17 POWDER, FOR SOLUTION ORAL at 07:59

## 2021-01-06 RX ADMIN — MICONAZOLE NITRATE: 2 OINTMENT TOPICAL at 19:53

## 2021-01-06 RX ADMIN — HYDROCODONE BITARTRATE AND ACETAMINOPHEN 1 TABLET: 7.5; 325 TABLET ORAL at 00:54

## 2021-01-06 RX ADMIN — CALCIUM CARBONATE 1 TABLET: 500 TABLET, CHEWABLE ORAL at 11:27

## 2021-01-06 RX ADMIN — TRAZODONE HYDROCHLORIDE 25 MG: 50 TABLET ORAL at 22:37

## 2021-01-06 RX ADMIN — HYDROCODONE BITARTRATE AND ACETAMINOPHEN 1 TABLET: 7.5; 325 TABLET ORAL at 09:58

## 2021-01-06 RX ADMIN — WARFARIN 5 MG: 5 TABLET ORAL at 16:21

## 2021-01-06 RX ADMIN — FOLIC ACID 1 MG: 1 TABLET ORAL at 07:58

## 2021-01-06 RX ADMIN — AMLODIPINE BESYLATE 5 MG: 5 TABLET ORAL at 07:58

## 2021-01-06 RX ADMIN — LEVOTHYROXINE SODIUM 100 MCG: 0.1 TABLET ORAL at 05:19

## 2021-01-06 RX ADMIN — TORSEMIDE 40 MG: 20 TABLET ORAL at 07:57

## 2021-01-06 RX ADMIN — HYDROCODONE BITARTRATE AND ACETAMINOPHEN 1 TABLET: 7.5; 325 TABLET ORAL at 14:05

## 2021-01-06 RX ADMIN — DOCUSATE SODIUM 50MG AND SENNOSIDES 8.6MG 2 TABLET: 8.6; 5 TABLET, FILM COATED ORAL at 19:51

## 2021-01-06 RX ADMIN — ALLOPURINOL 100 MG: 100 TABLET ORAL at 07:59

## 2021-01-06 RX ADMIN — SERTRALINE HYDROCHLORIDE 50 MG: 50 TABLET, FILM COATED ORAL at 16:50

## 2021-01-06 RX ADMIN — MICONAZOLE NITRATE 1 APPLICATION: 2 OINTMENT TOPICAL at 07:59

## 2021-01-06 RX ADMIN — Medication 2000 UNITS: at 07:58

## 2021-01-06 RX ADMIN — ONDANSETRON HYDROCHLORIDE 4 MG: 4 TABLET, FILM COATED ORAL at 14:30

## 2021-01-06 RX ADMIN — CALCIUM CARBONATE 1 TABLET: 500 TABLET, CHEWABLE ORAL at 08:18

## 2021-01-06 RX ADMIN — ATORVASTATIN CALCIUM 20 MG: 20 TABLET, FILM COATED ORAL at 19:49

## 2021-01-06 RX ADMIN — HYDROCODONE BITARTRATE AND ACETAMINOPHEN 1 TABLET: 7.5; 325 TABLET ORAL at 05:19

## 2021-01-06 RX ADMIN — HYDROCODONE BITARTRATE AND ACETAMINOPHEN 1 TABLET: 7.5; 325 TABLET ORAL at 22:36

## 2021-01-06 RX ADMIN — CALCIUM CARBONATE 1 TABLET: 500 TABLET, CHEWABLE ORAL at 16:48

## 2021-01-06 RX ADMIN — HYDROCODONE BITARTRATE AND ACETAMINOPHEN 1 TABLET: 7.5; 325 TABLET ORAL at 18:30

## 2021-01-06 NOTE — PROGRESS NOTES
Inpatient Rehabilitation Plan of Care Note    Plan of Care  Care Plan Reviewed - No updates at this time.    Psychosocial    Performed Intervention(s)  Verbalizes needs & concerns      Safety    Performed Intervention(s)  Items within reach (call bell, etc), safety rounds, falls precautions, &  bed/chair alarms.      Sphincter Control    Performed Intervention(s)  Monitor intake, output, & BM's, Encourage appropriate diet & fluids, & perineal  care.      Pain    Performed Intervention(s)  Pain medication as ordered, distraction, & repositioning assistance as needed.      Body Systems    Performed Intervention(s)  Daily skin inspections, wound care, assist with repositioning, & dressing  changes as ordered.    Signed by: Chio Purcell RN

## 2021-01-06 NOTE — THERAPY TREATMENT NOTE
Inpatient Rehabilitation - Occupational Therapy Treatment Note    [facility]     Patient Name: Alice Maxwell  : 1945  MRN: [id number]    Today's Date: 2021                 Admit Date: 2020         ICD-10-CM ICD-9-CM   1. Heartburn  R12 787.1       Patient Active Problem List   Diagnosis   • Stroke (CMS/HCC)   • Personal history of breast cancer   • History of loop electrical excision procedure (LEEP)   • History of abnormal cervical Pap smear   • S/p left hip fracture       Past Medical History:   Diagnosis Date   • Abnormal Pap smear of cervix    • Arthritis    • Cancer (CMS/HCC)     breast   • Disease of thyroid gland    • Osteoporosis    • Stroke (CMS/HCC)    • Stroke (CMS/HCC)     x2   • Urinary tract infection        Past Surgical History:   Procedure Laterality Date   • BACK SURGERY     • BREAST LUMPECTOMY     • CERVICAL BIOPSY  W/ LOOP ELECTRODE EXCISION     • CHOLECYSTECTOMY     • COLONOSCOPY      had polyps    • FOOT SURGERY     • GASTRIC BYPASS     • HERNIA REPAIR     • THYROIDECTOMY                  IRF OT ASSESSMENT FLOWSHEET (last 12 hours)      IRF OT Evaluation and Treatment     Row Name 21 1300 21 0800       OT Time and Intention    Document Type  daily treatment  -BT  daily treatment  -BT    Mode of Treatment  occupational therapy  -BT  occupational therapy  -BT    Patient Effort  --  good  -BT    Symptoms Noted During/After Treatment  --  fatigue  -BT    Row Name 21 0800          Pain Scale: Numbers Pre/Post-Treatment    Pre/Posttreatment Pain Comment  pt with no c/o pain  -BT     Row Name 21 0800          Bed Mobility    Comment (Bed Mobility)  pt up in chair upon arrival  -BT     Row Name 21 0800          Transfers    Chair-Bed [place] (Transfers)  contact guard;verbal cues  -BT     Sit-Stand [place] (Transfers)  standby assist  -BT     Stand-Sit [place] (Transfers)  standby assist  -BT     [place] Level (Toilet Transfer)   standby assist;contact guard  -BT     Assistive Device (Toilet Transfer)  walker, front-wheeled  -BT     Row Name 01/06/21 0800          Chair-Bed Transfer    Assistive Device (Chair-Bed Transfers)  walker, front-wheeled  -BT     Row Name 01/06/21 0800          Sit-Stand Transfer    Assistive Device (Sit-Stand Transfers)  walker, front-wheeled  -BT     Row Name 01/06/21 0800          Stand-Sit Transfer    Assistive Device (Stand-Sit Transfers)  walker, front-wheeled  -BT     Row Name 01/06/21 0800          Toilet Transfer    Type (Toilet Transfer)  stand pivot/stand step  -BT     Row Name 01/06/21 1300          Balance    Balance Assessment  standing dynamic balance  -BT     Dynamic Standing Balance  mild impairment;unsupported  -BT     Balance Interventions  standing;supported;minimal challenge  -BT     Comment, Balance  bean bag toss activity   -BT     Row Name 01/06/21 1300          Shoulder (Therapeutic Exercise)    Shoulder (Therapeutic Exercise)  strengthening exercise  -BT     Shoulder Strengthening (Therapeutic Exercise)  2 sets;10 repetitions;bilateral;flexion;extension;aBduction;aDduction 2.5 wrist weight  -BT     Row Name 01/06/21 1300          Elbow/Forearm (Therapeutic Exercise)    Elbow/Forearm (Therapeutic Exercise)  strengthening exercise  -BT     Elbow/Forearm Strengthening (Therapeutic Exercise)  bilateral;flexion;extension;10 repetitions;2 sets 2.5 wrist weight  -BT     Row Name 01/06/21 1300          Wrist (Therapeutic Exercise)    Wrist (Therapeutic Exercise)  strengthening exercise  -BT     Wrist Strengthening (Therapeutic Exercise)  bilateral;flexion;extension;10 repetitions;2 sets  -BT     Row Name 01/06/21 0800          Bathing    [place] Level (Bathing)  bathing skills;upper body;set up;lower body;contact guard assist  -BT     Assistive Device (Bathing)  long-handled sponge;hand held shower spray hose;shower chair;grab bar/tub rail  -BT     Position (Bathing)  supported  sitting;supported standing  -BT     Row Name 01/06/21 0800          Upper Body Dressing    [place] Level (Upper Body Dressing)  upper body dressing skills;set up assistance  -BT     Position (Upper Body Dressing)  edge of bed sitting  -BT     Row Name 01/06/21 0800          Lower Body Dressing    [place] Level (Lower Body Dressing)  doff;don;pants/bottoms;shoes/slippers;socks;verbal cues;contact guard assist;minimum assist (75% patient effort)  -BT     Position (Lower Body Dressing)  supported sitting;supported standing  -BT     Row Name 01/06/21 0800          Grooming    [place] Level (Grooming)  grooming skills;set up  -BT     Position (Grooming)  edge of bed sitting  -BT     Row Name 01/06/21 0800          Toileting    [place] Level (Toileting)  toileting skills;minimum assist (75% patient effort);verbal cues  -BT     Position (Toileting)  supported sitting;supported standing  -BT     Row Name 01/06/21 0800          Positioning and Restraints    Pre-Treatment Position  sitting in chair/recliner  -BT     Post Treatment Position  wheelchair  -BT     In Wheelchair  notified nsg;sitting;call light within reach;encouraged to call for assist;exit alarm on  -BT       User Key  (r) = Recorded By, (t) = Taken By, (c) = Cosigned By    Initials Name Effective Dates    BT Zenaida Saenz OT 11/16/20 -            Occupational Therapy Education                 Title: PT OT SLP Therapies (In Progress)     Topic: Occupational Therapy (In Progress)     Point: ADL training (Done)     Description:   Instruct learner(s) on proper safety adaptation and remediation techniques during self care or transfers.   Instruct in proper use of assistive devices.              Learning Progress Summary           Patient Acceptance, E,D, VU,NR by AF at 12/30/2020 1426    Comment: educated on LH sponge and LH reacher with LB dressing tasks will require further edcuation on process    Acceptance, E, VU,NR by AF at 12/28/2020 1418     Comment: educated on safety with ADLs and transfers                   Point: Home exercise program (Not Started)     Description:   Instruct learner(s) on appropriate technique for monitoring, assisting and/or progressing therapeutic exercises/activities.              Learner Progress:  Not documented in this visit.          Point: Precautions (Not Started)     Description:   Instruct learner(s) on prescribed precautions during self-care and functional transfers.              Learner Progress:  Not documented in this visit.          Point: Body mechanics (Not Started)     Description:   Instruct learner(s) on proper positioning and spine alignment during self-care, functional mobility activities and/or exercises.              Learner Progress:  Not documented in this visit.                      User Key     Initials Effective Dates Name Provider Type Discipline    AF 04/14/20 -  Misa Oneal OTR Occupational Therapist OT                    OT Recommendation and Plan                         Time Calculation:     Time Calculation- OT     Row Name 01/06/21 1300 01/06/21 0815          Time Calculation- OT    OT Start Time  1300  -BT  0815  -BT     OT Stop Time  1330  -BT  0900  -BT     OT Time Calculation (min)  30 min  -BT  45 min  -BT       User Key  (r) = Recorded By, (t) = Taken By, (c) = Cosigned By    Initials Name Provider Type    BT Zenaida Saenz, OT Occupational Therapist        Therapy Charges for Today     Code Description Service Date Service Provider Modifiers Qty    21242714993 HC OT SELF CARE/MGMT/TRAIN EA 15 MIN 1/5/2021 LettySosaZenaida, OT GO 4    31125050319 HC OT THER PROC EA 15 MIN 1/5/2021 Letty, Zenaida, OT GO 2    25985171219 HC OT SELF CARE/MGMT/TRAIN EA 15 MIN 1/6/2021 Letty, Zenaida, OT GO 3    64512623448 HC OT NEUROMUSC RE EDUCATION EA 15 MIN 1/6/2021 Letty, Zenaida, OT GO 1    13958631361 HC OT THER PROC EA 15 MIN 1/6/2021 Letty, Zenaida, OT GO 1                   Zenaidabertram Saenz  OT  1/6/2021

## 2021-01-06 NOTE — PROGRESS NOTES
NEPHROLOGY PROGRESS NOTE    PATIENT IDENTIFICATION:   Name:  Alice Maxwell      MRN:  [id number]     75 y.o.  female             Reason for visit: Tanya    SUBJECTIVE:   Seen and examined.  No shortness of air or chest pain.  Laying down in bed.  Complaining of nausea.  Complaining of neuropathy as well  OBJECTIVE:  Vitals:    01/05/21 1339 01/05/21 1920 01/06/21 0543 01/06/21 1213   BP: 114/53 140/66 116/55 125/69   BP Location: Right arm Right arm Right arm Right arm   Patient Position: Lying Lying Lying Sitting   Pulse: 82 93 72 81   Resp: 18 18 18 18   Temp: 98 °F (36.7 °C) 98.8 °F (37.1 °C) 97.9 °F (36.6 °C) 98.1 °F (36.7 °C)   TempSrc: Oral Oral Oral Oral   SpO2: 99% 94% 93% 100%   Weight:       Height:               Body mass index is 39.12 kg/m².    Intake/Output Summary (Last 24 hours) at 1/6/2021 1518  Last data filed at 1/6/2021 1200  Gross per 24 hour   Intake 780 ml   Output --   Net 780 ml         Exam:  GEN:  No distress, appears stated age  EYES:   Anicteric sclera  ENT:    External ears/nose normal, MM are moist  NECK:  No adenopathy, JVP none  LUNGS: Normal chest on inspection; not labored  CV:  Normal S1S2, without murmur  ABD:  Non-tender, non-distended, no hepatosplenomegaly, +BS  EXT:  No edema; no cyanosis; clubbing    Scheduled meds:  allopurinol, 100 mg, Oral, Daily  amLODIPine, 5 mg, Oral, Q24H  atorvastatin, 20 mg, Oral, Nightly  cholecalciferol, 2,000 Units, Oral, Daily  folic acid, 1 mg, Oral, Daily  levothyroxine, 100 mcg, Oral, Q AM  miconazole nitrate, , Topical, Q12H  polyethylene glycol, 17 g, Oral, Daily  senna-docusate sodium, 2 tablet, Oral, Nightly  [START ON 1/7/2021] torsemide, 80 mg, Oral, Daily  traZODone, 25 mg, Oral, Nightly  warfarin, 5 mg, Oral, Daily With Dinner      IV meds:                      Pharmacy to dose warfarin,         Data Review:    Results from last 7 days   Lab Units 01/04/21  0721 01/02/21  1012 12/31/20  1202   SODIUM mmol/L 140 139 137   POTASSIUM  mmol/L 4.8 5.2 5.0   CHLORIDE mmol/L 104 104 103   CO2 mmol/L 23.5 23.3 23.8   BUN mg/dL 21 22 24*   CREATININE mg/dL 1.68* 1.80* 1.77*   CALCIUM mg/dL 7.7* 8.1* 8.0*   BILIRUBIN mg/dL 0.5  --  0.6   ALK PHOS U/L 69  --  79   ALT (SGPT) U/L 18  --  24   AST (SGOT) U/L 29  --  36*   GLUCOSE mg/dL 104* 147* 153*       Estimated Creatinine Clearance: 36.4 mL/min (A) (by C-G formula based on SCr of 1.68 mg/dL (H)).    Results from last 7 days   Lab Units 01/04/21  0721   PHOSPHORUS mg/dL 4.3       Results from last 7 days   Lab Units 01/04/21  0721 12/31/20  1202   WBC 10*3/mm3 7.35 8.92   HEMOGLOBIN g/dL 8.8* 8.9*   PLATELETS 10*3/mm3 343 270       Results from last 7 days   Lab Units 01/06/21  0850 01/05/21  0846 01/04/21  0721 01/03/21  0647 01/02/21  1012   INR  1.84* 2.09* 2.45* 3.11* 3.05*             ASSESSMENT:     S/p left hip fracture      Non olig ELDER - ATN related to vancomycin nephrotoxicity suspected at [facility] per DC summary; peak Cr 2.5 and today is 1.6       HTN - BP acceptable  Left femur fracture s/p hemiarthroplasty 12/22/20   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   I will increase torsemide to 80 mg p.o. daily  Surveillance lab    Dodie Girard MD  1/6/2021    15:18 EST

## 2021-01-06 NOTE — PLAN OF CARE
Goal Outcome Evaluation:  Plan of Care Reviewed With: patient  Progress: no change  Outcome Summary: Mrs Maxwell is alert , oriented. She transfers from bed to /C with walker CGA of 1. She is dependent with getting her LLE back into bed. Scant old serous drainage distal end left anterior hip incision. Every other staple removed from upper half of incision. Very nervous this afternoon , c/o feet neuropathy to nephrologist today.(has hx of this). Zoloft was ordered by nephrologist. Pt informed and she asked me to call daughter Lanette and let her know which I did.

## 2021-01-06 NOTE — PROGRESS NOTES
"   LOS: 10 days   Patient Care Team:  Elsy Ventura as PCP - General (Nurse Practitioner)  Elsy Ventura as Nurse Practitioner (Nurse Practitioner)    Chief Complaint:   1. Lfemur fx s/p Bipolar hemiarthroplasty  2. Previous LHP/CVA  3. Gouty arthritis  4. HTN  5. HLD  6. Factor V Leiden        Subjective     History of Present Illness    Subjective  Complains of left hip pain.  She has been taking Norco about 5 times a day.  Nursing notes some serous drainage from inferior aspect of incision.     Denies fever/ chills/ headaches/ bodyaches/ sorethroat/ congestion/ change in taste or smell/ sob/ cough/ cp/ abdominal discomfort/ loose stools. C/o persistent leg pain. Slept better last night.       History taken from: patient    Objective     Vital Signs  Temp:  [97.9 °F (36.6 °C)-98.8 °F (37.1 °C)] 97.9 °F (36.6 °C)  Heart Rate:  [72-93] 72  Resp:  [18] 18  BP: (114-140)/(53-66) 116/55    Objective:  Vital signs: (most recent): Blood pressure 116/55, pulse 72, temperature 97.9 °F (36.6 °C), temperature source Oral, resp. rate 18, height 170.2 cm (67\"), weight 113 kg (249 lb 12.8 oz), SpO2 93 %.           Gen: NAD. Eating lunch.    HEENT: Trach midline.   Pulm: normal respirations; no r/r/w; non-labored   Heart; RRR;    Abd: soft; NT/ND;   Neuro: verbal; appropriate   Skin: no rashes on exposed skin   Blister posterior to left hip incision  Left hip incision healing. No erythema. Mild serous drainage on inferior aspect of incision.   Ext- no distal edema  Neuro - Pain inhibition LLE    Results Review:     I reviewed the patient's new clinical results.  Results from last 7 days   Lab Units 01/04/21  0721 12/31/20  1202   WBC 10*3/mm3 7.35 8.92   HEMOGLOBIN g/dL 8.8* 8.9*   HEMATOCRIT % 28.5* 28.7*   PLATELETS 10*3/mm3 343 270       Results from last 7 days   Lab Units 01/04/21  0721 01/02/21  1012 12/31/20  1202   SODIUM mmol/L 140 139 137   POTASSIUM mmol/L 4.8 5.2 5.0   CHLORIDE mmol/L 104 104 103   CO2 mmol/L " 23.5 23.3 23.8   BUN mg/dL 21 22 24*   CREATININE mg/dL 1.68* 1.80* 1.77*   CALCIUM mg/dL 7.7* 8.1* 8.0*   BILIRUBIN mg/dL 0.5  --  0.6   ALK PHOS U/L 69  --  79   ALT (SGPT) U/L 18  --  24   AST (SGOT) U/L 29  --  36*   GLUCOSE mg/dL 104* 147* 153*       Results from last 7 days   Lab Units 01/06/21  0850 01/05/21  0846 01/04/21  0721   INR  1.84* 2.09* 2.45*       Medication Review: done  Scheduled Meds:allopurinol, 100 mg, Oral, Daily  amLODIPine, 5 mg, Oral, Q24H  atorvastatin, 20 mg, Oral, Nightly  cholecalciferol, 2,000 Units, Oral, Daily  folic acid, 1 mg, Oral, Daily  levothyroxine, 100 mcg, Oral, Q AM  miconazole nitrate, , Topical, Q12H  polyethylene glycol, 17 g, Oral, Daily  senna-docusate sodium, 2 tablet, Oral, Nightly  torsemide, 40 mg, Oral, Daily  traZODone, 25 mg, Oral, Nightly  warfarin, 5 mg, Oral, Daily With Dinner      Continuous Infusions:Pharmacy to dose warfarin,       PRN Meds:.•  acetaminophen  •  calcium carbonate  •  hydrALAZINE  •  HYDROcodone-acetaminophen  •  melatonin  •  ondansetron  •  Pharmacy to dose warfarin  •  polyethylene glycol  •  senna      Assessment/Plan       S/p left hip fracture      Assessment & Plan     Left femur fx s/p Bipolar hemiarthroplasty  anterior approach bipolar hemiarthroplasty on 12/22.  Patient is now weightbearing as tolerated.   Jan 6 - mild serous drainage at inferior aspect of incision. D/C every other staple on superior half of incision.     Previous LHP/CVA    Gouty arthritis    HTN  Dec 31 -  today. Home med does not list any anti-hypertensive medication. Presently on amlodipine 5 mg daily.  Will add hydralazine 10 milligrams p.o. every 8 hours as needed systolic blood pressure greater than 170  Jan 6 - amlodipine/ torsemide added recently    HLD    Renal insuff - Cr 1.73 at outside hospital  Dec 1-creatinine 1.77  Dec 6 - followed by Nephrology . Torsemide added recently.     Anemia - HGB 8.9 on Dec 26  Dec 31-hemoglobin 8.9    Factor V  Leiden - coumadin 6 mg daily and ASA 81 mg daily at home(presently off ASA)   Jan 6 - INR 2.09 yesterday, on coumadin 5 mg.        DVT prophylaxis - coumadin/ SCDs    Pain management - FERN reviewed. On Norco 7.5 mg q 4 hours prn, ice pack    GI - constipation - multi-factorial - pain /immobility/pain med  Dec 30 - change colace to Senokot-S, add miralax    Impaired sleep  Dec 30 - trazodone 25 mg added on Dec 28  December 31-reports slept better    Jan 6 - patient reports walking further.  Left hip incision healing with mild serous drainage at inferior aspect. Remove 1/2 of upper half of staples.  Continues on Norco for pain. Consider adding gabapentin.     Now admit for comprehensive acute inpatient rehabilitation .  This would be an interdisciplinary program with physical therapy 1 hour,  occupational therapy 1 hour, and speech therapy 1 hour, 5 days a week.  Rehabilitation nursing for carryover, monitoring of  Incision, pain, volume  status, bowel and bladder, and skin  Ongoing physician follow-up.  Weekly team conferences.      Goal is for home with  Home health   therapies.  Barrier to discharge:  Impaired mobility and ADLs  - work on  Strength, transfers, balance, gait, ADLs  to overcome.           Aric Chao MD  01/06/21  10:59 EST    Time:   During rounds, used appropriate personal protective equipment including mask and gloves.  Additional gown if indicated.  Mask used was standard procedure mask. Appropriate PPE was worn during the entire visit.  Hand hygiene was completed before and after.

## 2021-01-06 NOTE — PLAN OF CARE
Goal Outcome Evaluation:  Plan of Care Reviewed With: patient  Progress: improving  Outcome Summary: Pain med for L hip/thigh pain. Assist 1 bed to bsc using walker. Needs assist to get L leg back in bed. Dressings dry/intact to L hip incision and blister area. Slit open area L groin fold. Using Miconazole cream.

## 2021-01-06 NOTE — THERAPY TREATMENT NOTE
Inpatient Rehabilitation - Speech Language Pathology Treatment Note    [facility]     Patient Name: Alice Maxwell  : 1945  MRN: [id number]    Today's Date: 2021                   Admit Date: 2020       Visit Dx:      ICD-10-CM ICD-9-CM   1. Heartburn  R12 787.1       Patient Active Problem List   Diagnosis   • Stroke (CMS/HCC)   • Personal history of breast cancer   • History of loop electrical excision procedure (LEEP)   • History of abnormal cervical Pap smear   • S/p left hip fracture       Past Medical History:   Diagnosis Date   • Abnormal Pap smear of cervix    • Arthritis    • Cancer (CMS/HCC)     breast   • Disease of thyroid gland    • Osteoporosis    • Stroke (CMS/HCC)    • Stroke (CMS/HCC)     x2   • Urinary tract infection        Past Surgical History:   Procedure Laterality Date   • BACK SURGERY     • BREAST LUMPECTOMY     • CERVICAL BIOPSY  W/ LOOP ELECTRODE EXCISION     • CHOLECYSTECTOMY     • COLONOSCOPY      had polyps    • FOOT SURGERY     • GASTRIC BYPASS     • HERNIA REPAIR     • THYROIDECTOMY            SLP EVALUATION (last 72 hours)      SLP SLC Evaluation     Row Name 21 1338 21 0900 21 0901 21 0931          Communication Assessment/Intervention    Document Type  therapy note (daily note)  -SL  therapy note (daily note)  -SL  therapy note (daily note)  -LN  therapy note (daily note)  -LN     Subjective Information  no complaints  -SL  complains of;pain;nausea/vomiting  -SL  no complaints  -LN  complains of;pain hip  -LN     Patient Observations  alert;cooperative  -SL  alert  -SL  alert;cooperative;agree to therapy  -LN  alert;poorly cooperative  -LN     Patient/Family/Caregiver Comments/Observations  --  needs encouragement to participate  -  --  Patient c/o 10/10 hip pain; pain medication already administered per RN.  -LN     Patient Effort  adequate  -SL  adequate  -SL  good  -LN  fair  -LN     Symptoms Noted During/After Treatment  none   -SL  fatigue  -SL  none  -LN  none  -LN        Organizational Skills Goal 1 (SLP)    Progress (Thought Organization Skills Goal 1, SLP)  --  --  --  50%;independently (over 90% accuracy);80%;with minimal cues (75-90%)  -LN     Progress/Outcomes (Thought Organization Skills Goal 1, SLP)  --  --  --  goal ongoing  -LN     Comment (Thought Organization Skills Goal 1, SLP)  --  --  --  Similarities/differences  -LN        Functional Problem Solving Skills Goal 1 (SLP)    Improve Problem Solving Through Goal 1 (SLP)  --  --  --  sequence steps in a task;complete organization/home management task;determine multiple solutions to problems;determine solutions to multifactorial problems;90%;independently (over 90% accuracy)  -LN     Time Frame (Problem Solving Goal 1, SLP)  --  --  --  by discharge  -LN     Barriers (Problem Solving Goal 1, SLP)  --  --  --  Pain appeared to limit concentration/attention  -LN     Progress (Problem Solving Goal 1, SLP)  --  --  --  70%;independently (over 90% accuracy);100%;with minimal cues (75-90%)  -LN     Progress/Outcomes (Problem Solving Goal 1, SLP)  --  --  --  goal ongoing  -LN     Comment (Problem Solving Goal 1, SLP)  --  --  --  Multi-factor problem solving  -LN        Functional Math Skills Goal 1 (SLP)    Improve Functional Math Skills Through Goal 1 (SLP)  --  --  --  complete word problems involving time;complete word problems involving money;90%;independently (over 90% accuracy)  -LN     Time Frame (Functional Math Skills Goal 1, SLP)  --  --  --  by discharge  -LN     Progress (Functional Math Skills Goal 1, SLP)  --  --  --  40%;independently (over 90% accuracy);70%;with minimal cues (75-90%);90%;with moderate cues (50-74%)  -LN     Progress/Outcomes (Functional Math Skills Goal 1, SLP)  --  --  --  goal ongoing  -LN     Comment (Functional Math Skills Goal 1, SLP)  --  --  --  Simple word problems  -LN       User Key  (r) = Recorded By, (t) = Taken By, (c) = Cosigned By     Initials Name Effective Dates     Gifty Rogers, MS CCC-SLP 06/08/18 -     LN Roxy Conte 12/17/19 -              EDUCATION    The patient has been educated in the following areas:       Cognitive Impairment.      SLP Recommendation and Plan                                                  SLP GOALS     Row Name 01/06/21 1300 01/06/21 0900 01/05/21 0901       Attention Goal 1 (SLP)    Barriers (Attention Goal 1, SLP)  --  decreased attn to details in directives  -SL  --    Progress (Attention Goal 1, SLP)  --  50%;60%;independently (over 90% accuracy) word altering task  -  --    Progress/Outcomes (Attention Goal 1, SLP)  --  goal ongoing  -  --       Memory Skills Goal 1 (SLP)    Progress (Memory Skills Goal 1, SLP)  70%;80%;independently (over 90% accuracy) 4 item category inclusion task  -  60%;70%;independently (over 90% accuracy) visual recall- 12 pictured items- grouping strategy  -  --    Progress/Outcomes (Memory Skills Goal 1, SLP)  goal ongoing  -SL  goal ongoing  -  --    Comment (Memory Skills Goal 1, SLP)  immed recall- boxed info- 5 numbers w/spatial restrictions- 50%  -SL  --  --       Organizational Skills Goal 1 (SLP)    Improve Thought Organization Through Goal 1 (SLP)  --  --  completing mental manipulation task;naming similarities and differences;completing a convergent naming task;completing a verbal sequencing task;90%;independently (over 90% accuracy)  -LN    Time Frame (Thought Organization Skills Goal 1, SLP)  --  --  by discharge  -LN    Progress (Thought Organization Skills Goal 1, SLP)  --  --  60%;independently (over 90% accuracy);90%;with minimal cues (75-90%)  -LN    Progress/Outcomes (Thought Organization Skills Goal 1, SLP)  --  --  goal ongoing  -LN    Comment (Thought Organization Skills Goal 1, SLP)  --  --  Abstract convergent  -LN       Reasoning Goal 1 (SLP)    Improve Reasoning Through Goal 1 (SLP)  --  --  complete logic/creative thinking task;complete mental  flexibility task;complete basic reasoning task;complete deductive reasoning task;90%;independently (over 90% accuracy)  -LN    Time Frame (Reasoning Goal 1, SLP)  --  --  by discharge  -LN    Progress (Reasoning Goal 1, SLP)  60%;with minimal cues (75-90%);independently (over 90% accuracy) word deductions  -SL  --  70%;independently (over 90% accuracy);80%;with minimal cues (75-90%)  -LN    Progress/Outcomes (Reasoning Goal 1, SLP)  goal ongoing  -SL  --  goal ongoing  -LN    Comment (Reasoning Goal 1, SLP)  --  --  Basic logic puzzle  -LN       Functional Problem Solving Skills Goal 1 (SLP)    Improve Problem Solving Through Goal 1 (SLP)  --  --  sequence steps in a task;complete organization/home management task;determine multiple solutions to problems;determine solutions to multifactorial problems;90%;independently (over 90% accuracy)  -LN    Time Frame (Problem Solving Goal 1, SLP)  --  --  by discharge  -LN    Barriers (Problem Solving Goal 1, SLP)  --  --  Reduced attention  -LN    Progress (Problem Solving Goal 1, SLP)  40%;independently (over 90% accuracy) multistep directives- hospital map  -SL  --  40%;independently (over 90% accuracy);100%;with minimal cues (75-90%)  -LN    Progress/Outcomes (Problem Solving Goal 1, SLP)  goal ongoing  -SL  --  goal ongoing  -LN    Comment (Problem Solving Goal 1, SLP)  planning task - closet organization - based on multiple sentence clues- 50%  -SL  --  6-step sequence  -LN       Functional Math Skills Goal 1 (SLP)    Improve Functional Math Skills Through Goal 1 (SLP)  --  --  complete word problems involving time;complete word problems involving money;90%;independently (over 90% accuracy)  -LN    Time Frame (Functional Math Skills Goal 1, SLP)  --  --  by discharge  -LN    Barriers (Functional Math Skills Goal 1, SLP)  --  decreased analysis/reasoning noted for chart of info re: video charges; difficulty noted for understanding transition from one question to the next   -SL  Reduced organization and attention to detial  -LN    Progress (Functional Math Skills Goal 1, SLP)  --  40%;independently (over 90% accuracy) video rental task/simple calculations  -SL  50%;independently (over 90% accuracy);80%;with minimal cues (75-90%)  -LN    Progress/Outcomes (Functional Math Skills Goal 1, SLP)  --  goal ongoing  -SL  goal ongoing  -LN    Comment (Functional Math Skills Goal 1, SLP)  --  more confusion re: details in questions than actual math additions  -SL  Balanced a checkbook  -LN    Row Name 01/04/21 0931             Memory Skills Goal 1 (SLP)    Improve Memory Skills Through Goal 1 (SLP)  recall details of the day;use memory strategies;use written schedule;listen to a paragraph and answer questions;visual memory task;read a paragraph and answer questions;select a word from a list by exclusion;recalling unrelated word lists with an imposed delay;recalling related word lists with an imposed delay;90%  -LN      Time Frame (Memory Skills Goal 1, SLP)  by discharge  -LN      Progress (Memory Skills Goal 1, SLP)  70%;independently (over 90% accuracy);100%;with minimal cues (75-90%)  -LN      Progress/Outcomes (Memory Skills Goal 1, SLP)  goal ongoing  -LN      Comment (Memory Skills Goal 1, SLP)  Recallings related items after a 5 minutes delay  -LN         Organizational Skills Goal 1 (SLP)    Improve Thought Organization Through Goal 1 (SLP)  completing mental manipulation task;naming similarities and differences;completing a convergent naming task;completing a verbal sequencing task;90%;independently (over 90% accuracy)  -LN      Time Frame (Thought Organization Skills Goal 1, SLP)  by discharge  -LN      Progress (Thought Organization Skills Goal 1, SLP)  50%;independently (over 90% accuracy);80%;with minimal cues (75-90%)  -LN      Progress/Outcomes (Thought Organization Skills Goal 1, SLP)  goal ongoing  -LN      Comment (Thought Organization Skills Goal 1, SLP)   Similarities/differences  -LN         Reasoning Goal 1 (SLP)    Improve Reasoning Through Goal 1 (SLP)  complete logic/creative thinking task;complete mental flexibility task;complete basic reasoning task;complete deductive reasoning task;90%;independently (over 90% accuracy)  -LN      Time Frame (Reasoning Goal 1, SLP)  by discharge  -LN      Progress (Reasoning Goal 1, SLP)  20%;independently (over 90% accuracy);80%;with minimal cues (75-90%)  -LN      Progress/Outcomes (Reasoning Goal 1, SLP)  goal ongoing  -LN      Comment (Reasoning Goal 1, SLP)  High-level mental flexibility (identifying three meanings for words)  -LN         Functional Problem Solving Skills Goal 1 (SLP)    Improve Problem Solving Through Goal 1 (SLP)  sequence steps in a task;complete organization/home management task;determine multiple solutions to problems;determine solutions to multifactorial problems;90%;independently (over 90% accuracy)  -LN      Time Frame (Problem Solving Goal 1, SLP)  by discharge  -LN      Barriers (Problem Solving Goal 1, SLP)  Pain appeared to limit concentration/attention  -LN      Progress (Problem Solving Goal 1, SLP)  70%;independently (over 90% accuracy);100%;with minimal cues (75-90%)  -LN      Progress/Outcomes (Problem Solving Goal 1, SLP)  goal ongoing  -LN      Comment (Problem Solving Goal 1, SLP)  Multi-factor problem solving  -LN         Functional Math Skills Goal 1 (SLP)    Improve Functional Math Skills Through Goal 1 (SLP)  complete word problems involving time;complete word problems involving money;90%;independently (over 90% accuracy)  -LN      Time Frame (Functional Math Skills Goal 1, SLP)  by discharge  -LN      Progress (Functional Math Skills Goal 1, SLP)  40%;independently (over 90% accuracy);70%;with minimal cues (75-90%);90%;with moderate cues (50-74%)  -LN      Progress/Outcomes (Functional Math Skills Goal 1, SLP)  goal ongoing  -LN      Comment (Functional Math Skills Goal 1, SLP)   Simple word problems  -LN        User Key  (r) = Recorded By, (t) = Taken By, (c) = Cosigned By    Initials Name Provider Type    Gifty Martin MS CCC-SLP Speech and Language Pathologist    Roxy Valles Speech and Language Pathologist                      Time Calculation:       Time Calculation- SLP     Row Name 01/06/21 1359 01/06/21 0927          Time Calculation- SLP    SLP Start Time  1330  -SL  0900  -SL     SLP Stop Time  1400  -SL  0930  -SL     SLP Time Calculation (min)  30 min  -SL  30 min  -SL       User Key  (r) = Recorded By, (t) = Taken By, (c) = Cosigned By    Initials Name Provider Type    Gifty Martin MS CCC-SLP Speech and Language Pathologist            Therapy Charges for Today     Code Description Service Date Service Provider Modifiers Qty    16096616526 HC ST DEV OF COGN SKILLS INITIAL 15 MIN 1/6/2021 Gifty Rogers MS CCC-SLP  1    64087383095 HC ST DEV OF COGN SKILLS EACH ADDT'L 15 MIN 1/6/2021 Gifty Rogers MS CCC-SLP  1    83472189089 HC ST DEV OF COGN SKILLS EACH ADDT'L 15 MIN 1/6/2021 Gifty Rogers, MS CCC-SLP  2                           Gifty Rogers MS CCC-SLP  1/6/2021

## 2021-01-06 NOTE — PROGRESS NOTES
Pharmacy Consult: Warfarin Dosing/ Monitoring    Alice Maxwell is a 75 y.o. female, estimated creatinine clearance is 36.4 mL/min (A) (by C-G formula based on SCr of 1.68 mg/dL (H)). weighing 113 kg (249 lb 12.8 oz).     has a past medical history of Abnormal Pap smear of cervix, Arthritis, Cancer (CMS/HCC), Disease of thyroid gland, Osteoporosis, Stroke (CMS/HCC), Stroke (CMS/HCC), and Urinary tract infection.    Social History     Tobacco Use    Smoking status: Never Smoker   Substance Use Topics    Alcohol use: No    Drug use: No     Results from last 7 days   Lab Units 01/06/21  0850 01/05/21  0846 01/04/21  0721 01/03/21  0647 01/02/21  1012 01/01/21  0958 12/31/20  1202   INR  1.84* 2.09* 2.45* 3.11* 3.05* 2.85* 3.44*   HEMOGLOBIN g/dL  --   --  8.8*  --   --   --  8.9*   HEMATOCRIT %  --   --  28.5*  --   --   --  28.7*   PLATELETS 10*3/mm3  --   --  343  --   --   --  270     Results from last 7 days   Lab Units 01/04/21  0721 01/02/21  1012 12/31/20  1202   SODIUM mmol/L 140 139 137   POTASSIUM mmol/L 4.8 5.2 5.0   CHLORIDE mmol/L 104 104 103   CO2 mmol/L 23.5 23.3 23.8   BUN mg/dL 21 22 24*   CREATININE mg/dL 1.68* 1.80* 1.77*   CALCIUM mg/dL 7.7* 8.1* 8.0*   BILIRUBIN mg/dL 0.5  --  0.6   ALK PHOS U/L 69  --  79   ALT (SGPT) U/L 18  --  24   AST (SGOT) U/L 29  --  36*   GLUCOSE mg/dL 104* 147* 153*     Anticoagulation history: Warfarin 6mg daily (home dose). Warfarin managed by [facility] for hx of Factor V Leiden.    Hospital Anticoagulation:  Consulting provider: Aric Chao MD  Start date: 12/27/20  Indication: Post Surgical - Hip, hypercoagulable disorder and hx of CVA  Target INR: 2-3  Expected duration: indefinite   Bridge Therapy: No                  Date 12/27 12/28 12/29 12/30 12/31 1/1 1/2 1/3 1/4 1/5 1/6     INR 1.74 2.04 2.39 2.73 3.44 2.85 3.05 3.11 2.45 2.09 1.84     Warfarin dose 6mg 6mg 6mg 5mg HOLD 4mg 2mg 1mg 3mg 4mg 5mg       Potential drug interactions:    Allopurinol - may enhance the anticoagulant effect of warfarin (home med)  Levothyroxine- may enhance the anticoagulant effect of warfarin (home med)  Norco - Patients taking acetaminophen at higher doses and/or for longer duration may be at greater risk for experiencing a clinically significant interaction (not listed on med rec)   Trazodone- May diminish the anticoagulant effect of warfarin. (not listed on med rec)    Relevant nutrition status: regular diet; eating better per RN notes    Education complete?/ Date: Home medication    Assessment/Plan:  -INR now subtherapeutic at 1.84, most likely due to lower doses patient received a couple days ago  -Will increase warfarin to 5mg daily (still down from patient's home dose of 6mg)  -Continue to follow INR daily for now due to recent dose adjustments    Pharmacy will continue to follow until discharge or discontinuation of warfarin.     Mando Wright, PharmD  01/06/21 10:20 EST

## 2021-01-06 NOTE — THERAPY TREATMENT NOTE
Inpatient Rehabilitation - Occupational Therapy Treatment Note    [facility]     Patient Name: Alice Maxwell  : 1945  MRN: [id number]    Today's Date: 2021                 Admit Date: 2020         ICD-10-CM ICD-9-CM   1. Heartburn  R12 787.1       Patient Active Problem List   Diagnosis   • Stroke (CMS/HCC)   • Personal history of breast cancer   • History of loop electrical excision procedure (LEEP)   • History of abnormal cervical Pap smear   • S/p left hip fracture       Past Medical History:   Diagnosis Date   • Abnormal Pap smear of cervix    • Arthritis    • Cancer (CMS/HCC)     breast   • Disease of thyroid gland    • Osteoporosis    • Stroke (CMS/HCC)    • Stroke (CMS/HCC)     x2   • Urinary tract infection        Past Surgical History:   Procedure Laterality Date   • BACK SURGERY     • BREAST LUMPECTOMY     • CERVICAL BIOPSY  W/ LOOP ELECTRODE EXCISION     • CHOLECYSTECTOMY     • COLONOSCOPY      had polyps    • FOOT SURGERY     • GASTRIC BYPASS     • HERNIA REPAIR     • THYROIDECTOMY                  IRF OT ASSESSMENT FLOWSHEET (last 12 hours)      IRF OT Evaluation and Treatment     Row Name 21 0800          OT Time and Intention    Document Type  daily treatment  -BT     Mode of Treatment  occupational therapy  -BT     Patient Effort  good  -BT     Symptoms Noted During/After Treatment  fatigue  -BT     Row Name 21 0800          Pain Scale: Numbers Pre/Post-Treatment    Pre/Posttreatment Pain Comment  pt with no c/o pain  -BT     Row Name 21 0800          Bed Mobility    Comment (Bed Mobility)  pt up in chair upon arrival  -BT     Row Name 21 0800          Transfers    Chair-Bed [facility] [facility] (Transfers)  contact guard;verbal cues  -BT     Sit-Stand [facility] [facility] (Transfers)  standby assist  -BT     Stand-Sit [facility] [facility] (Transfers)  standby assist  -BT     [facility] [place] [facility] Level (Toilet Transfer)  standby assist;contact guard  -BT     Assistive Device (Toilet  Transfer)  walker, front-wheeled  -BT     Row Name 01/06/21 0800          Chair-Bed Transfer    Assistive Device (Chair-Bed Transfers)  walker, front-wheeled  -BT     Row Name 01/06/21 0800          Sit-Stand Transfer    Assistive Device (Sit-Stand Transfers)  walker, front-wheeled  -BT     Row Name 01/06/21 0800          Stand-Sit Transfer    Assistive Device (Stand-Sit Transfers)  walker, front-wheeled  -BT     Row Name 01/06/21 0800          Toilet Transfer    Type (Toilet Transfer)  stand pivot/stand step  -BT     Row Name 01/06/21 0800          Bathing    Bertie Level (Bathing)  bathing skills;upper body;set up;lower body;contact guard assist  -BT     Assistive Device (Bathing)  long-handled sponge;hand held shower spray hose;shower chair;grab bar/tub rail  -BT     Position (Bathing)  supported sitting;supported standing  -BT     Row Name 01/06/21 0800          Upper Body Dressing    Bertie Level (Upper Body Dressing)  upper body dressing skills;set up assistance  -BT     Position (Upper Body Dressing)  edge of bed sitting  -BT     Row Name 01/06/21 0800          Lower Body Dressing    Bertie Level (Lower Body Dressing)  doff;don;pants/bottoms;shoes/slippers;socks;verbal cues;contact guard assist;minimum assist (75% patient effort)  -BT     Position (Lower Body Dressing)  supported sitting;supported standing  -BT     Row Name 01/06/21 0800          Grooming    Bertie Level (Grooming)  grooming skills;set up  -BT     Position (Grooming)  edge of bed sitting  -BT     Row Name 01/06/21 0800          Toileting    Bertie Level (Toileting)  toileting skills;minimum assist (75% patient effort);verbal cues  -BT     Position (Toileting)  supported sitting;supported standing  -BT     Row Name 01/06/21 0800          Positioning and Restraints    Pre-Treatment Position  sitting in chair/recliner  -BT     Post Treatment Position  wheelchair  -BT     In Wheelchair  notified nsg;sitting;call  light within reach;encouraged to call for assist;exit alarm on  -BT       User Key  (r) = Recorded By, (t) = Taken By, (c) = Cosigned By    Initials Name Effective Dates    BT Letty ZenaidaSARA burden 11/16/20 -            Occupational Therapy Education                 Title: PT OT SLP Therapies (In Progress)     Topic: Occupational Therapy (In Progress)     Point: ADL training (Done)     Description:   Instruct learner(s) on proper safety adaptation and remediation techniques during self care or transfers.   Instruct in proper use of assistive devices.              Learning Progress Summary           Patient Acceptance, E,D, VU,NR by AF at 12/30/2020 1426    Comment: educated on LH sponge and LH reacher with LB dressing tasks will require further edcuation on process    Acceptance, E, VU,NR by AF at 12/28/2020 1418    Comment: educated on safety with ADLs and transfers                   Point: Home exercise program (Not Started)     Description:   Instruct learner(s) on appropriate technique for monitoring, assisting and/or progressing therapeutic exercises/activities.              Learner Progress:  Not documented in this visit.          Point: Precautions (Not Started)     Description:   Instruct learner(s) on prescribed precautions during self-care and functional transfers.              Learner Progress:  Not documented in this visit.          Point: Body mechanics (Not Started)     Description:   Instruct learner(s) on proper positioning and spine alignment during self-care, functional mobility activities and/or exercises.              Learner Progress:  Not documented in this visit.                      User Key     Initials Effective Dates Name Provider Type Discipline     04/14/20 -  Misa Oneal OTR Occupational Therapist OT                    OT Recommendation and Plan                         Time Calculation:     Time Calculation- OT     Row Name 01/06/21 0815             Time Calculation- OT    OT Start  Time  0815  -BT      OT Stop Time  0900  -BT      OT Time Calculation (min)  45 min  -BT        User Key  (r) = Recorded By, (t) = Taken By, (c) = Cosigned By    Initials Name Provider Type    BT Zenaida aSenz OT Occupational Therapist        Therapy Charges for Today     Code Description Service Date Service Provider Modifiers Qty    82391526830 HC OT SELF CARE/MGMT/TRAIN EA 15 MIN 1/5/2021 Zenaida Saenz OT GO 4    29814681289 HC OT THER PROC EA 15 MIN 1/5/2021 Zenaida Saenz OT GO 2    82825582425 HC OT SELF CARE/MGMT/TRAIN EA 15 MIN 1/6/2021 Zenaida Saenz OT GO 3                   Zenaida Saenz OT  1/6/2021

## 2021-01-07 LAB
ALBUMIN SERPL-MCNC: 3.7 G/DL (ref 3.5–5.2)
ALBUMIN/GLOB SERPL: 1.3 G/DL
ALP SERPL-CCNC: 65 U/L (ref 39–117)
ALT SERPL W P-5'-P-CCNC: 15 U/L (ref 1–33)
ANION GAP SERPL CALCULATED.3IONS-SCNC: 11.1 MMOL/L (ref 5–15)
AST SERPL-CCNC: 27 U/L (ref 1–32)
BASOPHILS # BLD AUTO: 0.03 10*3/MM3 (ref 0–0.2)
BASOPHILS NFR BLD AUTO: 0.5 % (ref 0–1.5)
BILIRUB SERPL-MCNC: 0.5 MG/DL (ref 0–1.2)
BUN SERPL-MCNC: 22 MG/DL (ref 8–23)
BUN/CREAT SERPL: 12.9 (ref 7–25)
CALCIUM SPEC-SCNC: 7.6 MG/DL (ref 8.6–10.5)
CHLORIDE SERPL-SCNC: 98 MMOL/L (ref 98–107)
CO2 SERPL-SCNC: 28.9 MMOL/L (ref 22–29)
CREAT SERPL-MCNC: 1.7 MG/DL (ref 0.57–1)
DEPRECATED RDW RBC AUTO: 47.7 FL (ref 37–54)
EOSINOPHIL # BLD AUTO: 0.09 10*3/MM3 (ref 0–0.4)
EOSINOPHIL NFR BLD AUTO: 1.5 % (ref 0.3–6.2)
ERYTHROCYTE [DISTWIDTH] IN BLOOD BY AUTOMATED COUNT: 14.6 % (ref 12.3–15.4)
GFR SERPL CREATININE-BSD FRML MDRD: 29 ML/MIN/1.73
GLOBULIN UR ELPH-MCNC: 2.9 GM/DL
GLUCOSE BLDC GLUCOMTR-MCNC: 112 MG/DL (ref 70–130)
GLUCOSE BLDC GLUCOMTR-MCNC: 119 MG/DL (ref 70–130)
GLUCOSE BLDC GLUCOMTR-MCNC: 128 MG/DL (ref 70–130)
GLUCOSE BLDC GLUCOMTR-MCNC: 149 MG/DL (ref 70–130)
GLUCOSE SERPL-MCNC: 97 MG/DL (ref 65–99)
HCT VFR BLD AUTO: 27.7 % (ref 34–46.6)
HGB BLD-MCNC: 8.3 G/DL (ref 12–15.9)
IMM GRANULOCYTES # BLD AUTO: 0.02 10*3/MM3 (ref 0–0.05)
IMM GRANULOCYTES NFR BLD AUTO: 0.3 % (ref 0–0.5)
INR PPP: 2.05 (ref 0.9–1.1)
LYMPHOCYTES # BLD AUTO: 2.02 10*3/MM3 (ref 0.7–3.1)
LYMPHOCYTES NFR BLD AUTO: 32.9 % (ref 19.6–45.3)
MCH RBC QN AUTO: 27.1 PG (ref 26.6–33)
MCHC RBC AUTO-ENTMCNC: 30 G/DL (ref 31.5–35.7)
MCV RBC AUTO: 90.5 FL (ref 79–97)
MONOCYTES # BLD AUTO: 0.62 10*3/MM3 (ref 0.1–0.9)
MONOCYTES NFR BLD AUTO: 10.1 % (ref 5–12)
NEUTROPHILS NFR BLD AUTO: 3.36 10*3/MM3 (ref 1.7–7)
NEUTROPHILS NFR BLD AUTO: 54.7 % (ref 42.7–76)
NRBC BLD AUTO-RTO: 0 /100 WBC (ref 0–0.2)
PLATELET # BLD AUTO: 340 10*3/MM3 (ref 140–450)
PMV BLD AUTO: 9.8 FL (ref 6–12)
POTASSIUM SERPL-SCNC: 4.1 MMOL/L (ref 3.5–5.2)
PROT SERPL-MCNC: 6.6 G/DL (ref 6–8.5)
PROTHROMBIN TIME: 22.8 SECONDS (ref 11.7–14.2)
RBC # BLD AUTO: 3.06 10*6/MM3 (ref 3.77–5.28)
SODIUM SERPL-SCNC: 138 MMOL/L (ref 136–145)
WBC # BLD AUTO: 6.14 10*3/MM3 (ref 3.4–10.8)

## 2021-01-07 PROCEDURE — 85025 COMPLETE CBC W/AUTO DIFF WBC: CPT | Performed by: PHYSICAL MEDICINE & REHABILITATION

## 2021-01-07 PROCEDURE — 97530 THERAPEUTIC ACTIVITIES: CPT

## 2021-01-07 PROCEDURE — 97110 THERAPEUTIC EXERCISES: CPT

## 2021-01-07 PROCEDURE — 97130 THER IVNTJ EA ADDL 15 MIN: CPT

## 2021-01-07 PROCEDURE — 97535 SELF CARE MNGMENT TRAINING: CPT

## 2021-01-07 PROCEDURE — 97129 THER IVNTJ 1ST 15 MIN: CPT

## 2021-01-07 PROCEDURE — 85610 PROTHROMBIN TIME: CPT | Performed by: PHYSICAL MEDICINE & REHABILITATION

## 2021-01-07 PROCEDURE — 82962 GLUCOSE BLOOD TEST: CPT

## 2021-01-07 PROCEDURE — 80053 COMPREHEN METABOLIC PANEL: CPT | Performed by: PHYSICAL MEDICINE & REHABILITATION

## 2021-01-07 RX ADMIN — WARFARIN 5 MG: 5 TABLET ORAL at 15:04

## 2021-01-07 RX ADMIN — HYDROCODONE BITARTRATE AND ACETAMINOPHEN 1 TABLET: 7.5; 325 TABLET ORAL at 07:51

## 2021-01-07 RX ADMIN — ALLOPURINOL 100 MG: 100 TABLET ORAL at 07:52

## 2021-01-07 RX ADMIN — HYDROCODONE BITARTRATE AND ACETAMINOPHEN 1 TABLET: 7.5; 325 TABLET ORAL at 21:06

## 2021-01-07 RX ADMIN — MICONAZOLE NITRATE 1 APPLICATION: 2 OINTMENT TOPICAL at 21:08

## 2021-01-07 RX ADMIN — FOLIC ACID 1 MG: 1 TABLET ORAL at 07:51

## 2021-01-07 RX ADMIN — HYDROCODONE BITARTRATE AND ACETAMINOPHEN 1 TABLET: 7.5; 325 TABLET ORAL at 16:19

## 2021-01-07 RX ADMIN — TORSEMIDE 80 MG: 20 TABLET ORAL at 07:52

## 2021-01-07 RX ADMIN — Medication 3 MG: at 21:07

## 2021-01-07 RX ADMIN — ATORVASTATIN CALCIUM 20 MG: 20 TABLET, FILM COATED ORAL at 21:07

## 2021-01-07 RX ADMIN — AMLODIPINE BESYLATE 5 MG: 5 TABLET ORAL at 07:51

## 2021-01-07 RX ADMIN — Medication 2000 UNITS: at 07:51

## 2021-01-07 RX ADMIN — POLYETHYLENE GLYCOL 3350 17 G: 17 POWDER, FOR SOLUTION ORAL at 10:12

## 2021-01-07 RX ADMIN — LEVOTHYROXINE SODIUM 100 MCG: 0.1 TABLET ORAL at 06:00

## 2021-01-07 RX ADMIN — CALCIUM CARBONATE 1 TABLET: 500 TABLET, CHEWABLE ORAL at 11:45

## 2021-01-07 RX ADMIN — SERTRALINE HYDROCHLORIDE 50 MG: 50 TABLET, FILM COATED ORAL at 07:51

## 2021-01-07 RX ADMIN — HYDROCODONE BITARTRATE AND ACETAMINOPHEN 1 TABLET: 7.5; 325 TABLET ORAL at 11:44

## 2021-01-07 RX ADMIN — DOCUSATE SODIUM 50MG AND SENNOSIDES 8.6MG 2 TABLET: 8.6; 5 TABLET, FILM COATED ORAL at 21:06

## 2021-01-07 RX ADMIN — TRAZODONE HYDROCHLORIDE 25 MG: 50 TABLET ORAL at 21:07

## 2021-01-07 RX ADMIN — HYDROCODONE BITARTRATE AND ACETAMINOPHEN 1 TABLET: 7.5; 325 TABLET ORAL at 03:35

## 2021-01-07 RX ADMIN — CALCIUM CARBONATE 1 TABLET: 500 TABLET, CHEWABLE ORAL at 17:12

## 2021-01-07 RX ADMIN — POLYETHYLENE GLYCOL 3350 17 G: 17 POWDER, FOR SOLUTION ORAL at 07:52

## 2021-01-07 NOTE — PROGRESS NOTES
Inpatient Rehabilitation Plan of Care Note    Plan of Care  Care Plan Reviewed - No updates at this time.    Psychosocial    Performed Intervention(s)  Verbalizes needs & concerns      Safety    Performed Intervention(s)  Items within reach (call bell, etc), safety rounds, falls precautions, &  bed/chair alarms.      Sphincter Control    Performed Intervention(s)  Monitor intake, output, & BM's, Encourage appropriate diet & fluids, & perineal  care.      Pain    Performed Intervention(s)  Pain medication as ordered, distraction, & repositioning assistance as needed.      Body Systems    Performed Intervention(s)  Daily skin inspections, wound care, assist with repositioning, & dressing  changes as ordered.    Signed by: Carolina King RN

## 2021-01-07 NOTE — PROGRESS NOTES
Pharmacy Consult: Warfarin Dosing/ Monitoring    Alice Maxwell is a 75 y.o. female, estimated creatinine clearance is 36 mL/min (A) (by C-G formula based on SCr of 1.7 mg/dL (H)). weighing 113 kg (249 lb 12.8 oz).     has a past medical history of Abnormal Pap smear of cervix, Arthritis, Cancer (CMS/HCC), Disease of thyroid gland, Osteoporosis, Stroke (CMS/HCC), Stroke (CMS/HCC), and Urinary tract infection.    Social History     Tobacco Use    Smoking status: Never Smoker   Substance Use Topics    Alcohol use: No    Drug use: No     Results from last 7 days   Lab Units 01/07/21  0636 01/06/21  0850 01/05/21  0846 01/04/21  0721 01/03/21  0647 01/02/21  1012 01/01/21  0958 12/31/20  1202   INR  2.05* 1.84* 2.09* 2.45* 3.11* 3.05* 2.85* 3.44*   HEMOGLOBIN g/dL 8.3*  --   --  8.8*  --   --   --  8.9*   HEMATOCRIT % 27.7*  --   --  28.5*  --   --   --  28.7*   PLATELETS 10*3/mm3 340  --   --  343  --   --   --  270     Results from last 7 days   Lab Units 01/07/21  0636 01/04/21  0721 01/02/21  1012 12/31/20  1202   SODIUM mmol/L 138 140 139 137   POTASSIUM mmol/L 4.1 4.8 5.2 5.0   CHLORIDE mmol/L 98 104 104 103   CO2 mmol/L 28.9 23.5 23.3 23.8   BUN mg/dL 22 21 22 24*   CREATININE mg/dL 1.70* 1.68* 1.80* 1.77*   CALCIUM mg/dL 7.6* 7.7* 8.1* 8.0*   BILIRUBIN mg/dL 0.5 0.5  --  0.6   ALK PHOS U/L 65 69  --  79   ALT (SGPT) U/L 15 18  --  24   AST (SGOT) U/L 27 29  --  36*   GLUCOSE mg/dL 97 104* 147* 153*     Anticoagulation history: Warfarin 6mg daily (home dose). Warfarin managed by [facility] for hx of Factor V Leiden.    Hospital Anticoagulation:  Consulting provider: Aric Chao MD  Start date: 12/27/20  Indication: Post Surgical - Hip, hypercoagulable disorder and hx of CVA  Target INR: 2-3  Expected duration: indefinite   Bridge Therapy: No                  Date 12/27 12/28 12/29 12/30 12/31 1/1 1/2 1/3 1/4 1/5 1/6 1/7       INR 1.74 2.04 2.39 2.73 3.44 2.85 3.05 3.11 2.45 2.09 1.84 2.05        Warfarin dose 6mg 6mg 6mg 5mg HOLD 4mg 2mg 1mg 3mg 4mg 5mg 5mg         Potential drug interactions:   Allopurinol - may enhance the anticoagulant effect of warfarin (home med)  Levothyroxine- may enhance the anticoagulant effect of warfarin (home med)  Norco - Patients taking acetaminophen at higher doses and/or for longer duration may be at greater risk for experiencing a clinically significant interaction (not listed on med rec)   Trazodone- May diminish the anticoagulant effect of warfarin. (not listed on med rec)    Relevant nutrition status: regular diet; eating better per RN notes    Education complete?/ Date: Home medication    Assessment/Plan:  -INR now therapeutic again at 2.05  -Will continue dose of 5mg daily, but will monitor closely to make sure INR does not rise quickly again as it did with 6mg  -Continue to follow INR daily for now due to recent dose adjustments    Pharmacy will continue to follow until discharge or discontinuation of warfarin.     Mando Wright, PharmD  01/07/21 09:08 EST

## 2021-01-07 NOTE — THERAPY TREATMENT NOTE
Inpatient Rehabilitation - Speech Language Pathology Treatment Note    [facility]     Patient Name: Alice Maxwell  : 1945  MRN: [id number]    Today's Date: 2021                   Admit Date: 2020       Visit Dx:      ICD-10-CM ICD-9-CM   1. Heartburn  R12 787.1       Patient Active Problem List   Diagnosis   • Stroke (CMS/HCC)   • Personal history of breast cancer   • History of loop electrical excision procedure (LEEP)   • History of abnormal cervical Pap smear   • S/p left hip fracture       Past Medical History:   Diagnosis Date   • Abnormal Pap smear of cervix    • Arthritis    • Cancer (CMS/HCC)     breast   • Disease of thyroid gland    • Osteoporosis    • Stroke (CMS/HCC)    • Stroke (CMS/HCC)     x2   • Urinary tract infection        Past Surgical History:   Procedure Laterality Date   • BACK SURGERY     • BREAST LUMPECTOMY     • CERVICAL BIOPSY  W/ LOOP ELECTRODE EXCISION     • CHOLECYSTECTOMY     • COLONOSCOPY      had polyps    • FOOT SURGERY     • GASTRIC BYPASS     • HERNIA REPAIR     • THYROIDECTOMY            SLP EVALUATION (last 72 hours)      SLP S[facility] Evaluation     Row Name 21 1330 21 0900 21 1338 21 0900 21 0901       Communication Assessment/Intervention    Document Type  therapy note (daily note)  -SL  therapy note (daily note)  -SL  therapy note (daily note)  -SL  therapy note (daily note)  -SL  therapy note (daily note)  -LN    Subjective Information  complains of;fatigue  -SL  complains of;fatigue mulitple complaints  -SL  no complaints  -SL  complains of;pain;nausea/vomiting  -SL  no complaints  -LN    Patient Observations  agree to therapy  -SL  alert  -SL  alert;cooperative  -SL  alert  -SL  alert;cooperative;agree to therapy  -LN    Patient/Family/Caregiver Comments/Observations  --  --  --  needs encouragement to participate  -SL  --    Patient Effort  adequate  -SL  adequate  -SL  adequate  -SL  adequate  -SL  good  -LN    Symptoms  Noted During/After Treatment  fatigue  -SL  fatigue  -SL  none  -SL  fatigue  -SL  none  -LN      User Key  (r) = Recorded By, (t) = Taken By, (c) = Cosigned By    Initials Name Effective Dates    SL Gifty Rogers, MS CCC-SLP 06/08/18 -     Roxy Valles 12/17/19 -              EDUCATION    The patient has been educated in the following areas:       Cognitive Impairment.      SLP Recommendation and Plan                                                  SLP GOALS     Row Name 01/07/21 1330 01/07/21 0900 01/06/21 1300       Memory Skills Goal 1 (SLP)    Progress (Memory Skills Goal 1, SLP)  --  60%;independently (over 90% accuracy) recall of picture scenes  -SL  70%;80%;independently (over 90% accuracy) 4 item category inclusion task  -SL    Progress/Outcomes (Memory Skills Goal 1, SLP)  --  goal ongoing  -SL  goal ongoing  -SL    Comment (Memory Skills Goal 1, SLP)  --  --  immed recall- boxed info- 5 numbers w/spatial restrictions- 50%  -SL       Organizational Skills Goal 1 (SLP)    Progress (Thought Organization Skills Goal 1, SLP)  --  80%;independently (over 90% accuracy);with minimal cues (75-90%) concrete category differentiation- 5 items  -SL  --    Progress/Outcomes (Thought Organization Skills Goal 1, SLP)  --  goal ongoing  -SL  --       Reasoning Goal 1 (SLP)    Progress (Reasoning Goal 1, SLP)  80%;with minimal cues (75-90%);independently (over 90% accuracy) simple deduction by exclusion task  -SL  --  60%;with minimal cues (75-90%);independently (over 90% accuracy) word deductions  -SL    Progress/Outcomes (Reasoning Goal 1, SLP)  goal ongoing  -SL  --  goal ongoing  -SL    Comment (Reasoning Goal 1, SLP)  extra time required to complete task  -SL  --  --       Functional Problem Solving Skills Goal 1 (SLP)    Progress (Problem Solving Goal 1, SLP)  --  60%;70%;independently (over 90% accuracy) answering ?'s re: catalog order page  -SL  40%;independently (over 90% accuracy) multistep directives-  hospital map  -SL    Progress/Outcomes (Problem Solving Goal 1, SLP)  --  goal ongoing  -SL  goal ongoing  -SL    Comment (Problem Solving Goal 1, SLP)  --  --  planning task - closet organization - based on multiple sentence clues- 50%  -SL       Functional Math Skills Goal 1 (SLP)    Barriers (Functional Math Skills Goal 1, SLP)   slow processing, impaired attn to details and calculation errors aslo noted  -SL  --  --    Progress (Functional Math Skills Goal 1, SLP)  40%;independently (over 90% accuracy) simple math word problems  -SL  20%;independently (over 90% accuracy) determining denomination amounts  -SL  --    Progress/Outcomes (Functional Math Skills Goal 1, SLP)  goal ongoing  -SL  goal ongoing  -SL  --    Row Name 01/06/21 0900 01/05/21 0901          Attention Goal 1 (SLP)    Barriers (Attention Goal 1, SLP)  decreased attn to details in directives  -SL  --     Progress (Attention Goal 1, SLP)  50%;60%;independently (over 90% accuracy) word altering task  -SL  --     Progress/Outcomes (Attention Goal 1, SLP)  goal ongoing  -SL  --        Memory Skills Goal 1 (SLP)    Progress (Memory Skills Goal 1, SLP)  60%;70%;independently (over 90% accuracy) visual recall- 12 pictured items- grouping strategy  -SL  --     Progress/Outcomes (Memory Skills Goal 1, SLP)  goal ongoing  -SL  --        Organizational Skills Goal 1 (SLP)    Improve Thought Organization Through Goal 1 (SLP)  --  completing mental manipulation task;naming similarities and differences;completing a convergent naming task;completing a verbal sequencing task;90%;independently (over 90% accuracy)  -LN     Time Frame (Thought Organization Skills Goal 1, SLP)  --  by discharge  -LN     Progress (Thought Organization Skills Goal 1, SLP)  --  60%;independently (over 90% accuracy);90%;with minimal cues (75-90%)  -LN     Progress/Outcomes (Thought Organization Skills Goal 1, SLP)  --  goal ongoing  -LN     Comment (Thought Organization Skills Goal 1,  SLP)  --  Abstract convergent  -LN        Reasoning Goal 1 (SLP)    Improve Reasoning Through Goal 1 (SLP)  --  complete logic/creative thinking task;complete mental flexibility task;complete basic reasoning task;complete deductive reasoning task;90%;independently (over 90% accuracy)  -LN     Time Frame (Reasoning Goal 1, SLP)  --  by discharge  -LN     Progress (Reasoning Goal 1, SLP)  --  70%;independently (over 90% accuracy);80%;with minimal cues (75-90%)  -LN     Progress/Outcomes (Reasoning Goal 1, SLP)  --  goal ongoing  -LN     Comment (Reasoning Goal 1, SLP)  --  Basic logic puzzle  -LN        Functional Problem Solving Skills Goal 1 (SLP)    Improve Problem Solving Through Goal 1 (SLP)  --  sequence steps in a task;complete organization/home management task;determine multiple solutions to problems;determine solutions to multifactorial problems;90%;independently (over 90% accuracy)  -LN     Time Frame (Problem Solving Goal 1, SLP)  --  by discharge  -LN     Barriers (Problem Solving Goal 1, SLP)  --  Reduced attention  -LN     Progress (Problem Solving Goal 1, SLP)  --  40%;independently (over 90% accuracy);100%;with minimal cues (75-90%)  -LN     Progress/Outcomes (Problem Solving Goal 1, SLP)  --  goal ongoing  -LN     Comment (Problem Solving Goal 1, SLP)  --  6-step sequence  -LN        Functional Math Skills Goal 1 (SLP)    Improve Functional Math Skills Through Goal 1 (SLP)  --  complete word problems involving time;complete word problems involving money;90%;independently (over 90% accuracy)  -LN     Time Frame (Functional Math Skills Goal 1, SLP)  --  by discharge  -LN     Barriers (Functional Math Skills Goal 1, SLP)  decreased analysis/reasoning noted for chart of info re: video charges; difficulty noted for understanding transition from one question to the next  -SL  Reduced organization and attention to detial  -LN     Progress (Functional Math Skills Goal 1, SLP)  40%;independently (over 90%  accuracy) video rental task/simple calculations  -SL  50%;independently (over 90% accuracy);80%;with minimal cues (75-90%)  -LN     Progress/Outcomes (Functional Math Skills Goal 1, SLP)  goal ongoing  -SL  goal ongoing  -LN     Comment (Functional Math Skills Goal 1, SLP)  more confusion re: details in questions than actual math additions  -SL  Balanced a checkbook  -LN       User Key  (r) = Recorded By, (t) = Taken By, (c) = Cosigned By    Initials Name Provider Type    Gifty Martin MS CCC-SLP Speech and Language Pathologist    LN Roxy Conte Speech and Language Pathologist                      Time Calculation:       Time Calculation- SLP     Row Name 01/07/21 1418 01/07/21 0925          Time Calculation- SLP    SLP Start Time  1300  -SL  0900  -SL     SLP Stop Time  1330  -SL  0930  -SL     SLP Time Calculation (min)  30 min  -SL  30 min  -SL       User Key  (r) = Recorded By, (t) = Taken By, (c) = Cosigned By    Initials Name Provider Type    Gifty Martin MS CCC-SLP Speech and Language Pathologist            Therapy Charges for Today     Code Description Service Date Service Provider Modifiers Qty    51732752118 HC ST DEV OF COGN SKILLS INITIAL 15 MIN 1/6/2021 Gifty Rogers MS CCC-SLP  1    41698481804 HC ST DEV OF COGN SKILLS EACH ADDT'L 15 MIN 1/6/2021 Gifty Rogers MS CCC-SLP  1    18415205624 HC ST DEV OF COGN SKILLS EACH ADDT'L 15 MIN 1/6/2021 Gifty Rogers MS CCC-SLP  2    22002348401 HC ST DEV OF COGN SKILLS INITIAL 15 MIN 1/7/2021 Gifty Rogers MS CCC-SLP  1    75485317247 HC ST DEV OF COGN SKILLS EACH ADDT'L 15 MIN 1/7/2021 Gifty Rogers MS CCC-SLP  1    78901697476 HC ST DEV OF COGN SKILLS EACH ADDT'L 15 MIN 1/7/2021 Gifty Rogers MS CCC-SLP  2                           Gifty Rogers MS CCC-SLP  1/7/2021

## 2021-01-07 NOTE — PROGRESS NOTES
"   LOS: 11 days   Patient Care Team:  Elsy Ventura as PCP - General (Nurse Practitioner)  Elsy Ventura as Nurse Practitioner (Nurse Practitioner)    Chief Complaint:   1. Lfemur fx s/p Bipolar hemiarthroplasty  2. Previous LHP/CVA  3. Gouty arthritis  4. HTN  5. HLD  6. Factor V Leiden        Subjective     History of Present Illness    Subjective  Complains of left hip pain.  Not able to tolerate gabapentin in the past.      Denies fever/ chills/ headaches/ sob/ cough/ cp.    History taken from: patient    Objective     Vital Signs  Temp:  [97.6 °F (36.4 °C)-98.6 °F (37 °C)] 98.1 °F (36.7 °C)  Heart Rate:  [75-84] 84  Resp:  [16-18] 18  BP: (112-154)/(57-67) 154/67    Objective:  Vital signs: (most recent): Blood pressure 154/67, pulse 84, temperature 98.1 °F (36.7 °C), temperature source Oral, resp. rate 18, height 170.2 cm (67\"), weight 113 kg (249 lb 12.8 oz), SpO2 99 %.           Gen: NAD. Eating lunch.    HEENT: Trach midline.   Pulm: normal respirations; no r/r/w; non-labored   Heart; RRR;    Abd: soft; NT/ND;   Neuro: verbal; appropriate   Skin: no rashes on exposed skin   Blister posterior to left hip incision - dressed  Left hip incision - dressed  Ext- no distal edema  Neuro - Pain inhibition LLE    Results Review:     I reviewed the patient's new clinical results.  Results from last 7 days   Lab Units 01/07/21  0636 01/04/21 0721   WBC 10*3/mm3 6.14 7.35   HEMOGLOBIN g/dL 8.3* 8.8*   HEMATOCRIT % 27.7* 28.5*   PLATELETS 10*3/mm3 340 343       Results from last 7 days   Lab Units 01/07/21  0636 01/04/21  0721 01/02/21  1012   SODIUM mmol/L 138 140 139   POTASSIUM mmol/L 4.1 4.8 5.2   CHLORIDE mmol/L 98 104 104   CO2 mmol/L 28.9 23.5 23.3   BUN mg/dL 22 21 22   CREATININE mg/dL 1.70* 1.68* 1.80*   CALCIUM mg/dL 7.6* 7.7* 8.1*   BILIRUBIN mg/dL 0.5 0.5  --    ALK PHOS U/L 65 69  --    ALT (SGPT) U/L 15 18  --    AST (SGOT) U/L 27 29  --    GLUCOSE mg/dL 97 104* 147*       Results from last 7 days "   Lab Units 01/07/21  0636 01/06/21  0850 01/05/21  0846   INR  2.05* 1.84* 2.09*       Medication Review: done  Scheduled Meds:allopurinol, 100 mg, Oral, Daily  amLODIPine, 5 mg, Oral, Q24H  atorvastatin, 20 mg, Oral, Nightly  cholecalciferol, 2,000 Units, Oral, Daily  folic acid, 1 mg, Oral, Daily  levothyroxine, 100 mcg, Oral, Q AM  miconazole nitrate, , Topical, Q12H  polyethylene glycol, 17 g, Oral, Daily  senna-docusate sodium, 2 tablet, Oral, Nightly  sertraline, 50 mg, Oral, Daily  torsemide, 80 mg, Oral, Daily  traZODone, 25 mg, Oral, Nightly  warfarin, 5 mg, Oral, Daily With Dinner      Continuous Infusions:Pharmacy to dose warfarin,       PRN Meds:.•  acetaminophen  •  calcium carbonate  •  hydrALAZINE  •  HYDROcodone-acetaminophen  •  melatonin  •  ondansetron  •  Pharmacy to dose warfarin  •  polyethylene glycol  •  senna      Assessment/Plan       S/p left hip fracture      Assessment & Plan     Left femur fx s/p Bipolar hemiarthroplasty  anterior approach bipolar hemiarthroplasty on 12/22.  Patient is now weightbearing as tolerated.   Jan 6 - mild serous drainage at inferior aspect of incision. D/C every other staple on superior half of incision.     Previous LHP/CVA    Gouty arthritis    HTN  Dec 31 -  today. Home med does not list any anti-hypertensive medication. Presently on amlodipine 5 mg daily.  Will add hydralazine 10 milligrams p.o. every 8 hours as needed systolic blood pressure greater than 170  Jan 6 - amlodipine/ torsemide added recently, increased to 80 mg daily    HLD    Renal insuff - Cr 1.73 at outside hospital  Dec 1-creatinine 1.77  Dec 6 - followed by Nephrology . Torsemide added recently.     Anemia - HGB 8.9 on Dec 26  Dec 31-hemoglobin 8.9  Jan 7 - hemoglobin 8.3    Factor V Leiden - coumadin 6 mg daily and ASA 81 mg daily at home(presently off ASA)   Jan 6 - INR 2.09 yesterday, on coumadin 5 mg.        DVT prophylaxis - coumadin/ SCDs    Pain management - FERN  reviewed. On Norco 7.5 mg q 4 hours prn, ice pack    GI - constipation - multi-factorial - pain /immobility/pain med  Dec 30 - change colace to Senokot-S, add miralax    Impaired sleep  Dec 30 - trazodone 25 mg added on Dec 28  December 31-reports slept better    .       Now admit for comprehensive acute inpatient rehabilitation .  This would be an interdisciplinary program with physical therapy 1 hour,  occupational therapy 1 hour, and speech therapy 1 hour, 5 days a week.  Rehabilitation nursing for carryover, monitoring of  Incision, pain, volume  status, bowel and bladder, and skin  Ongoing physician follow-up.  Weekly team conferences.      Goal is for home with  Home health   therapies.  Barrier to discharge:  Impaired mobility and ADLs  - work on  Strength, transfers, balance, gait, ADLs  to overcome.           Aric Chao MD  01/07/21  12:38 EST    Time:   During rounds, used appropriate personal protective equipment including mask and gloves.  Additional gown if indicated.  Mask used was standard procedure mask. Appropriate PPE was worn during the entire visit.  Hand hygiene was completed before and after.

## 2021-01-07 NOTE — THERAPY TREATMENT NOTE
Inpatient Rehabilitation - Physical Therapy Treatment Note       [facility]     Patient Name: Alice Maxwell  : 1945  MRN: [id number]    Today's Date: 2021                    Admit Date: 2020      Visit Dx:     ICD-10-CM ICD-9-CM   1. Heartburn  R12 787.1       Patient Active Problem List   Diagnosis   • Stroke (CMS/HCC)   • Personal history of breast cancer   • History of loop electrical excision procedure (LEEP)   • History of abnormal cervical Pap smear   • S/p left hip fracture       Past Medical History:   Diagnosis Date   • Abnormal Pap smear of cervix    • Arthritis    • Cancer (CMS/HCC)     breast   • Disease of thyroid gland    • Osteoporosis    • Stroke (CMS/HCC)    • Stroke (CMS/HCC)     x2   • Urinary tract infection        Past Surgical History:   Procedure Laterality Date   • BACK SURGERY     • BREAST LUMPECTOMY     • CERVICAL BIOPSY  W/ LOOP ELECTRODE EXCISION     • CHOLECYSTECTOMY     • COLONOSCOPY      had polyps    • FOOT SURGERY     • GASTRIC BYPASS     • HERNIA REPAIR     • THYROIDECTOMY            PT ASSESSMENT (last 12 hours)      IRF PT Evaluation and Treatment     Row Name 21 1014          PT Time and Intention    Document Type  daily treatment  -MS     Mode of Treatment  physical therapy  -MS     Patient/Family/Caregiver Comments/Observations  AM: sitting up in wheelchair upon PT arrival, no acute distress, exit alarm on, reporting 10/10 pain- RN aware; PM: supine in bed with, required encouragement but agreeable  -MS     Total Minutes, Physical Therapy  90  -MS     Row Name 21 1014          General Information    Existing Precautions/Restrictions  fall;left;hip, anterior  -MS     Row Name 21 1014          Cognition/Psychosocial    Orientation Status (Cognition)  oriented x 4  -MS     Follows Commands (Cognition)  follows two-step commands;physical/tactile prompts required;repetition of directions required;verbal cues/prompting required  -MS      Personal Safety Interventions  fall prevention program maintained;gait belt;nonskid shoes/slippers when out of bed  -MS     Row Name 01/07/21 1014          Pain Scale: Numbers Pre/Post-Treatment    Pretreatment Pain Rating  10/10  -MS     Posttreatment Pain Rating  10/10  -MS     Pain Location - Side  Left  -MS     Pain Location - Orientation  lower  -MS     Pain Location  hip  -MS     Pre/Posttreatment Pain Comment  Reported pain in AM session only.  -MS     Row Name 01/07/21 1014          Mobility    Extremity Weight-bearing Status  left lower extremity  -MS     Left Lower Extremity (Weight-bearing Status)  weight-bearing as tolerated (WBAT)  -MS     Row Name 01/07/21 1014          Bed Mobility    Supine-Sit [place] (Bed Mobility)  standby assist;verbal cues  -MS     Row Name 01/07/21 1014          Transfers    Sit-Stand [place] (Transfers)  standby assist;verbal cues  -MS     Stand-Sit [place] (Transfers)  standby assist;verbal cues  -MS     Row Name 01/07/21 1014          Sit-Stand Transfer    Assistive Device (Sit-Stand Transfers)  walker, front-wheeled;bariatric  -MS     Row Name 01/07/21 1014          Stand-Sit Transfer    Assistive Device (Stand-Sit Transfers)  walker, front-wheeled;bariatric  -MS     Row Name 01/07/21 1014          Gait/Stairs (Locomotion)    [place] Level (Gait)  contact guard;verbal cues  -MS     Assistive Device (Gait)  walker, front-wheeled;bariatric equipment  -MS     Distance in Feet (Gait)  AM: 60', 80'; PM: 60', 80'  -MS     Pattern (Gait)  step-through  -MS     Deviations/Abnormal Patterns (Gait)  janet decreased;gait speed decreased;stride length decreased  -MS     Bilateral Gait Deviations  forward flexed posture  -MS     Left Sided Gait Deviations  heel strike decreased  -MS     Comment (Gait/Stairs)  Postural cues, fatigue limiting, no overt LOB or safety concerns demo'd.  -MS     Row Name 01/07/21 1014          Balance    Static Sitting Balance  WFL  -MS      Static Standing Balance  standing;mild impairment;unsupported  -MS     Dynamic Standing Balance  standing;moderate impairment;unsupported  -MS     Row Name 01/07/21 1014          Hip (Therapeutic Exercise)    Hip Isometrics (Therapeutic Exercise)  left;supine;gluteal sets;10 repetitions;2 sets  -MS     Hip Strengthening (Therapeutic Exercise)  left;supine;aBduction;aDduction;10 repetitions;2 sets  -MS     Row Name 01/07/21 1014          Knee (Therapeutic Exercise)    Knee Strengthening (Therapeutic Exercise)  left;supine;heel slides;10 repetitions;2 sets  -MS     Row Name 01/07/21 1014          Transfer Goal 1 (PT-IRF)    Progress/Outcomes (Transfer Goal 1, PT-IRF)  --  -MS     Row Name 01/07/21 1014          Positioning and Restraints    Pre-Treatment Position  in bed  -MS     Post Treatment Position  chair  -MS     In Chair  sitting;encouraged to call for assist;exit alarm on  -MS       User Key  (r) = Recorded By, (t) = Taken By, (c) = Cosigned By    Initials Name Provider Type    MS HudsonAlfreda, PT Physical Therapist        Wound 12/27/20 1601 Left anterior thigh Incision (Active)   Dressing Appearance dry;intact 01/06/21 1953   Closure NADIA 01/07/21 0751   Base dry;clean 01/06/21 1430   Drainage Amount scant 01/07/21 0751   Care, Wound cleansed with;sterile normal saline 01/06/21 1430   Dressing Care dressing changed 01/07/21 0950   Staples Removed Intact Yes 01/06/21 1430       Wound 12/27/20 1602 Left lateral thigh Blisters (Active)   Dressing Appearance intact;dry 01/06/21 1953   Closure NADIA 01/07/21 0751   Base dressing in place, unable to visualize 01/06/21 1953   Drainage Amount none 01/07/21 0751   Dressing Care dressing changed 01/07/21 0950       Wound 12/27/20 1602 Left anterior groin (Active)   Dressing Appearance open to air 01/06/21 1953   Drainage Amount none 01/06/21 1953   Care, Wound cleansed with;soap and water 01/06/21 1953     Physical Therapy Education                 Title: PT OT  SLP Therapies (In Progress)     Topic: Physical Therapy (In Progress)     Point: Mobility training (Done)     Learning Progress Summary           Patient Acceptance, E,TB, VU by MS at 1/7/2021 1029    Acceptance, E,TB, VU,NR by LB at 1/6/2021 1043    Acceptance, E,TB, VU,NR by LB at 1/5/2021 1027    Acceptance, E,TB, VU,NR by EE at 1/3/2021 0917    Acceptance, E,TB, VU,NR by EE at 1/2/2021 0921    Acceptance, E, VU,NR by LB1 at 1/1/2021 1204    Acceptance, E,TB, VU,NR by LB at 12/31/2020 1057    Acceptance, E,TB, VU,NR by LB at 12/30/2020 1133    Comment: walking    Acceptance, E,TB, VU,NR by LB at 12/29/2020 1050    Acceptance, E,TB, NR,VU by LB at 12/28/2020 1138                   Point: Home exercise program (Done)     Learning Progress Summary           Patient Acceptance, E,TB, VU by MS at 1/7/2021 1029    Acceptance, E,TB, VU,NR by LB at 1/4/2021 1038    Acceptance, E,TB, VU,NR by EE at 1/3/2021 0917    Acceptance, E,TB, VU,NR by EE at 1/2/2021 0921    Acceptance, E,TB, NR,VU by LB at 12/28/2020 1138                   Point: Body mechanics (Not Started)     Learner Progress:  Not documented in this visit.          Point: Precautions (Done)     Learning Progress Summary           Patient Acceptance, E,TB, VU by MS at 1/7/2021 1029    Acceptance, E,TB, VU,NR by EE at 1/3/2021 0917    Acceptance, E,TB, VU,NR by EE at 1/2/2021 0921                               User Key     Initials Effective Dates Name Provider Type Discipline    LB 04/03/18 -  Luly Francisco, PT Physical Therapist PT    LB1 03/07/18 -  Munira Azevedo, PTA Physical Therapy Assistant PT    EE 04/03/18 -  Jaqueline Soni, PT Physical Therapist PT    MS 03/04/19 -  Alfreda Hudson, PT Physical Therapist PT                PT Recommendation and Plan      Patient was wearing a face mask during this therapy encounter. Therapist used appropriate personal protective equipment including eye protection, mask, and gloves.  Mask used was standard procedure  mask. Appropriate PPE was worn during the entire therapy session. Hand hygiene was completed before and after therapy session. Patient is not in enhanced droplet precautions. Jaspal barnett student also present during therapy sessions.       Time Calculation:     PT Charges     Row Name 01/07/21 1349 01/07/21 1011          Time Calculation    Start Time  1230  -MS  1000  -MS     Stop Time  1300  -MS  1100  -MS     Time Calculation (min)  30 min  -MS  60 min  -MS     PT Received On  --  01/07/21  -MS     PT - Next Appointment  --  01/08/21  -MS       User Key  (r) = Recorded By, (t) = Taken By, (c) = Cosigned By    Initials Name Provider Type    MS Alfreda Hudson PT Physical Therapist          Therapy Charges for Today     Code Description Service Date Service Provider Modifiers Qty    91861670484 HC PT THER PROC EA 15 MIN 1/7/2021 Alfreda Hudson, PT GP 6    36366376240 HC PT THER SUPP EA 15 MIN 1/7/2021 Alfreda Hudson, PT GP 2                   Alfreda Hudson PT  1/7/2021

## 2021-01-07 NOTE — PROGRESS NOTES
Reviewed progress and d/c plans with patient's , by phone. Will plan to call him on Monday for family conference, but he has trouble hearing so may not be able to stay on phone with us if too difficult to hear. He stated his daughter can relay information to him. Also received call from daughter regarding d/c date. She has meeting for work on Wednesday that she cannot change so would like d/c to be Tuesday afternoon if possible. Will discuss with  and team. Family conference scheduled for Monday, 1/11 at 2:00 p.m.

## 2021-01-07 NOTE — PROGRESS NOTES
NEPHROLOGY PROGRESS NOTE    PATIENT IDENTIFICATION:   Name:  Alice Maxwell      MRN:  [id number]     75 y.o.  female             Reason for visit: Tanya    SUBJECTIVE:   Seen and examined.  No shortness of air or chest pain.  Laying down in bed.  Complaining of nausea.      OBJECTIVE:  Vitals:    01/06/21 0543 01/06/21 1213 01/06/21 2015 01/07/21 0613   BP: 116/55 125/69 112/58 145/67   BP Location: Right arm Right arm Left arm Right arm   Patient Position: Lying Sitting Lying Lying   Pulse: 72 81 79 80   Resp: 18 18 17 16   Temp: 97.9 °F (36.6 °C) 98.1 °F (36.7 °C) 98.6 °F (37 °C) 97.6 °F (36.4 °C)   TempSrc: Oral Oral Oral Oral   SpO2: 93% 100% 98% 97%   Weight:       Height:               Body mass index is 39.12 kg/m².    Intake/Output Summary (Last 24 hours) at 1/7/2021 0748  Last data filed at 1/6/2021 1700  Gross per 24 hour   Intake 590 ml   Output --   Net 590 ml         Exam:  GEN:  No distress, appears stated age  EYES:   Anicteric sclera  ENT:    External ears/nose normal, MM are moist  NECK:  No adenopathy, JVP none  LUNGS: Normal chest on inspection; not labored  CV:  Normal S1S2, without murmur  ABD:  Non-tender, non-distended, no hepatosplenomegaly, +BS  EXT:  No edema; no cyanosis; clubbing    Scheduled meds:  allopurinol, 100 mg, Oral, Daily  amLODIPine, 5 mg, Oral, Q24H  atorvastatin, 20 mg, Oral, Nightly  cholecalciferol, 2,000 Units, Oral, Daily  folic acid, 1 mg, Oral, Daily  levothyroxine, 100 mcg, Oral, Q AM  miconazole nitrate, , Topical, Q12H  polyethylene glycol, 17 g, Oral, Daily  senna-docusate sodium, 2 tablet, Oral, Nightly  sertraline, 50 mg, Oral, Daily  torsemide, 80 mg, Oral, Daily  traZODone, 25 mg, Oral, Nightly  warfarin, 5 mg, Oral, Daily With Dinner      IV meds:                      Pharmacy to dose warfarin,         Data Review:    Results from last 7 days   Lab Units 01/04/21  0721 01/02/21  1012 12/31/20  1202   SODIUM mmol/L 140 139 137   POTASSIUM mmol/L 4.8 5.2 5.0    CHLORIDE mmol/L 104 104 103   CO2 mmol/L 23.5 23.3 23.8   BUN mg/dL 21 22 24*   CREATININE mg/dL 1.68* 1.80* 1.77*   CALCIUM mg/dL 7.7* 8.1* 8.0*   BILIRUBIN mg/dL 0.5  --  0.6   ALK PHOS U/L 69  --  79   ALT (SGPT) U/L 18  --  24   AST (SGOT) U/L 29  --  36*   GLUCOSE mg/dL 104* 147* 153*       Estimated Creatinine Clearance: 36.4 mL/min (A) (by C-G formula based on SCr of 1.68 mg/dL (H)).    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   Continue torsemide to 80 mg p.o. daily  AM labs  Surveillance lab    Dodie Girard MD  1/7/2021    07:48 EST

## 2021-01-07 NOTE — PLAN OF CARE
Goal Outcome Evaluation:  Plan of Care Reviewed With: patient  Progress: no change  Outcome Summary: Alireza has been cooperative, alert and oriented x 4, and continent of b/b. She takes medication with water, pain medication given about every 4 hours, and no unsafe behavior. Tolerated therapy, numbness to feet and accucheck AC/HS. Dressing changed and no other issues at this time.

## 2021-01-07 NOTE — THERAPY TREATMENT NOTE
Inpatient Rehabilitation - Occupational Therapy Treatment Note    [facility]     Patient Name: Alice Maxwell  : 1945  MRN: [id number]    Today's Date: 2021                 Admit Date: 2020         ICD-10-CM ICD-9-CM   1. Heartburn  R12 787.1       Patient Active Problem List   Diagnosis   • Stroke (CMS/HCC)   • Personal history of breast cancer   • History of loop electrical excision procedure (LEEP)   • History of abnormal cervical Pap smear   • S/p left hip fracture       Past Medical History:   Diagnosis Date   • Abnormal Pap smear of cervix    • Arthritis    • Cancer (CMS/HCC)     breast   • Disease of thyroid gland    • Osteoporosis    • Stroke (CMS/HCC)    • Stroke (CMS/HCC)     x2   • Urinary tract infection        Past Surgical History:   Procedure Laterality Date   • BACK SURGERY     • BREAST LUMPECTOMY     • CERVICAL BIOPSY  W/ LOOP ELECTRODE EXCISION     • CHOLECYSTECTOMY     • COLONOSCOPY      had polyps    • FOOT SURGERY     • GASTRIC BYPASS     • HERNIA REPAIR     • THYROIDECTOMY                  IRF OT ASSESSMENT FLOWSHEET (last 12 hours)      IRF OT Evaluation and Treatment     Row Name 21 1300          OT Time and Intention    Document Type  daily treatment  -BL     Row Name 21 1300          Cognition/Psychosocial    Orientation Status (Cognition)  oriented x 4  -BL     Follows Commands (Cognition)  follows two-step commands  -BL     Personal Safety Interventions  gait belt;fall prevention program maintained;nonskid shoes/slippers when out of bed  -BL     Row Name 21 1300          Pain Scale: Numbers Pre/Post-Treatment    Pretreatment Pain Rating  10/10  -BL     Posttreatment Pain Rating  10/10  -BL     Pain Location - Side  Left  -BL     Pain Location - Orientation  lower  -BL     Pain Location  hip  -BL     Row Name 21 1300          Transfer Assessment/Treatment    Transfers  shower transfer  -BL     Comment (Transfers)  pt able to use grab bars to  complete transfer  -     Row Name 01/07/21 1300          Transfers    Sit-Stand [place] (Transfers)  standby assist  -     Stand-Sit [place] (Transfers)  standby assist  -     [place] Level (Shower Transfer)  stand by assist  -     Assistive Device (Shower Transfer)  grab bar, tub/shower;shower chair;wheelchair  -     Row Name 01/07/21 1300          Sit-Stand Transfer    Assistive Device (Sit-Stand Transfers)  other (see comments) grab bars  -     Row Name 01/07/21 1300          Stand-Sit Transfer    Assistive Device (Stand-Sit Transfers)  other (see comments) grab bars  -     Row Name 01/07/21 1300          Shower Transfer    Type (Shower Transfer)  sit-stand  -     Row Name 01/07/21 1300          Shoulder (Therapeutic Exercise)    Shoulder (Therapeutic Exercise)  AROM (active range of motion);strengthening exercise  -     Shoulder AROM (Therapeutic Exercise)  bilateral;flexion;extension;aBduction;aDduction;sitting x15 reps  -BL     Shoulder Strengthening (Therapeutic Exercise)  bilateral;flexion;extension;aBduction;aDduction;sitting;2 lb free weight  -     Row Name 01/07/21 1300          Elbow/Forearm (Therapeutic Exercise)    Elbow/Forearm (Therapeutic Exercise)  AROM (active range of motion);strengthening exercise  -     Elbow/Forearm Strengthening (Therapeutic Exercise)  bilateral;flexion;extension;supination;pronation;sitting;2 lb free weight  -     Row Name 01/07/21 1300          Wrist (Therapeutic Exercise)    Wrist (Therapeutic Exercise)  AROM (active range of motion);strengthening exercise  -     Wrist Strengthening (Therapeutic Exercise)  2 lb free weight;bilateral;flexion;extension x15 reps  -     Row Name 01/07/21 1300          Bathing    [place] Level (Bathing)  lower body;upper body;upper extremities;chest/trunk;distal lower extremities/feet;proximal lower extremities;perineal area;minimum assist (75% patient effort)  -     Assistive Device (Bathing)   shower chair;hand held shower spray hose;grab bar/tub rail  -     Position (Bathing)  supported sitting;supported standing  -     Row Name 01/07/21 1300          Upper Body Dressing    [place] Level (Upper Body Dressing)  doff;don;pull over garment;set up assistance  -BL     Position (Upper Body Dressing)  supported sitting  -BL     Row Name 01/07/21 1300          Lower Body Dressing    [place] Level (Lower Body Dressing)  doff;don;pants/bottoms;socks;shoes/slippers;minimum assist (75% patient effort);moderate assist (50% patient effort)  -BL     Position (Lower Body Dressing)  supported sitting;supported standing  -BL     Row Name 01/07/21 1300          Grooming    [place] Level (Grooming)  oral care regimen;wash face, hands;hair care, combing/brushing;deodorant application;set up  -     Position (Grooming)  supported sitting  -     Row Name 01/07/21 1300          Positioning and Restraints    In Wheelchair  sitting;with nsg pt sitting in wheelchair for am/pm treatment sessions  -       User Key  (r) = Recorded By, (t) = Taken By, (c) = Cosigned By    Initials Name Effective Dates     Juany Ventura OT 01/05/21 -            Occupational Therapy Education                 Title: PT OT SLP Therapies (In Progress)     Topic: Occupational Therapy (In Progress)     Point: ADL training (Done)     Description:   Instruct learner(s) on proper safety adaptation and remediation techniques during self care or transfers.   Instruct in proper use of assistive devices.              Learning Progress Summary           Patient Acceptance, E,D, VU,NR by AF at 12/30/2020 1426    Comment: educated on LH sponge and LH reacher with LB dressing tasks will require further edcuation on process    Acceptance, E, VU,NR by AF at 12/28/2020 1418    Comment: educated on safety with ADLs and transfers                   Point: Home exercise program (Not Started)     Description:   Instruct learner(s) on appropriate  technique for monitoring, assisting and/or progressing therapeutic exercises/activities.              Learner Progress:  Not documented in this visit.          Point: Precautions (Not Started)     Description:   Instruct learner(s) on prescribed precautions during self-care and functional transfers.              Learner Progress:  Not documented in this visit.          Point: Body mechanics (Not Started)     Description:   Instruct learner(s) on proper positioning and spine alignment during self-care, functional mobility activities and/or exercises.              Learner Progress:  Not documented in this visit.                      User Key     Initials Effective Dates Name Provider Type Discipline    AF 04/14/20 -  Misa Oneal OTJOSSIE Occupational Therapist OT                    OT Recommendation and Plan                         Time Calculation:     Time Calculation- OT     Row Name 01/07/21 1342 01/07/21 1341          Time Calculation- OT    OT Start Time  1100  -BL  0800  -BL     OT Stop Time  1130  -BL  0900  -BL     OT Time Calculation (min)  30 min  -BL  60 min  -BL     Total Timed Code Minutes- OT  30 minute(s)  -BL  60 minute(s)  -BL     OT Received On  --  01/07/21  -     OT - Next Appointment  --  01/08/21  -       User Key  (r) = Recorded By, (t) = Taken By, (c) = Cosigned By    Initials Name Provider Type     Juany Ventura OT Occupational Therapist        Therapy Charges for Today     Code Description Service Date Service Provider Modifiers Qty    06225698292 HC OT THERAPEUTIC ACT EA 15 MIN 1/7/2021 Juany Ventura OT GO 2    94047652474 HC OT SELF CARE/MGMT/TRAIN EA 15 MIN 1/7/2021 Juany Ventura OT GO 2    51529411230 HC OT THER PROC EA 15 MIN 1/7/2021 Juany Ventura OT GO 2                   Jauny Ventura OT  1/7/2021

## 2021-01-07 NOTE — PROGRESS NOTES
Inpatient Rehabilitation Plan of Care Note    Plan of Care  Care Plan Reviewed - Updates as Follows    Body Systems    [RN] Integumentary(Active)  Current Status(01/07/2021): Lt. hip incision, Lt. hip blister, & Crack in skin  at coccyx & Lt. groin area.  Weekly Goal(01/12/2021): Teach family/patient dressing changes. Incision  healing, no infection.  Discharge Goal: Incision, blister, & cracked areas healed.    Performed Intervention(s)  Daily skin inspections, wound care, assist with repositioning, & dressing  changes as ordered.      Pain    [RN] Pain Management(Active)  Current Status(01/07/2021): Pain related to Lt. hip surgery  Weekly Goal(01/13/2021): Able to tolerate therapies. Pain level < 5 on 1-10 pain  scale.  Discharge Goal: Pain resolved or under control.    Performed Intervention(s)  Pain medication as ordered, distraction, & repositioning assistance as needed.      Psychosocial    [RN] Coping/Adjustment(Active)  Current Status(01/07/2021): Expresses appropriate coping  Weekly Goal(01/12/2021): Allow opportunity to express concerns regarding current  status.  Discharge Goal: Adequate coping regarding life changes with ongoing support.    Performed Intervention(s)  Verbalizes needs & concerns      Safety    [RN] Potential for Injury(Active)  Current Status(01/07/2021): Uses call light appropriately. No unsafe behaviors,  but history of falls.  Weekly Goal(01/13/2021): Instruct family/patient regarding safety precautions &  need for close supervision.  Discharge Goal: No falls, no injuries. Patient/family will be aware of risk of  fall & safety in the home setting.    Performed Intervention(s)  Items within reach (call bell, etc), safety rounds, falls precautions, &  bed/chair alarms.      Sphincter Control    [RN] Bladder & bowel management(Active)  Current Status(01/07/2021): Bladder & bowel  continent 100%  Weekly Goal(01/12/2021): B&B Continent 100%  Discharge Goal: B&B Remains 100%  continent    Performed Intervention(s)  Monitor intake, output, & BM's, Encourage appropriate diet & fluids, & perineal  care.    Signed by: Beronica Spears RN

## 2021-01-08 LAB
ALBUMIN SERPL-MCNC: 3.6 G/DL (ref 3.5–5.2)
ALBUMIN/GLOB SERPL: 1.2 G/DL
ALP SERPL-CCNC: 66 U/L (ref 39–117)
ALT SERPL W P-5'-P-CCNC: 14 U/L (ref 1–33)
ANION GAP SERPL CALCULATED.3IONS-SCNC: 11.9 MMOL/L (ref 5–15)
AST SERPL-CCNC: 27 U/L (ref 1–32)
BILIRUB SERPL-MCNC: 0.5 MG/DL (ref 0–1.2)
BUN SERPL-MCNC: 24 MG/DL (ref 8–23)
BUN/CREAT SERPL: 13.3 (ref 7–25)
CALCIUM SPEC-SCNC: 7.5 MG/DL (ref 8.6–10.5)
CHLORIDE SERPL-SCNC: 97 MMOL/L (ref 98–107)
CO2 SERPL-SCNC: 29.1 MMOL/L (ref 22–29)
CREAT SERPL-MCNC: 1.81 MG/DL (ref 0.57–1)
GFR SERPL CREATININE-BSD FRML MDRD: 27 ML/MIN/1.73
GLOBULIN UR ELPH-MCNC: 3 GM/DL
GLUCOSE BLDC GLUCOMTR-MCNC: 123 MG/DL (ref 70–130)
GLUCOSE BLDC GLUCOMTR-MCNC: 123 MG/DL (ref 70–130)
GLUCOSE BLDC GLUCOMTR-MCNC: 138 MG/DL (ref 70–130)
GLUCOSE BLDC GLUCOMTR-MCNC: 166 MG/DL (ref 70–130)
GLUCOSE SERPL-MCNC: 111 MG/DL (ref 65–99)
INR PPP: 2.21 (ref 0.9–1.1)
POTASSIUM SERPL-SCNC: 4.1 MMOL/L (ref 3.5–5.2)
PROT SERPL-MCNC: 6.6 G/DL (ref 6–8.5)
PROTHROMBIN TIME: 24.3 SECONDS (ref 11.7–14.2)
SODIUM SERPL-SCNC: 138 MMOL/L (ref 136–145)

## 2021-01-08 PROCEDURE — 97110 THERAPEUTIC EXERCISES: CPT

## 2021-01-08 PROCEDURE — 97535 SELF CARE MNGMENT TRAINING: CPT

## 2021-01-08 PROCEDURE — 85610 PROTHROMBIN TIME: CPT | Performed by: PHYSICAL MEDICINE & REHABILITATION

## 2021-01-08 PROCEDURE — 82962 GLUCOSE BLOOD TEST: CPT

## 2021-01-08 PROCEDURE — 80053 COMPREHEN METABOLIC PANEL: CPT | Performed by: INTERNAL MEDICINE

## 2021-01-08 PROCEDURE — 97130 THER IVNTJ EA ADDL 15 MIN: CPT

## 2021-01-08 PROCEDURE — 97129 THER IVNTJ 1ST 15 MIN: CPT

## 2021-01-08 RX ORDER — TORSEMIDE 20 MG/1
40 TABLET ORAL DAILY
Status: DISCONTINUED | OUTPATIENT
Start: 2021-01-08 | End: 2021-01-12 | Stop reason: HOSPADM

## 2021-01-08 RX ADMIN — ATORVASTATIN CALCIUM 20 MG: 20 TABLET, FILM COATED ORAL at 21:36

## 2021-01-08 RX ADMIN — HYDROCODONE BITARTRATE AND ACETAMINOPHEN 1 TABLET: 7.5; 325 TABLET ORAL at 21:36

## 2021-01-08 RX ADMIN — Medication 3 MG: at 21:36

## 2021-01-08 RX ADMIN — CALCIUM CARBONATE 1 TABLET: 500 TABLET, CHEWABLE ORAL at 11:35

## 2021-01-08 RX ADMIN — MICONAZOLE NITRATE: 2 OINTMENT TOPICAL at 20:00

## 2021-01-08 RX ADMIN — POLYETHYLENE GLYCOL 3350 17 G: 17 POWDER, FOR SOLUTION ORAL at 08:11

## 2021-01-08 RX ADMIN — CALCIUM CARBONATE 1 TABLET: 500 TABLET, CHEWABLE ORAL at 16:26

## 2021-01-08 RX ADMIN — HYDROCODONE BITARTRATE AND ACETAMINOPHEN 1 TABLET: 7.5; 325 TABLET ORAL at 08:12

## 2021-01-08 RX ADMIN — FOLIC ACID 1 MG: 1 TABLET ORAL at 08:13

## 2021-01-08 RX ADMIN — DOCUSATE SODIUM 50MG AND SENNOSIDES 8.6MG 2 TABLET: 8.6; 5 TABLET, FILM COATED ORAL at 21:36

## 2021-01-08 RX ADMIN — MICONAZOLE NITRATE: 2 OINTMENT TOPICAL at 08:13

## 2021-01-08 RX ADMIN — TORSEMIDE 40 MG: 20 TABLET ORAL at 08:14

## 2021-01-08 RX ADMIN — LEVOTHYROXINE SODIUM 100 MCG: 0.1 TABLET ORAL at 06:08

## 2021-01-08 RX ADMIN — HYDROCODONE BITARTRATE AND ACETAMINOPHEN 1 TABLET: 7.5; 325 TABLET ORAL at 16:26

## 2021-01-08 RX ADMIN — SERTRALINE HYDROCHLORIDE 50 MG: 50 TABLET, FILM COATED ORAL at 08:12

## 2021-01-08 RX ADMIN — ALLOPURINOL 100 MG: 100 TABLET ORAL at 08:12

## 2021-01-08 RX ADMIN — HYDROCODONE BITARTRATE AND ACETAMINOPHEN 1 TABLET: 7.5; 325 TABLET ORAL at 01:49

## 2021-01-08 RX ADMIN — WARFARIN 5 MG: 5 TABLET ORAL at 15:54

## 2021-01-08 RX ADMIN — AMLODIPINE BESYLATE 5 MG: 5 TABLET ORAL at 08:12

## 2021-01-08 RX ADMIN — Medication 2000 UNITS: at 08:12

## 2021-01-08 RX ADMIN — HYDROCODONE BITARTRATE AND ACETAMINOPHEN 1 TABLET: 7.5; 325 TABLET ORAL at 12:23

## 2021-01-08 RX ADMIN — TRAZODONE HYDROCHLORIDE 25 MG: 50 TABLET ORAL at 21:36

## 2021-01-08 NOTE — PROGRESS NOTES
Pharmacy Consult: Warfarin Dosing/ Monitoring    Alice Maxwell is a 75 y.o. female, estimated creatinine clearance is 33.8 mL/min (A) (by C-G formula based on SCr of 1.81 mg/dL (H)). weighing 113 kg (249 lb 12.8 oz).     has a past medical history of Abnormal Pap smear of cervix, Arthritis, Cancer (CMS/HCC), Disease of thyroid gland, Osteoporosis, Stroke (CMS/HCC), Stroke (CMS/HCC), and Urinary tract infection.    Social History     Tobacco Use    Smoking status: Never Smoker   Substance Use Topics    Alcohol use: No    Drug use: No     Results from last 7 days   Lab Units 01/08/21  0614 01/07/21  0636 01/06/21  0850 01/05/21  0846 01/04/21  0721 01/03/21  0647 01/02/21  1012   INR  2.21* 2.05* 1.84* 2.09* 2.45* 3.11* 3.05*   HEMOGLOBIN g/dL  --  8.3*  --   --  8.8*  --   --    HEMATOCRIT %  --  27.7*  --   --  28.5*  --   --    PLATELETS 10*3/mm3  --  340  --   --  343  --   --      Results from last 7 days   Lab Units 01/08/21  0614 01/07/21  0636 01/04/21  0721   SODIUM mmol/L 138 138 140   POTASSIUM mmol/L 4.1 4.1 4.8   CHLORIDE mmol/L 97* 98 104   CO2 mmol/L 29.1* 28.9 23.5   BUN mg/dL 24* 22 21   CREATININE mg/dL 1.81* 1.70* 1.68*   CALCIUM mg/dL 7.5* 7.6* 7.7*   BILIRUBIN mg/dL 0.5 0.5 0.5   ALK PHOS U/L 66 65 69   ALT (SGPT) U/L 14 15 18   AST (SGOT) U/L 27 27 29   GLUCOSE mg/dL 111* 97 104*     Anticoagulation history: Warfarin 6mg daily (home dose). Warfarin managed by [facility] for hx of Factor V Leiden.    Hospital Anticoagulation:  Consulting provider: Aric Chao MD  Start date: 12/27/20  Indication: Post Surgical - Hip, hypercoagulable disorder and hx of CVA  Target INR: 2-3  Expected duration: indefinite   Bridge Therapy: No                  Date 12/27 12/28 12/29 12/30 12/31 1/1 1/2 1/3 1/4 1/5 1/6 1/7 1/8      INR 1.74 2.04 2.39 2.73 3.44 2.85 3.05 3.11 2.45 2.09 1.84 2.05 2.21      Warfarin dose 6mg 6mg 6mg 5mg HOLD 4mg 2mg 1mg 3mg 4mg 5mg 5mg 5mg        Potential drug  interactions:   Allopurinol - may enhance the anticoagulant effect of warfarin (home med)  Levothyroxine- may enhance the anticoagulant effect of warfarin (home med)  Norco - Patients taking acetaminophen at higher doses and/or for longer duration may be at greater risk for experiencing a clinically significant interaction (not listed on med rec)   Trazodone- May diminish the anticoagulant effect of warfarin. (not listed on med rec)    Relevant nutrition status: regular diet; eating better per RN notes    Education complete?/ Date: Home medication    Assessment/Plan:  -INR remains therapeutic at 2.21  -Will continue dose of 5mg daily, but will continue to monitor closely to make sure INR does not rise quickly again as it did with 6mg  -If INR continues to rise as it did previously over next couple days, would recommend decreasing dose to 4mg daily.  If INR remains stable, can consider decreasing frequency of INR checks  -Continue to follow INR daily for now due to recent dose adjustments    Pharmacy will continue to follow until discharge or discontinuation of warfarin.     Mando Wright, PharmD  01/08/21 07:51 EST

## 2021-01-08 NOTE — PROGRESS NOTES
NEPHROLOGY PROGRESS NOTE    PATIENT IDENTIFICATION:   Name:  Alice Maxwell      MRN:  [id number]     75 y.o.  female             Reason for visit: Tanya    SUBJECTIVE:   Seen and examined.  No shortness of air or chest pain.  Laying down in bed.  Complaining of neuropathy.  Nausea is better    OBJECTIVE:  Vitals:    01/07/21 0700 01/07/21 1208 01/07/21 2103 01/08/21 0536   BP: 120/57 154/67 118/60 136/69   BP Location: Right arm Right arm Right arm Right arm   Patient Position: Sitting Lying Lying Lying   Pulse: 75 84 84 87   Resp:  18 16 18   Temp:  98.1 °F (36.7 °C) 98.4 °F (36.9 °C) 98 °F (36.7 °C)   TempSrc:  Oral Oral Skin   SpO2: 99% 99% 96% 97%   Weight:       Height:               Body mass index is 39.12 kg/m².    Intake/Output Summary (Last 24 hours) at 1/8/2021 0746  Last data filed at 1/8/2021 0500  Gross per 24 hour   Intake 740 ml   Output --   Net 740 ml         Exam:  GEN:  No distress, appears stated age  EYES:   Anicteric sclera  ENT:    External ears/nose normal, MM are moist  NECK:  No adenopathy, JVP none  LUNGS: Normal chest on inspection; not labored  CV:  Normal S1S2, without murmur  ABD:  Non-tender, non-distended, no hepatosplenomegaly, +BS  EXT:  No edema; no cyanosis; clubbing    Scheduled meds:  allopurinol, 100 mg, Oral, Daily  amLODIPine, 5 mg, Oral, Q24H  atorvastatin, 20 mg, Oral, Nightly  cholecalciferol, 2,000 Units, Oral, Daily  folic acid, 1 mg, Oral, Daily  levothyroxine, 100 mcg, Oral, Q AM  miconazole nitrate, , Topical, Q12H  polyethylene glycol, 17 g, Oral, Daily  senna-docusate sodium, 2 tablet, Oral, Nightly  sertraline, 50 mg, Oral, Daily  torsemide, 80 mg, Oral, Daily  traZODone, 25 mg, Oral, Nightly  warfarin, 5 mg, Oral, Daily With Dinner      IV meds:                      Pharmacy to dose warfarin,         Data Review:    Results from last 7 days   Lab Units 01/08/21  0614 01/07/21  0636 01/04/21  0721   SODIUM mmol/L 138 138 140   POTASSIUM mmol/L 4.1 4.1 4.8    CHLORIDE mmol/L 97* 98 104   CO2 mmol/L 29.1* 28.9 23.5   BUN mg/dL 24* 22 21   CREATININE mg/dL 1.81* 1.70* 1.68*   CALCIUM mg/dL 7.5* 7.6* 7.7*   BILIRUBIN mg/dL 0.5 0.5 0.5   ALK PHOS U/L 66 65 69   ALT (SGPT) U/L 14 15 18   AST (SGOT) U/L 27 27 29   GLUCOSE mg/dL 111* 97 104*       Estimated Creatinine Clearance: 33.8 mL/min (A) (by C-G formula based on SCr of 1.81 mg/dL (H)).    Results from last 7 days   Lab Units 01/04/21  0721   PHOSPHORUS mg/dL 4.3       Results from last 7 days   Lab Units 01/07/21  0636 01/04/21  0721   WBC 10*3/mm3 6.14 7.35   HEMOGLOBIN g/dL 8.3* 8.8*   PLATELETS 10*3/mm3 340 343       Results from last 7 days   Lab Units 01/08/21  0614 01/07/21  0636 01/06/21  0850 01/05/21  0846 01/04/21  0721   INR  2.21* 2.05* 1.84* 2.09* 2.45*             ASSESSMENT:     S/p left hip fracture      Non olig ELDER - ATN related to vancomycin nephrotoxicity suspected at [facility] per DC summary; peak Cr 2.5 and today is 1.8     HTN - BP acceptable  Left femur fracture s/p hemiarthroplasty 12/22/20   FVL def - on coumadin  Dyslipidemia, on statin   Hypothyroidism, on synthroid   Gout, on allopurinol low dose 100mg; no acute flair   Anemia - hgb 8.8 post op      Plan    Kidney function is stable.  We will cut down torsemide to 40 mg p.o. daily otherwise continue current management  Surveillance lab    Dodie Girard MD  1/8/2021    07:46 EST

## 2021-01-08 NOTE — THERAPY TREATMENT NOTE
Inpatient Rehabilitation - Speech Language Pathology Treatment Note    [facility]     Patient Name: Alice Maxwell  : 1945  MRN: [id number]    Today's Date: 2021                   Admit Date: 2020       Visit Dx:      ICD-10-CM ICD-9-CM   1. Heartburn  R12 787.1       Patient Active Problem List   Diagnosis   • Stroke (CMS/HCC)   • Personal history of breast cancer   • History of loop electrical excision procedure (LEEP)   • History of abnormal cervical Pap smear   • S/p left hip fracture       Past Medical History:   Diagnosis Date   • Abnormal Pap smear of cervix    • Arthritis    • Cancer (CMS/HCC)     breast   • Disease of thyroid gland    • Osteoporosis    • Stroke (CMS/HCC)    • Stroke (CMS/HCC)     x2   • Urinary tract infection        Past Surgical History:   Procedure Laterality Date   • BACK SURGERY     • BREAST LUMPECTOMY     • CERVICAL BIOPSY  W/ LOOP ELECTRODE EXCISION     • CHOLECYSTECTOMY     • COLONOSCOPY      had polyps    • FOOT SURGERY     • GASTRIC BYPASS     • HERNIA REPAIR     • THYROIDECTOMY       Patient wore face mask during this therapy encounter, though frequent cues needed to wear properly. Therapist used appropriate personal protective equipment including mask, eye protection, and gloves.  Mask used was standard procedure mask. Appropriate PPE was worn during the entire therapy session. Hand hygiene was completed before and after therapy session. Patient is not in enhanced droplet precautions.       SLP EVALUATION (last 72 hours)      SLP SLC Evaluation     Row Name 21 1401 21 1101 21 1330 21 0900 21 1338       Communication Assessment/Intervention    Document Type  therapy note (daily note)  -LN  therapy note (daily note)  -LN  therapy note (daily note)  -SL  therapy note (daily note)  -SL  therapy note (daily note)  -SL    Subjective Information  no complaints  -LN  no complaints  -LN  complains of;fatigue  -SL  complains  of;fatigue mulitple complaints  -SL  no complaints  -SL    Patient Observations  alert;cooperative;agree to therapy  -LN  alert;cooperative;agree to therapy  -LN  agree to therapy  -SL  alert  -SL  alert;cooperative  -SL    Patient/Family/Caregiver Comments/Observations  Upright in w/c upon arrival. Patient seen for therapy at bedside.  -LN  Sitting upright in w/c. Patient seen at bedside for tx.  -LN  --  --  --    Patient Effort  good  -LN  good  -LN  adequate  -SL  adequate  -SL  adequate  -SL    Symptoms Noted During/After Treatment  none  -LN  none  -LN  fatigue  -SL  fatigue  -SL  none  -SL    Row Name 01/06/21 0900                   Communication Assessment/Intervention    Document Type  therapy note (daily note)  -SL        Subjective Information  complains of;pain;nausea/vomiting  -SL        Patient Observations  alert  -SL        Patient/Family/Caregiver Comments/Observations  needs encouragement to participate  -SL        Patient Effort  adequate  -SL        Symptoms Noted During/After Treatment  fatigue  -SL          User Key  (r) = Recorded By, (t) = Taken By, (c) = Cosigned By    Initials Name Effective Dates    SL Gifty Rogers, MS CCC-SLP 06/08/18 -     Roxy Valles 12/17/19 -              EDUCATION    The patient has been educated in the following areas:       Cognitive Impairment.      SLP Recommendation and Plan                                                  SLP GOALS     Row Name 01/08/21 1101 01/07/21 1330 01/07/21 0900       Attention Goal 1 (SLP)    Improve Attention by Goal 1 (SLP)  complete divided attention task;90%;independently (over 90% accuracy)  -LN  --  --    Time Frame (Attention Goal 1, SLP)  by discharge  -LN  --  --    Barriers (Attention Goal 1, SLP)  Continued with reduced attention to detail when reading directions, warranting increased initial cues. Then cues only required when patient's sustained attention was impacted by an individual knocking on her door.   -LN  --   --    Progress (Attention Goal 1, SLP)  60%;independently (over 90% accuracy);70%;with minimal cues (75-90%);100%;with moderate cues (50-74%)  -LN  --  --    Progress/Outcomes (Attention Goal 1, SLP)  goal ongoing  -LN  --  --    Comment (Attention Goal 1, SLP)  Patient reports some deficits with divided attention at baseline.   -LN  --  --       Memory Skills Goal 1 (SLP)    Improve Memory Skills Through Goal 1 (SLP)  recall details of the day;use memory strategies;use written schedule;listen to a paragraph and answer questions;visual memory task;read a paragraph and answer questions;select a word from a list by exclusion;recalling unrelated word lists with an imposed delay;recalling related word lists with an imposed delay;90%  -LN  --  --    Time Frame (Memory Skills Goal 1, SLP)  by discharge  -LN  --  --    Progress (Memory Skills Goal 1, SLP)  --  -LN  --  60%;independently (over 90% accuracy) recall of picture scenes  -SL    Progress/Outcomes (Memory Skills Goal 1, SLP)  goal ongoing  -LN  --  goal ongoing  -SL    Comment (Memory Skills Goal 1, SLP)  Immediate memory of paragraph: 60% with no cues and 80% with min cues.  -LN  --  --       Organizational Skills Goal 1 (SLP)    Improve Thought Organization Through Goal 1 (SLP)  completing mental manipulation task;naming similarities and differences;completing a convergent naming task;completing a verbal sequencing task;90%;independently (over 90% accuracy)  -LN  --  --    Time Frame (Thought Organization Skills Goal 1, SLP)  by discharge  -LN  --  --    Progress (Thought Organization Skills Goal 1, SLP)  --  --  80%;independently (over 90% accuracy);with minimal cues (75-90%) concrete category differentiation- 5 items  -SL    Progress/Outcomes (Thought Organization Skills Goal 1, SLP)  goal ongoing  -LN  --  goal ongoing  -SL    Comment (Thought Organization Skills Goal 1, SLP)  Category exclusion: 80% with no cues  -LN  --  --       Reasoning Goal 1 (SLP)     Improve Reasoning Through Goal 1 (SLP)  complete logic/creative thinking task;complete mental flexibility task;complete basic reasoning task;complete deductive reasoning task;90%;independently (over 90% accuracy)  -LN  --  --    Time Frame (Reasoning Goal 1, SLP)  by discharge  -LN  --  --    Progress (Reasoning Goal 1, SLP)  20%;independently (over 90% accuracy);80%;with minimal cues (75-90%)  -LN  80%;with minimal cues (75-90%);independently (over 90% accuracy) simple deduction by exclusion task  -SL  --    Progress/Outcomes (Reasoning Goal 1, SLP)  goal ongoing  -LN  goal ongoing  -SL  --    Comment (Reasoning Goal 1, SLP)  Mental flexibility (homonyms- three meanings)  -LN  extra time required to complete task  -SL  --       Functional Problem Solving Skills Goal 1 (SLP)    Progress (Problem Solving Goal 1, SLP)  --  --  60%;70%;independently (over 90% accuracy) answering ?'s re: catalog order page  -SL    Progress/Outcomes (Problem Solving Goal 1, SLP)  --  --  goal ongoing  -SL       Functional Math Skills Goal 1 (SLP)    Improve Functional Math Skills Through Goal 1 (SLP)  complete word problems involving time;complete word problems involving money;90%;independently (over 90% accuracy)  -LN  --  --    Time Frame (Functional Math Skills Goal 1, SLP)  by discharge  -LN  --  --    Barriers (Functional Math Skills Goal 1, SLP)  Calculation errors noted,  -LN   slow processing, impaired attn to details and calculation errors aslo noted  -SL  --    Progress (Functional Math Skills Goal 1, SLP)  60%;independently (over 90% accuracy);100%;with minimal cues (75-90%)  -LN  40%;independently (over 90% accuracy) simple math word problems  -SL  20%;independently (over 90% accuracy) determining denomination amounts  -SL    Progress/Outcomes (Functional Math Skills Goal 1, SLP)  goal ongoing  -LN  goal ongoing  -SL  goal ongoing  -    Comment (Functional Math Skills Goal 1, SLP)  Money word problems with visuals  -LN  --   --    Row Name 01/06/21 1300 01/06/21 0900          Attention Goal 1 (SLP)    Barriers (Attention Goal 1, SLP)  --  decreased attn to details in directives  -SL     Progress (Attention Goal 1, SLP)  --  50%;60%;independently (over 90% accuracy) word altering task  -SL     Progress/Outcomes (Attention Goal 1, SLP)  --  goal ongoing  -SL        Memory Skills Goal 1 (SLP)    Progress (Memory Skills Goal 1, SLP)  70%;80%;independently (over 90% accuracy) 4 item category inclusion task  -SL  60%;70%;independently (over 90% accuracy) visual recall- 12 pictured items- grouping strategy  -SL     Progress/Outcomes (Memory Skills Goal 1, SLP)  goal ongoing  -SL  goal ongoing  -SL     Comment (Memory Skills Goal 1, SLP)  immed recall- boxed info- 5 numbers w/spatial restrictions- 50%  -SL  --        Reasoning Goal 1 (SLP)    Progress (Reasoning Goal 1, SLP)  60%;with minimal cues (75-90%);independently (over 90% accuracy) word deductions  -SL  --     Progress/Outcomes (Reasoning Goal 1, SLP)  goal ongoing  -SL  --        Functional Problem Solving Skills Goal 1 (SLP)    Progress (Problem Solving Goal 1, SLP)  40%;independently (over 90% accuracy) multistep directives- hospital map  -SL  --     Progress/Outcomes (Problem Solving Goal 1, SLP)  goal ongoing  -SL  --     Comment (Problem Solving Goal 1, SLP)  planning task - closet organization - based on multiple sentence clues- 50%  -SL  --        Functional Math Skills Goal 1 (SLP)    Barriers (Functional Math Skills Goal 1, SLP)  --  decreased analysis/reasoning noted for chart of info re: video charges; difficulty noted for understanding transition from one question to the next  -SL     Progress (Functional Math Skills Goal 1, SLP)  --  40%;independently (over 90% accuracy) video rental task/simple calculations  -SL     Progress/Outcomes (Functional Math Skills Goal 1, SLP)  --  goal ongoing  -SL     Comment (Functional Math Skills Goal 1, SLP)  --  more confusion re: details  in questions than actual math additions  -       User Key  (r) = Recorded By, (t) = Taken By, (c) = Cosigned By    Initials Name Provider Type    Gifty Martin, MS CCC-SLP Speech and Language Pathologist    Roxy Valles Speech and Language Pathologist                      Time Calculation:       Time Calculation- SLP     Row Name 01/08/21 1443 01/08/21 1442          Time Calculation- SLP    SLP Start Time  1400  -LN  1100  -LN     SLP Stop Time  1430  -LN  1130  -LN     SLP Time Calculation (min)  30 min  -LN  30 min  -LN     SLP Received On  01/08/21  -LN  01/08/21  -LN       User Key  (r) = Recorded By, (t) = Taken By, (c) = Cosigned By    Initials Name Provider Type    Roxy Valles Speech and Language Pathologist            Therapy Charges for Today     Code Description Service Date Service Provider Modifiers Qty    28249405992 HC ST DEV OF COGN SKILLS INITIAL 15 MIN 1/8/2021 Roxy Conte  1    86167079162 HC ST DEV OF COGN SKILLS EACH ADDT'L 15 MIN 1/8/2021 Roxy Conte  3                           Roxy Conte  1/8/2021

## 2021-01-08 NOTE — PLAN OF CARE
Goal Outcome Evaluation:  Plan of Care Reviewed With: patient  Progress: improving  Outcome Summary: Pain med for L hip pain. C/o numbness both feet. Ambulates CGA with walker to BR or assist 1 to bsc with walker. Using call light. Meds with water.Dressings intact to L hip incision and blister. L groin slit open areas EDITH, using Miconazole ointment.

## 2021-01-08 NOTE — PLAN OF CARE
Goal Outcome Evaluation:  Plan of Care Reviewed With: patient  Progress: improving  Outcome Summary: AAO x 4. Flat affect, but very demanding. Pain meds q 4 hrs. to remove every other staple this afternoon after therapy. Meds with water.

## 2021-01-08 NOTE — PROGRESS NOTES
Inpatient Rehabilitation Plan of Care Note    Plan of Care  Care Plan Reviewed - No updates at this time.    Psychosocial    Performed Intervention(s)  Verbalizes needs & concerns      Safety    Performed Intervention(s)  Items within reach (call bell, etc), safety rounds, falls precautions, &  bed/chair alarms.      Pain    Performed Intervention(s)  Pain medication as ordered, distraction, & repositioning assistance as needed.      Body Systems    Performed Intervention(s)  Daily skin inspections, wound care, assist with repositioning, & dressing  changes as ordered.    Signed by: Daniel Horne RN

## 2021-01-08 NOTE — THERAPY TREATMENT NOTE
Inpatient Rehabilitation - Occupational Therapy Treatment Note    [facility]     Patient Name: Alice Maxwell  : 1945  MRN: [id number]    Today's Date: 2021                 Admit Date: 2020         ICD-10-CM ICD-9-CM   1. Heartburn  R12 787.1       Patient Active Problem List   Diagnosis   • Stroke (CMS/HCC)   • Personal history of breast cancer   • History of loop electrical excision procedure (LEEP)   • History of abnormal cervical Pap smear   • S/p left hip fracture       Past Medical History:   Diagnosis Date   • Abnormal Pap smear of cervix    • Arthritis    • Cancer (CMS/HCC)     breast   • Disease of thyroid gland    • Osteoporosis    • Stroke (CMS/HCC)    • Stroke (CMS/HCC)     x2   • Urinary tract infection        Past Surgical History:   Procedure Laterality Date   • BACK SURGERY     • BREAST LUMPECTOMY     • CERVICAL BIOPSY  W/ LOOP ELECTRODE EXCISION     • CHOLECYSTECTOMY     • COLONOSCOPY      had polyps    • FOOT SURGERY     • GASTRIC BYPASS     • HERNIA REPAIR     • THYROIDECTOMY                  IRF OT ASSESSMENT FLOWSHEET (last 12 hours)      IRF OT Evaluation and Treatment     Row Name 21 1300 21 08       OT Time and Intention    Document Type  daily treatment  -BT  daily treatment  -BT    Mode of Treatment  occupational therapy  -BT  occupational therapy  -BT    Patient Effort  good  -BT  good  -BT    Row Name 21 1300 21 0800       General Information    Patient/Family/Caregiver Comments/Observations  Pt sitting up in wheelchair upon arrival.   -BT  --    General Observations of Patient  --  pt sitting up in wheelchair complaining about her breakfast not being correct   -BT    Existing Precautions/Restrictions  --  fall;left;hip, anterior  -BT    Row Name 21 1300 21 0800       Pain Scale: Numbers Pre/Post-Treatment    Pretreatment Pain Rating  0/10 - no pain  -BT  8/10  -BT    Posttreatment Pain Rating  0/10 - no pain  -BT  8/10  -BT     Pain Location - Side  --  Left  -BT    Pain Location - Orientation  --  incisional  -BT    Pain Location  --  hip  -BT    Pre/Posttreatment Pain Comment  --  rn gave pt pain medication   -BT    Row Name 01/08/21 1300          Mobility    Extremity Weight-bearing Status  left lower extremity  -BT     Left Lower Extremity (Weight-bearing Status)  weight-bearing as tolerated (WBAT)  -BT     Row Name 01/08/21 1300 01/08/21 0800       Bed Mobility    Comment (Bed Mobility)  pt up in chair upon arrival   -BT  pt up in chair on this date.   -BT    Row Name 01/08/21 0800          Transfer Assessment/Treatment    Transfers  sit-stand transfer;stand-sit transfer;toilet transfer;shower transfer  -BT     Row Name 01/08/21 0800          Transfers    Sit-Stand [place] (Transfers)  standby assist  -BT     Stand-Sit [place] (Transfers)  standby assist  -BT     [place] Level (Toilet Transfer)  standby assist  -BT     Assistive Device (Toilet Transfer)  walker, front-wheeled  -BT     [place] Level (Shower Transfer)  stand by assist  -BT     Assistive Device (Shower Transfer)  grab bar, tub/shower;shower chair;wheelchair  -BT     Row Name 01/08/21 0800          Sit-Stand Transfer    Assistive Device (Sit-Stand Transfers)  walker, front-wheeled  -BT     Row Name 01/08/21 0800          Stand-Sit Transfer    Assistive Device (Stand-Sit Transfers)  walker, front-wheeled  -BT     Row Name 01/08/21 0800          Toilet Transfer    Type (Toilet Transfer)  stand pivot/stand step  -BT     Row Name 01/08/21 0800          Shower Transfer    Type (Shower Transfer)  sit-stand  -BT     Row Name 01/08/21 0800          Safety Issues, Functional Mobility    Impairments Affecting Function (Mobility)  endurance/activity tolerance;pain;strength  -BT     Row Name 01/08/21 1300          Shoulder (Therapeutic Exercise)    Shoulder (Therapeutic Exercise)  strengthening exercise;AAROM (active assistive range of motion)  -BT     Shoulder  AAROM (Therapeutic Exercise)  bilateral;flexion;extension arm bike   -BT     Shoulder Strengthening (Therapeutic Exercise)  bilateral;flexion;extension;aBduction;aDduction;resistance band;yellow  -BT     Row Name 01/08/21 1300          Elbow/Forearm (Therapeutic Exercise)    Elbow/Forearm (Therapeutic Exercise)  strengthening exercise  -BT     Elbow/Forearm Strengthening (Therapeutic Exercise)  bilateral;flexion;extension;resistance tubing;yellow  -BT     Row Name 01/08/21 0800          Bathing    [place] Level (Bathing)  lower body;upper body;upper extremities;chest/trunk;distal lower extremities/feet;proximal lower extremities;perineal area;standby assist;set up  -BT     Assistive Device (Bathing)  grab bar/tub rail;hand held shower spray hose;long-handled sponge  -BT     Position (Bathing)  supported sitting;supported standing  -BT     Row Name 01/08/21 0800          Upper Body Dressing    [place] Level (Upper Body Dressing)  upper body dressing skills;set up assistance  -BT     Position (Upper Body Dressing)  edge of bed sitting  -BT     Row Name 01/08/21 0800          Lower Body Dressing    [place] Level (Lower Body Dressing)  doff;don;pants/bottoms;socks;shoes/slippers;minimum assist (75% patient effort);moderate assist (50% patient effort)  -BT     Assistive Device Use (Lower Body Dressing)  sock-aid;reacher  -BT     Position (Lower Body Dressing)  edge of bed sitting  -BT     Row Name 01/08/21 0800          Grooming    [place] Level (Grooming)  oral care regimen;wash face, hands;hair care, combing/brushing;deodorant application;set up  -BT     Position (Grooming)  supported sitting  -BT     Row Name 01/08/21 0800          Toileting    [place] Level (Toileting)  toileting skills;not tested  -BT     Comment (Toileting)  pt did not need to use bathroom on this am  -BT     Row Name 01/08/21 1300 01/08/21 0800       Positioning and Restraints    Pre-Treatment Position  sitting in  chair/recliner  -BT  sitting in chair/recliner  -BT    Post Treatment Position  wheelchair  -BT  wheelchair  -BT    In Wheelchair  sitting;call light within reach;encouraged to call for assist;exit alarm on  -BT  notified nsg;sitting;call light within reach;encouraged to call for assist;exit alarm on  -BT      User Key  (r) = Recorded By, (t) = Taken By, (c) = Cosigned By    Initials Name Effective Dates    Zenaida Pina OT 11/16/20 -            Occupational Therapy Education                 Title: PT OT SLP Therapies (In Progress)     Topic: Occupational Therapy (In Progress)     Point: ADL training (Done)     Description:   Instruct learner(s) on proper safety adaptation and remediation techniques during self care or transfers.   Instruct in proper use of assistive devices.              Learning Progress Summary           Patient Acceptance, E,D, VU,NR by AF at 12/30/2020 1426    Comment: educated on LH sponge and LH reacher with LB dressing tasks will require further edcuation on process    Acceptance, E, VU,NR by AF at 12/28/2020 1418    Comment: educated on safety with ADLs and transfers                   Point: Home exercise program (Not Started)     Description:   Instruct learner(s) on appropriate technique for monitoring, assisting and/or progressing therapeutic exercises/activities.              Learner Progress:  Not documented in this visit.          Point: Precautions (Not Started)     Description:   Instruct learner(s) on prescribed precautions during self-care and functional transfers.              Learner Progress:  Not documented in this visit.          Point: Body mechanics (Not Started)     Description:   Instruct learner(s) on proper positioning and spine alignment during self-care, functional mobility activities and/or exercises.              Learner Progress:  Not documented in this visit.                      User Key     Initials Effective Dates Name Provider Type Discipline    AF  04/14/20 -  Misa Oneal OTR Occupational Therapist OT                    OT Recommendation and Plan                         Time Calculation:     Time Calculation- OT     Row Name 01/08/21 1415 01/08/21 1125          Time Calculation- OT    OT Start Time  1300  -BT  0800  -BT     OT Stop Time  1330  -BT  0900  -BT     OT Time Calculation (min)  30 min  -BT  60 min  -BT       User Key  (r) = Recorded By, (t) = Taken By, (c) = Cosigned By    Initials Name Provider Type    BT Zenaida Saenz OT Occupational Therapist        Therapy Charges for Today     Code Description Service Date Service Provider Modifiers Qty    18635061774 HC OT SELF CARE/MGMT/TRAIN EA 15 MIN 1/8/2021 Zenaida Saenz OT GO 4    23979327532 HC OT THER PROC EA 15 MIN 1/8/2021 Zenaida Saenz OT GO 2                   Zenaida Saenz OT  1/8/2021

## 2021-01-08 NOTE — THERAPY TREATMENT NOTE
Inpatient Rehabilitation - Physical Therapy Treatment Note       [facility]     Patient Name: Alice Maxwell  : 1945  MRN: [id number]    Today's Date: 2021                    Admit Date: 2020      Visit Dx:     ICD-10-CM ICD-9-CM   1. Heartburn  R12 787.1       Patient Active Problem List   Diagnosis   • Stroke (CMS/HCC)   • Personal history of breast cancer   • History of loop electrical excision procedure (LEEP)   • History of abnormal cervical Pap smear   • S/p left hip fracture       Past Medical History:   Diagnosis Date   • Abnormal Pap smear of cervix    • Arthritis    • Cancer (CMS/HCC)     breast   • Disease of thyroid gland    • Osteoporosis    • Stroke (CMS/HCC)    • Stroke (CMS/HCC)     x2   • Urinary tract infection        Past Surgical History:   Procedure Laterality Date   • BACK SURGERY     • BREAST LUMPECTOMY     • CERVICAL BIOPSY  W/ LOOP ELECTRODE EXCISION     • CHOLECYSTECTOMY     • COLONOSCOPY      had polyps    • FOOT SURGERY     • GASTRIC BYPASS     • HERNIA REPAIR     • THYROIDECTOMY            PT ASSESSMENT (last 12 hours)      IRF PT Evaluation and Treatment     Row Name 21 0934          PT Time and Intention    Document Type  daily treatment  -MS     Mode of Treatment  physical therapy  -MS     Patient/Family/Caregiver Comments/Observations  AM: sitting UIC, no acute distress, exit alarm on; PM: sitting up in wheelchair, exit alarm on  -MS     Total Minutes, Physical Therapy  90  -MS     Row Name 21 0934          General Information    Existing Precautions/Restrictions  fall;left;hip, anterior  -MS     Row Name 21 0934          Cognition/Psychosocial    Orientation Status (Cognition)  oriented x 4  -MS     Follows Commands (Cognition)  follows multi-step commands;repetition of directions required;verbal cues/prompting required  -MS     Personal Safety Interventions  fall prevention program maintained;gait belt;nonskid shoes/slippers when out of  bed  -MS     Row Name 01/08/21 0934          Pain Scale: Numbers Pre/Post-Treatment    Pretreatment Pain Rating  0/10 - no pain  -MS     Posttreatment Pain Rating  10/10  -MS     Pain Location - Side  Left  -MS     Pain Location - Orientation  lower  -MS     Pain Location  hip  -MS     Row Name 01/08/21 0934          Mobility    Extremity Weight-bearing Status  left lower extremity  -MS     Left Lower Extremity (Weight-bearing Status)  weight-bearing as tolerated (WBAT)  -MS     Row Name 01/08/21 0934          Bed Mobility    Supine-Sit [place] (Bed Mobility)  supervision;verbal cues  -MS     Sit-Supine [place] (Bed Mobility)  minimum assist (75% patient effort);verbal cues  -MS     Bed Mobility, Safety Issues  decreased use of legs for bridging/pushing  -MS     Comment (Bed Mobility)  Multiple trials. Emphasis on bed mobility for PM session. Increased bed height with HOB flat and no bed rails to mimic bed at home. Assist required getting into bed only for L LE. Patient prefers bed rail.  -MS     Row Name 01/08/21 0934          Transfers    Sit-Stand [place] (Transfers)  standby assist;contact guard;verbal cues  -MS     Stand-Sit [place] (Transfers)  standby assist;verbal cues  -MS     Row Name 01/08/21 0934          Sit-Stand Transfer    Assistive Device (Sit-Stand Transfers)  walker, front-wheeled;bariatric  -MS     Row Name 01/08/21 0934          Stand-Sit Transfer    Assistive Device (Stand-Sit Transfers)  walker, front-wheeled;bariatric  -MS     Row Name 01/08/21 0934          Gait/Stairs (Locomotion)    [place] Level (Gait)  contact guard;verbal cues  -MS     Assistive Device (Gait)  walker, front-wheeled;bariatric equipment  -MS     Distance in Feet (Gait)  AM: 80' x 2 trials  -MS     Pattern (Gait)  step-through  -MS     Deviations/Abnormal Patterns (Gait)  janet decreased;gait speed decreased  -MS     Bilateral Gait Deviations  forward flexed posture  -MS     Left Sided Gait  Deviations  heel strike decreased  -MS     Row Name 01/08/21 0934          Balance    Static Sitting Balance  WFL  -MS     Static Standing Balance  standing;mild impairment  -MS     Dynamic Standing Balance  standing;moderate impairment  -MS     Row Name 01/08/21 0934          Hip (Therapeutic Exercise)    Hip Isometrics (Therapeutic Exercise)  sitting;gluteal sets;10 repetitions;2 sets  -MS     Row Name 01/08/21 0934          Knee (Therapeutic Exercise)    Knee Strengthening (Therapeutic Exercise)  bilateral;sitting;LAQ (long arc quad);marching while seated;10 repetitions;2 sets  -MS     Row Name 01/08/21 0934          Ankle (Therapeutic Exercise)    Ankle Strengthening (Therapeutic Exercise)  bilateral;sitting;10 repetitions;2 sets  -MS     Row Name 01/08/21 0934          Positioning and Restraints    Pre-Treatment Position  sitting in chair/recliner  -MS     In Chair  sitting;call light within reach;encouraged to call for assist;exit alarm on AM and PM session  -MS       User Key  (r) = Recorded By, (t) = Taken By, (c) = Cosigned By    Initials Name Provider Type    MS HudsonAlfreda, PT Physical Therapist        Wound 12/27/20 1601 Left anterior thigh Incision (Active)   Dressing Appearance dry;intact 01/08/21 0810   Closure NADIA 01/08/21 0810   Base dressing in place, unable to visualize 01/08/21 0810   Drainage Amount none 01/07/21 2112       Wound 12/27/20 1602 Left lateral thigh Blisters (Active)   Dressing Appearance dry;intact 01/08/21 0810   Closure NADIA 01/08/21 0810   Base dressing in place, unable to visualize 01/07/21 2112   Drainage Amount none 01/08/21 0810   Dressing Care foam 01/08/21 0810       Wound 12/27/20 1602 Left anterior groin (Active)   Dressing Appearance open to air 01/08/21 0810   Closure None 01/08/21 0810   Base moist;white 01/07/21 2112   Periwound redness;blanchable 01/07/21 2112   Drainage Amount none 01/07/21 2112   Care, Wound other (see comments) 01/07/21 2112     Physical  Therapy Education                 Title: PT OT SLP Therapies (In Progress)     Topic: Physical Therapy (Done)     Point: Mobility training (Done)     Learning Progress Summary           Patient Acceptance, E,TB, VU,NR by MS at 1/8/2021 1048    Acceptance, E,TB, VU by MS at 1/7/2021 1029    Acceptance, E,TB, VU,NR by LB at 1/6/2021 1043    Acceptance, E,TB, VU,NR by LB at 1/5/2021 1027    Acceptance, E,TB, VU,NR by EE at 1/3/2021 0917    Acceptance, E,TB, VU,NR by EE at 1/2/2021 0921    Acceptance, E, VU,NR by LB1 at 1/1/2021 1204    Acceptance, E,TB, VU,NR by LB at 12/31/2020 1057    Acceptance, E,TB, VU,NR by LB at 12/30/2020 1133    Comment: walking    Acceptance, E,TB, VU,NR by LB at 12/29/2020 1050    Acceptance, E,TB, NR,VU by LB at 12/28/2020 1138                   Point: Home exercise program (Done)     Learning Progress Summary           Patient Acceptance, E,TB, VU,NR by MS at 1/8/2021 1048    Acceptance, E,TB, VU by MS at 1/7/2021 1029    Acceptance, E,TB, VU,NR by LB at 1/4/2021 1038    Acceptance, E,TB, VU,NR by EE at 1/3/2021 0917    Acceptance, E,TB, VU,NR by EE at 1/2/2021 0921    Acceptance, E,TB, NR,VU by LB at 12/28/2020 1138                   Point: Body mechanics (Done)     Learning Progress Summary           Patient Acceptance, E,TB, VU,NR by MS at 1/8/2021 1048                   Point: Precautions (Done)     Learning Progress Summary           Patient Acceptance, E,TB, VU,NR by MS at 1/8/2021 1048    Acceptance, E,TB, VU by MS at 1/7/2021 1029    Acceptance, E,TB, VU,NR by EE at 1/3/2021 0917    Acceptance, E,TB, VU,NR by EE at 1/2/2021 0921                               User Key     Initials Effective Dates Name Provider Type Discipline    LB 04/03/18 -  Luly Francisco, PT Physical Therapist PT    LB1 03/07/18 -  Munira Azevedo, PTA Physical Therapy Assistant PT    EE 04/03/18 -  Jaqueline Soni, PT Physical Therapist PT    MS 03/04/19 -  Alfreda Hudson, PT Physical Therapist PT                 PT Recommendation and Plan      Patient was intermittently wearing a face mask during this therapy encounter. Therapist used appropriate personal protective equipment including eye protection, mask, and gloves.  Mask used was standard procedure mask. Appropriate PPE was worn during the entire therapy session. Hand hygiene was completed before and after therapy session. Patient is not in enhanced droplet precautions.        Time Calculation:     PT Charges     Row Name 01/08/21 1246 01/08/21 0934          Time Calculation    Start Time  1230  -MS  0900  -MS     Stop Time  1300  -MS  1000  -MS     Time Calculation (min)  30 min  -MS  60 min  -MS     PT Received On  --  01/08/21  -MS     PT - Next Appointment  --  01/09/21  -MS       User Key  (r) = Recorded By, (t) = Taken By, (c) = Cosigned By    Initials Name Provider Type    MS Alfreda Hudson, PT Physical Therapist          Therapy Charges for Today     Code Description Service Date Service Provider Modifiers Qty    21900837070  PT THER PROC EA 15 MIN 1/7/2021 Alfreda Hudson, PT GP 6    [id number]  PT THER SUPP EA 15 MIN 1/7/2021 Alfreda Hudson, PT GP 2                   Alfreda Hudson PT  1/8/2021

## 2021-01-08 NOTE — THERAPY TREATMENT NOTE
Inpatient Rehabilitation - Occupational Therapy Treatment Note    [facility]     Patient Name: Alice Maxwell  : 1945  MRN: [id number]    Today's Date: 2021                 Admit Date: 2020         ICD-10-CM ICD-9-CM   1. Heartburn  R12 787.1       Patient Active Problem List   Diagnosis   • Stroke (CMS/HCC)   • Personal history of breast cancer   • History of loop electrical excision procedure (LEEP)   • History of abnormal cervical Pap smear   • S/p left hip fracture       Past Medical History:   Diagnosis Date   • Abnormal Pap smear of cervix    • Arthritis    • Cancer (CMS/HCC)     breast   • Disease of thyroid gland    • Osteoporosis    • Stroke (CMS/HCC)    • Stroke (CMS/HCC)     x2   • Urinary tract infection        Past Surgical History:   Procedure Laterality Date   • BACK SURGERY     • BREAST LUMPECTOMY     • CERVICAL BIOPSY  W/ LOOP ELECTRODE EXCISION     • CHOLECYSTECTOMY     • COLONOSCOPY      had polyps    • FOOT SURGERY     • GASTRIC BYPASS     • HERNIA REPAIR     • THYROIDECTOMY                  IRF OT ASSESSMENT FLOWSHEET (last 12 hours)      IRF OT Evaluation and Treatment     Row Name 21          OT Time and Intention    Document Type  daily treatment  -BT     Mode of Treatment  occupational therapy  -BT     Patient Effort  good  -BT     Row Name 21          General Information    General Observations of Patient  pt sitting up in wheelchair complaining about her breakfast not being correct   -BT     Existing Precautions/Restrictions  fall;left;hip, anterior  -BT     Row Name 21          Pain Scale: Numbers Pre/Post-Treatment    Pretreatment Pain Rating  8/10  -BT     Posttreatment Pain Rating  8/10  -BT     Pain Location - Side  Left  -BT     Pain Location - Orientation  incisional  -BT     Pain Location  hip  -BT     Pre/Posttreatment Pain Comment  rn gave pt pain medication   -BT     Row Name 21          Bed Mobility    Comment  (Bed Mobility)  pt up in chair on this date.   -BT     Row Name 01/08/21 0800          Transfer Assessment/Treatment    Transfers  sit-stand transfer;stand-sit transfer;toilet transfer;shower transfer  -BT     Row Name 01/08/21 0800          Transfers    Sit-Stand [place] (Transfers)  standby assist  -BT     Stand-Sit [place] (Transfers)  standby assist  -BT     [place] Level (Toilet Transfer)  standby assist  -BT     Assistive Device (Toilet Transfer)  walker, front-wheeled  -BT     [place] Level (Shower Transfer)  stand by assist  -BT     Assistive Device (Shower Transfer)  grab bar, tub/shower;shower chair;wheelchair  -BT     Row Name 01/08/21 0800          Sit-Stand Transfer    Assistive Device (Sit-Stand Transfers)  walker, front-wheeled  -BT     Row Name 01/08/21 0800          Stand-Sit Transfer    Assistive Device (Stand-Sit Transfers)  walker, front-wheeled  -BT     Row Name 01/08/21 0800          Toilet Transfer    Type (Toilet Transfer)  stand pivot/stand step  -BT     Row Name 01/08/21 0800          Shower Transfer    Type (Shower Transfer)  sit-stand  -BT     Row Name 01/08/21 0800          Safety Issues, Functional Mobility    Impairments Affecting Function (Mobility)  endurance/activity tolerance;pain;strength  -BT     Row Name 01/08/21 0800          Bathing    [place] Level (Bathing)  lower body;upper body;upper extremities;chest/trunk;distal lower extremities/feet;proximal lower extremities;perineal area;standby assist;set up  -BT     Assistive Device (Bathing)  grab bar/tub rail;hand held shower spray hose;long-handled sponge  -BT     Position (Bathing)  supported sitting;supported standing  -BT     Row Name 01/08/21 0800          Upper Body Dressing    [place] Level (Upper Body Dressing)  upper body dressing skills;set up assistance  -BT     Position (Upper Body Dressing)  edge of bed sitting  -BT     Row Name 01/08/21 0800          Lower Body Dressing    [place]  Level (Lower Body Dressing)  doff;don;pants/bottoms;socks;shoes/slippers;minimum assist (75% patient effort);moderate assist (50% patient effort)  -BT     Assistive Device Use (Lower Body Dressing)  sock-aid;reacher  -BT     Position (Lower Body Dressing)  edge of bed sitting  -BT     Row Name 01/08/21 0800          Grooming    [place] Level (Grooming)  oral care regimen;wash face, hands;hair care, combing/brushing;deodorant application;set up  -BT     Position (Grooming)  supported sitting  -BT     Row Name 01/08/21 0800          Toileting    [place] Level (Toileting)  toileting skills;not tested  -BT     Comment (Toileting)  pt did not need to use bathroom on this am  -BT     Row Name 01/08/21 0800          Positioning and Restraints    Pre-Treatment Position  sitting in chair/recliner  -BT     Post Treatment Position  wheelchair  -BT     In Wheelchair  notified nsg;sitting;call light within reach;encouraged to call for assist;exit alarm on  -BT       User Key  (r) = Recorded By, (t) = Taken By, (c) = Cosigned By    Initials Name Effective Dates    BT Zenaida Saenz OT 11/16/20 -            Occupational Therapy Education                 Title: PT OT SLP Therapies (In Progress)     Topic: Occupational Therapy (In Progress)     Point: ADL training (Done)     Description:   Instruct learner(s) on proper safety adaptation and remediation techniques during self care or transfers.   Instruct in proper use of assistive devices.              Learning Progress Summary           Patient Acceptance, E,D, VU,NR by AF at 12/30/2020 1426    Comment: educated on LH sponge and LH reacher with LB dressing tasks will require further edcuation on process    Acceptance, E, VU,NR by AF at 12/28/2020 1418    Comment: educated on safety with ADLs and transfers                   Point: Home exercise program (Not Started)     Description:   Instruct learner(s) on appropriate technique for monitoring, assisting and/or progressing  therapeutic exercises/activities.              Learner Progress:  Not documented in this visit.          Point: Precautions (Not Started)     Description:   Instruct learner(s) on prescribed precautions during self-care and functional transfers.              Learner Progress:  Not documented in this visit.          Point: Body mechanics (Not Started)     Description:   Instruct learner(s) on proper positioning and spine alignment during self-care, functional mobility activities and/or exercises.              Learner Progress:  Not documented in this visit.                      User Key     Initials Effective Dates Name Provider Type Discipline    AF 04/14/20 -  Misa Oneal OTR Occupational Therapist OT                    OT Recommendation and Plan                         Time Calculation:     Time Calculation- OT     Row Name 01/08/21 1125             Time Calculation- OT    OT Start Time  0800  -BT      OT Stop Time  0900  -BT      OT Time Calculation (min)  60 min  -BT        User Key  (r) = Recorded By, (t) = Taken By, (c) = Cosigned By    Initials Name Provider Type    BT Zenaida Saenz, OT Occupational Therapist        Therapy Charges for Today     Code Description Service Date Service Provider Modifiers Qty    [id number]  OT SELF CARE/MGMT/TRAIN EA 15 MIN 1/8/2021 Zenaida Saenz, OT GO 4                   Zenaida Saenz OT  1/8/2021

## 2021-01-08 NOTE — PROGRESS NOTES
"   LOS: 12 days   Patient Care Team:  Elsy Ventura as PCP - General (Nurse Practitioner)  Elsy Ventura as Nurse Practitioner (Nurse Practitioner)    Chief Complaint:   1. Lfemur fx s/p Bipolar hemiarthroplasty  2. Previous LHP/CVA  3. Gouty arthritis  4. HTN  5. HLD  6. Factor V Leiden        Subjective     History of Present Illness    Subjective  Again complaining of left hip pain, does not want to make any medication changes at this time.      Denies fever/ chills/ headaches/ sob/ cough/ cp.    History taken from: patient    Objective     Vital Signs  Temp:  [98 °F (36.7 °C)-98.4 °F (36.9 °C)] 98 °F (36.7 °C)  Heart Rate:  [84-87] 87  Resp:  [16-18] 18  BP: (118-154)/(60-69) 136/69    Objective:  Vital signs: (most recent): Blood pressure 136/69, pulse 87, temperature 98 °F (36.7 °C), temperature source Skin, resp. rate 18, height 170.2 cm (67\"), weight 113 kg (249 lb 12.8 oz), SpO2 97 %.          Gen: NAD. Sitting in WC at bedside    HEENT: Trach midline.   Pulm: normal respirations; no r/r/w; non-labored   Heart; RRR;    Abd: soft; NT/ND;   Neuro: verbal; appropriate   Skin: no rashes on exposed skin   Blister posterior to left hip incision - dressed  Left hip incision - scant serous drainage, no erythema surrounding, no fluctuance or induration  Ext- no distal edema  Neuro - Pain inhibition LLE    Results Review:     I reviewed the patient's new clinical results.  Results from last 7 days   Lab Units 01/07/21  0636 01/04/21  0721   WBC 10*3/mm3 6.14 7.35   HEMOGLOBIN g/dL 8.3* 8.8*   HEMATOCRIT % 27.7* 28.5*   PLATELETS 10*3/mm3 340 343       Results from last 7 days   Lab Units 01/08/21  0614 01/07/21  0636 01/04/21  0721   SODIUM mmol/L 138 138 140   POTASSIUM mmol/L 4.1 4.1 4.8   CHLORIDE mmol/L 97* 98 104   CO2 mmol/L 29.1* 28.9 23.5   BUN mg/dL 24* 22 21   CREATININE mg/dL 1.81* 1.70* 1.68*   CALCIUM mg/dL 7.5* 7.6* 7.7*   BILIRUBIN mg/dL 0.5 0.5 0.5   ALK PHOS U/L 66 65 69   ALT (SGPT) U/L 14 15 " 18   AST (SGOT) U/L 27 27 29   GLUCOSE mg/dL 111* 97 104*       Results from last 7 days   Lab Units 01/08/21  0614 01/07/21  0636 01/06/21  0850   INR  2.21* 2.05* 1.84*       Medication Review: done  Scheduled Meds:allopurinol, 100 mg, Oral, Daily  amLODIPine, 5 mg, Oral, Q24H  atorvastatin, 20 mg, Oral, Nightly  cholecalciferol, 2,000 Units, Oral, Daily  folic acid, 1 mg, Oral, Daily  levothyroxine, 100 mcg, Oral, Q AM  miconazole nitrate, , Topical, Q12H  polyethylene glycol, 17 g, Oral, Daily  senna-docusate sodium, 2 tablet, Oral, Nightly  sertraline, 50 mg, Oral, Daily  torsemide, 40 mg, Oral, Daily  traZODone, 25 mg, Oral, Nightly  warfarin, 5 mg, Oral, Daily With Dinner      Continuous Infusions:Pharmacy to dose warfarin,       PRN Meds:.•  acetaminophen  •  calcium carbonate  •  hydrALAZINE  •  HYDROcodone-acetaminophen  •  melatonin  •  ondansetron  •  Pharmacy to dose warfarin  •  polyethylene glycol  •  senna      Assessment/Plan       S/p left hip fracture      Assessment & Plan    Left femur fx s/p Bipolar hemiarthroplasty  anterior approach bipolar hemiarthroplasty on 12/22.  Patient is now weightbearing as tolerated.   Jan 6 - mild serous drainage at inferior aspect of incision. D/C every other staple on superior half of incision  Jan 8 - scant serous drainage. Will remove every other staple from inferior half of incision    Previous LHP/CVA    Gouty arthritis    HTN  Dec 31 -  today. Home med does not list any anti-hypertensive medication. Presently on amlodipine 5 mg daily.  Will add hydralazine 10 milligrams p.o. every 8 hours as needed systolic blood pressure greater than 170  Jan 6 - amlodipine/torsemide added recently, increased to 80 mg daily  Jan 8 - torsemide changed back to 40 mg daily    HLD    Renal insuff - Cr 1.73 at outside hospital  Dec 1-creatinine 1.77  Dec 6 - followed by Nephrology . Torsemide added recently.   Jan 8 - creatinine stable at 1.7, torsemide changed back to 40  mg daily    Anemia - HGB 8.9 on Dec 26  Dec 31-hemoglobin 8.9  Jan 7 - hemoglobin 8.3    Factor V Leiden - coumadin 6 mg daily and ASA 81 mg daily at home(presently off ASA)   Jan 6 - INR 2.09 yesterday, on coumadin 5 mg.   Jan 8 - INR 2.21       DVT prophylaxis - coumadin/ SCDs    Pain management - FERN reviewed. On Norco 7.5 mg q 4 hours prn, ice pack    GI - constipation - multi-factorial - pain /immobility/pain med  Dec 30 - change colace to Senokot-S, add miralax    Impaired sleep  Dec 30 - trazodone 25 mg added on Dec 28  December 31-reports slept better      Now admit for comprehensive acute inpatient rehabilitation .  This would be an interdisciplinary program with physical therapy 1 hour,  occupational therapy 1 hour, and speech therapy 1 hour, 5 days a week.  Rehabilitation nursing for carryover, monitoring of  Incision, pain, volume  status, bowel and bladder, and skin  Ongoing physician follow-up.  Weekly team conferences.      Goal is for home with  Home health   therapies.  Barrier to discharge:  Impaired mobility and ADLs  - work on  Strength, transfers, balance, gait, ADLs  to overcome.       Otis Ventura DO  01/08/21  08:34 EST    Time:   During rounds, used appropriate personal protective equipment including mask and gloves.  Additional gown if indicated.  Mask used was standard procedure mask. Appropriate PPE was worn during the entire visit.  Hand hygiene was completed before and after.

## 2021-01-09 LAB
GLUCOSE BLDC GLUCOMTR-MCNC: 113 MG/DL (ref 70–130)
GLUCOSE BLDC GLUCOMTR-MCNC: 119 MG/DL (ref 70–130)
GLUCOSE BLDC GLUCOMTR-MCNC: 128 MG/DL (ref 70–130)
GLUCOSE BLDC GLUCOMTR-MCNC: 150 MG/DL (ref 70–130)
INR PPP: 2.36 (ref 0.9–1.1)
PROTHROMBIN TIME: 25.5 SECONDS (ref 11.7–14.2)

## 2021-01-09 PROCEDURE — 63710000001 ONDANSETRON PER 8 MG: Performed by: PHYSICAL MEDICINE & REHABILITATION

## 2021-01-09 PROCEDURE — 82962 GLUCOSE BLOOD TEST: CPT

## 2021-01-09 PROCEDURE — 85610 PROTHROMBIN TIME: CPT | Performed by: PHYSICAL MEDICINE & REHABILITATION

## 2021-01-09 PROCEDURE — 97110 THERAPEUTIC EXERCISES: CPT

## 2021-01-09 RX ADMIN — DOCUSATE SODIUM 50MG AND SENNOSIDES 8.6MG 2 TABLET: 8.6; 5 TABLET, FILM COATED ORAL at 20:46

## 2021-01-09 RX ADMIN — HYDROCODONE BITARTRATE AND ACETAMINOPHEN 1 TABLET: 7.5; 325 TABLET ORAL at 04:43

## 2021-01-09 RX ADMIN — LEVOTHYROXINE SODIUM 100 MCG: 0.1 TABLET ORAL at 05:45

## 2021-01-09 RX ADMIN — WARFARIN 5 MG: 5 TABLET ORAL at 15:03

## 2021-01-09 RX ADMIN — CALCIUM CARBONATE 1 TABLET: 500 TABLET, CHEWABLE ORAL at 11:28

## 2021-01-09 RX ADMIN — CALCIUM CARBONATE 1 TABLET: 500 TABLET, CHEWABLE ORAL at 16:19

## 2021-01-09 RX ADMIN — TORSEMIDE 40 MG: 20 TABLET ORAL at 09:07

## 2021-01-09 RX ADMIN — HYDROCODONE BITARTRATE AND ACETAMINOPHEN 1 TABLET: 7.5; 325 TABLET ORAL at 15:03

## 2021-01-09 RX ADMIN — Medication 3 MG: at 20:46

## 2021-01-09 RX ADMIN — ONDANSETRON HYDROCHLORIDE 4 MG: 4 TABLET, FILM COATED ORAL at 11:33

## 2021-01-09 RX ADMIN — POLYETHYLENE GLYCOL 3350 17 G: 17 POWDER, FOR SOLUTION ORAL at 09:08

## 2021-01-09 RX ADMIN — AMLODIPINE BESYLATE 5 MG: 5 TABLET ORAL at 09:14

## 2021-01-09 RX ADMIN — TRAZODONE HYDROCHLORIDE 25 MG: 50 TABLET ORAL at 20:46

## 2021-01-09 RX ADMIN — FOLIC ACID 1 MG: 1 TABLET ORAL at 09:07

## 2021-01-09 RX ADMIN — ALLOPURINOL 100 MG: 100 TABLET ORAL at 09:08

## 2021-01-09 RX ADMIN — Medication 2000 UNITS: at 09:08

## 2021-01-09 RX ADMIN — HYDROCODONE BITARTRATE AND ACETAMINOPHEN 1 TABLET: 7.5; 325 TABLET ORAL at 20:46

## 2021-01-09 RX ADMIN — ATORVASTATIN CALCIUM 20 MG: 20 TABLET, FILM COATED ORAL at 20:46

## 2021-01-09 RX ADMIN — MICONAZOLE NITRATE: 2 OINTMENT TOPICAL at 20:46

## 2021-01-09 RX ADMIN — HYDROCODONE BITARTRATE AND ACETAMINOPHEN 1 TABLET: 7.5; 325 TABLET ORAL at 09:07

## 2021-01-09 RX ADMIN — CALCIUM CARBONATE 1 TABLET: 500 TABLET, CHEWABLE ORAL at 09:08

## 2021-01-09 RX ADMIN — MICONAZOLE NITRATE: 2 OINTMENT TOPICAL at 09:15

## 2021-01-09 RX ADMIN — SERTRALINE HYDROCHLORIDE 50 MG: 50 TABLET, FILM COATED ORAL at 09:08

## 2021-01-09 NOTE — PROGRESS NOTES
"   LOS: 13 days   Patient Care Team:  Elsy Ventura as PCP - General (Nurse Practitioner)  Elsy Ventura as Nurse Practitioner (Nurse Practitioner)    Chief Complaint:   1. Lfemur fx s/p Bipolar hemiarthroplasty  2. Previous LHP/CVA  3. Gouty arthritis  4. HTN  5. HLD  6. Factor V Leiden        Subjective     History of Present Illness    Subjective  Seen and examined, no acute events overnight. Feels OK, denies chest pain, shortness of breath, f/c. Slept well. Continues with left hip discomfort, improves with prn pain meds.       History taken from: patient    Objective     Vital Signs  Temp:  [98 °F (36.7 °C)-98.2 °F (36.8 °C)] 98.2 °F (36.8 °C)  Heart Rate:  [73-84] 79  Resp:  [16-18] 16  BP: (101-134)/(55-76) 109/55    Objective:  Vital signs: (most recent): Blood pressure 109/55, pulse 79, temperature 98.2 °F (36.8 °C), temperature source Oral, resp. rate 16, height 170.2 cm (67\"), weight 113 kg (249 lb 12.8 oz), SpO2 98 %.          Gen: NAD. Sitting in WC at bedside    HEENT: Trach midline.   Pulm: normal respirations; no r/r/w; non-labored   Heart; RRR;    Abd: soft; NT/ND;   Neuro: verbal; appropriate   Skin: no rashes on exposed skin   Blister posterior to left hip incision - dressed  Left hip incision - scant serous drainage, no erythema surrounding, no fluctuance or induration  Ext- no distal edema  Neuro - Pain inhibition LLE    Results Review:     I reviewed the patient's new clinical results.  Results from last 7 days   Lab Units 01/07/21  0636 01/04/21  0721   WBC 10*3/mm3 6.14 7.35   HEMOGLOBIN g/dL 8.3* 8.8*   HEMATOCRIT % 27.7* 28.5*   PLATELETS 10*3/mm3 340 343       Results from last 7 days   Lab Units 01/08/21  0614 01/07/21  0636 01/04/21  0721   SODIUM mmol/L 138 138 140   POTASSIUM mmol/L 4.1 4.1 4.8   CHLORIDE mmol/L 97* 98 104   CO2 mmol/L 29.1* 28.9 23.5   BUN mg/dL 24* 22 21   CREATININE mg/dL 1.81* 1.70* 1.68*   CALCIUM mg/dL 7.5* 7.6* 7.7*   BILIRUBIN mg/dL 0.5 0.5 0.5   ALK PHOS " U/L 66 65 69   ALT (SGPT) U/L 14 15 18   AST (SGOT) U/L 27 27 29   GLUCOSE mg/dL 111* 97 104*       Results from last 7 days   Lab Units 01/09/21  0841 01/08/21  0614 01/07/21  0636   INR  2.36* 2.21* 2.05*       Medication Review: done  Scheduled Meds:allopurinol, 100 mg, Oral, Daily  amLODIPine, 5 mg, Oral, Q24H  atorvastatin, 20 mg, Oral, Nightly  cholecalciferol, 2,000 Units, Oral, Daily  folic acid, 1 mg, Oral, Daily  levothyroxine, 100 mcg, Oral, Q AM  miconazole nitrate, , Topical, Q12H  polyethylene glycol, 17 g, Oral, Daily  senna-docusate sodium, 2 tablet, Oral, Nightly  sertraline, 50 mg, Oral, Daily  torsemide, 40 mg, Oral, Daily  traZODone, 25 mg, Oral, Nightly  warfarin, 5 mg, Oral, Daily With Dinner      Continuous Infusions:Pharmacy to dose warfarin,       PRN Meds:.•  acetaminophen  •  calcium carbonate  •  hydrALAZINE  •  HYDROcodone-acetaminophen  •  melatonin  •  ondansetron  •  Pharmacy to dose warfarin  •  polyethylene glycol  •  senna      Assessment/Plan       S/p left hip fracture      Assessment & Plan    Left femur fx s/p Bipolar hemiarthroplasty  anterior approach bipolar hemiarthroplasty on 12/22.  Patient is now weightbearing as tolerated.   Jan 6 - mild serous drainage at inferior aspect of incision. D/C every other staple on superior half of incision  Jan 8 - scant serous drainage. Will remove every other staple from inferior half of incision    Previous LHP/CVA    Gouty arthritis    HTN  Dec 31 -  today. Home med does not list any anti-hypertensive medication. Presently on amlodipine 5 mg daily.  Will add hydralazine 10 milligrams p.o. every 8 hours as needed systolic blood pressure greater than 170  Jan 6 - amlodipine/torsemide added recently, increased to 80 mg daily  Jan 8 - torsemide changed back to 40 mg daily    HLD    Renal insuff - Cr 1.73 at outside hospital  Dec 1-creatinine 1.77  Dec 6 - followed by Nephrology . Torsemide added recently.   Jan 8 - creatinine stable  at 1.7, torsemide changed back to 40 mg daily    Anemia - HGB 8.9 on Dec 26  Dec 31-hemoglobin 8.9  Jan 7 - hemoglobin 8.3    Factor V Leiden - coumadin 6 mg daily and ASA 81 mg daily at home(presently off ASA)   Jan 6 - INR 2.09 yesterday, on coumadin 5 mg.   Jan 8 - INR 2.21       DVT prophylaxis - coumadin/ SCDs    Pain management - FERN reviewed. On Norco 7.5 mg q 4 hours prn, ice pack    GI - constipation - multi-factorial - pain /immobility/pain med  Dec 30 - change colace to Senokot-S, add miralax    Impaired sleep  Dec 30 - trazodone 25 mg added on Dec 28  December 31-reports slept better      Now admit for comprehensive acute inpatient rehabilitation .  This would be an interdisciplinary program with physical therapy 1 hour,  occupational therapy 1 hour, and speech therapy 1 hour, 5 days a week.  Rehabilitation nursing for carryover, monitoring of  Incision, pain, volume  status, bowel and bladder, and skin  Ongoing physician follow-up.  Weekly team conferences.      Goal is for home with  Home health   therapies.  Barrier to discharge:  Impaired mobility and ADLs  - work on  Strength, transfers, balance, gait, ADLs  to overcome.     1/9  - Continue comprehensive inpatient rehabilitation program  - Continue prn norco for pain  - Continue warfarin, INR therapeutic  - Remove remaining staples from left hip        Phillip Jolley MD  01/09/21  14:43 EST    Time:   During rounds, used appropriate personal protective equipment including mask and gloves.  Additional gown if indicated.  Mask used was standard procedure mask. Appropriate PPE was worn during the entire visit.  Hand hygiene was completed before and after.

## 2021-01-09 NOTE — PLAN OF CARE
Goal Outcome Evaluation:  Plan of Care Reviewed With: patient  Progress: improving  Outcome Summary: Pt rested well this shift.  Pain level less than 9.  Pt able to go longer than 4 hours without pain meds.  Ambulates with walker.  BM this shift.  Meds whole with thins.

## 2021-01-09 NOTE — PROGRESS NOTES
NEPHROLOGY PROGRESS NOTE    PATIENT IDENTIFICATION:   Name:  Alice Maxwell      MRN:  [id number]     75 y.o.  female             Reason for visit: Follow-up acute kidney injury    SUBJECTIVE:   The patient is feeling better today denies any chest pain or shortness of air, no orthopnea or PND, no nausea or vomiting, no abdominal pain, no dysuria or gross hematuria.  Tolerating her rehab very well.    OBJECTIVE:  Vitals:    01/08/21 1452 01/08/21 1929 01/09/21 0534 01/09/21 0914   BP: 101/59 117/61 118/76 134/63   BP Location: Left arm Right arm Right arm    Patient Position: Sitting Lying Lying    Pulse: 76 84 76 73   Resp: 18 16 16    Temp: 98.1 °F (36.7 °C) 98.1 °F (36.7 °C) 98 °F (36.7 °C)    TempSrc: Oral Oral Oral    SpO2: 96% 97% 93%    Weight:       Height:               Body mass index is 39.12 kg/m².    Intake/Output Summary (Last 24 hours) at 1/9/2021 1406  Last data filed at 1/9/2021 0800  Gross per 24 hour   Intake 480 ml   Output --   Net 480 ml         Exam:  General Appearance: alert, oriented x 3, no acute distress,   Skin: warm and dry  HEENT: pupils round and reactive to light, oral mucosa normal, nonicteric sclera  Neck: supple, no JVD, trachea midline  Lungs: CTA, unlabored breathing effort  Heart: RRR, normal S1 and S2, no S3, no rub  Abdomen: soft, nontender, normoactive bowels  : no palpable bladder,  Extremities: no edema, cyanosis or clubbing  Neuro: normal speech and mental status        Scheduled meds:  allopurinol, 100 mg, Oral, Daily  amLODIPine, 5 mg, Oral, Q24H  atorvastatin, 20 mg, Oral, Nightly  cholecalciferol, 2,000 Units, Oral, Daily  folic acid, 1 mg, Oral, Daily  levothyroxine, 100 mcg, Oral, Q AM  miconazole nitrate, , Topical, Q12H  polyethylene glycol, 17 g, Oral, Daily  senna-docusate sodium, 2 tablet, Oral, Nightly  sertraline, 50 mg, Oral, Daily  torsemide, 40 mg, Oral, Daily  traZODone, 25 mg, Oral, Nightly  warfarin, 5 mg, Oral, Daily With Dinner      IV meds:                       Pharmacy to dose warfarin,         Data Review:    Results from last 7 days   Lab Units 01/08/21  0614 01/07/21  0636 01/04/21  0721   SODIUM mmol/L 138 138 140   POTASSIUM mmol/L 4.1 4.1 4.8   CHLORIDE mmol/L 97* 98 104   CO2 mmol/L 29.1* 28.9 23.5   BUN mg/dL 24* 22 21   CREATININE mg/dL 1.81* 1.70* 1.68*   CALCIUM mg/dL 7.5* 7.6* 7.7*   BILIRUBIN mg/dL 0.5 0.5 0.5   ALK PHOS U/L 66 65 69   ALT (SGPT) U/L 14 15 18   AST (SGOT) U/L 27 27 29   GLUCOSE mg/dL 111* 97 104*       Estimated Creatinine Clearance: 33.8 mL/min (A) (by C-G formula based on SCr of 1.81 mg/dL (H)).    Results from last 7 days   Lab Units 01/04/21  0721   PHOSPHORUS mg/dL 4.3       Results from last 7 days   Lab Units 01/07/21  0636 01/04/21  0721   WBC 10*3/mm3 6.14 7.35   HEMOGLOBIN g/dL 8.3* 8.8*   PLATELETS 10*3/mm3 340 343       Results from last 7 days   Lab Units 01/09/21  0841 01/08/21  0614 01/07/21  0636 01/06/21  0850 01/05/21  0846   INR  2.36* 2.21* 2.05* 1.84* 2.09*             ASSESSMENT:   1.  Acute kidney injury associated with vancomycin nephrotoxicity suspected from [facility] as per discharge summary P creatinine was 2.5 yesterday was 1.8 today's lab is not done.  2.  Hypertension, controlled  3.  Left femur fracture status post hemiarthroplasty on 12/22/2020  4.  Factor V Leiden mutation, anticoagulated  5.  Dyslipidemia on statin  6.  Gout, quiescent  7.  Hypothyroidism, treated  8.  Anemia, hemoglobin was 8.3 on 1/7/2021    Plan:  1.  Continue the same treatment  2.  Check labs tomorrow         David Gill MD  1/9/2021    14:06 EST

## 2021-01-09 NOTE — PROGRESS NOTES
Pharmacy Consult: Warfarin Dosing/ Monitoring    Alice Maxwell is a 75 y.o. female, estimated creatinine clearance is 33.8 mL/min (A) (by C-G formula based on SCr of 1.81 mg/dL (H)). weighing 113 kg (249 lb 12.8 oz).     has a past medical history of Abnormal Pap smear of cervix, Arthritis, Cancer (CMS/HCC), Disease of thyroid gland, Osteoporosis, Stroke (CMS/HCC), Stroke (CMS/HCC), and Urinary tract infection.    Social History     Tobacco Use   • Smoking status: Never Smoker   Substance Use Topics   • Alcohol use: No   • Drug use: No     Results from last 7 days   Lab Units 01/09/21  0841 01/08/21  0614 01/07/21  0636 01/06/21  0850 01/05/21  0846 01/04/21  0721 01/03/21  0647   INR  2.36* 2.21* 2.05* 1.84* 2.09* 2.45* 3.11*   HEMOGLOBIN g/dL  --   --  8.3*  --   --  8.8*  --    HEMATOCRIT %  --   --  27.7*  --   --  28.5*  --    PLATELETS 10*3/mm3  --   --  340  --   --  343  --      Results from last 7 days   Lab Units 01/08/21  0614 01/07/21  0636 01/04/21  0721   SODIUM mmol/L 138 138 140   POTASSIUM mmol/L 4.1 4.1 4.8   CHLORIDE mmol/L 97* 98 104   CO2 mmol/L 29.1* 28.9 23.5   BUN mg/dL 24* 22 21   CREATININE mg/dL 1.81* 1.70* 1.68*   CALCIUM mg/dL 7.5* 7.6* 7.7*   BILIRUBIN mg/dL 0.5 0.5 0.5   ALK PHOS U/L 66 65 69   ALT (SGPT) U/L 14 15 18   AST (SGOT) U/L 27 27 29   GLUCOSE mg/dL 111* 97 104*     Anticoagulation history: Warfarin 6mg daily (home dose). Warfarin managed by [facility] for history of Factor V Leiden.    Hospital Anticoagulation:  Consulting provider: Aric Chao MD  Start date: 12/27/20  Indication: Post Surgical - Hip, hypercoagulable disorder and hx of CVA  Target INR: 2-3  Expected duration: indefinite   Bridge Therapy: No               Date 12/27 12/28 12/29 12/30 12/31 1/1 1/2 1/3 1/4 1/5 1/6 1/7 1/8 1/9     INR 1.74 2.04 2.39 2.73 3.44 2.85 3.05 3.11 2.45 2.09 1.84 2.05 2.21 2.36     Warfarin dose 6mg 6mg 6mg 5mg HOLD 4mg 2mg 1mg 3mg 4mg 5mg 5mg 5mg         Potential drug interactions:   • Allopurinol - may enhance the anticoagulant effect of warfarin (home med)  • Levothyroxine- may enhance the anticoagulant effect of warfarin (home med)  • Norco - Patients taking acetaminophen at higher doses and/or for longer duration may be at greater risk for experiencing a clinically significant interaction (not listed on med rec)   • Trazodone- May diminish the anticoagulant effect of warfarin. (not listed on med rec)    Relevant nutrition status: regular diet; eating better per RN notes    Education complete?/ Date: Home medication    Assessment/Plan:  · INR is therapeutic at 2.36 (Goal 2-3). I will continue the current regimen of warfarin 5 mg daily.   · The INR will be evaluated daily to adjust regimen to goal.    Pharmacy will continue to follow until discharge or discontinuation of warfarin.     Guadalupe George PharmD  01/09/21 15:13 EST

## 2021-01-09 NOTE — PLAN OF CARE
Goal Outcome Evaluation:  Plan of Care Reviewed With: patient  Progress: improving  Outcome Summary: Patient cooperative, but demanding at times. She has taken Tums with every meal, pain medication given twice, and takes medication with water. Miralax with apple juice, continent of b/b, and assist x1. Staples were removed, steristrips in place and dressing changed twice. Labs scheduled for 4:30 am per Dr Gill, VS stable, and no other issues at this time.

## 2021-01-09 NOTE — THERAPY TREATMENT NOTE
Inpatient Rehabilitation - Physical Therapy Treatment Note       [facility]     Patient Name: Alice Maxwell  : 1945  MRN: [id number]    Today's Date: 2021                    Admit Date: 2020      Visit Dx:     ICD-10-CM ICD-9-CM   1. Heartburn  R12 787.1       Patient Active Problem List   Diagnosis   • Stroke (CMS/HCC)   • Personal history of breast cancer   • History of loop electrical excision procedure (LEEP)   • History of abnormal cervical Pap smear   • S/p left hip fracture       Past Medical History:   Diagnosis Date   • Abnormal Pap smear of cervix    • Arthritis    • Cancer (CMS/HCC)     breast   • Disease of thyroid gland    • Osteoporosis    • Stroke (CMS/HCC)    • Stroke (CMS/HCC)     x2   • Urinary tract infection        Past Surgical History:   Procedure Laterality Date   • BACK SURGERY     • BREAST LUMPECTOMY     • CERVICAL BIOPSY  W/ LOOP ELECTRODE EXCISION     • CHOLECYSTECTOMY     • COLONOSCOPY      had polyps    • FOOT SURGERY     • GASTRIC BYPASS     • HERNIA REPAIR     • THYROIDECTOMY            PT ASSESSMENT (last 12 hours)      IRF PT Evaluation and Treatment     Row Name 21 1010          PT Time and Intention    Document Type  daily treatment  -LB     Mode of Treatment  physical therapy  -LB     Patient/Family/Caregiver Comments/Observations  Agreed To PT session. c/o Lhip and thigh pain  -LB     Row Name 21 1010          General Information    Existing Precautions/Restrictions  fall;left;hip, anterior  -LB     Row Name 21 1010          Pain Scale: Numbers Pre/Post-Treatment    Pretreatment Pain Rating  9/10  -LB     Pain Location - Side  Left  -LB     Pain Location  hip and thigh  -LB     Pre/Posttreatment Pain Comment  premedicated  -LB     Row Name 21 1010          Transfers    Sit-Stand [place] (Transfers)  contact guard;standby assist  -LB     Stand-Sit [place] (Transfers)  standby assist;verbal cues  -LB     Row Name 21  1010          Sit-Stand Transfer    Assistive Device (Sit-Stand Transfers)  walker, front-wheeled  -LB     Row Name 01/09/21 1010          Stand-Sit Transfer    Assistive Device (Stand-Sit Transfers)  walker, front-wheeled  -LB     Row Name 01/09/21 1010          Gait/Stairs (Locomotion)    [place] Level (Gait)  contact guard  -LB     Assistive Device (Gait)  walker, front-wheeled  -LB     Distance in Feet (Gait)  104' x 2  -LB     Pattern (Gait)  step-through  -LB     Deviations/Abnormal Patterns (Gait)  janet decreased;gait speed decreased  -LB     Bilateral Gait Deviations  forward flexed posture  -LB     Left Sided Gait Deviations  heel strike decreased  -LB     Row Name 01/09/21 1010          Hip (Therapeutic Exercise)    Hip (Therapeutic Exercise)  isometric exercises  -LB     Hip Isometrics (Therapeutic Exercise)  bilateral;aDduction;sitting;10 repetitions using small ball  -LB     Row Name 01/09/21 1010          Knee (Therapeutic Exercise)    Knee (Therapeutic Exercise)  AROM (active range of motion);strengthening exercise  -LB     Knee AROM (Therapeutic Exercise)  bilateral;flexion;sitting;10 repetitions  -LB     Knee Strengthening (Therapeutic Exercise)  bilateral;LAQ (long arc quad);sitting;10 repetitions  -LB     Row Name 01/09/21 1010          Ankle (Therapeutic Exercise)    Ankle (Therapeutic Exercise)  strengthening exercise  -LB     Ankle Strengthening (Therapeutic Exercise)  bilateral;dorsiflexion;plantarflexion;sitting;10 repetitions  -LB     Row Name 01/09/21 1010          Positioning and Restraints    Post Treatment Position  wheelchair  -LB     In Wheelchair  sitting;call light within reach;exit alarm on  -LB       User Key  (r) = Recorded By, (t) = Taken By, (c) = Cosigned By    Initials Name Provider Type    LB Munira Azevedo PTA Physical Therapy Assistant        Wound 12/27/20 1601 Left anterior thigh Incision (Active)   Dressing Appearance dry;intact 01/08/21 1504   Closure NADIA  01/09/21 0907   Base dressing in place, unable to visualize 01/08/21 2000   Periwound Temperature warm 01/08/21 1504   Drainage Amount none 01/09/21 1149   Dressing Care dressing changed 01/09/21 1149       Wound 12/27/20 1602 Left lateral thigh Blisters (Active)   Closure NADIA 01/09/21 0907   Drainage Amount none 01/09/21 1149   Dressing Care foam;dressing changed 01/09/21 1149       Wound 12/27/20 1602 Left anterior groin (Active)   Closure None 01/08/21 2000   Drainage Amount none 01/09/21 1149   Dressing Care open to air 01/09/21 1149     Physical Therapy Education                 Title: PT OT SLP Therapies (In Progress)     Topic: Physical Therapy (In Progress)     Point: Mobility training (In Progress)     Learning Progress Summary           Patient Acceptance, E, NR by LB at 1/9/2021 1157    Acceptance, E,TB, VU,NR by MS at 1/8/2021 1048    Acceptance, E,TB, VU by MS at 1/7/2021 1029    Acceptance, E,TB, VU,NR by LB1 at 1/6/2021 1043    Acceptance, E,TB, VU,NR by LB1 at 1/5/2021 1027    Acceptance, E,TB, VU,NR by EE at 1/3/2021 0917    Acceptance, E,TB, VU,NR by EE at 1/2/2021 0921    Acceptance, E, VU,NR by LB at 1/1/2021 1204    Acceptance, E,TB, VU,NR by LB1 at 12/31/2020 1057    Acceptance, E,TB, VU,NR by LB1 at 12/30/2020 1133    Comment: walking    Acceptance, E,TB, VU,NR by LB1 at 12/29/2020 1050    Acceptance, E,TB, NR,VU by LB1 at 12/28/2020 1138                   Point: Home exercise program (Done)     Learning Progress Summary           Patient Acceptance, E,TB, VU,NR by MS at 1/8/2021 1048    Acceptance, E,TB, VU by MS at 1/7/2021 1029    Acceptance, E,TB, VU,NR by LB1 at 1/4/2021 1038    Acceptance, E,TB, VU,NR by EE at 1/3/2021 0917    Acceptance, E,TB, VU,NR by EE at 1/2/2021 0921    Acceptance, E,TB, NR,VU by LB1 at 12/28/2020 1138                   Point: Body mechanics (Done)     Learning Progress Summary           Patient Acceptance, E,TB, VU,NR by MS at 1/8/2021 1049                    Point: Precautions (Done)     Learning Progress Summary           Patient Acceptance, E,TB, VU,NR by MS at 1/8/2021 1048    Acceptance, E,TB, VU by MS at 1/7/2021 1029    Acceptance, E,TB, VU,NR by EE at 1/3/2021 0917    Acceptance, E,TB, VU,NR by EE at 1/2/2021 0921                               User Key     Initials Effective Dates Name Provider Type Discipline    LB1 04/03/18 -  Luly Francisco, PT Physical Therapist PT     03/07/18 -  Munira Azevedo PTA Physical Therapy Assistant PT    EE 04/03/18 -  Jaqueline Soni, PT Physical Therapist PT    MS 03/04/19 -  Alfreda Hudson PT Physical Therapist PT                PT Recommendation and Plan      Patient was intermittently wearing a face mask during this therapy encounter. Therapist used appropriate personal protective equipment including eye protection, mask, and gloves.  Mask used was standard procedure mask. Appropriate PPE was worn during the entire therapy session. Hand hygiene was completed before and after therapy session. Patient is not in enhanced droplet precautions.                        Time Calculation:     PT Charges     Row Name 01/09/21 1156             Time Calculation    Start Time  1000  -LB      Stop Time  1030  -LB      Time Calculation (min)  30 min  -LB        User Key  (r) = Recorded By, (t) = Taken By, (c) = Cosigned By    Initials Name Provider Type    LB Munira Azevedo PTA Physical Therapy Assistant          Therapy Charges for Today     Code Description Service Date Service Provider Modifiers Qty    68602014483 HC PT THER PROC EA 15 MIN 1/9/2021 Munira Azevedo PTA GP 2                   Munira Azevedo PTA  1/9/2021

## 2021-01-10 LAB
ALBUMIN SERPL-MCNC: 3.9 G/DL (ref 3.5–5.2)
ANION GAP SERPL CALCULATED.3IONS-SCNC: 10.5 MMOL/L (ref 5–15)
BASOPHILS # BLD AUTO: 0.03 10*3/MM3 (ref 0–0.2)
BASOPHILS NFR BLD AUTO: 0.7 % (ref 0–1.5)
BUN SERPL-MCNC: 24 MG/DL (ref 8–23)
BUN/CREAT SERPL: 13.8 (ref 7–25)
CALCIUM SPEC-SCNC: 7.6 MG/DL (ref 8.6–10.5)
CHLORIDE SERPL-SCNC: 97 MMOL/L (ref 98–107)
CO2 SERPL-SCNC: 30.5 MMOL/L (ref 22–29)
CREAT SERPL-MCNC: 1.74 MG/DL (ref 0.57–1)
DEPRECATED RDW RBC AUTO: 47 FL (ref 37–54)
EOSINOPHIL # BLD AUTO: 0.04 10*3/MM3 (ref 0–0.4)
EOSINOPHIL NFR BLD AUTO: 0.9 % (ref 0.3–6.2)
ERYTHROCYTE [DISTWIDTH] IN BLOOD BY AUTOMATED COUNT: 14.7 % (ref 12.3–15.4)
GFR SERPL CREATININE-BSD FRML MDRD: 28 ML/MIN/1.73
GLUCOSE BLDC GLUCOMTR-MCNC: 112 MG/DL (ref 70–130)
GLUCOSE BLDC GLUCOMTR-MCNC: 117 MG/DL (ref 70–130)
GLUCOSE BLDC GLUCOMTR-MCNC: 125 MG/DL (ref 70–130)
GLUCOSE BLDC GLUCOMTR-MCNC: 127 MG/DL (ref 70–130)
GLUCOSE SERPL-MCNC: 118 MG/DL (ref 65–99)
HCT VFR BLD AUTO: 26 % (ref 34–46.6)
HGB BLD-MCNC: 8.4 G/DL (ref 12–15.9)
IMM GRANULOCYTES # BLD AUTO: 0.01 10*3/MM3 (ref 0–0.05)
IMM GRANULOCYTES NFR BLD AUTO: 0.2 % (ref 0–0.5)
INR PPP: 2.65 (ref 0.9–1.1)
LYMPHOCYTES # BLD AUTO: 1.23 10*3/MM3 (ref 0.7–3.1)
LYMPHOCYTES NFR BLD AUTO: 27.5 % (ref 19.6–45.3)
MAGNESIUM SERPL-MCNC: 1.9 MG/DL (ref 1.6–2.4)
MCH RBC QN AUTO: 28.8 PG (ref 26.6–33)
MCHC RBC AUTO-ENTMCNC: 32.3 G/DL (ref 31.5–35.7)
MCV RBC AUTO: 89 FL (ref 79–97)
MONOCYTES # BLD AUTO: 0.5 10*3/MM3 (ref 0.1–0.9)
MONOCYTES NFR BLD AUTO: 11.2 % (ref 5–12)
NEUTROPHILS NFR BLD AUTO: 2.66 10*3/MM3 (ref 1.7–7)
NEUTROPHILS NFR BLD AUTO: 59.5 % (ref 42.7–76)
NRBC BLD AUTO-RTO: 0 /100 WBC (ref 0–0.2)
PHOSPHATE SERPL-MCNC: 4.2 MG/DL (ref 2.5–4.5)
PLATELET # BLD AUTO: 279 10*3/MM3 (ref 140–450)
PMV BLD AUTO: 10 FL (ref 6–12)
POTASSIUM SERPL-SCNC: 3.5 MMOL/L (ref 3.5–5.2)
PROTHROMBIN TIME: 28 SECONDS (ref 11.7–14.2)
RBC # BLD AUTO: 2.92 10*6/MM3 (ref 3.77–5.28)
SODIUM SERPL-SCNC: 138 MMOL/L (ref 136–145)
URATE SERPL-MCNC: 8.5 MG/DL (ref 2.4–5.7)
WBC # BLD AUTO: 4.47 10*3/MM3 (ref 3.4–10.8)

## 2021-01-10 PROCEDURE — 83735 ASSAY OF MAGNESIUM: CPT | Performed by: INTERNAL MEDICINE

## 2021-01-10 PROCEDURE — 80069 RENAL FUNCTION PANEL: CPT | Performed by: INTERNAL MEDICINE

## 2021-01-10 PROCEDURE — 85025 COMPLETE CBC W/AUTO DIFF WBC: CPT | Performed by: INTERNAL MEDICINE

## 2021-01-10 PROCEDURE — 82962 GLUCOSE BLOOD TEST: CPT

## 2021-01-10 PROCEDURE — 97116 GAIT TRAINING THERAPY: CPT

## 2021-01-10 PROCEDURE — 85610 PROTHROMBIN TIME: CPT | Performed by: PHYSICAL MEDICINE & REHABILITATION

## 2021-01-10 PROCEDURE — 63710000001 ONDANSETRON PER 8 MG: Performed by: PHYSICAL MEDICINE & REHABILITATION

## 2021-01-10 PROCEDURE — 97110 THERAPEUTIC EXERCISES: CPT

## 2021-01-10 PROCEDURE — 84550 ASSAY OF BLOOD/URIC ACID: CPT | Performed by: INTERNAL MEDICINE

## 2021-01-10 RX ORDER — POTASSIUM CHLORIDE 1.5 G/1.77G
40 POWDER, FOR SOLUTION ORAL ONCE
Status: COMPLETED | OUTPATIENT
Start: 2021-01-10 | End: 2021-01-10

## 2021-01-10 RX ORDER — WARFARIN SODIUM 4 MG/1
4 TABLET ORAL
Status: DISCONTINUED | OUTPATIENT
Start: 2021-01-10 | End: 2021-01-12 | Stop reason: HOSPADM

## 2021-01-10 RX ADMIN — TRAZODONE HYDROCHLORIDE 25 MG: 50 TABLET ORAL at 22:05

## 2021-01-10 RX ADMIN — CALCIUM CARBONATE 1 TABLET: 500 TABLET, CHEWABLE ORAL at 07:29

## 2021-01-10 RX ADMIN — HYDROCODONE BITARTRATE AND ACETAMINOPHEN 1 TABLET: 7.5; 325 TABLET ORAL at 04:01

## 2021-01-10 RX ADMIN — FOLIC ACID 1 MG: 1 TABLET ORAL at 07:30

## 2021-01-10 RX ADMIN — POTASSIUM CHLORIDE 40 MEQ: 1.5 POWDER, FOR SOLUTION ORAL at 15:31

## 2021-01-10 RX ADMIN — MICONAZOLE NITRATE: 2 OINTMENT TOPICAL at 10:12

## 2021-01-10 RX ADMIN — Medication 3 MG: at 22:05

## 2021-01-10 RX ADMIN — WARFARIN 4 MG: 4 TABLET ORAL at 16:11

## 2021-01-10 RX ADMIN — ONDANSETRON HYDROCHLORIDE 4 MG: 4 TABLET, FILM COATED ORAL at 18:07

## 2021-01-10 RX ADMIN — HYDROCODONE BITARTRATE AND ACETAMINOPHEN 1 TABLET: 7.5; 325 TABLET ORAL at 20:39

## 2021-01-10 RX ADMIN — HYDROCODONE BITARTRATE AND ACETAMINOPHEN 1 TABLET: 7.5; 325 TABLET ORAL at 11:51

## 2021-01-10 RX ADMIN — MICONAZOLE NITRATE 1 APPLICATION: 2 OINTMENT TOPICAL at 20:41

## 2021-01-10 RX ADMIN — HYDROCODONE BITARTRATE AND ACETAMINOPHEN 1 TABLET: 7.5; 325 TABLET ORAL at 16:28

## 2021-01-10 RX ADMIN — LEVOTHYROXINE SODIUM 100 MCG: 0.1 TABLET ORAL at 05:49

## 2021-01-10 RX ADMIN — SERTRALINE HYDROCHLORIDE 50 MG: 50 TABLET, FILM COATED ORAL at 07:29

## 2021-01-10 RX ADMIN — ATORVASTATIN CALCIUM 20 MG: 20 TABLET, FILM COATED ORAL at 20:39

## 2021-01-10 RX ADMIN — AMLODIPINE BESYLATE 5 MG: 5 TABLET ORAL at 07:30

## 2021-01-10 RX ADMIN — CALCIUM CARBONATE 1 TABLET: 500 TABLET, CHEWABLE ORAL at 11:31

## 2021-01-10 RX ADMIN — Medication 2000 UNITS: at 07:30

## 2021-01-10 RX ADMIN — CALCIUM CARBONATE 1 TABLET: 500 TABLET, CHEWABLE ORAL at 16:28

## 2021-01-10 RX ADMIN — DOCUSATE SODIUM 50MG AND SENNOSIDES 8.6MG 2 TABLET: 8.6; 5 TABLET, FILM COATED ORAL at 22:05

## 2021-01-10 RX ADMIN — POLYETHYLENE GLYCOL 3350 17 G: 17 POWDER, FOR SOLUTION ORAL at 07:29

## 2021-01-10 RX ADMIN — ONDANSETRON HYDROCHLORIDE 4 MG: 4 TABLET, FILM COATED ORAL at 11:57

## 2021-01-10 RX ADMIN — ALLOPURINOL 100 MG: 100 TABLET ORAL at 07:29

## 2021-01-10 RX ADMIN — TORSEMIDE 40 MG: 20 TABLET ORAL at 07:30

## 2021-01-10 NOTE — THERAPY TREATMENT NOTE
Inpatient Rehabilitation - Physical Therapy Treatment Note       [facility]     Patient Name: Alice Maxwell  : 1945  MRN: [id number]    Today's Date: 1/10/2021                    Admit Date: 2020      Visit Dx:     ICD-10-CM ICD-9-CM   1. Heartburn  R12 787.1       Patient Active Problem List   Diagnosis   • Stroke (CMS/HCC)   • Personal history of breast cancer   • History of loop electrical excision procedure (LEEP)   • History of abnormal cervical Pap smear   • S/p left hip fracture       Past Medical History:   Diagnosis Date   • Abnormal Pap smear of cervix    • Arthritis    • Cancer (CMS/HCC)     breast   • Disease of thyroid gland    • Osteoporosis    • Stroke (CMS/HCC)    • Stroke (CMS/HCC)     x2   • Urinary tract infection        Past Surgical History:   Procedure Laterality Date   • BACK SURGERY     • BREAST LUMPECTOMY     • CERVICAL BIOPSY  W/ LOOP ELECTRODE EXCISION     • CHOLECYSTECTOMY     • COLONOSCOPY      had polyps    • FOOT SURGERY     • GASTRIC BYPASS     • HERNIA REPAIR     • THYROIDECTOMY            PT ASSESSMENT (last 12 hours)      IRF PT Evaluation and Treatment     Row Name 01/10/21 0925          PT Time and Intention    Document Type  daily treatment  -MD     Mode of Treatment  physical therapy  -MD     Patient/Family/Caregiver Comments/Observations  Pt sitting in WC showing no signs of acute distress.  -MD     Row Name 01/10/21 0925          Bed Mobility    Sit-Supine [place] (Bed Mobility)  verbal cues;minimum assist (75% patient effort)  -MD     Assistive Device (Bed Mobility)  draw sheet;bed rails  -MD     Row Name 01/10/21 0925          Transfers    Sit-Stand [place] (Transfers)  contact guard;standby assist  -MD     Stand-Sit [place] (Transfers)  standby assist;verbal cues  -MD     Row Name 01/10/21 0925          Sit-Stand Transfer    Assistive Device (Sit-Stand Transfers)  walker, front-wheeled  -MD     Row Name 01/10/21 0925          Stand-Sit  Transfer    Assistive Device (Stand-Sit Transfers)  walker, front-wheeled  -MD     Row Name 01/10/21 0925          Gait/Stairs (Locomotion)    [place] Level (Gait)  contact guard  -MD     Assistive Device (Gait)  walker, front-wheeled  -MD     Distance in Feet (Gait)  55'x1, 75'x1, 73'x1, 72'x1 and 75' x1  -MD     Pattern (Gait)  step-through  -MD     Deviations/Abnormal Patterns (Gait)  janet decreased;gait speed decreased  -MD     Bilateral Gait Deviations  forward flexed posture  -MD     Row Name 01/10/21 0925          Hip (Therapeutic Exercise)    Hip AROM (Therapeutic Exercise)  -- pt unable to complete marching while seated due to pain(L)  -MD     Hip Strengthening (Therapeutic Exercise)  marching while standing;10 repetitions;right;bilateral;aBduction;aDduction  -MD     Row Name 01/10/21 0925          Knee (Therapeutic Exercise)    Knee Strengthening (Therapeutic Exercise)  LAQ (long arc quad);10 repetitions;bilateral  -MD     Row Name 01/10/21 0925          Ankle (Therapeutic Exercise)    Ankle Strengthening (Therapeutic Exercise)  dorsiflexion;10 repetitions;bilateral  -MD     Row Name 01/10/21 0925          Positioning and Restraints    Pre-Treatment Position  sitting in chair/recliner  -MD     Post Treatment Position  wheelchair  -MD     In Wheelchair  sitting;call light within reach;exit alarm on  -MD       User Key  (r) = Recorded By, (t) = Taken By, (c) = Cosigned By    Initials Name Provider Type    Nereida Stock, PT Physical Therapist        Wound 12/27/20 1601 Left anterior thigh Incision (Active)   Closure NADIA 01/10/21 0731   Base dressing in place, unable to visualize 01/09/21 2000   Drainage Amount none 01/09/21 2000   Dressing Care dressing changed 01/09/21 1555   Staples Removed Intact Yes 01/09/21 1555   Number of Staples Removed 14 01/09/21 1555       Wound 12/27/20 1602 Left lateral thigh Blisters (Active)   Closure NADIA 01/10/21 0731   Base dressing in place, unable to visualize  01/09/21 2000   Drainage Amount none 01/09/21 2000   Dressing Care foam;dressing changed 01/09/21 1149       Wound 12/27/20 1602 Left anterior groin (Active)   Closure None 01/09/21 2000   Periwound redness 01/10/21 0731   Drainage Amount none 01/09/21 1149   Dressing Care open to air 01/10/21 0731     Physical Therapy Education                 Title: PT OT SLP Therapies (In Progress)     Topic: Physical Therapy (In Progress)     Point: Mobility training (In Progress)     Learning Progress Summary           Patient Acceptance, E, NR by LB at 1/9/2021 1157    Acceptance, E,TB, VU,NR by MS at 1/8/2021 1048    Acceptance, E,TB, VU by MS at 1/7/2021 1029    Acceptance, E,TB, VU,NR by LB1 at 1/6/2021 1043    Acceptance, E,TB, VU,NR by LB1 at 1/5/2021 1027    Acceptance, E,TB, VU,NR by EE at 1/3/2021 0917    Acceptance, E,TB, VU,NR by EE at 1/2/2021 0921    Acceptance, E, VU,NR by LB at 1/1/2021 1204    Acceptance, E,TB, VU,NR by LB1 at 12/31/2020 1057    Acceptance, E,TB, VU,NR by LB1 at 12/30/2020 1133    Comment: walking    Acceptance, E,TB, VU,NR by LB1 at 12/29/2020 1050    Acceptance, E,TB, NR,VU by LB1 at 12/28/2020 1138                   Point: Home exercise program (Done)     Learning Progress Summary           Patient Acceptance, E,TB, VU,NR by MS at 1/8/2021 1048    Acceptance, E,TB, VU by MS at 1/7/2021 1029    Acceptance, E,TB, VU,NR by LB1 at 1/4/2021 1038    Acceptance, E,TB, VU,NR by EE at 1/3/2021 0917    Acceptance, E,TB, VU,NR by EE at 1/2/2021 0921    Acceptance, E,TB, NR,VU by LB1 at 12/28/2020 1138                   Point: Body mechanics (Done)     Learning Progress Summary           Patient Acceptance, E,TB, VU,NR by MS at 1/8/2021 1048                   Point: Precautions (Done)     Learning Progress Summary           Patient Acceptance, E, VU by MD at 1/10/2021 0941    Acceptance, E,TB, VU,NR by MS at 1/8/2021 1048    Acceptance, E,TB, VU by MS at 1/7/2021 1029    Acceptance, E,TB, VU,NR by EE at  1/3/2021 0917    Acceptance, E,TB, VU,NR by EE at 1/2/2021 0921                               User Key     Initials Effective Dates Name Provider Type Discipline    LB1 04/03/18 -  Luly Francisco, PT Physical Therapist PT    LB 03/07/18 -  Munira Azevedo, NAILA Physical Therapy Assistant PT    EE 04/03/18 -  Jaqueline Soni, PT Physical Therapist PT    MD 04/03/18 -  Nereida Llamas, ONEIDA Physical Therapist PT    MS 03/04/19 -  Alfreda Hudson PT Physical Therapist PT                PT Recommendation and Plan                          Time Calculation:     PT Charges     Row Name 01/10/21 0925             Time Calculation    Start Time  0900  -MD      Stop Time  0940  -MD      Time Calculation (min)  40 min  -MD      PT Received On  01/10/21  -MD      PT - Next Appointment  01/11/21  -MD        User Key  (r) = Recorded By, (t) = Taken By, (c) = Cosigned By    Initials Name Provider Type    Nereida Stock, PT Physical Therapist          Therapy Charges for Today     Code Description Service Date Service Provider Modifiers Qty    77220735904 HC PT THER PROC EA 15 MIN 1/10/2021 Nereida Llamas, PT GP 1    44169810246 HC GAIT TRAINING EA 15 MIN 1/10/2021 Nereida Llamas, PT GP 1    36476942482 HC PT THER SUPP EA 15 MIN 1/10/2021 Nereida Llamas, PT GP 2              Patient was intermittently wearing a face mask during this therapy encounter. Therapist used appropriate personal protective equipment including eye protection, mask, and gloves.  Mask used was standard procedure mask. Appropriate PPE was worn during the entire therapy session. Hand hygiene was completed before and after therapy session. Patient is not in enhanced droplet precautions. Janette Lu was present throughout this treatment.          Nereida Llamas PT  1/10/2021

## 2021-01-10 NOTE — PLAN OF CARE
Goal Outcome Evaluation:  Plan of Care Reviewed With: patient  Progress: improving  Outcome Summary: Pt rested well this shift.  Up to bathroom several times.  Uses call light for assistance.  c/o pain to hip and bottom.  small bm 1/9/21. labs drawn at 0430. meds whole with water.

## 2021-01-10 NOTE — PROGRESS NOTES
"   LOS: 14 days   Patient Care Team:  Elsy Ventura as PCP - General (Nurse Practitioner)  Elsy Ventura as Nurse Practitioner (Nurse Practitioner)    Chief Complaint:   1. Lfemur fx s/p Bipolar hemiarthroplasty  2. Previous LHP/CVA  3. Gouty arthritis  4. HTN  5. HLD  6. Factor V Leiden        Subjective     History of Present Illness    Subjective  Seen and examined, no acute events overnight. Feels OK, denies chest pain, shortness of breath, f/c.  Continues with left hip discomfort, improves with prn pain meds, worse with movement. Also reports poor sleep last night       History taken from: patient    Objective     Vital Signs  Temp:  [97.6 °F (36.4 °C)-98.2 °F (36.8 °C)] 97.9 °F (36.6 °C)  Heart Rate:  [76-82] 82  Resp:  [16-19] 19  BP: (106-117)/(55-61) 117/60    Objective:  Vital signs: (most recent): Blood pressure 117/60, pulse 82, temperature 97.9 °F (36.6 °C), temperature source Oral, resp. rate 19, height 170.2 cm (67\"), weight 113 kg (249 lb 12.8 oz), SpO2 96 %.          Gen: NAD. Sitting in WC at bedside    HEENT: Trach midline.   Pulm: normal respirations; no r/r/w; non-labored   Heart; RRR;    Abd: soft; NT/ND;   Neuro: verbal; appropriate   Skin: no rashes on exposed skin   Blister posterior to left hip incision - dressed  Left hip incision - scant serous drainage, no erythema surrounding, no fluctuance or induration  Ext- no distal edema  Neuro - Pain inhibition LLE    Results Review:     I reviewed the patient's new clinical results.  Results from last 7 days   Lab Units 01/10/21  0440 01/07/21  0636 01/04/21  0721   WBC 10*3/mm3 4.47 6.14 7.35   HEMOGLOBIN g/dL 8.4* 8.3* 8.8*   HEMATOCRIT % 26.0* 27.7* 28.5*   PLATELETS 10*3/mm3 279 340 343       Results from last 7 days   Lab Units 01/10/21  0440 01/08/21  0614 01/07/21  0636 01/04/21  0721   SODIUM mmol/L 138 138 138 140   POTASSIUM mmol/L 3.5 4.1 4.1 4.8   CHLORIDE mmol/L 97* 97* 98 104   CO2 mmol/L 30.5* 29.1* 28.9 23.5   BUN mg/dL 24* " 24* 22 21   CREATININE mg/dL 1.74* 1.81* 1.70* 1.68*   CALCIUM mg/dL 7.6* 7.5* 7.6* 7.7*   BILIRUBIN mg/dL  --  0.5 0.5 0.5   ALK PHOS U/L  --  66 65 69   ALT (SGPT) U/L  --  14 15 18   AST (SGOT) U/L  --  27 27 29   GLUCOSE mg/dL 118* 111* 97 104*       Results from last 7 days   Lab Units 01/10/21  0440 01/09/21  0841 01/08/21  0614   INR  2.65* 2.36* 2.21*       Medication Review: done  Scheduled Meds:allopurinol, 100 mg, Oral, Daily  amLODIPine, 5 mg, Oral, Q24H  atorvastatin, 20 mg, Oral, Nightly  cholecalciferol, 2,000 Units, Oral, Daily  folic acid, 1 mg, Oral, Daily  levothyroxine, 100 mcg, Oral, Q AM  miconazole nitrate, , Topical, Q12H  polyethylene glycol, 17 g, Oral, Daily  potassium chloride, 40 mEq, Oral, Once  senna-docusate sodium, 2 tablet, Oral, Nightly  sertraline, 50 mg, Oral, Daily  torsemide, 40 mg, Oral, Daily  traZODone, 25 mg, Oral, Nightly  warfarin, 5 mg, Oral, Daily With Dinner      Continuous Infusions:Pharmacy to dose warfarin,       PRN Meds:.•  acetaminophen  •  calcium carbonate  •  hydrALAZINE  •  HYDROcodone-acetaminophen  •  melatonin  •  ondansetron  •  Pharmacy to dose warfarin  •  polyethylene glycol  •  senna      Assessment/Plan       S/p left hip fracture      Assessment & Plan    Left femur fx s/p Bipolar hemiarthroplasty  anterior approach bipolar hemiarthroplasty on 12/22.  Patient is now weightbearing as tolerated.   Jan 6 - mild serous drainage at inferior aspect of incision. D/C every other staple on superior half of incision  Jan 8 - scant serous drainage. Will remove every other staple from inferior half of incision    Previous LHP/CVA    Gouty arthritis    HTN  Dec 31 -  today. Home med does not list any anti-hypertensive medication. Presently on amlodipine 5 mg daily.  Will add hydralazine 10 milligrams p.o. every 8 hours as needed systolic blood pressure greater than 170  Jan 6 - amlodipine/torsemide added recently, increased to 80 mg daily  Jan 8 -  torsemide changed back to 40 mg daily    HLD    Renal insuff - Cr 1.73 at outside hospital  Dec 1-creatinine 1.77  Dec 6 - followed by Nephrology . Torsemide added recently.   Jan 8 - creatinine stable at 1.7, torsemide changed back to 40 mg daily    Anemia - HGB 8.9 on Dec 26  Dec 31-hemoglobin 8.9  Jan 7 - hemoglobin 8.3    Factor V Leiden - coumadin 6 mg daily and ASA 81 mg daily at home(presently off ASA)   Jan 6 - INR 2.09 yesterday, on coumadin 5 mg.   Jan 8 - INR 2.21       DVT prophylaxis - coumadin/ SCDs    Pain management - FERN reviewed. On Norco 7.5 mg q 4 hours prn, ice pack    GI - constipation - multi-factorial - pain /immobility/pain med  Dec 30 - change colace to Senokot-S, add miralax    Impaired sleep  Dec 30 - trazodone 25 mg added on Dec 28  December 31-reports slept better      Now admit for comprehensive acute inpatient rehabilitation .  This would be an interdisciplinary program with physical therapy 1 hour,  occupational therapy 1 hour, and speech therapy 1 hour, 5 days a week.  Rehabilitation nursing for carryover, monitoring of  Incision, pain, volume  status, bowel and bladder, and skin  Ongoing physician follow-up.  Weekly team conferences.      Goal is for home with  Home health   therapies.  Barrier to discharge:  Impaired mobility and ADLs  - work on  Strength, transfers, balance, gait, ADLs  to overcome.     1/9  - Continue comprehensive inpatient rehabilitation program  - Continue prn norco for pain  - Continue warfarin, INR therapeutic  - Remove remaining staples from left hip    1/10  - Continue comprehensive inpatient rehabilitation program  - PRN melatonin for sleep  - Labs reviewed, creatinine stable, K slightly low, repleted per nephrology  - INR therapeutic, continue warfarin          Phillip Jolley MD  01/10/21  13:31 EST    Time:   During rounds, used appropriate personal protective equipment including mask and gloves.  Additional gown if indicated.  Mask used was  standard procedure mask. Appropriate PPE was worn during the entire visit.  Hand hygiene was completed before and after.

## 2021-01-10 NOTE — PLAN OF CARE
Goal Outcome Evaluation:  Plan of Care Reviewed With: patient  Progress: improving  Outcome Summary: Patient continent of b/b, assist x1 with walker, and takes medication with water. Pain medication x 2, Tums with all meals, and accucheck AC/HS- no coverage needed. Patient complained of soreness to buttock- orange cream applied twice, VS stable and no unsafe behavior.

## 2021-01-10 NOTE — PROGRESS NOTES
NEPHROLOGY PROGRESS NOTE    PATIENT IDENTIFICATION:   Name:  Alice Maxwell      MRN:  [id number]     75 y.o.  female             Reason for visit: Follow-up acute kidney injury    SUBJECTIVE:   The patient is feeling better today denies any chest pain or shortness of air, no orthopnea or PND, no nausea or vomiting, no abdominal pain, no dysuria or gross hematuria.  Tolerating her rehab very well.    OBJECTIVE:  Vitals:    01/09/21 0914 01/09/21 1441 01/09/21 1951 01/10/21 0629   BP: 134/63 109/55 116/58 106/61   BP Location:  Right arm Right arm Right arm   Patient Position:  Lying Lying Lying   Pulse: 73 79 76 78   Resp:  16 18 18   Temp:  98.2 °F (36.8 °C) 98 °F (36.7 °C) 97.6 °F (36.4 °C)   TempSrc:  Oral Oral Oral   SpO2:  98% 97% 95%   Weight:       Height:               Body mass index is 39.12 kg/m².  No intake or output data in the 24 hours ending 01/10/21 1223      Exam:  General Appearance: alert, oriented x 3, no acute distress,   Skin: warm and dry  HEENT: pupils round and reactive to light, oral mucosa normal, nonicteric sclera  Neck: supple, no JVD, trachea midline  Lungs: CTA, unlabored breathing effort  Heart: RRR, normal S1 and S2, no S3, no rub  Abdomen: soft, nontender, normoactive bowels  : no palpable bladder,  Extremities: no edema, cyanosis or clubbing  Neuro: normal speech and mental status        Scheduled meds:  allopurinol, 100 mg, Oral, Daily  amLODIPine, 5 mg, Oral, Q24H  atorvastatin, 20 mg, Oral, Nightly  cholecalciferol, 2,000 Units, Oral, Daily  folic acid, 1 mg, Oral, Daily  levothyroxine, 100 mcg, Oral, Q AM  miconazole nitrate, , Topical, Q12H  polyethylene glycol, 17 g, Oral, Daily  senna-docusate sodium, 2 tablet, Oral, Nightly  sertraline, 50 mg, Oral, Daily  torsemide, 40 mg, Oral, Daily  traZODone, 25 mg, Oral, Nightly  warfarin, 5 mg, Oral, Daily With Dinner      IV meds:                      Pharmacy to dose warfarin,         Data Review:    Results from last 7 days    Lab Units 01/10/21  0440 01/08/21  0614 01/07/21  0636 01/04/21  0721   SODIUM mmol/L 138 138 138 140   POTASSIUM mmol/L 3.5 4.1 4.1 4.8   CHLORIDE mmol/L 97* 97* 98 104   CO2 mmol/L 30.5* 29.1* 28.9 23.5   BUN mg/dL 24* 24* 22 21   CREATININE mg/dL 1.74* 1.81* 1.70* 1.68*   CALCIUM mg/dL 7.6* 7.5* 7.6* 7.7*   BILIRUBIN mg/dL  --  0.5 0.5 0.5   ALK PHOS U/L  --  66 65 69   ALT (SGPT) U/L  --  14 15 18   AST (SGOT) U/L  --  27 27 29   GLUCOSE mg/dL 118* 111* 97 104*       Estimated Creatinine Clearance: 35.1 mL/min (A) (by C-G formula based on SCr of 1.74 mg/dL (H)).    Results from last 7 days   Lab Units 01/10/21  0440 01/04/21  0721   MAGNESIUM mg/dL 1.9  --    PHOSPHORUS mg/dL 4.2 4.3       Results from last 7 days   Lab Units 01/10/21  0440 01/07/21  0636 01/04/21  0721   WBC 10*3/mm3 4.47 6.14 7.35   HEMOGLOBIN g/dL 8.4* 8.3* 8.8*   PLATELETS 10*3/mm3 279 340 343       Results from last 7 days   Lab Units 01/10/21  0440 01/09/21  0841 01/08/21  0614 01/07/21  0636 01/06/21  0850   INR  2.65* 2.36* 2.21* 2.05* 1.84*             ASSESSMENT:   1.  Acute kidney injury associated with vancomycin nephrotoxicity suspected from [facility] as per discharge summary, creatinine today 1.74, stable, electrolyte within acceptable range   2.  Hypertension, controlled  3.  Left femur fracture status post hemiarthroplasty on 12/22/2020  4.  Factor V Leiden mutation, anticoagulated  5.  Dyslipidemia on statin  6.  Gout, quiescent  7.  Hypothyroidism, treated  8.  Anemia, hemoglobin today is 8.4    Plan:  1.  Continue the same treatment  2.  Replete potassium  3.  Surveillance lab         David Gill MD  1/10/2021    12:23 EST

## 2021-01-10 NOTE — PROGRESS NOTES
Pharmacy Consult: Warfarin Dosing/ Monitoring    Alice Maxwell is a 75 y.o. female, estimated creatinine clearance is 35.1 mL/min (A) (by C-G formula based on SCr of 1.74 mg/dL (H)). weighing 113 kg (249 lb 12.8 oz).     has a past medical history of Abnormal Pap smear of cervix, Arthritis, Cancer (CMS/HCC), Disease of thyroid gland, Osteoporosis, Stroke (CMS/HCC), Stroke (CMS/HCC), and Urinary tract infection.    Social History     Tobacco Use   • Smoking status: Never Smoker   Substance Use Topics   • Alcohol use: No   • Drug use: No     Results from last 7 days   Lab Units 01/10/21  0440 01/09/21  0841 01/08/21  0614 01/07/21  0636 01/06/21  0850 01/05/21  0846 01/04/21  0721   INR  2.65* 2.36* 2.21* 2.05* 1.84* 2.09* 2.45*   HEMOGLOBIN g/dL 8.4*  --   --  8.3*  --   --  8.8*   HEMATOCRIT % 26.0*  --   --  27.7*  --   --  28.5*   PLATELETS 10*3/mm3 279  --   --  340  --   --  343     Results from last 7 days   Lab Units 01/10/21  0440 01/08/21  0614 01/07/21  0636 01/04/21  0721   SODIUM mmol/L 138 138 138 140   POTASSIUM mmol/L 3.5 4.1 4.1 4.8   CHLORIDE mmol/L 97* 97* 98 104   CO2 mmol/L 30.5* 29.1* 28.9 23.5   BUN mg/dL 24* 24* 22 21   CREATININE mg/dL 1.74* 1.81* 1.70* 1.68*   CALCIUM mg/dL 7.6* 7.5* 7.6* 7.7*   BILIRUBIN mg/dL  --  0.5 0.5 0.5   ALK PHOS U/L  --  66 65 69   ALT (SGPT) U/L  --  14 15 18   AST (SGOT) U/L  --  27 27 29   GLUCOSE mg/dL 118* 111* 97 104*     Anticoagulation history: Warfarin 6mg daily (home dose). Warfarin managed by [facility] for history of Factor V Leiden.    Hospital Anticoagulation:  Consulting provider: Aric Chao MD  Start date: 12/27/20  Indication: Post Surgical - Hip, hypercoagulable disorder and hx of CVA  Target INR: 2-3  Expected duration: indefinite   Bridge Therapy: No               Date 12/27 12/28 12/29 12/30 12/31 1/1 1/2 1/3 1/4 1/5 1/6 1/7 1/8 1/9 1/10    INR 1.74 2.04 2.39 2.73 3.44 2.85 3.05 3.11 2.45 2.09 1.84 2.05 2.21 2.36 2.36     Warfarin dose 6mg 6mg 6mg 5mg HOLD 4mg 2mg 1mg 3mg 4mg 5mg 5mg 5mg 5 mg 4 mg      Potential drug interactions:   • Allopurinol - may enhance the anticoagulant effect of warfarin (home med)  • Levothyroxine- may enhance the anticoagulant effect of warfarin (home med)  • Norco - Patients taking acetaminophen at higher doses and/or for longer duration may be at greater risk for experiencing a clinically significant interaction (not listed on med rec)   • Trazodone- May diminish the anticoagulant effect of warfarin. (not listed on med rec)    Relevant nutrition status: regular diet; eating better per RN notes    Education complete?/ Date: Home medication    Assessment/Plan:  · INR is therapeutic at 2.65 (Goal 2-3). The regimen continues to uptrend with no signs of nearing plateau, therefore I will reduce the regimen to warfarin 4 mg daily (previously 5 mg QD) to prevent overshooting the INR.  · The INR will be evaluated daily to adjust regimen to goal.    Pharmacy will continue to follow until discharge or discontinuation of warfarin.     Guadalupe George, PharmD  01/10/21 15:06 EST

## 2021-01-11 LAB
ALBUMIN SERPL-MCNC: 4.2 G/DL (ref 3.5–5.2)
ALBUMIN/GLOB SERPL: 1.4 G/DL
ALP SERPL-CCNC: 71 U/L (ref 39–117)
ALT SERPL W P-5'-P-CCNC: 12 U/L (ref 1–33)
ANION GAP SERPL CALCULATED.3IONS-SCNC: 11.1 MMOL/L (ref 5–15)
AST SERPL-CCNC: 25 U/L (ref 1–32)
BASOPHILS # BLD AUTO: 0.04 10*3/MM3 (ref 0–0.2)
BASOPHILS NFR BLD AUTO: 0.7 % (ref 0–1.5)
BILIRUB SERPL-MCNC: 0.6 MG/DL (ref 0–1.2)
BUN SERPL-MCNC: 25 MG/DL (ref 8–23)
BUN/CREAT SERPL: 12.9 (ref 7–25)
CALCIUM SPEC-SCNC: 7.8 MG/DL (ref 8.6–10.5)
CHLORIDE SERPL-SCNC: 94 MMOL/L (ref 98–107)
CO2 SERPL-SCNC: 30.9 MMOL/L (ref 22–29)
CREAT SERPL-MCNC: 1.94 MG/DL (ref 0.57–1)
DEPRECATED RDW RBC AUTO: 46.4 FL (ref 37–54)
EOSINOPHIL # BLD AUTO: 0.07 10*3/MM3 (ref 0–0.4)
EOSINOPHIL NFR BLD AUTO: 1.1 % (ref 0.3–6.2)
ERYTHROCYTE [DISTWIDTH] IN BLOOD BY AUTOMATED COUNT: 14.4 % (ref 12.3–15.4)
GFR SERPL CREATININE-BSD FRML MDRD: 25 ML/MIN/1.73
GLOBULIN UR ELPH-MCNC: 3.1 GM/DL
GLUCOSE BLDC GLUCOMTR-MCNC: 112 MG/DL (ref 70–130)
GLUCOSE BLDC GLUCOMTR-MCNC: 114 MG/DL (ref 70–130)
GLUCOSE BLDC GLUCOMTR-MCNC: 128 MG/DL (ref 70–130)
GLUCOSE BLDC GLUCOMTR-MCNC: 134 MG/DL (ref 70–130)
GLUCOSE SERPL-MCNC: 170 MG/DL (ref 65–99)
HCT VFR BLD AUTO: 30.3 % (ref 34–46.6)
HGB BLD-MCNC: 9.5 G/DL (ref 12–15.9)
IMM GRANULOCYTES # BLD AUTO: 0.02 10*3/MM3 (ref 0–0.05)
IMM GRANULOCYTES NFR BLD AUTO: 0.3 % (ref 0–0.5)
INR PPP: 2.73 (ref 0.9–1.1)
LYMPHOCYTES # BLD AUTO: 1.67 10*3/MM3 (ref 0.7–3.1)
LYMPHOCYTES NFR BLD AUTO: 27.3 % (ref 19.6–45.3)
MAGNESIUM SERPL-MCNC: 1.8 MG/DL (ref 1.6–2.4)
MCH RBC QN AUTO: 28.2 PG (ref 26.6–33)
MCHC RBC AUTO-ENTMCNC: 31.4 G/DL (ref 31.5–35.7)
MCV RBC AUTO: 89.9 FL (ref 79–97)
MONOCYTES # BLD AUTO: 0.54 10*3/MM3 (ref 0.1–0.9)
MONOCYTES NFR BLD AUTO: 8.8 % (ref 5–12)
NEUTROPHILS NFR BLD AUTO: 3.77 10*3/MM3 (ref 1.7–7)
NEUTROPHILS NFR BLD AUTO: 61.8 % (ref 42.7–76)
NRBC BLD AUTO-RTO: 0.2 /100 WBC (ref 0–0.2)
PHOSPHATE SERPL-MCNC: 3.5 MG/DL (ref 2.5–4.5)
PLATELET # BLD AUTO: 345 10*3/MM3 (ref 140–450)
PMV BLD AUTO: 10.2 FL (ref 6–12)
POTASSIUM SERPL-SCNC: 3.6 MMOL/L (ref 3.5–5.2)
PROT SERPL-MCNC: 7.3 G/DL (ref 6–8.5)
PROTHROMBIN TIME: 28.7 SECONDS (ref 11.7–14.2)
RBC # BLD AUTO: 3.37 10*6/MM3 (ref 3.77–5.28)
SODIUM SERPL-SCNC: 136 MMOL/L (ref 136–145)
URATE SERPL-MCNC: 8.4 MG/DL (ref 2.4–5.7)
WBC # BLD AUTO: 6.11 10*3/MM3 (ref 3.4–10.8)

## 2021-01-11 PROCEDURE — 97129 THER IVNTJ 1ST 15 MIN: CPT

## 2021-01-11 PROCEDURE — 97535 SELF CARE MNGMENT TRAINING: CPT

## 2021-01-11 PROCEDURE — 97110 THERAPEUTIC EXERCISES: CPT

## 2021-01-11 PROCEDURE — 85025 COMPLETE CBC W/AUTO DIFF WBC: CPT | Performed by: PHYSICAL MEDICINE & REHABILITATION

## 2021-01-11 PROCEDURE — 84550 ASSAY OF BLOOD/URIC ACID: CPT | Performed by: INTERNAL MEDICINE

## 2021-01-11 PROCEDURE — 84100 ASSAY OF PHOSPHORUS: CPT | Performed by: INTERNAL MEDICINE

## 2021-01-11 PROCEDURE — 80053 COMPREHEN METABOLIC PANEL: CPT | Performed by: PHYSICAL MEDICINE & REHABILITATION

## 2021-01-11 PROCEDURE — 85610 PROTHROMBIN TIME: CPT | Performed by: PHYSICAL MEDICINE & REHABILITATION

## 2021-01-11 PROCEDURE — 82962 GLUCOSE BLOOD TEST: CPT

## 2021-01-11 PROCEDURE — 97130 THER IVNTJ EA ADDL 15 MIN: CPT

## 2021-01-11 PROCEDURE — 83735 ASSAY OF MAGNESIUM: CPT | Performed by: INTERNAL MEDICINE

## 2021-01-11 RX ADMIN — ALLOPURINOL 100 MG: 100 TABLET ORAL at 07:50

## 2021-01-11 RX ADMIN — AMLODIPINE BESYLATE 5 MG: 5 TABLET ORAL at 07:49

## 2021-01-11 RX ADMIN — MICONAZOLE NITRATE 1 APPLICATION: 2 OINTMENT TOPICAL at 21:32

## 2021-01-11 RX ADMIN — CALCIUM CARBONATE 1 TABLET: 500 TABLET, CHEWABLE ORAL at 07:27

## 2021-01-11 RX ADMIN — HYDROCODONE BITARTRATE AND ACETAMINOPHEN 1 TABLET: 7.5; 325 TABLET ORAL at 21:31

## 2021-01-11 RX ADMIN — LEVOTHYROXINE SODIUM 100 MCG: 0.1 TABLET ORAL at 05:53

## 2021-01-11 RX ADMIN — HYDROCODONE BITARTRATE AND ACETAMINOPHEN 1 TABLET: 7.5; 325 TABLET ORAL at 15:23

## 2021-01-11 RX ADMIN — Medication 2000 UNITS: at 07:49

## 2021-01-11 RX ADMIN — FOLIC ACID 1 MG: 1 TABLET ORAL at 07:49

## 2021-01-11 RX ADMIN — HYDROCODONE BITARTRATE AND ACETAMINOPHEN 1 TABLET: 7.5; 325 TABLET ORAL at 00:55

## 2021-01-11 RX ADMIN — ATORVASTATIN CALCIUM 20 MG: 20 TABLET, FILM COATED ORAL at 21:31

## 2021-01-11 RX ADMIN — SERTRALINE HYDROCHLORIDE 50 MG: 50 TABLET, FILM COATED ORAL at 07:49

## 2021-01-11 RX ADMIN — DOCUSATE SODIUM 50MG AND SENNOSIDES 8.6MG 2 TABLET: 8.6; 5 TABLET, FILM COATED ORAL at 21:31

## 2021-01-11 RX ADMIN — WARFARIN 4 MG: 4 TABLET ORAL at 16:33

## 2021-01-11 RX ADMIN — TRAZODONE HYDROCHLORIDE 25 MG: 50 TABLET ORAL at 21:31

## 2021-01-11 RX ADMIN — HYDROCODONE BITARTRATE AND ACETAMINOPHEN 1 TABLET: 7.5; 325 TABLET ORAL at 05:53

## 2021-01-11 RX ADMIN — Medication 3 MG: at 21:31

## 2021-01-11 RX ADMIN — CALCIUM CARBONATE 1 TABLET: 500 TABLET, CHEWABLE ORAL at 16:34

## 2021-01-11 RX ADMIN — HYDROCODONE BITARTRATE AND ACETAMINOPHEN 1 TABLET: 7.5; 325 TABLET ORAL at 09:59

## 2021-01-11 RX ADMIN — CALCIUM CARBONATE 1 TABLET: 500 TABLET, CHEWABLE ORAL at 11:41

## 2021-01-11 RX ADMIN — TORSEMIDE 40 MG: 20 TABLET ORAL at 07:49

## 2021-01-11 RX ADMIN — POLYETHYLENE GLYCOL 3350 17 G: 17 POWDER, FOR SOLUTION ORAL at 07:48

## 2021-01-11 NOTE — PROGRESS NOTES
Adult Nutrition  Assessment/PES    Patient Name:  Alice Maxwell  YOB: 1945  MRN: [id number]  Admit Date:  12/27/2020    Assessment Date:  1/11/2021      Reason for Assessment     Row Name 01/11/21 1525          Reason for Assessment    Reason For Assessment  follow-up protocol             Labs/Tests/Procedures/Meds     Row Name 01/11/21 1525          Labs/Procedures/Meds    Lab Results Reviewed  reviewed     Lab Results Comments  BUN, Cl, Cr, h/h        Diagnostic Tests/Procedures    Diagnostic Test/Procedure Reviewed  reviewed        Medications    Pertinent Medications Reviewed  reviewed         Physical Findings     Row Name 01/11/21 1526          Physical Findings    Overall Physical Appearance  obese     Skin  other (see comments) L anterior thigh incision, groin blisters noted, Rock Walsh           Nutrition Prescription Ordered     Row Name 01/11/21 1526          Nutrition Prescription PO    Current PO Diet  Regular     Common Modifiers  Low Potassium         Evaluation of Received Nutrient/Fluid Intake     Row Name 01/11/21 1526          PO Evaluation    % PO Intake  %               Problem/Interventions:      Intervention Goal     Row Name 01/11/21 1526          Intervention Goal    General  Maintain nutrition;Disease management/therapy     PO  Maintain intake;PO intake (%)     PO Intake %  75 %     Weight  Appropriate weight loss         Nutrition Intervention     Row Name 01/11/21 1526          Nutrition Intervention    RD/Tech Action  Follow Tx progress;Care plan reviewd           Education/Evaluation     Row Name 01/11/21 1526          Education    Education  Previous education by RD/LD        Monitor/Evaluation    Monitor  Per protocol;PO intake;Pertinent labs;Weight;Skin status;Symptoms           Electronically signed by:  Modesta Ramirez RD  01/11/21 15:26 EST

## 2021-01-11 NOTE — THERAPY TREATMENT NOTE
Inpatient Rehabilitation - Occupational Therapy Treatment Note    [facility]     Patient Name: Alice Maxwell  : 1945  MRN: [id number]    Today's Date: 2021                 Admit Date: 2020         ICD-10-CM ICD-9-CM   1. Heartburn  R12 787.1       Patient Active Problem List   Diagnosis   • Stroke (CMS/HCC)   • Personal history of breast cancer   • History of loop electrical excision procedure (LEEP)   • History of abnormal cervical Pap smear   • S/p left hip fracture       Past Medical History:   Diagnosis Date   • Abnormal Pap smear of cervix    • Arthritis    • Cancer (CMS/HCC)     breast   • Disease of thyroid gland    • Osteoporosis    • Stroke (CMS/HCC)    • Stroke (CMS/HCC)     x2   • Urinary tract infection        Past Surgical History:   Procedure Laterality Date   • BACK SURGERY     • BREAST LUMPECTOMY     • CERVICAL BIOPSY  W/ LOOP ELECTRODE EXCISION     • CHOLECYSTECTOMY     • COLONOSCOPY      had polyps    • FOOT SURGERY     • GASTRIC BYPASS     • HERNIA REPAIR     • THYROIDECTOMY                  IRF OT ASSESSMENT FLOWSHEET (last 12 hours)      IRF OT Evaluation and Treatment     Row Name 21 0911          OT Time and Intention    Document Type  daily treatment  -AF     Mode of Treatment  occupational therapy  -AF     Patient Effort  good  -AF     Symptoms Noted During/After Treatment  fatigue  -AF     Row Name 21 0911          General Information    Patient/Family/Caregiver Comments/Observations  pt sitting up in w/c in room agreeable to shower   -AF     Existing Precautions/Restrictions  fall;hip, anterior;left  -AF     Row Name 21 0911          Cognition/Psychosocial    Affect/Mental Status (Cognitive)  WFL  -AF     Orientation Status (Cognition)  oriented x 4  -AF     Follows Commands (Cognition)  follows three-step commands;increased processing time needed;verbal cues/prompting required;repetition of directions required  -AF     Personal Safety  Interventions  fall prevention program maintained;gait belt;nonskid shoes/slippers when out of bed  -AF     Comment, Cognition  poor carry over with use of Adaptive equipment for LBD tasks   -AF     Row Name 01/11/21 0911          Pain Scale: Numbers Pre/Post-Treatment    Pretreatment Pain Rating  0/10 - no pain  -AF     Posttreatment Pain Rating  0/10 - no pain  -AF     Pre/Posttreatment Pain Comment  stated that her L hip was swollen   -AF     Row Name 01/11/21 0911          Bed Mobility    Comment (Bed Mobility)  up in w/c   -AF     Row Name 01/11/21 0911          Functional Mobility    Functional Mobility- Comment  walked from commode to shower seat with CGA/SBA with josh fo grab abr   -AF     Row Name 01/11/21 0911          Transfer Assessment/Treatment    Transfers  shower transfer  -AF     Row Name 01/11/21 0911          Transfers    [place] Level (Toilet Transfer)  standby assist;verbal cues  -AF     Assistive Device (Toilet Transfer)  commode;grab bars/safety frame;wheelchair  -AF     [place] Level (Shower Transfer)  stand by assist  -AF     Assistive Device (Shower Transfer)  grab bar, tub/shower;wheelchair;tub bench  -AF     Row Name 01/11/21 0911          Toilet Transfer    Type (Toilet Transfer)  stand pivot/stand step  -AF     Row Name 01/11/21 0911          Shower Transfer    Type (Shower Transfer)  stand pivot/stand step  -AF     Row Name 01/11/21 0911          Motor Skills    Functional Endurance  fair c/o of fatgiue at end of ADL   -AF     Row Name 01/11/21 0911          Bathing    [place] Level (Bathing)  bathing skills;contact guard assist;verbal cues  -AF     Assistive Device (Bathing)  grab bar/tub rail;hand held shower spray hose;long-handled sponge;tub bench  -AF     Position (Bathing)  supported sitting;supported standing  -AF     Comment (Bathing)  with encouragement to complete on her own   -AF     Row Name 01/11/21 0911          Upper Body Dressing    [place] Level  (Upper Body Dressing)  upper body dressing skills;set up assistance  -AF     Position (Upper Body Dressing)  supported sitting  -AF     Comment (Upper Body Dressing)  seated on shower chair   -AF     Row Name 01/11/21 0911          Lower Body Dressing    [place] Level (Lower Body Dressing)  doff;don;pants/bottoms;shoes/slippers;socks;moderate assist (50% patient effort);verbal cues  -AF     Assistive Device Use (Lower Body Dressing)  reacher  -AF     Position (Lower Body Dressing)  supported standing;supported sitting  -AF     Set-up Assistance (Lower Body Dressing)  obtain clothing  -AF     Row Name 01/11/21 0911          Grooming    [place] Level (Grooming)  grooming skills;set up  -AF     Position (Grooming)  supported sitting  -AF     Set-up Assistance (Grooming)  obtain supplies  -AF     Row Name 01/11/21 0911          Toileting    [place] Level (Toileting)  toileting skills;minimum assist (75% patient effort)  -AF     Position (Toileting)  supported sitting;supported standing  -AF     Comment (Toileting)  assist with hygiene   -AF     Row Name 01/11/21 0911          Positioning and Restraints    Pre-Treatment Position  sitting in chair/recliner  -AF     Post Treatment Position  wheelchair  -AF     In Wheelchair  sitting;exit alarm on;with PT  -AF       User Key  (r) = Recorded By, (t) = Taken By, (c) = Cosigned By    Initials Name Effective Dates    Misa Barlow, OTR 04/14/20 -            Occupational Therapy Education                 Title: PT OT SLP Therapies (In Progress)     Topic: Occupational Therapy (In Progress)     Point: ADL training (Done)     Description:   Instruct learner(s) on proper safety adaptation and remediation techniques during self care or transfers.   Instruct in proper use of assistive devices.              Learning Progress Summary           Patient Acceptance, E, VU,NR by AF at 1/11/2021 0916    Comment: educated on using LH sponge and LH reacher with ADL tasks to  improve her independence    Acceptance, E,D, VU,NR by AF at 12/30/2020 1426    Comment: educated on LH sponge and LH reacher with LB dressing tasks will require further edcuation on process    Acceptance, E, VU,NR by AF at 12/28/2020 1418    Comment: educated on safety with ADLs and transfers                   Point: Home exercise program (Not Started)     Description:   Instruct learner(s) on appropriate technique for monitoring, assisting and/or progressing therapeutic exercises/activities.              Learner Progress:  Not documented in this visit.          Point: Precautions (Not Started)     Description:   Instruct learner(s) on prescribed precautions during self-care and functional transfers.              Learner Progress:  Not documented in this visit.          Point: Body mechanics (Not Started)     Description:   Instruct learner(s) on proper positioning and spine alignment during self-care, functional mobility activities and/or exercises.              Learner Progress:  Not documented in this visit.                      User Key     Initials Effective Dates Name Provider Type Discipline    AF 04/14/20 -  Misa Oneal, OTR Occupational Therapist OT                    OT Recommendation and Plan    Planned Therapy Interventions (OT): activity tolerance training, BADL retraining, patient/caregiver education/training, ROM/therapeutic exercise, strengthening exercise, transfer/mobility retraining, occupation/activity based interventions                    Time Calculation:     Time Calculation- OT     Row Name 01/11/21 0917             Time Calculation- OT    OT Start Time  0800  -AF      OT Stop Time  0900  -AF      OT Time Calculation (min)  60 min  -AF        User Key  (r) = Recorded By, (t) = Taken By, (c) = Cosigned By    Initials Name Provider Type    AF Misa Oneal, OTR Occupational Therapist        Therapy Charges for Today     Code Description Service Date Service Provider Modifiers Qty     [id number]  OT SELF CARE/MGMT/TRAIN EA 15 MIN 1/11/2021 Misa Oneal, OTR GO 4                   KANDY Adames  1/11/2021

## 2021-01-11 NOTE — PROGRESS NOTES
Inpatient Rehabilitation Functional Measures Assessment and Plan of Care    Plan of Care  Updated Problems/Interventions  Mobility    [OT] Toilet Transfers(Active)  Current Status(01/11/2021): SBA with RWX  Weekly Goal(01/11/2021): SBA with RWX  Discharge Goal: SBA with RWX    [OT] Tub/Shower Transfers(Active)  Current Status(01/11/2021): SBA to seat with back  Weekly Goal(01/11/2021): SBA  Discharge Goal: SBA        Self Care    [OT] Bathing(Active)  Current Status(01/11/2021): CGA  Weekly Goal(01/11/2021): CGA  Discharge Goal: CGA    [OT] Dressing (Lower)(Active)  Current Status(01/11/2021): mod A  Weekly Goal(01/12/2021): MOD  Discharge Goal: mod A with AE    [OT] Dressing (Upper)(Active)  Current Status(01/11/2021): set up  Weekly Goal(01/11/2021): set up  Discharge Goal: set up    [OT] Grooming(Active)  Current Status(01/11/2021): Set up  Weekly Goal(01/11/2021): set up  Discharge Goal: set up    [OT] Toileting(Active)  Current Status(01/11/2021): Min  Weekly Goal(01/12/2021): min A  Discharge Goal: min A    Functional Measures  YESI Eating:  Branch  YESI Grooming: Branch  YESI Bathing:  Branch  YESI Upper Body Dressing:  Branch  YESI Lower Body Dressing:  Branch  YESI Toileting:  Branch    YESI Bladder Management  Level of Assistance:  Branch  Frequency/Number of Accidents this Shift:  Branch    YESI Bowel Management  Level of Assistance: Branch  Frequency/Number of Accidents this Shift: Branch    YESI Bed/Chair/Wheelchair Transfer:  Branch  YESI Toilet Transfer:  Branch  YESI Tub/Shower Transfer:  Branch    Previously Documented Mode of Locomotion at Discharge: Field  YESI Expected Mode of Locomotion at Discharge: Branch  YESI Walk/Wheelchair:  Branch  YESI Stairs:  Branch    YESI Comprehension:  Branch  YESI Expression:  Branch  YESI Social Interaction:  Branch  YESI Problem Solving:  Branch  YESI Memory:  Branch    Therapy Mode Minutes  Occupational Therapy: Branch  Physical Therapy: Branch  Speech Language Pathology:   Branch    Signed by: Zenaida Saenz OT

## 2021-01-11 NOTE — PROGRESS NOTES
"   LOS: 15 days   Patient Care Team:  Elsy Ventura as PCP - General (Nurse Practitioner)  Elsy Ventura as Nurse Practitioner (Nurse Practitioner)    Chief Complaint:   1. Lfemur fx s/p Bipolar hemiarthroplasty  2. Previous LHP/CVA  3. Gouty arthritis  4. HTN  5. HLD  6. Factor V Leiden        Subjective     History of Present Illness    Subjective    Participating in therapies.Patient able to transfer bed-chair with rolling walker and SBA. Patient walking with rolling walker SB/CGA.  Family conference today.  Family teaching plan tomorrow and then discharge home.  Staples have been removed from the left hip incision.  Scant serous drainage from the midpoint of the incision.  Patient continues with left hip pain.  Reviewed recommendation to try to taper off of opioid over the next 2 weeks at home, follow-up with orthopedic surgery for any pain medication refill.    History taken from: patient    Objective     Vital Signs  Temp:  [97.6 °F (36.4 °C)-98.5 °F (36.9 °C)] 97.6 °F (36.4 °C)  Heart Rate:  [74-78] 78  Resp:  [16-20] 18  BP: (105-149)/(54-67) 149/67    Objective:  Vital signs: (most recent): Blood pressure 149/67, pulse 78, temperature 97.6 °F (36.4 °C), temperature source Oral, resp. rate 18, height 170.2 cm (67\"), weight 113 kg (249 lb 12.8 oz), SpO2 96 %.          Gen: NAD. Sitting in WC at bedside    HEENT: Trach midline.   Pulm: normal respirations; no r/r/w; non-labored   Heart; RRR;    Abd: soft; NT/ND;   Neuro: verbal; appropriate   Skin: no rashes on exposed skin   Blister posterior to left hip incision - dressed  Left hip incision - scant serous drainage at the midpoint of the incision, no erythema surrounding, no fluctuance or induration.  Staples out.  Ext- no distal edema  Neuro - Pain inhibition LLE.  Takes resistance distally in the bilateral lower extremities    Results Review:     I reviewed the patient's new clinical results.  Results from last 7 days   Lab Units 01/11/21  0807 " 01/10/21  0440 01/07/21  0636   WBC 10*3/mm3 6.11 4.47 6.14   HEMOGLOBIN g/dL 9.5* 8.4* 8.3*   HEMATOCRIT % 30.3* 26.0* 27.7*   PLATELETS 10*3/mm3 345 279 340       Results from last 7 days   Lab Units 01/11/21  0807 01/10/21  0440 01/08/21  0614 01/07/21  0636   SODIUM mmol/L 136 138 138 138   POTASSIUM mmol/L 3.6 3.5 4.1 4.1   CHLORIDE mmol/L 94* 97* 97* 98   CO2 mmol/L 30.9* 30.5* 29.1* 28.9   BUN mg/dL 25* 24* 24* 22   CREATININE mg/dL 1.94* 1.74* 1.81* 1.70*   CALCIUM mg/dL 7.8* 7.6* 7.5* 7.6*   BILIRUBIN mg/dL 0.6  --  0.5 0.5   ALK PHOS U/L 71  --  66 65   ALT (SGPT) U/L 12  --  14 15   AST (SGOT) U/L 25  --  27 27   GLUCOSE mg/dL 170* 118* 111* 97       Results from last 7 days   Lab Units 01/11/21  0807 01/10/21  0440 01/09/21  0841   INR  2.73* 2.65* 2.36*       Medication Review: done  Scheduled Meds:allopurinol, 100 mg, Oral, Daily  amLODIPine, 5 mg, Oral, Q24H  atorvastatin, 20 mg, Oral, Nightly  cholecalciferol, 2,000 Units, Oral, Daily  folic acid, 1 mg, Oral, Daily  levothyroxine, 100 mcg, Oral, Q AM  miconazole nitrate, , Topical, Q12H  polyethylene glycol, 17 g, Oral, Daily  senna-docusate sodium, 2 tablet, Oral, Nightly  sertraline, 50 mg, Oral, Daily  torsemide, 40 mg, Oral, Daily  traZODone, 25 mg, Oral, Nightly  warfarin, 4 mg, Oral, Daily With Dinner      Continuous Infusions:Pharmacy to dose warfarin,       PRN Meds:.•  acetaminophen  •  calcium carbonate  •  hydrALAZINE  •  HYDROcodone-acetaminophen  •  melatonin  •  ondansetron  •  Pharmacy to dose warfarin  •  polyethylene glycol  •  senna      Assessment/Plan       S/p left hip fracture      Assessment & Plan    Left femur fx s/p Bipolar hemiarthroplasty  anterior approach bipolar hemiarthroplasty on 12/22.  Patient is now weightbearing as tolerated.   Jan 6 - mild serous drainage at inferior aspect of incision. D/C every other staple on superior half of incision  Jan 8 - scant serous drainage. Will remove every other staple from inferior  half of incision    Previous LHP/CVA    Gouty arthritis    HTN  Dec 31 -  today. Home med does not list any anti-hypertensive medication. Presently on amlodipine 5 mg daily.  Will add hydralazine 10 milligrams p.o. every 8 hours as needed systolic blood pressure greater than 170  Jan 6 - amlodipine/torsemide added recently, increased to 80 mg daily  Jan 8 - torsemide changed back to 40 mg daily    HLD    Renal insuff - Cr 1.73 at outside hospital  Dec 1-creatinine 1.77  Dec 6 - followed by Nephrology . Torsemide added recently.   Jan 8 - creatinine stable at 1.7, torsemide changed back to 40 mg daily    Anemia - HGB 8.9 on Dec 26  Dec 31-hemoglobin 8.9  Jan 7 - hemoglobin 8.3    Factor V Leiden - coumadin 6 mg daily and ASA 81 mg daily at home(presently off ASA)   Jan 6 - INR 2.09 yesterday, on coumadin 5 mg.   Jan 8 - INR 2.21       DVT prophylaxis - coumadin/ SCDs    Pain management - FERN reviewed. On Norco 7.5 mg q 4 hours prn, ice pack  January 11-Staples have been removed from the left hip incision.  Scant serous drainage from the midpoint of the incision.  Patient continues with left hip pain.  Reviewed recommendation to try to taper off of opioid over the next 2 weeks at home, follow-up with orthopedic surgery for any pain medication refill.    GI - constipation - multi-factorial - pain /immobility/pain med  Dec 30 - change colace to Senokot-S, add miralax    Impaired sleep  Dec 30 - trazodone 25 mg added on Dec 28  December 31-reports slept better      Now admit for comprehensive acute inpatient rehabilitation .  This would be an interdisciplinary program with physical therapy 1 hour,  occupational therapy 1 hour, and speech therapy 1 hour, 5 days a week.  Rehabilitation nursing for carryover, monitoring of  Incision, pain, volume  status, bowel and bladder, and skin  Ongoing physician follow-up.  Weekly team conferences.      Goal is for home with  Home health   therapies.  Barrier to discharge:   Impaired mobility and ADLs  - work on  Strength, transfers, balance, gait, ADLs  to overcome.     January 11-family teaching tomorrow with anticipated discharge home.          Aric Chao MD  01/11/21  16:55 EST    Time:   During rounds, used appropriate personal protective equipment including mask and gloves.  Additional gown if indicated.  Mask used was standard procedure mask. Appropriate PPE was worn during the entire visit.  Hand hygiene was completed before and after.

## 2021-01-11 NOTE — PROGRESS NOTES
Pharmacy Consult: Warfarin Dosing/ Monitoring    Alice Maxwell is a 75 y.o. female, estimated creatinine clearance is 31.5 mL/min (A) (by C-G formula based on SCr of 1.94 mg/dL (H)). weighing 113 kg (249 lb 12.8 oz).     has a past medical history of Abnormal Pap smear of cervix, Arthritis, Cancer (CMS/HCC), Disease of thyroid gland, Osteoporosis, Stroke (CMS/HCC), Stroke (CMS/HCC), and Urinary tract infection.    Social History     Tobacco Use    Smoking status: Never Smoker   Substance Use Topics    Alcohol use: No    Drug use: No       Results from last 7 days   Lab Units 01/11/21  0807 01/10/21  0440 01/09/21  0841 01/08/21  0614 01/07/21  0636 01/06/21  0850 01/05/21  0846   INR  2.73* 2.65* 2.36* 2.21* 2.05* 1.84* 2.09*   HEMOGLOBIN g/dL 9.5* 8.4*  --   --  8.3*  --   --    HEMATOCRIT % 30.3* 26.0*  --   --  27.7*  --   --    PLATELETS 10*3/mm3 345 279  --   --  340  --   --      Results from last 7 days   Lab Units 01/11/21  0807 01/10/21  0440 01/08/21  0614 01/07/21  0636   SODIUM mmol/L 136 138 138 138   POTASSIUM mmol/L 3.6 3.5 4.1 4.1   CHLORIDE mmol/L 94* 97* 97* 98   CO2 mmol/L 30.9* 30.5* 29.1* 28.9   BUN mg/dL 25* 24* 24* 22   CREATININE mg/dL 1.94* 1.74* 1.81* 1.70*   CALCIUM mg/dL 7.8* 7.6* 7.5* 7.6*   BILIRUBIN mg/dL 0.6  --  0.5 0.5   ALK PHOS U/L 71  --  66 65   ALT (SGPT) U/L 12  --  14 15   AST (SGOT) U/L 25  --  27 27   GLUCOSE mg/dL 170* 118* 111* 97     Anticoagulation history: Warfarin 6mg daily (home dose). [facility] for history of Factor V Leiden.    Hospital Anticoagulation:  Consulting provider: Aric Chao MD  Start date: 12/27/20  Indication: Post Surgical - Hip, hypercoagulable disorder and hx of CVA  Target INR: 2-3  Expected duration: indefinite   Bridge Therapy: No               Date 12/29 12/30 12/31 1/1 1/2 1/3 1/4 1/5 1/6 1/7 1/8 1/9 1/10 1/11     INR 2.39 2.73 3.44 2.85 3.05 3.11 2.45 2.09 1.84 2.05 2.21 2.36 2.65 2.73      Warfarin dose 6mg 5mg HOLD 4mg 2mg 1mg 3mg 4mg 5mg 5mg 5mg 5 mg 4 mg 4mg       Potential drug interactions:   Allopurinol - may enhance the anticoagulant effect of warfarin (home med)  Levothyroxine- may enhance the anticoagulant effect of warfarin (home med)  Norco - Patients taking acetaminophen at higher doses and/or for longer duration may be at greater risk for experiencing a clinically significant interaction (not listed on med rec)   Trazodone- May diminish the anticoagulant effect of warfarin. (not listed on med rec)    Relevant nutrition status: regular diet; eating better per RN notes    Education complete?/ Date: Home medication    Assessment/Plan:  INR remains therapeutic at 2.73 (Goal 2-3).  Maintenance dose of warfarin was just decreased yesterday since INR had been trending upward, so will continue with lower dose of 4mg daily for now.  Will recheck INR tomorrow as warfarin dose recently changed and INR continues to trend upward.  The INR will be evaluated daily to adjust regimen to goal.    Pharmacy will continue to follow until discharge or discontinuation of warfarin.     Mando Wright, PharmD  01/11/21

## 2021-01-11 NOTE — PROGRESS NOTES
NEPHROLOGY PROGRESS NOTE    PATIENT IDENTIFICATION:   Name:  Alice Maxwell      MRN:  [id number]     75 y.o.  female             Reason for visit: Follow-up acute kidney injury    SUBJECTIVE:   The patient is feeling better today denies any chest pain or shortness of air, no orthopnea or PND, no nausea or vomiting, no abdominal pain, no dysuria or gross hematuria.  Tolerating her rehab very well.    OBJECTIVE:  Vitals:    01/10/21 1240 01/10/21 1900 01/11/21 0542 01/11/21 0546   BP: 117/60 105/57 105/54    BP Location: Right arm Right arm Right arm    Patient Position: Lying Lying Lying    Pulse: 82 75 74    Resp: 19 20 16    Temp: 97.9 °F (36.6 °C) 98.2 °F (36.8 °C) 98.5 °F (36.9 °C)    TempSrc: Oral Oral Oral    SpO2: 96% 96% 90% 94%   Weight:       Height:               Body mass index is 39.12 kg/m².    Intake/Output Summary (Last 24 hours) at 1/11/2021 1359  Last data filed at 1/11/2021 1200  Gross per 24 hour   Intake 860 ml   Output --   Net 860 ml         Exam:  General Appearance: alert, oriented x 3, no acute distress,   Skin: warm and dry  HEENT: pupils round and reactive to light, oral mucosa normal, nonicteric sclera  Neck: supple, no JVD, trachea midline  Lungs: CTA, unlabored breathing effort  Heart: RRR, normal S1 and S2, no S3, no rub  Abdomen: soft, nontender, normoactive bowels  : no palpable bladder,  Extremities: no edema, cyanosis or clubbing  Neuro: normal speech and mental status        Scheduled meds:  allopurinol, 100 mg, Oral, Daily  amLODIPine, 5 mg, Oral, Q24H  atorvastatin, 20 mg, Oral, Nightly  cholecalciferol, 2,000 Units, Oral, Daily  folic acid, 1 mg, Oral, Daily  levothyroxine, 100 mcg, Oral, Q AM  miconazole nitrate, , Topical, Q12H  polyethylene glycol, 17 g, Oral, Daily  senna-docusate sodium, 2 tablet, Oral, Nightly  sertraline, 50 mg, Oral, Daily  torsemide, 40 mg, Oral, Daily  traZODone, 25 mg, Oral, Nightly  warfarin, 4 mg, Oral, Daily With Dinner      IV meds:                       Pharmacy to dose warfarin,         Data Review:    Results from last 7 days   Lab Units 01/11/21  0807 01/10/21  0440 01/08/21  0614 01/07/21  0636   SODIUM mmol/L 136 138 138 138   POTASSIUM mmol/L 3.6 3.5 4.1 4.1   CHLORIDE mmol/L 94* 97* 97* 98   CO2 mmol/L 30.9* 30.5* 29.1* 28.9   BUN mg/dL 25* 24* 24* 22   CREATININE mg/dL 1.94* 1.74* 1.81* 1.70*   CALCIUM mg/dL 7.8* 7.6* 7.5* 7.6*   BILIRUBIN mg/dL 0.6  --  0.5 0.5   ALK PHOS U/L 71  --  66 65   ALT (SGPT) U/L 12  --  14 15   AST (SGOT) U/L 25  --  27 27   GLUCOSE mg/dL 170* 118* 111* 97       Estimated Creatinine Clearance: 31.5 mL/min (A) (by C-G formula based on SCr of 1.94 mg/dL (H)).    Results from last 7 days   Lab Units 01/11/21  0807 01/10/21  0440   MAGNESIUM mg/dL 1.8 1.9   PHOSPHORUS mg/dL 3.5 4.2       Results from last 7 days   Lab Units 01/11/21  0807 01/10/21  0440 01/07/21  0636   WBC 10*3/mm3 6.11 4.47 6.14   HEMOGLOBIN g/dL 9.5* 8.4* 8.3*   PLATELETS 10*3/mm3 345 279 340       Results from last 7 days   Lab Units 01/11/21  0807 01/10/21  0440 01/09/21  0841 01/08/21  0614 01/07/21  0636   INR  2.73* 2.65* 2.36* 2.21* 2.05*             ASSESSMENT:   1.  Acute kidney injury associated with vancomycin nephrotoxicity suspected from [facility] as per discharge summary, creatinine today 1.9, stable, electrolyte within acceptable range   2.  Hypertension, controlled  3.  Left femur fracture status post hemiarthroplasty on 12/22/2020  4.  Factor V Leiden mutation, anticoagulated  5.  Dyslipidemia on statin  6.  Gout, quiescent  7.  Hypothyroidism, treated  8.  Anemia, hemoglobin today is 9.5    Plan:  1.  Continue the same treatment  2.  Surveillance lab         Dodie Girard MD  1/11/2021    13:59 EST

## 2021-01-11 NOTE — PROGRESS NOTES
Inpatient Rehabilitation Plan of Care Note    Plan of Care  Care Plan Reviewed - Updates as Follows    Body Systems    [RN] Integumentary(Active)  Current Status(01/07/2021): Lt. hip incision, Lt. hip blister, & Crack in skin  Lt. groin area.  Weekly Goal(01/12/2021): Teach family/patient dressing changes. Incision  healing, no infection.  Discharge Goal: Incision, blister, & cracked areas healed.    Performed Intervention(s)  Daily skin inspections, wound care, assist with repositioning, & dressing  changes as ordered.      Psychosocial    Performed Intervention(s)  Verbalizes needs & concerns      Safety    Performed Intervention(s)  Items within reach (call bell, etc), safety rounds, falls precautions, &  bed/chair alarms.      Sphincter Control    Performed Intervention(s)  Monitor intake, output, & BM's, Encourage appropriate diet & fluids, & perineal  care.      Pain    Performed Intervention(s)  Pain medication as ordered, distraction, & repositioning assistance as needed.    Signed by: Verna Robin RN

## 2021-01-11 NOTE — THERAPY TREATMENT NOTE
Inpatient Rehabilitation - Physical Therapy Treatment Note       [facility]     Patient Name: Alice Maxwell  : 1945  MRN: [id number]    Today's Date: 2021                    Admit Date: 2020      Visit Dx:     ICD-10-CM ICD-9-CM   1. Heartburn  R12 787.1       Patient Active Problem List   Diagnosis   • Stroke (CMS/HCC)   • Personal history of breast cancer   • History of loop electrical excision procedure (LEEP)   • History of abnormal cervical Pap smear   • S/p left hip fracture       Past Medical History:   Diagnosis Date   • Abnormal Pap smear of cervix    • Arthritis    • Cancer (CMS/HCC)     breast   • Disease of thyroid gland    • Osteoporosis    • Stroke (CMS/HCC)    • Stroke (CMS/HCC)     x2   • Urinary tract infection        Past Surgical History:   Procedure Laterality Date   • BACK SURGERY     • BREAST LUMPECTOMY     • CERVICAL BIOPSY  W/ LOOP ELECTRODE EXCISION     • CHOLECYSTECTOMY     • COLONOSCOPY      had polyps    • FOOT SURGERY     • GASTRIC BYPASS     • HERNIA REPAIR     • THYROIDECTOMY            PT ASSESSMENT (last 12 hours)      IRF PT Evaluation and Treatment     Row Name 21 0914          PT Time and Intention    Document Type  progress note  -MS     Mode of Treatment  physical therapy  -MS     Patient/Family/Caregiver Comments/Observations  AM: sitting up in wheelchair in rehab gym, no acute distress noted, exit alarm on; PM: sitting up in wheelchair, no acute distress noted  -MS     Total Minutes, Physical Therapy  90  -MS     Row Name 21 0914          General Information    General Observations of Patient  Falling asleep during therapy session this morning- drowsy. Reported pain medicine administration but also noted reporting 10/10 pain.  -MS     Existing Precautions/Restrictions  fall;left;hip, anterior  -MS     Row Name 21 0914          Cognition/Psychosocial    Affect/Mental Status (Cognitive)  WFL  -MS     Orientation Status (Cognition)   oriented x 4  -MS     Follows Commands (Cognition)  follows multi-step commands  -MS     Personal Safety Interventions  fall prevention program maintained;gait belt;nonskid shoes/slippers when out of bed  -MS     Row Name 01/11/21 0914          Pain Scale: Numbers Pre/Post-Treatment    Pretreatment Pain Rating  10/10  -MS     Posttreatment Pain Rating  10/10  -MS     Pain Location - Side  Left  -MS     Pain Location - Orientation  lower  -MS     Pain Location  hip  -MS     Row Name 01/11/21 0914          Mobility    Extremity Weight-bearing Status  left lower extremity  -MS     Left Lower Extremity (Weight-bearing Status)  weight-bearing as tolerated (WBAT)  -MS     Advanced Gait Activity  sloped surfaces  -MS     Additional Documentation  Advanced Gait Activity (Row)  -MS     Row Name 01/11/21 0914          Bed Mobility    Supine-Sit [place] (Bed Mobility)  minimum assist (75% patient effort);verbal cues  -MS     Sit-Supine [place] (Bed Mobility)  minimum assist (75% patient effort);verbal cues  -MS     Comment (Bed Mobility)  (mat mobility)  -MS     Row Name 01/11/21 0914          Transfer Assessment/Treatment    Transfers  car transfer  -MS     Row Name 01/11/21 0914          Transfers    Sit-Stand [place] (Transfers)  standby assist;verbal cues  -MS     Stand-Sit [place] (Transfers)  standby assist;verbal cues  -MS     Row Name 01/11/21 0914          Sit-Stand Transfer    Assistive Device (Sit-Stand Transfers)  walker, front-wheeled;bariatric  -MS     Row Name 01/11/21 0914          Stand-Sit Transfer    Assistive Device (Stand-Sit Transfers)  walker, front-wheeled;bariatric  -MS     Row Name 01/11/21 0914          Car Transfer    Type (Car Transfer)  sit-stand;stand-sit  -MS     [place] Level (Car Transfer)  minimum assist (75% patient effort);verbal cues  -MS     Assistive Device (Car Transfer)  walker, front-wheeled;bariatric equipment assist required for L LE and lifting into car   -MS     Row Name 01/11/21 0914          Gait/Stairs (Locomotion)    [place] Level (Gait)  contact guard;verbal cues  -MS     Assistive Device (Gait)  walker, front-wheeled;bariatric equipment  -MS     Distance in Feet (Gait)  AM: 80' w one sitting and two standing rest breaks; PM: 80' x 2 trials  -MS     Pattern (Gait)  step-through  -MS     Deviations/Abnormal Patterns (Gait)  janet decreased;gait speed decreased  -MS     Bilateral Gait Deviations  forward flexed posture  -MS     Left Sided Gait Deviations  heel strike decreased  -MS     Comment (Gait/Stairs)  Required more encouragement this AM, slowed janet, no overt LOB noted.  -MS     Row Name 01/11/21 0914          Sloped Surface Gait Skills (Mobility)    [place], Gait On Sloped Surface (Mobility)  contact guard;verbal cues  -MS     Assistive Device (Sloped Surface Gait)  walker, front-wheeled  -MS     Distance in Feet (Sloped Surface Gait)  8 inch height with ramp  -MS     Comment, Gait On Sloped Surface (Mobility)  Mild cues for walker placement, no safety concerns demo'd.  -MS     Row Name 01/11/21 0914          Balance    Static Sitting Balance  WFL  -MS     Static Standing Balance  supported;mild impairment  -MS     Dynamic Standing Balance  standing;moderate impairment  -MS     Row Name 01/11/21 0914          Hip (Therapeutic Exercise)    Hip Strengthening (Therapeutic Exercise)  right;sitting;10 repetitions;2 sets;supine;aBduction;aDduction AAROM at times for hip ABD  -MS     Row Name 01/11/21 0914          Knee (Therapeutic Exercise)    Knee Strengthening (Therapeutic Exercise)  bilateral;sitting;LAQ (long arc quad);10 repetitions;2 sets;supine;heel slides AAROM at times for heel slides  -MS     Row Name 01/11/21 0914          Ankle (Therapeutic Exercise)    Ankle AROM (Therapeutic Exercise)  bilateral;sitting;dorsiflexion;plantarflexion;10 repetitions;2 sets  -MS     Row Name 01/11/21 0914          Bed Mobility Goal 1 (PT-IRF)     Progress/Outcomes (Bed Mobility Goal 1, PT-IRF)  goal ongoing  -MS     Row Name 01/11/21 0914          Transfer Goal 1 (PT-IRF)    Progress/Outcomes (Transfer Goal 1, PT-IRF)  goal met  -MS     Row Name 01/11/21 0914          Transfer Goal 2 (PT-IRF)    Progress/Outcomes (Transfer Goal 2, PT-IRF)  goal met  -MS     Row Name 01/11/21 0914          Gait/Walking Locomotion Goal 1 (PT-IRF)    Progress/Outcomes (Gait/Walking Locomotion Goal 1, PT-IRF)  goal met  -MS     Row Name 01/11/21 0914          Positioning and Restraints    Pre-Treatment Position  sitting in chair/recliner  -MS     In Wheelchair  sitting;call light within reach;encouraged to call for assist;exit alarm on AM session  -MS       User Key  (r) = Recorded By, (t) = Taken By, (c) = Cosigned By    Initials Name Provider Type    Alfreda Chavez, PT Physical Therapist        Wound 12/27/20 1601 Left anterior thigh Incision (Active)   Dressing Appearance dry;intact 01/11/21 0800   Closure Adhesive closure strips;Approximated 01/11/21 0900   Base dry;clean 01/11/21 0900   Drainage Amount none 01/11/21 0900   Dressing Care dressing changed 01/11/21 0900       Wound 12/27/20 1602 Left lateral thigh Blisters (Active)   Dressing Appearance dry;intact 01/10/21 2040   Closure Open to air;None 01/11/21 0900   Base pink 01/11/21 0900   Periwound intact;dry 01/11/21 0900   Drainage Amount none 01/11/21 0900   Care, Wound cleansed with;soap and water 01/11/21 0900   Dressing Care foam 01/11/21 0900       Wound 12/27/20 1602 Left anterior groin (Active)   Dressing Appearance open to air 01/11/21 0800   Closure None 01/11/21 0800   Base pink;clean;moist 01/11/21 0800   Periwound intact 01/11/21 0800   Drainage Amount none 01/10/21 2040   Care, Wound cleansed with;soap and water 01/11/21 0800   Dressing Care dressing changed;gauze 01/10/21 2040     Physical Therapy Education                 Title: PT OT SLP Therapies (In Progress)     Topic: Physical Therapy (Done)      Point: Mobility training (Done)     Learning Progress Summary           Patient Acceptance, E,TB, VU,NR by MS at 1/11/2021 0926    Acceptance, E, NR by LB at 1/9/2021 1157    Acceptance, E,TB, VU,NR by MS at 1/8/2021 1048    Acceptance, E,TB, VU by MS at 1/7/2021 1029    Acceptance, E,TB, VU,NR by LB1 at 1/6/2021 1043    Acceptance, E,TB, VU,NR by LB1 at 1/5/2021 1027    Acceptance, E,TB, VU,NR by EE at 1/3/2021 0917    Acceptance, E,TB, VU,NR by EE at 1/2/2021 0921    Acceptance, E, VU,NR by LB at 1/1/2021 1204    Acceptance, E,TB, VU,NR by LB1 at 12/31/2020 1057    Acceptance, E,TB, VU,NR by LB1 at 12/30/2020 1133    Comment: walking    Acceptance, E,TB, VU,NR by LB1 at 12/29/2020 1050    Acceptance, E,TB, NR,VU by LB1 at 12/28/2020 1138                   Point: Home exercise program (Done)     Learning Progress Summary           Patient Acceptance, E,TB, VU,NR by MS at 1/11/2021 0926    Acceptance, E,TB, VU,NR by MS at 1/8/2021 1048    Acceptance, E,TB, VU by MS at 1/7/2021 1029    Acceptance, E,TB, VU,NR by LB1 at 1/4/2021 1038    Acceptance, E,TB, VU,NR by EE at 1/3/2021 0917    Acceptance, E,TB, VU,NR by EE at 1/2/2021 0921    Acceptance, E,TB, NR,VU by LB1 at 12/28/2020 1138                   Point: Body mechanics (Done)     Learning Progress Summary           Patient Acceptance, E,TB, VU,NR by MS at 1/11/2021 0926    Acceptance, E,TB, VU,NR by MS at 1/8/2021 1048                   Point: Precautions (Done)     Learning Progress Summary           Patient Acceptance, E,TB, VU,NR by MS at 1/11/2021 0926    Acceptance, E, VU by MD at 1/10/2021 0941    Acceptance, E,TB, VU,NR by MS at 1/8/2021 1048    Acceptance, E,TB, VU by MS at 1/7/2021 1029    Acceptance, E,TB, VU,NR by EE at 1/3/2021 0917    Acceptance, E,TB, VU,NR by EE at 1/2/2021 0921                               User Key     Initials Effective Dates Name Provider Type Discipline    LB1 04/03/18 -  Luly Francisco, PT Physical Therapist PT    LB  03/07/18 -  Munira Azevedo PTA Physical Therapy Assistant PT    EE 04/03/18 -  Jaqueline Soni, PT Physical Therapist PT    MD 04/03/18 -  Nereida Llamas PT Physical Therapist PT    MS 03/04/19 -  Alfreda Hudson PT Physical Therapist PT                PT Recommendation and Plan      Patient was wearing a face mask during this therapy encounter. Therapist used appropriate personal protective equipment including eye protection, mask, and gloves.  Mask used was standard procedure mask. Appropriate PPE was worn during the entire therapy session. Hand hygiene was completed before and after therapy session. Patient is not in enhanced droplet precautions.     Patient is progressing towards goals with PT. Currently, patient required [place] for bed mobility, SBA for transfers and ambulates multiple trials of 80' CGA with a RW. Patient practiced ambulating on a ramp today as this will be how she enters her home at [place]. Family conference planned for today and anticipate DC this week.     Time Calculation:     PT Charges     Row Name 01/11/21 1243 01/11/21 0913          Time Calculation    Start Time  1230  -MS  0900  -MS     Stop Time  1300  -MS  1000  -MS     Time Calculation (min)  30 min  -MS  60 min  -MS     PT Received On  --  01/11/21  -MS     PT - Next Appointment  --  01/12/21  -MS     PT Goal Re-Cert Due Date  --  01/18/21  -MS       User Key  (r) = Recorded By, (t) = Taken By, (c) = Cosigned By    Initials Name Provider Type    MS HudsonAlfreda, PT Physical Therapist          Therapy Charges for Today     Code Description Service Date Service Provider Modifiers Qty    [id number]  PT THER PROC EA 15 MIN 1/11/2021 Alfreda Hudson, PT GP 6                   Alfreda Hudson PT  1/11/2021

## 2021-01-11 NOTE — PROGRESS NOTES
Inpatient Rehabilitation Plan of Care Note    Plan of Care  Care Plan Reviewed - Updates as Follows    Body Systems    [RN] Integumentary(Active)  Current Status(01/11/2021): Lt. hip incision, Lt. hip blister, & Crack in skin  Lt. groin area.  Weekly Goal(01/18/2021): Teach family/patient dressing changes. Incision  healing, no infection.  Discharge Goal: Incision, blister, & cracked areas healed.    Performed Intervention(s)  Daily skin inspections, wound care, assist with repositioning, & dressing  changes as ordered.      Pain    [RN] Pain Management(Active)  Current Status(01/11/2021): Pain related to Lt. hip surgery  Weekly Goal(01/18/2021): Able to tolerate therapies. Pain level < 5 on 1-10 pain  scale.  Discharge Goal: Pain resolved or under control.    Performed Intervention(s)  Pain medication as ordered, distraction, & repositioning assistance as needed.      Psychosocial    [RN] Coping/Adjustment(Active)  Current Status(01/11/2021): Expresses appropriate coping  Weekly Goal(01/18/2021): Allow opportunity to express concerns regarding current  status.  Discharge Goal: Adequate coping regarding life changes with ongoing support.    Performed Intervention(s)  Verbalizes needs & concerns  calm enviroment      Safety    [RN] Potential for Injury(Active)  Current Status(01/11/2021): Uses call light appropriately. No unsafe behaviors,  but history of falls.  Weekly Goal(01/18/2021): Instruct family/patient regarding safety precautions &  need for close supervision.  Discharge Goal: No falls, no injuries. Patient/family will be aware of risk of  fall & safety in the home setting.    Performed Intervention(s)  Items within reach (call bell, etc), safety rounds, falls precautions, &  bed/chair alarms.      Sphincter Control    [RN] Bladder & bowel management(Active)  Current Status(01/11/2021): Bladder & bowel  continent 100%  Weekly Goal(01/18/2021): B&B Continent 100%  Discharge Goal: B&B Remains 100%  continent    Performed Intervention(s)  Monitor intake, output, & BM's, Encourage appropriate diet & fluids, & perineal  care.    Signed by: Chio Purcell RN

## 2021-01-11 NOTE — THERAPY TREATMENT NOTE
Inpatient Rehabilitation - Occupational Therapy Treatment Note    Psychiatric     Patient Name: Alice Maxwell  : 1945  MRN: [id number]    Today's Date: 2021                 Admit Date: 2020         ICD-10-CM ICD-9-CM   1. Heartburn  R12 787.1       Patient Active Problem List   Diagnosis   • Stroke (CMS/HCC)   • Personal history of breast cancer   • History of loop electrical excision procedure (LEEP)   • History of abnormal cervical Pap smear   • S/p left hip fracture       Past Medical History:   Diagnosis Date   • Abnormal Pap smear of cervix    • Arthritis    • Cancer (CMS/HCC)     breast   • Disease of thyroid gland    • Osteoporosis    • Stroke (CMS/HCC)    • Stroke (CMS/HCC)     x2   • Urinary tract infection        Past Surgical History:   Procedure Laterality Date   • BACK SURGERY     • BREAST LUMPECTOMY     • CERVICAL BIOPSY  W/ LOOP ELECTRODE EXCISION     • CHOLECYSTECTOMY     • COLONOSCOPY      had polyps    • FOOT SURGERY     • GASTRIC BYPASS     • HERNIA REPAIR     • THYROIDECTOMY                  IRF OT ASSESSMENT FLOWSHEET (last 12 hours)      IRF OT Evaluation and Treatment     Row Name 21 1330 21 0911       OT Time and Intention    Document Type  daily treatment  -BT  daily treatment  -AF    Mode of Treatment  occupational therapy  -BT  occupational therapy  -AF    Patient Effort  good  -BT  good  -AF    Symptoms Noted During/After Treatment  --  fatigue  -AF    Row Name 21 0911          General Information    Patient/Family/Caregiver Comments/Observations  pt sitting up in w/c in room agreeable to shower   -AF     Existing Precautions/Restrictions  fall;hip, anterior;left  -AF     Row Name 21 1330 21 0911       Cognition/Psychosocial    Affect/Mental Status (Cognitive)  WFL  -BT  WFL  -AF    Orientation Status (Cognition)  oriented x 4  -BT  oriented x 4  -AF    Follows Commands (Cognition)  follows multi-step commands  -BT  follows  three-step commands;increased processing time needed;verbal cues/prompting required;repetition of directions required  -AF    Personal Safety Interventions  fall prevention program maintained;gait belt  -BT  fall prevention program maintained;gait belt;nonskid shoes/slippers when out of bed  -AF    Comment, Cognition  --  poor carry over with use of Adaptive equipment for LBD tasks   -AF    Row Name 01/11/21 1330 01/11/21 0911       Pain Scale: Numbers Pre/Post-Treatment    Pretreatment Pain Rating  0/10 - no pain  -BT  0/10 - no pain  -AF    Posttreatment Pain Rating  0/10 - no pain  -BT  0/10 - no pain  -AF    Pre/Posttreatment Pain Comment  --  stated that her L hip was swollen   -AF    Row Name 01/11/21 0911          Bed Mobility    Comment (Bed Mobility)  up in w/c   -AF     Row Name 01/11/21 0911          Functional Mobility    Functional Mobility- Comment  walked from commode to shower seat with CGA/SBA with josh fo grab abr   -AF     Row Name 01/11/21 0911          Transfer Assessment/Treatment    Transfers  shower transfer  -AF     Row Name 01/11/21 0911          Transfers    [place] Level (Toilet Transfer)  standby assist;verbal cues  -AF     Assistive Device (Toilet Transfer)  commode;grab bars/safety frame;wheelchair  -AF     [place] Level (Shower Transfer)  stand by assist  -AF     Assistive Device (Shower Transfer)  grab bar, tub/shower;wheelchair;tub bench  -AF     Row Name 01/11/21 0911          Toilet Transfer    Type (Toilet Transfer)  stand pivot/stand step  -AF     Row Name 01/11/21 0911          Shower Transfer    Type (Shower Transfer)  stand pivot/stand step  -AF     Row Name 01/11/21 0911          Motor Skills    Functional Endurance  fair c/o of fatgiue at end of ADL   -AF     Row Name 01/11/21 1330          Shoulder (Therapeutic Exercise)    Shoulder (Therapeutic Exercise)  strengthening exercise  -BT     Shoulder Strengthening (Therapeutic Exercise)   bilateral;flexion;extension;aBduction;aDduction;resistance band;red;10 repetitions;3 sets  -BT     Row Name 01/11/21 1330          Elbow/Forearm (Therapeutic Exercise)    Elbow/Forearm (Therapeutic Exercise)  strengthening exercise  -BT     Elbow/Forearm Strengthening (Therapeutic Exercise)  bilateral;flexion;extension;resistance band;red;10 repetitions;3 sets  -BT     Row Name 01/11/21 0911          Bathing    [place] Level (Bathing)  bathing skills;contact guard assist;verbal cues  -AF     Assistive Device (Bathing)  grab bar/tub rail;hand held shower spray hose;long-handled sponge;tub bench  -AF     Position (Bathing)  supported sitting;supported standing  -AF     Comment (Bathing)  with encouragement to complete on her own   -AF     Row Name 01/11/21 0911          Upper Body Dressing    [place] Level (Upper Body Dressing)  upper body dressing skills;set up assistance  -AF     Position (Upper Body Dressing)  supported sitting  -AF     Comment (Upper Body Dressing)  seated on shower chair   -AF     Row Name 01/11/21 0911          Lower Body Dressing    [place] Level (Lower Body Dressing)  doff;don;pants/bottoms;shoes/slippers;socks;moderate assist (50% patient effort);verbal cues  -AF     Assistive Device Use (Lower Body Dressing)  reacher  -AF     Position (Lower Body Dressing)  supported standing;supported sitting  -AF     Set-up Assistance (Lower Body Dressing)  obtain clothing  -AF     Row Name 01/11/21 0911          Grooming    [place] Level (Grooming)  grooming skills;set up  -AF     Position (Grooming)  supported sitting  -AF     Set-up Assistance (Grooming)  obtain supplies  -AF     Row Name 01/11/21 0911          Toileting    [place] Level (Toileting)  toileting skills;minimum assist (75% patient effort)  -AF     Position (Toileting)  supported sitting;supported standing  -AF     Comment (Toileting)  assist with hygiene   -AF     Row Name 01/11/21 1330 01/11/21 0911        Positioning and Restraints    Pre-Treatment Position  sitting in chair/recliner  -BT  sitting in chair/recliner  -AF    Post Treatment Position  wheelchair  -BT  wheelchair  -AF    In Wheelchair  sitting;with other staff pt with  finishing family conference   -BT  sitting;exit alarm on;with PT  -AF      User Key  (r) = Recorded By, (t) = Taken By, (c) = Cosigned By    Initials Name Effective Dates    AF Misa Oneal, OTR 04/14/20 -     BT Zenaida Saenz OT 11/16/20 -            Occupational Therapy Education                 Title: PT OT SLP Therapies (In Progress)     Topic: Occupational Therapy (In Progress)     Point: ADL training (Done)     Description:   Instruct learner(s) on proper safety adaptation and remediation techniques during self care or transfers.   Instruct in proper use of assistive devices.              Learning Progress Summary           Patient Acceptance, E, VU,NR by AF at 1/11/2021 0916    Comment: educated on using LH sponge and LH reacher with ADL tasks to improve her independence    Acceptance, E,D, VU,NR by AF at 12/30/2020 1426    Comment: educated on LH sponge and LH reacher with LB dressing tasks will require further edcuation on process    Acceptance, E, VU,NR by AF at 12/28/2020 1418    Comment: educated on safety with ADLs and transfers                   Point: Home exercise program (Not Started)     Description:   Instruct learner(s) on appropriate technique for monitoring, assisting and/or progressing therapeutic exercises/activities.              Learner Progress:  Not documented in this visit.          Point: Precautions (Not Started)     Description:   Instruct learner(s) on prescribed precautions during self-care and functional transfers.              Learner Progress:  Not documented in this visit.          Point: Body mechanics (Not Started)     Description:   Instruct learner(s) on proper positioning and spine alignment during self-care, functional mobility  activities and/or exercises.              Learner Progress:  Not documented in this visit.                      User Key     Initials Effective Dates Name Provider Type Discipline    AF 04/14/20 -  Misa Oneal, OTR Occupational Therapist OT                    OT Recommendation and Plan                         Time Calculation:     Time Calculation- OT     Row Name 01/11/21 1439 01/11/21 0917          Time Calculation- OT    OT Start Time  1330  -BT  0800  -AF     OT Stop Time  1400  -BT  0900  -AF     OT Time Calculation (min)  30 min  -BT  60 min  -AF       User Key  (r) = Recorded By, (t) = Taken By, (c) = Cosigned By    Initials Name Provider Type    AF Misa Oneal, OTR Occupational Therapist    BT Zenaida Saenz OT Occupational Therapist        Therapy Charges for Today     Code Description Service Date Service Provider Modifiers Qty    [id number]  OT THER PROC EA 15 MIN 1/11/2021 Zenaida Saenz OT GO 2                   Zenaida Saenz OT  1/11/2021

## 2021-01-11 NOTE — PROGRESS NOTES
Inpatient Rehabilitation Plan of Care Note    Plan of Care  Updated Problems/Interventions  Mobility    [PT] Bed/Chair/Wheelchair(Active)  Current Status(01/11/2021): SBA-CGA, RW  Weekly Goal(01/18/2021): SBA, RW  Discharge Goal: SBA, RW    [PT] Bed Mobility(Active)  Current Status(01/11/2021): Lobo  Weekly Goal(01/18/2021): CGA  Discharge Goal: CGA/Min    [PT] Walk(Active)  Current Status(01/11/2021): 80' SBA-CGA, RW  Weekly Goal(01/18/2021): SBA, RW  Discharge Goal: SBA, RW    Signed by: Alfreda Hudson, PT

## 2021-01-11 NOTE — PROGRESS NOTES
Case Management  Inpatient Rehabilitation Plan of Care and Discharge Plan Note    Rehabilitation Diagnosis:  Branch  Date of Onset:  Branch    Medical Summary:  Branch  Past Medical History: Branch    Plan of Care  Updated Problems/Interventions  Field    Expected Intensity:  Branch  Interdisciplinary Team:  Kevin  Estimated Length of Stay/Anticipated Discharge Date: Branch  Anticipated Discharge Destination:  Anticipated discharge destination from inpatient rehabilitation is community  discharge with assistance. Family conference held on 1/11 with patient,   and daughter on conference call.  Daughter scheduled for family teaching on 1/12.  D/C plan is home with daughter. [facility] to provide home PT, OT,  ST, NSG.      Based on the patient's medical and functional status, their prognosis and  expected level of functional improvement is:  Branch    Signed by: MICHAEL Couch

## 2021-01-11 NOTE — PROGRESS NOTES
Family conference held today with patient, PT, OT, ST, and SW present. Patient's  and daughter were on conference call. NSG sent report. Discussed progress and d/c recommendations.  PT reported patient requires Min assist for bed mobility (to get left leg into bed). Patient limited somewhat by pain. Patient able to transfer bed-chair with rolling walker and SBA. Patient walking with rolling walker SB/CGA. PT has also practiced patient using rollator as patient also has this at home but recommends patient use regular rolling walker for now. Patient requires Min assist also with car transfers. Recommended SB/CGA at home.  OT reported patient able to do bathing seated on shower chair with CGA. Patient does use grab bars in shower when stands. Transfers to shower chair and commode require SBA. Patient able to dress UE with set up and Min assist with LE using some adaptive equipment. Patient does require Mod assist with toileting for hygiene. Recommended patient use toilet aid since has this at home. Daughter stated she will plan to help with LE dressing as needed. OT has also worked on UE strengthening.   ST reported patient has slower processing and decreased attention to details when doing tasks. Patient requires cues. Patient also has decreased memory for new information but does okay with recalling daily events. Patient also has difficulty with math calculations involving time and money and difficulty with planning tasks. ST recommended patient have supervision if does any cooking and have assistance with medication and finance management.   NSG report given by SHONDA. Discussed medications. Patient on coumadin and getting daily PT/INR. Will check with MD to see how often this needs to be done after d/c. Patient stated she has machine at home she uses to check protime and her hematologist follows. She had been checking once every 2 weeks prior to hospitalization. Patient takes pain medication every 4 hours. NSG  checking blood sugars AC and HS here but patient stated she only checked once a day in the mornings at home. Will check with MD how often to check. Patient currently not on any medication for diabetes. Nephrology has been following and reports kidneys are stable. BP also controlled. Staples are out of hip incision and no drainage. Patient does have blister on left hip that is healing--using foam dressing. Left groin healing and patient is continent of bowel and bladder. Discussed follow ups with PCP, orthopedic surgeon, nephrology.   Discussed equipment recommended for home. Patient has rolling walker, BSC, shower chair, toilet aid. Daughter has ordered rail for bed and will order reacher.   Daughter scheduled to come in the morning at 9:00 for family teaching.   Team recommended home health PT, OT, ST, and NSG. Discussed options for HH agencies. Daughter prefers to use [facility]. Referral made.   Since daughter will be here tomorrow for teaching, patient prefers to d/c home tomorrow vs Wednesday. Patient will be d/c to daughter's home here in [place] vs going to her home in [facility].   Will assist with plans.

## 2021-01-11 NOTE — THERAPY TREATMENT NOTE
Inpatient Rehabilitation - Speech Language Pathology Treatment Note    [facility]     Patient Name: Alice Maxwell  : 1945  MRN: [id number]    Today's Date: 2021                   Admit Date: 2020       Visit Dx:      ICD-10-CM ICD-9-CM   1. Heartburn  R12 787.1       Patient Active Problem List   Diagnosis   • Stroke (CMS/HCC)   • Personal history of breast cancer   • History of loop electrical excision procedure (LEEP)   • History of abnormal cervical Pap smear   • S/p left hip fracture       Past Medical History:   Diagnosis Date   • Abnormal Pap smear of cervix    • Arthritis    • Cancer (CMS/HCC)     breast   • Disease of thyroid gland    • Osteoporosis    • Stroke (CMS/HCC)    • Stroke (CMS/HCC)     x2   • Urinary tract infection        Past Surgical History:   Procedure Laterality Date   • BACK SURGERY     • BREAST LUMPECTOMY     • CERVICAL BIOPSY  W/ LOOP ELECTRODE EXCISION     • CHOLECYSTECTOMY     • COLONOSCOPY      had polyps    • FOOT SURGERY     • GASTRIC BYPASS     • HERNIA REPAIR     • THYROIDECTOMY            SLP EVALUATION (last 72 hours)      SLP SLC Evaluation     Row Name 21 1100 21 1000 21 1401             Communication Assessment/Intervention    Document Type  therapy note (daily note)  -SL  therapy note (daily note)  -SL  therapy note (daily note)  -LN      Subjective Information  no complaints  -SL  no complaints  -SL  no complaints  -LN      Patient Observations  alert;cooperative;agree to therapy  -SL  alert;cooperative  -SL  alert;cooperative;agree to therapy  -LN      Patient/Family/Caregiver Comments/Observations  --  --  Upright in w/c upon arrival. Patient seen for therapy at bedside.  -LN      Patient Effort  good  -SL  good  -SL  good  -LN      Symptoms Noted During/After Treatment  none  -SL  none  -SL  none  -LN        User Key  (r) = Recorded By, (t) = Taken By, (c) = Cosigned By    Initials Name Effective Dates    Gifty Martin, MS  CCC-[facility] 06/08/18 -     Roxy Valles 12/17/19 -              EDUCATION    The patient has been educated in the following areas:       Cognitive Impairment.      SLP Recommendation and Plan                                                  SLP GOALS     Row Name 01/11/21 1116 01/11/21 1000          Attention Goal 1 (SLP)    Progress (Attention Goal 1, SLP)  80%;independently (over 90% accuracy) divided attn trailmaking task ( square/circle0  -SL  --     Progress/Outcomes (Attention Goal 1, SLP)  goal ongoing  -SL  --        Memory Skills Goal 1 (SLP)    Progress (Memory Skills Goal 1, SLP)  --  80%;90%;independently (over 90% accuracy) visual recall- 16 words- grouping strategy  -SL     Progress/Outcomes (Memory Skills Goal 1, SLP)  --  goal ongoing  -SL     Comment (Memory Skills Goal 1, SLP)  --  immed recall - detailed paragraphs- stories-70%  -SL        Organizational Skills Goal 1 (SLP)    Progress (Thought Organization Skills Goal 1, SLP)  70%;80%;independently (over 90% accuracy) category matrix - initial letter restrictions  -SL  100%;independently (over 90% accuracy) sorting and categorization 16 items  -SL     Progress/Outcomes (Thought Organization Skills Goal 1, SLP)  goal ongoing  -SL  goal ongoing  -SL        Reasoning Goal 1 (SLP)    Progress (Reasoning Goal 1, SLP)  --  70%;independently (over 90% accuracy) word deductions  -SL     Progress/Outcomes (Reasoning Goal 1, SLP)  --  goal ongoing  -SL        Functional Problem Solving Skills Goal 1 (SLP)    Progress (Problem Solving Goal 1, SLP)  80%;90%;independently (over 90% accuracy) medication dosage amts/times  -SL  100%;independently (over 90% accuracy);with minimal cues (75-90%) following written directives- 3 columns of info  -SL     Progress/Outcomes (Problem Solving Goal 1, SLP)  goal ongoing  -SL  goal ongoing  -SL       User Key  (r) = Recorded By, (t) = Taken By, (c) = Cosigned By    Initials Name Provider Type    Gifty Martin, MS  CCC-SLP Speech and Language Pathologist                      Time Calculation:       Time Calculation- SLP     Row Name 01/11/21 1129 01/11/21 1027          Time Calculation- SLP    SLP Start Time  1100  -SL  1000  -     SLP Stop Time  1130  -SL  1030  -     SLP Time Calculation (min)  30 min  -SL  30 min  -SL       User Key  (r) = Recorded By, (t) = Taken By, (c) = Cosigned By    Initials Name Provider Type    Gifty Martin MS CCC-SLP Speech and Language Pathologist            Therapy Charges for Today     Code Description Service Date Service Provider Modifiers Qty    70787742087 HC ST DEV OF COGN SKILLS INITIAL 15 MIN 1/11/2021 Gifty Rogers MS CCC-SLP  1    85231075298 HC ST DEV OF COGN SKILLS EACH ADDT'L 15 MIN 1/11/2021 Gifty Rogers MS CCC-SLP  1    08317180308 HC ST DEV OF COGN SKILLS EACH ADDT'L 15 MIN 1/11/2021 Gifty Rogers, MS CCC-SLP  2                           Gifty Rogers MS CCC-SLP  1/11/2021

## 2021-01-11 NOTE — PLAN OF CARE
Goal Outcome Evaluation:  Plan of Care Reviewed With: patient  Progress: improving  Outcome Summary: Pain med for L hip/leg pain. Ambulates CGA 1 with walker to br. needs help with L leg in/out of bed. Numbness feet, baseline. Dressings dry/intact L hip incision and blister. Slit open area L groin. Applied Miconazole cream/ gauze dressing.

## 2021-01-12 VITALS
OXYGEN SATURATION: 93 % | RESPIRATION RATE: 18 BRPM | BODY MASS INDEX: 39.21 KG/M2 | SYSTOLIC BLOOD PRESSURE: 124 MMHG | WEIGHT: 249.8 LBS | HEART RATE: 74 BPM | TEMPERATURE: 98.1 F | HEIGHT: 67 IN | DIASTOLIC BLOOD PRESSURE: 78 MMHG

## 2021-01-12 LAB
ALBUMIN SERPL-MCNC: 3.9 G/DL (ref 3.5–5.2)
ANION GAP SERPL CALCULATED.3IONS-SCNC: 13.1 MMOL/L (ref 5–15)
BUN SERPL-MCNC: 28 MG/DL (ref 8–23)
BUN/CREAT SERPL: 15.3 (ref 7–25)
CALCIUM SPEC-SCNC: 7.8 MG/DL (ref 8.6–10.5)
CHLORIDE SERPL-SCNC: 96 MMOL/L (ref 98–107)
CO2 SERPL-SCNC: 29.9 MMOL/L (ref 22–29)
CREAT SERPL-MCNC: 1.83 MG/DL (ref 0.57–1)
GFR SERPL CREATININE-BSD FRML MDRD: 27 ML/MIN/1.73
GLUCOSE BLDC GLUCOMTR-MCNC: 114 MG/DL (ref 70–130)
GLUCOSE BLDC GLUCOMTR-MCNC: 127 MG/DL (ref 70–130)
GLUCOSE SERPL-MCNC: 119 MG/DL (ref 65–99)
INR PPP: 2.83 (ref 0.9–1.1)
PHOSPHATE SERPL-MCNC: 3.8 MG/DL (ref 2.5–4.5)
POTASSIUM SERPL-SCNC: 3.9 MMOL/L (ref 3.5–5.2)
PROTHROMBIN TIME: 29.5 SECONDS (ref 11.7–14.2)
SODIUM SERPL-SCNC: 139 MMOL/L (ref 136–145)

## 2021-01-12 PROCEDURE — 97535 SELF CARE MNGMENT TRAINING: CPT

## 2021-01-12 PROCEDURE — 80069 RENAL FUNCTION PANEL: CPT | Performed by: PHYSICAL MEDICINE & REHABILITATION

## 2021-01-12 PROCEDURE — 82962 GLUCOSE BLOOD TEST: CPT

## 2021-01-12 PROCEDURE — 85610 PROTHROMBIN TIME: CPT | Performed by: PHYSICAL MEDICINE & REHABILITATION

## 2021-01-12 PROCEDURE — 97110 THERAPEUTIC EXERCISES: CPT

## 2021-01-12 RX ORDER — ACETAMINOPHEN 325 MG/1
325 TABLET ORAL EVERY 4 HOURS PRN
Start: 2021-01-12

## 2021-01-12 RX ORDER — LANOLIN ALCOHOL/MO/W.PET/CERES
3 CREAM (GRAM) TOPICAL NIGHTLY PRN
Qty: 30 TABLET | Refills: 0 | Status: SHIPPED | OUTPATIENT
Start: 2021-01-12

## 2021-01-12 RX ORDER — TORSEMIDE 20 MG/1
40 TABLET ORAL DAILY
Qty: 60 TABLET | Refills: 0 | Status: SHIPPED | OUTPATIENT
Start: 2021-01-12

## 2021-01-12 RX ORDER — HYDROCODONE BITARTRATE AND ACETAMINOPHEN 7.5; 325 MG/1; MG/1
1 TABLET ORAL EVERY 6 HOURS PRN
Qty: 40 TABLET | Refills: 0 | Status: SHIPPED | OUTPATIENT
Start: 2021-01-12

## 2021-01-12 RX ORDER — CALCIUM CARBONATE 200(500)MG
1 TABLET,CHEWABLE ORAL 3 TIMES DAILY PRN
Qty: 30 TABLET | Refills: 0 | Status: SHIPPED | OUTPATIENT
Start: 2021-01-12

## 2021-01-12 RX ORDER — POLYETHYLENE GLYCOL 3350 17 G/17G
17 POWDER, FOR SOLUTION ORAL DAILY
Qty: 238 G | Refills: 0 | Status: SHIPPED | OUTPATIENT
Start: 2021-01-12

## 2021-01-12 RX ORDER — WARFARIN SODIUM 4 MG/1
4 TABLET ORAL EVERY EVENING
Qty: 30 TABLET | Refills: 0 | Status: SHIPPED | OUTPATIENT
Start: 2021-01-12

## 2021-01-12 RX ORDER — ONDANSETRON 4 MG/1
4 TABLET, FILM COATED ORAL EVERY 6 HOURS PRN
Qty: 10 TABLET | Refills: 0 | Status: SHIPPED | OUTPATIENT
Start: 2021-01-12

## 2021-01-12 RX ORDER — AMLODIPINE BESYLATE 5 MG/1
5 TABLET ORAL
Qty: 30 TABLET | Refills: 0 | Status: SHIPPED | OUTPATIENT
Start: 2021-01-12

## 2021-01-12 RX ADMIN — FOLIC ACID 1 MG: 1 TABLET ORAL at 07:10

## 2021-01-12 RX ADMIN — Medication 2000 UNITS: at 07:10

## 2021-01-12 RX ADMIN — AMLODIPINE BESYLATE 5 MG: 5 TABLET ORAL at 07:10

## 2021-01-12 RX ADMIN — HYDROCODONE BITARTRATE AND ACETAMINOPHEN 1 TABLET: 7.5; 325 TABLET ORAL at 07:09

## 2021-01-12 RX ADMIN — ALLOPURINOL 100 MG: 100 TABLET ORAL at 07:10

## 2021-01-12 RX ADMIN — TORSEMIDE 40 MG: 20 TABLET ORAL at 07:09

## 2021-01-12 RX ADMIN — LEVOTHYROXINE SODIUM 100 MCG: 0.1 TABLET ORAL at 05:47

## 2021-01-12 RX ADMIN — SERTRALINE HYDROCHLORIDE 50 MG: 50 TABLET, FILM COATED ORAL at 07:10

## 2021-01-12 RX ADMIN — CALCIUM CARBONATE 1 TABLET: 500 TABLET, CHEWABLE ORAL at 11:17

## 2021-01-12 RX ADMIN — MICONAZOLE NITRATE: 2 OINTMENT TOPICAL at 07:13

## 2021-01-12 RX ADMIN — POLYETHYLENE GLYCOL 3350 17 G: 17 POWDER, FOR SOLUTION ORAL at 07:09

## 2021-01-12 RX ADMIN — HYDROCODONE BITARTRATE AND ACETAMINOPHEN 1 TABLET: 7.5; 325 TABLET ORAL at 02:51

## 2021-01-12 NOTE — THERAPY DISCHARGE NOTE
Inpatient Rehabilitation - IRF Occupational Therapy Treatment Note/Discharge  [facility]     Patient Name: Alice Maxwell  : 1945  MRN: [id number]  Today's Date: 2021               Admit Date: 2020       ICD-10-CM ICD-9-CM   1. Heartburn  R12 787.1   2. S/p left hip fracture  Z87.81 V15.51     Patient Active Problem List   Diagnosis   • Stroke (CMS/HCC)   • Personal history of breast cancer   • History of loop electrical excision procedure (LEEP)   • History of abnormal cervical Pap smear   • S/p left hip fracture     Past Medical History:   Diagnosis Date   • Abnormal Pap smear of cervix    • Arthritis    • Cancer (CMS/HCC)     breast   • Disease of thyroid gland    • Osteoporosis    • Stroke (CMS/HCC)    • Stroke (CMS/HCC)     x2   • Urinary tract infection      Past Surgical History:   Procedure Laterality Date   • BACK SURGERY     • BREAST LUMPECTOMY     • CERVICAL BIOPSY  W/ LOOP ELECTRODE EXCISION     • CHOLECYSTECTOMY     • COLONOSCOPY      had polyps    • FOOT SURGERY     • GASTRIC BYPASS     • HERNIA REPAIR     • THYROIDECTOMY            IRF OT ASSESSMENT FLOWSHEET (last 12 hours)      IRF OT Evaluation and Treatment     Row Name 21          OT Time and Intention    Document Type  daily treatment  -BT     Mode of Treatment  occupational therapy  -BT     Patient Effort  good  -BT     Symptoms Noted During/After Treatment  none  -BT     Row Name 21          Relationship/Environment    Primary Source of Support/Comfort  child(jorje);spouse  -BT     Lives With  child(jorje), adult  -BT     Name(s) of Who Lives With Patient  pt going to daughters house upon dc, then eventually to primary home with spouse   -BT     Family Caregiver Names  Lanette (daughter)  -BT     Row Name 21          General Information    Patient/Family/Caregiver Comments/Observations  in wheelchair upon arrival   -BT     Existing Precautions/Restrictions  fall;left;hip, anterior  -BT      Row Name 01/12/21 0800          Living Environment    Current Living Arrangements  home/apartment/condo  -BT     Row Name 01/12/21 0800          Home Use of Assistive/Adaptive Equipment    Equipment Currently Used at Home  shower chair;walker, rolling;grab bar  -BT     Row Name 01/12/21 0800          Family/Support System    Family/Support System Care  support provided;self-care encouraged  -BT     Row Name 01/12/21 1016 01/12/21 0800       Cognition/Psychosocial    Orientation Status (Cognition)  --  oriented x 4  -BT    Follows Commands (Cognition)  --  follows multi-step commands  -BT    Personal Safety Interventions  --  fall prevention program maintained;gait belt  -BT    Cognitive Function (Cognitive)  WFL  -BT  WFL  -BT    Row Name 01/12/21 0800          Pain Scale: Numbers Pre/Post-Treatment    Pretreatment Pain Rating  8/10  -BT     Posttreatment Pain Rating  8/10  -BT     Pain Location - Side  Left  -BT     Pain Location - Orientation  lower  -BT     Pain Location  hip  -BT     Row Name 01/12/21 0800          Range of Motion (ROM)    Range of Motion  ROM is WFL  -BT     Row Name 01/12/21 0800          Range of Motion Comprehensive    General Range of Motion  no range of motion deficits identified  -BT     Row Name 01/12/21 0800          Hand  Strength Testing    Left Hand, Setting 2 (Dynamometer Testing)  35  -BT     Right Hand, Setting 2 (Dynamometer Testing)  70  -BT     Left Hand: Tip (Pincer) Pinch Strength (Pinch Dynamometer Testing)  6  -BT     Left Hand: Lateral (Key) Pinch Strength (Pinch Dynamometer Testing)  9  -BT     Right Hand: Tip (Pincer) Pinch Strength (Pinch Dynamometer Testing)  6  -BT     Right Hand: Lateral (Key) Pinch Strength (Pinch Dynamometer Testing)  7  -BT     Row Name 01/12/21 0800          Bed Mobility    Comment (Bed Mobility)  Pt in wheelchair   -BT     Row Name 01/12/21 0800          Functional Mobility    Functional Mobility- Ind. Level  standby assist  -BT      Functional Mobility- Device  rolling walker  -BT     Row Name 01/12/21 0800          Transfer Assessment/Treatment    Transfers  sit-stand transfer;stand-sit transfer;shower transfer  -BT     Row Name 01/12/21 0800          Transfers    Sit-Stand [place] (Transfers)  standby assist;verbal cues  -BT     Stand-Sit [place] (Transfers)  standby assist;verbal cues  -BT     [place] Level (Toilet Transfer)  standby assist;verbal cues  -BT     Assistive Device (Toilet Transfer)  commode;grab bars/safety frame;wheelchair  -BT     [place] Level (Shower Transfer)  stand by assist  -BT     Assistive Device (Shower Transfer)  grab bar, tub/shower;wheelchair;tub bench  -BT     Row Name 01/12/21 0800          Sit-Stand Transfer    Assistive Device (Sit-Stand Transfers)  walker, front-wheeled;bariatric  -BT     Row Name 01/12/21 0800          Stand-Sit Transfer    Assistive Device (Stand-Sit Transfers)  walker, front-wheeled;bariatric  -BT     Row Name 01/12/21 0800          Toilet Transfer    Type (Toilet Transfer)  stand pivot/stand step  -BT     Row Name 01/12/21 0800          Shower Transfer    Type (Shower Transfer)  stand pivot/stand step  -BT     Row Name 01/12/21 0800          Shoulder (Therapeutic Exercise)    Shoulder (Therapeutic Exercise)  strengthening exercise  -BT     Shoulder Strengthening (Therapeutic Exercise)  bilateral;flexion;extension;aBduction;aDduction;sitting;yellow;red;resistance band;supine;10 repetitions;3 sets  -BT     Row Name 01/12/21 0800          Elbow/Forearm (Therapeutic Exercise)    Elbow/Forearm (Therapeutic Exercise)  strengthening exercise  -BT     Elbow/Forearm Strengthening (Therapeutic Exercise)  bilateral;flexion;extension;resistance band;yellow;red;10 repetitions;3 sets  -BT     Row Name 01/12/21 0800          Bathing    [place] Level (Bathing)  bathing skills;verbal cues;standby assist  -BT     Assistive Device (Bathing)  hand held shower spray hose;shower  chair;long-handled sponge  -BT     Position (Bathing)  supported sitting;supported standing  -BT     Row Name 01/12/21 0800          Upper Body Dressing    [place] Level (Upper Body Dressing)  upper body dressing skills;set up assistance  -BT     Position (Upper Body Dressing)  edge of bed sitting  -BT     Row Name 01/12/21 0800          Lower Body Dressing    [place] Level (Lower Body Dressing)  doff;don;pants/bottoms;shoes/slippers;socks;verbal cues;contact guard assist;minimum assist (75% patient effort)  -BT     Position (Lower Body Dressing)  edge of bed sitting  -BT     Comment (Lower Body Dressing)  pt's daughter to provide hip kit for increased independence   -BT     Row Name 01/12/21 0800          Grooming    [place] Level (Grooming)  grooming skills;set up  -BT     Position (Grooming)  sink side;supported sitting;supported standing  -BT     Row Name 01/12/21 0800          Toileting    [place] Level (Toileting)  toileting skills;contact guard assist;minimum assist (75% patient effort)  -BT     Position (Toileting)  supported sitting;supported standing  -BT     Comment (Toileting)  pt's daughter to provide toilet paper aid for increased independence   -BT     Row Name 01/12/21 0800          Transfer Goal 1 (OT-IRF)    Activity/Assistive Device (Transfer Goal 1, OT-IRF)  toilet;walk-in shower;walker, rolling  -BT     [place] Level (Transfer Goal 1, OT-IRF)  minimum assist (75% or more patient effort);verbal cues required  -BT     Time Frame (Transfer Goal 1, OT-IRF)  short-term goal (STG)  -BT     Progress/Outcomes (Transfer Goal 1, OT-IRF)  goal met  -BT     Row Name 01/12/21 0800          Transfer Goal 2 (OT-IRF)    Activity/Assistive Device (Transfer Goal 2, OT-IRF)  toilet;walk-in shower;tub bench;walker, rolling  -BT     [place] Level (Transfer Goal 2, OT-IRF)  standby assist  -BT     Time Frame (Transfer Goal 2, OT-IRF)  long-term goal (LTG)  -BT     Progress/Outcomes  (Transfer Goal 2, OT-IRF)  goal met  -BT     Row Name 01/12/21 0800          Bathing Goal 1 (OT-IRF)    Activity/Device (Bathing Goal 1, OT-IRF)  bathing skills, all;grab bar, tub/shower;hand-held shower spray hose;tub bench  -BT     [place] Level (Bathing Goal 1, OT-IRF)  moderate assist (50-74% patient effort);verbal cues required  -BT     Time Frame (Bathing Goal 1, OT-IRF)  short-term goal (STG)  -BT     Progress/Outcomes (Bathing Goal 1, OT-IRF)  goal met  -BT     Row Name 01/12/21 0800          Bathing Goal 2 (OT-IRF)    Activity/Device (Bathing Goal 2, OT-IRF)  bathing skills, all;grab bar, tub/shower;hand-held shower spray hose;tub bench  -BT     [place] Level (Bathing Goal 2, OT-IRF)  contact guard assist;standby assist  -BT     Time Frame (Bathing Goal 2, OT-IRF)  long-term goal (LTG)  -BT     Progress/Outcomes (Bathing Goal 2, OT-IRF)  goal met  -BT     Row Name 01/12/21 0800          UB Dressing Goal 1 (OT-IRF)    Activity/Device (UB Dressing Goal 1, OT-IRF)  upper body dressing  -BT     [place] (UB Dress Goal 1, OT-IRF)  set-up required  -BT     Time Frame (UB Dressing Goal 1, OT-IRF)  long-term goal (LTG)  -BT     Progress/Outcomes (UB Dressing Goal 1, OT-IRF)  goal met  -BT     Row Name 01/12/21 0800          LB Dressing Goal 1 (OT-IRF)    Activity/Device (LB Dressing Goal 1, OT-IRF)  lower body dressing;reacher;sock aid;long-handled shoe horn  -BT     [place] (LB Dressing Goal 1, OT-IRF)  moderate assist (50-74% patient effort);verbal cues required  -BT     Time Frame (LB Dressing Goal 1, OT-IRF)  short-term goal (STG)  -BT     Progress/Outcomes (LB Dressing Goal 1, OT-IRF)  goal met  -BT     Row Name 01/12/21 0800          LB Dressing Goal 2 (OT-IRF)    Activity/Device (LB Dressing Goal 2, OT-IRF)  lower body dressing;long-handled shoe horn;reacher;sock aid  -BT     [place] (LB Dressing Goal 2, OT-IRF)  contact guard assist;verbal cues required  -BT     Time Frame (LB  Dressing Goal 2, OT-IRF)  long-term goal (LTG)  -BT     Progress/Outcomes (LB Dressing Goal 2, OT-IRF)  goal partially met  -BT     Row Name 01/12/21 0800          Grooming Goal 1 (OT-IRF)    Activity/Device (Grooming Goal 1, OT-IRF)  grooming skills, all  -BT     [place] (Grooming Goal 1, OT-IRF)  set-up required  -BT     Time Frame (Grooming Goal 1, OT-IRF)  long-term goal (LTG)  -BT     Progress/Outcomes (Grooming Goal 1, OT-IRF)  goal met  -BT     Row Name 01/12/21 0800          Toileting Goal 1 (OT-IRF)    Activity/Device (Toileting Goal 1, OT-IRF)  toileting skills, all;grab bar/safety frame;raised toilet seat  -BT     [place] Level (Toileting Goal 1, OT-IRF)  maximum assist (25-49% patient effort);verbal cues required  -BT     Progress/Outcomes (Toileting Goal 1, OT-IRF)  goal met  -BT     Time Frame (Toileting Goal 1, OT-IRF)  short-term goal (STG)  -BT     Row Name 01/12/21 0800          Toileting Goal 2 (OT-IRF)    Activity/Device (Toileting Goal 2, OT-IRF)  toileting skills, all;grab bar/safety frame;raised toilet seat  -BT     [place] Level (Toileting Goal 2, OT-IRF)  contact guard assist;verbal cues required  -BT     Progress/Outcomes (Toileting Goal 2, OT-IRF)  goal partially met  -BT     Time Frame (Toileting Goal 2, OT-IRF)  long-term goal (LTG)  -BT     Row Name 01/12/21 0800          Strength Goal 1 (OT-IRF)    Strength Goal 1 (OT-IRF)  To increase  BUE strength 1 MMT to assist with transfers and ADL tasks   -BT     Time Frame (Strength Goal 1, OT-IRF)  long-term goal (LTG)  -BT     Progress/Outcomes (Strength Goal 1, OT-IRF)  goal met  -BT     Row Name 01/12/21 0800          Balance Goal 1 (OT)    Activity/Assistive Device (Balance Goal 1, OT)  standing, dynamic;walker, rolling  -BT     [place] Level/Cues Needed (Balance Goal 1, OT)  supervision required  -BT     Time Frame (Balance Goal 1, OT)  long term goal (LTG)  -BT     Progress/Outcomes (Balance Goal 1, OT)  goal  partially met  -BT     Row Name 01/12/21 0800           Endurance Goal 1 (OT)    Endurance Goal 1 (OT)  during ADL tasks   -BT     [place] Level (Endurance Goal 1, OT)  endurance 2 good -  -BT     Progress/Outcome (Endurance Goal 1, OT)  goal met  -BT     Row Name 01/12/21 0800          Functional Mobility Goal 1 (OT)    Activity/Assistive Device (Functional Mobility Goal 1, OT)  walker, rolling  -BT     [place] Level/Cues Needed (Functional Mobility Goal 1, OT)  contact guard assist;verbal cues required  -BT     Distance Goal 1 (Functional Mobility, OT)  to and from bathroom  -BT     Time Frame (Functional Mobility Goal 1, OT)  long term goal (LTG)  -BT     Progress/Outcome (Functional Mobility Goal 1, OT)  goal met  -BT     Row Name 01/12/21 0800          Caregiver Training Goal 1 (OT-IRF)    Caregiver Training Goal 1 (OT-IRF)  Pt will demo safe anterior hip precautions during ADL tasks   -BT     Time Frame (Caregiver Training Goal 1, OT-IRF)  long-term goal (LTG)  -BT     Progress/Outcomes (Caregiver Training Goal 1, OT-IRF)  goal met  -BT     Row Name 01/12/21 0800          Positioning and Restraints    Pre-Treatment Position  sitting in chair/recliner  -BT     Post Treatment Position  bed  -BT     In Bed  supine;with family/caregiver  -BT     Row Name 01/12/21 0800          Therapy Assessment/Plan (OT)    Patient's Goals For Discharge  return home  -BT     Row Name 01/12/21 0800          Therapy Plan Review/Discharge Plan (OT)    Therapy Plan Review (OT)  care plan/treatment goals reviewed;participants included;patient;daughter  -BT     Anticipated Equipment Needs At Discharge (OT)  -- pt already has all needed equipment in home   -BT     Expected Discharge Disposition (OT)  other (see comments) home with daughter then with spouse following HH   -BT     Patient/Family Concerns, Discharge Disposition (OT)  none per daughter   -BT     Plan for Continued Service After Discharge (OT)  HH services  -BT        User Key  (r) = Recorded By, (t) = Taken By, (c) = Cosigned By    Initials Name Effective Dates    BT Zenaida Saenz, OT 11/16/20 -         Wound 12/27/20 1601 Left anterior thigh Incision (Active)   Dressing Appearance intact;dry 01/12/21 0800   Closure Adhesive closure strips;Approximated 01/12/21 0800   Base clean;dry 01/12/21 0800   Periwound Temperature warm 01/12/21 0800   Periwound Skin Turgor firm 01/12/21 0800   Drainage Amount none 01/12/21 0800   Care, Wound cleansed with;sterile normal saline 01/12/21 0800   Dressing Care dressing applied;dressing changed;dressing removed;other (see comments) 01/12/21 0800   Periwound Care dry periwound area maintained 01/11/21 2131       Wound 12/27/20 1602 Left lateral thigh Blisters (Active)   Dressing Appearance dry;intact 01/12/21 0800   Closure NADIA 01/12/21 0800   Base pink 01/11/21 2131   Periwound intact;dry 01/11/21 2131   Drainage Amount none 01/11/21 2131   Dressing Care foam 01/11/21 2131   Periwound Care dry periwound area maintained 01/11/21 2131       Wound 12/27/20 1602 Left anterior groin (Active)   Dressing Appearance open to air 01/12/21 0800   Closure None 01/12/21 0800   Base pink;clean 01/12/21 0800   Periwound intact 01/12/21 0800   Periwound Temperature warm 01/12/21 0800   Drainage Amount none 01/12/21 0800   Care, Wound cleansed with;soap and water 01/12/21 0800   Dressing Care dressing reinforced 01/11/21 2131   Periwound Care dry periwound area maintained 01/11/21 2131       Occupational Therapy Education                 Title: PT OT SLP Therapies (Done)     Topic: Occupational Therapy (Done)     Point: ADL training (Done)     Description:   Instruct learner(s) on proper safety adaptation and remediation techniques during self care or transfers.   Instruct in proper use of assistive devices.              Learning Progress Summary           Patient Acceptance, E, VU by BT at 1/12/2021 1049    Comment: Education provided to pt and pt's daughter  regarding any adl concerns upon dc.    Acceptance, E, VU,NR by AF at 1/11/2021 0916    Comment: educated on using LH sponge and LH reacher with ADL tasks to improve her independence    Acceptance, E,D, VU,NR by AF at 12/30/2020 1426    Comment: educated on LH sponge and LH reacher with LB dressing tasks will require further edcuation on process    Acceptance, E, VU,NR by AF at 12/28/2020 1418    Comment: educated on safety with ADLs and transfers   Family Acceptance, E, VU by BT at 1/12/2021 1049    Comment: Education provided to pt and pt's daughter regarding any adl concerns upon dc.                   Point: Home exercise program (Done)     Description:   Instruct learner(s) on appropriate technique for monitoring, assisting and/or progressing therapeutic exercises/activities.              Learning Progress Summary           Patient Acceptance, E, VU by BT at 1/12/2021 1049    Comment: Education provided to pt and pt's daughter regarding any adl concerns upon dc.   Family Acceptance, E, VU by BT at 1/12/2021 1049    Comment: Education provided to pt and pt's daughter regarding any adl concerns upon dc.                   Point: Precautions (Done)     Description:   Instruct learner(s) on prescribed precautions during self-care and functional transfers.              Learning Progress Summary           Patient Acceptance, E, VU by BT at 1/12/2021 1049    Comment: Education provided to pt and pt's daughter regarding any adl concerns upon dc.   Family Acceptance, E, VU by BT at 1/12/2021 1049    Comment: Education provided to pt and pt's daughter regarding any adl concerns upon dc.                   Point: Body mechanics (Done)     Description:   Instruct learner(s) on proper positioning and spine alignment during self-care, functional mobility activities and/or exercises.              Learning Progress Summary           Patient Acceptance, E, VU by BT at 1/12/2021 1049    Comment: Education provided to pt and pt's  daughter regarding any adl concerns upon dc.   Family Acceptance, E, VU by BT at 1/12/2021 0504    Comment: Education provided to pt and pt's daughter regarding any adl concerns upon dc.                               User Key     Initials Effective Dates Name Provider Type Discipline    AF 04/14/20 -  Misa Oneal, OTR Occupational Therapist OT    BT 11/16/20 -  Zenaida Saenz OT Occupational Therapist OT                OT Recommendation and Plan  Planned Therapy Interventions (OT): activity tolerance training, BADL retraining, patient/caregiver education/training, ROM/therapeutic exercise, strengthening exercise, transfer/mobility retraining, occupation/activity based interventions          OT IRF GOALS     Row Name 01/12/21 0800 01/04/21 0800          Transfer Goal 1 (OT-IRF)    Activity/Assistive Device (Transfer Goal 1, OT-IRF)  toilet;walk-in shower;walker, rolling  -BT  toilet;walk-in shower;walker, rolling  -BT     [place] Level (Transfer Goal 1, OT-IRF)  minimum assist (75% or more patient effort);verbal cues required  -BT  minimum assist (75% or more patient effort);verbal cues required  -BT     Time Frame (Transfer Goal 1, OT-IRF)  short-term goal (STG)  -BT  short-term goal (STG)  -BT     Progress/Outcomes (Transfer Goal 1, OT-IRF)  goal met  -BT  goal partially met  -BT        Transfer Goal 2 (OT-IRF)    Activity/Assistive Device (Transfer Goal 2, OT-IRF)  toilet;walk-in shower;tub bench;walker, rolling  -BT  toilet;walk-in shower;tub bench;walker, rolling  -BT     [place] Level (Transfer Goal 2, OT-IRF)  standby assist  -BT  standby assist  -BT     Time Frame (Transfer Goal 2, OT-IRF)  long-term goal (LTG)  -BT  --     Progress/Outcomes (Transfer Goal 2, OT-IRF)  goal met  -BT  goal ongoing  -BT        Bathing Goal 1 (OT-IRF)    Activity/Device (Bathing Goal 1, OT-IRF)  bathing skills, all;grab bar, tub/shower;hand-held shower spray hose;tub bench  -BT  bathing skills, all;grab bar,  tub/shower;hand-held shower spray hose;tub bench  -BT     [place] Level (Bathing Goal 1, OT-IRF)  moderate assist (50-74% patient effort);verbal cues required  -BT  moderate assist (50-74% patient effort);verbal cues required  -BT     Time Frame (Bathing Goal 1, OT-IRF)  short-term goal (STG)  -BT  short-term goal (STG)  -BT     Progress/Outcomes (Bathing Goal 1, OT-IRF)  goal met  -BT  goal partially met  -BT        Bathing Goal 2 (OT-IRF)    Activity/Device (Bathing Goal 2, OT-IRF)  bathing skills, all;grab bar, tub/shower;hand-held shower spray hose;tub bench  -BT  bathing skills, all;grab bar, tub/shower;hand-held shower spray hose;tub bench  -BT     [place] Level (Bathing Goal 2, OT-IRF)  contact guard assist;standby assist  -BT  contact guard assist;standby assist  -BT     Time Frame (Bathing Goal 2, OT-IRF)  long-term goal (LTG)  -BT  long-term goal (LTG)  -BT     Progress/Outcomes (Bathing Goal 2, OT-IRF)  goal met  -BT  goal ongoing  -BT        UB Dressing Goal 1 (OT-IRF)    Activity/Device (UB Dressing Goal 1, OT-IRF)  upper body dressing  -BT  upper body dressing  -BT     [place] (UB Dress Goal 1, OT-IRF)  set-up required  -BT  set-up required  -BT     Time Frame (UB Dressing Goal 1, OT-IRF)  long-term goal (LTG)  -BT  long-term goal (LTG)  -BT     Progress/Outcomes (UB Dressing Goal 1, OT-IRF)  goal met  -BT  goal met  -BT        LB Dressing Goal 1 (OT-IRF)    Activity/Device (LB Dressing Goal 1, OT-IRF)  lower body dressing;reacher;sock aid;long-handled shoe horn  -BT  lower body dressing;reacher;sock aid;long-handled shoe horn  -BT     [place] (LB Dressing Goal 1, OT-IRF)  moderate assist (50-74% patient effort);verbal cues required  -BT  moderate assist (50-74% patient effort);verbal cues required  -BT     Time Frame (LB Dressing Goal 1, OT-IRF)  short-term goal (STG)  -BT  short-term goal (STG)  -BT     Progress/Outcomes (LB Dressing Goal 1, OT-IRF)  goal met  -BT  goal met  -BT         LB Dressing Goal 2 (OT-IRF)    Activity/Device (LB Dressing Goal 2, OT-IRF)  lower body dressing;long-handled shoe horn;reacher;sock aid  -BT  lower body dressing;long-handled shoe horn;reacher;sock aid  -BT     [place] (LB Dressing Goal 2, OT-IRF)  contact guard assist;verbal cues required  -BT  contact guard assist;verbal cues required  -BT     Time Frame (LB Dressing Goal 2, OT-IRF)  long-term goal (LTG)  -BT  long-term goal (LTG)  -BT     Progress/Outcomes (LB Dressing Goal 2, OT-IRF)  goal partially met  -BT  goal ongoing  -BT        Grooming Goal 1 (OT-IRF)    Activity/Device (Grooming Goal 1, OT-IRF)  grooming skills, all  -BT  grooming skills, all  -BT     [place] (Grooming Goal 1, OT-IRF)  set-up required  -BT  set-up required  -BT     Time Frame (Grooming Goal 1, OT-IRF)  long-term goal (LTG)  -BT  long-term goal (LTG)  -BT     Progress/Outcomes (Grooming Goal 1, OT-IRF)  goal met  -BT  goal met  -BT        Toileting Goal 1 (OT-IRF)    Activity/Device (Toileting Goal 1, OT-IRF)  toileting skills, all;grab bar/safety frame;raised toilet seat  -BT  toileting skills, all;grab bar/safety frame;raised toilet seat  -BT     [place] Level (Toileting Goal 1, OT-IRF)  maximum assist (25-49% patient effort);verbal cues required  -BT  maximum assist (25-49% patient effort);verbal cues required  -BT     Progress/Outcomes (Toileting Goal 1, OT-IRF)  goal met  -BT  goal met  -BT     Time Frame (Toileting Goal 1, OT-IRF)  short-term goal (STG)  -BT  short-term goal (STG)  -BT        Toileting Goal 2 (OT-IRF)    Activity/Device (Toileting Goal 2, OT-IRF)  toileting skills, all;grab bar/safety frame;raised toilet seat  -BT  toileting skills, all;grab bar/safety frame;raised toilet seat  -BT     [place] Level (Toileting Goal 2, OT-IRF)  contact guard assist;verbal cues required  -BT  contact guard assist;verbal cues required  -BT     Progress/Outcomes (Toileting Goal 2, OT-IRF)  goal partially met   -BT  goal ongoing  -BT     Time Frame (Toileting Goal 2, OT-IRF)  long-term goal (LTG)  -BT  long-term goal (LTG)  -BT        Strength Goal 1 (OT-IRF)    Strength Goal 1 (OT-IRF)  To increase  BUE strength 1 MMT to assist with transfers and ADL tasks   -BT  To increase  BUE strength 1 MMT to assist with transfers and ADL tasks   -BT     Time Frame (Strength Goal 1, OT-IRF)  long-term goal (LTG)  -BT  long-term goal (LTG)  -BT     Progress/Outcomes (Strength Goal 1, OT-IRF)  goal met  -BT  goal ongoing  -BT        Balance Goal 1 (OT)    Activity/Assistive Device (Balance Goal 1, OT)  standing, dynamic;walker, rolling  -BT  standing, dynamic;walker, rolling  -BT     [place] Level/Cues Needed (Balance Goal 1, OT)  supervision required  -BT  supervision required  -BT     Time Frame (Balance Goal 1, OT)  long term goal (LTG)  -BT  long term goal (LTG)  -BT     Progress/Outcomes (Balance Goal 1, OT)  goal partially met  -BT  goal ongoing  -BT        Caregiver Training Goal 1 (OT-IRF)    Caregiver Training Goal 1 (OT-IRF)  Pt will demo safe anterior hip precautions during ADL tasks   -BT  Pt will demo safe anterior hip precautions during ADL tasks   -BT     Time Frame (Caregiver Training Goal 1, OT-IRF)  long-term goal (LTG)  -BT  long-term goal (LTG)  -BT     Progress/Outcomes (Caregiver Training Goal 1, OT-IRF)  goal met  -BT  goal ongoing  -BT       User Key  (r) = Recorded By, (t) = Taken By, (c) = Cosigned By    Initials Name Provider Type    BT Zenaida Saenz, OT Occupational Therapist              Time Calculation:   Time Calculation- OT     Row Name 01/12/21 1050 01/12/21 1049          Time Calculation- OT    OT Start Time  1000  -BT  0800  -BT     OT Stop Time  1025  -BT  0900  -BT     OT Time Calculation (min)  25 min  -BT  60 min  -BT       User Key  (r) = Recorded By, (t) = Taken By, (c) = Cosigned By    Initials Name Provider Type    BT Zenaida Saenz, OT Occupational Therapist          Therapy Charges for  Today     Code Description Service Date Service Provider Modifiers Qty    75250715915 HC OT THER PROC EA 15 MIN 1/11/2021 Zenaida Saenz OT GO 2    04904442816 HC OT SELF CARE/MGMT/TRAIN EA 15 MIN 1/12/2021 Zenaida Sanez OT GO 4    84634059872 HC OT THER PROC EA 15 MIN 1/12/2021 Zenaida Saenz OT GO 2                    Zenaida Saenz OT  1/12/2021

## 2021-01-12 NOTE — PROGRESS NOTES
SECTION GG      Self Care Performance Discharge:   Oral Hygiene: [place] sets up or cleans up; patient completes activity. [place]  assists only prior to or following the activity.   Toileting Hygiene: : [place] provides verbal cues and/or touching/steadying  and/or contact guard assistance as patient completes activity.   Shower/Bathe Self: [place] provides verbal cues and/or touching/steadying and/or  contact guard assistance as patient completes activity.   Upper Body Dressing: [place] provides verbal cues and/or touching/steadying  and/or contact guard assistance as patient completes activity.   Lower Body Dressing: [place] does less than half the effort. [place] lifts, holds  or supports trunk or limbs but provides less than half the effort.   Putting On/Taking Off Footwear: [place] does less than half the effort. [place]  lifts, holds or supports trunk or limbs but provides less than half the effort.    Mobility Toilet Transfer Discharge: [place] provides verbal cues or  touching/steadying assistance as patient completes activity.    Signed by: Zenaida Saenz, OT

## 2021-01-12 NOTE — PROGRESS NOTES
Pharmacy Consult: Warfarin Dosing/ Monitoring    Alice Maxwell is a 75 y.o. female, estimated creatinine clearance is 31.5 mL/min (A) (by C-G formula based on SCr of 1.94 mg/dL (H)). weighing 113 kg (249 lb 12.8 oz).     has a past medical history of Abnormal Pap smear of cervix, Arthritis, Cancer (CMS/HCC), Disease of thyroid gland, Osteoporosis, Stroke (CMS/HCC), Stroke (CMS/HCC), and Urinary tract infection.    Social History     Tobacco Use    Smoking status: Never Smoker   Substance Use Topics    Alcohol use: No    Drug use: No       Results from last 7 days   Lab Units 01/12/21  0724 01/11/21  0807 01/10/21  0440 01/09/21  0841 01/08/21  0614 01/07/21  0636 01/06/21  0850   INR  2.83* 2.73* 2.65* 2.36* 2.21* 2.05* 1.84*   HEMOGLOBIN g/dL  --  9.5* 8.4*  --   --  8.3*  --    HEMATOCRIT %  --  30.3* 26.0*  --   --  27.7*  --    PLATELETS 10*3/mm3  --  345 279  --   --  340  --      Results from last 7 days   Lab Units 01/11/21  0807 01/10/21  0440 01/08/21  0614 01/07/21  0636   SODIUM mmol/L 136 138 138 138   POTASSIUM mmol/L 3.6 3.5 4.1 4.1   CHLORIDE mmol/L 94* 97* 97* 98   CO2 mmol/L 30.9* 30.5* 29.1* 28.9   BUN mg/dL 25* 24* 24* 22   CREATININE mg/dL 1.94* 1.74* 1.81* 1.70*   CALCIUM mg/dL 7.8* 7.6* 7.5* 7.6*   BILIRUBIN mg/dL 0.6  --  0.5 0.5   ALK PHOS U/L 71  --  66 65   ALT (SGPT) U/L 12  --  14 15   AST (SGOT) U/L 25  --  27 27   GLUCOSE mg/dL 170* 118* 111* 97     Anticoagulation history: Warfarin 6mg daily (home dose). Warfarin managed by [facility] for history of Factor V Leiden.    Hospital Anticoagulation:  Consulting provider: Aric Chao MD  Start date: 12/27/20  Indication: Post Surgical - Hip, hypercoagulable disorder and hx of CVA  Target INR: 2-3  Expected duration: indefinite   Bridge Therapy: No               Date 12/29 12/30 12/31 1/1 1/2 1/3 1/4 1/5 1/6 1/7 1/8 1/9 1/10 1/11 1/12    INR 2.39 2.73 3.44 2.85 3.05 3.11 2.45 2.09 1.84 2.05 2.21 2.36 2.65 2.73 2.83     Warfarin dose 6mg 5mg HOLD 4mg 2mg 1mg 3mg 4mg 5mg 5mg 5mg 5 mg 4 mg 4mg 4mg      Potential drug interactions:   Allopurinol - may enhance the anticoagulant effect of warfarin (home med)  Levothyroxine- may enhance the anticoagulant effect of warfarin (home med)  Norco - Patients taking acetaminophen at higher doses and/or for longer duration may be at greater risk for experiencing a clinically significant interaction (not listed on med rec)   Trazodone- May diminish the anticoagulant effect of warfarin. (not listed on med rec)    Relevant nutrition status: regular diet; eating better per RN notes    Education complete?/ Date: Home medication    Assessment/Plan:  INR remains therapeutic at 2.83 (Goal 2-3) but does continue to trend upward.  Maintenance dose of warfarin was recently decreased from 5mg to 4mg, so we should start seeing the INR plateau and possibly begin trending back downward over next day or so.  Will recheck INR tomorrow as warfarin dose recently changed and INR continues to trend upward.  The INR will be evaluated daily to adjust regimen to goal.    Pharmacy will continue to follow until discharge or discontinuation of warfarin.     Mando Wright, PharmD  01/12/21

## 2021-01-12 NOTE — PROGRESS NOTES
Results for DELILAH WISE (MRN [id number]) as of 1/12/2021 10:47   Ref. Range 1/8/2021 06:14 1/9/2021 08:41 1/10/2021 04:40 1/11/2021 08:07 1/12/2021 07:24   Protime Latest Ref Range: 11.7 - 14.2 Seconds 24.3 (H) 25.5 (H) 28.0 (H) 28.7 (H) 29.5 (H)   INR Latest Ref Range: 0.90 - 1.10  2.21 (H) 2.36 (H) 2.65 (H) 2.73 (H) 2.83 (H)   Results for DELILAH WISE (MRN [id number]) as of 1/12/2021 10:47   Ref. Range 1/7/2021 06:36 1/8/2021 06:14 1/10/2021 04:40 1/11/2021 08:07 1/12/2021 07:24   Sodium Latest Ref Range: 136 - 145 mmol/L 138 138 138 136 139   Potassium Latest Ref Range: 3.5 - 5.2 mmol/L 4.1 4.1 3.5 3.6 3.9   CO2 Latest Ref Range: 22.0 - 29.0 mmol/L 28.9 29.1 (H) 30.5 (H) 30.9 (H) 29.9 (H)   Chloride Latest Ref Range: 98 - 107 mmol/L 98 97 (L) 97 (L) 94 (L) 96 (L)   Creatinine Latest Ref Range: 0.57 - 1.00 mg/dL 1.70 (H) 1.81 (H) 1.74 (H) 1.94 (H) 1.83 (H)   BUN Latest Ref Range: 8 - 23 mg/dL 22 24 (H) 24 (H) 25 (H) 28 (H)   Calcium Latest Ref Range: 8.6 - 10.5 mg/dL 7.6 (L) 7.5 (L) 7.6 (L) 7.8 (L) 7.8 (L)   Results for DELILAH WISE (MRN [id number]) as of 1/12/2021 10:47   Ref. Range 1/7/2021 06:36 1/10/2021 04:40 1/11/2021 08:07   WBC Latest Ref Range: 3.40 - 10.80 10*3/mm3 6.14 4.47 6.11   RBC Latest Ref Range: 3.77 - 5.28 10*6/mm3 3.06 (L) 2.92 (L) 3.37 (L)   Hemoglobin Latest Ref Range: 12.0 - 15.9 g/dL 8.3 (L) 8.4 (L) 9.5 (L)   Hematocrit Latest Ref Range: 34.0 - 46.6 % 27.7 (L) 26.0 (L) 30.3 (L)   RDW Latest Ref Range: 12.3 - 15.4 % 14.6 14.7 14.4   MCV Latest Ref Range: 79.0 - 97.0 fL 90.5 89.0 89.9   MCH Latest Ref Range: 26.6 - 33.0 pg 27.1 28.8 28.2   MCHC Latest Ref Range: 31.5 - 35.7 g/dL 30.0 (L) 32.3 31.4 (L)   MPV Latest Ref Range: 6.0 - 12.0 fL 9.8 10.0 10.2   Platelets Latest Ref Range: 140 - 450 10*3/mm3 340 279 345

## 2021-01-12 NOTE — DISCHARGE INSTRUCTIONS
Reviewed recommendation to try to taper off of opioid over the next 2 weeks at home, follow-up with orthopedic surgery for any pain medication refill.    No driving.  No alcohol.    Anticoagulation with Coumadin to be followed by the physician who managed it before hospitalization.    home health PT, OT, ST, and NSG.  Lab work-INR twice a week with results to the physician who manage anticoagulation before - Dr Nunez.  Weight-bear as tolerated left lower extremity.  Dry gauze dressing to the left hip change daily.  Mepilex dressing to the posterior left hip blister-change every 2 days.    Medication refills per Primary Care or Orthopedics

## 2021-01-12 NOTE — THERAPY DISCHARGE NOTE
Inpatient Rehabilitation - Speech Language Pathology Discharge Summary  [facility]       Patient Name: Alice Maxwell  : 1945  MRN: [id number]    Today's Date: 2021                   Admit Date: 2020      SLP Recommendation and Plan    The patient has made some progress towards tx goals, however does still display deficits in the areas of attention, reasoning, executive functioning, processing, memory,  and math skills. It is recommended that she receive supervision at home and continued ST intervention.  Visit Dx:    ICD-10-CM ICD-9-CM   1. Heartburn  R12 787.1   2. S/p left hip fracture  Z87.81 V15.51               SLP GOALS     Row Name 21 1116 21 1000          Attention Goal 1 (SLP)    Progress (Attention Goal 1, SLP)  80%;independently (over 90% accuracy) divided attn trailmaking task ( square/circle0  -SL  --     Progress/Outcomes (Attention Goal 1, SLP)  goal ongoing  -SL  --        Memory Skills Goal 1 (SLP)    Progress (Memory Skills Goal 1, SLP)  --  80%;90%;independently (over 90% accuracy) visual recall- 16 words- grouping strategy  -SL     Progress/Outcomes (Memory Skills Goal 1, SLP)  --  goal ongoing  -SL     Comment (Memory Skills Goal 1, SLP)  --  immed recall - detailed paragraphs- stories-70%  -SL        Organizational Skills Goal 1 (SLP)    Progress (Thought Organization Skills Goal 1, SLP)  70%;80%;independently (over 90% accuracy) category matrix - initial letter restrictions  -SL  100%;independently (over 90% accuracy) sorting and categorization 16 items  -SL     Progress/Outcomes (Thought Organization Skills Goal 1, SLP)  goal ongoing  -SL  goal ongoing  -SL        Reasoning Goal 1 (SLP)    Progress (Reasoning Goal 1, SLP)  --  70%;independently (over 90% accuracy) word deductions  -SL     Progress/Outcomes (Reasoning Goal 1, SLP)  --  goal ongoing  -SL        Functional Problem Solving Skills Goal 1 (SLP)    Progress (Problem Solving Goal 1, SLP)   80%;90%;independently (over 90% accuracy) medication dosage amts/times  -SL  100%;independently (over 90% accuracy);with minimal cues (75-90%) following written directives- 3 columns of info  -SL     Progress/Outcomes (Problem Solving Goal 1, SLP)  goal ongoing  -SL  goal ongoing  -SL       User Key  (r) = Recorded By, (t) = Taken By, (c) = Cosigned By    Initials Name Provider Type    Gifty Martin MS CCC-SLP Speech and Language Pathologist            Therapy Charges for Today     Code Description Service Date Service Provider Modifiers Qty    82828196151 HC ST DEV OF COGN SKILLS INITIAL 15 MIN 1/11/2021 Gifty Rogers MS CCC-SLP  1    56975205688 HC ST DEV OF COGN SKILLS EACH ADDT'L 15 MIN 1/11/2021 Gifty Rogers MS CCC-SLP  1    68779423182 HC ST DEV OF COGN SKILLS EACH ADDT'L 15 MIN 1/11/2021 Gifty Rogers MS CCC-SLP  2            SLP Discharge Summary  Anticipated Discharge Disposition (SLP): home with 24/7 care, home with home health  Reason for Discharge: discharge from this facility  Progress Toward Achieving Short/long Term Goals: goals partially met within established timelines  Discharge Destination: home w/ 24/7 care, home w/ home health      Gifty Rogers MS CCC-SLP  1/12/2021

## 2021-01-12 NOTE — THERAPY TREATMENT NOTE
Inpatient Rehabilitation - Physical Therapy Treatment Note/Discharge       [facility]     Patient Name: Alice Maxwell  : 1945  MRN: [id number]    Today's Date: 2021                    Admit Date: 2020      Visit Dx:     ICD-10-CM ICD-9-CM   1. Heartburn  R12 787.1   2. S/p left hip fracture  Z87.81 V15.51       Patient Active Problem List   Diagnosis   • Stroke (CMS/HCC)   • Personal history of breast cancer   • History of loop electrical excision procedure (LEEP)   • History of abnormal cervical Pap smear   • S/p left hip fracture       Past Medical History:   Diagnosis Date   • Abnormal Pap smear of cervix    • Arthritis    • Cancer (CMS/HCC)     breast   • Disease of thyroid gland    • Osteoporosis    • Stroke (CMS/HCC)    • Stroke (CMS/HCC)     x2   • Urinary tract infection        Past Surgical History:   Procedure Laterality Date   • BACK SURGERY     • BREAST LUMPECTOMY     • CERVICAL BIOPSY  W/ LOOP ELECTRODE EXCISION     • CHOLECYSTECTOMY     • COLONOSCOPY      had polyps    • FOOT SURGERY     • GASTRIC BYPASS     • HERNIA REPAIR     • THYROIDECTOMY            PT ASSESSMENT (last 12 hours)      IRF PT Evaluation and Treatment     Row Name 21 1016          PT Time and Intention    Document Type  daily treatment;discharge evaluation  -MS     Mode of Treatment  physical therapy  -MS     Patient/Family/Caregiver Comments/Observations  In wheelchair with OT- daughter present for majority of therapy session for family teaching. Plans to DC home with family assist and  PT to follow up.  -MS     Total Minutes, Physical Therapy  60  -MS     Row Name 21 1016          General Information    Existing Precautions/Restrictions  fall;left;hip, anterior  -MS     Row Name 21 1016          Cognition/Psychosocial    Orientation Status (Cognition)  oriented x 4  -MS     Follows Commands (Cognition)  follows multi-step commands;repetition of directions required;verbal cues/prompting  required  -MS     Personal Safety Interventions  fall prevention program maintained;gait belt;nonskid shoes/slippers when out of bed  -MS     Row Name 01/12/21 1016          Pain Scale: Numbers Pre/Post-Treatment    Pretreatment Pain Rating  7/10  -MS     Posttreatment Pain Rating  7/10  -MS     Pain Location - Side  Left  -MS     Pain Location - Orientation  lower  -MS     Pain Location  hip  -MS     Row Name 01/12/21 1016          Mobility    Extremity Weight-bearing Status  left lower extremity  -MS     Left Lower Extremity (Weight-bearing Status)  weight-bearing as tolerated (WBAT)  -MS     Row Name 01/12/21 1016          Bed Mobility    Supine-Sit [place] (Bed Mobility)  minimum assist (75% patient effort);verbal cues;other (see comments) assist with L LE only  -MS     Assistive Device (Bed Mobility)  bed rails  -MS     Comment (Bed Mobility)  Mimicked bed at home and increased bed height- daughter aware of assist required for L UE.  -MS     Row Name 01/12/21 1016          Transfers    Sit-Stand [place] (Transfers)  standby assist;verbal cues  -MS     Stand-Sit [place] (Transfers)  standby assist;verbal cues  -MS     Row Name 01/12/21 1016          Sit-Stand Transfer    Assistive Device (Sit-Stand Transfers)  walker, front-wheeled;bariatric  -MS     Row Name 01/12/21 1016          Stand-Sit Transfer    Assistive Device (Stand-Sit Transfers)  walker, front-wheeled;bariatric  -MS     Row Name 01/12/21 1016          Car Transfer    Type (Car Transfer)  sit-stand;stand-sit  -MS     [place] Level (Car Transfer)  minimum assist (75% patient effort);verbal cues;other (see comments) assist with L LE only- daughter hands on with transfer  -MS     Assistive Device (Car Transfer)  walker, front-wheeled;bariatric equipment  -MS     Row Name 01/12/21 1016          Gait/Stairs (Locomotion)    [place] Level (Gait)  standby assist;verbal cues  -MS     Assistive Device (Gait)  walker, front-wheeled  -MS      Distance in Feet (Gait)  80' x 2 trials, 15' x 2 trials  -MS     Pattern (Gait)  step-through  -MS     Deviations/Abnormal Patterns (Gait)  janet decreased;gait speed decreased  -MS     Bilateral Gait Deviations  forward flexed posture  -MS     Row Name 01/12/21 1016          Sloped Surface Gait Skills (Mobility)    Story, Gait On Sloped Surface (Mobility)  contact guard;verbal cues  -MS     Assistive Device (Sloped Surface Gait)  walker, front-wheeled  -MS     Distance in Feet (Sloped Surface Gait)  8 inch height with ramp- daughter present and instructed on placement when assisting at home.  -MS     Row Name 01/12/21 1016          Balance    Static Sitting Balance  WFL  -MS     Row Name 01/12/21 1016          Motor Skills    Therapeutic Exercise  other (see comments) Reviewed HEP with both patient and daughter.  -MS     Row Name 01/12/21 1016          Bed Mobility Goal 1 (PT-IRF)    Progress/Outcomes (Bed Mobility Goal 1, PT-IRF)  goal not met  -MS     Row Name 01/12/21 1016          Transfer Goal 1 (PT-IRF)    Progress/Outcomes (Transfer Goal 1, PT-IRF)  goal met  -MS     Row Name 01/12/21 1016          Transfer Goal 2 (PT-IRF)    Progress/Outcomes (Transfer Goal 2, PT-IRF)  goal met  -MS     Row Name 01/12/21 1016          Gait/Walking Locomotion Goal 1 (PT-IRF)    Progress/Outcomes (Gait/Walking Locomotion Goal 1, PT-IRF)  goal met  -MS     Row Name 01/12/21 1016          Positioning and Restraints    Pre-Treatment Position  sitting in chair/recliner  -MS     Post Treatment Position  bed  -MS     In Bed  fowlers;call light within reach;encouraged to call for assist;with family/caregiver  -MS       User Key  (r) = Recorded By, (t) = Taken By, (c) = Cosigned By    Initials Name Provider Type    MS HudsonAlfreda, PT Physical Therapist        Wound 12/27/20 1601 Left anterior thigh Incision (Active)   Dressing Appearance intact;dry 01/12/21 0800   Closure Adhesive closure strips;Approximated  01/12/21 0800   Base clean;dry 01/12/21 0800   Periwound Temperature warm 01/12/21 0800   Periwound Skin Turgor firm 01/12/21 0800   Drainage Amount none 01/12/21 0800   Care, Wound cleansed with;sterile normal saline 01/12/21 0800   Dressing Care dressing applied;dressing changed;dressing removed;other (see comments) 01/12/21 0800   Periwound Care dry periwound area maintained 01/11/21 2131       Wound 12/27/20 1602 Left lateral thigh Blisters (Active)   Dressing Appearance dry;intact 01/12/21 0800   Closure NADIA 01/12/21 0800   Base pink 01/11/21 2131   Periwound intact;dry 01/11/21 2131   Drainage Amount none 01/11/21 2131   Dressing Care foam 01/11/21 2131   Periwound Care dry periwound area maintained 01/11/21 2131       Wound 12/27/20 1602 Left anterior groin (Active)   Dressing Appearance open to air 01/12/21 0800   Closure None 01/12/21 0800   Base pink;clean 01/12/21 0800   Periwound intact 01/12/21 0800   Periwound Temperature warm 01/12/21 0800   Drainage Amount none 01/12/21 0800   Care, Wound cleansed with;soap and water 01/12/21 0800   Dressing Care dressing reinforced 01/11/21 2131   Periwound Care dry periwound area maintained 01/11/21 2131     Physical Therapy Education                 Title: PT OT SLP Therapies (Done)     Topic: Physical Therapy (Resolved)     Point: Mobility training (Resolved)     Learning Progress Summary           Patient Acceptance, E,TB, VU,NR by MS at 1/11/2021 0926    Acceptance, E, NR by LB at 1/9/2021 1157    Acceptance, E,TB, VU,NR by MS at 1/8/2021 1048    Acceptance, E,TB, VU by MS at 1/7/2021 1029    Acceptance, E,TB, VU,NR by LB1 at 1/6/2021 1043    Acceptance, E,TB, VU,NR by LB1 at 1/5/2021 1027    Acceptance, E,TB, VU,NR by  at 1/3/2021 0917    Acceptance, E,TB, VU,NR by EE at 1/2/2021 0921    Acceptance, E, VU,NR by LB at 1/1/2021 1204    Acceptance, E,TB, VU,NR by LB1 at 12/31/2020 1057    Acceptance, E,TB, VU,NR by LB1 at 12/30/2020 1133    Comment: walking     Acceptance, E,TB, VU,NR by LB1 at 12/29/2020 1050    Acceptance, E,TB, NR,VU by LB1 at 12/28/2020 1138                   Point: Home exercise program (Resolved)     Learning Progress Summary           Patient Acceptance, E,TB, VU,NR by MS at 1/11/2021 0926    Acceptance, E,TB, VU,NR by MS at 1/8/2021 1048    Acceptance, E,TB, VU by MS at 1/7/2021 1029    Acceptance, E,TB, VU,NR by LB1 at 1/4/2021 1038    Acceptance, E,TB, VU,NR by EE at 1/3/2021 0917    Acceptance, E,TB, VU,NR by EE at 1/2/2021 0921    Acceptance, E,TB, NR,VU by LB1 at 12/28/2020 1138                   Point: Body mechanics (Resolved)     Learning Progress Summary           Patient Acceptance, E,TB, VU,NR by MS at 1/11/2021 0926    Acceptance, E,TB, VU,NR by MS at 1/8/2021 1048                   Point: Precautions (Resolved)     Learning Progress Summary           Patient Acceptance, E,TB, VU,NR by MS at 1/11/2021 0926    Acceptance, E, VU by MD at 1/10/2021 0941    Acceptance, E,TB, VU,NR by MS at 1/8/2021 1048    Acceptance, E,TB, VU by MS at 1/7/2021 1029    Acceptance, E,TB, VU,NR by EE at 1/3/2021 0917    Acceptance, E,TB, VU,NR by EE at 1/2/2021 0921                               User Key     Initials Effective Dates Name Provider Type Discipline    [facility] 04/03/18 -  Luly Francisco, PT Physical Therapist PT    LB 03/07/18 -  Munira Azevedo, PTA Physical Therapy Assistant PT    EE 04/03/18 -  Jaqueline Soni, PT Physical Therapist PT    MD 04/03/18 -  Nereida Llamas, PT Physical Therapist PT    MS 03/04/19 -  Alfreda Hudson PT Physical Therapist PT                PT Recommendation and Plan      Patient was wearing a face mask during this therapy encounter. Therapist used appropriate personal protective equipment including eye protection, mask, and gloves.  Mask used was standard procedure mask. Appropriate PPE was worn during the entire therapy session. Hand hygiene was completed before and after therapy session. Patient is not in enhanced  droplet precautions. Daughter also present during therapy session.     Time Calculation:     PT Charges     Row Name 01/12/21 1015             Time Calculation    Start Time  0905  -MS      Stop Time  1000  -MS      Time Calculation (min)  55 min  -MS      PT Received On  01/12/21  -MS        User Key  (r) = Recorded By, (t) = Taken By, (c) = Cosigned By    Initials Name Provider Type    MS Alfreda Hudson, PT Physical Therapist          Therapy Charges for Today     Code Description Service Date Service Provider Modifiers Qty    94966348866  PT THER PROC EA 15 MIN 1/11/2021 Alfreda Hudson, PT GP 6    23116050295 HC PT THER PROC EA 15 MIN 1/12/2021 Alfreda Hudson, PT GP 3    84451005944 HC PT THER PROC EA 15 MIN 1/12/2021 Alfreda Hudson, PT GP 1                   Alfreda Hudson, PT  1/12/2021

## 2021-01-12 NOTE — PROGRESS NOTES
SECTION GG      Mobility Performance Discharge:     Roll Left and Right: [place] sets up or cleans up; patient completes activity.  [place] assists only prior to or following the activity.   Sit to Lying: [place] does less than half the effort. [place] lifts, holds or  supports trunk or limbs but provides less than half the effort.   Lying to Sitting on Side of Bed: [place] does less than half the effort. [place]  lifts, holds or supports trunk or limbs but provides less than half the effort.   Sit to Stand: [place] provides verbal cues and/or touching/steadying and/or  contact guard assistance as patient completes activity. Assistance may be  provided throughout the activity or intermittently.   Chair/Bed to Chair Transfer: [place] provides verbal cues and/or  touching/steadying and/or contact guard assistance as patient completes  activity. Assistance may be provided throughout the activity or intermittently.   Car Transfer: [place] does less than half the effort. [place] lifts, holds or  supports trunk or limbs but provides less than half the effort.   Walk 10 Feet:   [place] provides verbal cues and/or touching/steadying and/or  contact guard assistance as patient completes activity. Assistance may be  provided throughout the activity or intermittently.  Walk 50 Feet with 2 Turns:   [place] provides verbal cues and/or  touching/steadying and/or contact guard assistance as patient completes  activity. Assistance may be provided throughout the activity or intermittently.  Walk 150 Feet:   Not attempted due to medical or safety concerns.  Walking 10 Feet on Uneven Surfaces:   Not attempted due to medical or safety  concerns.  1 Step Over Curb or Up/Down Stair:   Not attempted due to medical or safety  concerns.  Picking up an Object:   Not attempted due to medical or safety concerns. Uses  Wheelchair and/or Scooter: No    Signed by: Alfreda Hudson, PT

## 2021-01-12 NOTE — PROGRESS NOTES
Daughter here for family teaching this morning with PT and OT. Both patient and daughter report this went well. Plan is for d/c home with daughter today, 1/12. Daughter also received call from home health liaison while in patient's room. [facility] to provide home PT, OT, ST, NSG. Patient has equipment needed at daughter's home.    Scribe Attestation (For Scribes USE Only)... Scribe Attestation (For Scribes USE Only).../Attending Attestation (For Attendings USE Only)...

## 2021-01-12 NOTE — PROGRESS NOTES
TEAM CONF - JAN 12 - TRANSFERS SBA. GAIT SBA RW 80 FEET RW. SLOW PROCESSING. BATH CTG. TOILET AND SHOWER TRANSFERS SBA. UBD SET UP. LBD MIN ASSIST USING SOME ADAPTIVE EQUIPMENT. TOLET HYGIENE MOD ASSIST. COGNITION- SLOWER PROCESSING. MILD DEDUCTIVE REASONING.   PATIENT REQUESTS DISCHARGE HOME AFTER FAMILY TEACHING TODAY.

## 2021-01-12 NOTE — DISCHARGE SUMMARY
[facility] - REHABILITATION UNIT    ALICE WISE  1945    Admit - Dec 27, 2020  Discharge - Jan 12, 2021    Chief Complaint:   1. Lfemur fx s/p Bipolar hemiarthroplasty  2. Previous LHP/CVA  3. Gouty arthritis  4. HTN  5. HLD  6. Factor V Leiden  7. Renal insufficiency  8. DM    HPI:  Ms. Alice Wise is a 75-year-old  white female admitted here in transfer from [facility] where she had presented following a fall that resulted in a left hip fracture.  Patient underwent anterior approach bipolar hemiarthroplasty on 12/22.  Patient is now weightbearing as tolerated.  During her acute stay patient was found to have a renal insufficiency with a creatinine of 1.73 she also was diagnosed with UTI UTI and was on IV Rocephin.  Patient has been on Coumadin secondary to CVA in 2017.  Patient participated in physical therapy and Occupational Therapy at the acute setting as of 1226 she is ambulating 10 feet with a rolling walker moderate assistance with cues and upper body dressing with min assistance lower body dressing with max assistance.  Chest x-ray on 1221 showed cardiomegaly.  Hemoglobin adequate on 1226 of 8.9 and 27.9 respectively ultrasound of kidneys showed atrophy of the right side no hydronephrosis 1221 x-ray of the left elbow showed no fracture dislocation tib-fib x-ray showed osteopenia with no fracture 1221 x-ray of the left hip showed fracture of the femoral neck with superior displacement of the femoral shaft.     Past medical history     Patient has history of hyperlipidemia hypertension gouty arthritis osteoporosis CVA with residual left-sided weakness patient received her acute rehabilitation at [facility] at that time.  Patient also has a history of hypothyroidism and type 2 diabetes mellitus.  She has history of factor V Leiden vitamin D deficiency history of breast cancer with lumpectomy in the past patient is undergone cholecystectomy back  surgery.     Social history patient lives with her spouse in a single-story home she was modified independent to contact-guard with ADLs but needed supervision for safety patient used a walker in the community during her last rehab stay she was discharged to her daughter's home where she would have a single floor dwelling and the plan is for her to return to her daughter's home following this rehab effort as well there is 24/7 care available at that setting patient does not smoke or drink.     HOSPITAL COURSE:  Patient participated in acute inpatient rehab program. Her  Incision healed. She continued on Norco for pain with instructions to taper off over two weeks. Healing blister posterior to the incision. Nephrology followed. She progressed with rehab and requested discharge home on Jan 12. During her stay, the following areas were addressed:    Left femur fx s/p Bipolar hemiarthroplasty  anterior approach bipolar hemiarthroplasty on 12/22.  Patient is now weightbearing as tolerated.   Jan 6 - mild serous drainage at inferior aspect of incision. D/C every other staple on superior half of incision  Jan 8 - scant serous drainage. Will remove every other staple from inferior half of incision  Jan 11 - incision healed     Previous LHP/CVA     Gouty arthritis     HTN  Dec 31 -  today. Home med does not list any anti-hypertensive medication. Presently on amlodipine 5 mg daily.  Will add hydralazine 10 milligrams p.o. every 8 hours as needed systolic blood pressure greater than 170  Jan 6 - amlodipine/torsemide added recently, increased to 80 mg daily  Jan 8 - torsemide changed back to 40 mg daily     HLD     Renal insuff - Cr 1.73 at outside hospital  Dec 1-creatinine 1.77  Dec 6 - followed by Nephrology . Torsemide added recently.   Jan 8 - creatinine stable at 1.7, torsemide changed back to 40 mg daily   Jan 12 - Cr 1.83 - f/u Nephrology in two weeks    Anemia - HGB 8.9 on Dec 26  Dec 31-hemoglobin 8.9  Jan 7 -  hemoglobin 8.3  Jan 12 - HGB 9.5     Factor V Leiden - coumadin 6 mg daily and ASA 81 mg daily at home(presently off ASA)   Jan 6 - INR 2.09 yesterday, on coumadin 5 mg.   Jan 8 - INR 2.21  Jan 12 - INR 2.83 - home on coumadin 4 mg daily with INR twice a week, results to Dr Nunez        DVT prophylaxis - coumadin/ SCDs     Pain management - FERN reviewed. On Norco 7.5 mg q 4 hours prn, ice pack  January 11-Staples have been removed from the left hip incision.  Scant serous drainage from the midpoint of the incision.  Patient continues with left hip pain.  Reviewed recommendation to try to taper off of opioid over the next 2 weeks at home, follow-up with orthopedic surgery for any pain medication refill.   Jan 12 - Norco 7. 5 mg po q 6 hours prn pain, Disp#40 - taper off over two weeks.      GI - constipation - multi-factorial - pain /immobility/pain med  Dec 30 - change colace to Senokot-S, add miralax     Impaired sleep  Dec 30 - trazodone 25 mg added on Dec 28  December 31-reports slept better  Jan 12 - home on melatonin             Goal is for home with  Home health   therapies.  Barrier to discharge:  Impaired mobility and ADLs  - worked on  Strength, transfers, balance, gait, ADLs  to overcome.      January 12-family teaching tomorrow with today discharge home.      physical exam near discharge-   Gen: NAD. Sitting in WC at bedside    HEENT: Trach midline.   Pulm: normal respirations; no r/r/w; non-labored   Heart; RRR;    Abd: soft; NT/ND;   Neuro: verbal; appropriate   Skin: no rashes on exposed skin   Blister posterior to left hip incision - dressed  Left hip incision - scant serous drainage at the midpoint of the incision, no erythema surrounding, no fluctuance or induration.  Staples out.  Ext- no distal edema  Neuro - Pain inhibition LLE.  Takes resistance distally in the bilateral lower extremities      Results for DELILAH WISE (MRN [id number]) as of 1/12/2021 10:47   Ref. Range 1/8/2021 06:14  1/9/2021 08:41 1/10/2021 04:40 1/11/2021 08:07 1/12/2021 07:24   Protime Latest Ref Range: 11.7 - 14.2 Seconds 24.3 (H) 25.5 (H) 28.0 (H) 28.7 (H) 29.5 (H)   INR Latest Ref Range: 0.90 - 1.10  2.21 (H) 2.36 (H) 2.65 (H) 2.73 (H) 2.83 (H)   Results for DELILAH WISE (MRN [id number]) as of 1/12/2021 10:47   Ref. Range 1/7/2021 06:36 1/8/2021 06:14 1/10/2021 04:40 1/11/2021 08:07 1/12/2021 07:24   Sodium Latest Ref Range: 136 - 145 mmol/L 138 138 138 136 139   Potassium Latest Ref Range: 3.5 - 5.2 mmol/L 4.1 4.1 3.5 3.6 3.9   CO2 Latest Ref Range: 22.0 - 29.0 mmol/L 28.9 29.1 (H) 30.5 (H) 30.9 (H) 29.9 (H)   Chloride Latest Ref Range: 98 - 107 mmol/L 98 97 (L) 97 (L) 94 (L) 96 (L)   Creatinine Latest Ref Range: 0.57 - 1.00 mg/dL 1.70 (H) 1.81 (H) 1.74 (H) 1.94 (H) 1.83 (H)   BUN Latest Ref Range: 8 - 23 mg/dL 22 24 (H) 24 (H) 25 (H) 28 (H)   Calcium Latest Ref Range: 8.6 - 10.5 mg/dL 7.6 (L) 7.5 (L) 7.6 (L) 7.8 (L) 7.8 (L)   Results for DELILAH WISE (MRN [id number]) as of 1/12/2021 10:47   Ref. Range 1/7/2021 06:36 1/10/2021 04:40 1/11/2021 08:07   WBC Latest Ref Range: 3.40 - 10.80 10*3/mm3 6.14 4.47 6.11   RBC Latest Ref Range: 3.77 - 5.28 10*6/mm3 3.06 (L) 2.92 (L) 3.37 (L)   Hemoglobin Latest Ref Range: 12.0 - 15.9 g/dL 8.3 (L) 8.4 (L) 9.5 (L)   Hematocrit Latest Ref Range: 34.0 - 46.6 % 27.7 (L) 26.0 (L) 30.3 (L)   RDW Latest Ref Range: 12.3 - 15.4 % 14.6 14.7 14.4   MCV Latest Ref Range: 79.0 - 97.0 fL 90.5 89.0 89.9   MCH Latest Ref Range: 26.6 - 33.0 pg 27.1 28.8 28.2   MCHC Latest Ref Range: 31.5 - 35.7 g/dL 30.0 (L) 32.3 31.4 (L)   MPV Latest Ref Range: 6.0 - 12.0 fL 9.8 10.0 10.2   Platelets Latest Ref Range: 140 - 450 10*3/mm3 340 077 345       Name Relationship Specialty Phone Fax Address Order              [facility] [phone] [address] [address]     Next Steps: Follow up      Instructions: You will be  receiving home PT, OT, ST, NSG. They will call you to schedule in home visits.       Herb Small MD   Orthopedic Surgery [phone] [address] [place], [place] [id number]     Next Steps: Schedule an appointment as soon as possible for a visit in 10 day(s)      Instructions: F/U Appointment on Jan. 22nd at 1P.MMarianna Paredes MD   Nephrology [phone] [address]  [address]  [facility] [address]     Next Steps: Schedule an appointment as soon as possible for a visit in 2 week(s)      Instructions: They will call patient at home.   Please wear a face mask to your appointment.       Elsy Ventura  PCP - General  Nurse Practitioner Nurse Practitioner [phone] [address]  [facility] [address]     Next Steps: Schedule an appointment as soon as possible for a visit in 3 week(s)      Instructions: Appointment Tuesday Feb. 2nd 10:00am           Discharge Medications      New Medications      Instructions Start Date   acetaminophen 325 MG tablet  Commonly known as: TYLENOL   325 mg, Oral, Every 4 Hours PRN      amLODIPine 5 MG tablet  Commonly known as: NORVASC   5 mg, Oral, Every 24 Hours Scheduled      Calcium Antacid 500 MG chewable tablet  Generic drug: calcium carbonate   1 tablet, Oral, 3 Times Daily PRN      ClearLax 17 GM/SCOOP powder  Generic drug: polyethylene glycol   Mix one capful with liquid and take by mouth Daily.      HYDROcodone-acetaminophen 7.5-325 MG per tablet  Commonly known as: NORCO   1 tablet, Oral, Every 6 Hours PRN      melatonin 3 MG tablet   3 mg, Oral, Nightly PRN      miconazole nitrate 2 % ointment ointment  Commonly known as: ALOE VESTA   Topical, Every 12 Hours Scheduled, Take with her      ondansetron 4 MG tablet  Commonly known as: ZOFRAN   4 mg, Oral, Every 6 Hours PRN      sertraline 50 MG tablet  Commonly known as: ZOLOFT   50 mg, Oral, Daily      torsemide 20 MG tablet  Commonly known as: DEMADEX   40 mg,  Oral, Daily         Changes to Medications      Instructions Start Date   warfarin 4 MG tablet  Commonly known as: COUMADIN  What changed:   · medication strength  · how much to take  · when to take this   4 mg, Oral, Every Evening         Continue These Medications      Instructions Start Date   allopurinol 100 MG tablet  Commonly known as: ZYLOPRIM   100 mg, Oral, Daily      aspirin 81 MG chewable tablet   81 mg, Oral, Daily      atorvastatin 20 MG tablet  Commonly known as: LIPITOR   20 mg, Oral, Daily      calcium carbonate-cholecalciferol 500-400 MG-UNIT tablet tablet   1 tablet, Oral, 2 Times Daily      cholecalciferol 25 MCG (1000 UT) tablet  Commonly known as: VITAMIN D3   2,000 Units, Oral, Daily      folic acid 1 MG tablet  Commonly known as: FOLVITE   1 mg, Oral, Daily      levothyroxine 100 MCG tablet  Commonly known as: SYNTHROID, LEVOTHROID   100 mcg, Oral, Daily      sennosides-docusate 8.6-50 MG per tablet  Commonly known as: PERICOLACE   2 tablets, Oral, 2 Times Daily           Jan 12 - Norco 7. 5 mg po q 6 hours prn pain, Disp#40 - taper off over two weeks.   FERN reviewed.    Reviewed recommendation to try to taper off of opioid over the next 2 weeks at home, follow-up with orthopedic surgery for any pain medication refill.    No driving.  No alcohol.    Anticoagulation with Coumadin to be followed by the physician who managed it before hospitalization.    home health PT, OT, ST, and NSG.  Lab work-INR twice a week with results to the physician who manage anticoagulation before - Dr Nunez.  Weight-bear as tolerated left lower extremity.  Dry gauze dressing to the left hip change daily.  Mepilex dressing to the posterior left hip blister-change every 2 days.    Medication refills per Primary Care or Orthopedics    >30 on discharge

## 2021-01-12 NOTE — PROGRESS NOTES
Inpatient Rehabilitation Plan of Care Note    Plan of Care  Care Plan Reviewed - No updates at this time.    Psychosocial    Performed Intervention(s)  Verbalizes needs & concerns  calm enviroment      Safety    Performed Intervention(s)  Items within reach (call bell, etc), safety rounds, falls precautions, &  bed/chair alarms.      Sphincter Control    Performed Intervention(s)  Monitor intake, output, & BM's, Encourage appropriate diet & fluids, & perineal  care.      Pain    Performed Intervention(s)  Pain medication as ordered, distraction, & repositioning assistance as needed.      Body Systems    Performed Intervention(s)  Daily skin inspections, wound care, assist with repositioning, & dressing  changes as ordered.    Signed by: Svetlana Reynaga RN

## 2021-01-12 NOTE — PLAN OF CARE
Goal Outcome Evaluation:  Plan of Care Reviewed With: patient, daughter  Progress: improving  Outcome Summary: Mrs Maxwell is alert, oriented. Ambulates with walker CGA of 1. No unsafe behavior. Lt hip incision with steri strips clean, dry and intact.Old blister lateral incision healing. Family conference held today. Discharge home tomorrow.

## 2021-01-12 NOTE — PROGRESS NOTES
PPS CMG Coordinator  Inpatient Rehabilitation Discharge    Mode of Locomotion: Walking.    Discharge Against Medical Advice:  No.  Discharge Information  Patient Discharged Alive:  Yes  Discharge Destination/Living Setting: Home with Home Health Services  Diagnosis for Interruption/Death: ICD    Impairment Group: 08.11 Status Post Unilateral Hip Fracture    Comorbidities: ICD    Complications: ICD    QUALITY INDICATORS  Section J Health Conditions: Fall(s) Since Admission:  No    Section M. Skin Conditions Discharge:  Unhealed Pressure Ulcer(s) at Stage 1 or  Higher:  No    . Current Number of Unhealed Pressure Ulcers  Branch    Section N. Medication:  Medication Intervention: N/A - There were no potential clinically significant  medication issues identified since admission or patient is not taking any  medications.    Signed by: Jean Claude Harkins RN

## 2021-01-12 NOTE — PLAN OF CARE
Goal Outcome Evaluation:  Plan of Care Reviewed With: patient    Problem: Rehabilitation (IRF) Plan of Care  Goal: Plan of Care Review  Outcome: Ongoing, Progressing  Flowsheets (Taken 1/12/2021 0330)  Outcome Summary: Pt is alert and oriented x 4. Flat affect. Vocal regarding all care and needs. Takes meds whole PO. Regular diet, low potassium, thin liquids. Renal panel on 1/12. INR daily. Continent of bowel and bladder. Last BM 1/11. Assist x 1 during transfers. Ambulates in room with walker. [place] dressing intact to left hip. Steri strips to incision. Dried drainage at base of incision. Foam dressing to old healing blister to [facility] thip. D/C home with daughter on 1/12. Prn [place] administered at bed time and at 3am. Resting in bed with eyes closed. Call light within reach.

## 2021-01-12 NOTE — PROGRESS NOTES
SECTION GG    Eating Performance Discharge: Lamont sets up or cleans up; patient completes  activity. Lamont assists only prior to or following the activity.    Signed by: Rossy Falcon RN

## 2021-01-12 NOTE — PROGRESS NOTES
Case Management  Inpatient Rehabilitation Team Conference    Conference Date/Time: 1/12/2021 7:34:27 AM    Team Conference Attendees:  Dr. Aric Calderon, Luis Armando Wright, Pharmacist  Gifty Doe, MICHAEL Hudson, PT  Misa Oneal, OT  Lilian Mohr, SLP  Gifty Connolly, CTRS  Modesta Ramirez RD, LD  Rossy Falcon, JOSE RAUL Harkins RN    Demographics            Age: 75Y            Gender: Female    Admission Date: 12/27/2020 3:22:00 PM  Rehabilitation Diagnosis:  Left hip fx  Past Medical History: [place], allergies, DOMITILA cataracts; HLD, LE edema; WALLY; OA,  chronic pain, RA, back sx, foot sx 2016; multiple UTIs, urgency; hernia repair,  gastric bypass; CVA 2014 with residual dysarthria; DM, obesity, partial  thyroidectomy; factor V Leiden, breast cancer, lumpectomy; anxiety      Plan of Care  Anticipated Discharge Date/Estimated Length of Stay: ELOS: DC 1/13  Anticipated Discharge Destination: Community discharge with assistance  Discharge Plan : Family conference held on 1/11 with patient,  and  daughter on conference call.  Daughter scheduled for family teaching on 1/12.  D/C plan is home with daughter. [facility] to provide home PT, OT,  ST, NSG.  Medical Necessity Expected Level Rationale: CGA with HH  Intensity and Duration: an average of 3 hours/5 days per week  Medical Supervision and 24 Hour Rehab Nursing: x  Physical Therapy: x  PT Intensity/Duration: 1 hour/day, 5 days/week for approximately 10 - 15 days  Occupational Therapy: x  OT Intensity/Duration: 1 hour/day, 5 days/week for approximately 10 - 15 days  Speech and Language Therapy: x  SLP Intensity/Duration: 1 hour/day, 5 days/week for approximately 10 - 15 days  Social Work: x  Therapeutic Recreation: x  Updated (if changes indicated)    Anticipated Discharge Date/Estimated Length of Stay:   ELOS: DC 1/12    Based on the patient's medical and functional status, their prognosis and  expected level of functional  improvement is: CGA with HH      Interdisciplinary Problem/Goals/Status    All Rehab Problems:  Body Systems    [RN] Integumentary(Active)  Current Status(01/11/2021): Lt. hip incision, Lt. hip blister, & Crack in skin  Lt. groin area.  Weekly Goal(01/18/2021): Teach family/patient dressing changes. Incision  healing, no infection.  Discharge Goal: Incision, blister, & cracked areas healed.        Cognition    [ST] Executive Functions(Active)  Current Status(01/04/2021): MIN cues needed for functional problem solving and  reasoning. MOD cues needed for simple (math/temporal) word problems.  Weekly Goal(01/11/2021): follow daily schedule indep; recall daily events indep  Discharge Goal: fxnl cognition for home with assist/supervision        Mobility    [OT] Toilet Transfers(Active)  Current Status(01/11/2021): SBA with RWX  Weekly Goal(01/11/2021): SBA with RWX  Discharge Goal: SBA with RWX    [OT] Tub/Shower Transfers(Active)  Current Status(01/11/2021): SBA to seat with back  Weekly Goal(01/11/2021): SBA  Discharge Goal: SBA    [PT] Bed/Chair/Wheelchair(Active)  Current Status(01/11/2021): SBA-CGA, RW  Weekly Goal(01/18/2021): SBA, RW  Discharge Goal: SBA, RW    [PT] Bed Mobility(Active)  Current Status(01/11/2021): [place]  Weekly Goal(01/18/2021): CGA  Discharge Goal: CGA/Min    [PT] Walk(Active)  Current Status(01/11/2021): 80' SBA-CGA, RW  Weekly Goal(01/18/2021): SBA, RW  Discharge Goal: SBA, RW        Pain    [RN] Pain Management(Active)  Current Status(01/11/2021): Pain related to Lt. hip surgery  Weekly Goal(01/18/2021): Able to tolerate therapies. Pain level < 5 on 1-10 pain  scale.  Discharge Goal: Pain resolved or under control.        Psychosocial    [RN] Coping/Adjustment(Active)  Current Status(01/11/2021): Expresses appropriate coping  Weekly Goal(01/18/2021): Allow opportunity to express concerns regarding current  status.  Discharge Goal: Adequate coping regarding life changes with ongoing  support.        Safety    [RN] Potential for Injury(Active)  Current Status(01/11/2021): Uses call light appropriately. No unsafe behaviors,  but history of falls.  Weekly Goal(01/18/2021): Instruct family/patient regarding safety precautions &  need for close supervision.  Discharge Goal: No falls, no injuries. Patient/family will be aware of risk of  fall & safety in the home setting.        Self Care    [OT] Bathing(Active)  Current Status(01/11/2021): CGA  Weekly Goal(01/11/2021): CGA  Discharge Goal: CGA    [OT] Dressing (Lower)(Active)  Current Status(01/11/2021): mod A  Weekly Goal(01/12/2021): MOD  Discharge Goal: mod A with AE    [OT] Dressing (Upper)(Active)  Current Status(01/11/2021): set up  Weekly Goal(01/11/2021): set up  Discharge Goal: set up    [OT] Grooming(Active)  Current Status(01/11/2021): Set up  Weekly Goal(01/11/2021): set up  Discharge Goal: set up    [OT] Toileting(Active)  Current Status(01/11/2021): Min  Weekly Goal(01/12/2021): min A  Discharge Goal: min A        Sphincter Control    [RN] Bladder & bowel management(Active)  Current Status(01/11/2021): Bladder & bowel  continent 100%  Weekly Goal(01/18/2021): B&B Continent 100%  Discharge Goal: B&B Remains 100% continent        Comments: 12/29: pain is a barrier; SLP to eval today related to hx of strokes;  BNE to be ordered;        1/12: still with c/o pain;    Signed by: Jean Claude Harkins RN    Physician CoSigned By: Aric Chao 01/12/2021 08:33:03

## 2021-01-12 NOTE — PROGRESS NOTES
NEPHROLOGY PROGRESS NOTE    PATIENT IDENTIFICATION:   Name:  Alice Maxwell      MRN:  [id number]     75 y.o.  female             Reason for visit: Follow-up acute kidney injury    SUBJECTIVE:   The patient is feeling better today denies any chest pain or shortness of air, no orthopnea or PND, no nausea or vomiting, no abdominal pain, no dysuria or gross hematuria.  Tolerating her rehab very well.    OBJECTIVE:  Vitals:    01/11/21 1510 01/11/21 2038 01/12/21 0544 01/12/21 0709   BP: 149/67 136/63 127/70 124/78   BP Location: Right arm Right arm Right arm    Patient Position: Lying Lying Lying    Pulse: 78 82 76 74   Resp: 18 20 18    Temp: 97.6 °F (36.4 °C) 98 °F (36.7 °C) 98.1 °F (36.7 °C)    TempSrc: Oral Oral Oral    SpO2: 96% 99% 93%    Weight:       Height:               Body mass index is 39.12 kg/m².    Intake/Output Summary (Last 24 hours) at 1/12/2021 0924  Last data filed at 1/12/2021 0800  Gross per 24 hour   Intake 600 ml   Output --   Net 600 ml         Exam:  General Appearance: alert, oriented x 3, no acute distress,   Skin: warm and dry  HEENT: pupils round and reactive to light, oral mucosa normal, nonicteric sclera  Neck: supple, no JVD, trachea midline  Lungs: CTA, unlabored breathing effort  Heart: RRR, normal S1 and S2, no S3, no rub  Abdomen: soft, nontender, normoactive bowels  : no palpable bladder,  Extremities: no edema, cyanosis or clubbing  Neuro: normal speech and mental status        Scheduled meds:  allopurinol, 100 mg, Oral, Daily  amLODIPine, 5 mg, Oral, Q24H  atorvastatin, 20 mg, Oral, Nightly  cholecalciferol, 2,000 Units, Oral, Daily  folic acid, 1 mg, Oral, Daily  levothyroxine, 100 mcg, Oral, Q AM  miconazole nitrate, , Topical, Q12H  polyethylene glycol, 17 g, Oral, Daily  senna-docusate sodium, 2 tablet, Oral, Nightly  sertraline, 50 mg, Oral, Daily  torsemide, 40 mg, Oral, Daily  warfarin, 4 mg, Oral, Daily With Dinner      IV meds:                      Pharmacy to  dose warfarin,         Data Review:    Results from last 7 days   Lab Units 01/12/21  0724 01/11/21  0807 01/10/21  0440 01/08/21  0614 01/07/21  0636   SODIUM mmol/L 139 136 138 138 138   POTASSIUM mmol/L 3.9 3.6 3.5 4.1 4.1   CHLORIDE mmol/L 96* 94* 97* 97* 98   CO2 mmol/L 29.9* 30.9* 30.5* 29.1* 28.9   BUN mg/dL 28* 25* 24* 24* 22   CREATININE mg/dL 1.83* 1.94* 1.74* 1.81* 1.70*   CALCIUM mg/dL 7.8* 7.8* 7.6* 7.5* 7.6*   BILIRUBIN mg/dL  --  0.6  --  0.5 0.5   ALK PHOS U/L  --  71  --  66 65   ALT (SGPT) U/L  --  12  --  14 15   AST (SGOT) U/L  --  25  --  27 27   GLUCOSE mg/dL 119* 170* 118* 111* 97       Estimated Creatinine Clearance: 33.4 mL/min (A) (by C-G formula based on SCr of 1.83 mg/dL (H)).    Results from last 7 days   Lab Units 01/12/21  0724 01/11/21  0807 01/10/21  0440   MAGNESIUM mg/dL  --  1.8 1.9   PHOSPHORUS mg/dL 3.8 3.5 4.2       Results from last 7 days   Lab Units 01/11/21  0807 01/10/21  0440 01/07/21  0636   WBC 10*3/mm3 6.11 4.47 6.14   HEMOGLOBIN g/dL 9.5* 8.4* 8.3*   PLATELETS 10*3/mm3 345 279 340       Results from last 7 days   Lab Units 01/12/21  0724 01/11/21  0807 01/10/21  0440 01/09/21  0841 01/08/21  0614   INR  2.83* 2.73* 2.65* 2.36* 2.21*             ASSESSMENT:   1.  Acute kidney injury associated with vancomycin nephrotoxicity suspected from [facility] as per discharge summary, creatinine today 1.8, stable, electrolyte within acceptable range   2.  Hypertension, controlled  3.  Left femur fracture status post hemiarthroplasty on 12/22/2020  4.  Factor V Leiden mutation, anticoagulated  5.  Dyslipidemia on statin  6.  Gout, quiescent  7.  Hypothyroidism, treated  8.  Anemia, hemoglobin today is 9.5    Plan:  1.  Continue the same treatment  2.  Surveillance lab         Dodie Girard MD  1/12/2021    09:24 EST

## 2021-01-20 ENCOUNTER — DOCUMENTATION (OUTPATIENT)
Dept: INTERNAL MEDICINE | Facility: HOSPITAL | Age: 76
End: 2021-01-20

## 2021-01-20 ENCOUNTER — LAB REQUISITION (OUTPATIENT)
Dept: LAB | Facility: HOSPITAL | Age: 76
End: 2021-01-20

## 2021-01-20 DIAGNOSIS — Z00.00 ENCOUNTER FOR GENERAL ADULT MEDICAL EXAMINATION WITHOUT ABNORMAL FINDINGS: ICD-10-CM

## 2021-01-20 LAB
ANION GAP SERPL CALCULATED.3IONS-SCNC: 17 MMOL/L (ref 5–15)
BUN SERPL-MCNC: 30 MG/DL (ref 8–23)
BUN/CREAT SERPL: 21.6 (ref 7–25)
CALCIUM SPEC-SCNC: 8.1 MG/DL (ref 8.6–10.5)
CHLORIDE SERPL-SCNC: 94 MMOL/L (ref 98–107)
CO2 SERPL-SCNC: 27 MMOL/L (ref 22–29)
CREAT SERPL-MCNC: 1.39 MG/DL (ref 0.57–1)
DEPRECATED RDW RBC AUTO: 47.9 FL (ref 37–54)
ERYTHROCYTE [DISTWIDTH] IN BLOOD BY AUTOMATED COUNT: 14.5 % (ref 12.3–15.4)
GFR SERPL CREATININE-BSD FRML MDRD: 37 ML/MIN/1.73
GLUCOSE SERPL-MCNC: 126 MG/DL (ref 65–99)
HCT VFR BLD AUTO: 33.3 % (ref 34–46.6)
HGB BLD-MCNC: 10.4 G/DL (ref 12–15.9)
MCH RBC QN AUTO: 28.1 PG (ref 26.6–33)
MCHC RBC AUTO-ENTMCNC: 31.2 G/DL (ref 31.5–35.7)
MCV RBC AUTO: 90 FL (ref 79–97)
PLATELET # BLD AUTO: 208 10*3/MM3 (ref 140–450)
PMV BLD AUTO: 11.6 FL (ref 6–12)
POTASSIUM SERPL-SCNC: 3 MMOL/L (ref 3.5–5.2)
RBC # BLD AUTO: 3.7 10*6/MM3 (ref 3.77–5.28)
SODIUM SERPL-SCNC: 138 MMOL/L (ref 136–145)
WBC # BLD AUTO: 6.69 10*3/MM3 (ref 3.4–10.8)

## 2021-01-20 PROCEDURE — 85027 COMPLETE CBC AUTOMATED: CPT | Performed by: PHYSICAL MEDICINE & REHABILITATION

## 2021-01-20 PROCEDURE — 80048 BASIC METABOLIC PNL TOTAL CA: CPT | Performed by: PHYSICAL MEDICINE & REHABILITATION

## 2021-01-20 NOTE — PROGRESS NOTES
History of left hip fracture.  Renal insufficiency  Chronic anticoagulation  Hypertension    Patient was discharged from the rehab unit earlier this month.  Received a call from home health physical therapy yesterday January 19 that the patient had generalized weakness and was noted to have a blood pressure going from 160-110 supine to standing.  Patient may not be eating as well at home.  I asked for a call from the home health nurse.    Patient reviewed with home health nursing this morning.  She is on amlodipine 5 mg daily, torsemide 40 mg daily, and warfarin.  INR was in the target range on Monday.  The home health nurse reports that she had some fatigue after ambulating from one room to another but no complaint of any orthostatic symptoms at that time.  Reviewed concerns before decreased intake and dehydration/increased renal insufficiency from dehydration or from hypoperfusion with orthostatic symptoms.  Have asked home health nursing to get a CBC and BMP today and would recommend the patient follow-up with her primary care LANDRY Lara or her nephrologist Dr. Ceron for further evaluation depend on the results of the labs.  On January 11 hemoglobin 9.5.  On January 12 creatinine 1.83, blood urea nitrogen 28, INR 2.83.    Aric Chao MD

## 2021-01-22 ENCOUNTER — CONVERSION ENCOUNTER (OUTPATIENT)
Dept: ORTHOPEDIC SURGERY | Facility: CLINIC | Age: 76
End: 2021-01-22

## 2021-01-22 ENCOUNTER — OFFICE VISIT CONVERTED (OUTPATIENT)
Dept: ORTHOPEDIC SURGERY | Facility: CLINIC | Age: 76
End: 2021-01-22
Attending: ORTHOPAEDIC SURGERY

## 2021-02-02 ENCOUNTER — LAB REQUISITION (OUTPATIENT)
Dept: LAB | Facility: HOSPITAL | Age: 76
End: 2021-02-02

## 2021-02-02 DIAGNOSIS — Z00.00 ENCOUNTER FOR GENERAL ADULT MEDICAL EXAMINATION WITHOUT ABNORMAL FINDINGS: ICD-10-CM

## 2021-02-02 LAB
25(OH)D3 SERPL-MCNC: 50.7 NG/ML (ref 30–100)
ALBUMIN SERPL-MCNC: 3.4 G/DL (ref 3.5–5.2)
ALBUMIN/GLOB SERPL: 1.2 G/DL
ALP SERPL-CCNC: 64 U/L (ref 39–117)
ALT SERPL W P-5'-P-CCNC: 10 U/L (ref 1–33)
ANION GAP SERPL CALCULATED.3IONS-SCNC: 12.6 MMOL/L (ref 5–15)
AST SERPL-CCNC: 22 U/L (ref 1–32)
BILIRUB SERPL-MCNC: 0.4 MG/DL (ref 0–1.2)
BUN SERPL-MCNC: 16 MG/DL (ref 8–23)
BUN/CREAT SERPL: 15.4 (ref 7–25)
CALCIUM SPEC-SCNC: 9.1 MG/DL (ref 8.6–10.5)
CHLORIDE SERPL-SCNC: 103 MMOL/L (ref 98–107)
CO2 SERPL-SCNC: 23.4 MMOL/L (ref 22–29)
CREAT SERPL-MCNC: 1.04 MG/DL (ref 0.57–1)
DEPRECATED RDW RBC AUTO: 47.1 FL (ref 37–54)
ERYTHROCYTE [DISTWIDTH] IN BLOOD BY AUTOMATED COUNT: 14 % (ref 12.3–15.4)
GFR SERPL CREATININE-BSD FRML MDRD: 51 ML/MIN/1.73
GLOBULIN UR ELPH-MCNC: 2.8 GM/DL
GLUCOSE SERPL-MCNC: 136 MG/DL (ref 65–99)
HBA1C MFR BLD: 5.78 % (ref 4.8–5.6)
HCT VFR BLD AUTO: 31.5 % (ref 34–46.6)
HGB BLD-MCNC: 9.5 G/DL (ref 12–15.9)
MCH RBC QN AUTO: 27.3 PG (ref 26.6–33)
MCHC RBC AUTO-ENTMCNC: 30.2 G/DL (ref 31.5–35.7)
MCV RBC AUTO: 90.5 FL (ref 79–97)
PLATELET # BLD AUTO: 179 10*3/MM3 (ref 140–450)
PMV BLD AUTO: 11.7 FL (ref 6–12)
POTASSIUM SERPL-SCNC: 4.2 MMOL/L (ref 3.5–5.2)
PROT SERPL-MCNC: 6.2 G/DL (ref 6–8.5)
RBC # BLD AUTO: 3.48 10*6/MM3 (ref 3.77–5.28)
SODIUM SERPL-SCNC: 139 MMOL/L (ref 136–145)
TSH SERPL DL<=0.05 MIU/L-ACNC: 0.52 UIU/ML (ref 0.27–4.2)
VIT B12 BLD-MCNC: 1313 PG/ML (ref 211–946)
WBC # BLD AUTO: 5.2 10*3/MM3 (ref 3.4–10.8)

## 2021-02-02 PROCEDURE — 82607 VITAMIN B-12: CPT | Performed by: NURSE PRACTITIONER

## 2021-02-02 PROCEDURE — U0004 COV-19 TEST NON-CDC HGH THRU: HCPCS | Performed by: NURSE PRACTITIONER

## 2021-02-02 PROCEDURE — 82306 VITAMIN D 25 HYDROXY: CPT | Performed by: NURSE PRACTITIONER

## 2021-02-02 PROCEDURE — 80053 COMPREHEN METABOLIC PANEL: CPT | Performed by: NURSE PRACTITIONER

## 2021-02-02 PROCEDURE — 85027 COMPLETE CBC AUTOMATED: CPT | Performed by: NURSE PRACTITIONER

## 2021-02-02 PROCEDURE — 83036 HEMOGLOBIN GLYCOSYLATED A1C: CPT | Performed by: NURSE PRACTITIONER

## 2021-02-02 PROCEDURE — 84443 ASSAY THYROID STIM HORMONE: CPT | Performed by: NURSE PRACTITIONER

## 2021-02-03 LAB — SARS-COV-2 RNA RESP QL NAA+PROBE: NOT DETECTED

## 2021-02-09 ENCOUNTER — TRANSCRIBE ORDERS (OUTPATIENT)
Dept: PHYSICAL THERAPY | Facility: CLINIC | Age: 76
End: 2021-02-09

## 2021-02-09 DIAGNOSIS — Z47.89 AFTERCARE FOLLOWING SURGERY OF THE MUSCULOSKELETAL SYSTEM, NEC: Primary | ICD-10-CM

## 2021-03-09 DIAGNOSIS — Z23 IMMUNIZATION DUE: ICD-10-CM

## 2021-03-21 ENCOUNTER — HOSPITAL ENCOUNTER (EMERGENCY)
Facility: HOSPITAL | Age: 76
Discharge: HOME | End: 2021-03-21
Attending: EMERGENCY MEDICINE | Admitting: EMERGENCY MEDICINE

## 2021-03-21 ENCOUNTER — APPOINTMENT (OUTPATIENT)
Dept: CT IMAGING | Facility: HOSPITAL | Age: 76
End: 2021-03-21

## 2021-03-21 VITALS
HEIGHT: 67 IN | WEIGHT: 250 LBS | TEMPERATURE: 97 F | HEART RATE: 77 BPM | DIASTOLIC BLOOD PRESSURE: 84 MMHG | OXYGEN SATURATION: 96 % | RESPIRATION RATE: 16 BRPM | BODY MASS INDEX: 39.24 KG/M2 | SYSTOLIC BLOOD PRESSURE: 156 MMHG
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
## 2021-03-21 DIAGNOSIS — S01.81XA LACERATION OF FOREHEAD, INITIAL ENCOUNTER: Primary | ICD-10-CM

## 2021-03-21 DIAGNOSIS — Z79.01 ANTICOAGULATED ON WARFARIN: ICD-10-CM

## 2021-03-21 LAB
BASOPHILS # BLD AUTO: 0.04 10*3/MM3 (ref 0–0.2)
BASOPHILS NFR BLD AUTO: 0.5 % (ref 0–1.5)
DEPRECATED RDW RBC AUTO: 44 FL (ref 37–54)
EOSINOPHIL # BLD AUTO: 0.17 10*3/MM3 (ref 0–0.4)
EOSINOPHIL NFR BLD AUTO: 2.2 % (ref 0.3–6.2)
ERYTHROCYTE [DISTWIDTH] IN BLOOD BY AUTOMATED COUNT: 13.7 % (ref 12.3–15.4)
HCT VFR BLD AUTO: 38.2 % (ref 34–46.6)
HGB BLD-MCNC: 11.4 G/DL (ref 12–15.9)
IMM GRANULOCYTES # BLD AUTO: 0.02 10*3/MM3 (ref 0–0.05)
IMM GRANULOCYTES NFR BLD AUTO: 0.3 % (ref 0–0.5)
INR PPP: 1.92 (ref 0.9–1.1)
LYMPHOCYTES # BLD AUTO: 3.64 10*3/MM3 (ref 0.7–3.1)
LYMPHOCYTES NFR BLD AUTO: 47.3 % (ref 19.6–45.3)
MCH RBC QN AUTO: 26.6 PG (ref 26.6–33)
MCHC RBC AUTO-ENTMCNC: 29.8 G/DL (ref 31.5–35.7)
MCV RBC AUTO: 89.3 FL (ref 79–97)
MONOCYTES # BLD AUTO: 0.56 10*3/MM3 (ref 0.1–0.9)
MONOCYTES NFR BLD AUTO: 7.3 % (ref 5–12)
NEUTROPHILS NFR BLD AUTO: 3.27 10*3/MM3 (ref 1.7–7)
NEUTROPHILS NFR BLD AUTO: 42.4 % (ref 42.7–76)
NRBC BLD AUTO-RTO: 0 /100 WBC (ref 0–0.2)
PLATELET # BLD AUTO: 195 10*3/MM3 (ref 140–450)
PMV BLD AUTO: 11.4 FL (ref 6–12)
PROTHROMBIN TIME: 21.7 SECONDS (ref 11.7–14.2)
RBC # BLD AUTO: 4.28 10*6/MM3 (ref 3.77–5.28)
WBC # BLD AUTO: 7.7 10*3/MM3 (ref 3.4–10.8)

## 2021-03-21 PROCEDURE — 70450 CT HEAD/BRAIN W/O DYE: CPT

## 2021-03-21 PROCEDURE — 85025 COMPLETE CBC W/AUTO DIFF WBC: CPT | Performed by: EMERGENCY MEDICINE

## 2021-03-21 PROCEDURE — 85610 PROTHROMBIN TIME: CPT | Performed by: EMERGENCY MEDICINE

## 2021-03-21 PROCEDURE — 99283 EMERGENCY DEPT VISIT LOW MDM: CPT

## 2021-03-21 NOTE — DISCHARGE INSTRUCTIONS
Return to the emergency department for worsening headache, dizziness, confusion, nausea, vomiting, changes, or other concern.  Follow-up with your primary care doctor as needed.

## 2021-03-21 NOTE — ED PROVIDER NOTES
" EMERGENCY DEPARTMENT ENCOUNTER    Room Number:  16/16  Date of encounter:  3/21/2021  PCP: Elsy Ventura  Historian: Patient and daughter    I used full protective equipment while examining this patient.  This includes face mask, gloves and protective eyewear.  I washed my hands before entering the room and immediately upon leaving the room.  Patient was wearing a surgical mask.      HPI:  Chief Complaint: Forehead laceration  A complete HPI/ROS/PMH/PSH/SH/FH are unobtainable due to: None    Context: Alice Maxwell is a 76 y.o. female who presents to the ED c/o left forehead laceration.  Patient states she was getting out of bed when her feet slipped.  She fell and hit the left side of her forehead on a nightstand and sustained a laceration to the left side of her forehead.  Denies loss of consciousness, feeling dazed, nausea, vomiting, or dizziness.  Patient is on Coumadin due to previous strokes.  Complains of a mild headache but denies neck pain, back pain, chest pain, abdominal pain, or extremity pain.  INR was 2.12 days ago.  Patient's daughter states that the patient \"bled quite a bit\".      PAST MEDICAL HISTORY  Active Ambulatory Problems     Diagnosis Date Noted   • Stroke (CMS/HCC) 11/01/2017   • Personal history of breast cancer 03/12/2018   • History of loop electrical excision procedure (LEEP) 03/12/2018   • History of abnormal cervical Pap smear 05/08/2019   • S/p left hip fracture 12/30/2020     Resolved Ambulatory Problems     Diagnosis Date Noted   • No Resolved Ambulatory Problems     Past Medical History:   Diagnosis Date   • Abnormal Pap smear of cervix    • Arthritis    • Cancer (CMS/HCC)    • Disease of thyroid gland    • Osteoporosis    • Urinary tract infection          PAST SURGICAL HISTORY  Past Surgical History:   Procedure Laterality Date   • BACK SURGERY     • BREAST LUMPECTOMY     • CERVICAL BIOPSY  W/ LOOP ELECTRODE EXCISION     • CHOLECYSTECTOMY     • COLONOSCOPY      had polyps " 2015   • FOOT SURGERY     • GASTRIC BYPASS     • HERNIA REPAIR     • THYROIDECTOMY           FAMILY HISTORY  Family History   Problem Relation Age of Onset   • Breast cancer Daughter    • Breast cancer Daughter          SOCIAL HISTORY  Social History     Socioeconomic History   • Marital status:      Spouse name: Not on file   • Number of children: Not on file   • Years of education: Not on file   • Highest education level: Not on file   Tobacco Use   • Smoking status: Never Smoker   Substance and Sexual Activity   • Alcohol use: No   • Drug use: No   • Sexual activity: Defer         ALLERGIES  Penicillins       REVIEW OF SYSTEMS  Review of Systems      All systems have been reviewed and are negative except as as discussed in the HPI    PHYSICAL EXAM    I have reviewed the triage vital signs and nursing notes.    ED Triage Vitals   Temp Heart Rate Resp BP SpO2   03/21/21 0859 03/21/21 0859 03/21/21 0900 03/21/21 0905 03/21/21 0859   97 °F (36.1 °C) 77 16 156/84 100 %      Temp src Heart Rate Source Patient Position BP Location FiO2 (%)   03/21/21 0859 -- -- -- --   Tympanic           Physical Exam  GENERAL: Awake, alert, oriented x3  HENT: 1 cm laceration over the left lateral forehead with a small surrounding hematoma, nares patent, no bony tenderness of the face, no malocclusion  NECK: supple, no cervical spine tenderness  EYES: Extraocular muscles intact, pupils reactive to light bilaterally  CV: regular rhythm, regular rate  RESPIRATORY: normal effort, clear to auscultation bilaterally  ABDOMEN: soft, nontender  MUSCULOSKELETAL: Extremities are nontender and without obvious deformity.  There is decreased range of motion of the left hip (chronic and unchanged per patient).  There is normal range of motion in all other extremities.    NEURO: Strength, sensation, and coordination are grossly intact.  Speech and mentation are unremarkable.  No facial droop.  GCS 15  SKIN: warm, dry, no rash  PSYCH: Normal  mood and affect      LAB RESULTS  Recent Results (from the past 24 hour(s))   Protime-INR    Collection Time: 03/21/21  9:22 AM    Specimen: Blood   Result Value Ref Range    Protime 21.7 (H) 11.7 - 14.2 Seconds    INR 1.92 (H) 0.90 - 1.10   CBC Auto Differential    Collection Time: 03/21/21  9:22 AM    Specimen: Blood   Result Value Ref Range    WBC 7.70 3.40 - 10.80 10*3/mm3    RBC 4.28 3.77 - 5.28 10*6/mm3    Hemoglobin 11.4 (L) 12.0 - 15.9 g/dL    Hematocrit 38.2 34.0 - 46.6 %    MCV 89.3 79.0 - 97.0 fL    MCH 26.6 26.6 - 33.0 pg    MCHC 29.8 (L) 31.5 - 35.7 g/dL    RDW 13.7 12.3 - 15.4 %    RDW-SD 44.0 37.0 - 54.0 fl    MPV 11.4 6.0 - 12.0 fL    Platelets 195 140 - 450 10*3/mm3    Neutrophil % 42.4 (L) 42.7 - 76.0 %    Lymphocyte % 47.3 (H) 19.6 - 45.3 %    Monocyte % 7.3 5.0 - 12.0 %    Eosinophil % 2.2 0.3 - 6.2 %    Basophil % 0.5 0.0 - 1.5 %    Immature Grans % 0.3 0.0 - 0.5 %    Neutrophils, Absolute 3.27 1.70 - 7.00 10*3/mm3    Lymphocytes, Absolute 3.64 (H) 0.70 - 3.10 10*3/mm3    Monocytes, Absolute 0.56 0.10 - 0.90 10*3/mm3    Eosinophils, Absolute 0.17 0.00 - 0.40 10*3/mm3    Basophils, Absolute 0.04 0.00 - 0.20 10*3/mm3    Immature Grans, Absolute 0.02 0.00 - 0.05 10*3/mm3    nRBC 0.0 0.0 - 0.2 /100 WBC       Ordered the above labs and independently reviewed the results.      RADIOLOGY  CT Head Without Contrast    Result Date: 3/21/2021  CT SCAN OF THE BRAIN WITHOUT CONTRAST  HISTORY: Fell with trauma to left forehead. On anticoagulation.  FINDINGS:  The CT scan was performed as an emergency procedure through the brain without contrast. There is mild diffuse atrophy and chronic small vessel ischemic change. There is a localized area of old encephalomalacia in the right temporofrontal region consistent with old infarct. There is no evidence of acute intracranial hemorrhage or mass effect and the visualized sinuses are clear.  Radiation dose reduction techniques were utilized, including automated  exposure control and exposure modulation based on body size.  This report was finalized on 3/21/2021 11:38 AM by Dr. Nghia Nicole M.D.        I ordered the above noted radiological studies. Reviewed by me and discussed with radiologist.  See dictation for official radiology interpretation.      PROCEDURES  Laceration Repair    Date/Time: 3/21/2021 10:44 AM  Performed by: Kwabena Montemayor MD  Authorized by: Kwabena Montemayor MD     Consent:     Consent obtained:  Verbal  Anesthesia (see MAR for exact dosages):     Anesthesia method:  None  Laceration details:     Location:  Face    Face location:  Forehead    Length (cm):  1  Repair type:     Repair type:  Simple  Pre-procedure details:     Preparation:  Imaging obtained to evaluate for foreign bodies  Exploration:     Hemostasis achieved with:  Direct pressure    Wound exploration: entire depth of wound probed and visualized      Wound extent: no foreign bodies/material noted and no underlying fracture noted      Contaminated: no    Treatment:     Area cleansed with:  Hibiclens    Amount of cleaning:  Standard    Irrigation solution:  Sterile saline  Skin repair:     Repair method:  Tissue adhesive  Approximation:     Approximation:  Close  Post-procedure details:     Dressing:  Open (no dressing)    Patient tolerance of procedure:  Tolerated well, no immediate complications          MEDICATIONS GIVEN IN ER    Medications - No data to display      PROGRESS, DATA ANALYSIS, CONSULTS, AND MEDICAL DECISION MAKING    All labs have been independently reviewed by me.  All radiology studies have been reviewed by me and discussed with radiologist dictating the report.   EKG's independently viewed and interpreted by me.  I have reviewed the nurse's notes, vital signs, past medical history, and medication list.  Discussion below represents my analysis of pertinent findings related to patient's condition, differential diagnosis, treatment plan and final  disposition.      ED Course as of Mar 21 1430   Sun Mar 21, 2021   1007 9.5 one month ago   Hemoglobin(!): 11.4 [WH]   1024 Results of the head CT discussed with Dr. Nicole.  Images intimately viewed by me.  Head CT is negative acute.    [WH]   1033 Test results discussed with the patient and her daughter.  Patient is laceration was cleaned by me with Hibiclens and then irrigated with saline.  Wound will be closed with a skin adhesive.  See procedure note for details.    [WH]   1048 Patient remains awake and alert.  She is now complaining of some soreness in her neck.  On reexam, cervical spine is nontender.  There is mild soft tissue tenderness over the cervical paraspinal soft tissues bilaterally.  Patient was advised to take Tylenol and to apply ice.  Return precautions were discussed.  Patient will be discharged.    [WH]   1430 Patient presented to the ED after falling.  She had a small laceration with of the left forehead which was repaired with skin adhesive.  Head CT was negative acute.  INR was slightly low at 1.9.  Patient had a normal neuro exam and a GCS of 15.    [WH]      ED Course User Index  [WH] Kwabena Montemayor MD       AS OF 14:30 EDT VITALS:    BP - 156/84  HR - 77  TEMP - 97 °F (36.1 °C) (Tympanic)  O2 SATS - 96%      DIAGNOSIS  Final diagnoses:   Laceration of forehead, initial encounter   Fall, initial encounter   Anticoagulated on warfarin         DISPOSITION  Discharge    DISCHARGE    Patient discharged in stable condition.    Reviewed implications of results, diagnosis, meds, responsibility to follow up, warning signs and symptoms of possible worsening, potential complications and reasons to return to ER, including worsening headache, confusion, dizziness, nausea, vomiting, vision changes, or other concern..    Patient/Family voiced understanding of above instructions.    Discussed plan for discharge, as there is no emergent indication for admission. Patient referred to primary care  provider for BP management due to today's BP. Pt/family is agreeable and understands need for follow up and repeat testing.  Pt is aware that discharge does not mean that nothing is wrong but it indicates no emergency is present that requires admission and they must continue care with follow-up as given below or physician of their choice.     FOLLOW-UP  Elsy Ventura  [address]  [facility] [address]  [phone]    Schedule an appointment as soon as possible for a visit            Medication List      No changes were made to your prescriptions during this visit.           Dictated utilizing Dragon dictation:  Much of this encounter note is an electronic transcription/translation of spoken language to printed text. The electronic translation of spoken language may permit erroneous, or at times, nonsensical words or phrases to be inadvertently transcribed; Although I have reviewed the note for such errors, some may still exist.     Kwabena Montemayor MD  03/21/21 5051

## 2021-03-21 NOTE — ED TRIAGE NOTES
Pt states fell out of bed this morning and hit left side head near eyebrow on bedside table and has small laceration. Pt denies LOC, pt is on coumadin.    Patient masked in first look triage. I was wearing mask and goggles.

## 2021-05-10 NOTE — H&P
History and Physical      Patient Name: Alice Maxwell   Patient ID: [id number]   Sex: Female   YOB: 1945        Visit Date: January 22, 2021    Provider: Herb Small MD   Location: [facility] [place]   Location Address: [address] [place] [address]  [id number]   Location Phone: [phone]          Chief Complaint  · Left Hip Fracture      History Of Present Illness  Alice Maxwell is a 75 year old female who presents today to [facility]. Patient is here status-post hemiarthroplasty performed on 12/22/20. Patient states moderate fatigue and pain in the left hip, radiating down to the left knee. Patient states home health is providing physical therapy. Patient is here in a wheelchair. Patients daughter is in the room.       Past Medical History  Anemia, Unspecified; Arthritis; Blood Diseases; Cancer; Diabetes; Limb Swelling; Stroke; Thyroid disorder         Past Surgical History  Back; Colonoscopy         Medication List  allopurinol 100 mg oral tablet; atorvastatin 20 mg oral tablet; cholecalciferol (vitamin D3) 50 mcg (2,000 unit) oral capsule; folic acid 1 mg oral tablet; levothyroxine 100 mcg oral capsule; metformin 500 mg oral tablet; Norco 7.5-325 mg oral tablet; warfarin 6 mg oral tablet         Allergy List  PENICILLINS         Family Medical History  Cancer, Unspecified; Blood Diseases         Social History  Alcohol Use (Never); lives with spouse; .; Recreational Drug Use (Never); Retired.; Tobacco (Never)         Review of Systems  · Constitutional  o Denies  o : fever, chills, weight loss  · Cardiovascular  o Denies  o : chest pain, shortness of breath  · Gastrointestinal  o Denies  o : liver disease, heartburn, nausea, blood in stools  · Genitourinary  o Denies  o : painful urination, blood in urine  · Integument  o Denies  o : rash, itching  · Neurologic  o Admits  o : weakness  o Denies  o : headache, loss of  "consciousness  · Musculoskeletal  o Admits  o : painful, swollen joints  · Psychiatric  o Denies  o : drug/alcohol addiction, anxiety, depression      Vitals  Date Time BP Position Site L\R Cuff Size HR RR TEMP (F) WT  HT  BMI kg/m2 BSA m2 O2 Sat FR L/min FiO2 HC       01/22/2021 01:03 PM      85 - R   212lbs 16oz 5'  7\" 33.36 2.14 97 %            Physical Examination  · Constitutional  o Appearance  o : well developed, well-nourished, no obvious deformities present  · Head and Face  o Head  o :   § Inspection  § : normocephalic  o Face  o :   § Inspection  § : no facial lesions  · Eyes  o Conjunctivae  o : conjunctivae normal  o Sclerae  o : sclerae white  · Ears, Nose, Mouth and Throat  o Ears  o :   § External Ears  § : appearance within normal limits  § Hearing  § : intact  o Nose  o :   § External Nose  § : appearance normal  · Neck  o Inspection/Palpation  o : normal appearance  o Range of Motion  o : full range of motion  · Respiratory  o Respiratory Effort  o : breathing unlabored  o Inspection of Chest  o : normal appearance  o Auscultation of Lungs  o : no audible wheezing or rales  · Cardiovascular  o Heart  o : regular rate  · Gastrointestinal  o Abdominal Examination  o : soft and non-tender  · Skin and Subcutaneous Tissue  o General Inspection  o : intact, no rashes  · Psychiatric  o General  o : Alert and oriented x3  o Judgement and Insight  o : judgment and insight intact  o Mood and Affect  o : mood normal, affect appropriate  · Left Hip  o Inspection  o : Her incision is well-healed without signs of infection. She had mild tenderness over the hip flexor tendons, greater trochanteric bursa. Her calf is supple, nontender. Negative Holmans sign. Neurovascularly and sensation grossly intact. X-rays show stable left hip hemiarthroplasty.  · In Office Procedures  o View  o : AP/LATERAL  o Site  o : left, hip   o Indication  o : Left Hip Fracture  o Study  o : X-rays ordered, taken in the office, and " reviewed today.  o Xray  o : X-rays show stable left hip hemiarthroplasty.  o Comparative Data  o : No comparative data found          Assessment  · Left hip hemiarthroplasty-Aftercare; following joint replacement     V54.81/Z47.1  · Hip fracture     820.8/S72.009A      Plan  · Orders  o Hip (Left) 2 or more views (includes AP Pelvis) [facility] Preferred View (18780) - 820.8/S72.009A - 01/22/2021  · Medications  o Medications have been Reconciled  o Transition of Care or Provider Policy  · Instructions  o Reviewed the patient's Past Medical, Social, and Family history as well as the ROS at today's visit, no changes.  o Call or return if worsening symptoms.  o This note is transcribed by Bria holder/ashley  o Continue home health. Prescribed Norco 7.5/325 1 pill q6h as needed for pain, #25. Follow-up in 1 month, re-x-ray at that time.             Electronically Signed by: Bria Steel, -Author on January 25, 2021 08:46:17 AM  Electronically Co-signed by: Sepideh Molina PA-C -Reviewer on January 25, 2021 12:06:36 PM  Electronically Co-signed by: Herb Small MD -Reviewer on January 26, 2021 09:37:53 PM

## 2021-05-14 VITALS — WEIGHT: 213 LBS | HEART RATE: 85 BPM | HEIGHT: 67 IN | OXYGEN SATURATION: 97 % | BODY MASS INDEX: 33.43 KG/M2

## 2021-09-27 ENCOUNTER — OFFICE VISIT (OUTPATIENT)
Dept: ORTHOPEDIC SURGERY | Facility: CLINIC | Age: 76
End: 2021-09-27

## 2021-09-27 VITALS — WEIGHT: 240.6 LBS | BODY MASS INDEX: 37.76 KG/M2 | HEART RATE: 74 BPM | OXYGEN SATURATION: 98 % | HEIGHT: 67 IN

## 2021-09-27 DIAGNOSIS — M70.62 TROCHANTERIC BURSITIS OF LEFT HIP: Primary | ICD-10-CM

## 2021-09-27 DIAGNOSIS — Z47.89 ORTHOPEDIC AFTERCARE: ICD-10-CM

## 2021-09-27 DIAGNOSIS — M25.552 LEFT HIP PAIN: ICD-10-CM

## 2021-09-27 PROCEDURE — 99213 OFFICE O/P EST LOW 20 MIN: CPT | Performed by: ORTHOPAEDIC SURGERY

## 2021-09-27 RX ORDER — FOLIC ACID 1 MG/1
TABLET ORAL
COMMUNITY

## 2021-09-27 RX ORDER — LEVOTHYROXINE SODIUM 0.1 MG/1
100 TABLET ORAL DAILY
COMMUNITY
Start: 2021-07-23

## 2021-09-27 RX ORDER — OMEGA-3 FATTY ACIDS CAP DELAYED RELEASE 1000 MG 1000 MG
1 CAPSULE DELAYED RELEASE ORAL
COMMUNITY

## 2021-09-27 RX ORDER — UREA 10 %
LOTION (ML) TOPICAL
COMMUNITY

## 2021-09-27 RX ORDER — WARFARIN SODIUM 5 MG/1
TABLET ORAL
COMMUNITY
Start: 2021-08-23

## 2021-09-27 RX ORDER — UBIDECARENONE 75 MG
50 CAPSULE ORAL
COMMUNITY

## 2021-09-27 RX ORDER — OXYBUTYNIN CHLORIDE 5 MG/1
TABLET ORAL
COMMUNITY

## 2021-09-27 RX ORDER — ALLOPURINOL 100 MG/1
TABLET ORAL
COMMUNITY

## 2021-09-27 RX ORDER — MUPIROCIN CALCIUM 20 MG/G
CREAM TOPICAL 3 TIMES DAILY
COMMUNITY

## 2021-09-27 RX ORDER — PHENOL 1.4 %
AEROSOL, SPRAY (ML) MUCOUS MEMBRANE
COMMUNITY

## 2021-09-27 RX ORDER — OMEPRAZOLE 20 MG/1
CAPSULE, DELAYED RELEASE ORAL EVERY 12 HOURS SCHEDULED
COMMUNITY

## 2021-09-27 RX ORDER — ATORVASTATIN CALCIUM 20 MG/1
TABLET, FILM COATED ORAL
COMMUNITY

## 2021-09-27 NOTE — PROGRESS NOTES
"Chief Complaint  Pain of the Left Hip     Subjective      Alice Maxwell presents to [facility] for an evaluation of left hip. She states she has weakness and pain with hip range of motion. She states she is more concerned about the weakness in her hip. She states that she has been doing at home exercises everyday. She denies any injury to her left hip. Patient has a history of a hemiarthroplasty performed on 12/22/20. She points to the lateral aspect of her hip as her main source of the pain. Patient states she has started taking prolia and wonder if this is causing her weakness and pain.     Allergies   Allergen Reactions   • Penicillins Dermatitis        Social History     Socioeconomic History   • Marital status:      Spouse name: Not on file   • Number of children: Not on file   • Years of education: Not on file   • Highest education level: Not on file   Tobacco Use   • Smoking status: Never Smoker   Substance and Sexual Activity   • Alcohol use: No   • Drug use: No   • Sexual activity: Defer        Review of Systems     Objective   Vital Signs:   Pulse 74   Ht 170.2 cm (67\")   Wt 109 kg (240 lb 9.6 oz)   SpO2 98%   BMI 37.68 kg/m²       Physical Exam  Constitutional:       Appearance: Normal appearance. Patient is well-developed and normal weight.   HENT:      Head: Normocephalic.      Right Ear: Hearing and external ear normal.      Left Ear: Hearing and external ear normal.      Nose: Nose normal.   Eyes:      Conjunctiva/sclera: Conjunctivae normal.   Cardiovascular:      Rate and Rhythm: Normal rate.   Pulmonary:      Effort: Pulmonary effort is normal.      Breath sounds: No wheezing or rales.   Abdominal:      Palpations: Abdomen is soft.      Tenderness: There is no abdominal tenderness.   Musculoskeletal:      Cervical back: Normal range of motion.   Skin:     Findings: No rash.   Neurological:      Mental Status: Patient  is alert and oriented to person, " place, and time.   Psychiatric:         Mood and Affect: Mood and affect normal.         Judgment: Judgment normal.       Ortho Exam      LEFT HIP: Ambulation with a cane. Well healed scar. Good tone of hip flexors, hip extensors, hip adductor, hip abductors. No groin pain with hip range of motion. Weakness with leg raise. Antalgic gait. Full weight bearing. Tender greater trochanter. Sensation grossly intact. Neurovascular intact. Calf supple, non-tender, no signs of DVT. Dorsal Pedal Pulse 2+, posterior tibialis pulse 2+.     Procedures        Imaging Results (Most Recent)     Procedure Component Value Units Date/Time    XR Hip With or Without Pelvis 2 - 3 View Left [[id number]] Resulted: 09/27/21 1320     Updated: 09/27/21 1324           Result Review :       X-Ray Report:  Left hip(s) X-Ray  Indication: Evaluation of left hip pain   AP and Lateral view(s)  Findings: Intact left hemiarthroplasty with no evidence of complication.   Prior studies available for comparison: no           Assessment and Plan     DX: History of left hemiarthroplasty   Left hip trochanteric bursitis     Patient is to continue her home exercises, she was also provided with a prescription for physical therapy. If she begins to experience groin pain, she will follow-up with us.     Call or return if worsening symptoms.    Follow Up     PRN.       Patient was given instructions and counseling regarding her condition or for health maintenance advice. Please see specific information pulled into the AVS if appropriate.     Scribed for Herb Small MD by Shy Palm.  09/27/21   13:46 EDT    I have personally performed the services described in this document as scribed by the above individual and it is both accurate and complete. Herb Small MD 09/29/21

## 2023-09-20 NOTE — PLAN OF CARE
Goal Outcome Evaluation:  Plan of Care Reviewed With: patient  Progress: no change   Slept fair. Melatonin 3mg given for sleep. Norco given for Lt. Hip & Thigh pain, with some relief. Meds whole with water. Continent of B&B. Lt. Hip dressing dry & intact. Miconazole applied to Lt. Groin, see MAR. Wore SCD's most of the night.   36.4

## 2024-04-17 NOTE — THERAPY TREATMENT NOTE
Discharge Instructions            Discharge Instructions      Visit Discharge/Physician Orders     Discharge condition: Stable     Assessment of pain at discharge:     Anesthetic used: 4% topical lidocaine     Discharge to: Home     Left via:Private automobile     Accompanied by: accompanied by spouse     [facility]: [facility]     Dressing Orders: clean left leg and right leg with saline, apply HF BLUE,  kerramax super absorber, abd, coban 2, change twice weekly    Zinc oxide to reynaldo wound         Treatment Orders: elevate legs above level of heart  X 1 culture of left leg         [facility] followup visit _____________1 week_______  (Please note your next appointment above and if you are unable to keep, kindly give a 24 hour notice. Thank you.)     Physician signature:__________________________        If you experience any of the following, please call the Wound Care Center during business hours:     * Increase in Pain  * Temperature over 101  * Increase in drainage from your wound  * Drainage with a foul odor  * Bleeding  * Increase in swelling  * Need for compression bandage changes due to slippage, breakthrough drainage.     If you need medical attention outside of the business hours of the Wound Care Centers please contact your PCP or go to the nearest emergency room.         Inpatient Rehabilitation - Physical Therapy Treatment Note       [facility]     Patient Name: Alice Maxwell  : 1945  MRN: [id number]    Today's Date: 2021                    Admit Date: 2020      Visit Dx:     ICD-10-CM ICD-9-CM   1. Heartburn  R12 787.1       Patient Active Problem List   Diagnosis   • Stroke (CMS/HCC)   • Personal history of breast cancer   • History of loop electrical excision procedure (LEEP)   • History of abnormal cervical Pap smear   • S/p left hip fracture       Past Medical History:   Diagnosis Date   • Abnormal Pap smear of cervix    • Arthritis    • Cancer (CMS/HCC)     breast   • Disease of thyroid gland    • Osteoporosis    • Stroke (CMS/HCC)    • Stroke (CMS/HCC)     x2   • Urinary tract infection        Past Surgical History:   Procedure Laterality Date   • BACK SURGERY     • BREAST LUMPECTOMY     • CERVICAL BIOPSY  W/ LOOP ELECTRODE EXCISION     • CHOLECYSTECTOMY     • COLONOSCOPY      had polyps    • FOOT SURGERY     • GASTRIC BYPASS     • HERNIA REPAIR     • THYROIDECTOMY            PT ASSESSMENT (last 12 hours)      [facility] PT Evaluation and Treatment     Row Name 21 1034          PT Time and Intention    Document Type  daily treatment  -LB     Mode of Treatment  physical therapy  -LB     Patient/Family/Caregiver Comments/Observations  pt lethargic but wakes up easily  -LB     Row Name 21 1034          General Information    Existing Precautions/Restrictions  fall;left;hip, anterior  -LB     Row Name 21 1034          Cognition/Psychosocial    Affect/Mental Status (Cognitive)  WFL  -LB     Follows Commands (Cognition)  follows one-step commands  -LB     Personal Safety Interventions  fall prevention program maintained;gait belt;muscle strengthening facilitated;nonskid shoes/slippers when out of bed  -LB     Row Name 21 1034          Pain Scale: Numbers Pre/Post-Treatment    Pretreatment Pain Rating  8/10  -LB     Posttreatment Pain  Rating  9/10  -LB     Pain Location - Side  Left  -LB     Pain Location  hip  -LB     Row Name 01/06/21 1034          Bed Mobility    Supine-Sit [place] (Bed Mobility)  verbal cues;contact guard  -LB     Sit-Supine [place] (Bed Mobility)  verbal cues;minimum assist (75% patient effort)  -LB     Comment (Bed Mobility)  assist for left leg; on treatment mat  -LB     Row Name 01/06/21 1034          Transfers    Bed-Chair [place] (Transfers)  contact guard  -LB     Chair-Bed [place] (Transfers)  contact guard  -LB     Assistive Device (Bed-Chair Transfers)  walker, front-wheeled  -LB     Sit-Stand [place] (Transfers)  standby assist  -LB     Stand-Sit [place] (Transfers)  standby assist  -LB     Row Name 01/06/21 1034          Chair-Bed Transfer    Assistive Device (Chair-Bed Transfers)  walker, front-wheeled  -LB     Row Name 01/06/21 1034          Sit-Stand Transfer    Assistive Device (Sit-Stand Transfers)  walker, front-wheeled  -LB     Row Name 01/06/21 1034          Stand-Sit Transfer    Assistive Device (Stand-Sit Transfers)  walker, front-wheeled  -LB     Row Name 01/06/21 1034          Gait/Stairs (Locomotion)    [place] Level (Gait)  contact guard  -LB     Assistive Device (Gait)  walker, front-wheeled  -LB     Distance in Feet (Gait)  65; 30 x 2  -LB     Deviations/Abnormal Patterns (Gait)  stride length decreased;left sided deviations;antalgic  -LB     Bilateral Gait Deviations  forward flexed posture  -LB     Left Sided Gait Deviations  heel strike decreased  -LB     Row Name 01/06/21 1034          Safety Issues, Functional Mobility    Impairments Affecting Function (Mobility)  endurance/activity tolerance;pain;strength  -LB     Row Name 01/06/21 1034          Hip (Therapeutic Exercise)    Hip Strengthening (Therapeutic Exercise)  right;flexion;sitting;2 sets;10 repetitions Left supine abd/add; HS SAQS 2 sets of 10  -LB     Row Name 01/06/21 1034          Knee (Therapeutic  Exercise)    Knee Strengthening (Therapeutic Exercise)  bilateral;LAQ (long arc quad);2 sets;10 repetitions  -LB     Row Name 01/06/21 1034          Ankle (Therapeutic Exercise)    Ankle Strengthening (Therapeutic Exercise)  bilateral;dorsiflexion;plantarflexion;sitting;10 repetitions;2 sets  -LB     Row Name 01/06/21 1034          Positioning and Restraints    Pre-Treatment Position  sitting in chair/recliner  -LB     Post Treatment Position  wheelchair  -LB     In Wheelchair  encouraged to call for assist;call light within reach;exit alarm on  -LB       User Key  (r) = Recorded By, (t) = Taken By, (c) = Cosigned By    Initials Name Provider Type    Luly Adams, PT Physical Therapist        Wound 12/27/20 1601 Left anterior thigh Incision (Active)   Dressing Appearance dry;intact 01/06/21 0800   Closure NADIA 01/05/21 1930   Base dressing in place, unable to visualize 01/05/21 1930       Wound 12/27/20 1602 Left lateral thigh Blisters (Active)   Dressing Appearance dry;intact 01/06/21 0800   Closure NADIA 01/05/21 1930   Base dressing in place, unable to visualize 01/05/21 1930       Wound 12/27/20 1602 Left anterior groin (Active)   Dressing Appearance open to air 01/06/21 0800   Base moist;white;pink 01/06/21 0800   Periwound redness;blanchable 01/06/21 0800   Drainage Amount none 01/06/21 0800   Care, Wound cleansed with;soap and water 01/06/21 0800     Physical Therapy Education                 Title: PT OT SLP Therapies (In Progress)     Topic: Physical Therapy (In Progress)     Point: Mobility training (Done)     Learning Progress Summary           Patient Acceptance, E,TB, VU,NR by LB at 1/6/2021 1043    Acceptance, E,TB, VU,NR by LB at 1/5/2021 1027    Acceptance, E,TB, VU,NR by EE at 1/3/2021 0917    Acceptance, E,TB, VU,NR by EE at 1/2/2021 0921    Acceptance, E, VU,NR by LB1 at 1/1/2021 1204    Acceptance, E,TB, VU,NR by LB at 12/31/2020 1057    Acceptance, E,TB, VU,NR by LB at 12/30/2020 1130     Comment: walking    Acceptance, E,TB, VU,NR by LB at 12/29/2020 1050    Acceptance, E,TB, NR,VU by LB at 12/28/2020 1138                   Point: Home exercise program (Done)     Learning Progress Summary           Patient Acceptance, E,TB, VU,NR by LB at 1/4/2021 1038    Acceptance, E,TB, VU,NR by EE at 1/3/2021 0917    Acceptance, E,TB, VU,NR by EE at 1/2/2021 0921    Acceptance, E,TB, NR,VU by LB at 12/28/2020 1138                   Point: Body mechanics (Not Started)     Learner Progress:  Not documented in this visit.          Point: Precautions (Done)     Learning Progress Summary           Patient Acceptance, E,TB, VU,NR by EE at 1/3/2021 0917    Acceptance, E,TB, VU,NR by EE at 1/2/2021 0921                               User Key     Initials Effective Dates Name Provider Type Discipline     04/03/18 -  Luly Francisco, PT Physical Therapist PT    [facility] 03/07/18 -  Munira Azevedo PTA Physical Therapy Assistant PT     04/03/18 -  Jaqueline Soni, [place] Physical Therapist PT                PT Recommendation and Plan    Planned Therapy Interventions (PT): bed mobility training, gait training, home exercise program, strengthening, transfer training  Frequency of Treatment (PT): 90 minutes per session                     Time Calculation:     PT Charges     Row Name 01/06/21 1259 01/06/21 1057          Time Calculation    Start Time  1230  -LB  1000  -LB     Stop Time  1300  -LB  1100  -LB     Time Calculation (min)  30 min  -LB  60 min  -LB     PT Received On  01/06/21  -LB  01/06/21  -LB     PT - Next Appointment  01/07/21  -LB  --       User Key  (r) = Recorded By, (t) = Taken By, (c) = Cosigned By    Initials Name Provider Type    LB Luly Francisco, PT Physical Therapist          Therapy Charges for Today     Code Description Service Date Service Provider Modifiers Qty    24664714474 HC GAIT TRAINING EA 15 MIN 1/5/2021 Luly Francisco, PT GP 3    20222633109 HC PT THER PROC EA 15 MIN 1/5/2021 Luly Francisco, PT GP  2    [id number]  PT THERAPEUTIC ACT EA 15 MIN 1/5/2021 Baehl, Luly B, PT GP 1    43581722783 HC PT THER SUPP EA 15 MIN 1/5/2021 Baehl, Luly B, PT GP 2    48052849205 HC GAIT TRAINING EA 15 MIN 1/6/2021 Baehl, Luly B, PT GP 2    11601222937 HC PT THER PROC EA 15 MIN 1/6/2021 Baehl, Luly B, PT GP 3    61983304953 HC PT THERAPEUTIC ACT EA 15 MIN 1/6/2021 Baehl, Luly B, PT GP 1    [id number]  PT THER SUPP EA 15 MIN 1/6/2021 Baehl Luly B, PT GP 1              Patient was wearing a face mask during this therapy encounter. Therapist used appropriate personal protective equipment including eye protection, mask, and gloves.  Mask used was standard procedure mask. Appropriate PPE was worn during the entire therapy session. Hand hygiene was completed before and after therapy session. Patient is not in enhanced droplet precautions.           Luly Francisco PT  1/6/2021